# Patient Record
Sex: FEMALE | Race: WHITE | NOT HISPANIC OR LATINO | Employment: OTHER | URBAN - NONMETROPOLITAN AREA
[De-identification: names, ages, dates, MRNs, and addresses within clinical notes are randomized per-mention and may not be internally consistent; named-entity substitution may affect disease eponyms.]

---

## 2017-01-09 ENCOUNTER — COMMUNICATION - GICH (OUTPATIENT)
Dept: FAMILY MEDICINE | Facility: OTHER | Age: 67
End: 2017-01-09

## 2017-01-09 DIAGNOSIS — E78.5 HYPERLIPIDEMIA: ICD-10-CM

## 2017-02-13 ENCOUNTER — AMBULATORY - GICH (OUTPATIENT)
Dept: RADIOLOGY | Facility: OTHER | Age: 67
End: 2017-02-13

## 2017-02-13 DIAGNOSIS — R06.09 OTHER FORMS OF DYSPNEA: ICD-10-CM

## 2017-02-22 ENCOUNTER — MEDICAL CORRESPONDENCE (OUTPATIENT)
Facility: CLINIC | Age: 67
End: 2017-02-22
Payer: COMMERCIAL

## 2017-02-22 ENCOUNTER — TRANSFERRED RECORDS (OUTPATIENT)
Dept: HEALTH INFORMATION MANAGEMENT | Facility: CLINIC | Age: 67
End: 2017-02-22

## 2017-02-22 ENCOUNTER — HOSPITAL ENCOUNTER (OUTPATIENT)
Dept: RADIOLOGY | Facility: OTHER | Age: 67
End: 2017-02-22

## 2017-02-22 DIAGNOSIS — R06.09 OTHER FORMS OF DYSPNEA: ICD-10-CM

## 2017-02-22 PROCEDURE — 93306 TTE W/DOPPLER COMPLETE: CPT | Mod: 26 | Performed by: INTERNAL MEDICINE

## 2017-08-24 ENCOUNTER — COMMUNICATION - GICH (OUTPATIENT)
Dept: FAMILY MEDICINE | Facility: OTHER | Age: 67
End: 2017-08-24

## 2017-11-25 ENCOUNTER — HISTORY (OUTPATIENT)
Dept: MEDSURG UNIT | Facility: OTHER | Age: 67
End: 2017-11-25

## 2017-12-01 ENCOUNTER — COMMUNICATION - GICH (OUTPATIENT)
Dept: FAMILY MEDICINE | Facility: OTHER | Age: 67
End: 2017-12-01

## 2017-12-04 ENCOUNTER — AMBULATORY - GICH (OUTPATIENT)
Dept: SCHEDULING | Facility: OTHER | Age: 67
End: 2017-12-04

## 2017-12-04 ENCOUNTER — COMMUNICATION - GICH (OUTPATIENT)
Dept: FAMILY MEDICINE | Facility: OTHER | Age: 67
End: 2017-12-04

## 2017-12-11 ENCOUNTER — HISTORY (OUTPATIENT)
Dept: INTERNAL MEDICINE | Facility: OTHER | Age: 67
End: 2017-12-11

## 2017-12-11 ENCOUNTER — OFFICE VISIT - GICH (OUTPATIENT)
Dept: INTERNAL MEDICINE | Facility: OTHER | Age: 67
End: 2017-12-11

## 2017-12-11 DIAGNOSIS — J18.9 PNEUMONIA: ICD-10-CM

## 2017-12-11 DIAGNOSIS — C18.9 MALIGNANT NEOPLASM OF COLON (H): ICD-10-CM

## 2017-12-11 DIAGNOSIS — M05.9 RHEUMATOID ARTHRITIS WITH RHEUMATOID FACTOR (H): ICD-10-CM

## 2017-12-11 DIAGNOSIS — I10 ESSENTIAL (PRIMARY) HYPERTENSION: ICD-10-CM

## 2017-12-11 DIAGNOSIS — N18.30 CHRONIC KIDNEY DISEASE, STAGE III (MODERATE) (H): ICD-10-CM

## 2017-12-11 DIAGNOSIS — M71.22 SYNOVIAL CYST OF LEFT POPLITEAL SPACE: ICD-10-CM

## 2017-12-11 ASSESSMENT — PATIENT HEALTH QUESTIONNAIRE - PHQ9: SUM OF ALL RESPONSES TO PHQ QUESTIONS 1-9: 0

## 2018-01-02 NOTE — TELEPHONE ENCOUNTER
Patient Information     Patient Name MRN Sex Laura Leroy [id number] Female 1950      Telephone Encounter by Everett Cooper RN at 1/10/2017  9:07 AM     Author:  Everett Cooper RN Service:  (none) Author Type:  NURS- Registered Nurse     Filed:  1/10/2017  9:14 AM Encounter Date:  2017 Status:  Signed     :  Everett Cooper RN (NURS- Registered Nurse)            [facility]    Office visit in the past 12 months.    Last visit with OLIVER MUKHERJEE was on: 2015 in [facility]  Next visit with OLIVER MUKHERJEE is on: No future appointment listed with this provider  Next visit with Family Practice is on: No future appointment listed in this department    Last Lipids:  Chol: 213    12/3/2015  T    12/3/2015  HDL:   40    12/3/2015  LDL:  121    12/3/2015  LDL DIRECT:  No results found in past 5 years    .    Max refills 12 months from last office visit.      Patient is due for medication management appointment as last office visit to address Hyperlipidemia as well as pravachol was on 2015. Patient was also to follow up 2 months after 12/3/2015 office visit with PCP. No office visit with PCP noted in chart. Limited refill provided at this time and letter/mychart message sent for reminder to patient. Prescription refilled per RN Medication Refill Policy.................... Everett Cooper RN ....................  1/10/2017   9:10 AM

## 2018-01-26 ENCOUNTER — DOCUMENTATION ONLY (OUTPATIENT)
Dept: FAMILY MEDICINE | Facility: OTHER | Age: 68
End: 2018-01-26

## 2018-01-26 PROBLEM — M94.9 DISORDER OF BONE AND CARTILAGE: Status: ACTIVE | Noted: 2018-01-26

## 2018-01-26 PROBLEM — Z86.0100 HISTORY OF COLONIC POLYPS: Status: ACTIVE | Noted: 2018-01-26

## 2018-01-26 PROBLEM — L71.9 ACNE ROSACEA: Status: ACTIVE | Noted: 2018-01-26

## 2018-01-26 PROBLEM — N18.30 CHRONIC KIDNEY DISEASE (CKD), STAGE III (MODERATE) (H): Status: ACTIVE | Noted: 2017-12-11

## 2018-01-26 PROBLEM — M89.9 DISORDER OF BONE AND CARTILAGE: Status: ACTIVE | Noted: 2018-01-26

## 2018-01-26 PROBLEM — M81.0 OSTEOPOROSIS: Status: ACTIVE | Noted: 2017-11-27

## 2018-01-26 PROBLEM — E78.5 HYPERLIPIDEMIA: Status: ACTIVE | Noted: 2018-01-26

## 2018-01-26 PROBLEM — G43.909 MIGRAINE HEADACHE: Status: ACTIVE | Noted: 2018-01-26

## 2018-01-26 PROBLEM — E03.9 HYPOTHYROIDISM: Status: ACTIVE | Noted: 2018-01-26

## 2018-01-26 PROBLEM — C18.9 MALIGNANT TUMOR OF COLON (H): Status: ACTIVE | Noted: 2017-11-26

## 2018-01-26 PROBLEM — J18.9 HCAP (HEALTHCARE-ASSOCIATED PNEUMONIA): Status: ACTIVE | Noted: 2017-11-28

## 2018-01-26 PROBLEM — M71.22 POPLITEAL CYST, LEFT: Status: ACTIVE | Noted: 2018-01-26

## 2018-01-26 RX ORDER — FORMOTEROL FUMARATE DIHYDRATE 20 UG/2ML
2 SOLUTION RESPIRATORY (INHALATION) 2 TIMES DAILY
COMMUNITY
End: 2019-07-08

## 2018-01-26 RX ORDER — ASPIRIN 81 MG/1
81 TABLET, CHEWABLE ORAL
COMMUNITY

## 2018-01-26 RX ORDER — ALBUTEROL SULFATE 0.83 MG/ML
2.5 SOLUTION RESPIRATORY (INHALATION) EVERY 4 HOURS PRN
Status: ON HOLD | COMMUNITY
Start: 2017-12-11 | End: 2018-06-16

## 2018-01-26 RX ORDER — LANCETS
EACH MISCELLANEOUS
COMMUNITY
Start: 2014-12-29 | End: 2018-08-22

## 2018-01-26 RX ORDER — MONTELUKAST SODIUM 10 MG/1
10 TABLET ORAL AT BEDTIME
COMMUNITY
End: 2019-07-08

## 2018-01-26 RX ORDER — AMLODIPINE BESYLATE 5 MG/1
5 TABLET ORAL DAILY
Status: ON HOLD | COMMUNITY
End: 2018-06-16

## 2018-01-26 RX ORDER — ESTRADIOL 10 UG/1
1 INSERT VAGINAL
Status: ON HOLD | COMMUNITY
Start: 2015-05-12 | End: 2018-06-16

## 2018-01-26 RX ORDER — TRAZODONE HYDROCHLORIDE 100 MG/1
100 TABLET ORAL AT BEDTIME
Status: ON HOLD | COMMUNITY
End: 2018-06-28

## 2018-01-26 RX ORDER — PREDNISONE 5 MG/1
10 TABLET ORAL
COMMUNITY
End: 2020-11-02

## 2018-01-26 RX ORDER — FENOFIBRATE 48 MG/1
48 TABLET, COATED ORAL
COMMUNITY
End: 2019-07-08

## 2018-01-26 RX ORDER — LOSARTAN POTASSIUM 25 MG/1
25 TABLET ORAL DAILY
Status: ON HOLD | COMMUNITY
Start: 2017-11-30 | End: 2018-06-28

## 2018-01-26 RX ORDER — EPINEPHRINE 0.3 MG/.3ML
0.3 INJECTION SUBCUTANEOUS
COMMUNITY
Start: 2014-05-27 | End: 2018-07-05

## 2018-01-26 RX ORDER — LEVOTHYROXINE SODIUM 112 UG/1
112 TABLET ORAL
Status: ON HOLD | COMMUNITY
End: 2018-06-28

## 2018-01-26 RX ORDER — TRAMADOL HYDROCHLORIDE 50 MG/1
50 TABLET ORAL AT BEDTIME
Status: ON HOLD | COMMUNITY
End: 2018-06-28

## 2018-01-26 RX ORDER — PRAVASTATIN SODIUM 40 MG
40 TABLET ORAL AT BEDTIME
COMMUNITY
Start: 2017-01-10 | End: 2018-06-15

## 2018-01-26 RX ORDER — ALBUTEROL SULFATE 90 UG/1
2 AEROSOL, METERED RESPIRATORY (INHALATION) EVERY 4 HOURS PRN
Status: ON HOLD | COMMUNITY
Start: 2017-12-11 | End: 2018-06-28

## 2018-01-26 RX ORDER — BUDESONIDE 0.5 MG/2ML
0.5 INHALANT ORAL 2 TIMES DAILY
COMMUNITY
End: 2019-07-08

## 2018-02-09 VITALS
HEART RATE: 88 BPM | DIASTOLIC BLOOD PRESSURE: 72 MMHG | BODY MASS INDEX: 24.45 KG/M2 | WEIGHT: 143.2 LBS | SYSTOLIC BLOOD PRESSURE: 143 MMHG | HEIGHT: 64 IN

## 2018-02-11 ASSESSMENT — PATIENT HEALTH QUESTIONNAIRE - PHQ9: SUM OF ALL RESPONSES TO PHQ QUESTIONS 1-9: 0

## 2018-02-12 NOTE — TELEPHONE ENCOUNTER
Patient Information     Patient Name MRN Sex Laura Leroy [id number] Female 1950      Telephone Encounter by Micki Warren RN at 2017  4:27 PM     Author:  Micki Warren RN  Service:  (none) Author Type:  NURS- Registered Nurse     Filed:  2017  1:41 PM  Encounter Date:  2017 Status:  Addendum     :  Yamel Pacheco MD (Physician)        Related Notes: Original Note by Micki Warren RN (NURS- Registered Nurse) filed at 2017  4:34 PM            URGENT: Response needed within 24 hours    This timeframe is established by CMS as  best practice  for the delivery of home health care. The home health clinician may need to contact you again if this timeframe is not met.      S:    Medication reconciliation discrepancies and/or drug interactions/contraindications have been identified.  Home Care s drug regime review has revealed significant medication issues.    B:    You are being contacted for orders related to medication issues.     A:     Patient is taking the following medications which is not on her clinic list:   VItamin B12 100 mcg daily.           Albuterol Neb   2.5 mg /3ml, 3ml every 4 hours as needed for SOB or wheezing.            The following medication is listed with conflicting doses:             Tramadol.  (her prescription bottle does say 1 tab 2 times a day) 1-2 tabs q6 prn              During a routine med reconciliation, the following mod/major med interactions were noted:          Levaquin and prednisone          levaquin and tramadol          levaquin and budesonide          Levaquin and oscal          Levaquin and trazodone          Prednisone and aspirin          Prednisone and calcium          Prednisone and vagifem          Aspirin and budesonide          oscal and synthroid          oscal and prilosec          Fenofibrate and pravastatin          prilosec and Vitamin B12   All of the above is okay      R:    Please evaluate this information and indicate  below whether or not changes are required. A copy of the patient's drug interaction/contraindications report is available upon request.     Thank you for your time. Please call with questions or concerns.       Bobbi Mcfarland MD  1:41 PM 12/5/2017

## 2018-02-12 NOTE — TELEPHONE ENCOUNTER
Patient Information     Patient Name MRN Sex Laura Leroy [id number] Female 1950      Telephone Encounter by Evita Horner CNA at 2017 11:30 AM     Author:  Evita Horner CNA Service:  (none) Author Type:  NURS- Nurse Assist/Care Tech     Filed:  2017 11:31 AM Encounter Date:  2017 Status:  Signed     :  Evita Horner CNA (NURS- Nurse Assist/Care Tech)            Patient has a appointment scheduled 17 with Dr. Ortega for a hospital follow up. Would Dr. Ortega be willing to add the home care phrase to his note (.homecarecertification) for the face to face statement for medicare. Thank you.     Nilam Horner LPN  Clinical Coordinator

## 2018-02-12 NOTE — NURSING NOTE
Patient Information     Patient Name MRN Sex Laura Leroy [id number] Female 1950      Nursing Note by Haley Lugo LPN at 2017  2:00 PM     Author:  Haley Lugo LPN Service:  (none) Author Type:  NURS- Licensed Practical Nurse     Filed:  2017  2:20 PM Encounter Date:  2017 Status:  Signed     :  Haley Lugo LPN (NURS- Licensed Practical Nurse)            The patient is here today to have a hospital follow up.  Haley Lugo LPN......2017  1:51 PM

## 2018-02-12 NOTE — PROGRESS NOTES
Patient Information     Patient Name MRN Sex Laura Leroy [id number] Female 1950      Progress Notes by Raul Ortega MD at 2017  2:00 PM     Author:  Raul Ortega MD  Service:  (none) Author Type:  Physician     Filed:  2017  3:21 PM  Encounter Date:  2017 Status:  Addendum     :  Raul Ortega MD (Physician)        Related Notes: Original Note by Raul Ortega MD (Physician) filed at 2017  2:49 PM            SUBJECTIVE:    Laura Perez is a 67 y.o. female who presents for hospital f/u.    HPI Comments: She comes in today after having been hospitalized with a pneumonia. She had initially fallen and injured her knee and was seen from that. She had this aspirated in the emergency room and was then admitted to the hospital for pain control at which time the pneumonia and developed. She has a history of chronic rheumatoid arthritis and has had numerous treatments in the past including Biologics. She had to come off of all biologic because of the numerous infections that she had while on these. She is currently just on chronic prednisone.    She was treated appropriately in the hospital and was discharged home on Levaquin. She completed that course of therapy and now feels back to normal. Her follow-up chest x-ray showed improvement and near resolution of the infiltrate so I don't think that a follow-up chest x-ray needs to be done today. She had fairly extensive blood work while in the hospital and that looked fine as well. Her knee is doing much better.    Prior to all this, the patient had a colonoscopy as she was basically do and also because there is a family history of colon cancer. She had a subtotal colectomy done. All of that was done in [place]. She does go to [place] for most of her medical care, she establish there years ago when her  was attending law school in that area. She continues to see a rheumatologist and pulmonologist as well as  a nephrologist in that community.    I spent time today reconciling medications as well as updating her past medical history, past surgical history, family history and social history. Immunizations are up-to-date.      Allergies      Allergen   Reactions     Codeine  Nausea Only and Other - Describe In Comment Field     Stomach problems      Glyburide  Nausea Only and Dizziness     Septra [Sulfamethoxazole-Trimethoprim]  Other - Describe In Comment Field     Rash, nausea, dizziness      Sulfa (Sulfonamide Antibiotics)  Rash   ,   Current Outpatient Prescriptions     Medication  Sig     albuterol (PROVENTIL) 0.083 % neb solution Inhale 3 mL via a nebulizer every 4 hours if needed.     albuterol HFA 90 mcg/actuation inhaler Inhale 2 Puffs by mouth every 4 hours if needed.     amLODIPine (NORVASC) 5 mg tablet Take 5 mg by mouth once daily.     aspirin chewable 81 mg chewable tablet Take 81 mg by mouth once daily with a meal.     budesonide (PULMICORT RESPULES) 0.5 mg/2 mL neb suspension Inhale 0.5 mg via a nebulizer 2 times daily.     calcium with vitamin D3 (OS-GIANNI 500 + D) tablet Take 1 tablet by mouth 2 times daily with meals.     cyanocobalamin (VITAMIN B-12) 100 mcg tablet Take  by mouth once daily.     EPINEPHrine (EPIPEN 2-MADIHA) 0.3 mg/0.3 mL (1:1,000) injection Inject 0.3 mg intramuscular one time if needed for Allergic Reaction for 1 dose.     fenofibrate nanocrystallized (TRICOR) 48 mg tablet Take 48 mg by mouth once daily with a meal.     Formoterol Fumarate 20 mcg/2 mL nebu Inhale 2 mL by mouth 2 times daily.     insulin aspart protamine-insulin aspart (NOVOLOG MIX 70-30) 100 unit/mL (70-30) FLEXPEN 35 units am, 18 units pm  Indications: Take 35 units in the morning. Take 18 units in the evening.     levothyroxine (SYNTHROID) 112 mcg tablet Take 112 mcg by mouth before breakfast.     losartan (COZAAR) 25 mg tablet Take 1 tablet by mouth once daily.     montelukast (SINGULAIR) 10 mg tablet Take 10 mg by mouth  at bedtime.     omeprazole (PRILOSEC) 20 mg Delayed-Release capsule TAKE 1 CAPSULE BY MOUTH ONCE DAILY BEFORE A MEAL     ONE TOUCH ULTRA TEST strip TEST 2 TIMES PER DAY.     ONE TOUCH ULTRASOFT LANCETS TEST 2 TIMES PER DAY     pravastatin (PRAVACHOL) 40 mg tablet TAKE 1 TABLET BY MOUTH AT BEDTIME     predniSONE (DELTASONE) 5 mg tablet Take 15 mg by mouth once daily with a meal.     traMADol (ULTRAM) 50 mg tablet Take 50 mg by mouth 2 times daily if needed for Pain.     traZODone (DESYREL) 100 mg tablet Take 100 mg by mouth at bedtime.     VAGIFEM 10 mcg tab vaginal tablet INSERT 1 TABLET INTO VAGINA THREE TIMES PER WEEK     No current facility-administered medications for this visit.      Medications have been reviewed by me and are current to the best of my knowledge and ability. ,   Past Medical History:     Diagnosis  Date     Acne rosacea      Colon cancer (HC) 9/2107     COPD (chronic obstructive pulmonary disease) (HC)      Diabetes mellitus (HC)     Diabetes Mellitus, prednisone induced      Diverticulosis     Diffuse diverticulosis and history of diminutive hyperplastic colon      History of migraine headaches      Hyperlipidemia      Hypertension      Hypothyroidism      Rheumatoid arthritis(714.0)     Rheumatologist Dr. Gonzalez, [phone], fax [phone]      Urosepsis 8/2013    admitted to [facility], cx ecoli    ,   Patient Active Problem List       Diagnosis  Date Noted     Chronic kidney disease (CKD), stage III (moderate)  12/11/2017     HTN (hypertension)  11/29/2017     HCAP (healthcare-associated pneumonia)  11/28/2017     Osteoporosis  11/27/2017     Popliteal cyst, left       Colon cancer (HC)  11/26/2017     Anemia of chronic kidney failure, stage 3 (moderate)  07/28/2017     Diabetes mellitus type 2, insulin dependent (HC)  07/28/2017     Chronic diastolic CHF (congestive heart failure), NYHA class 2 (HC)  02/13/2016     Non-rheumatic mitral valve stenosis  02/13/2016     On prednisone  therapy  01/15/2016     Orthostatic hypotension  01/15/2016     Secondary adrenal insufficiency (HC)  01/15/2016     Seropositive rheumatoid arthritis (HC)  01/15/2016     Lichen sclerosus et atrophicus  2015     COPD (chronic obstructive pulmonary disease) (HC)  2014     MIGRAINE HEADACHE       Hypothyroidism       HYPERLIPIDEMIA       ACNE ROSACEA       COLONIC POLYPS, HYPERPLASTIC, HX OF       diminutive at 25 cm          OSTEOPENIA               ,   Past Surgical History:      Procedure  Laterality Date     ARTHROPLASTY  98    Left MCP joint arthroplasty       ARTHROPLASTY      Right wrist arthroplasty       ARTHROPLASTY      Right long finger MCP repair [place]       ARTHROPLASTY      Right MTP 1 and 5 and left MTP 5       ARTHROPLASTY  3/01/04    Right MCP 4 and 5 arthroplasty.         CARPAL TUNNEL RELEASE      Left carpal tunnel repair       COLONOSCOPY SCREENING  08    Colonoscopy next due        HX of SURGERY FOOT      MTP 2, 3, 4 right foot       SUBTOTAL COLECTOMY  2017     TUBAL LIGATION      and   Social History        Substance Use Topics          Smoking status:   Former Smoker      Types:  Cigarettes      Smokeless tobacco:   Never Used      Alcohol use   1.5 oz/week     3 Glasses of wine per week        Comment: 3 daily       Family Status     Relation  Status     Mother  at age 82     Father  at age 45    MI      Son Alive     Son Alive     Son Alive     Daughter Alive     Sister Alive     Sister Alive     Sister Alive     Brother      No Family History      Social History     Social History        Marital status:       Spouse name: N/A     Number of children:  N/A     Years of education:  N/A     Social History Main Topics          Smoking status:   Former Smoker      Types:  Cigarettes      Smokeless tobacco:   Never Used      Alcohol use   1.5 oz/week     3 Glasses of wine per week        Comment: 3 daily    "    Drug use:   No      Sexual activity:   Not Asked      Other Topics  Concern     None      Social History Narrative         , [place].  Does not work outside the home.    Four children are grown.    Lives in town.                   REVIEW OF SYSTEMS:  Review of Systems   Constitutional: Negative for chills, diaphoresis, fever, malaise/fatigue and weight loss.   HENT: Negative for congestion, ear pain, nosebleeds, sore throat and tinnitus.    Eyes: Negative for blurred vision, double vision, photophobia, pain, discharge and redness.   Respiratory: Negative for cough, hemoptysis, sputum production, shortness of breath and wheezing.    Cardiovascular: Negative for chest pain, palpitations, orthopnea, claudication, leg swelling and PND.   Gastrointestinal: Negative for abdominal pain, blood in stool, constipation, diarrhea, heartburn, nausea and vomiting.   Genitourinary: Negative for dysuria, flank pain and hematuria.   Musculoskeletal: Negative for back pain, joint pain, myalgias and neck pain.   Skin: Negative for itching and rash.   Neurological: Negative for dizziness, tingling, tremors, speech change, loss of consciousness, weakness and headaches.   Psychiatric/Behavioral: Negative for depression, hallucinations, memory loss, substance abuse and suicidal ideas. The patient is not nervous/anxious.        OBJECTIVE:  /72  Pulse 88  Ht 1.626 m (5' 4\")  Wt 65 kg (143 lb 3.2 oz)  Breastfeeding? No  BMI 24.58 kg/m2    EXAM:   Physical Exam   Constitutional: She is well-developed, well-nourished, and in no distress. No distress.   HENT:   Head: Normocephalic.   Neck: Normal range of motion.   Cardiovascular: Normal rate, regular rhythm and normal heart sounds.    Pulmonary/Chest: Effort normal. She has decreased breath sounds. She has no wheezes. She has no rhonchi. She has no rales.   Abdominal: Soft. Bowel sounds are normal. She exhibits no distension and no mass. There is no tenderness. There is no " rebound and no guarding.   Musculoskeletal: She exhibits no edema.   chronic RA changes especially in her hands   Neurological: She is alert.   Skin: Skin is warm and dry. She is not diaphoretic.   Psychiatric: Affect normal.   Nursing note and vitals reviewed.      ASSESSMENT/PLAN:    ICD-10-CM    1. Popliteal cyst, left M71.22    2. HTN (hypertension) I10    3. HCAP (healthcare-associated pneumonia) J18.9    4. Seropositive rheumatoid arthritis (HC) M05.9    5. Malignant neoplasm of colon, unspecified part of colon (HC) C18.9    6. Chronic kidney disease (CKD), stage III (moderate) N18.3         Plan:  She appears stable at this time and has certainly recovered from her acute infection. Medications are all reconciled and will remain the same, she did not need any refills. Immunizations are up-to-date. She will follow-up with me on an as-needed basis. She will follow-up with her physicians in [facility] as previously scheduled as well.    Home Health Certification/Face to Face Attestation:     I certify that this patient is under my care and that I, or a nurse practitioner (NP) with whom I collaborate or physician assistant (PA) whom I supervise, had a face-to-face encounter that meets the face-to-face encounter requirements with this patient during this visit.    Date of the Face to Face Encounter: 12/11/2017    I certify, based on my findings, review of the medical records, and/or collaboration with the NP or PA, the following services are medically necessary home health services:  Physicial Therapy  And OT  Clinical findings support the need for the above services for this patient because of:  Significant weakness and decreased strength/endurance    This patient is homebound based on my clinical findings, review of the medical records, and/or collaboration with the NP or the PA whom I supervise because:   There is a taxing effort to leave the home due to recent illness and  hospitalization.    Patients with Medicare are required to be homebound. Patients may receive therapy services under Medicaid after it has been determined the recipient is unable to access outpatient therapy.     Patient requires the assistance of another individual in order to leave the home.    The services identified in this certification are medically necessary home health services.     Provider following for home care services Yamel Pacheco MD           Electronically signed,   Raul Ortega MD    12/11/2017

## 2018-02-12 NOTE — TELEPHONE ENCOUNTER
Patient Information     Patient Name MRN Laura Calvin [id number] Female 1950      Telephone Encounter by Jaida Roberts at 2017  2:38 PM     Author:  Jaida Roberts Service:  (none) Author Type:  (none)     Filed:  2017  2:42 PM Encounter Date:  2017 Status:  Signed     :  Jaida Roberts MD documented Okay.  Jaida Roberts LPN .............2017  2:39 PM

## 2018-03-01 ENCOUNTER — MEDICAL CORRESPONDENCE (OUTPATIENT)
Dept: HEALTH INFORMATION MANAGEMENT | Facility: OTHER | Age: 68
End: 2018-03-01

## 2018-03-13 DIAGNOSIS — J44.9 COPD (CHRONIC OBSTRUCTIVE PULMONARY DISEASE) (H): Primary | ICD-10-CM

## 2018-03-14 DIAGNOSIS — M47.816 SPONDYLOSIS WITHOUT MYELOPATHY OR RADICULOPATHY, LUMBAR REGION: Primary | ICD-10-CM

## 2018-03-14 DIAGNOSIS — M48.061 STENOSIS, SPINAL, LUMBAR: ICD-10-CM

## 2018-03-21 ENCOUNTER — HOSPITAL ENCOUNTER (OUTPATIENT)
Dept: PHYSICAL THERAPY | Facility: OTHER | Age: 68
Setting detail: THERAPIES SERIES
End: 2018-03-21
Attending: FAMILY MEDICINE
Payer: MEDICARE

## 2018-03-21 PROCEDURE — 97162 PT EVAL MOD COMPLEX 30 MIN: CPT | Mod: GP | Performed by: PHYSICAL THERAPIST

## 2018-03-21 PROCEDURE — G8982 BODY POS GOAL STATUS: HCPCS | Mod: GP,CJ | Performed by: PHYSICAL THERAPIST

## 2018-03-21 PROCEDURE — 40000185 ZZHC STATISTIC PT OUTPT VISIT: Performed by: PHYSICAL THERAPIST

## 2018-03-21 PROCEDURE — G8981 BODY POS CURRENT STATUS: HCPCS | Mod: GP,CK | Performed by: PHYSICAL THERAPIST

## 2018-03-21 PROCEDURE — 97110 THERAPEUTIC EXERCISES: CPT | Mod: GP | Performed by: PHYSICAL THERAPIST

## 2018-03-21 NOTE — INITIAL ASSESSMENTS
03/21/18 0700   General Information   Type of Visit Initial OP Ortho PT Evaluation   Start of Care Date 03/21/18   Referring Physician Savage Zee MD   Patient/Family Goals Statement to have less pain with daily activities, and positions.   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   How/Where did it occur From insidious onset   Chronicity Chronic   Pain rating (0-10 point scale) Best (/10);Worst (/10)   Best (/10) 5   Worst (/10) 9   Pain quality A. Sharp;B. Dull;C. Aching   Frequency of pain/symptoms A. Constant   Pain/symptoms are: Other   Pain symptoms comment worse with bending or reaching.   Pain/symptoms exacerbated by B. Walking;H. Overhead reach;G. Certain positions;I. Bending;K. Home tasks;L. Work tasks   Pain/symptoms eased by A. Sitting;C. Rest;E. Changing positions   Progression of symptoms since onset: Unchanged;Worsened   Current / Previous Interventions   Diagnostic Tests: MRI   MRI results FINDINGS: Coronal and sagittal sequences demonstrate relative preservation of normal vertebral body heights as well as alignment. Minimal height loss of L5 with slight scalloping of the inferior endplate are without corresponding bone marrow edema  consistent with chronic change. No suspicious abnormal focal bone marrow signal is appreciated. The spinal cord demonstrates grossly normal caliber and intrinsic signal with the conus medullaris terminating at level of L1.  The nerve roots demonstrate normal distribution.  Moderate epidural lipomatosis most severe at the lumbar sacral junction typically is of limited clinical significance.  No significant paraspinal soft tissue abnormal is appreciated. Renal cysts are essentially noted.   Prior Level of Function   Prior Level of Function-Mobility has been limited with reach and bending for past year or so, hard to do laundry, don/doff clothing, shop, and walk for more than 30 min.   Prior Level of Function-ADLs laundry, dressing, reaching up and out.   Current Level of Function   Current Community Support Family/friend caregiver   Patient role/employment history A. 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   Current equipment-ADL Reacher   Fall Risk Screen   Fall screen completed by PT   Have you fallen 2 or more times in the past year? No   Have you fallen and had an injury in the past year? No   Is patient a fall risk? No   Functional Scales   Functional Scales Other   Other Scales  PSFS 57% impaired.   Lumbar Spine/SI Objective Findings   Observation very tight paraspinals, decreased rotaiton, RA deformities of hands/wrists.   Integumentary intact   Posture slight kyphotic, rounded shoulders,    Flexion ROM tight, sore, standing and seated flexion, able to reach to ankles in standing,   Extension ROM tight, not as painful. but limited due to tightness.   Lumbar ROM Comment shoulder pain limited passive assessment of ROM, but she is very tight,   Pelvic Screen supine assess of innominate showed good alignment, but pain with ASIS gapping and compression.   Hip Screen Neg.   Hip Flexion (L2) Strength good   Hip Abduction Strength fair   Hip Adduction Strength good   Hip Extension Strength fair    Knee Flexion Strength good   Knee Extension (L3) Strength good   Ankle Dorsiflexion (L4) Strength good   Great Toe Extension (L5) Strength good   Ankle Plantar Flexion (S1) Strength fair to good.   Hamstring Flexibility tight, supine ankle of stretch at 50 deg.   Hip Flexor Flexibility not tested   Quadricep Flexibility tight   Piriformis Flexibility mild tight   SLR Neg   Repeated Extension Prone not able to test, due to shoulders   Slump Test tension in spine   Segmental Mobility shoulder pain limited testing   Palpation very tight paraspianls and QL.   Shoulder Objective Findings   Side (if bilateral, select both right and left) Right;Left   Observation hypertonic UT/LS.   Integumentary  bruising along lower arm, patient states from meds.   Posture kyphotic, rounderd shoulders.   Thoracic Mobility Screen stiff and sore with rotaiton and side glide, more assess next session.   Shoulder ROM Comment pain with all ROM checks.   Neer's Test pain   Grider-Clifford Test pain   Crossover Test pain   Right Shoulder Flexion AROM 130   Right Shoulder Flexion PROM 150   Right Shoulder Abduction AROM 120   Right Shoulder Abduction PROM 150   Right Shoulder ER AROM 30, behind neck/head to C4.   Right Shoulder ER PROM 45   Right Shoulder IR AROM to behind back to L3   Right Shoulder Flexion Strength 4/5   Right Shoulder Abduction Strength 4+   Right Shoulder ER Strength 4   Right Shoulder IR Strength 4   Right Shoulder Extension Strength 4   Left Shoulder Flexion AROM 120   Left Shoulder Flexion PROM 130   Left Shoulder Abduction AROM 100   Left Shoulder Abduction PROM 120   Left Shoulder ER AROM 25, to rech behind head to C3   Left Shoulder IR AROM to behind back to L5   Left Shoulder Flexion Strength 4   Left Shoulder Abduction Strength 4   Left Shoulder ER Strength 4   Left Shoulder IR Strength 4   Left Shoulder Extension Strength 4   Planned Therapy Interventions   Planned Therapy Interventions joint mobilization;manual  therapy;neuromuscular re-education;ROM;strengthening;stretching   Planned Modality Interventions   Planned Modality Interventions Electrical stimulation;Hot packs;Traction;Ultrasound   Clinical Impression   Criteria for Skilled Therapeutic Interventions Met yes, treatment indicated   PT Diagnosis decerased mobility, ROM, strength, and pain of bilateral shoudlers and lumbar spine.   Functional limitations due to impairments dressing, don/doff clothing , shopping, walking, bed mobilty.   Clinical Presentation Evolving/Changing   Clinical Presentation Rationale RA, chronic OA, worsening s/s.   Clinical Decision Making (Complexity) Moderate complexity   Therapy Frequency 2 times/Week   Predicted Duration of Therapy Intervention (days/wks) 2x/wk for 8 weeks. (less or more if indicated)   Risk & Benefits of therapy have been explained Yes   Patient, Family & other staff in agreement with plan of care Yes   Education Assessment   Barriers to Learning No barriers   Ortho Goal 1   Goal Identifier walking   Goal Description tolerate walking for shopping greater than 30 min.   Target Date 04/18/18   Ortho Goal 2   Goal Identifier raisng arm overhead   Goal Description able to reach up into cupboards with 50% less pain   Target Date 04/18/18   Ortho Goal 3   Goal Identifier bending forward/down   Goal Description able to bend down to floor/or laundry to  items with good posture and 50% less pain   Target Date 04/18/18   Total Evaluation Time   Total Evaluation Time 40   1x Eval Only-Outpatient/Observation Medicare G-Code   G-code Body Position: Changing & Maintaining Body Position   Body Position: Changing & Maintaining Body Position   Body Position: Current Status , Goal , Discharge -Xkhq Only- Modifier the same for all G-codes CK: 40-59% impairment   Body Position Comments goal of CJ   Therapy Certification   Certification date from 03/21/18   Certification date to 05/20/18

## 2018-03-21 NOTE — PROGRESS NOTES
[facility]          OUTPATIENT PHYSICAL THERAPY ORTHOPEDIC EVALUATION  PLAN OF TREATMENT FOR OUTPATIENT REHABILITATION  (COMPLETE FOR INITIAL CLAIMS ONLY)  Patient's Last Name, First Name, M.I.  YOB: 1950  Laura Perez    Provider s Name:  [facility]   Medical Record No.  [id number]   Start of Care Date:  03/21/18   Onset Date:      Type:     _X__PT   ___OT   ___SLP Medical Diagnosis:        PT Diagnosis:  decerased mobility, ROM, strength, and pain of bilateral shoudlers and lumbar spine.   Visits from SOC:  1      _________________________________________________________________________________  Plan of Treatment/Functional Goals:  joint mobilization, manual therapy, neuromuscular re-education, ROM, strengthening, stretching     Electrical stimulation, Hot packs, Traction, Ultrasound     Goals  Goal Identifier: walking  Goal Description: tolerate walking for shopping greater than 30 min.  Target Date: 04/18/18    Goal Identifier: raisng arm overhead  Goal Description: able to reach up into cupboards with 50% less pain  Target Date: 04/18/18    Goal Identifier: bending forward/down  Goal Description: able to bend down to floor/or laundry to  items with good posture and 50% less pain  Target Date: 04/18/18           Therapy Frequency:  2 times/Week  Predicted Duration of Therapy Intervention:  2x/wk for 8 weeks. (less or more if indicated)    Jackson Naranjo, PT                 I CERTIFY THE NEED FOR THESE SERVICES FURNISHED UNDER        THIS PLAN OF TREATMENT AND WHILE UNDER MY CARE .             Physician Signature               Date    X_____________________________________________________                             Certification Date From:  03/21/18   Certification Date To:  05/20/18    Referring Provider:  Savage Zee MD    Initial Assessment        See Epic Evaluation Start of Care Date: 03/21/18

## 2018-03-26 ENCOUNTER — HOSPITAL ENCOUNTER (OUTPATIENT)
Dept: RESPIRATORY THERAPY | Facility: OTHER | Age: 68
Discharge: HOME | End: 2018-03-26
Attending: INTERNAL MEDICINE | Admitting: INTERNAL MEDICINE
Payer: MEDICARE

## 2018-03-26 ENCOUNTER — HOSPITAL ENCOUNTER (OUTPATIENT)
Dept: RESPIRATORY THERAPY | Facility: OTHER | Age: 68
End: 2018-03-26
Attending: INTERNAL MEDICINE
Payer: MEDICARE

## 2018-03-26 DIAGNOSIS — J44.9 COPD (CHRONIC OBSTRUCTIVE PULMONARY DISEASE) (H): Primary | ICD-10-CM

## 2018-03-26 PROCEDURE — 94729 DIFFUSING CAPACITY: CPT | Mod: 26 | Performed by: INTERNAL MEDICINE

## 2018-03-26 PROCEDURE — 25000125 ZZHC RX 250: Performed by: INTERNAL MEDICINE

## 2018-03-26 PROCEDURE — 94726 PLETHYSMOGRAPHY LUNG VOLUMES: CPT | Mod: 26 | Performed by: INTERNAL MEDICINE

## 2018-03-26 PROCEDURE — 94640 AIRWAY INHALATION TREATMENT: CPT

## 2018-03-26 PROCEDURE — 94060 EVALUATION OF WHEEZING: CPT | Mod: 26 | Performed by: INTERNAL MEDICINE

## 2018-03-26 PROCEDURE — 94200 LUNG FUNCTION TEST (MBC/MVV): CPT

## 2018-03-26 PROCEDURE — 94726 PLETHYSMOGRAPHY LUNG VOLUMES: CPT

## 2018-03-26 PROCEDURE — 94729 DIFFUSING CAPACITY: CPT

## 2018-03-26 PROCEDURE — 94060 EVALUATION OF WHEEZING: CPT

## 2018-03-26 PROCEDURE — 94618 PULMONARY STRESS TESTING: CPT

## 2018-03-26 PROCEDURE — 94200 LUNG FUNCTION TEST (MBC/MVV): CPT | Mod: 26 | Performed by: INTERNAL MEDICINE

## 2018-03-26 RX ORDER — ALBUTEROL SULFATE 0.83 MG/ML
2.5 SOLUTION RESPIRATORY (INHALATION) ONCE
Status: COMPLETED | OUTPATIENT
Start: 2018-03-26 | End: 2018-03-26

## 2018-03-26 RX ADMIN — ALBUTEROL SULFATE 2.5 MG: 2.5 SOLUTION RESPIRATORY (INHALATION) at 12:03

## 2018-03-27 ENCOUNTER — HOSPITAL ENCOUNTER (OUTPATIENT)
Dept: RESPIRATORY THERAPY | Facility: OTHER | Age: 68
End: 2018-03-27
Attending: INTERNAL MEDICINE
Payer: MEDICARE

## 2018-03-27 VITALS — WEIGHT: 145 LBS | BODY MASS INDEX: 25.69 KG/M2 | HEIGHT: 63 IN

## 2018-03-27 PROCEDURE — G0424 PULMONARY REHAB W EXER: HCPCS

## 2018-03-27 ASSESSMENT — 6 MINUTE WALK TEST (6MWT)
MALE CALC: 439.97
GENDER SELECTION: FEMALE
TOTAL DISTANCE WALKED: 459
FEMALE CALC: 454.89

## 2018-03-27 ASSESSMENT — DUKE ACTIVITY SCORE INDEX (DASI)
DASI METS SCORE: 4.95
VO2_PEAK: 17.32

## 2018-03-27 ASSESSMENT — PULMONARY FUNCTION TESTS
FEV1/FVC: 57
FVC_PERCENT_PREDICTED: 44
FEV1 (%PREDICTED): 32
FVC: 1.2
FEV1: .68

## 2018-03-27 NOTE — PROGRESS NOTES
03/27/18 0900   Session   Session Initial Evaluation and Exercise Prescription   Type Initial   General Information   Treatment Diagnosis COPD   Classification of COPD Stage 3 (Severe)   Onset Date 12/29/15  (Pt unsure of exact date)   Hospital Location Other (see comments)  ([facility] in [place])   Current Signs and Symptoms SOB;Anxiety;Fatigue;Lightheadedness   Outpatient Pulmonary Rehab Start Date 03/27/18   Primary Physician Felicitas Johnson   Pulmonolgist Elliot Ray   Other Specialty Provider Nia Gomez   General Information Comments (Pt suffers form Rhemotoid Arthritus)   Medical History/Comorbidities   Medical History/Comorbidities Anxiety;COPD;Chronic pain syndromes;Chronic renal disease;Depression;Diabetes;GERD;Hypertension;Malignancy;Neuromuscular disease;Obstructive sleep apnea;Osteoarthritis;Osteoporosis;Pneumonia;Previous exacerbation of chronic lung disease;Other (see comments)   Medical History Comments (Rheumatoid arthritus)   Untoward Events/Exacerbations/Hospitalizations   Untoward Events/Exacerbations/Hospitalizations None   Sputum   Sputum Production Amount (periodic increases in sputum)   Sputum Production Color Yellow   Sputum Production Consistency Thick   Tobacco History   Tobacco Former smoker   Tobacco Habit Cigarettes   Years Smoked 30   Average Packs Per Day 1   Quit Date or Planned Quit Date 08/15/05   Medications   Short-Acting Beta Agonist Prescribed, taking as prescribed  (Proformis BID and Albuterol MDI prn)   Inhaled Corticosteroid Prescribed, taking as prescribed  (Pulmicort Neb BID)   Preventative Vaccinations   Influenza Vaccination Yes   Pneumonia Vaccination Yes   Pain   Patient Currently in Pain Yes   Pain Location (Back and sholders)   Pain Rating 7   Pain Description Ache;Dull   Pain Treatment Recommendations (pt uses Tramado, Med Marijuana, Trazadone, Montilukas)   Fall Risk Screen   Fall screen completed by Pulmonary Rehab   Have you fallen 2 or more  times in the past year? No   Have you fallen and had an injury in the past year? Yes   Timed Up and Go score (seconds) 12.86   Fall screen comments pt slower due to arthritus   Living and Work Status   Living Arrangements house;spouse   Support System Live with an adult   Environmental Factors No concerns   ADL Limitations Dressing;Bathing;Toileting;Stair climbing;Cooking   Initial Duke Activity Status Index (DASI) score. A measure of functional capacity. The goal is to have a pre-program raw score of 9.95 (~4 METs) or above 17.95   Initial DASI VO2 Peak (ml*kg-1*min-1) 17.32   Initial DASI MET Level 4.95   Occupation clerical/housewife   Physical Assessments   Edema +1 Trace  (due to her arthritus)   Left Lung Sounds diminished   Right Lung Sounds diminished   Pulmonary Function Test (PFTs)   PFT Results Available   Date Completed 03/26/18   FVC Actual 1.2   % Predicted FVC 44   FEV1 Actual 0.68   % Predicted FEV1 32   FEV1/FEV Ratio 57   FRC Actual 0.72   % Predicted FRC  27   Uncorrected DLCO 14.35   % Predicted Uncorrected DLCO 72   Pre/Post Bronchodilator Pre   Airway Obstruction Severe   Individualized Treatment Plan   Sessions Scheduled 16   Sessions Attended (1)   Type Aerobic exercise;Resistance training;Flexibility training   Oxygen Use   Supplemental Oxygen Needed No   Interventions Recommended Titrate O2 with exercise;Continue to monitor SpO2 at rest and with exercise on current O2 settings   Exercise Prescription   Mode Treadmill;Nustep;Arm Ergometer/UBE;Weights;Ambulation   Frequency 3 daysweek   Duration/Time 15-30 min   THR (85% of age predicted max heart rate)  128.35   Comments (will do some basic exercises to day to determine range)   Exercise Assessment   6 Minute Walk Predicted - Gender Selection Female   6 Minute Walk Predicted (Female) 454.89   6 Minute Walk Predicted (Male) 439.97   6 Minute Walk Distance (Initial) 459 Meters   Resting HR 92   Exercise    Resting /60   SpO2 92  "  Exercise SpO2 89   Current MET level 1.6   Exercise Tolerance fair   Normal Limits Discussed Yes   Current Symptoms at Home Anxiety;Dizziness;Fatigue;Joint pain;Lightleadedness   Nutrition Management   Age 68   Height 1.6 m (5' 3\")   Weight 65.8 kg (145 lb)   BMI (Calculated) 25.74   Assessment Overweight   Goal weight 61.2 kg (135 lb)   Interventions Planned Educate on weight loss principles   Psychosocial   Initial Patient Health Questionnaire -9 (PHQ-9) for depression. To notify physician if pre-score >9. 10   Initial [facility] COOP Survey score. A quality of life measure. To re evaluate if total score is > 25. Also consider PHQ-9 and DASI to determine if patient should be referred back to physician. 23   Initial COPD Assessment Test (CAT). A quality of life measure. Scores range from 0-40. Higher scores indicate greater levels of limitations. The goal is to reduce scores pre to post program. 22   Initial Shortness of Breath Questionnaire (SOBQ) score. The goal is to reduce the score pre to post program. 38   Stages of Change   Aerobic Exercise Preparation;Action   Physical Activity Preparation;Action   Recommended diet Contemplation   Stress Preparation   Smoking Cessation Maintenance   Oxygen Usage NA   Current Home Exercise   Type of Exercise LE Strengthening Exercise   Recommended Home Exercise Prescription   Type of Exercise Walking;Treadmill   Frequency (Days per week) 3  (3)   Duration (minutes per session) 15-30 min   Effort Rating Recommended (0-10) Scale  4-6/10   30 Day Exercise Plan Pt will attend [place] 2d/w and will ex at home atleat 1d/w   Learning Assessment   Learner Patient   Primary Language English   Preferred Learning Style Pictures/Video   Barriers to Learning No barriers noted   Patient Education/Referrals   Education Recommended Activities of Daily Living;Advance Directives;Air Quality;Airway Clearance;Anatomy and Disease Process;Breathing Techniques;Community Resources/Exacerbation " Management;Emotional Aspects of CLD;Energy Conservation;Exercise Principles;Home Oxygen Equipment;Medication Overview;Nutrition   Education Attended Breathing Techniques;Exercise Principles;Medication Overview   Pulmonary Rehab Goals   Pulmonary Rehab Goals 1;2;3;4   Goal 1   Goal Be able to climb stairs with less SOB   Target Date (end of [place])   Goal 2   Goal Learn breathing techniques so I can better manage my SOB   Target Date (By week four of [place])   Goal 3   Goal Be able to walk longer distances with less SOB   Target Date (by the end of [place])   Goal 4   Goal Learn how to appropriately exercise with my lung condition   Target Date (By mid [place])   Assessment   Assessment Pt is ready to start Pulmonary Rehab.         Today's face-to-face visit has been completed. I have reviewed initial evaluation outcomes including patient's response to initial activity assessment, and agree with exercise prescription for pulmonary rehabilitation for this patient.      Kary Vitale RT on 3/27/2018 at 1:25 PM

## 2018-03-28 ENCOUNTER — HOSPITAL ENCOUNTER (OUTPATIENT)
Dept: PHYSICAL THERAPY | Facility: OTHER | Age: 68
Setting detail: THERAPIES SERIES
End: 2018-03-28
Attending: FAMILY MEDICINE
Payer: MEDICARE

## 2018-03-28 PROCEDURE — 97110 THERAPEUTIC EXERCISES: CPT | Mod: GP | Performed by: PHYSICAL THERAPIST

## 2018-03-28 PROCEDURE — 40000185 ZZHC STATISTIC PT OUTPT VISIT: Performed by: PHYSICAL THERAPIST

## 2018-03-28 PROCEDURE — 97140 MANUAL THERAPY 1/> REGIONS: CPT | Mod: GP | Performed by: PHYSICAL THERAPIST

## 2018-03-28 NOTE — ADDENDUM NOTE
Encounter addended by: Jackson Naranjo, PT on: 3/28/2018  7:15 AM<BR>     Actions taken: Flowsheet accepted

## 2018-03-30 LAB
DLCOCOR-%PRED-PRE: 74 %
DLCOCOR-PRE: 14.72 ML/MIN/MMHG
DLCOUNC-%PRED-PRE: 72 %
DLCOUNC-PRE: 14.35 ML/MIN/MMHG
DLCOUNC-PRED: 19.8 ML/MIN/MMHG
ERV-%PRED-PRE: 37 %
ERV-PRE: 0.22 L
ERV-PRED: 0.6 L
EXPTIME-PRE: 6.17 SEC
FEF2575-%PRED-POST: 32 %
FEF2575-%PRED-PRE: 17 %
FEF2575-POST: 0.59 L/SEC
FEF2575-PRE: 0.32 L/SEC
FEF2575-PRED: 1.82 L/SEC
FEFMAX-%PRED-PRE: 41 %
FEFMAX-PRE: 2.25 L/SEC
FEFMAX-PRED: 5.41 L/SEC
FEV1-%PRED-PRE: 32 %
FEV1-PRE: 0.68 L
FEV1FEV6-PRE: 55 %
FEV1FEV6-PRED: 79 %
FEV1FVC-PRE: 57 %
FEV1FVC-PRED: 76 %
FEV1SVC-PRE: 49 %
FEV1SVC-PRED: 74 %
FIFMAX-PRE: 1.86 L/SEC
FRCPLETH-%PRED-PRE: 27 %
FRCPLETH-PRE: 0.72 L
FRCPLETH-PRED: 2.6 L
FVC-%PRED-PRE: 44 %
FVC-PRE: 1.2 L
FVC-PRED: 2.68 L
GAW-%PRED-PRE: 5 %
GAW-PRE: 0.06 L/S/CMH2O
GAW-PRED: 1.03 L/S/CMH2O
IC-%PRED-PRE: 53 %
IC-PRE: 1.18 L
IC-PRED: 2.23 L
MVV-%PRED-PRE: 52 %
MVV-PRE: 43 L/MIN
MVV-PRED: 82 L/MIN
RVPLETH-%PRED-PRE: 25 %
RVPLETH-PRE: 0.5 L
RVPLETH-PRED: 1.96 L
SGAW-%PRED-PRE: 77 %
SGAW-PRE: 0.08 1/CMH2O*S
SGAW-PRED: 0.1 1/CMH2O*S
SRAW-%PRED-PRE: 330 %
SRAW-PRE: 15.72 CMH2O*S
SRAW-PRED: 4.76 CMH2O*S
TLCPLETH-%PRED-PRE: 41 %
TLCPLETH-PRE: 1.91 L
TLCPLETH-PRED: 4.6 L
VA-%PRED-PRE: 62 %
VA-PRE: 2.94 L
VC-%PRED-PRE: 49 %
VC-PRE: 1.41 L
VC-PRED: 2.83 L

## 2018-04-04 ENCOUNTER — HOSPITAL ENCOUNTER (OUTPATIENT)
Dept: PHYSICAL THERAPY | Facility: OTHER | Age: 68
Setting detail: THERAPIES SERIES
End: 2018-04-04
Attending: FAMILY MEDICINE
Payer: MEDICARE

## 2018-04-04 PROCEDURE — 97012 MECHANICAL TRACTION THERAPY: CPT | Mod: GP | Performed by: PHYSICAL THERAPIST

## 2018-04-04 PROCEDURE — 40000185 ZZHC STATISTIC PT OUTPT VISIT: Performed by: PHYSICAL THERAPIST

## 2018-04-04 PROCEDURE — 97110 THERAPEUTIC EXERCISES: CPT | Mod: GP | Performed by: PHYSICAL THERAPIST

## 2018-04-04 PROCEDURE — 97140 MANUAL THERAPY 1/> REGIONS: CPT | Mod: GP | Performed by: PHYSICAL THERAPIST

## 2018-04-10 ENCOUNTER — HOSPITAL ENCOUNTER (OUTPATIENT)
Dept: RESPIRATORY THERAPY | Facility: OTHER | Age: 68
End: 2018-04-10
Attending: INTERNAL MEDICINE
Payer: MEDICARE

## 2018-04-10 VITALS — BODY MASS INDEX: 27.63 KG/M2 | WEIGHT: 156 LBS

## 2018-04-10 PROCEDURE — G0424 PULMONARY REHAB W EXER: HCPCS

## 2018-04-10 PROCEDURE — 40000275 ZZH STATISTIC RCP TIME EA 10 MIN

## 2018-04-11 ENCOUNTER — HOSPITAL ENCOUNTER (OUTPATIENT)
Dept: PHYSICAL THERAPY | Facility: OTHER | Age: 68
Setting detail: THERAPIES SERIES
End: 2018-04-11
Attending: FAMILY MEDICINE
Payer: MEDICARE

## 2018-04-11 PROCEDURE — 97140 MANUAL THERAPY 1/> REGIONS: CPT | Mod: GP,XU | Performed by: PHYSICAL THERAPIST

## 2018-04-11 PROCEDURE — 40000185 ZZHC STATISTIC PT OUTPT VISIT: Performed by: PHYSICAL THERAPIST

## 2018-04-11 PROCEDURE — 97012 MECHANICAL TRACTION THERAPY: CPT | Mod: GP | Performed by: PHYSICAL THERAPIST

## 2018-04-11 PROCEDURE — 97110 THERAPEUTIC EXERCISES: CPT | Mod: GP | Performed by: PHYSICAL THERAPIST

## 2018-04-17 ENCOUNTER — HOSPITAL ENCOUNTER (OUTPATIENT)
Dept: RESPIRATORY THERAPY | Facility: OTHER | Age: 68
End: 2018-04-17
Attending: INTERNAL MEDICINE
Payer: MEDICARE

## 2018-04-17 VITALS — BODY MASS INDEX: 27.33 KG/M2 | WEIGHT: 154.3 LBS

## 2018-04-17 PROCEDURE — 40000275 ZZH STATISTIC RCP TIME EA 10 MIN

## 2018-04-17 PROCEDURE — G0424 PULMONARY REHAB W EXER: HCPCS

## 2018-04-18 ENCOUNTER — HOSPITAL ENCOUNTER (OUTPATIENT)
Dept: PHYSICAL THERAPY | Facility: OTHER | Age: 68
Setting detail: THERAPIES SERIES
End: 2018-04-18
Attending: FAMILY MEDICINE
Payer: MEDICARE

## 2018-04-18 PROCEDURE — 97140 MANUAL THERAPY 1/> REGIONS: CPT | Mod: GP | Performed by: PHYSICAL THERAPIST

## 2018-04-18 PROCEDURE — 40000185 ZZHC STATISTIC PT OUTPT VISIT: Performed by: PHYSICAL THERAPIST

## 2018-04-18 PROCEDURE — 97110 THERAPEUTIC EXERCISES: CPT | Mod: GP | Performed by: PHYSICAL THERAPIST

## 2018-04-18 PROCEDURE — 97012 MECHANICAL TRACTION THERAPY: CPT | Mod: GP | Performed by: PHYSICAL THERAPIST

## 2018-04-19 ENCOUNTER — HOSPITAL ENCOUNTER (OUTPATIENT)
Dept: RESPIRATORY THERAPY | Facility: OTHER | Age: 68
End: 2018-04-19
Attending: INTERNAL MEDICINE
Payer: MEDICARE

## 2018-04-19 VITALS — BODY MASS INDEX: 27.53 KG/M2 | WEIGHT: 155.4 LBS

## 2018-04-19 PROCEDURE — 40000275 ZZH STATISTIC RCP TIME EA 10 MIN

## 2018-04-19 PROCEDURE — G0424 PULMONARY REHAB W EXER: HCPCS

## 2018-04-20 ENCOUNTER — HOSPITAL ENCOUNTER (OUTPATIENT)
Dept: PHYSICAL THERAPY | Facility: OTHER | Age: 68
Setting detail: THERAPIES SERIES
End: 2018-04-20
Attending: FAMILY MEDICINE
Payer: MEDICARE

## 2018-04-20 PROCEDURE — 97140 MANUAL THERAPY 1/> REGIONS: CPT | Mod: GP | Performed by: PHYSICAL THERAPIST

## 2018-04-20 PROCEDURE — 97110 THERAPEUTIC EXERCISES: CPT | Mod: GP | Performed by: PHYSICAL THERAPIST

## 2018-04-20 PROCEDURE — 40000185 ZZHC STATISTIC PT OUTPT VISIT: Performed by: PHYSICAL THERAPIST

## 2018-04-24 ENCOUNTER — HOSPITAL ENCOUNTER (OUTPATIENT)
Dept: RESPIRATORY THERAPY | Facility: OTHER | Age: 68
End: 2018-04-24
Attending: INTERNAL MEDICINE
Payer: MEDICARE

## 2018-04-24 VITALS — HEIGHT: 63 IN | BODY MASS INDEX: 27.64 KG/M2 | WEIGHT: 156 LBS

## 2018-04-24 PROCEDURE — 40000275 ZZH STATISTIC RCP TIME EA 10 MIN

## 2018-04-24 PROCEDURE — G0424 PULMONARY REHAB W EXER: HCPCS

## 2018-04-24 ASSESSMENT — 6 MINUTE WALK TEST (6MWT)
FEMALE CALC: 443.4
MALE CALC: 431.02

## 2018-04-24 NOTE — PROGRESS NOTES
04/24/18 1400   Session   Session 30 Day Individualized Treatment Plan   Type Reassessment   General Information   Treatment Diagnosis COPD   Classification of COPD Stage 3 (Severe)   Onset Date 12/28/15   Hospital Location (no change)   Current Signs and Symptoms SOB;Fatigue;Anxiety  (improved)   Outpatient Pulmonary Rehab Start Date 03/27/18   Primary Physician Felicitas Johnson   Pulmonolgist Elliot Ray   Other Specialty Provider same   Medical History/Comorbidities   Medical History/Comorbidities (no change)   Medical History Comments (no change)   Untoward Events/Exacerbations/Hospitalizations   Untoward Events/Exacerbations/Hospitalizations None   Sputum   Sputum Production Amount none   Tobacco History   Tobacco Former smoker   Tobacco Habit (no changes)   Medications   Long-Acting Beta Agonist (no changes)   Preventative Vaccinations   Influenza Vaccination (no changes)   Pneumonia Vaccination (no changes)   Pain   Patient Currently in Pain (no change)   Pain Location (no change)   Living and Work Status   Living Arrangements (no change)   Physical Assessments   Edema None   Left Lung Sounds diminished   Right Lung Sounds diminished   Pulmonary Function Test (PFTs)   PFT Results (no changes)   Individualized Treatment Plan   Sessions Scheduled 17   Sessions Attended 6   Type Aerobic exercise;Resistance training;Flexibility training;Other (comments)  (education)   Oxygen Use   Supplemental Oxygen Needed No   Interventions Recommended Attend education on home oxygen; patient on home oxygen use;Titrate O2 with exercise;Continue to monitor SpO2 at rest and with exercise on current O2 settings   Interventions Completed Titrated O2 wtih exercise;Demonstrated need for oxygen using SpO2 readings;Demonstrated stable SpO2 readings at rest and with exercise on current O2 settings   Exercise Prescription   Mode Treadmill;Nustep;Arm Ergometer/UBE;Weights   Frequency 3 daysweek  (work up to 5d/week)  "  Duration/Time 30-45 min   THR (85% of age predicted max heart rate)  128.35   Effort Rating (0-10) 4-6   Progression of Exercise doing well increase to 5d/w   Oxygen Titration with Exercise > 88% with exercise   Exercise Assessment   6 Minute Walk Predicted (Female) 443.4   6 Minute Walk Predicted (Male) 431.02   Nutrition Management   Age 68   Height 1.6 m (5' 2.99\")   Weight 70.8 kg (156 lb)   BMI (Calculated) 27.7   Assessment Overweight   Goal weight 61.2 kg (134 lb 15.8 oz)   Interventions Planned Attend group nutrition class;Educate on weight loss principles;Initiate plan for weight loss/weight maintenance;Initiate plan for weight gain   Stages of Change   Aerobic Exercise Action   Physical Activity Action   Recommended diet Action   Stress Action   Smoking Cessation Maintenance   Oxygen Usage NA   Current Home Exercise   Type of Exercise Walking;LE Strengthening Exercise   Frequency (days per week) 3   (increase upto 5 days/week)   Recommended Home Exercise Prescription   Type of Exercise Walking;Treadmill;LE Strengthening Exercise;Mall walking   Frequency (Days per week) 3   Duration (minutes per session) 30-45 min   Effort Rating Recommended (0-10) Scale  4-6/10   Recommended Exercise Heart Rate Range    30 Day Exercise Plan work up to 5 day/week    Learning Assessment   Learner Patient   Primary Language English   Preferred Learning Style Pictures/Video   Barriers to Learning No barriers noted   Patient Education/Referrals   Education Recommended Activities of Daily Living;Advance Directives;Airway Clearance;Anatomy and Disease Process;Breathing Techniques;Community Resources/Exacerbation Management;Emotional Aspects of CLD;Energy Conservation;Exercise Principles;Home Oxygen Equipment;Medication Overview;Nutrition;Panic and Anxiety;Sleep Issues;Smoking Cessation   Education Attended Activities of Daily Living;Airway Clearance;Anatomy and Disease Process;Breathing Techniques;Energy " Conservation;Exercise Principles;Home Oxygen Equipment   Goal 1   Target Date (improving)   Goal 2   Target Date 04/24/18   Date Met 04/24/18   Goal 3   Target Date 04/24/18   Date Met 04/24/18   Goal 4   Target Date 04/24/18   Date Met 04/24/18   Assessment   Assessment cont. with [place] work upto 5d/w of ex   I have reviewed and agree with this patient s individual treatment plan and exercise prescription for pulmonary rehabilitation.  Please see  individual treatment plan for details of progress and plan.    Kary Vitale, RT on 4/24/2018 at 2:46 PM

## 2018-04-25 ENCOUNTER — HOSPITAL ENCOUNTER (OUTPATIENT)
Dept: PHYSICAL THERAPY | Facility: OTHER | Age: 68
Setting detail: THERAPIES SERIES
End: 2018-04-25
Attending: FAMILY MEDICINE
Payer: MEDICARE

## 2018-04-25 PROCEDURE — 97140 MANUAL THERAPY 1/> REGIONS: CPT | Mod: GP | Performed by: PHYSICAL THERAPIST

## 2018-04-25 PROCEDURE — 97110 THERAPEUTIC EXERCISES: CPT | Mod: GP | Performed by: PHYSICAL THERAPIST

## 2018-04-25 PROCEDURE — 40000185 ZZHC STATISTIC PT OUTPT VISIT: Performed by: PHYSICAL THERAPIST

## 2018-04-25 NOTE — ADDENDUM NOTE
Encounter addended by: Jackson Naranjo, PT on: 4/25/2018  9:08 AM<BR>     Actions taken: Episode edited, Flowsheet data copied forward, Flowsheet accepted, Charge Capture section accepted

## 2018-04-26 ENCOUNTER — HOSPITAL ENCOUNTER (OUTPATIENT)
Dept: RESPIRATORY THERAPY | Facility: OTHER | Age: 68
End: 2018-04-26
Attending: INTERNAL MEDICINE
Payer: MEDICARE

## 2018-04-26 VITALS — BODY MASS INDEX: 27.82 KG/M2 | WEIGHT: 157 LBS

## 2018-04-26 PROCEDURE — G0424 PULMONARY REHAB W EXER: HCPCS

## 2018-04-26 PROCEDURE — 40000275 ZZH STATISTIC RCP TIME EA 10 MIN

## 2018-05-03 ENCOUNTER — HOSPITAL ENCOUNTER (OUTPATIENT)
Dept: RESPIRATORY THERAPY | Facility: OTHER | Age: 68
End: 2018-05-03
Attending: INTERNAL MEDICINE
Payer: MEDICARE

## 2018-05-03 VITALS — WEIGHT: 157 LBS | BODY MASS INDEX: 27.82 KG/M2

## 2018-05-03 PROCEDURE — 40000275 ZZH STATISTIC RCP TIME EA 10 MIN

## 2018-05-03 PROCEDURE — G0424 PULMONARY REHAB W EXER: HCPCS

## 2018-05-04 NOTE — ADDENDUM NOTE
Encounter addended by: Jackson Naranjo, PT on: 5/4/2018  8:17 AM<BR>     Actions taken: Episode edited, Flowsheet data copied forward, Flowsheet accepted, Charge Capture section accepted

## 2018-05-10 ENCOUNTER — HOSPITAL ENCOUNTER (OUTPATIENT)
Dept: RESPIRATORY THERAPY | Facility: OTHER | Age: 68
End: 2018-05-10
Attending: INTERNAL MEDICINE
Payer: MEDICARE

## 2018-05-10 VITALS — WEIGHT: 157 LBS | BODY MASS INDEX: 27.82 KG/M2

## 2018-05-10 PROCEDURE — 40000275 ZZH STATISTIC RCP TIME EA 10 MIN

## 2018-05-10 PROCEDURE — G0424 PULMONARY REHAB W EXER: HCPCS

## 2018-05-15 ENCOUNTER — HOSPITAL ENCOUNTER (OUTPATIENT)
Dept: RESPIRATORY THERAPY | Facility: OTHER | Age: 68
End: 2018-05-15
Attending: INTERNAL MEDICINE
Payer: MEDICARE

## 2018-05-15 VITALS — WEIGHT: 155.3 LBS | BODY MASS INDEX: 27.52 KG/M2

## 2018-05-15 PROCEDURE — 40000275 ZZH STATISTIC RCP TIME EA 10 MIN

## 2018-05-15 PROCEDURE — G0424 PULMONARY REHAB W EXER: HCPCS

## 2018-05-17 ENCOUNTER — HOSPITAL ENCOUNTER (OUTPATIENT)
Dept: RESPIRATORY THERAPY | Facility: OTHER | Age: 68
End: 2018-05-17
Attending: INTERNAL MEDICINE
Payer: MEDICARE

## 2018-05-17 VITALS — WEIGHT: 155.8 LBS | BODY MASS INDEX: 27.61 KG/M2

## 2018-05-17 PROCEDURE — 40000275 ZZH STATISTIC RCP TIME EA 10 MIN

## 2018-05-17 PROCEDURE — G0424 PULMONARY REHAB W EXER: HCPCS

## 2018-05-22 ENCOUNTER — HOSPITAL ENCOUNTER (OUTPATIENT)
Dept: RESPIRATORY THERAPY | Facility: OTHER | Age: 68
End: 2018-05-22
Attending: INTERNAL MEDICINE
Payer: MEDICARE

## 2018-05-22 VITALS — BODY MASS INDEX: 27.91 KG/M2 | WEIGHT: 157.5 LBS

## 2018-05-22 PROCEDURE — G0424 PULMONARY REHAB W EXER: HCPCS

## 2018-05-22 PROCEDURE — 40000275 ZZH STATISTIC RCP TIME EA 10 MIN

## 2018-05-24 ENCOUNTER — HOSPITAL ENCOUNTER (OUTPATIENT)
Dept: RESPIRATORY THERAPY | Facility: OTHER | Age: 68
End: 2018-05-24
Attending: INTERNAL MEDICINE
Payer: MEDICARE

## 2018-05-24 VITALS — BODY MASS INDEX: 27.94 KG/M2 | WEIGHT: 157.7 LBS

## 2018-05-24 PROCEDURE — G0424 PULMONARY REHAB W EXER: HCPCS

## 2018-05-24 PROCEDURE — 40000275 ZZH STATISTIC RCP TIME EA 10 MIN

## 2018-05-29 ENCOUNTER — HOSPITAL ENCOUNTER (OUTPATIENT)
Dept: RESPIRATORY THERAPY | Facility: OTHER | Age: 68
End: 2018-05-29
Attending: INTERNAL MEDICINE
Payer: MEDICARE

## 2018-05-29 VITALS — HEIGHT: 63 IN | WEIGHT: 155.9 LBS | BODY MASS INDEX: 27.62 KG/M2

## 2018-05-29 PROCEDURE — 40000275 ZZH STATISTIC RCP TIME EA 10 MIN

## 2018-05-29 PROCEDURE — G0424 PULMONARY REHAB W EXER: HCPCS

## 2018-05-29 ASSESSMENT — PULMONARY FUNCTION TESTS
FVC: 144
FVC_PERCENT_PREDICTED: 53
FEV1/FVC: 57
FEV1 (%PREDICTED): 39
FEV1: .82

## 2018-05-29 ASSESSMENT — DUKE ACTIVITY SCORE INDEX (DASI)
VO2_PEAK: 16.14
TOTAL_SCORE: 15.2
DASI METS SCORE: 4.61

## 2018-05-29 ASSESSMENT — 6 MINUTE WALK TEST (6MWT)
MALE CALC: 431.1
TOTAL DISTANCE WALKED: 827.8
FEMALE CALC: 443.5
GENDER SELECTION: FEMALE

## 2018-05-29 NOTE — PROGRESS NOTES
05/29/18 1400   Session   Session 60 Day Individualized Treatment Plan   Type Discharge/Follow-up   General Information   Treatment Diagnosis COPD   Classification of COPD Stage 3 (Severe)   Current Signs and Symptoms SOB;Anxiety;Fatigue;Lightheadedness   Primary Physician Felicitas Johnson   Pulmonolgist Elliot Ray   Medical History/Comorbidities   Medical History/Comorbidities Anxiety;COPD;Chronic pain syndromes;Chronic renal disease;Depression;Diabetes;GERD;Hypertension;Malignancy;Neuromuscular disease;Obstructive sleep apnea;Osteoarthritis;Osteoporosis;Pneumonia;Previous exacerbation of chronic lung disease;Other (see comments)   Medical History Comments (Rheumatoid Arthritus)   Sputum   Sputum Production Amount None   Tobacco History   Tobacco Former smoker   Tobacco Habit Cigarettes   Years Smoked 30   Medications   Long-Acting Beta Agonist (No changes)   Short-Acting Beta Agonist (No changes)   Inhaled Corticosteroid (No changes)   Preventative Vaccinations   Influenza Vaccination (No change)   Pneumonia Vaccination (No change)   Pain   Patient Currently in Pain Yes   Pain Location Back and shoulders   Pain Rating 7   Pain Description Dull;Ache   Pain Comments (Takes pain meds for chronic back and shoulder pain)   Fall Risk Screen   Fall screen completed by Pulmonary Rehab   Have you fallen 2 or more times in the past year? No   Have you fallen and had an injury in the past year? Yes   Living and Work Status   Living Arrangements house;spouse   Support System Live with an adult   Environmental Factors No concerns   ADL Limitations Bathing;Stair climbing   Discharge DASI score 15.2   Discharge DASI VO2 Peak 16.14   Discharge DASI MET Level 4.61   Pulmonary Function Test (PFTs)   PFT Results Available   Date Completed 05/29/18   FVC Actual 144   % Predicted FVC 53   FEV1 Actual 0.82   % Predicted FEV1 39   FEV1/FEV Ratio 57   Pre/Post Bronchodilator Pre   Individualized Treatment Plan   Sessions Scheduled 16  "  Sessions Attended 13   Type Aerobic exercise;Resistance training;Flexibility training   Oxygen Use   Supplemental Oxygen Needed No   Exercise Prescription   Mode Nustep;Treadmill;Sci-Fit;Weights;Ambulation   Frequency 3 daysweek   Duration/Time 45-60 min   THR (85% of age predicted max heart rate)  128.35   Effort Rating (0-10) 4-6   Progression of Exercise Continue to exercise at home   Oxygen Titration with Exercise > 88% with exercise   Exercise Assessment   6 Minute Walk Predicted - Gender Selection Female   6 Minute Walk Predicted (Female) 443.5   6 Minute Walk Predicted (Male) 431.1   6-min Walk Distance (Discharge) 827.8 Meters   Resting    Exercise    Resting /58   SpO2 92   Exercise SpO2 89   Exercise Tolerance good   Current Symptoms at Home Anxiety;Dyspnea;Fatigue;Lightleadedness   Current Symptoms in Rehab Denies symptoms   Nutrition Management   Age 68   Height 1.6 m (5' 2.99\")   Weight 70.7 kg (155 lb 14.4 oz)   BMI (Calculated) 27.68   Assessment Overweight   Interventions Completed Attended group nutrition class;Educated on weight loss principles;Instructed on label reading   Psychosocial   Discharge PHQ-9 for depression 8   Discharge [facility] Survey Score 28   Discharge CAT Test Score 23   Discharge SOBQ Score 81   Interventions Completed Identified 2-3 coping mechanisms for stress;Verbalized understanding of negative impact of stress to personal health;Recognizes Signs and Symptoms of Depression;Introduced to Relaxation Techniques to assist with decreased anxiety;Demonstrated ability to apply breathing techniques to control dyspnea cycle   Stages of Change   Aerobic Exercise Maintenance   Physical Activity Maintenance   Recommended diet Action   Stress Action   Smoking Cessation Maintenance   Oxygen Usage NA   Current Home Exercise   Type of Exercise Walking;LE Strengthening Exercise   Frequency (days per week) 2   Duration (minutes per session) 20   Recommended Home " Exercise Prescription   Type of Exercise Walking;Mall walking;LE Strengthening Exercise   Frequency (Days per week) 3-5 d/w   Duration (minutes per session) 45-60 min aerobic exercise   Effort Rating Recommended (0-10) Scale  4-6/10   Recommended Exercise Heart Rate Range    Learning Assessment   Learner Patient   Primary Language English   Patient Education/Referrals   Education Attended Activities of Daily Living;Advance Directives;Air Quality;Airway Clearance;Anatomy and Disease Process;Breathing Techniques;Community Resources/Exacerbation Management;Emotional Aspects of CLD;Energy Conservation;Exercise Principles;Home Oxygen Equipment;Medication Overview;Nutrition;Panic and Anxiety;Sleep Issues;Smoking Cessation   Goal 1   Date Met 05/29/18   Progress Towards Goal (Takes more time, but easier)   Goal 2   Date Met 05/29/18   Goal 3   Date Met 05/29/18   Goal 4   Date Met 05/29/18         Pulmonary Rehab Discharge Summary    Reason for discharge:    All goals and outcomes met, no further needs identified.    Progress towards goals:  Goals met    Recommendation(s):    Continue home exercise program.     I have personally met face-to-face with pt and ...        RT Franklin on 5/29/2018 at 4:29 PM

## 2018-06-08 ENCOUNTER — TRANSFERRED RECORDS (OUTPATIENT)
Dept: HEALTH INFORMATION MANAGEMENT | Facility: OTHER | Age: 68
End: 2018-06-08

## 2018-06-15 ENCOUNTER — APPOINTMENT (OUTPATIENT)
Dept: GENERAL RADIOLOGY | Facility: OTHER | Age: 68
DRG: 871 | End: 2018-06-15
Attending: FAMILY MEDICINE
Payer: MEDICARE

## 2018-06-15 ENCOUNTER — HOSPITAL ENCOUNTER (INPATIENT)
Facility: OTHER | Age: 68
LOS: 3 days | Discharge: ANOTHER HOSPITAL | DRG: 871 | End: 2018-06-18
Attending: FAMILY MEDICINE | Admitting: FAMILY MEDICINE
Payer: MEDICARE

## 2018-06-15 DIAGNOSIS — Z79.899 POLYPHARMACY: ICD-10-CM

## 2018-06-15 DIAGNOSIS — J18.9 PNEUMONIA DUE TO INFECTIOUS ORGANISM, UNSPECIFIED LATERALITY, UNSPECIFIED PART OF LUNG: ICD-10-CM

## 2018-06-15 DIAGNOSIS — M05.9 SEROPOSITIVE RHEUMATOID ARTHRITIS (H): ICD-10-CM

## 2018-06-15 DIAGNOSIS — N18.6 TYPE 2 DIABETES MELLITUS WITH ESRD (END-STAGE RENAL DISEASE) (H): ICD-10-CM

## 2018-06-15 DIAGNOSIS — R79.89 ABNORMAL LIVER FUNCTION TESTS: ICD-10-CM

## 2018-06-15 DIAGNOSIS — E86.0 DEHYDRATION: ICD-10-CM

## 2018-06-15 DIAGNOSIS — Z79.4 ENCOUNTER FOR LONG-TERM (CURRENT) USE OF INSULIN (H): ICD-10-CM

## 2018-06-15 DIAGNOSIS — I13.0 MALIGNANT HYPERTENSIVE HEART AND RENAL DISEASE WITH RENAL FAILURE, STAGE 1 THROUGH STAGE 4 OR UNSPECIFIED CHRONIC KIDNEY DISEASE, WITH HEART FAILURE (H): ICD-10-CM

## 2018-06-15 DIAGNOSIS — J44.9 CHRONIC OBSTRUCTIVE PULMONARY DISEASE, UNSPECIFIED COPD TYPE (H): ICD-10-CM

## 2018-06-15 DIAGNOSIS — N18.30 CHRONIC KIDNEY DISEASE, STAGE III (MODERATE) (H): ICD-10-CM

## 2018-06-15 DIAGNOSIS — E11.22 TYPE 2 DIABETES MELLITUS WITH ESRD (END-STAGE RENAL DISEASE) (H): ICD-10-CM

## 2018-06-15 PROBLEM — A41.9 SEPSIS (H): Status: ACTIVE | Noted: 2018-06-15

## 2018-06-15 LAB
ALBUMIN SERPL-MCNC: 3.3 G/DL (ref 3.5–5.7)
ALBUMIN UR-MCNC: 30 MG/DL
ALP SERPL-CCNC: 70 U/L (ref 34–104)
ALT SERPL W P-5'-P-CCNC: 18 U/L (ref 7–52)
ANION GAP SERPL CALCULATED.3IONS-SCNC: 11 MMOL/L (ref 3–14)
APPEARANCE UR: CLEAR
AST SERPL W P-5'-P-CCNC: 22 U/L (ref 13–39)
BASOPHILS # BLD AUTO: 0 10E9/L (ref 0–0.2)
BASOPHILS NFR BLD AUTO: 0.2 %
BILIRUB DIRECT SERPL-MCNC: 0.7 MG/DL (ref 0–0.2)
BILIRUB SERPL-MCNC: 1.2 MG/DL (ref 0.3–1)
BILIRUB UR QL STRIP: ABNORMAL
BUN SERPL-MCNC: 18 MG/DL (ref 7–25)
CALCIUM SERPL-MCNC: 9.3 MG/DL (ref 8.6–10.3)
CHLORIDE SERPL-SCNC: 97 MMOL/L (ref 98–107)
CO2 SERPL-SCNC: 22 MMOL/L (ref 21–31)
COLOR UR AUTO: YELLOW
CREAT SERPL-MCNC: 1.63 MG/DL (ref 0.6–1.2)
CRP SERPL-MCNC: 21.3 MG/L
DIFFERENTIAL METHOD BLD: ABNORMAL
EOSINOPHIL # BLD AUTO: 0 10E9/L (ref 0–0.7)
EOSINOPHIL NFR BLD AUTO: 0.1 %
ERYTHROCYTE [DISTWIDTH] IN BLOOD BY AUTOMATED COUNT: 17.2 % (ref 10–15)
GFR SERPL CREATININE-BSD FRML MDRD: 31 ML/MIN/1.7M2
GLUCOSE SERPL-MCNC: 222 MG/DL (ref 70–105)
GLUCOSE UR STRIP-MCNC: NEGATIVE MG/DL
HCT VFR BLD AUTO: 30.5 % (ref 35–47)
HGB BLD-MCNC: 9.4 G/DL (ref 11.7–15.7)
HGB UR QL STRIP: ABNORMAL
HYALINE CASTS #/AREA URNS LPF: ABNORMAL /LPF
IMM GRANULOCYTES # BLD: 0.2 10E9/L (ref 0–0.4)
IMM GRANULOCYTES NFR BLD: 1 %
INR PPP: 1.22 (ref 0–1.3)
KETONES UR STRIP-MCNC: 15 MG/DL
LACTATE SERPL-SCNC: 0.9 MMOL/L (ref 0.5–2.2)
LEUKOCYTE ESTERASE UR QL STRIP: NEGATIVE
LYMPHOCYTES # BLD AUTO: 0.6 10E9/L (ref 0.8–5.3)
LYMPHOCYTES NFR BLD AUTO: 3.4 %
MAGNESIUM SERPL-MCNC: 1.8 MG/DL (ref 1.9–2.7)
MCH RBC QN AUTO: 24.7 PG (ref 26.5–33)
MCHC RBC AUTO-ENTMCNC: 30.8 G/DL (ref 31.5–36.5)
MCV RBC AUTO: 80 FL (ref 78–100)
MONOCYTES # BLD AUTO: 1.4 10E9/L (ref 0–1.3)
MONOCYTES NFR BLD AUTO: 7.4 %
MUCOUS THREADS #/AREA URNS LPF: PRESENT /LPF
NEUTROPHILS # BLD AUTO: 16.5 10E9/L (ref 1.6–8.3)
NEUTROPHILS NFR BLD AUTO: 87.9 %
NITRATE UR QL: NEGATIVE
NT-PROBNP SERPL-MCNC: 49 PG/ML (ref 0–100)
PH UR STRIP: 6 PH (ref 5–7)
PLATELET # BLD AUTO: 321 10E9/L (ref 150–450)
POTASSIUM SERPL-SCNC: 4.6 MMOL/L (ref 3.5–5.1)
PROT SERPL-MCNC: 7.1 G/DL (ref 6.4–8.9)
RBC # BLD AUTO: 3.8 10E12/L (ref 3.8–5.2)
RBC #/AREA URNS AUTO: ABNORMAL /HPF
SODIUM SERPL-SCNC: 130 MMOL/L (ref 134–144)
SOURCE: ABNORMAL
SP GR UR STRIP: 1.02 (ref 1–1.03)
TROPONIN I SERPL-MCNC: <0.03 UG/L (ref 0–0.03)
UROBILINOGEN UR STRIP-ACNC: 2 EU/DL (ref 0.2–1)
WBC # BLD AUTO: 18.8 10E9/L (ref 4–11)
WBC #/AREA URNS AUTO: ABNORMAL /HPF

## 2018-06-15 PROCEDURE — 99285 EMERGENCY DEPT VISIT HI MDM: CPT | Mod: 25 | Performed by: FAMILY MEDICINE

## 2018-06-15 PROCEDURE — 86140 C-REACTIVE PROTEIN: CPT | Performed by: FAMILY MEDICINE

## 2018-06-15 PROCEDURE — 83605 ASSAY OF LACTIC ACID: CPT | Performed by: FAMILY MEDICINE

## 2018-06-15 PROCEDURE — 87086 URINE CULTURE/COLONY COUNT: CPT | Performed by: FAMILY MEDICINE

## 2018-06-15 PROCEDURE — 82248 BILIRUBIN DIRECT: CPT | Performed by: FAMILY MEDICINE

## 2018-06-15 PROCEDURE — 25000128 H RX IP 250 OP 636: Performed by: FAMILY MEDICINE

## 2018-06-15 PROCEDURE — 93010 ELECTROCARDIOGRAM REPORT: CPT | Performed by: INTERNAL MEDICINE

## 2018-06-15 PROCEDURE — 36415 COLL VENOUS BLD VENIPUNCTURE: CPT | Performed by: FAMILY MEDICINE

## 2018-06-15 PROCEDURE — 83735 ASSAY OF MAGNESIUM: CPT | Performed by: FAMILY MEDICINE

## 2018-06-15 PROCEDURE — 40000275 ZZH STATISTIC RCP TIME EA 10 MIN

## 2018-06-15 PROCEDURE — 99285 EMERGENCY DEPT VISIT HI MDM: CPT | Mod: Z6 | Performed by: FAMILY MEDICINE

## 2018-06-15 PROCEDURE — 99222 1ST HOSP IP/OBS MODERATE 55: CPT | Mod: AI | Performed by: FAMILY MEDICINE

## 2018-06-15 PROCEDURE — 96375 TX/PRO/DX INJ NEW DRUG ADDON: CPT | Performed by: FAMILY MEDICINE

## 2018-06-15 PROCEDURE — 25000125 ZZHC RX 250: Performed by: FAMILY MEDICINE

## 2018-06-15 PROCEDURE — A9270 NON-COVERED ITEM OR SERVICE: HCPCS | Mod: GY | Performed by: FAMILY MEDICINE

## 2018-06-15 PROCEDURE — 83880 ASSAY OF NATRIURETIC PEPTIDE: CPT | Performed by: FAMILY MEDICINE

## 2018-06-15 PROCEDURE — 81001 URINALYSIS AUTO W/SCOPE: CPT | Performed by: FAMILY MEDICINE

## 2018-06-15 PROCEDURE — 84484 ASSAY OF TROPONIN QUANT: CPT | Performed by: FAMILY MEDICINE

## 2018-06-15 PROCEDURE — 87040 BLOOD CULTURE FOR BACTERIA: CPT | Mod: 91 | Performed by: FAMILY MEDICINE

## 2018-06-15 PROCEDURE — 80053 COMPREHEN METABOLIC PANEL: CPT | Performed by: FAMILY MEDICINE

## 2018-06-15 PROCEDURE — 71045 X-RAY EXAM CHEST 1 VIEW: CPT

## 2018-06-15 PROCEDURE — 25000131 ZZH RX MED GY IP 250 OP 636 PS 637: Mod: GY | Performed by: FAMILY MEDICINE

## 2018-06-15 PROCEDURE — 25000132 ZZH RX MED GY IP 250 OP 250 PS 637: Mod: GY | Performed by: FAMILY MEDICINE

## 2018-06-15 PROCEDURE — 96376 TX/PRO/DX INJ SAME DRUG ADON: CPT | Performed by: FAMILY MEDICINE

## 2018-06-15 PROCEDURE — 73600 X-RAY EXAM OF ANKLE: CPT | Mod: LT

## 2018-06-15 PROCEDURE — 96361 HYDRATE IV INFUSION ADD-ON: CPT | Performed by: FAMILY MEDICINE

## 2018-06-15 PROCEDURE — 87040 BLOOD CULTURE FOR BACTERIA: CPT | Performed by: FAMILY MEDICINE

## 2018-06-15 PROCEDURE — 85025 COMPLETE CBC W/AUTO DIFF WBC: CPT | Performed by: FAMILY MEDICINE

## 2018-06-15 PROCEDURE — 12000000 ZZH R&B MED SURG/OB

## 2018-06-15 PROCEDURE — 96365 THER/PROPH/DIAG IV INF INIT: CPT | Performed by: FAMILY MEDICINE

## 2018-06-15 PROCEDURE — 85610 PROTHROMBIN TIME: CPT | Performed by: FAMILY MEDICINE

## 2018-06-15 RX ORDER — SODIUM CHLORIDE, SODIUM LACTATE, POTASSIUM CHLORIDE, CALCIUM CHLORIDE 600; 310; 30; 20 MG/100ML; MG/100ML; MG/100ML; MG/100ML
1000 INJECTION, SOLUTION INTRAVENOUS CONTINUOUS
Status: DISCONTINUED | OUTPATIENT
Start: 2018-06-15 | End: 2018-06-16

## 2018-06-15 RX ORDER — FAMOTIDINE 20 MG/1
20 TABLET, FILM COATED ORAL 2 TIMES DAILY
Status: DISCONTINUED | OUTPATIENT
Start: 2018-06-15 | End: 2018-06-16

## 2018-06-15 RX ORDER — LEVOFLOXACIN 5 MG/ML
500 INJECTION, SOLUTION INTRAVENOUS ONCE
Status: COMPLETED | OUTPATIENT
Start: 2018-06-15 | End: 2018-06-15

## 2018-06-15 RX ORDER — HYDROMORPHONE HYDROCHLORIDE 1 MG/ML
0.5 INJECTION, SOLUTION INTRAMUSCULAR; INTRAVENOUS; SUBCUTANEOUS
Status: COMPLETED | OUTPATIENT
Start: 2018-06-15 | End: 2018-06-15

## 2018-06-15 RX ORDER — OXYCODONE HYDROCHLORIDE 5 MG/1
5-10 TABLET ORAL
Status: DISCONTINUED | OUTPATIENT
Start: 2018-06-15 | End: 2018-06-18 | Stop reason: HOSPADM

## 2018-06-15 RX ORDER — BUDESONIDE 0.5 MG/2ML
0.5 INHALANT ORAL 2 TIMES DAILY
Status: DISCONTINUED | OUTPATIENT
Start: 2018-06-15 | End: 2018-06-18 | Stop reason: HOSPADM

## 2018-06-15 RX ORDER — FORMOTEROL FUMARATE DIHYDRATE 20 UG/2ML
2 SOLUTION RESPIRATORY (INHALATION) 2 TIMES DAILY
Status: DISCONTINUED | OUTPATIENT
Start: 2018-06-15 | End: 2018-06-16 | Stop reason: CLARIF

## 2018-06-15 RX ORDER — NALOXONE HYDROCHLORIDE 0.4 MG/ML
.1-.4 INJECTION, SOLUTION INTRAMUSCULAR; INTRAVENOUS; SUBCUTANEOUS
Status: DISCONTINUED | OUTPATIENT
Start: 2018-06-15 | End: 2018-06-18 | Stop reason: HOSPADM

## 2018-06-15 RX ORDER — NICOTINE POLACRILEX 4 MG
15-30 LOZENGE BUCCAL
Status: DISCONTINUED | OUTPATIENT
Start: 2018-06-15 | End: 2018-06-18 | Stop reason: HOSPADM

## 2018-06-15 RX ORDER — IPRATROPIUM BROMIDE AND ALBUTEROL SULFATE 2.5; .5 MG/3ML; MG/3ML
3 SOLUTION RESPIRATORY (INHALATION) 4 TIMES DAILY
Status: DISCONTINUED | OUTPATIENT
Start: 2018-06-16 | End: 2018-06-18 | Stop reason: HOSPADM

## 2018-06-15 RX ORDER — LEVOTHYROXINE SODIUM 112 UG/1
112 TABLET ORAL
Status: DISCONTINUED | OUTPATIENT
Start: 2018-06-16 | End: 2018-06-18 | Stop reason: HOSPADM

## 2018-06-15 RX ORDER — ALBUTEROL SULFATE 0.83 MG/ML
3 SOLUTION RESPIRATORY (INHALATION)
Status: DISCONTINUED | OUTPATIENT
Start: 2018-06-15 | End: 2018-06-18 | Stop reason: HOSPADM

## 2018-06-15 RX ORDER — ONDANSETRON 2 MG/ML
4 INJECTION INTRAMUSCULAR; INTRAVENOUS EVERY 30 MIN PRN
Status: DISCONTINUED | OUTPATIENT
Start: 2018-06-15 | End: 2018-06-15

## 2018-06-15 RX ORDER — PROCHLORPERAZINE 25 MG
12.5 SUPPOSITORY, RECTAL RECTAL EVERY 12 HOURS PRN
Status: DISCONTINUED | OUTPATIENT
Start: 2018-06-15 | End: 2018-06-18 | Stop reason: HOSPADM

## 2018-06-15 RX ORDER — SODIUM CHLORIDE 9 MG/ML
INJECTION, SOLUTION INTRAVENOUS CONTINUOUS
Status: DISCONTINUED | OUTPATIENT
Start: 2018-06-15 | End: 2018-06-17

## 2018-06-15 RX ORDER — LISINOPRIL 2.5 MG/1
2.5 TABLET ORAL
COMMUNITY
Start: 2018-06-08 | End: 2018-07-24

## 2018-06-15 RX ORDER — PANTOPRAZOLE SODIUM 40 MG/1
40 TABLET, DELAYED RELEASE ORAL
Status: DISCONTINUED | OUTPATIENT
Start: 2018-06-16 | End: 2018-06-18 | Stop reason: HOSPADM

## 2018-06-15 RX ORDER — DEXTROSE MONOHYDRATE 25 G/50ML
25-50 INJECTION, SOLUTION INTRAVENOUS
Status: DISCONTINUED | OUTPATIENT
Start: 2018-06-15 | End: 2018-06-18 | Stop reason: HOSPADM

## 2018-06-15 RX ORDER — LEVOFLOXACIN 5 MG/ML
500 INJECTION, SOLUTION INTRAVENOUS EVERY 24 HOURS
Status: DISCONTINUED | OUTPATIENT
Start: 2018-06-16 | End: 2018-06-16

## 2018-06-15 RX ORDER — FUROSEMIDE 20 MG
20 TABLET ORAL DAILY PRN
Status: ON HOLD | COMMUNITY
End: 2018-06-28

## 2018-06-15 RX ORDER — PROCHLORPERAZINE MALEATE 5 MG
5 TABLET ORAL EVERY 6 HOURS PRN
Status: DISCONTINUED | OUTPATIENT
Start: 2018-06-15 | End: 2018-06-18 | Stop reason: HOSPADM

## 2018-06-15 RX ORDER — PREDNISONE 20 MG/1
60 TABLET ORAL DAILY
Status: DISCONTINUED | OUTPATIENT
Start: 2018-06-15 | End: 2018-06-17

## 2018-06-15 RX ORDER — MONTELUKAST SODIUM 5 MG/1
10 TABLET, CHEWABLE ORAL AT BEDTIME
Status: DISCONTINUED | OUTPATIENT
Start: 2018-06-15 | End: 2018-06-18 | Stop reason: HOSPADM

## 2018-06-15 RX ORDER — HYDROMORPHONE HYDROCHLORIDE 1 MG/ML
0.5 INJECTION, SOLUTION INTRAMUSCULAR; INTRAVENOUS; SUBCUTANEOUS ONCE
Status: COMPLETED | OUTPATIENT
Start: 2018-06-15 | End: 2018-06-15

## 2018-06-15 RX ORDER — LEVOFLOXACIN 5 MG/ML
500 INJECTION, SOLUTION INTRAVENOUS EVERY 24 HOURS
Status: DISCONTINUED | OUTPATIENT
Start: 2018-06-15 | End: 2018-06-15

## 2018-06-15 RX ORDER — ACETAMINOPHEN 325 MG/1
650 TABLET ORAL EVERY 4 HOURS PRN
Status: DISCONTINUED | OUTPATIENT
Start: 2018-06-15 | End: 2018-06-18 | Stop reason: HOSPADM

## 2018-06-15 RX ORDER — ATORVASTATIN CALCIUM 20 MG/1
20 TABLET, FILM COATED ORAL AT BEDTIME
Status: DISCONTINUED | OUTPATIENT
Start: 2018-06-15 | End: 2018-06-18 | Stop reason: HOSPADM

## 2018-06-15 RX ORDER — ASPIRIN 81 MG/1
81 TABLET, CHEWABLE ORAL DAILY
Status: DISCONTINUED | OUTPATIENT
Start: 2018-06-15 | End: 2018-06-18 | Stop reason: HOSPADM

## 2018-06-15 RX ORDER — GABAPENTIN 300 MG/1
300 CAPSULE ORAL 2 TIMES DAILY
Status: DISCONTINUED | OUTPATIENT
Start: 2018-06-15 | End: 2018-06-18 | Stop reason: HOSPADM

## 2018-06-15 RX ORDER — ONDANSETRON 2 MG/ML
4 INJECTION INTRAMUSCULAR; INTRAVENOUS EVERY 6 HOURS PRN
Status: DISCONTINUED | OUTPATIENT
Start: 2018-06-15 | End: 2018-06-18 | Stop reason: HOSPADM

## 2018-06-15 RX ORDER — ONDANSETRON 4 MG/1
4 TABLET, ORALLY DISINTEGRATING ORAL EVERY 6 HOURS PRN
Status: DISCONTINUED | OUTPATIENT
Start: 2018-06-15 | End: 2018-06-18 | Stop reason: HOSPADM

## 2018-06-15 RX ADMIN — ATORVASTATIN CALCIUM 20 MG: 20 TABLET, FILM COATED ORAL at 22:04

## 2018-06-15 RX ADMIN — LIDOCAINE HYDROCHLORIDE 20 ML: 20 JELLY TOPICAL at 18:03

## 2018-06-15 RX ADMIN — LEVOFLOXACIN 500 MG: 5 INJECTION, SOLUTION INTRAVENOUS at 18:58

## 2018-06-15 RX ADMIN — INSULIN ASPART 1 UNITS: 100 INJECTION, SOLUTION INTRAVENOUS; SUBCUTANEOUS at 22:08

## 2018-06-15 RX ADMIN — GABAPENTIN 300 MG: 300 CAPSULE ORAL at 22:05

## 2018-06-15 RX ADMIN — ENOXAPARIN SODIUM 40 MG: 100 INJECTION SUBCUTANEOUS at 22:07

## 2018-06-15 RX ADMIN — FAMOTIDINE 20 MG: 20 TABLET ORAL at 22:03

## 2018-06-15 RX ADMIN — ONDANSETRON 4 MG: 2 INJECTION INTRAMUSCULAR; INTRAVENOUS at 17:41

## 2018-06-15 RX ADMIN — SODIUM CHLORIDE, SODIUM LACTATE, POTASSIUM CHLORIDE, AND CALCIUM CHLORIDE 1000 ML: 600; 310; 30; 20 INJECTION, SOLUTION INTRAVENOUS at 17:40

## 2018-06-15 RX ADMIN — PREDNISONE 60 MG: 20 TABLET ORAL at 22:22

## 2018-06-15 RX ADMIN — MONTELUKAST SODIUM 10 MG: 5 TABLET, CHEWABLE ORAL at 22:03

## 2018-06-15 RX ADMIN — SODIUM CHLORIDE 1000 ML: 900 INJECTION, SOLUTION INTRAVENOUS at 18:58

## 2018-06-15 RX ADMIN — ASPIRIN 81 MG 81 MG: 81 TABLET ORAL at 22:05

## 2018-06-15 RX ADMIN — SODIUM CHLORIDE: 900 INJECTION, SOLUTION INTRAVENOUS at 21:59

## 2018-06-15 RX ADMIN — OXYCODONE HYDROCHLORIDE 10 MG: 5 TABLET ORAL at 22:04

## 2018-06-15 RX ADMIN — Medication 0.5 MG: at 18:45

## 2018-06-15 RX ADMIN — Medication 0.5 MG: at 17:41

## 2018-06-15 ASSESSMENT — ACTIVITIES OF DAILY LIVING (ADL)
EATING: 0 - INDEPENDENT
COGNITION: 0 - NO COGNITION ISSUES REPORTED
BATHING: 0 - INDEPENDENT
RETIRED_EATING: 0-->INDEPENDENT
AMBULATION: 2 - ASSISTIVE PERSON
SWALLOWING: 0 - SWALLOWS FOODS/LIQUIDS WITHOUT DIFFICULTY
TOILETING: 0-->INDEPENDENT
DRESS: 0 - INDEPENDENT
COMMUNICATION: 0 - UNDERSTANDS/COMMUNICATES WITHOUT DIFFICULTY
BATHING: 0-->INDEPENDENT
TRANSFERRING: 2 - ASSISTIVE PERSON
FALL_HISTORY_WITHIN_LAST_SIX_MONTHS: YES
AMBULATION: 0-->INDEPENDENT
DRESS: 0-->INDEPENDENT
WHICH_OF_THE_ABOVE_FUNCTIONAL_RISKS_HAD_A_RECENT_ONSET_OR_CHANGE?: AMBULATION;TRANSFERRING
TOILETING: 2 - ASSISTIVE PERSON
RETIRED_COMMUNICATION: 0-->UNDERSTANDS/COMMUNICATES WITHOUT DIFFICULTY
SWALLOWING: 0-->SWALLOWS FOODS/LIQUIDS WITHOUT DIFFICULTY
NUMBER_OF_TIMES_PATIENT_HAS_FALLEN_WITHIN_LAST_SIX_MONTHS: 8
TRANSFERRING: 0-->INDEPENDENT

## 2018-06-15 NOTE — IP AVS SNAPSHOT
Laura Perez #[id number] (CSN: [id number])  (68 year old F)  (Adm: 06/15/18)     PNQN-[id number]               [facility]: [phone]            Patient Demographics     Patient Name Sex          Age SSN Address Phone    Laura Perez Female 1950 (68 year old) [id number] [facility] [address] [phone] (Home)  [phone] (Mobile)      Emergency Contact(s)     Name Relation Home Work Mobile    Benigno Perez Spouse [phone]      Savage Perez Son [phone]        Admission Information     Attending Provider Admitting Provider Admission Type Admission Date/Time    Noelle Jacinto MD Cleary, Cathy, MD Emergency 06/15/18  1637    Discharge Date Hospital Service Auth/Cert Status [facility] Room/Bed Admission Status        MEDICAL SURGICAL  Admission (Confirmed)       Admission     Complaint    Pneumonia, Recurrent pneumonia      Hospital Account     Name Acct ID Class Status Primary Coverage    Laura Perez [id number] Inpatient Open MEDICARE - MEDICARE            Guarantor Account (for Hospital Account #[id number])     Name Relation to Pt Service Area Active? Acct Type    Laura Perez Self FCS Yes Personal/Family    Address Phone          [address] [place] [address] [phone](H)              Coverage Information (for Hospital Account #[id number])     1. MEDICARE/MEDICARE     F/O Payor/Plan Precert #    MEDICARE/MEDICARE     Subscriber Subscriber #    Laura Perez [id number]    Address Phone    [employer] CLAIM[employer]  [address]  [place] [address] [phone]          2. [place]     F/O Payor/Plan Precert #    [place]     Subscriber Subscriber #    Laura Perez [id number]    Address Phone    [address] [address]  [place], [place] [id number] [phone]                                                      INTERAGENCY TRANSFER FORM -  "PHYSICIAN ORDERS   6/15/2018                       [facility]: [phone]            Attending Provider: Noelle Jacinto MD     Allergies:  Glyburide, Sulfa Drugs, Sulfamethoxazole-trimethoprim, Codeine, Mosquitoes (Informational Only), Other [Seasonal Allergies]    Infection:  None   Service:  HOSPITALIST    Ht:  1.6 m (5' 3\")   Wt:  70.9 kg (156 lb 4.9 oz)   Admission Wt:  69.4 kg (153 lb)    BMI:  27.69 kg/m 2   BSA:  1.78 m 2            ED Clinical Impression     Diagnosis Description Comment Added By Time Added    Pneumonia due to infectious organism, unspecified laterality, unspecified part of lung [J18.9] Pneumonia due to infectious organism, unspecified laterality, unspecified part of lung [J18.9]  Toney Davis MD 6/15/2018  8:16 PM    Dehydration [E86.0] Dehydration [E86.0]  Toney Davis MD 6/15/2018  8:16 PM    Creatinine elevation [R79.89] Creatinine elevation [R79.89]  Toney Davis MD 6/15/2018  8:17 PM      Hospital Problems as of 6/18/2018              Priority Class Noted POA    COPD (chronic obstructive pulmonary disease) (H) Medium  9/12/2014 Yes    Seropositive rheumatoid arthritis (H) Medium  1/15/2016 Yes    Chronic diastolic CHF (congestive heart failure), NYHA class 2 (H) Medium  2/13/2016 Yes    Diabetes mellitus type 2, insulin dependent (H) Medium  7/28/2017 Yes    Chronic kidney disease (CKD), stage III (moderate) Medium  12/11/2017 Yes    * (Principal)Sepsis (H) Medium  6/15/2018 Yes    Recurrent pneumonia Medium  6/15/2018 Yes    Avascular necrosis of left talus (H) Medium  6/16/2018 Yes    ACS (acute coronary syndrome) (H) Medium  6/18/2018 No      Non-Hospital Problems as of 6/18/2018              Priority Class Noted    Lichen sclerosus et atrophicus Medium  3/5/2015    On prednisone therapy Medium  1/15/2016    Orthostatic hypotension Medium  1/15/2016    Secondary adrenal insufficiency (H) Medium  1/15/2016    Non-rheumatic mitral valve " stenosis Medium  2/13/2016    Anemia of chronic kidney failure, stage 3 (moderate) Medium  7/28/2017    Malignant tumor of colon (H) Medium  11/26/2017    Osteoporosis Medium  11/27/2017    HCAP (healthcare-associated pneumonia) Medium  11/28/2017    HTN (hypertension) Medium  11/29/2017    Acne rosacea Medium  1/26/2018    History of colonic polyps Medium  1/26/2018    Hyperlipidemia Medium  1/26/2018    Hypothyroidism Medium  1/26/2018    Migraine headache Medium  1/26/2018    Disorder of bone and cartilage Medium  1/26/2018    Popliteal cyst, left Medium  1/26/2018    Pneumonia Medium  6/15/2018      Code Status History     Date Active Date Inactive Code Status Order ID Comments User Context    6/18/2018 12:07 AM  Full Code [id number]  Noelle Jacinto MD Outpatient    6/15/2018  9:19 PM 6/18/2018 12:07 AM Full Code [id number]  Noelle Jacinto MD Inpatient      Current Code Status     Date Active Code Status Order ID Comments User Context       Prior      Summary of Visit     Reason for your hospital stay       Pneumonia; sepsis; chest pain with elevated cardiac enzymes leading to transfer to [facility]                Medication Review      CONTINUE these medications which have NOT CHANGED        Dose / Directions Comments    albuterol 108 (90 Base) MCG/ACT Inhaler   Commonly known as:  PROAIR HFA/PROVENTIL HFA/VENTOLIN HFA        Dose:  2 puff   Inhale 2 puffs into the lungs every 4 hours as needed   Refills:  0        amLODIPine 2.5 MG tablet   Commonly known as:  NORVASC        Dose:  2.5 mg   Take 2.5 mg by mouth daily   Refills:  0        aspirin 81 MG chewable tablet        Dose:  81 mg   Take 81 mg by mouth daily with food   Refills:  0        blood glucose monitoring lancets        TEST 2 TIMES PER DAY   Refills:  0        budesonide 0.5 MG/2ML neb solution   Commonly known as:  PULMICORT        Dose:  0.5 mg   Inhale 0.5 mg into the lungs 2 times daily   Refills:  0        Calcium carb-Vitamin D 500 mg  [place]-200 units 500-200 MG-UNIT per tablet   Commonly known as:  OSCAL with D;Oyster Shell Calcium        Dose:  1 tablet   Take 1 tablet by mouth daily with food   Refills:  0        EPINEPHrine 0.3 MG/0.3ML injection 2-pack   Commonly known as:  EPIPEN/ADRENACLICK/or ANY BX GENERIC EQUIV        Dose:  0.3 mg   Inject 0.3 mg into the muscle once as needed   Refills:  0        fenofibrate 48 MG tablet        Dose:  48 mg   Take 48 mg by mouth daily with food   Refills:  0        formoterol 20 MCG/2ML neb solution   Commonly known as:  PERFOROMIST        Dose:  2 mL   Inhale 2 mLs into the lungs 2 times daily   Refills:  0        furosemide 20 MG tablet   Commonly known as:  LASIX        Dose:  20 mg   Take 20 mg by mouth daily as needed (leg swelling)   Refills:  0        gabapentin 300 MG capsule   Commonly known as:  NEURONTIN        Dose:  300 mg   Take 300 mg by mouth 3 times daily   Refills:  0        glucagon 1 MG kit        Dose:  1 mg   Inject 1 mg into the muscle as needed for low blood sugar   Refills:  0        glucose 4 g Chew chewable tablet        Dose:  1 tablet   Take 1 tablet by mouth as needed for low blood sugar   Refills:  0        insulin aspart prot & aspart injection   Commonly known as:  NovoLOG MIX 70/30 PEN        Inject 50 units under th skin in the morning, and  30 units under the skin at bedtime   Refills:  0        levothyroxine 112 MCG tablet   Commonly known as:  SYNTHROID/LEVOTHROID        Dose:  112 mcg   Take 112 mcg by mouth every morning (before breakfast)   Refills:  0        lisinopril 2.5 MG tablet   Commonly known as:  PRINIVIL/Zestril        Dose:  2.5 mg   Take 2.5 mg by mouth   Refills:  0        losartan 25 MG tablet   Commonly known as:  COZAAR        Dose:  25 mg   Take 25 mg by mouth daily   Refills:  0        * medical cannabis liquid        1-2 sprays under the tongue as needed for pain   Refills:  0        * medical cannabis (Patient's own supply.  Not a  prescription)        Dose:  1 Dose   Apply 1 Dose topically as needed (pain) (This is NOT a prescription, and does not certify that the patient has a qualifying medical condition for medical cannabis.  The purpose of this order is  to document that the patient reports taking medical cannabis.)   Refills:  0        * medical cannabis liquid        Dose:  1 drop   Place 1 drop under the tongue as needed (pain) (This is NOT a prescription, and does not certify that the patient has a qualifying medical condition for medical cannabis.  The purpose of this order is  to document that the patient reports taking medical cannabis.)   Refills:  0        montelukast 10 MG tablet   Commonly known as:  SINGULAIR        Dose:  10 mg   Take 10 mg by mouth At Bedtime   Refills:  0        omeprazole 20 MG CR capsule   Commonly known as:  priLOSEC        Dose:  20 mg   Take 20 mg by mouth every morning (before breakfast)   Refills:  0        ONETOUCH ULTRA test strip   Generic drug:  blood glucose monitoring        TEST 2 TIMES PER DAY.   Refills:  0        predniSONE 5 MG tablet   Commonly known as:  DELTASONE        Dose:  12.5 mg   Take 12.5 mg by mouth daily with food   Refills:  0        simvastatin 40 MG tablet   Commonly known as:  ZOCOR        Dose:  40 mg   Take 40 mg by mouth At Bedtime   Refills:  0        traMADol 50 MG tablet   Commonly known as:  ULTRAM        Dose:   mg   Take  mg by mouth At Bedtime   Refills:  0        traZODone 100 MG tablet   Commonly known as:  DESYREL        Dose:  100 mg   Take 100 mg by mouth At Bedtime   Refills:  0        YUVAFEM 10 MCG Tabs vaginal tablet   Generic drug:  estradiol        Dose:  10 mcg   Place 10 mcg vaginally twice a week   Refills:  0        * Notice:  This list has 3 medication(s) that are the same as other medications prescribed for you. Read the directions carefully, and ask your doctor or other care provider to review them with you.            Follow-Up  "Appointment Instructions     Follow-up and recommended labs and tests        As recommended at discharge from [facility]                                                 [facility]   6/15/2018                       [facility]: [phone]            Attending Provider: Noelle Jacinto MD     Allergies:  Glyburide, Sulfa Drugs, Sulfamethoxazole-trimethoprim, Codeine, Mosquitoes (Informational Only), Other [Seasonal Allergies]    Infection:  None   Service:  HOSPITALIST    Ht:  1.6 m (5' 3\")   Wt:  70.9 kg (156 lb 4.9 oz)   Admission Wt:  69.4 kg (153 lb)    BMI:  27.69 kg/m 2   BSA:  1.78 m 2            Advance Directives        Scanned docmt in ACP Activity?           No scanned doc        Immunizations     None      ASSESSMENT     Discharge Profile Flowsheet     DISCHARGE NEEDS ASSESSMENT     Passing flatus  yes 06/17/18 1539    Transportation Available  family or friend will provide 06/17/18 1155   COMMUNICATION ASSESSMENT      FUNCTIONAL LEVEL CURRENT     Patient's communication style  spoken language (English or Bilingual) 06/15/18 1638    Ambulation  2 - assistive person 06/17/18 0942   SKIN      Transferring  2 - assistive person 06/17/18 0942   Inspection of bony prominences  Full except (identify areas not inspected) 06/17/18 1541    Toileting  2 - assistive person 06/17/18 0942   Full except areas not inspected   Coccyx 06/17/18 1541    Bathing  2 - assistive person 06/17/18 0942   Inspection under devices  Full 06/17/18 1541    Dressing  2 - assistive person 06/17/18 0942   Skin WDL  ex;color;characteristics 06/17/18 1541    Eating  0 - independent 06/17/18 0942   Skin Color/Characteristics  bruised (ecchymotic);redness blanchable 06/17/18 1541    Communication  0 - understands/communicates without difficulty 06/17/18 0942   Skin Temperature  warm 06/17/18 1541    Swallowing  0 - swallows foods/liquids without difficulty 06/17/18 0942   Skin Moisture  dry " "06/17/18 1541    GASTROINTESTINAL (ADULT,PEDIATRIC,OB)     Skin Elasticity  slow return to original state 06/17/18 1541    GI WDL  ex;appearance/characteristics 06/17/18 1539   Skin Integrity  abrasion(s);bruise(s);scar(s) 06/17/18 1541    Abdominal Appearance  rounded 06/17/18 1539   SAFETY      All Quadrants Bowel Sounds  audible and normoactive 06/17/18 1539   Safety WDL  WDL 06/17/18 1543    All Quadrants Palpation  tender 06/15/18 1738   Safety Factors  bed in low position;wheels locked;call light in reach;upper side rails raised x 2;ID band on 06/17/18 1543    GI Signs/Symptoms  no gastrointestinal signs/symptoms 06/17/18 1539   All Alarms  alarm(s) activated and audible 06/17/18 1543                 Assessment WDL (Within Defined Limits) Definitions           Safety WDL     Effective: 09/28/15    Row Information: <b>WDL Definition:</b> Bed in low position, wheels locked; call light in reach; upper side rails up x 2; ID band on<br> <font color=\"gray\"><i>Item=AS safety wdl>>List=AS safety wdl>>Version=F14</i></font>      Skin WDL     Effective: 09/28/15    Row Information: <b>WDL Definition:</b> Warm; dry; intact; elastic; without discoloration; pressure points without redness<br> <font color=\"gray\"><i>Item=AS skin wdl>>List=AS skin wdl>>Version=F14</i></font>      Vitals     Vital Signs Flowsheet     VITAL SIGNS     Height Method  Stated 06/15/18 1640    Temp  98.9  F (37.2  C) 06/17/18 2345   Weight  70.9 kg (156 lb 4.9 oz) 06/17/18 0519    Temp src  Tympanic 06/17/18 2345   Weight Method  Bed scale 06/17/18 0519    Resp  20 06/17/18 2345   BSA (Calculated - sq m)  1.77 06/15/18 2127    Pulse  91 06/17/18 1448   BMI (Calculated)  27.43 06/15/18 2127    Heart Rate  95 06/17/18 2345   LATISHA COMA SCALE      Pulse/Heart Rate Source  Monitor 06/17/18 2345   Best Eye Response  4-->(E4) spontaneous 06/17/18 1535    BP  135/62 06/17/18 2345   Best Motor Response  6-->(M6) obeys commands 06/17/18 1535    OXYGEN " "THERAPY     Best Verbal Response  5-->(V5) oriented 06/17/18 1535    SpO2  93 % 06/17/18 2345   Laurita Coma Scale Score  15 06/17/18 1535    O2 Device  None (Room air) 06/17/18 2345   ECG      Oxygen Delivery  1 LPM (decreased to RA) 06/16/18 1818   ECG Rhythm  Sinus rhythm 06/17/18 2233    PAIN/COMFORT     MO Interval  0.14 06/17/18 2233    Patient Currently in Pain  yes 06/17/18 2055   QRS Interval  0.10 06/17/18 2233    Preferred Pain Scale  number (Numeric Rating Pain Scale) 06/17/18 2055   QT Interval  0.37 (QTc 0.45) 06/17/18 2233    Patient's Stated Pain Goal  No pain 06/17/18 2055   Lead Monitored  Lead II 06/17/18 2233    0-10 Pain Scale  7 06/17/18 2055   Ectopy  PVC 06/17/18 2233    Pain Location  Leg 06/17/18 2055   Ectopy Frequency  Occasional 06/17/18 2233    Pain Orientation  Right;Left 06/17/18 2055   POINT OF CARE TESTING      Pain Descriptors  Aching;Throbbing 06/17/18 2055   Puncture Site  fingertip 06/17/18 2101    Pain Intervention(s)  Medication (See eMAR) 06/17/18 1532   Bedside Glucose (mg/dl )   301 mg/dl 06/17/18 2101    Response to Interventions  Decrease in pain 06/17/18 2314   POSITIONING      Additional Documentation  Frances Postanesthesia Score (with SaO2) (Group) 06/16/18 2206   Body Position  independently positioning 06/17/18 2055    ANALGESIA SIDE EFFECTS MONITORING     Head of Bed (HOB)  HOB at 20-30 degrees 06/17/18 0937    Side Effects Monitoring: Respiratory Quality  R 06/16/18 2206   Positioning/Transfer Devices  pillows;in use 06/16/18 2054    Side Effects Monitoring: Respiratory Depth  N 06/16/18 2206   DAILY CARE      Side Effects Monitoring: Sedation Level  S 06/16/18 2206   Activity Management  activity adjusted per tolerance;activity encouraged 06/17/18 1544    HEIGHT AND WEIGHT     Activity Assistance Provided  assistance, 2 people 06/17/18 0937    Height  1.6 m (5' 3\") 06/15/18 2127                 Patient Lines/Drains/Airways Status    Active " LINES/DRAINS/AIRWAYS     Name: Placement date: Placement time: Site: Days: Last dressing change:    Urethral Catheter Non-latex 16 fr 06/15/18   1804   Non-latex   2     Peripheral IV 06/16/18 Left Upper forearm 06/16/18   0906   Upper forearm   1             Patient Lines/Drains/Airways Status    Active PICC/CVC     None            Intake/Output Detail Report     Date Intake     Output Net    Shift P.O. I.V. IV Piggyback Total Urine Total       Yojana 06/16/18 1500 - 06/16/18 2259 200 704 -- 904 1050 1050 -146    Noc 06/16/18 2300 - 06/17/18 0659 -- 565 -- 565 750 750 -185    Day 06/17/18 0700 - 06/17/18 1459 300 -- -- 300 600 600 -300    Yojana 06/17/18 1500 - 06/17/18 2259 100 512 -- 612 1200 1200 -588    Noc 06/17/18 2300 - 06/18/18 0659 -- -- -- -- -- -- 0      Case Management/Discharge Planning     Case Management/Discharge Planning Flowsheet     LIVING ENVIRONMENT     QUESTION TO PATIENT:  Has a member of your family or a partner(now or in the past) intimidated, hurt, manipulated, or controlled you in any way?  no 06/15/18 1645    Lives With  spouse 06/17/18 1155   QUESTION TO PATIENT: Do you feel safe going back to the place where you are living?  yes 06/15/18 1645    COPING/STRESS     OBSERVATION: Is there reason to believe there has been maltreatment of a vulnerable adult (ie. Physical/Sexual/Emotional abuse, self neglect, lack of adequate food, shelter, medical care, or financial exploitation)?  no 06/15/18 1645    Major Change/Loss/Stressor  illness 06/15/18 2322   OTHER      DISCHARGE PLANNING     Are you depressed or being treated for depression?  No 06/15/18 2336    Transportation Available  family or friend will provide 06/17/18 1155   HOMICIDE RISK      ABUSE RISK SCREEN     Feels Like Hurting Others  no 06/15/18 1645                  [facility]: [phone]            Medication Administration Report for Laura Perez as of 06/18/18 0028   Legend:    Given Hold Not Given Due  Canceled Entry Other Actions    Time Time (Time) Time  Time-Action       Inactive    Active    Linked        Medications 06/12/18 06/13/18 06/14/18 06/15/18 06/16/18 06/17/18 06/18/18    acetaminophen (TYLENOL) tablet 650 mg  Dose: 650 mg  Freq: EVERY 4 HOURS PRN Route: PO  PRN Reason: mild pain  Start: 06/15/18 2119   Admin Instructions: Alternate ibuprofen (if ordered) with acetaminophen.  Maximum acetaminophen dose from all sources = 75 mg/kg/day not to exceed 4 grams/day.    Admin. Amount: 2 tablet (2 × 325 mg tablet)  Last Admin: 06/17/18 1107  Dispense Loc: MED/SURG/PEDS          1107 (650 mg)-Given [C]            albuterol neb solution 2.5 mg  Dose: 3 mL  Freq: EVERY 2 HOURS PRN Route: NEBULIZATION  PRN Reasons: wheezing,shortness of breath / dyspnea  Start: 06/15/18 2119   Admin. Amount: 2.5 mg = 3 mL Conc: 2.5 mg/3 mL  Dispense Loc: MED/SURG/PEDS  Volume: 3 mL               aspirin chewable tablet 81 mg  Dose: 81 mg  Freq: DAILY Route: PO  Start: 06/15/18 2130   Admin. Amount: 1 tablet (1 × 81 mg tablet)  Last Admin: 06/17/18 0817  Dispense Loc: MED/SURG/PEDS        2205 (81 mg)-Given        0823 (81 mg)-Given        0817 (81 mg)-Given        [ ] 0800           atorvastatin (LIPITOR) tablet 20 mg  Dose: 20 mg  Freq: AT BEDTIME Route: PO  Start: 06/15/18 2200   Admin. Amount: 1 tablet (1 × 20 mg tablet)  Last Admin: 06/17/18 2141  Dispense Loc: MED/SURG/PEDS        2204 (20 mg)-Given        2148 (20 mg)-Given        2141 (20 mg)-Given        [ ] 2200           budesonide (PULMICORT) neb solution 0.5 mg  Dose: 0.5 mg  Freq: 2 TIMES DAILY Route: NEBULIZATION  Start: 06/15/18 2130   Admin. Amount: 0.5 mg = 2 mL Conc: 0.5 mg/2 mL  Last Admin: 06/17/18 1940  Dispense Loc: MED/SURG/PEDS        (2133)-Not Given [C]        0753 (0.5 mg)-Given       1813 (0.5 mg)-Given        0616 (0.5 mg)-Given       1940 (0.5 mg)-Given        [ ] 0700       [ ] 1900           enoxaparin (LOVENOX) injection 40 mg  Dose: 40 mg  Freq:  EVERY 24 HOURS Route: SC  Start: 06/15/18 2130   Admin Instructions: HOLD if platelet count falls below 50% of baseline or less than 100,000/ L and notify provider.    Admin. Amount: 40 mg = 0.4 mL Conc: 40 mg/0.4 mL  Last Admin: 06/17/18 2143  Dispense Loc: MED/SURG/PEDS  Volume: 0.4 mL        2207 (40 mg)-Given        2150 (40 mg)-Given        2143 (40 mg)-Given        [ ] 2200           famotidine (PEPCID) tablet 20 mg  Dose: 20 mg  Freq: AT BEDTIME Route: PO  Start: 06/16/18 2200   Admin. Amount: 1 tablet (1 × 20 mg tablet)  Last Admin: 06/17/18 2141  Dispense Loc: MED/SURG/PEDS         2148 (20 mg)-Given        2141 (20 mg)-Given        [ ] 2200           gabapentin (NEURONTIN) capsule 300 mg  Dose: 300 mg  Freq: 2 TIMES DAILY Route: PO  Start: 06/15/18 2200   Admin. Amount: 1 capsule (1 × 300 mg capsule)  Last Admin: 06/17/18 2141  Dispense Loc: MED/SURG/PEDS        2205 (300 mg)-Given        1005 (300 mg)-Given       2148 (300 mg)-Given        1009 (300 mg)-Given       2141 (300 mg)-Given        [ ] 1000       [ ] 2200           glucose gel 15-30 g  Dose: 15-30 g  Freq: EVERY 15 MIN PRN Route: PO  PRN Reason: low blood sugar  Start: 06/15/18 2119   Admin Instructions: Give 15 g for BG 51 to 69 mg/dL IF patient is conscious and able to swallow. Give 30 g for BG less than or equal to 50 mg/dL IF patient is conscious and able to swallow. Do NOT give glucose gel via enteral tube.  IF patient has enteral tube: give apple juice 120 mL (4 oz or 15 g of CHO) via enteral tube for BG 51 to 69 mg/dL.  Give apple juice 240 mL (8 oz or 30 g of CHO) via enteral tube for BG less than or equal to 50 mg/dL.    ~Oral gel is preferable for conscious and able to swallow patient.   ~IF gel unavailable or patient refuses may provide apple juice 120 mL (4 oz or 15 g of CHO). Document juice on I and O flowsheet.    Admin. Amount: 15-30 g  Dispense Loc: MED/SURG/PEDS  Volume: 93.75 mL              Or  dextrose 50 % injection 25-50  mL  Dose: 25-50 mL  Freq: EVERY 15 MIN PRN Route: IV  PRN Reason: low blood sugar  Start: 06/15/18 2119   Admin Instructions: Use if have IV access, BG less than 70 mg/dL and meet dose criteria below:  Dose if conscious and alert (or disorientated) and NPO = 25 mL  Dose if unconscious / not alert = 50 mL  Vesicant. For ordered doses up to 25 g, give IV Push undiluted. Give each 5g over 1 minute.    Admin. Amount: 25-50 mL  Dispense Loc: MED/SURG/PEDS  Infused Over: 1-5 Minutes  Volume: 50 mL              Or  glucagon injection 1 mg  Dose: 1 mg  Freq: EVERY 15 MIN PRN Route: SC  PRN Reason: low blood sugar  PRN Comment: May repeat x 1 only  Start: 06/15/18 2119   Admin Instructions: May give SQ or IM. ONLY use glucagon IF patient has NO IV access AND is UNABLE to swallow AND blood glucose is LESS than or EQUAL to 50 mg/dL.  If ordered IV, give IV Push over 1 minute. Reconstitute with 1mL sterile water.    Admin. Amount: 1 mg  Dispense Loc: MED/SURG/PEDS               insulin aspart (NovoLOG) inj (RAPID ACTING)  Dose: 4 Units  Freq: DAILY WITH SUPPER Route: SC  Start: 06/17/18 1700   Admin Instructions: Do not give if pre-prandial glucose is less than 60 mg/dL.  If given at mealtime, must be administered 5 min before meal or immediately after.    Admin. Amount: 4 Units  Last Admin: 06/17/18 1729  Dispense Loc: [facility] DISPENSE ON DEMAND ONLY  Volume: 3 mL          1726 (4 Units)-Handoff       1729 (4 Units)-Given        [ ] 1700           insulin aspart (NovoLOG) inj (RAPID ACTING)  Dose: 4 Units  Freq: DAILY WITH LUNCH Route: SC  Start: 06/17/18 1200   Admin Instructions: Do not give if pre-prandial glucose is less than 60 mg/dL.  If given at mealtime, must be administered 5 min before meal or immediately after.    Admin. Amount: 4 Units  Dispense Loc: [facility] DISPENSE ON DEMAND ONLY  Volume: 3 mL          1315-Hold [C]        [ ] 1200           insulin glargine (LANTUS) injection 10 Units  Dose: 10 Units  Freq: AT BEDTIME  Route: SC  Start: 06/16/18 2200   Admin. Amount: 10 Units  Last Admin: 06/17/18 2143  Dispense Loc:  MAIN PHARMACY  Volume: 0.1 mL         2151 (10 Units)-Given        2143 (10 Units)-Given        [ ] 2200           ipratropium - albuterol 0.5 mg/2.5 mg/3 mL (DUONEB) neb solution 3 mL  Dose: 3 mL  Freq: 4 TIMES DAILY Route: NEBULIZATION  Start: 06/16/18 0700   Admin. Amount: 3 mL  Last Admin: 06/17/18 1939  Dispense Loc: MED/SURG/PEDS  Volume: 3 mL         0753 (3 mL)-Given       1202 (3 mL)-Given       1557-Hold       1810 (3 mL)-Given        0616 (3 mL)-Given       1044 (3 mL)-Given       1527 (3 mL)-Given       1939 (3 mL)-Given        [ ] 0700       [ ] 1100       [ ] 1500       [ ] 1900           levofloxacin (LEVAQUIN) tablet 750 mg  Dose: 750 mg  Freq: EVERY 48 HOURS Route: PO  Indications of Use: COMMUNITY ACQUIRED PNEUMONIA  Start: 06/17/18 1230   Admin Instructions: Administer at least 2 hrs before or 4 hrs after aluminum, calcium, iron, zinc or magnesium containing products.    Admin. Amount: 1 tablet (1 × 750 mg tablet)  Dispense Loc: MED/SURG/PEDS          1318-Hold [C]            levothyroxine (SYNTHROID/LEVOTHROID) tablet 112 mcg  Dose: 112 mcg  Freq: EVERY MORNING BEFORE BREAKFAST Route: PO  Start: 06/16/18 0730   Admin Instructions: Separate oral administration of iron- or calcium-containing products and levothyroxine by at least 4 hours.    Admin. Amount: 1 tablet (1 × 112 mcg tablet)  Last Admin: 06/17/18 0801  Dispense Loc: MED/SURG/PEDS         0823 (112 mcg)-Given        0801 (112 mcg)-Given        [ ] 0730           montelukast (SINGULAIR) chewable tablet 10 mg  Dose: 10 mg  Freq: AT BEDTIME Route: PO  Start: 06/15/18 2200   Admin. Amount: 2 tablet (2 × 5 mg tablet)  Last Admin: 06/17/18 2142  Dispense Loc: MED/SURG/PEDS        2203 (10 mg)-Given        2148 (10 mg)-Given        2142 (10 mg)-Given        [ ] 2200           naloxone (NARCAN) injection 0.1-0.4 mg  Dose: 0.1-0.4 mg  Freq: EVERY  2 MIN PRN Route: IV  PRN Reason: opioid reversal  Start: 06/15/18 2119   Admin Instructions: For respiratory rate LESS than or EQUAL to 8.  Partial reversal dose:  0.1 mg titrated q 2 minutes for Analgesia Side Effects Monitoring Sedation Level of 3 (frequently drowsy, arousable, drifts to sleep during conversation).Full reversal dose:  0.4 mg bolus for Analgesia Side Effects Monitoring Sedation Level of 4 (somnolent, minimal or no response to stimulation).  For ordered doses up to 2mg give IVP. Give each 0.4mg over 15 seconds in emergency situations. For non-emergent situations further dilute in 9mL of NS to facilitate titration of response.    Admin. Amount: 0.1-0.4 mg = 0.25-1 mL Conc: 0.4 mg/mL  Dispense Loc: MED/SURG/PEDS  Volume: 1 mL               nitroGLYcerin (NITROSTAT) sublingual tablet 0.4 mg  Dose: 0.4 mg  Freq: EVERY 5 MIN PRN Route: SL  PRN Reason: chest pain  Start: 06/17/18 2227   Admin. Amount: 1 tablet (1 × 0.4 mg tablet)  Last Admin: 06/17/18 2335  Dispense Loc: MED/SURG/PEDS          2234 (0.4 mg)-Given       2248 (0.4 mg)-Given       2300 (0.4 mg)-Given       2335 (0.4 mg)-Given            ondansetron (ZOFRAN-ODT) ODT tab 4 mg  Dose: 4 mg  Freq: EVERY 6 HOURS PRN Route: PO  PRN Reasons: nausea,vomiting  Start: 06/15/18 2119   Admin Instructions: This is Step 1 of nausea and vomiting management.  If nausea not resolved in 15 minutes, go to Step 2 prochlorperazine (COMPAZINE). Do not push through foil backing. Peel back foil and gently remove. Place on tongue immediately. Administration with liquid unnecessary  With dry hands, peel back foil backing and gently remove tablet; do not push oral disintegrating tablet through foil backing; administer immediately on tongue and oral disintegrating tablet dissolves in seconds; then swallow with saliva; liquid not required.    Admin. Amount: 1 tablet (1 × 4 mg tablet)  Last Admin: 06/16/18 2019  Dispense Loc: MED/SURG/PEDS         2019 (4 mg)-Given             Or  ondansetron (ZOFRAN) injection 4 mg  Dose: 4 mg  Freq: EVERY 6 HOURS PRN Route: IV  PRN Reasons: nausea,vomiting  Start: 06/15/18 2119   Admin Instructions: This is Step 1 of nausea and vomiting management.  If nausea not resolved in 15 minutes, go to Step 2 prochlorperazine (COMPAZINE).  Irritant. For ordered doses up to 4 mg, give IV Push undiluted over 2-5 minutes.    Admin. Amount: 4 mg = 2 mL Conc: 4 mg/2 mL  Dispense Loc: MED/SURG/PEDS  Infused Over: 2-5 Minutes  Volume: 2 mL                      oxyCODONE IR (ROXICODONE) tablet 5-10 mg  Dose: 5-10 mg  Freq: EVERY 3 HOURS PRN Route: PO  PRN Reason: other  PRN Comment: pain control or improvement in physical function. Hold dose for analgesic side effects.  Start: 06/15/18 2119   Admin Instructions: Start with the lowest dose.  May adjust dose by 5 mg every 3 hours as needed. Notify provider to assess for uncontrolled pain or analgesic side effects. Hold while on PCA or with regular IV opioid dosing.    Admin. Amount: 1-2 tablet (1-2 × 5 mg tablet)  Last Admin: 06/17/18 2141  Dispense Loc: MED/SURG/PEDS        2204 (10 mg)-Given        0622 (10 mg)-Given       1318 (10 mg)-Given       1708 (10 mg)-Given       2020 (10 mg)-Given        0817 (5 mg)-Given       1019 (5 mg)-Given       1528 (5 mg)-Given       2141 (10 mg)-Given            pantoprazole (PROTONIX) EC tablet 40 mg  Dose: 40 mg  Freq: EVERY MORNING BEFORE BREAKFAST Route: PO  Start: 06/16/18 0730   Admin. Amount: 1 tablet (1 × 40 mg tablet)  Last Admin: 06/17/18 0801  Dispense Loc: MED/SURG/PEDS         0823 (40 mg)-Given        0801 (40 mg)-Given        [ ] 0730           prochlorperazine (COMPAZINE) injection 5 mg  Dose: 5 mg  Freq: EVERY 6 HOURS PRN Route: IV  PRN Reasons: nausea,vomiting  Start: 06/15/18 2119   Admin Instructions: This is Step 2 of nausea and vomiting management. Give if nausea not resolved 15 minutes after giving ondansetron (ZOFRAN). If nausea not resolved in 15 minutes,  go to Step 3 metoclopramide (REGLAN), if ordered.  For ordered doses up to 10 mg, give IV Push undiluted. Each 5mg over 1 minute.    Admin. Amount: 5 mg = 1 mL Conc: 5 mg/mL  Dispense Loc: MED/SURG/PEDS  Infused Over: 1-2 Minutes  Volume: 1 mL              Or  prochlorperazine (COMPAZINE) tablet 5 mg  Dose: 5 mg  Freq: EVERY 6 HOURS PRN Route: PO  PRN Reason: vomiting  Start: 06/15/18 2119   Admin Instructions: This is Step 2 of nausea and vomiting management. Give if nausea not resolved 15 minutes after giving ondansetron (ZOFRAN). If nausea not resolved in 15 minutes, go to Step 3 metoclopramide (REGLAN), if ordered.    Admin. Amount: 1 tablet (1 × 5 mg tablet)  Dispense Loc: MED/SURG/PEDS              Or  prochlorperazine (COMPAZINE) Suppository 12.5 mg  Dose: 12.5 mg  Freq: EVERY 12 HOURS PRN Route: RE  PRN Reasons: nausea,vomiting  Start: 06/15/18 2119   Admin Instructions: This is Step 2 of nausea and vomiting management. Give if nausea not resolved 15 minutes after giving ondansetron (ZOFRAN). If nausea not resolved in 15 minutes, go to Step 3 metoclopramide (REGLAN), if ordered.    Admin. Amount: 0.5 suppository (0.5 × 25 mg suppository)  Dispense Loc: MED/SURG/PEDS               salmeterol (SEREVENT) diskus inhaler 1 puff  Dose: 1 puff  Freq: 2 TIMES DAILY Route: IN  Start: 06/16/18 0800   Admin Instructions: Substitution for formoterol inhaler    Admin. Amount: 1 puff  Last Admin: 06/17/18 1940  Dispense Loc:  MAIN PHARM[facility]         0932 (1 puff)-Given       1813 (1 puff)-Given        0617 (1 puff)-Given       1940 (1 puff)-Given        [ ] 0700       [ ] 1900          Future Medications  Medications 06/12/18 06/13/18 06/14/18 06/15/18 06/16/18 06/17/18 06/18/18       insulin aspart (NovoLOG) inj (RAPID ACTING)  Dose: 3 Units  Freq: EVERY MORNING BEFORE BREAKFAST Route: SC  Start: 06/18/18 0730   Admin Instructions: Do not give if pre-prandial glucose is less than 60 mg/dL.  If given at mealtime, must be  administered 5 min before meal or immediately after.    Admin. Amount: 3 Units  Dispense Loc: [facility] DISPENSE ON DEMAND ONLY  Volume: 3 mL           [ ] 0730           predniSONE (DELTASONE) tablet 40 mg  Dose: 40 mg  Freq: DAILY Route: PO  Start: 18 0800   Admin. Amount: 2 tablet (2 × 20 mg tablet)  Dispense Loc: MED/SURG/PEDS           [ ] 0800          Completed Medications  Medications 06/12/18 06/13/18 06/14/18 06/15/18 06/16/18 06/17/18 06/18/18         Dose: 1,000 mL  Freq: ONCE Route: IV  Last Dose: Stopped (06/15/18 2108)  Start: 06/15/18 1844   End: 06/15/18 2108   Admin. Amount: 1,000 mL  Last Admin: 06/15/18 1858  Dispense Loc: EMERGENCY DEPARTMENT  Infused Over: 1 Hours  Administrations Remainin  Volume: 1,000 mL         (1,000 mL)-New Bag       -ED Infusing on Admission/transfer                Dose: 81 mg  Freq: ONCE Route: PO  Start: 18   End: 18   Admin. Amount: 1 tablet (1 × 81 mg tablet)  Last Admin: 18  Dispense Loc: MED/SURG/PEDS  Administrations Remainin           (81 mg)-Given              Dose: 0.5 mg  Freq: ONCE Route: IV  Start: 06/15/18 1843   End: 06/15/18 1845   Admin Instructions: Notify the provider to assess for uncontrolled pain or analgesic side effects.  Hold while on IV PCA or with regular IV opioid dosing.  For ordered doses up to 4 mg give IV Push undiluted. Administer each 2mg over 2-5 minutes.    Admin. Amount: 0.5 mg  Last Admin: 06/15/18 1845  Dispense Loc: EMERGENCY DEPARTMENT  Administrations Remainin        5 (0.5 mg)-Given                Dose: 0.5 mg  Freq: ONCE PRN Route: IV  PRN Reason: other  PRN Comment: pain control or improvement in physical function. Hold dose for analgesic side effects.  Start: 06/15/18 1729   End: 06/15/18 1741   Admin Instructions: Notify the provider to assess for uncontrolled pain or analgesic side effects.  Hold while on IV PCA or with regular IV opioid dosing.  For ordered doses  up to 4 mg give IV Push undiluted. Administer each 2mg over 2-5 minutes.    Admin. Amount: 0.5 mg  Last Admin: 06/15/18 1741  Dispense Loc: EMERGENCY DEPARTMENT  Administrations Remainin        1741 (0.5 mg)-Given                Dose: 2 Units  Freq: ONCE Route: SC  Start: 18   End: 18   Admin Instructions: If given at mealtime, must be administered 5 min before meal or immediately after.    Admin. Amount: 2 Units  Last Admin: 18  Dispense Loc: [facility] DISPENSE ON DEMAND ONLY  Administrations Remainin  Volume: 3 mL          3 (2 Units)-Given              Dose: 1,000 mL  Freq: ONCE Route: IV  Last Dose: Stopped (06/15/18 1855)  Start: 06/15/18 1728   End: 06/15/18 1855   Admin. Amount: 1,000 mL  Last Admin: 06/15/18 1740  Dispense Loc: EMERGENCY DEPARTMENT  Infused Over: 1 Hours  Administrations Remainin  Volume: 1,000 mL         (1,000 mL)-New Bag       -Stopped                Dose: 500 mg  Freq: ONCE Route: IV  Indications of Use: SKIN AND SOFT TISSUE INFECTION  Last Dose: Stopped (06/15/18 1915)  Start: 06/15/18 1844   End: 06/15/18 1915   Admin Instructions: Irritant. Administer at a rate of no greater than 100 mL/hr    Admin. Amount: 500 mg = 100 mL Conc: 500 mg/100 mL  Last Admin: 06/15/18 1858  Dispense Loc: EMERGENCY DEPARTMENT  Infused Over: 60 Minutes  Administrations Remainin         (500 mg)-New Bag       -Stopped                Dose: 20 mL  Freq: ONCE PRN Route: UR  PRN Comment: For saleem insertion  Start: 06/15/18 1725   End: 06/15/18 1803   Admin Instructions: For saleem insertion    Admin. Amount: 20 mL  Last Admin: 06/15/18 1803  Dispense Loc: EMERGENCY DEPARTMENT  Administrations Remainin  Volume: 20 mL        1803 (20 mL)-Given             Discontinued Medications  Medications 06/12/18 06/13/18 06/14/18 06/15/18 06/16/18 06/17/18 06/18/18         Dose: 20 mg  Freq: 2 TIMES DAILY Route: PO  Start: 06/15/18 2200   End: 18 0757    Admin. Amount: 1 tablet (1 × 20 mg tablet)  Last Admin: 06/15/18 2203  Dispense Loc: MED/SURG/PEDS        2203 (20 mg)-Given        0735-Med Discontinued           Dose: 2 mL  Freq: 2 TIMES DAILY Route: NEBULIZATION  Start: 06/15/18 2130   End: 06/16/18 0758   Admin. Amount: 20 mcg = 2 mL Conc: 20 mcg/2 mL  Volume: 2 mL        (2128)-Not Given        0756-Hold       0758-Med Discontinued           Dose: 1-3 Units  Freq: AT BEDTIME Route: SC  Start: 06/15/18 2200   End: 06/17/18 1200   Admin Instructions: LOW INSULIN RESISTANCE DOSING    Do Not give Bedtime Correction Insulin if BG less than 200.   For  - 299 give 1 unit.   For  - 399 give 2 units   For BG greater than or equal 400 give 3 units.   Notify provider if glucose greater than or equal to 350 mg/dL after administration of correction dose.  If given at mealtime, must be administered 5 min before meal or immediately after.    Admin. Amount: 1-3 Units  Last Admin: 06/16/18 2151  Dispense Loc: [facility] DISPENSE ON DEMAND ONLY  Volume: 3 mL        2208 (1 Units)-Given        2145 (2 Units)-Handoff       2151 (2 Units)-Given        1200-Med Discontinued          Dose: 1-3 Units  Freq: 3 TIMES DAILY BEFORE MEALS Route: SC  Start: 06/16/18 0730   End: 06/17/18 1200   Admin Instructions: Correction Scale - LOW INSULIN RESISTANCE DOSING     Do Not give Correction Insulin if Pre-Meal BG less than 140.   For Pre-Meal  - 239 give 1 unit.   For Pre-Meal  - 339 give 2 units.   For Pre-Meal BG greater than or equal to 340 give 3 units.   To be given with prandial insulin, and based on pre-meal blood glucose.   Notify provider if glucose greater than or equal to 350 mg/dL after administration of correction dose.  If given at mealtime, must be administered 5 min before meal or immediately after.    Admin. Amount: 1-3 Units  Last Admin: 06/17/18 1153  Dispense Loc: [facility] DISPENSE ON DEMAND ONLY  Volume: 3 mL         0849 (1 Units)-Given       1315 (2  Units)-Handoff       1319 (2 Units)-Given       1705 (3 Units)-Handoff [C]       1710 (3 Units)-Given        0818 (1 Units)-Given       1148 (2 Units)-Handoff [C]       1153 (2 Units)-Given       1200-Med Discontinued          Rate: 125 mL/hr Dose: 1000 mL  Freq: CONTINUOUS Route: IV  Start: 06/15/18 1730   End: 06/16/18 0741   Admin Instructions: Administer after the bolus.    Admin. Amount: 1,000 mL  Dispense Loc:  MAIN PHARMACY  Volume: 1,000 mL                0741-Med Discontinued           Dose: 500 mg  Freq: EVERY 24 HOURS Route: IV  Indications of Use: COMMUNITY ACQUIRED PNEUMONIA  Start: 06/16/18 1900   End: 06/16/18 0739   Admin Instructions: Irritant. Administer at a rate of no greater than 100 mL/hr    Admin. Amount: 500 mg = 100 mL Conc: 500 mg/100 mL  Dispense Loc:  [facility]  Infused Over: 60 Minutes         0739-Med Discontinued           Dose: 500 mg  Freq: EVERY 24 HOURS Route: IV  Indications of Use: COMMUNITY ACQUIRED PNEUMONIA  Start: 06/15/18 2004   End: 06/15/18 2004   Admin Instructions: Irritant. Administer at a rate of no greater than 100 mL/hr    Admin. Amount: 500 mg = 100 mL Conc: 500 mg/100 mL  Infused Over: 60 Minutes        2004-Med Discontinued                  Dose: 750 mg  Freq: EVERY 48 HOURS Route: IV  Indications of Use: COMMUNITY ACQUIRED PNEUMONIA  Last Dose: Stopped (06/17/18 1318)  Start: 06/17/18 1200   End: 06/17/18 1215   Admin Instructions: Irritant. Administer at a rate of no greater than 100 mL/hr    Admin. Amount: 750 mg = 150 mL Conc: 500 mg/100 mL  Dispense Loc:  MAIN PHARMACY  Infused Over: 60 Minutes          1215-Med Discontinued  1318-Hold [C]              Dose: 750 mg  Freq: EVERY 48 HOURS Route: IV  Indications of Use: COMMUNITY ACQUIRED PNEUMONIA  Last Dose: 750 mg (06/17/18 1010)  Start: 06/17/18 1000   End: 06/17/18 1150   Admin Instructions: Irritant. Administer at a rate of no greater than 100 mL/hr    Admin. Amount: 750 mg = 150 mL Conc: 500  mg/100 mL  Last Admin: 18 1010  Dispense Loc:  MAIN [facility]  Infused Over: 60 Minutes   Current Line: Peripheral IV 18 Left Upper forearm         1010 (750 mg)-New Bag       1150-Med Discontinued          Dose: 4 mg  Freq: EVERY 30 MIN PRN Route: IV  PRN Reasons: nausea,vomiting  Start: 06/15/18 1729   End: 06/15/18 2136   Admin Instructions: May repeat in 30 minutes as needed, up to 3 doses.  Irritant. For ordered doses up to 4 mg, give IV Push undiluted over 2-5 minutes.    Admin. Amount: 4 mg = 2 mL Conc: 4 mg/2 mL  Last Admin: 06/15/18 1741  Dispense Loc: MED/SURG/PEDS  Infused Over: 2-5 Minutes  Administrations Remainin  Volume: 2 mL        174 (4 mg)-Given       -Med Discontinued            Dose: 60 mg  Freq: DAILY Route: PO  Start: 06/15/18 2028   End: 18   Admin. Amount: 3 tablet (3 × 20 mg tablet)  Last Admin: 18 08  Dispense Loc: MED/SURG/PEDS        2222 (60 mg)-Given        0823 (60 mg)-Given        0817 (60 mg)-Given       1150-Med Discontinued          Dose: 40 mg  Freq: AT BEDTIME Route: PO  Start: 06/15/18 2200   End: 06/15/18 2143        2143-Med Discontinued            Rate: 50 mL/hr   Freq: CONTINUOUS Route: IV  Start: 06/15/18 2145   End: 18   Admin Instructions: For 8 hours then decrease to 100mL/hr    Last Admin: 18  Dispense Loc:  MAIN PHARM[facility]Y  Volume: 1,000 mL   Current Line: Peripheral IV 18 Left Upper forearm       2159 ( )-New Bag        0618 ( )-Rate/Dose Change       0619 ( )-New Bag       1409 ( )-Rate/Dose Change       1708 ( )-New Bag        1154-Med Discontinued     Medications 06/12/18 06/13/18 06/14/18 06/15/18 06/16/18 06/17/18 06/18/18               INTERAGENCY TRANSFER FORM - NOTES (H&P, Discharge Summary, Consults, Procedures, Therapies)   6/15/2018                       [facility]: [phone]               History & Physicals      H&P by Noelle Jacinto MD at 6/15/2018  8:32 PM      Author:  Noelle Jacinto MD Service:  Family Medicine Author Type:  Physician    Filed:  6/15/2018  8:53 PM Date of Service:  6/15/2018  8:32 PM Creation Time:  6/15/2018  8:28 PM    Status:  Signed :  Noelle Jacinto MD (Physician)         [facility]    History and Physical  Hospitalist       Date of Admission:  6/15/2018[CC1.1]    Assessment & Plan[CC1.2]   Laura Perez is a 68 year old female who presents with a several-day history of illness with vomiting, diarrhea, and weakness.     Principal Problem:    Sepsis (H)    Assessment: by SOFA criteria. Likely source pulmonary. Has had antecedent GI illness with vomiting and diarrhea.     Plan: Fluid bolus and first dose Levaquin given in ED. BP low after Dilaudid dose. Continue IVF, monitor closely given Hx CHF.   Active Problems:  Recurrent pneumonia    Assessment: prior RUL pneumonia with current CXR showing fibrosis vs infiltrate. Question aspiration given recent vomiting.     Plan: Levaquin as above, scheduled nebs. Consider repeat CXR once rehydrated.  Chronic diastolic CHF (congestive heart failure), NYHA class 2 (H)    Assessment: currently appears compensated    Plan: watch fluid status closely  Chronic kidney disease (CKD), stage III (moderate)    Assessment: at baseline    Plan: monitor  COPD (chronic obstructive pulmonary disease) (H)    Assessment: on steroid and LABA nebs at home    Plan: oral Prednisone, continue nebs  Diabetes mellitus type 2, insulin dependent (H)    Assessment: normally controlled with 70/30 insulin    Plan: hold 70/30 until taking PO well; coverage insulin  Seropositive rheumatoid arthritis (H)    Assessment: steroid-dependent    Plan: increase Prednisone for acute illness, taper as improves.  Will obtain x-ray of left ankle because of erythema and pain      # Pain Assessment:[CC1.1]  Current Pain Score 6/15/2018   Pain score (0-10) 2[CC1.2]   - Laura is experiencing pain due to RA. Pain management was  discussed with Laura and her spouse and the plan was created in a collaborative fashion.  Laura's response to the current recommendations: engaged  - will use gabapentin BID, PO or IV opiates short-term      DVT Prophylaxis: Enoxaparin (Lovenox) SQ  Code Status: Full Code[CC1.1]    Noelle Jacinto    Primary Care Physician   Felicitas Johnson MD    Chief Complaint[CC1.2]   Weakness, falls, fever    History is obtained from the patient, , ED physician, and chart review.[CC1.1]    History of Present Illness[CC1.2]   Laura Perez is a 68 year old female who presents with an approximate 1 week history of illness.  She relates that she and her  were at their farm in the [place] area about 8 days ago when she noticed unusual fatigue and vomited once.  She saw her rheumatologist and her primary physician in [place] the following day and had several medication changes.  3 days prior to admission, she developed a diarrheal illness with 4-5 episodes of diarrheal stool per day.  She has had only a few episodes of vomiting.  She has noticed marked fatigue, generalized achiness, and increased falls, but no shortness of breath and only mild cough.  Her past medical history is significant for rheumatoid arthritis since her teens.  She has had, as above, several medication changes recently, including a marked increase in her gabapentin dose after an unsuccessful spinal injection for pain.  The gabapentin dose was then fairly quickly tapered because she had several falls with bruising and skin tears.  Today, she felt warm, continued to have diarrhea, and was diffusely weak and was brought to the ED for evaluation.  In the ED, low-grade fever was noted.  White count was markedly elevated and chest x-ray showed a possible right upper lobe infiltrate.  Blood pressure was in the low end of normal range, with mild hypotension noted after a dose of IV Dilaudid.  Heart rate was in the 90s.  She was treated with an IV  fluid bolus and given a dose of IV Levaquin.  She is feeling somewhat better.[CC1.1]    Past Medical History[CC1.2]    I have reviewed this patient's medical history and updated it with pertinent information if needed.[CC1.1]   Past Medical History:   Diagnosis Date     Chronic obstructive pulmonary disease (H)     No Comments Provided     Diverticulosis of intestine without perforation or abscess without bleeding     Diffuse diverticulosis and history of diminutive hyperplastic colon     Essential (primary) hypertension     No Comments Provided     Hyperlipidemia     No Comments Provided     Hypothyroidism     No Comments Provided     Malignant neoplasm of colon (H)     9/2017     Personal history of other diseases of the nervous system and sense organs     No Comments Provided     Rheumatoid arthritis (H)     Rheumatologist Dr. Gonzalez, [phone], fax [phone]     Rosacea     No Comments Provided     Type 2 diabetes mellitus without complications (H)     Diabetes Mellitus, prednisone induced     Urinary tract infection     8/2013,admitted to [facility], cx ecoli       Past Surgical History[CC1.2]   I have reviewed this patient's surgical history and updated it with pertinent information if needed.[CC1.1]  Past Surgical History:   Procedure Laterality Date     COLONOSCOPY  09/29/2008     HEMICOLECTOMY, RT/LT Right 09/2017    For colon cancer     LAPAROSCOPIC TUBAL LIGATION      No Comments Provided     OTHER SURGICAL HISTORY      2/5/98,[id number],ARTHROPLASTY,Left MCP joint arthroplasty     OTHER SURGICAL HISTORY      1990,[id number],ARTHROPLASTY,Right wrist arthroplasty     OTHER SURGICAL HISTORY      1996,[id number],ARTHROPLASTY,Right long finger MCP repair [place]     OTHER SURGICAL HISTORY      5/00,[id number],OTHER,MTP 2, 3, 4 right foot     OTHER SURGICAL HISTORY      1/01,[id number],ARTHROPLASTY,Right MTP 1 and 5 and left MTP 5     RELEASE CARPAL TUNNEL      1991,Left carpal tunnel repair     Right MCP 4  and 5 arthroplasty  03/01/2004       Prior to Admission Medications   Prior to Admission Medications   Prescriptions Last Dose Informant Patient Reported? Taking?   Calcium carb-Vitamin D 500 mg Jackson-200 units (OSCAL WITH D;OYSTER SHELL CALCIUM) 500-200 MG-UNIT per tablet Past Week at Unknown time  Yes Yes   Sig: Take 1 tablet by mouth daily with food   DULoxetine HCl (CYMBALTA PO) 6/15/2018 at Unknown time  Yes Yes   Sig: Take 20 mg by mouth daily   EPINEPHrine (EPIPEN/ADRENACLICK/OR ANY BX GENERIC EQUIV) 0.3 MG/0.3ML injection 2-pack More than a month at Unknown time  Yes No   Sig: Inject 0.3 mg into the muscle once as needed   GABAPENTIN PO 6/15/2018 at Unknown time  Yes Yes   Sig: Take 300 mg by mouth 2 times daily   SIMVASTATIN PO Past Week at Unknown time  Yes Yes   Sig: Take 40 mg by mouth At Bedtime   albuterol (2.5 MG/3ML) 0.083% neb solution 6/14/2018 at Unknown time  Yes Yes   Sig: Inhale 2.5 mg into the lungs every 4 hours as needed   albuterol (PROAIR HFA/PROVENTIL HFA/VENTOLIN HFA) 108 (90 BASE) MCG/ACT Inhaler More than a month at Unknown time  Yes No   Sig: Inhale 2 puffs into the lungs every 4 hours as needed   amLODIPine (NORVASC) 5 MG tablet 6/14/2018 at Unknown time  Yes Yes   Sig: Take 5 mg by mouth daily   aspirin 81 MG chewable tablet 6/14/2018 at Unknown time  Yes Yes   Sig: Take 81 mg by mouth daily with food   blood glucose monitoring (ONE TOUCH ULTRASOFT) lancets 6/15/2018 at Unknown time  Yes Yes   Sig: TEST 2 TIMES PER DAY   blood glucose monitoring (ONETOUCH ULTRA) test strip 6/15/2018 at Unknown time  Yes Yes   Sig: TEST 2 TIMES PER DAY.   budesonide (PULMICORT) 0.5 MG/2ML neb solution Past Week at Unknown time  Yes Yes   Sig: Inhale 0.5 mg into the lungs 2 times daily   cyanocobalamin (TH VITAMIN B12) 100 MCG TABS tablet Past Week at Unknown time  Yes Yes   Sig: Take by mouth daily   estradiol (VAGIFEM) 10 MCG TABS vaginal tablet Past Week at Unknown time  Yes Yes   Sig: Place 1  tablet vaginally three times a week   fenofibrate 48 MG tablet Past Week at Unknown time  Yes Yes   Sig: Take 48 mg by mouth daily with food   formoterol (PERFOROMIST) 20 MCG/2ML neb solution Past Week at Unknown time  Yes Yes   Sig: Inhale 2 mLs into the lungs 2 times daily   furosemide (LASIX) 20 MG tablet   Yes No   Sig: Take 20 mg by mouth   insulin aspart prot & aspart (NOVOLOG MIX 70/30 PEN) injection 2018 at Unknown time  Yes Yes   Si units am, 30 units pm  Indications:   levothyroxine (SYNTHROID/LEVOTHROID) 112 MCG tablet Past Week at Unknown time  Yes Yes   Sig: Take 112 mcg by mouth every morning (before breakfast)   lisinopril (PRINIVIL/ZESTRIL) 2.5 MG tablet   Yes Yes   Sig: Take 2.5 mg by mouth   losartan (COZAAR) 25 MG tablet Past Week at Unknown time  Yes Yes   Sig: Take 25 mg by mouth daily   medical cannabis liquid   Yes No   montelukast (SINGULAIR) 10 MG tablet Past Week at Unknown time  Yes Yes   Sig: Take 10 mg by mouth At Bedtime   omeprazole (PRILOSEC) 20 MG CR capsule Past Week at Unknown time  Yes Yes   Sig: Take 20 mg by mouth every morning (before breakfast)   predniSONE (DELTASONE) 5 MG tablet 6/15/2018 at Unknown time  Yes Yes   Sig: Take 15 mg by mouth daily with food   traMADol (ULTRAM) 50 MG tablet 6/15/2018 at Unknown time  Yes Yes   Sig: Take 50 mg by mouth 2 times daily as needed   traZODone (DESYREL) 100 MG tablet 6/15/2018 at Unknown time  Yes Yes   Sig: Take 100 mg by mouth At Bedtime      Facility-Administered Medications: None     Allergies   Allergies   Allergen Reactions     Codeine Nausea and Other (See Comments)     Stomach problems     Glyburide Nausea     Other reaction(s): Dizziness     Sulfamethoxazole-Trimethoprim      Other reaction(s): Other - Describe In Comment Field  Rash, nausea, dizziness     Sulfa Drugs Rash       Social History[CC1.2]   I have reviewed this patient's social history and updated it with pertinent information if needed. Laura PICKETT  Chris[CC1.1]  reports that she has quit smoking. Her smoking use included Cigarettes. She has a 30.00 pack-year smoking history. She has never used smokeless tobacco. She reports that she drinks about 1.5 oz of alcohol per week  She reports that she uses illicit drugs.[CC1.2]  (Has been using medical marijuana for pain)[CC1.1]    Family History[CC1.2]   I have reviewed this patient's family history and updated it with pertinent information if needed.[CC1.1]   Family History   Problem Relation Age of Onset     CANCER Mother 50     Cancer,uterus     HEART DISEASE Mother      Heart Disease,MI     Colon Cancer Mother      Cancer-colon     HEART DISEASE Father      Heart Disease,MI     Chronic Obstructive Pulmonary Disease Sister      COPD     Other - See Comments Sister      rubina dow     Colon Cancer Brother      Cancer-colon     Breast Cancer No family hx of      Cancer-breast       Review of Systems[CC1.2]   The 10 point Review of Systems is negative other than noted in the HPI or here.   She has had 1 or 2 episodes of falling that were associated with crying out and shaking of her upper extremities.  She was started on duloxetine at the time of her medical follow-up appointments last week, and this was subsequently stopped.[CC1.1]    Physical Exam   Temp: 99.6  F (37.6  C) Temp src: Tympanic BP: (!) 87/52   Heart Rate: 96 Resp: 27 SpO2: 96 % O2 Device: Nasal cannula Oxygen Delivery: 2 LPM[CC1.2]  Vital Signs with Ranges[CC1.1]  Temp:  [99.6  F (37.6  C)-100.1  F (37.8  C)] 99.6  F (37.6  C)  Heart Rate:  [] 96  Resp:  [12-27] 27  BP: ()/(50-69) 87/52  SpO2:  [88 %-97 %] 96 %  153 lbs 0 oz[CC1.2]    Constitutional: Alert, pale, in no respiratory distress  Eyes: Pupils are 2 mm and reactive, EOMI  HEENT: Normocephalic.  Oral mucosa mildly dry  Respiratory: Good air exchange with few right-sided crackles, no wheezes  Cardiovascular: Regular rate and rhythm, no murmur heard  GI: Soft with no palpable  mass or HSM, no apparent tenderness  Lymph/Hematologic: No cervical or supraclavicular adenopathy  Genitourinary: Deferred  Skin: Multiple superficial abrasions and ecchymoses over the upper extremities, especially over the left proximal forearm just distal to the elbow, where there is mild induration, but no fluctuance  Musculoskeletal: Both ankles and the right knee are warm to the touch and mildly erythematous.  Chronic hand deformities consistent with her diagnosis of RA and multiple surgeries  Neurologic: She is alert and oriented ×3, able to give an accurate history.  Face symmetric.  DTRs symmetric upper and lower extremities and toes downgoing  Psychiatric: Affect appropriate[CC1.1]    Data[CC1.2]   Data reviewed today:  I personally reviewed the chest x-ray image(s) showing possible recurrent RUL pneumonia.[CC1.1]    Recent Labs  Lab 06/15/18  1745   WBC 18.8*   HGB 9.4*   MCV 80      INR 1.22   *   POTASSIUM 4.6   CHLORIDE 97*   CO2 22   BUN 18   CR 1.63*   ANIONGAP 11   GIANNI 9.3   *   ALBUMIN 3.3*   PROTTOTAL 7.1   BILITOTAL 1.2*   ALKPHOS 70   ALT 18   AST 22   TROPI <0.030       Recent Results (from the past 24 hour(s))   XR Chest Port 1 View    Narrative    EXAM:XR CHEST PORT 1 VW     CLINICAL HISTORY: Patient Age  68 years Additional clinical info:  fever and hx pneumonia;      COMPARISON: 11/30/2017      TECHNIQUE: Single view      Impression    IMPRESSION: Slight interstitial prominence in the right upper lung  could be due to fibrosis or infiltrate. Aortic calcifications. Chest  otherwise normal.    ANAHI SANTACRUZ MD[CC1.2]          Revision History        User Key Date/Time User Provider Type Action    > CC1.2 6/15/2018  8:53 PM Noelle Jacinto MD Physician Sign     CC1.1 6/15/2018  8:28 PM Noelle Jacinto MD Physician                      Discharge Summaries      Discharge Summaries by Noelle Jacinto MD at 6/18/2018 12:07 AM     Author:  Noelle Jacinto MD Service:  Family  "Medicine Author Type:  Physician    Filed:  6/18/2018 12:19 AM Date of Service:  6/18/2018 12:07 AM Creation Time:  6/18/2018 12:07 AM    Status:  Signed :  Noelle Jacinto MD (Physician)         [facility]    Discharge/Transfer Summary  Hospitalist    Date of Admission:  6/15/2018  Date of Discharge:[CC1.1]  6/18/2018[CC1.2]  Discharging Provider: Noelle Jacinto  Date of Service (when I saw the patient):[CC1.1] 06/18/18[CC1.2]    Discharge Diagnoses   Principal Problem:    Sepsis (H) (6/15/2018)  Active Problems:    Chronic diastolic CHF (congestive heart failure), NYHA class 2 (H) (2/13/2016)    Chronic kidney disease (CKD), stage III (moderate) (12/11/2017)    COPD (chronic obstructive pulmonary disease) (H) (9/12/2014)    Diabetes mellitus type 2, insulin dependent (H) (7/28/2017)    Seropositive rheumatoid arthritis (H) (1/15/2016)    Recurrent pneumonia (6/15/2018)    Avascular necrosis of left talus (H) (6/16/2018)    ACS (acute coronary syndrome) (H) (6/18/2018)      History of Present Illness   Laura Perez is an 68 year old female who presented with weakness. From the admission H&P: \"Laura Perez is a 68 year old female who presents with an approximate 1 week history of illness.  She relates that she and her  were at their farm in the [place] area about 8 days ago when she noticed unusual fatigue and vomited once.  She saw her rheumatologist and her primary physician in [place] the following day and had several medication changes.  3 days prior to admission, she developed a diarrheal illness with 4-5 episodes of diarrheal stool per day.  She has had only a few episodes of vomiting.  She has noticed marked fatigue, generalized achiness, and increased falls, but no shortness of breath and only mild cough.  Her past medical history is significant for rheumatoid arthritis since her teens.  She has had, as above, several medication changes recently, including a marked " "increase in her gabapentin dose after an unsuccessful spinal injection for pain.  The gabapentin dose was then fairly quickly tapered because she had several falls with bruising and skin tears.  Today, she felt warm, continued to have diarrhea, and was diffusely weak and was brought to the ED for evaluation.  In the ED, low-grade fever was noted.  White count was markedly elevated and chest x-ray showed a possible right upper lobe infiltrate.  Blood pressure was in the low end of normal range, with mild hypotension noted after a dose of IV Dilaudid.  Heart rate was in the 90s.  She was treated with an IV fluid bolus and given a dose of IV Levaquin.  She is feeling somewhat better.\"    Hospital Course   Laura Perez was admitted on 6/15/2018.  The following problems were addressed during her hospitalization:  Principal Problem:    Sepsis (H)    Assessment: met SOFA criteria on admission. Now afebrile >24h, VS normal, improved subjectively    Plan: continue Levaquin, convert to PO   Active Problems:  Acute coronary syndrome    Patient developed anterior chest pain during the day[CC1.1] 6/18/2018[CC1.3] and notified her nurse after several hours of discomfort. Intermittent diaphoresis not temporally related to the pain; no dyspnea, palpitations, nausea, or lightheadedness. Substantially relieved after NTG X 3, with another dose 30 minutes later (total of 4). ASA given.  EKG showed +/- 0.5 mm ST depression in V3 and V4, repeat EKG normalized. Troponin 0.08, repeat after 90 minutes 0.112. Discussed with Dr. Markham, intake MD at [facility], who will accept patient in transfer.   Recurrent pneumonia  Post-hydration XRay shows lingular infiltrate. Afebrile, no further O2 requirement, WBC remains high (?steroids) but trending down. Converted to PO ABx.  Seropositive rheumatoid arthritis (H)  Steroid-dependent, s/p multiple reconstructive surgeries - mostly in hands. 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 R " ankle XRay shows only arthritic changes.  Ambulating with PT/OT, will try a walking boot for comfort  Chronic diastolic CHF (congestive heart failure), NYHA class 2 (H)  Clinically euvolemic. BNP 49 on admission. Weight stable.  Chronic kidney disease (CKD), stage III (moderate)  Cr improved. Hyperkalemia resolved. Still holding both lisinopril and losartan  COPD (chronic obstructive pulmonary disease) (H)  Weaned to room air. Using home CPAP at night. Prednisone taper started.  Diabetes mellitus type 2, insulin dependent (H)  FSBS elevated, recent A1c high  Treating with Lantus and mealtime coverage in hospital. Plan to resume usual 70/30 at discharge    Significant Results and Procedures   No procedures.    Pending Results   These results will be followed up by PCP  Unresulted Labs Ordered in the Past 30 Days of this Admission     Date and Time Order Name Status Description    6/15/2018 1843 Blood culture Preliminary     6/15/2018 1843 Blood culture Preliminary     6/15/2018 1730 Urine Culture Aerobic Bacterial In process           Code Status   Full Code       Primary Care Physician   Felicitas Johnson MD    Physical Exam   Temp: 98.9  F (37.2  C) Temp src: Tympanic BP: 135/62 Pulse: 91 Heart Rate: 95 Resp: 20 SpO2: 93 % O2 Device: None (Room air)    Vitals:    06/15/18 1639 06/15/18 2124 06/17/18 0519   Weight: 69.4 kg (153 lb) 70.1 kg (154 lb 8.7 oz) 70.9 kg (156 lb 4.9 oz)     Vital Signs with Ranges  Temp:  [96.2  F (35.7  C)-98.9  F (37.2  C)] 98.9  F (37.2  C)  Pulse:  [91] 91  Heart Rate:  [] 95  Resp:  [16-20] 20  BP: (106-155)/(42-65) 135/62  SpO2:  [91 %-95 %] 93 %  I/O last 3 completed shifts:  In: 1477 [P.O.:400; I.V.:1077]  Out: 2550 [Urine:2550]    Constitutional: alert, no resp distress, conversant  Eyes: conjunctival erythema on L, PERRL  ENT: OP moist  Respiratory: lungs clear  Cardiovascular: RRR, no murmur heard  GI: soft, NT    Discharge Disposition   Transferred to [facility] with   [facility] accepting  Condition at discharge: Stable    Consultations This Hospital Stay   PHYSICAL THERAPY ADULT IP CONSULT  OCCUPATIONAL THERAPY ADULT IP CONSULT    Time Spent on this Encounter   I, Noelle Jacinto, personally saw the patient today and spent greater than 30 minutes discharging this patient.    Discharge Orders     Reason for your hospital stay   Pneumonia; sepsis; chest pain with elevated cardiac enzymes leading to transfer to [facility]     Follow-up and recommended labs and tests    As recommended at discharge from [facility]     Full Code       Discharge Medications   Current Discharge Medication List      CONTINUE these medications which have NOT CHANGED    Details   amLODIPine (NORVASC) 2.5 MG tablet Take 2.5 mg by mouth daily      aspirin 81 MG chewable tablet Take 81 mg by mouth daily with food      blood glucose monitoring (ONE TOUCH ULTRASOFT) lancets TEST 2 TIMES PER DAY      blood glucose monitoring (ONETOUCH ULTRA) test strip TEST 2 TIMES PER DAY.      budesonide (PULMICORT) 0.5 MG/2ML neb solution Inhale 0.5 mg into the lungs 2 times daily      Calcium carb-Vitamin D 500 mg [place]-200 units (OSCAL WITH D;OYSTER SHELL CALCIUM) 500-200 MG-UNIT per tablet Take 1 tablet by mouth daily with food      estradiol (YUVAFEM) 10 MCG TABS vaginal tablet Place 10 mcg vaginally twice a week      fenofibrate 48 MG tablet Take 48 mg by mouth daily with food      formoterol (PERFOROMIST) 20 MCG/2ML neb solution Inhale 2 mLs into the lungs 2 times daily      furosemide (LASIX) 20 MG tablet Take 20 mg by mouth daily as needed (leg swelling)       gabapentin (NEURONTIN) 300 MG capsule Take 300 mg by mouth 3 times daily      insulin aspart prot & aspart (NOVOLOG MIX 70/30 PEN) injection Inject 50 units under th skin in the morning, and  30 units under the skin at bedtime      levothyroxine (SYNTHROID/LEVOTHROID) 112 MCG tablet Take 112 mcg by mouth every morning (before breakfast)      lisinopril  (PRINIVIL/ZESTRIL) 2.5 MG tablet Take 2.5 mg by mouth      losartan (COZAAR) 25 MG tablet Take 25 mg by mouth daily      !! medical cannabis (Patient's own supply.  Not a prescription) Apply 1 Dose topically as needed (pain) (This is NOT a prescription, and does not certify that the patient has a qualifying medical condition for medical cannabis.  The purpose of this order is  to document that the patient reports taking medical cannabis.)      !! medical cannabis liquid Place 1 drop under the tongue as needed (pain) (This is NOT a prescription, and does not certify that the patient has a qualifying medical condition for medical cannabis.  The purpose of this order is  to document that the patient reports taking medical cannabis.)      !! medical cannabis liquid 1-2 sprays under the tongue as needed for pain      montelukast (SINGULAIR) 10 MG tablet Take 10 mg by mouth At Bedtime      omeprazole (PRILOSEC) 20 MG CR capsule Take 20 mg by mouth every morning (before breakfast)      predniSONE (DELTASONE) 5 MG tablet Take 12.5 mg by mouth daily with food       simvastatin (ZOCOR) 40 MG tablet Take 40 mg by mouth At Bedtime      traMADol (ULTRAM) 50 MG tablet Take  mg by mouth At Bedtime       traZODone (DESYREL) 100 MG tablet Take 100 mg by mouth At Bedtime      albuterol (PROAIR HFA/PROVENTIL HFA/VENTOLIN HFA) 108 (90 BASE) MCG/ACT Inhaler Inhale 2 puffs into the lungs every 4 hours as needed      EPINEPHrine (EPIPEN/ADRENACLICK/OR ANY BX GENERIC EQUIV) 0.3 MG/0.3ML injection 2-pack Inject 0.3 mg into the muscle once as needed      glucagon 1 MG kit Inject 1 mg into the muscle as needed for low blood sugar       glucose 4 g CHEW chewable tablet Take 1 tablet by mouth as needed for low blood sugar       !! - Potential duplicate medications found. Please discuss with provider.        Allergies   Allergies   Allergen Reactions     Glyburide Anaphylaxis     Other reaction(s): Dizziness     Sulfa Drugs Anaphylaxis      Sulfamethoxazole-Trimethoprim Anaphylaxis     Other reaction(s): Other - Describe In Comment Field  Rash, nausea, dizziness     Codeine Nausea and Nausea and Vomiting     Mosquitoes (Informational Only) Hives     Other [Seasonal Allergies] Hives     Deer Flies     Data   Most Recent 3 CBC's:  Recent Labs   Lab Test  06/17/18   0635  06/16/18   0700  06/15/18   1745   WBC  16.6*  16.3*  18.8*   HGB  8.4*  8.5*  9.4*   MCV  81  83  80   PLT  348  300  321      Most Recent 3 BMP's:  Recent Labs   Lab Test  06/17/18   0635  06/16/18   0700  06/15/18   1745   NA  132*  131*  130*   POTASSIUM  4.7  5.6*  4.6   CHLORIDE  103  102  97*   CO2  21  21  22   BUN  21  20  18   CR  1.31*  1.50*  1.63*   ANIONGAP  8  8  11   GIANNI  8.9  8.4*  9.3   GLC  223*  194*  222*     Most Recent 2 LFT's:  Recent Labs   Lab Test  06/15/18   1745  06/29/16   1555   AST  22   --    ALT  18   --    ALKPHOS  70  51   BILITOTAL  1.2*  0.5     Most Recent INR's and Anticoagulation Dosing History:  Anticoagulation Dose History     Recent Dosing and Labs Latest Ref Rng & Units 6/15/2018    INR 0 - 1.3 1.22        Most Recent 3 Troponin's:  Recent Labs   Lab Test  06/17/18   2300  06/17/18   2116  06/15/18   1745   TROPI  0.112*  0.080*  <0.030     Most Recent Cholesterol Panel:  Recent Labs   Lab Test  12/03/15   1802   LDL  121*   HDL  40   TRIG  260*     Most Recent 6 Bacteria Isolates From Any Culture (See EPIC Reports for Culture Details):  Recent Labs   Lab Test  06/15/18   1745   CULT  No growth after 2 days  No growth after 2 days     Most Recent TSH, T4 and A1c Labs:No lab results found.  Results for orders placed or performed during the hospital encounter of 06/15/18   XR Chest Port 1 View    Narrative    EXAM:XR CHEST PORT 1 VW     CLINICAL HISTORY: Patient Age  68 years Additional clinical info:  fever and hx pneumonia;      COMPARISON: 11/30/2017      TECHNIQUE: Single view      Impression    IMPRESSION: Slight interstitial prominence in  the right upper lung  could be due to fibrosis or infiltrate. Aortic calcifications. Chest  otherwise normal.    ANAHI SANTACRUZ MD   XR Ankle Port Left 2 Views    Narrative    EXAM:XR ANKLE PORT LT 2 VW     CLINICAL HISTORY: Patient Age  68 years Additional clinical info:  pain, erythema, hx RA;      COMPARISON: None      TECHNIQUE: 2 views      Impression    IMPRESSION: Irregular sclerosis of the subcortical bone of the talar  dome. This has an appearance suggestive of avascular necrosis. MRI of  the left ankle be helpful in further evaluation. Soft tissue swelling  about the left ankle. Mild arthritic changes in the ankle mortise and  visualized midfoot.    ANAHI SANTACRUZ MD   XR Ankle Right 2 Views    Narrative    EXAM:XR ANKLE RT 2 VW     CLINICAL HISTORY: Patient Age  68 years Additional clinical info:  pain, Hx RA;      COMPARISON: Left ankle 6/15/2018      TECHNIQUE: 4 views      Impression    IMPRESSION: Subcortical lucency and irregular sclerosis in the left  talar dome could be due to avascular necrosis, degenerative cystic  changes, or erosive changes. MRI of the left ankle be helpful in  further evaluation. No fracture is identified. Mild degenerative  arthritis in the medial left ankle mortise. Soft tissue swelling about  the left ankle. Vascular calcifications. Mild degenerative arthritis  in the visualized left midfoot.    There is mild soft tissue swelling about the right ankle but the right  ankle otherwise appears normal.    ANAHI SANTACRUZ MD   XR Chest Port 1 View    Narrative    Procedure:XR CHEST PORT 1 VW    Clinical history:Female, 68 years, Recheck RUL fibrosis vs infiltrate;      Technique: Single view was obtained.    Comparison: 6/15/2018    Findings: The cardiac silhouette is normal. The pulmonary vasculature  is normal.    The lungs demonstrate increasing density at the left lung base,  partially silhouetting the left heart border. Slight interstitial  prominence in the right upper lobe  is not significantly changed  secondary to technical differences.. Bony structures are unremarkable.      Impression    Impression:  1.   Slight increase in density suggesting a lingular pneumonia.     2.  Interstitial prominence of the right upper lobe is similar in  appearance.    KAUSHIK KING MD[CC1.1]          Revision History        User Key Date/Time User Provider Type Action    > CC1.3 6/18/2018 12:19 AM Noelle Jacinto MD Physician Sign     CC1.2 6/18/2018 12:08 AM Noelle Jacinto MD Physician      CC1.1 6/18/2018 12:07 AM Noelle Jacinto MD Physician                   Consult Notes     No notes of this type exist for this encounter.         Progress Notes - Physician (Notes for yesterday and today)      Progress Notes by Noelle Jacinto MD at 6/17/2018 12:01 PM     Author:  Noelle Jacinto MD Service:  Family Medicine Author Type:  Physician    Filed:  6/17/2018 12:13 PM Date of Service:  6/17/2018 12:01 PM Creation Time:  6/17/2018 12:01 PM    Status:  Signed :  Noelle Jacinto MD (Physician)         [facility]    Hospitalist Progress Note[CC1.1]      Assessment & Plan[CC1.2]   Laura Perez is a 68 year old female who was admitted on 6/15/2018 with fever and weakness after a diarrheal illness. Being treated for recurrent pneumonia. Hospital day 3, Levaquin day 3.     Principal Problem:    Sepsis (H)    Assessment: met SOFA criteria on admission. Now afebrile >24h, VS normal, improved subjectively    Plan: continue Levaquin, convert to PO    Active Problems:  Recurrent pneumonia    Assessment: post-hydration XRay shows lingular infiltrate. Afebrile, no further O2 requirement, WBC remains high (?steroids) but trending down    Plan: convert Levaquin to PO. Encourage ambulation  Seropositive rheumatoid arthritis (H)    Assessment: steroid-dependent, s/p multiple reconstructive surgeries - mostly in hands. 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 R ankle XRay shows only  arthritic changes.    Plan: ambulate with PT/OT, will try a walking boot for comfort  Chronic diastolic CHF (congestive heart failure), NYHA class 2 (H)    Assessment: clinically euvolemic. BNP 49 on admission. Weight stable.    Plan: stop maintenance IV.   Chronic kidney disease (CKD), stage III (moderate)    Assessment: Cr improved. Hyperkalemia resolved. Still holding both lisinopril and losartan    Plan: continue to monitor  COPD (chronic obstructive pulmonary disease) (H)    Assessment: weaned to room air. Using home CPAP at night    Plan: convert Levaquin to PO; start Prednisone taper; continue scheduled nebs   Diabetes mellitus type 2, insulin dependent (H)    Assessment: FSBS elevated, recent A1c high    Plan: add scheduled mealtime insulin, continue coverage insulin and low-dose Lantus. Plan to resume usual 70/30 at discharge         # Pain Assessment:[CC1.1]  Current Pain Score 6/17/2018   Patient currently in pain? -   Pain score (0-10) 6   Pain location -   Pain descriptors -[CC1.2]   - Laura is experiencing pain due to RA. Pain management was discussed with Laura and the plan was created in a collaborative fashion.  Laura's response to the current recommendations: engaged  - Please see the plan for pain management as documented above      DVT Prophylaxis: Enoxaparin (Lovenox) SQ  Code Status:[CC1.1] Full Code    Noelle Jacinto    Interval History[CC1.2]   Feels better today, was up in chair, has not tried to walk any distance. Denies nausea; appetite improved. Pain in joints substantially better, comfortable at rest. No cough or pleuritic pain. No oxygen requirement.    -Data reviewed today: I reviewed all new labs and imaging results over the last 24 hours. I personally reviewed the CXR showing a lingular infiltrate.[CC1.1]    Physical Exam   Temp: 97.3  F (36.3  C) Temp src: Oral BP: 141/57   Heart Rate: 86 Resp: 20 SpO2: 95 % O2 Device: None (Room air) Oxygen Delivery: 1 LPM (decreased to RA)  Vitals:     06/15/18 1639 06/15/18 2124 06/17/18 0519   Weight: 69.4 kg (153 lb) 70.1 kg (154 lb 8.7 oz) 70.9 kg (156 lb 4.9 oz)[CC1.2]     Vital Signs with Ranges[CC1.1]  Temp:  [96.2  F (35.7  C)-98.7  F (37.1  C)] 97.3  F (36.3  C)  Heart Rate:  [70-98] 86  Resp:  [18-20] 20  BP: (106-156)/(42-59) 141/57  SpO2:  [92 %-96 %] 95 %  I/O last 3 completed shifts:  In: 1869 [P.O.:600; I.V.:1269]  Out: 1800 [Urine:1800][CC1.2]    Constitutional: Alert, NAD at rest  Respiratory: Good air exchange, few crackles R mid-lung which clear with cough  Cardiovascular: RRR without murmur  GI: soft, NT, no palpable mass or HSM  Skin/Integument: Decreased erythema and swelling around RUE skin tears  Extremities: Decreased erythema and tenderness over knee and ankle joints[CC1.1]    Medications       aspirin  81 mg Oral Daily     atorvastatin  20 mg Oral At Bedtime     budesonide  0.5 mg Nebulization BID     enoxaparin  40 mg Subcutaneous Q24H     famotidine  20 mg Oral At Bedtime     gabapentin (NEURONTIN) capsule 300 mg  300 mg Oral BID     [START ON 6/18/2018] insulin aspart  3 Units Subcutaneous QAM AC     insulin aspart  4 Units Subcutaneous Daily with lunch     insulin aspart  4 Units Subcutaneous Daily with supper     insulin glargine  10 Units Subcutaneous At Bedtime     ipratropium - albuterol 0.5 mg/2.5 mg/3 mL  3 mL Nebulization 4x Daily     levofloxacin  750 mg Intravenous Q48H     levothyroxine  112 mcg Oral QAM AC     montelukast  10 mg Oral At Bedtime     pantoprazole  40 mg Oral QAM AC     [START ON 6/18/2018] predniSONE  40 mg Oral Daily     salmeterol  1 puff Inhalation 2 times daily       Data     Recent Labs  Lab 06/17/18  0635 06/16/18  0700 06/15/18  1745   WBC 16.6* 16.3* 18.8*   HGB 8.4* 8.5* 9.4*   MCV 81 83 80    300 321   INR  --   --  1.22   * 131* 130*   POTASSIUM 4.7 5.6* 4.6   CHLORIDE 103 102 97*   CO2 21 21 22   BUN 21 20 18   CR 1.31* 1.50* 1.63*   ANIONGAP 8 8 11   GIANNI 8.9 8.4* 9.3   *  194* 222*   ALBUMIN  --   --  3.3*   PROTTOTAL  --   --  7.1   BILITOTAL  --   --  1.2*   ALKPHOS  --   --  70   ALT  --   --  18   AST  --   --  22   TROPI  --   --  <0.030     Glucose Values Latest Ref Rng & Units 6/16/2018 6/16/2018 6/16/2018 6/17/2018 6/17/2018 6/17/2018 6/17/2018   Bedside Glucose (mg/dl )  - 302 349 346 292 -- 232 281   GLUCOSE 70 - 105 mg/dL -- -- -- -- 223(H) -- --   Some recent data might be hidden[CC1.2]   8 U coverage insulin 6/16/18[CC1.1]      Recent Results (from the past 24 hour(s))   XR Ankle Right 2 Views    Narrative    EXAM:XR ANKLE RT 2 VW     CLINICAL HISTORY: Patient Age  68 years Additional clinical info:  pain, Hx RA;      COMPARISON: Left ankle 6/15/2018      TECHNIQUE: 4 views      Impression    IMPRESSION: Subcortical lucency and irregular sclerosis in the left  talar dome could be due to avascular necrosis, degenerative cystic  changes, or erosive changes. MRI of the left ankle be helpful in  further evaluation. No fracture is identified. Mild degenerative  arthritis in the medial left ankle mortise. Soft tissue swelling about  the left ankle. Vascular calcifications. Mild degenerative arthritis  in the visualized left midfoot.    There is mild soft tissue swelling about the right ankle but the right  ankle otherwise appears normal.    ANAHI SANTACRUZ MD   XR Chest Port 1 View    Narrative    Procedure:XR CHEST PORT 1 VW    Clinical history:Female, 68 years, Recheck RUL fibrosis vs infiltrate;      Technique: Single view was obtained.    Comparison: 6/15/2018    Findings: The cardiac silhouette is normal. The pulmonary vasculature  is normal.    The lungs demonstrate increasing density at the left lung base,  partially silhouetting the left heart border. Slight interstitial  prominence in the right upper lobe is not significantly changed  secondary to technical differences.. Bony structures are unremarkable.      Impression    Impression:  1.   Slight increase in  density suggesting a lingular pneumonia.     2.  Interstitial prominence of the right upper lobe is similar in  appearance.    KAUSHIK KING MD[CC1.2]        Revision History        User Key Date/Time User Provider Type Action    > CC1.2 6/17/2018 12:13 PM Noelle Jacinto MD Physician Sign     CC1.1 6/17/2018 12:01 PM Noelle Jacinto MD Physician                   Procedure Notes     No notes of this type exist for this encounter.         Progress Notes - Therapies (Notes from 06/15/18 through 06/18/18)      Progress Notes by Travon March, PT at 6/17/2018 11:55 AM     Author:  Travon March PT Service:  (none) Author Type:  Physical Therapist    Filed:  6/17/2018 11:55 AM Date of Service:  6/17/2018 11:55 AM Creation Time:  6/17/2018 11:55 AM    Status:  Signed :  Travon March PT (Physical Therapist)            06/17/18 1142   Quick Adds   Type of Visit Initial PT Evaluation   Living Environment   Lives With spouse   Number of Stairs to Enter Home 2   Number of Stairs Within Home 1  (pt does not use)   Stair Railings at Home outside, present on right side   Transportation Available family or friend will provide   Living Environment Comment Lives at home with spouse Benigno. 2 steps into house and 1 level living once inside. 2nd level but pt does not use.   Functional Level Prior   Ambulation 0-->independent   Transferring 0-->independent   Communication 0-->understands/communicates without difficulty   Cognition 0 - no cognition issues reported   Fall history within last six months yes   Which of the above functional risks had a recent onset or change? ambulation;transferring;fall history   Prior Functional Level Comment Ind with cares. Spouse does majority of laundry, meal prep, etc   General Information   Referring Physician [place]   Patient/Family Goals Statement Return home   Pertinent History of Current Problem (include personal factors and/or comorbidities that impact the POC) Pt has increased  fatigue. Hx of RA and diabetes.    Precautions/Limitations (fatigue, RA in hands)   Weight-Bearing Status - LLE full weight-bearing   Weight-Bearing Status - RLE full weight-bearing   General Observations assist of 1 with transfers for safety but pt did very well   General Info Comments She has a walker and cane at home if needed   Cognitive Status Examination   Orientation orientation to person, place and time   Level of Consciousness alert   Follows Commands and Answers Questions 100% of the time   Personal Safety and Judgment intact   Memory intact   Pain Assessment   Patient Currently in Pain Yes, see Vital Sign flowsheet  (3/10 L foot area)   Range of Motion (ROM)   ROM Comment functional LE ROM to accommodate bed mobility and transfers   Strength   Strength Comments functional LE strength to accommodate bed mobility and transfers   Bed Mobility   Bed Mobility Comments stand by assist for IV, catheter   Transfer Skills   Transfer Comments assist of 1 for safety and equipmemt handling   Balance   Balance Comments intact   General Therapy Interventions   Planned Therapy Interventions bed mobility training;gait training;strengthening   Clinical Impression   Criteria for Skilled Therapeutic Intervention yes, treatment indicated   PT Diagnosis fatigue   Clinical Presentation Stable/Uncomplicated   Clinical Decision Making (Complexity) Low complexity   Therapy Frequency` daily   Predicted Duration of Therapy Intervention (days/wks) 2-3 days   Anticipated Discharge Disposition Home with Home Therapy   Risk & Benefits of therapy have been explained Yes   Patient, Family & other staff in agreement with plan of care Yes   Clinical Impression Comments pleasant lady   Total Evaluation Time   Total Evaluation Time (Minutes) 15[BG1.1]        Revision History        User Key Date/Time User Provider Type Action    > BG1.1 6/17/2018 11:55 AM Travon March, PT Physical Therapist Sign            Progress Notes by Danilo  Ingrid PICKETT OT at 6/17/2018 11:21 AM     Author:  Ingrid Carrasco OT Service:  Physical Medicine and Rehabilitation Author Type:  Occupational Therapist    Filed:  6/17/2018 11:21 AM Date of Service:  6/17/2018 11:21 AM Creation Time:  6/17/2018 11:21 AM    Status:  Signed :  Ingrid Carrasco OT (Occupational Therapist)            06/17/18 1100   Quick Adds   Type of Visit Initial Occupational Therapy Evaluation   Living Environment   Lives With spouse   Functional Level Prior   Ambulation 0-->independent   Transferring 0-->independent   Toileting 0-->independent   Bathing 0-->independent   Dressing 0-->independent   Eating 0-->independent   Communication 0-->understands/communicates without difficulty   Swallowing 0-->swallows foods/liquids without difficulty   Cognition 0 - no cognition issues reported   Fall history within last six months yes   Number of times patient has fallen within last six months 8   Which of the above functional risks had a recent onset or change? ambulation;transferring   General Information   Onset of Illness/Injury or Date of Surgery - Date 06/16/18   Referring Physician Dr. Jacinto   Patient/Family Goals Statement to return home with     Precautions/Limitations fall precautions   General Info Comments Laura is a 68 year old female admitted form home due to increase pain from RA. She reports rina in the left ankle with difficulty weight bearing on it. she has had several falls.    Cognitive Status Examination   Orientation orientation to person, place and time   Level of Consciousness alert   Able to Follow Commands WNL/WFL   Personal Safety (Cognitive) WNL/WFL   Memory intact   Pain Assessment   Patient Currently in Pain Yes, see Vital Sign flowsheet   Coordination   Upper Extremity Coordination Left UE impaired;Right UE impaired   Gross Motor Coordination impaired due to arthritic deformities   Transfer Skill: Sit to Stand   Level of [place]: Sit/Stand  minimum assist (75% patients effort)   Physical Assist/Nonphysical Assist: Sit/Stand 2 persons   Transfer Skill: Sit to Stand full weight-bearing   Assistive Device for Transfer: Sit/Stand rolling walker   Clinical Impression   Criteria for Skilled Therapeutic Interventions Met yes, treatment indicated   OT Diagnosis Weakness, decline in self cares   Identified Performance Deficits self cares   Clinical Decision Making (Complexity) Low complexity   Therapy Frequency daily   Predicted Duration of Therapy Intervention (days/wks) 1-4 days   Anticipated Equipment Needs at Discharge other (see comments)  (patient has all equipment at home)   Anticipated Discharge Disposition Home  (with )   Risks and Benefits of Treatment have been explained. Yes   Patient, Family & other staff in agreement with plan of care Yes   Clinical Impression Comments Laura is demonstrating a dlicn ein her funcitonal ability due to pain and weakness. She is not able to perform functional transferrs at PLOF or self cares   Total Evaluation Time   Total Evaluation Time (Minutes) 15[MF1.1]        Revision History        User Key Date/Time User Provider Type Action    > MF1.1 6/17/2018 11:21 AM Ingrid Carrasco OT Occupational Therapist Sign            Progress Notes by Rosalie Kay RT at 6/15/2018 11:29 PM     Author:  Rosalie Kay RT Service:  (none) Author Type:  Respiratory Therapist    Filed:  6/15/2018 11:30 PM Date of Service:  6/15/2018 11:29 PM Creation Time:  6/15/2018 11:29 PM    Status:  Signed :  Rosalie Kay RT (Respiratory Therapist)         Patient set up on home CPAP machine of 6.  Sats 99% on room air.[CT1.1]     Revision History        User Key Date/Time User Provider Type Action    > CT1.1 6/15/2018 11:30 PM Rosalie Kay RT Respiratory Therapist Sign                                                      INTERAGENCY TRANSFER FORM - LAB / IMAGING / EKG / EMG RESULTS   6/15/2018                        [facility]: [phone]            Unresulted Labs (24h ago through future)    Start       Ordered    06/18/18 0600  Platelet count  (Pharmacological Prophylaxis - enoxaparin (LOVENOX) *Use only if creatinine clearance is greater than 30 mL/min)  EVERY THREE DAYS,   Routine     Comments:  Repeat every 3 days while on VTE prophylaxis.  Notify provider and hold enoxaparin if platelet count falls by 50% of baseline. If no result is listed, this lab has not been done the past 365 days. LATEST LAB RESULT: Platelet Count (10e9/L)       Date                     Value                 06/15/2018               321              ----------    06/15/18 2119    06/18/18 0000  Creatinine  (Pharmacological Prophylaxis - enoxaparin (LOVENOX) *Use only if creatinine clearance is greater than 30 mL/min)  EVERY THREE DAYS,   Routine     Comments:  Repeat every 3 days while on VTE prophylaxis.    06/15/18 2119         Lab Results - 3 Days      Troponin I [[id number]] (Abnormal)  Resulted: 06/17/18 2327, Result status: Final result    Ordering provider: Noelle Jacinto MD  06/17/18 2200 Resulting lab: [facility]    Specimen Information    Type Source Collected On   Blood  06/17/18 2300          Components       Value Reference Range Flag Lab   Troponin I ES 0.112 0.000 - 0.034 ug/L H [facility]            Troponin I [[id number]] (Abnormal)  Resulted: 06/17/18 2149, Result status: Final result    Ordering provider: Noelle Jacinto MD  06/17/18 2106 Resulting lab: [facility]    Specimen Information    Type Source Collected On   Blood  06/17/18 2116          Components       Value Reference Range Flag Lab   Troponin I ES 0.080 0.000 - 0.034 ug/L [facility]            Blood culture [[id number]]  Resulted: 06/17/18 0809, Result status: Preliminary result    Ordering provider: Toney Davis MD  06/15/18 1843 Resulting lab: [facility]    Specimen  Information    Type Source Collected On   Blood  06/15/18 1745          Components       Value Reference Range Flag Lab   Specimen Description Blood      Culture Micro No growth after 2 days   [facility]            Blood culture [[id number]]  Resulted: 06/17/18 0809, Result status: Preliminary result    Ordering provider: Toney Davis MD  06/15/18 1843 Resulting lab: [facility]    Specimen Information    Type Source Collected On   Blood  06/15/18 1745          Components       Value Reference Range Flag Lab   Specimen Description Blood      Culture Micro No growth after 2 days   [facility]            Basic metabolic panel [[id number]] (Abnormal)  Resulted: 06/17/18 0743, Result status: Final result    Ordering provider: Noelle Jacinto MD  06/16/18 2300 Resulting lab: [facility]    Specimen Information    Type Source Collected On   Blood  06/17/18 0635          Components       Value Reference Range Flag Lab   Sodium 132 134 - 144 mmol/L L [facility]   Potassium 4.7 3.5 - 5.1 mmol/L  [facility]   Chloride 103 98 - 107 mmol/L  [facility]   Carbon Dioxide 21 21 - 31 mmol/L  [facility]   Anion Gap 8 3 - 14 mmol/L  [facility]   Glucose 223 70 - 105 mg/dL H [facility]   Urea Nitrogen 21 7 - 25 mg/dL  [facility]   Creatinine 1.31 0.60 - 1.20 mg/dL H [facility]   GFR Estimate 40 >60 mL/min/1.7m2 L [facility]   GFR Estimate If Black 49 >60 mL/min/1.7m2 L [facility]   Calcium 8.9 8.6 - 10.3 mg/dL  [facility]            CBC with platelets [[id number]] (Abnormal)  Resulted: 06/17/18 0701, Result status: Final result    Ordering provider: Noelle Jacinto MD  06/16/18 2300 Resulting lab: [facility]    Specimen Information    Type Source Collected On   Blood  06/17/18 0635          Components       Value Reference Range Flag Lab   WBC 16.6 4.0 - 11.0 10e9/L H [facility]   RBC Count 3.33 3.8 - 5.2 10e12/L L [facility]   Hemoglobin 8.4 11.7 - 15.7 g/dL L [facility]    Hematocrit 27.1 35.0 - 47.0 % L [facility]   MCV 81 78 - 100 fl  [facility]   MCH 25.2 26.5 - 33.0 pg L [facility]   MCHC 31.0 31.5 - 36.5 g/dL L [facility]   RDW 16.5 10.0 - 15.0 % H [facility]   Platelet Count 348 150 - 450 10e9/L  [facility]            Basic metabolic panel [[id number]] (Abnormal)  Resulted: 06/16/18 0757, Result status: Final result    Ordering provider: Noelle Jacnito MD  06/15/18 2300 Resulting lab: [facility]    Specimen Information    Type Source Collected On   Blood  06/16/18 0700          Components       Value Reference Range Flag Lab   Sodium 131 134 - 144 mmol/L L [facility]   Potassium 5.6 3.5 - 5.1 mmol/L H [facility]   Chloride 102 98 - 107 mmol/L  [facility]   Carbon Dioxide 21 21 - 31 mmol/L  [facility]   Anion Gap 8 3 - 14 mmol/L  [facility]   Glucose 194 70 - 105 mg/dL H [facility]   Urea Nitrogen 20 7 - 25 mg/dL  [facility]   Creatinine 1.50 0.60 - 1.20 mg/dL H [facility]   GFR Estimate 34 >60 mL/min/1.7m2 L [facility]   GFR Estimate If Black 42 >60 mL/min/1.7m2 L [facility]   Calcium 8.4 8.6 - 10.3 mg/dL L [facility]            CBC with platelets [[id number]] (Abnormal)  Resulted: 06/16/18 0727, Result status: Final result    Ordering provider: Noelle Jacinto MD  06/15/18 4930 Resulting lab: [facility]    Specimen Information    Type Source Collected On   Blood  06/16/18 0700          Components       Value Reference Range Flag Lab   WBC 16.3 4.0 - 11.0 10e9/L H [facility]   RBC Count 3.39 3.8 - 5.2 10e12/L L [facility]   Hemoglobin 8.5 11.7 - 15.7 g/dL L [facility]   Hematocrit 28.0 35.0 - 47.0 % L [facility]   MCV 83 78 - 100 fl  [facility]   MCH 25.1 26.5 - 33.0 pg L [facility]   MCHC 30.4 31.5 - 36.5 g/dL L [facility]   RDW 16.9 10.0 - 15.0 % H [facility]   Platelet Count 300 150 - 450 10e9/L  [facility]            Urine Culture Aerobic Bacterial [[id number]]  Resulted: 06/15/18 1938, Result status: In process    Ordering provider:  Toney Davis MD  06/15/18 1730 Resulting lab: [facility]    Specimen Information    Type Source Collected On   Urine Urine catheter 06/15/18 1830            *UA reflex to Microscopic [[id number]] (Abnormal)  Resulted: 06/15/18 1844, Result status: Final result    Ordering provider: Toney Davis MD  06/15/18 1730 Resulting lab: [facility]    Specimen Information    Type Source Collected On   Midstream Urine Urine catheter 06/15/18 1829          Components       Value Reference Range Flag Lab   Color Urine Yellow   [facility]   Appearance Urine Clear   [facility]   Glucose Urine Negative NEG^Negative mg/dL  [facility]   Bilirubin Urine Moderate NEG^Negative A [facility]   Comment:         This is an unconfirmed screening test result. A positive result may be false.   Ketones Urine 15 NEG^Negative mg/dL A [facility]   Specific [place] Urine 1.025 1.003 - 1.035  [facility]   Blood Urine Trace NEG^Negative A [facility]   pH Urine 6.0 5.0 - 7.0 pH  [facility]   Protein Albumin Urine 30 NEG^Negative mg/dL A [facility]   Urobilinogen Urine 2.0 0.2 - 1.0 EU/dL H [facility]   Nitrite Urine Negative NEG^Negative  [facility]   Leukocyte Esterase Urine Negative NEG^Negative  [facility]   Source Midstream Urine   [facility]            Urine Microscopic [[id number]] (Abnormal)  Resulted: 06/15/18 1844, Result status: Final result    Ordering provider: Toney Davis MD  06/15/18 1829 Resulting lab: [facility]    Specimen Information    Type Source Collected On     06/15/18 1829          Components       Value Reference Range Flag Lab   WBC Urine 0 - 5 OTO5^0 - 5 /HPF  [facility]   RBC Urine O - 2 OTO2^O - 2 /HPF  [facility]   Hyaline Casts 5-10 OTO2^O - 2 /LPF A [facility]   Mucous Urine Present NEG^Negative /LPF A [facility]            NT pro BNP [[id number]]  Resulted: 06/15/18 1818, Result status: Final result    Ordering provider: Toney Davis MD  06/15/18 1730  Resulting lab: [facility]""    Specimen Information    Type Source Collected On   Blood  06/15/18 1745          Components       Value Reference Range Flag Lab   N-Terminal Pro BNP Inpatient 49 0 - 100 pg/mL  [facility]   Comment:            Reference range shown and results flagged as abnormal are suggested inpatient   cut points for confirming diagnosis if CHF in an acute setting. Establishing a   baseline value for each individual patient is useful for follow-up. An   inpatient or emergency department NT-proPBNP <300 pg/mL effectively rules out   acute CHF, with 99% negative predictive value.  The outpatient non-acute reference range for ruling out CHF is:   0-125 pg/mL (age 18 to less than 75)   0-450 pg/mL (age 75 yrs and older)              CRP inflammation [[id number]] (Abnormal)  Resulted: 06/15/18 1817, Result status: Final result    Ordering provider: Toney Davis MD  06/15/18 1730 Resulting lab: [facility] AND \[facility]""    Specimen Information    Type Source Collected On   Blood  06/15/18 1745          Components       Value Reference Range Flag Lab   CRP Inflammation 21.3 <0.5 mg/L H [facility]            Comprehensive metabolic panel [[id number]] (Abnormal)  Resulted: 06/15/18 1817, Result status: Final result    Ordering provider: Toney Davis MD  06/15/18 1730 Resulting lab: [facility] AND \[facility]\""    Specimen Information    Type Source Collected On   Blood  06/15/18 1745          Components       Value Reference Range Flag Lab   Sodium 130 134 - 144 mmol/L L [facility]   Potassium 4.6 3.5 - 5.1 mmol/L  [facility]   Chloride 97 98 - 107 mmol/L L [facility]   Carbon Dioxide 22 21 - 31 mmol/L  [facility]   Anion Gap 11 3 - 14 mmol/L  [facility]   Glucose 222 70 - 105 mg/dL H [facility]   Urea Nitrogen 18 7 - 25 mg/dL  [facility]   Creatinine 1.63 0.60 - 1.20 mg/dL H [facility]   GFR Estimate 31 >60 mL/min/1.7m2 L [facility]   GFR Estimate If Black 38 >60  mL/min/1.7m2 L [facility]   Calcium 9.3 8.6 - 10.3 mg/dL  [facility]   Bilirubin Total 1.2 0.3 - 1.0 mg/dL H [facility]   Albumin 3.3 3.5 - 5.7 g/dL L [facility]   Protein Total 7.1 6.4 - 8.9 g/dL  [facility]   Alkaline Phosphatase 70 34 - 104 U/L  [facility]   ALT 18 7 - 52 U/L  [facility]   AST 22 13 - 39 U/L  [facility]            Magnesium [[id number]] (Abnormal)  Resulted: 06/15/18 1817, Result status: Final result    Ordering provider: Toney Davis MD  06/15/18 1730 Resulting lab: [facility]    Specimen Information    Type Source Collected On   Blood  06/15/18 1745          Components       Value Reference Range Flag Lab   Magnesium 1.8 1.9 - 2.7 mg/dL L [facility]            Bilirubin direct [[id number]] (Abnormal)  Resulted: 06/15/18 1817, Result status: Final result    Ordering provider: Toney Davis MD  06/15/18 1726 Resulting lab: [facility]    Specimen Information    Type Source Collected On     06/15/18 1745          Components       Value Reference Range Flag Lab   Bilirubin Direct 0.7 0.0 - 0.2 mg/dL H [facility]            Troponin I [[id number]]  Resulted: 06/15/18 1814, Result status: Final result    Ordering provider: Toney Davis MD  06/15/18 1730 Resulting lab: [facility]    Specimen Information    Type Source Collected On   Blood  06/15/18 1745          Components       Value Reference Range Flag Lab   Troponin I ES <0.030 0.000 - 0.034 ug/L  [facility]            Lactic acid [[id number]]  Resulted: 06/15/18 1812, Result status: Final result    Ordering provider: Toney Davis MD  06/15/18 1730 Resulting lab: [facility]    Specimen Information    Type Source Collected On   Blood  06/15/18 1745          Components       Value Reference Range Flag Lab   Lactic Acid 0.9 0.5 - 2.2 mmol/L  [facility]            INR [[id number]]  Resulted: 06/15/18 1802, Result status: Final result    Ordering  provider: Toney Davis MD  06/15/18 1730 Resulting lab: [facility]    Specimen Information    Type Source Collected On   Blood  06/15/18 1745          Components       Value Reference Range Flag Lab   INR 1.22 0 - 1.3  [facility]            CBC with platelets differential [[id number]] (Abnormal)  Resulted: 06/15/18 1801, Result status: Final result    Ordering provider: Toney Davis MD  06/15/18 1730 Resulting lab: [facility]    Specimen Information    Type Source Collected On   Blood  06/15/18 1745          Components       Value Reference Range Flag Lab   WBC 18.8 4.0 - 11.0 10e9/L H [facility]   RBC Count 3.80 3.8 - 5.2 10e12/L  [facility]   Hemoglobin 9.4 11.7 - 15.7 g/dL L [facility]   Hematocrit 30.5 35.0 - 47.0 % L [facility]   MCV 80 78 - 100 fl  [facility]   MCH 24.7 26.5 - 33.0 pg L [facility]   MCHC 30.8 31.5 - 36.5 g/dL L [facility]   RDW 17.2 10.0 - 15.0 % H [facility]   Platelet Count 321 150 - 450 10e9/L  [facility]   Diff Method Automated Method   [facility]   % Neutrophils 87.9 %  [facility]   % Lymphocytes 3.4 %  [facility]   % Monocytes 7.4 %  [facility]   % Eosinophils 0.1 %  [facility]   % Basophils 0.2 %  [facility]   % Immature Granulocytes 1.0 %  [facility]   Absolute Neutrophil 16.5 1.6 - 8.3 10e9/L H [facility]   Absolute Lymphocytes 0.6 0.8 - 5.3 10e9/L L [facility]   Absolute Monocytes 1.4 0.0 - 1.3 10e9/L H [facility]   Absolute Eosinophils 0.0 0.0 - 0.7 10e9/L  [facility]   Absolute Basophils 0.0 0.0 - 0.2 10e9/L  [facility]   Abs Immature Granulocytes 0.2 0 - 0.4 10e9/L  [facility]            Testing Performed By     Lab - Abbreviation Name Director Address Valid Date Range    45 - EJR242 MISYS Unknown Unknown 01/28/02 0000 - Present    1743 - [facility] Unknown [address]  [facility] [address] 08/07/15 0948 - Present               Imaging Results - 3 Days      XR Chest Port 1 View  [[id number]]  Resulted: 06/17/18 0844, Result status: Final result    Ordering provider: Noelle Jacinto MD  06/17/18 0005 Resulted by: Kaushik Candelario MD    Performed: 06/17/18 0618 - 06/17/18 0641 Resulting lab: RADIOLOGY RESULTS    Narrative:       Procedure:XR CHEST PORT 1 VW    Clinical history:Female, 68 years, Recheck RUL fibrosis vs infiltrate;      Technique: Single view was obtained.    Comparison: 6/15/2018    Findings: The cardiac silhouette is normal. The pulmonary vasculature  is normal.    The lungs demonstrate increasing density at the left lung base,  partially silhouetting the left heart border. Slight interstitial  prominence in the right upper lobe is not significantly changed  secondary to technical differences.. Bony structures are unremarkable.      Impression:       Impression:  1.   Slight increase in density suggesting a lingular pneumonia.     2.  Interstitial prominence of the right upper lobe is similar in  appearance.    KAUSHIK CANDELARIO MD      XR Ankle Right 2 Views [[id number]]  Resulted: 06/16/18 1400, Result status: Final result    Ordering provider: Noelle Jacinto MD  06/16/18 1235 Resulted by: Ava Reyes MD    Performed: 06/16/18 1246 - 06/16/18 1351 Resulting lab: RADIOLOGY RESULTS    Narrative:       EXAM:XR ANKLE RT 2 VW     CLINICAL HISTORY: Patient Age  68 years Additional clinical info:  pain, Hx RA;      COMPARISON: Left ankle 6/15/2018      TECHNIQUE: 4 views      Impression:       IMPRESSION: Subcortical lucency and irregular sclerosis in the left  talar dome could be due to avascular necrosis, degenerative cystic  changes, or erosive changes. MRI of the left ankle be helpful in  further evaluation. No fracture is identified. Mild degenerative  arthritis in the medial left ankle mortise. Soft tissue swelling about  the left ankle. Vascular calcifications. Mild degenerative arthritis  in the visualized left midfoot.    There is mild soft tissue swelling about  the right ankle but the right  ankle otherwise appears normal.    AVA SANTACRUZ MD      XR Ankle Port Left 2 Views [[id number]]  Resulted: 06/16/18 0551, Result status: Final result    Ordering provider: Noelle Jacinto MD  06/15/18 2119 Resulted by: Ava Santacruz MD    Performed: 06/15/18 2130 - 06/15/18 2144 Resulting lab: RADIOLOGY RESULTS    Narrative:       EXAM:XR ANKLE PORT LT 2 VW     CLINICAL HISTORY: Patient Age  68 years Additional clinical info:  pain, erythema, hx RA;      COMPARISON: None      TECHNIQUE: 2 views      Impression:       IMPRESSION: Irregular sclerosis of the subcortical bone of the talar  dome. This has an appearance suggestive of avascular necrosis. MRI of  the left ankle be helpful in further evaluation. Soft tissue swelling  about the left ankle. Mild arthritic changes in the ankle mortise and  visualized midfoot.    AVA SANTACRUZ MD      XR Chest Port 1 View [[id number]]  Resulted: 06/15/18 1839, Result status: Final result    Ordering provider: Toney Davis MD  06/15/18 1730 Resulted by: Ava Santacruz MD    Performed: 06/15/18 1825 - 06/15/18 1825 Resulting lab: RADIOLOGY RESULTS    Narrative:       EXAM:XR CHEST PORT 1 VW     CLINICAL HISTORY: Patient Age  68 years Additional clinical info:  fever and hx pneumonia;      COMPARISON: 11/30/2017      TECHNIQUE: Single view      Impression:       IMPRESSION: Slight interstitial prominence in the right upper lung  could be due to fibrosis or infiltrate. Aortic calcifications. Chest  otherwise normal.    AVA SANTACRUZ MD      Testing Performed By     Lab - Abbreviation Name Director Address Valid Date Range    104 - [facility] RADIOLOGY RESULTS Unknown Unknown 02/16/05 1553 - Present            Encounter-Level Documents:     There are no encounter-level documents.      Order-Level Documents:     There are no order-level documents.

## 2018-06-15 NOTE — ED PROVIDER NOTES
History     Chief Complaint   Patient presents with     Generalized Weakness     HPI  Laura Perez is a 68 year old female who presents by ambulance from her home where she has had progressive weakness and several falls that occurred over the last week. Patient has had rheumatoid arthritis which is somewhat in remission but she is on multiple medications for management for hypertension and her other multiple medical problems as noted. She has been followed and [place] by her provider who is made some adjustments in her medications according to her  Benigno who helps provide the background information for the involved history of this lady's difficulties. The patient was diagnosed to have colon cancer and had a segment of her bowel removed. She had recent diagnosis of COPD which is a consequence from medications are used to treat her arthritis. Patient also was noted to have fever today and last night was sweaty with cough and is also been nauseated with vomiting of bilious material and has had diarrhea. She's not been voiding very often. She has a history of a pneumonia that developed during a hospitalization here in the past after she had an orthopedic procedure. Patient does have chronic shortness of breath and did have some episodes of chest discomfort earlier today which may have been from some congestion is currently not experiencing chest pain. Her pain level is 9/10 in his multiple locations and seems to alternate from various regions including a right elbow down to right foot and over to her left foot where she does have some erythema that is new. She has abrasions of her right elbow from falls that recently occurred. She has pain with flexion and extension of her knees and she has back pain for which she's had cortisone injection recently also. She is scheduled for epidural injections in the future.    Problem List:    Patient Active Problem List    Diagnosis Date Noted     Acne rosacea 01/26/2018      Priority: Medium     History of colonic polyps 01/26/2018     Priority: Medium     Overview:   diminutive at 25 cm       Hyperlipidemia 01/26/2018     Priority: Medium     Hypothyroidism 01/26/2018     Priority: Medium     Migraine headache 01/26/2018     Priority: Medium     Disorder of bone and cartilage 01/26/2018     Priority: Medium     Overview:   2007       Popliteal cyst, left 01/26/2018     Priority: Medium     Chronic kidney disease (CKD), stage III (moderate) 12/11/2017     Priority: Medium     HTN (hypertension) 11/29/2017     Priority: Medium     HCAP (healthcare-associated pneumonia) 11/28/2017     Priority: Medium     Osteoporosis 11/27/2017     Priority: Medium     Malignant tumor of colon (H) 11/26/2017     Priority: Medium     Anemia of chronic kidney failure, stage 3 (moderate) 07/28/2017     Priority: Medium     Diabetes mellitus type 2, insulin dependent (H) 07/28/2017     Priority: Medium     Chronic diastolic CHF (congestive heart failure), NYHA class 2 (H) 02/13/2016     Priority: Medium     Non-rheumatic mitral valve stenosis 02/13/2016     Priority: Medium     On prednisone therapy 01/15/2016     Priority: Medium     Orthostatic hypotension 01/15/2016     Priority: Medium     Secondary adrenal insufficiency (H) 01/15/2016     Priority: Medium     Seropositive rheumatoid arthritis (H) 01/15/2016     Priority: Medium     Lichen sclerosus et atrophicus 03/05/2015     Priority: Medium     COPD (chronic obstructive pulmonary disease) (H) 09/12/2014     Priority: Medium        Past Medical History:    Past Medical History:   Diagnosis Date     Chronic obstructive pulmonary disease (H)      Diverticulosis of intestine without perforation or abscess without bleeding      Essential (primary) hypertension      Hyperlipidemia      Hypothyroidism      Malignant neoplasm of colon (H)      Personal history of other diseases of the nervous system and sense organs      Rheumatoid arthritis (H)       Rosacea      Type 2 diabetes mellitus without complications (H)      Urinary tract infection        Past Surgical History:    Past Surgical History:   Procedure Laterality Date     COLONOSCOPY  09/29/2008     HEMICOLECTOMY, RT/LT Right 09/2017    For colon cancer     LAPAROSCOPIC TUBAL LIGATION      No Comments Provided     OTHER SURGICAL HISTORY      2/5/98,[id number],ARTHROPLASTY,Left MCP joint arthroplasty     OTHER SURGICAL HISTORY      1990,[id number],ARTHROPLASTY,Right wrist arthroplasty     OTHER SURGICAL HISTORY      1996,[id number],ARTHROPLASTY,Right long finger MCP repair [place]     OTHER SURGICAL HISTORY      5/00,[id number],OTHER,MTP 2, 3, 4 right foot     OTHER SURGICAL HISTORY      1/01,[id number],ARTHROPLASTY,Right MTP 1 and 5 and left MTP 5     RELEASE CARPAL TUNNEL      1991,Left carpal tunnel repair     Right MCP 4 and 5 arthroplasty  03/01/2004       Family History:    Family History   Problem Relation Age of Onset     CANCER Mother 50     Cancer,uterus     HEART DISEASE Mother      Heart Disease,MI     Colon Cancer Mother      Cancer-colon     HEART DISEASE Father      Heart Disease,MI     Chronic Obstructive Pulmonary Disease Sister      COPD     Other - See Comments Sister      rubina dow     Colon Cancer Brother      Cancer-colon     Breast Cancer No family hx of      Cancer-breast       Social History:  Marital Status:   [2]  Social History   Substance Use Topics     Smoking status: Former Smoker     Packs/day: 1.00     Years: 30.00     Types: Cigarettes     Smokeless tobacco: Never Used     Alcohol use 1.5 oz/week      Comment: Wine daily        Medications:      albuterol (2.5 MG/3ML) 0.083% neb solution   amLODIPine (NORVASC) 5 MG tablet   aspirin 81 MG chewable tablet   blood glucose monitoring (ONE TOUCH ULTRASOFT) lancets   blood glucose monitoring (ONETOUCH ULTRA) test strip   budesonide (PULMICORT) 0.5 MG/2ML neb solution   Calcium carb-Vitamin D 500 mg [place]-200 units (OSCAL WITH  "D;OYSTER SHELL CALCIUM) 500-200 MG-UNIT per tablet   cyanocobalamin (TH VITAMIN B12) 100 MCG TABS tablet   DULoxetine HCl (CYMBALTA PO)   estradiol (VAGIFEM) 10 MCG TABS vaginal tablet   fenofibrate 48 MG tablet   formoterol (PERFOROMIST) 20 MCG/2ML neb solution   GABAPENTIN PO   insulin aspart prot & aspart (NOVOLOG MIX 70/30 PEN) injection   levothyroxine (SYNTHROID/LEVOTHROID) 112 MCG tablet   lisinopril (PRINIVIL/ZESTRIL) 2.5 MG tablet   losartan (COZAAR) 25 MG tablet   montelukast (SINGULAIR) 10 MG tablet   omeprazole (PRILOSEC) 20 MG CR capsule   predniSONE (DELTASONE) 5 MG tablet   SIMVASTATIN PO   traMADol (ULTRAM) 50 MG tablet   traZODone (DESYREL) 100 MG tablet   albuterol (PROAIR HFA/PROVENTIL HFA/VENTOLIN HFA) 108 (90 BASE) MCG/ACT Inhaler   EPINEPHrine (EPIPEN/ADRENACLICK/OR ANY BX GENERIC EQUIV) 0.3 MG/0.3ML injection 2-pack   furosemide (LASIX) 20 MG tablet   medical cannabis liquid         Review of Systems as noted in the history of present illness is quite significant with a numerous areas of positive response on the full review, see history of present illness    Physical Exam   BP: 143/69  Heart Rate: 102  Temp: 100.1  F (37.8  C)  Resp: 20  Height: 160 cm (5' 3\")  Weight: 69.4 kg (153 lb)  SpO2: 94 %      Physical Exam this patient is running a low-grade temperature with mild tachycardia but otherwise stable vital signs maintaining O2 sats in 94% with a blood pressure 140/70  HEENT eyes exam is unremarkable, TMs are normal, throat clear,  neck is supple no tenderness  chest good breath sounds throughout and was able to get up into a sitting position with no wheezes or rales heard  cardiovascular regular rate in the 100-110 range could not appreciate a murmur  abdomen soft nontender somewhat protuberant but nontender with good bowel sounds noted surgical midline scar is well-healed  hip range of motion appears to be nontender  extremities right elbow has several-day-old abrasion and scabs but does " not appear to be acutely infected 1 small area of his bleeding from a scab has been picked off but the patient, patient also has an erythema on the instep of the left foot, both feet are tender to any range of motion or exam of both the feet and ankles  neurologically this patient does not appear to be demonstrating any lateralizing findings  dermatologic exam of than the abrasions and erythema on the foot appear to be otherwise negative    ED Course     ED Course     Procedures               Critical Care time:  none               Results for orders placed or performed during the hospital encounter of 06/15/18 (from the past 24 hour(s))   CBC with platelets differential   Result Value Ref Range    WBC 18.8 (H) 4.0 - 11.0 10e9/L    RBC Count 3.80 3.8 - 5.2 10e12/L    Hemoglobin 9.4 (L) 11.7 - 15.7 g/dL    Hematocrit 30.5 (L) 35.0 - 47.0 %    MCV 80 78 - 100 fl    MCH 24.7 (L) 26.5 - 33.0 pg    MCHC 30.8 (L) 31.5 - 36.5 g/dL    RDW 17.2 (H) 10.0 - 15.0 %    Platelet Count 321 150 - 450 10e9/L    Diff Method Automated Method     % Neutrophils 87.9 %    % Lymphocytes 3.4 %    % Monocytes 7.4 %    % Eosinophils 0.1 %    % Basophils 0.2 %    % Immature Granulocytes 1.0 %    Absolute Neutrophil 16.5 (H) 1.6 - 8.3 10e9/L    Absolute Lymphocytes 0.6 (L) 0.8 - 5.3 10e9/L    Absolute Monocytes 1.4 (H) 0.0 - 1.3 10e9/L    Absolute Eosinophils 0.0 0.0 - 0.7 10e9/L    Absolute Basophils 0.0 0.0 - 0.2 10e9/L    Abs Immature Granulocytes 0.2 0 - 0.4 10e9/L   CRP inflammation   Result Value Ref Range    CRP Inflammation 21.3 (H) <0.5 mg/L   INR   Result Value Ref Range    INR 1.22 0 - 1.3   Comprehensive metabolic panel   Result Value Ref Range    Sodium 130 (L) 134 - 144 mmol/L    Potassium 4.6 3.5 - 5.1 mmol/L    Chloride 97 (L) 98 - 107 mmol/L    Carbon Dioxide 22 21 - 31 mmol/L    Anion Gap 11 3 - 14 mmol/L    Glucose 222 (H) 70 - 105 mg/dL    Urea Nitrogen 18 7 - 25 mg/dL    Creatinine 1.63 (H) 0.60 - 1.20 mg/dL    GFR  Estimate 31 (L) >60 mL/min/1.7m2    GFR Estimate If Black 38 (L) >60 mL/min/1.7m2    Calcium 9.3 8.6 - 10.3 mg/dL    Bilirubin Total 1.2 (H) 0.3 - 1.0 mg/dL    Albumin 3.3 (L) 3.5 - 5.7 g/dL    Protein Total 7.1 6.4 - 8.9 g/dL    Alkaline Phosphatase 70 34 - 104 U/L    ALT 18 7 - 52 U/L    AST 22 13 - 39 U/L   Magnesium   Result Value Ref Range    Magnesium 1.8 (L) 1.9 - 2.7 mg/dL   Troponin I   Result Value Ref Range    Troponin I ES <0.030 0.000 - 0.034 ug/L   Lactic acid   Result Value Ref Range    Lactic Acid 0.9 0.5 - 2.2 mmol/L   NT pro BNP   Result Value Ref Range    N-Terminal Pro BNP Inpatient 49 0 - 100 pg/mL   Bilirubin direct   Result Value Ref Range    Bilirubin Direct 0.7 (H) 0.0 - 0.2 mg/dL   XR Chest Port 1 View    Narrative    EXAM:XR CHEST PORT 1 VW     CLINICAL HISTORY: Patient Age  68 years Additional clinical info:  fever and hx pneumonia;      COMPARISON: 11/30/2017      TECHNIQUE: Single view      Impression    IMPRESSION: Slight interstitial prominence in the right upper lung  could be due to fibrosis or infiltrate. Aortic calcifications. Chest  otherwise normal.    ANAHI SANTACRUZ MD   *UA reflex to Microscopic   Result Value Ref Range    Color Urine Yellow     Appearance Urine Clear     Glucose Urine Negative NEG^Negative mg/dL    Bilirubin Urine Moderate (A) NEG^Negative    Ketones Urine 15 (A) NEG^Negative mg/dL    Specific Gravity Urine 1.025 1.003 - 1.035    Blood Urine Trace (A) NEG^Negative    pH Urine 6.0 5.0 - 7.0 pH    Protein Albumin Urine 30 (A) NEG^Negative mg/dL    Urobilinogen Urine 2.0 (H) 0.2 - 1.0 EU/dL    Nitrite Urine Negative NEG^Negative    Leukocyte Esterase Urine Negative NEG^Negative    Source Midstream Urine    Urine Microscopic   Result Value Ref Range    WBC Urine 0 - 5 OTO5^0 - 5 /HPF    RBC Urine O - 2 OTO2^O - 2 /HPF    Hyaline Casts 5-10 (A) OTO2^O - 2 /LPF    Mucous Urine Present (A) NEG^Negative /LPF       Medications   lactated ringers BOLUS 1,000 mL (0 mLs  Intravenous Stopped 6/15/18 1855)     Followed by   lactated ringers infusion ( Intravenous Canceled Entry 6/15/18 1848)   ondansetron (ZOFRAN) injection 4 mg (4 mg Intravenous Given 6/15/18 1741)   levofloxacin (LEVAQUIN) infusion 500 mg (not administered)   lidocaine 2 % (URO-JET) jelly 20 mL (20 mLs Urethral Given 6/15/18 1803)   HYDROmorphone (PF) (DILAUDID) injection 0.5 mg (0.5 mg Intravenous Given 6/15/18 1741)   HYDROmorphone (PF) (DILAUDID) injection 0.5 mg (0.5 mg Intravenous Given 6/15/18 1845)   levofloxacin (LEVAQUIN) infusion 500 mg (500 mg Intravenous New Bag 6/15/18 1858)   0.9% sodium chloride BOLUS (1,000 mLs Intravenous New Bag 6/15/18 1858)       Assessments & Plan (with Medical Decision Making)     I have reviewed the nursing notes.    I have reviewed the findings, diagnosis, plan and need for follow up with the patient.    this patient appears to have a subtle abnormality of the chest x-ray and with an elevated white blood count source of this infection is not clear that may be skin and soft tissue of the left foot with the right elbow where there is old wound. Reviewed with the hospitalist and blood cultures have been submitted and therefore we'll start on antibiotics in the form of levofloxacin 500 mg IV at this time because of the impaired renal function at this patient is demonstrating with her creatinine level elevated. Discussed this with the patient at length and he agrees to this management and now for her to be under the care of the hospitalist Dr. Ailyn Jacinto.    New Prescriptions    No medications on file       Final diagnoses:   Pneumonia due to infectious organism, unspecified laterality, unspecified part of lung   Dehydration   Creatinine elevation       6/15/2018   [facility]     Toney Davis MD  06/15/18 2018

## 2018-06-15 NOTE — ED TRIAGE NOTES
Patient presents from home with c/o increased weakness over past couple of days. She fell on Thursday, was not seen after fall. She states she hit her head and fell on the left side. She recently has a couple meds changes in [place] with her PCP. She is unsure of the names of those meds. She was diaphoretic last night but did not check a temperature.

## 2018-06-15 NOTE — IP AVS SNAPSHOT
MRN:[id number]                      After Visit Summary   6/15/2018    Laura Perez    MRN: [id number]           Thank you!     Thank you for choosing [facility] for your care. Our goal is always to provide you with excellent care. Hearing back from our patients is one way we can continue to improve our services. Please take a few minutes to complete the written survey that you may receive in the mail after you visit with us. Thank you!        Patient Information     Date Of Birth          1950        Designated Caregiver       Most Recent Value    Caregiver    Will someone help with your care after discharge? yes    Name of designated caregiver Benigno-     Phone number of caregiver see facesheet    Caregiver address see facesheet      About your hospital stay     You were admitted on:  Sonia 15, 2018 You last received care in the:  [facility]    You were discharged on:  June 18, 2018        Reason for your hospital stay       Pneumonia; sepsis; chest pain with elevated cardiac enzymes leading to transfer to [facility] to Call     For medical emergencies, please call 911.  For non-urgent questions about your medical care, please call your primary care provider or clinic, [phone]          Attending Provider     Provider Specialty    Toney Davis MD Specialist    Noelle Jacinto MD Family Practice       Primary Care Provider Office Phone # Fax #    Felicitas Johnson MD, [phone]      Follow-up Appointments     Follow-up and recommended labs and tests        As recommended at discharge from [facility]                  Pending Results     Date and Time Order Name Status Description    6/15/2018 1843 Blood culture Preliminary     6/15/2018 1843 Blood culture Preliminary     6/15/2018 1730 Urine Culture Aerobic Bacterial In process             Admission Information     Date & Time Provider Department Dept. Phone     "6/15/2018 Noelle Jacinto MD [facility] [phone]      Your Vitals Were     Blood Pressure Pulse Temperature Respirations Height Weight    135/62 91 98.9  F (37.2  C) (Tympanic) 20 1.6 m (5' 3\") 70.9 kg (156 lb 4.9 oz)    Pulse Oximetry BMI (Body Mass Index)                93% 27.69 kg/m2          [employer] Information     [employer] lets you send messages to your doctor, view your test results, renew your prescriptions, schedule appointments and more. To sign up, go to [email or web address] . Click on \"Log in\" on the left side of the screen, which will take you to the Welcome page. Then click on \"Sign up Now\" on the right side of the page.     You will be asked to enter the access code listed below, as well as some personal information. Please follow the directions to create your username and password.     Your access code is: [email or web address]  Expires: 2018  2:45 PM     Your access code will  in 90 days. If you need help or a new code, please call your [facility] clinic or [phone].        Care EveryWhere ID     This is your Care EveryWhere ID. This could be used by other organizations to access your [facility] medical records  [id number]        Equal Access to Services     SKINNY HYMAN AH: Hadlonny Augustine, waaxda luqadaha, qaybta kaalban josue, tara boyd. So [facility] [phone].    ATENCIÓN: Si habla español, tiene a dawkins disposición servicios gratuitos de asistencia lingüística. Lilly al [phone].    We comply with applicable federal civil rights laws and [place] laws. We do not discriminate on the basis of race, color, national origin, age, disability, sex, sexual orientation, or gender identity.               Review of your medicines      CONTINUE these medicines which have NOT CHANGED        Dose / Directions    albuterol 108 (90 Base) MCG/ACT Inhaler   Commonly known as:  PROAIR HFA/PROVENTIL HFA/VENTOLIN HFA        Dose:  2 puff "   Inhale 2 puffs into the lungs every 4 hours as needed   Refills:  0       amLODIPine 2.5 MG tablet   Commonly known as:  NORVASC        Dose:  2.5 mg   Take 2.5 mg by mouth daily   Refills:  0       aspirin 81 MG chewable tablet        Dose:  81 mg   Take 81 mg by mouth daily with food   Refills:  0       blood glucose monitoring lancets        TEST 2 TIMES PER DAY   Refills:  0       budesonide 0.5 MG/2ML neb solution   Commonly known as:  PULMICORT        Dose:  0.5 mg   Inhale 0.5 mg into the lungs 2 times daily   Refills:  0       Calcium carb-Vitamin D 500 mg [place]-200 units 500-200 MG-UNIT per tablet   Commonly known as:  OSCAL with D;Oyster Shell Calcium        Dose:  1 tablet   Take 1 tablet by mouth daily with food   Refills:  0       EPINEPHrine 0.3 MG/0.3ML injection 2-pack   Commonly known as:  EPIPEN/ADRENACLICK/or ANY BX GENERIC EQUIV        Dose:  0.3 mg   Inject 0.3 mg into the muscle once as needed   Refills:  0       fenofibrate 48 MG tablet        Dose:  48 mg   Take 48 mg by mouth daily with food   Refills:  0       formoterol 20 MCG/2ML neb solution   Commonly known as:  PERFOROMIST        Dose:  2 mL   Inhale 2 mLs into the lungs 2 times daily   Refills:  0       furosemide 20 MG tablet   Commonly known as:  LASIX        Dose:  20 mg   Take 20 mg by mouth daily as needed (leg swelling)   Refills:  0       gabapentin 300 MG capsule   Commonly known as:  NEURONTIN        Dose:  300 mg   Take 300 mg by mouth 3 times daily   Refills:  0       glucagon 1 MG kit        Dose:  1 mg   Inject 1 mg into the muscle as needed for low blood sugar   Refills:  0       glucose 4 g Chew chewable tablet        Dose:  1 tablet   Take 1 tablet by mouth as needed for low blood sugar   Refills:  0       insulin aspart prot & aspart injection   Commonly known as:  NovoLOG MIX 70/30 PEN        Inject 50 units under th skin in the morning, and  30 units under the skin at bedtime   Refills:  0       levothyroxine 112  MCG tablet   Commonly known as:  SYNTHROID/LEVOTHROID        Dose:  112 mcg   Take 112 mcg by mouth every morning (before breakfast)   Refills:  0       lisinopril 2.5 MG tablet   Commonly known as:  PRINIVIL/Zestril        Dose:  2.5 mg   Take 2.5 mg by mouth   Refills:  0       losartan 25 MG tablet   Commonly known as:  COZAAR        Dose:  25 mg   Take 25 mg by mouth daily   Refills:  0       * medical cannabis liquid        1-2 sprays under the tongue as needed for pain   Refills:  0       * medical cannabis (Patient's own supply.  Not a prescription)        Dose:  1 Dose   Apply 1 Dose topically as needed (pain) (This is NOT a prescription, and does not certify that the patient has a qualifying medical condition for medical cannabis.  The purpose of this order is  to document that the patient reports taking medical cannabis.)   Refills:  0       * medical cannabis liquid        Dose:  1 drop   Place 1 drop under the tongue as needed (pain) (This is NOT a prescription, and does not certify that the patient has a qualifying medical condition for medical cannabis.  The purpose of this order is  to document that the patient reports taking medical cannabis.)   Refills:  0       montelukast 10 MG tablet   Commonly known as:  SINGULAIR        Dose:  10 mg   Take 10 mg by mouth At Bedtime   Refills:  0       omeprazole 20 MG CR capsule   Commonly known as:  priLOSEC        Dose:  20 mg   Take 20 mg by mouth every morning (before breakfast)   Refills:  0       ONETOUCH ULTRA test strip   Generic drug:  blood glucose monitoring        TEST 2 TIMES PER DAY.   Refills:  0       predniSONE 5 MG tablet   Commonly known as:  DELTASONE        Dose:  12.5 mg   Take 12.5 mg by mouth daily with food   Refills:  0       simvastatin 40 MG tablet   Commonly known as:  ZOCOR        Dose:  40 mg   Take 40 mg by mouth At Bedtime   Refills:  0       traMADol 50 MG tablet   Commonly known as:  ULTRAM        Dose:   mg   Take   mg by mouth At Bedtime   Refills:  0       traZODone 100 MG tablet   Commonly known as:  DESYREL        Dose:  100 mg   Take 100 mg by mouth At Bedtime   Refills:  0       YUVAFEM 10 MCG Tabs vaginal tablet   Generic drug:  estradiol        Dose:  10 mcg   Place 10 mcg vaginally twice a week   Refills:  0       * Notice:  This list has 3 medication(s) that are the same as other medications prescribed for you. Read the directions carefully, and ask your doctor or other care provider to review them with you.             Protect others around you: Learn how to safely use, store and throw away your medicines at [email or web address].             Medication List: This is a list of all your medications and when to take them. Check marks below indicate your daily home schedule. Keep this list as a reference.      Medications           Morning Afternoon Evening Bedtime As Needed    albuterol 108 (90 Base) MCG/ACT Inhaler   Commonly known as:  PROAIR HFA/PROVENTIL HFA/VENTOLIN HFA   Inhale 2 puffs into the lungs every 4 hours as needed                                amLODIPine 2.5 MG tablet   Commonly known as:  NORVASC   Take 2.5 mg by mouth daily                                aspirin 81 MG chewable tablet   Take 81 mg by mouth daily with food   Last time this was given:  81 mg on 6/17/2018 10:48 PM                                blood glucose monitoring lancets   TEST 2 TIMES PER DAY                                budesonide 0.5 MG/2ML neb solution   Commonly known as:  PULMICORT   Inhale 0.5 mg into the lungs 2 times daily   Last time this was given:  0.5 mg on 6/17/2018  7:40 PM                                Calcium carb-Vitamin D 500 mg [place]-200 units 500-200 MG-UNIT per tablet   Commonly known as:  OSCAL with D;Oyster Shell Calcium   Take 1 tablet by mouth daily with food                                EPINEPHrine 0.3 MG/0.3ML injection 2-pack   Commonly known as:  EPIPEN/ADRENACLICK/or ANY BX GENERIC EQUIV    Inject 0.3 mg into the muscle once as needed                                fenofibrate 48 MG tablet   Take 48 mg by mouth daily with food                                formoterol 20 MCG/2ML neb solution   Commonly known as:  PERFOROMIST   Inhale 2 mLs into the lungs 2 times daily                                furosemide 20 MG tablet   Commonly known as:  LASIX   Take 20 mg by mouth daily as needed (leg swelling)                                gabapentin 300 MG capsule   Commonly known as:  NEURONTIN   Take 300 mg by mouth 3 times daily   Last time this was given:  300 mg on 6/17/2018  9:41 PM                                glucagon 1 MG kit   Inject 1 mg into the muscle as needed for low blood sugar                                glucose 4 g Chew chewable tablet   Take 1 tablet by mouth as needed for low blood sugar                                insulin aspart prot & aspart injection   Commonly known as:  NovoLOG MIX 70/30 PEN   Inject 50 units under th skin in the morning, and  30 units under the skin at bedtime                                levothyroxine 112 MCG tablet   Commonly known as:  SYNTHROID/LEVOTHROID   Take 112 mcg by mouth every morning (before breakfast)   Last time this was given:  112 mcg on 6/17/2018  8:01 AM                                lisinopril 2.5 MG tablet   Commonly known as:  PRINIVIL/Zestril   Take 2.5 mg by mouth                                losartan 25 MG tablet   Commonly known as:  COZAAR   Take 25 mg by mouth daily                                * medical cannabis liquid   1-2 sprays under the tongue as needed for pain                                * medical cannabis (Patient's own supply.  Not a prescription)   Apply 1 Dose topically as needed (pain) (This is NOT a prescription, and does not certify that the patient has a qualifying medical condition for medical cannabis.  The purpose of this order is  to document that the patient reports taking medical cannabis.)                                 * medical cannabis liquid   Place 1 drop under the tongue as needed (pain) (This is NOT a prescription, and does not certify that the patient has a qualifying medical condition for medical cannabis.  The purpose of this order is  to document that the patient reports taking medical cannabis.)                                montelukast 10 MG tablet   Commonly known as:  SINGULAIR   Take 10 mg by mouth At Bedtime                                omeprazole 20 MG CR capsule   Commonly known as:  priLOSEC   Take 20 mg by mouth every morning (before breakfast)                                ONETOUCH ULTRA test strip   TEST 2 TIMES PER DAY.   Generic drug:  blood glucose monitoring                                predniSONE 5 MG tablet   Commonly known as:  DELTASONE   Take 12.5 mg by mouth daily with food   Last time this was given:  60 mg on 6/17/2018  8:17 AM                                simvastatin 40 MG tablet   Commonly known as:  ZOCOR   Take 40 mg by mouth At Bedtime                                traMADol 50 MG tablet   Commonly known as:  ULTRAM   Take  mg by mouth At Bedtime                                traZODone 100 MG tablet   Commonly known as:  DESYREL   Take 100 mg by mouth At Bedtime                                YUVAFEM 10 MCG Tabs vaginal tablet   Place 10 mcg vaginally twice a week   Generic drug:  estradiol                                * Notice:  This list has 3 medication(s) that are the same as other medications prescribed for you. Read the directions carefully, and ask your doctor or other care provider to review them with you.

## 2018-06-15 NOTE — IP AVS SNAPSHOT
[facility]    [address]    [place] [address]    Phone:  [phone]    Fax:  [phone]                                       After Visit Summary   6/15/2018    Laura Perez    MRN: [id number]           After Visit Summary Signature Page     I have received my discharge instructions, and my questions have been answered. I have discussed any challenges I see with this plan with the nurse or doctor.    ..........................................................................................................................................  Patient/Patient Representative Signature      ..........................................................................................................................................  Patient Representative Print Name and Relationship to Patient    ..................................................               ................................................  Date                                            Time    ..........................................................................................................................................  Reviewed by Signature/Title    ...................................................              ..............................................  Date                                                            Time

## 2018-06-16 ENCOUNTER — APPOINTMENT (OUTPATIENT)
Dept: GENERAL RADIOLOGY | Facility: OTHER | Age: 68
DRG: 871 | End: 2018-06-16
Attending: FAMILY MEDICINE
Payer: MEDICARE

## 2018-06-16 PROBLEM — M87.072: Status: ACTIVE | Noted: 2018-06-16

## 2018-06-16 LAB
ANION GAP SERPL CALCULATED.3IONS-SCNC: 8 MMOL/L (ref 3–14)
BUN SERPL-MCNC: 20 MG/DL (ref 7–25)
CALCIUM SERPL-MCNC: 8.4 MG/DL (ref 8.6–10.3)
CHLORIDE SERPL-SCNC: 102 MMOL/L (ref 98–107)
CO2 SERPL-SCNC: 21 MMOL/L (ref 21–31)
CREAT SERPL-MCNC: 1.5 MG/DL (ref 0.6–1.2)
ERYTHROCYTE [DISTWIDTH] IN BLOOD BY AUTOMATED COUNT: 16.9 % (ref 10–15)
GFR SERPL CREATININE-BSD FRML MDRD: 34 ML/MIN/1.7M2
GLUCOSE SERPL-MCNC: 194 MG/DL (ref 70–105)
HCT VFR BLD AUTO: 28 % (ref 35–47)
HGB BLD-MCNC: 8.5 G/DL (ref 11.7–15.7)
MCH RBC QN AUTO: 25.1 PG (ref 26.5–33)
MCHC RBC AUTO-ENTMCNC: 30.4 G/DL (ref 31.5–36.5)
MCV RBC AUTO: 83 FL (ref 78–100)
PLATELET # BLD AUTO: 300 10E9/L (ref 150–450)
POTASSIUM SERPL-SCNC: 5.6 MMOL/L (ref 3.5–5.1)
RBC # BLD AUTO: 3.39 10E12/L (ref 3.8–5.2)
SODIUM SERPL-SCNC: 131 MMOL/L (ref 134–144)
WBC # BLD AUTO: 16.3 10E9/L (ref 4–11)

## 2018-06-16 PROCEDURE — 94640 AIRWAY INHALATION TREATMENT: CPT

## 2018-06-16 PROCEDURE — A9270 NON-COVERED ITEM OR SERVICE: HCPCS | Mod: GY | Performed by: FAMILY MEDICINE

## 2018-06-16 PROCEDURE — 99232 SBSQ HOSP IP/OBS MODERATE 35: CPT | Performed by: FAMILY MEDICINE

## 2018-06-16 PROCEDURE — 80048 BASIC METABOLIC PNL TOTAL CA: CPT | Performed by: FAMILY MEDICINE

## 2018-06-16 PROCEDURE — 12000000 ZZH R&B MED SURG/OB

## 2018-06-16 PROCEDURE — 73600 X-RAY EXAM OF ANKLE: CPT | Mod: RT

## 2018-06-16 PROCEDURE — 25000125 ZZHC RX 250: Performed by: FAMILY MEDICINE

## 2018-06-16 PROCEDURE — 25000131 ZZH RX MED GY IP 250 OP 636 PS 637: Mod: GY | Performed by: FAMILY MEDICINE

## 2018-06-16 PROCEDURE — 94640 AIRWAY INHALATION TREATMENT: CPT | Mod: 76

## 2018-06-16 PROCEDURE — 40000275 ZZH STATISTIC RCP TIME EA 10 MIN

## 2018-06-16 PROCEDURE — 36415 COLL VENOUS BLD VENIPUNCTURE: CPT | Performed by: FAMILY MEDICINE

## 2018-06-16 PROCEDURE — 25000132 ZZH RX MED GY IP 250 OP 250 PS 637: Mod: GY | Performed by: FAMILY MEDICINE

## 2018-06-16 PROCEDURE — 25000128 H RX IP 250 OP 636: Performed by: FAMILY MEDICINE

## 2018-06-16 PROCEDURE — 85027 COMPLETE CBC AUTOMATED: CPT | Performed by: FAMILY MEDICINE

## 2018-06-16 RX ORDER — SIMVASTATIN 40 MG
40 TABLET ORAL AT BEDTIME
COMMUNITY
End: 2018-08-22

## 2018-06-16 RX ORDER — AMLODIPINE BESYLATE 2.5 MG/1
2.5 TABLET ORAL DAILY
Status: ON HOLD | COMMUNITY
End: 2018-06-28

## 2018-06-16 RX ORDER — GABAPENTIN 300 MG/1
300 CAPSULE ORAL AT BEDTIME
COMMUNITY
End: 2019-07-08

## 2018-06-16 RX ORDER — FAMOTIDINE 20 MG/1
20 TABLET, FILM COATED ORAL AT BEDTIME
Status: DISCONTINUED | OUTPATIENT
Start: 2018-06-16 | End: 2018-06-18 | Stop reason: HOSPADM

## 2018-06-16 RX ORDER — LEVOFLOXACIN 5 MG/ML
750 INJECTION, SOLUTION INTRAVENOUS
Status: DISCONTINUED | OUTPATIENT
Start: 2018-06-17 | End: 2018-06-17

## 2018-06-16 RX ORDER — ESTRADIOL 10 UG/1
10 INSERT VAGINAL
COMMUNITY
End: 2018-08-22

## 2018-06-16 RX ADMIN — SODIUM CHLORIDE: 900 INJECTION, SOLUTION INTRAVENOUS at 17:08

## 2018-06-16 RX ADMIN — ATORVASTATIN CALCIUM 20 MG: 20 TABLET, FILM COATED ORAL at 21:48

## 2018-06-16 RX ADMIN — SALMETEROL XINAFOATE 1 PUFF: 50 POWDER, METERED ORAL; RESPIRATORY (INHALATION) at 18:13

## 2018-06-16 RX ADMIN — GABAPENTIN 300 MG: 300 CAPSULE ORAL at 21:48

## 2018-06-16 RX ADMIN — FAMOTIDINE 20 MG: 20 TABLET ORAL at 21:48

## 2018-06-16 RX ADMIN — INSULIN ASPART 1 UNITS: 100 INJECTION, SOLUTION INTRAVENOUS; SUBCUTANEOUS at 08:49

## 2018-06-16 RX ADMIN — OXYCODONE HYDROCHLORIDE 10 MG: 5 TABLET ORAL at 20:20

## 2018-06-16 RX ADMIN — INSULIN ASPART 2 UNITS: 100 INJECTION, SOLUTION INTRAVENOUS; SUBCUTANEOUS at 21:51

## 2018-06-16 RX ADMIN — SALMETEROL XINAFOATE 1 PUFF: 50 POWDER, METERED ORAL; RESPIRATORY (INHALATION) at 09:32

## 2018-06-16 RX ADMIN — MONTELUKAST SODIUM 10 MG: 5 TABLET, CHEWABLE ORAL at 21:48

## 2018-06-16 RX ADMIN — INSULIN GLARGINE 10 UNITS: 100 INJECTION, SOLUTION SUBCUTANEOUS at 21:51

## 2018-06-16 RX ADMIN — OXYCODONE HYDROCHLORIDE 10 MG: 5 TABLET ORAL at 06:22

## 2018-06-16 RX ADMIN — SODIUM CHLORIDE: 900 INJECTION, SOLUTION INTRAVENOUS at 06:19

## 2018-06-16 RX ADMIN — PANTOPRAZOLE SODIUM 40 MG: 40 TABLET, DELAYED RELEASE ORAL at 08:23

## 2018-06-16 RX ADMIN — BUDESONIDE 0.5 MG: 0.5 INHALANT RESPIRATORY (INHALATION) at 18:13

## 2018-06-16 RX ADMIN — BUDESONIDE 0.5 MG: 0.5 INHALANT RESPIRATORY (INHALATION) at 07:53

## 2018-06-16 RX ADMIN — ASPIRIN 81 MG 81 MG: 81 TABLET ORAL at 08:23

## 2018-06-16 RX ADMIN — IPRATROPIUM BROMIDE AND ALBUTEROL SULFATE 3 ML: .5; 3 SOLUTION RESPIRATORY (INHALATION) at 18:10

## 2018-06-16 RX ADMIN — INSULIN ASPART 2 UNITS: 100 INJECTION, SOLUTION INTRAVENOUS; SUBCUTANEOUS at 13:19

## 2018-06-16 RX ADMIN — GABAPENTIN 300 MG: 300 CAPSULE ORAL at 10:05

## 2018-06-16 RX ADMIN — ENOXAPARIN SODIUM 40 MG: 100 INJECTION SUBCUTANEOUS at 21:50

## 2018-06-16 RX ADMIN — IPRATROPIUM BROMIDE AND ALBUTEROL SULFATE 3 ML: .5; 3 SOLUTION RESPIRATORY (INHALATION) at 12:02

## 2018-06-16 RX ADMIN — PREDNISONE 60 MG: 20 TABLET ORAL at 08:23

## 2018-06-16 RX ADMIN — INSULIN ASPART 3 UNITS: 100 INJECTION, SOLUTION INTRAVENOUS; SUBCUTANEOUS at 17:10

## 2018-06-16 RX ADMIN — ONDANSETRON 4 MG: 4 TABLET, ORALLY DISINTEGRATING ORAL at 20:19

## 2018-06-16 RX ADMIN — LEVOTHYROXINE SODIUM 112 MCG: 112 TABLET ORAL at 08:23

## 2018-06-16 RX ADMIN — OXYCODONE HYDROCHLORIDE 10 MG: 5 TABLET ORAL at 13:18

## 2018-06-16 RX ADMIN — IPRATROPIUM BROMIDE AND ALBUTEROL SULFATE 3 ML: .5; 3 SOLUTION RESPIRATORY (INHALATION) at 07:53

## 2018-06-16 RX ADMIN — OXYCODONE HYDROCHLORIDE 10 MG: 5 TABLET ORAL at 17:08

## 2018-06-16 NOTE — PROGRESS NOTES
" [facility] [place] [place]    Patient admitted to room 334 at approximately 2115 via cart from emergency room. Patient was accompanied by spouse and transport tech.     Verbal SBAR report received from Zenobia BULL prior to patient arrival.     Patient trasferred to bed via slip Patient alert and oriented X 3. Pain is controlled with current analgesics.  Medication(s) being used: narcotic analgesics including oxycodone. 0-10 Pain Scale: 9. Admission vital signs: Blood pressure 111/48, temperature 98.9  F (37.2  C), temperature source Tympanic, resp. rate 18, height 1.6 m (5' 3\"), weight 70.1 kg (154 lb 8.7 oz), SpO2 95 %. Patient and spouse was oriented to plan of care, call light, bed controls, tv, telephone, bathroom and visiting hours.     Sarita Moore RN on 6/15/2018 at 9:15 PM    "

## 2018-06-16 NOTE — H&P
[facility]    History and Physical  Hospitalist       Date of Admission:  6/15/2018    Assessment & Plan   Laura Perez is a 68 year old female who presents with a several-day history of illness with vomiting, diarrhea, and weakness.     Principal Problem:    Sepsis (H)    Assessment: by SOFA criteria. Likely source pulmonary. Has had antecedent GI illness with vomiting and diarrhea.     Plan: Fluid bolus and first dose Levaquin given in ED. BP low after Dilaudid dose. Continue IVF, monitor closely given Hx CHF.   Active Problems:  Recurrent pneumonia    Assessment: prior RUL pneumonia with current CXR showing fibrosis vs infiltrate. Question aspiration given recent vomiting.     Plan: Levaquin as above, scheduled nebs. Consider repeat CXR once rehydrated.  Chronic diastolic CHF (congestive heart failure), NYHA class 2 (H)    Assessment: currently appears compensated    Plan: watch fluid status closely  Chronic kidney disease (CKD), stage III (moderate)    Assessment: at baseline    Plan: monitor  COPD (chronic obstructive pulmonary disease) (H)    Assessment: on steroid and LABA nebs at home    Plan: oral Prednisone, continue nebs  Diabetes mellitus type 2, insulin dependent (H)    Assessment: normally controlled with 70/30 insulin    Plan: hold 70/30 until taking PO well; coverage insulin  Seropositive rheumatoid arthritis (H)    Assessment: steroid-dependent    Plan: increase Prednisone for acute illness, taper as improves.  Will obtain x-ray of left ankle because of erythema and pain      # Pain Assessment:  Current Pain Score 6/15/2018   Pain score (0-10) 2   - Laura is experiencing pain due to RA. Pain management was discussed with Lauar and her spouse and the plan was created in a collaborative fashion.  Laura's response to the current recommendations: engaged  - will use gabapentin BID, PO or IV opiates short-term      DVT Prophylaxis: Enoxaparin (Lovenox) SQ  Code Status: Full  Adela Jacinto    Primary Care Physician   Felicitas Johnson MD    Chief Complaint   Weakness, falls, fever    History is obtained from the patient, , ED physician, and chart review.    History of Present Illness   Laura Perez is a 68 year old female who presents with an approximate 1 week history of illness.  She relates that she and her  were at their farm in the [place] area about 8 days ago when she noticed unusual fatigue and vomited once.  She saw her rheumatologist and her primary physician in [place] the following day and had several medication changes.  3 days prior to admission, she developed a diarrheal illness with 4-5 episodes of diarrheal stool per day.  She has had only a few episodes of vomiting.  She has noticed marked fatigue, generalized achiness, and increased falls, but no shortness of breath and only mild cough.  Her past medical history is significant for rheumatoid arthritis since her teens.  She has had, as above, several medication changes recently, including a marked increase in her gabapentin dose after an unsuccessful spinal injection for pain.  The gabapentin dose was then fairly quickly tapered because she had several falls with bruising and skin tears.  Today, she felt warm, continued to have diarrhea, and was diffusely weak and was brought to the ED for evaluation.  In the ED, low-grade fever was noted.  White count was markedly elevated and chest x-ray showed a possible right upper lobe infiltrate.  Blood pressure was in the low end of normal range, with mild hypotension noted after a dose of IV Dilaudid.  Heart rate was in the 90s.  She was treated with an IV fluid bolus and given a dose of IV Levaquin.  She is feeling somewhat better.    Past Medical History    I have reviewed this patient's medical history and updated it with pertinent information if needed.   Past Medical History:   Diagnosis Date     Chronic obstructive pulmonary disease (H)      No Comments Provided     Diverticulosis of intestine without perforation or abscess without bleeding     Diffuse diverticulosis and history of diminutive hyperplastic colon     Essential (primary) hypertension     No Comments Provided     Hyperlipidemia     No Comments Provided     Hypothyroidism     No Comments Provided     Malignant neoplasm of colon (H)     9/2017     Personal history of other diseases of the nervous system and sense organs     No Comments Provided     Rheumatoid arthritis (H)     Rheumatologist Dr. Gonzalez, [phone], fax [phone]     Rosacea     No Comments Provided     Type 2 diabetes mellitus without complications (H)     Diabetes Mellitus, prednisone induced     Urinary tract infection     8/2013,admitted to [facility], [facility]ak [facility]       Past Surgical History   I have reviewed this patient's surgical history and updated it with pertinent information if needed.  Past Surgical History:   Procedure Laterality Date     COLONOSCOPY  09/29/2008     HEMICOLECTOMY, RT/LT Right 09/2017    For colon cancer     LAPAROSCOPIC TUBAL LIGATION      No Comments Provided     OTHER SURGICAL HISTORY      2/5/98,[id number],ARTHROPLASTY,Left MCP joint arthroplasty     OTHER SURGICAL HISTORY      1990,[id number],ARTHROPLASTY,Right wrist arthroplasty     OTHER SURGICAL HISTORY      1996,[id number],ARTHROPLASTY,Right long finger MCP repair Plain     OTHER SURGICAL HISTORY      5/00,[id number],OTHER,MTP 2, 3, 4 right foot     OTHER SURGICAL HISTORY      1/01,[id number],ARTHROPLASTY,Right MTP 1 and 5 and left MTP 5     RELEASE CARPAL TUNNEL      1991,Left carpal tunnel repair     Right MCP 4 and 5 arthroplasty  03/01/2004       Prior to Admission Medications   Prior to Admission Medications   Prescriptions Last Dose Informant Patient Reported? Taking?   Calcium carb-Vitamin D 500 mg [place]-200 units (OSCAL WITH D;OYSTER SHELL CALCIUM) 500-200 MG-UNIT per tablet Past Week at Unknown time  Yes Yes   Sig: Take 1 tablet by  mouth daily with food   DULoxetine HCl (CYMBALTA PO) 6/15/2018 at Unknown time  Yes Yes   Sig: Take 20 mg by mouth daily   EPINEPHrine (EPIPEN/ADRENACLICK/OR ANY BX GENERIC EQUIV) 0.3 MG/0.3ML injection 2-pack More than a month at Unknown time  Yes No   Sig: Inject 0.3 mg into the muscle once as needed   GABAPENTIN PO 6/15/2018 at Unknown time  Yes Yes   Sig: Take 300 mg by mouth 2 times daily   SIMVASTATIN PO Past Week at Unknown time  Yes Yes   Sig: Take 40 mg by mouth At Bedtime   albuterol (2.5 MG/3ML) 0.083% neb solution 6/14/2018 at Unknown time  Yes Yes   Sig: Inhale 2.5 mg into the lungs every 4 hours as needed   albuterol (PROAIR HFA/PROVENTIL HFA/VENTOLIN HFA) 108 (90 BASE) MCG/ACT Inhaler More than a month at Unknown time  Yes No   Sig: Inhale 2 puffs into the lungs every 4 hours as needed   amLODIPine (NORVASC) 5 MG tablet 6/14/2018 at Unknown time  Yes Yes   Sig: Take 5 mg by mouth daily   aspirin 81 MG chewable tablet 6/14/2018 at Unknown time  Yes Yes   Sig: Take 81 mg by mouth daily with food   blood glucose monitoring (ONE TOUCH ULTRASOFT) lancets 6/15/2018 at Unknown time  Yes Yes   Sig: TEST 2 TIMES PER DAY   blood glucose monitoring (ONETOUCH ULTRA) test strip 6/15/2018 at Unknown time  Yes Yes   Sig: TEST 2 TIMES PER DAY.   budesonide (PULMICORT) 0.5 MG/2ML neb solution Past Week at Unknown time  Yes Yes   Sig: Inhale 0.5 mg into the lungs 2 times daily   cyanocobalamin (TH VITAMIN B12) 100 MCG TABS tablet Past Week at Unknown time  Yes Yes   Sig: Take by mouth daily   estradiol (VAGIFEM) 10 MCG TABS vaginal tablet Past Week at Unknown time  Yes Yes   Sig: Place 1 tablet vaginally three times a week   fenofibrate 48 MG tablet Past Week at Unknown time  Yes Yes   Sig: Take 48 mg by mouth daily with food   formoterol (PERFOROMIST) 20 MCG/2ML neb solution Past Week at Unknown time  Yes Yes   Sig: Inhale 2 mLs into the lungs 2 times daily   furosemide (LASIX) 20 MG tablet   Yes No   Sig: Take 20 mg  by mouth   insulin aspart prot & aspart (NOVOLOG MIX 70/30 PEN) injection 2018 at Unknown time  Yes Yes   Si units am, 30 units pm  Indications:   levothyroxine (SYNTHROID/LEVOTHROID) 112 MCG tablet Past Week at Unknown time  Yes Yes   Sig: Take 112 mcg by mouth every morning (before breakfast)   lisinopril (PRINIVIL/ZESTRIL) 2.5 MG tablet   Yes Yes   Sig: Take 2.5 mg by mouth   losartan (COZAAR) 25 MG tablet Past Week at Unknown time  Yes Yes   Sig: Take 25 mg by mouth daily   medical cannabis liquid   Yes No   montelukast (SINGULAIR) 10 MG tablet Past Week at Unknown time  Yes Yes   Sig: Take 10 mg by mouth At Bedtime   omeprazole (PRILOSEC) 20 MG CR capsule Past Week at Unknown time  Yes Yes   Sig: Take 20 mg by mouth every morning (before breakfast)   predniSONE (DELTASONE) 5 MG tablet 6/15/2018 at Unknown time  Yes Yes   Sig: Take 15 mg by mouth daily with food   traMADol (ULTRAM) 50 MG tablet 6/15/2018 at Unknown time  Yes Yes   Sig: Take 50 mg by mouth 2 times daily as needed   traZODone (DESYREL) 100 MG tablet 6/15/2018 at Unknown time  Yes Yes   Sig: Take 100 mg by mouth At Bedtime      Facility-Administered Medications: None     Allergies   Allergies   Allergen Reactions     Codeine Nausea and Other (See Comments)     Stomach problems     Glyburide Nausea     Other reaction(s): Dizziness     Sulfamethoxazole-Trimethoprim      Other reaction(s): Other - Describe In Comment Field  Rash, nausea, dizziness     Sulfa Drugs Rash       Social History   I have reviewed this patient's social history and updated it with pertinent information if needed. Laura NIEVES Perez  reports that she has quit smoking. Her smoking use included Cigarettes. She has a 30.00 pack-year smoking history. She has never used smokeless tobacco. She reports that she drinks about 1.5 oz of alcohol per week  She reports that she uses illicit drugs.  (Has been using medical marijuana for pain)    Family History   I have reviewed this  patient's family history and updated it with pertinent information if needed.   Family History   Problem Relation Age of Onset     CANCER Mother 50     Cancer,uterus     HEART DISEASE Mother      Heart Disease,MI     Colon Cancer Mother      Cancer-colon     HEART DISEASE Father      Heart Disease,MI     Chronic Obstructive Pulmonary Disease Sister      COPD     Other - See Comments Sister      rubina dow     Colon Cancer Brother      Cancer-colon     Breast Cancer No family hx of      Cancer-breast       Review of Systems   The 10 point Review of Systems is negative other than noted in the HPI or here.   She has had 1 or 2 episodes of falling that were associated with crying out and shaking of her upper extremities.  She was started on duloxetine at the time of her medical follow-up appointments last week, and this was subsequently stopped.    Physical Exam   Temp: 99.6  F (37.6  C) Temp src: Tympanic BP: (!) 87/52   Heart Rate: 96 Resp: 27 SpO2: 96 % O2 Device: Nasal cannula Oxygen Delivery: 2 LPM  Vital Signs with Ranges  Temp:  [99.6  F (37.6  C)-100.1  F (37.8  C)] 99.6  F (37.6  C)  Heart Rate:  [] 96  Resp:  [12-27] 27  BP: ()/(50-69) 87/52  SpO2:  [88 %-97 %] 96 %  153 lbs 0 oz    Constitutional: Alert, pale, in no respiratory distress  Eyes: Pupils are 2 mm and reactive, EOMI  HEENT: Normocephalic.  Oral mucosa mildly dry  Respiratory: Good air exchange with few right-sided crackles, no wheezes  Cardiovascular: Regular rate and rhythm, no murmur heard  GI: Soft with no palpable mass or HSM, no apparent tenderness  Lymph/Hematologic: No cervical or supraclavicular adenopathy  Genitourinary: Deferred  Skin: Multiple superficial abrasions and ecchymoses over the upper extremities, especially over the left proximal forearm just distal to the elbow, where there is mild induration, but no fluctuance  Musculoskeletal: Both ankles and the right knee are warm to the touch and mildly erythematous.   Chronic hand deformities consistent with her diagnosis of RA and multiple surgeries  Neurologic: She is alert and oriented ×3, able to give an accurate history.  Face symmetric.  DTRs symmetric upper and lower extremities and toes downgoing  Psychiatric: Affect appropriate    Data   Data reviewed today:  I personally reviewed the chest x-ray image(s) showing possible recurrent RUL pneumonia.    Recent Labs  Lab 06/15/18  1745   WBC 18.8*   HGB 9.4*   MCV 80      INR 1.22   *   POTASSIUM 4.6   CHLORIDE 97*   CO2 22   BUN 18   CR 1.63*   ANIONGAP 11   GIANNI 9.3   *   ALBUMIN 3.3*   PROTTOTAL 7.1   BILITOTAL 1.2*   ALKPHOS 70   ALT 18   AST 22   TROPI <0.030       Recent Results (from the past 24 hour(s))   XR Chest Port 1 View    Narrative    EXAM:XR CHEST PORT 1 VW     CLINICAL HISTORY: Patient Age  68 years Additional clinical info:  fever and hx pneumonia;      COMPARISON: 11/30/2017      TECHNIQUE: Single view      Impression    IMPRESSION: Slight interstitial prominence in the right upper lung  could be due to fibrosis or infiltrate. Aortic calcifications. Chest  otherwise normal.    ANAHI SANTACRUZ MD

## 2018-06-16 NOTE — PLAN OF CARE
Problem: Patient Care Overview  Goal: Plan of Care/Patient Progress Review  Outcome: No Change  Pt's BP remains low but WDL, pt feet continue to hurt bilaterally, pt rated pain 8/10 at the highest, PRN oxycodone given x 2, pt legs repositioned for additional pain management, pt satisfied with pain control. Pt had remained on bedrest throughout the shift, declined being repositioned by staff. Pt placed on RA by RT, pt tolerating it well. Farr output adequate, IV infusing, bed alarm on, call light within reach. Will continue to monitor.   Tasha Mustafa, RN on 6/16/2018 at 6:23 PM

## 2018-06-16 NOTE — PLAN OF CARE
"Problem: Patient Care Overview  Goal: Plan of Care/Patient Progress Review  Outcome: No Change  No change since previous assessment. Pt c/o pain 7/10 PRN oxycodone 10mg given with relief. Pt state,\"they feel a little better, but I couldn't stand up if I wanted to\". Lt foot is red and warm to touch. Lung sounds clear. BP (!) 101/27  Pulse 82  Temp 97.4  F (36.3  C) (Tympanic)  Resp 18  Ht 1.6 m (5' 3\")  Wt 70.1 kg (154 lb 8.7 oz)  SpO2 92%  BMI 27.38 kg/m2 1L O2. Sarita Moore RN on 6/16/2018 at 7:11 AM          "

## 2018-06-16 NOTE — PROGRESS NOTES
[facility]    Hospitalist Progress Note      Assessment & Plan   Laura Perez is a 68 year old female who was admitted on 6/15/2018 with fever and weakness after a diarrheal illness. Being treated presumptively for recurrent pneumonia, ? Aspiration.     Principal Problem:    Sepsis (H)    Assessment: met SOFA criteria on admission. WBC decreasing, Tmax 99.6, BP improved. Presumed respiratory source. UC pending.    Plan: continue Levaquin    Active Problems:  Recurrent pneumonia    Assessment: probable; CXR shows infiltrate vs fibrosis RUL    Plan: PA and lateral CXR in AM to reflect post-hydration changes.   Seropositive rheumatoid arthritis (H)    Assessment: steroid-dependent, s/p multiple reconstructive surgeries - mostly in hands. 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 Also having significant pain R talus.    Plan: XRay R ankle. Plan MRI Monday. Taper steroids as able.  Chronic diastolic CHF (congestive heart failure), NYHA class 2 (H)    Assessment: clinically euvolemic. BNP 49 on admission    Plan: decrease IVF rate, monitor I/O, weight  Chronic kidney disease (CKD), stage III (moderate)    Assessment: Cr stable, in baseline range K+5.6 this morning, no PO or IV potassium, lisinopril and losartan being held    Plan: continue to monitor  COPD (chronic obstructive pulmonary disease) (H)    Assessment: with O2 requirement - not usual for her. Using home CPAP at night    Plan: continue Prednisone, Levaquin, nebs.   Diabetes mellitus type 2, insulin dependent (H)    Assessment: FSBS elevated, recent A1c high    Plan: add low-dose Lantus and continue coverage. Plan to resume usual 70/30 at discharge         # Pain Assessment:  Current Pain Score 6/16/2018   Patient currently in pain? yes   Pain score (0-10) 3   Pain location -   Pain descriptors -   - Laura is experiencing pain due to RA. Pain management was discussed with Laura and her spouse and the plan was created in a  collaborative fashion.  Laura's response to the current recommendations: engaged  - Please see the plan for pain management as documented above      DVT Prophylaxis: Enoxaparin (Lovenox) SQ  Code Status: Full Code    Noelle [place]    Interval History   Feels better today with improved appetite and energy.  Still having significant pain in both ankles/feet.  Denies cough or shortness of breath.  Slept well.  No vomiting or diarrhea since admission.    -Data reviewed today: I reviewed all new labs and imaging results over the last 24 hours. I personally reviewed no images or EKG's today.    Physical Exam   Temp: 97.9  F (36.6  C) Temp src: Oral BP: 112/50 Pulse: 82 Heart Rate: 69 Resp: 18 SpO2: 97 % O2 Device: Nasal cannula Oxygen Delivery: 1 LPM  Vitals:    06/15/18 1639 06/15/18 2124   Weight: 69.4 kg (153 lb) 70.1 kg (154 lb 8.7 oz)     Vital Signs with Ranges  Temp:  [97.4  F (36.3  C)-100.1  F (37.8  C)] 97.9  F (36.6  C)  Pulse:  [82] 82  Heart Rate:  [] 69  Resp:  [9-27] 18  BP: ()/(27-69) 112/50  SpO2:  [88 %-99 %] 97 %  I/O last 3 completed shifts:  In: -   Out: 350 [Urine:350]    Constitutional: Alert, NAD at rest  Respiratory: Good air exchange, no crackles or wheezes  Cardiovascular: RRR without murmur  GI: soft, NT, no palpable mass or HSM  Skin/Integument: Decreased erythema and swelling around RUE skin tears  Other:      Medications     sodium chloride 100 mL/hr at 06/16/18 0619       aspirin  81 mg Oral Daily     atorvastatin  20 mg Oral At Bedtime     budesonide  0.5 mg Nebulization BID     enoxaparin  40 mg Subcutaneous Q24H     famotidine  20 mg Oral At Bedtime     gabapentin (NEURONTIN) capsule 300 mg  300 mg Oral BID     insulin aspart  1-3 Units Subcutaneous TID AC     insulin aspart  1-3 Units Subcutaneous At Bedtime     ipratropium - albuterol 0.5 mg/2.5 mg/3 mL  3 mL Nebulization 4x Daily     [START ON 6/17/2018] levofloxacin  750 mg Intravenous Q48H     levothyroxine  112 mcg Oral  QAM AC     montelukast  10 mg Oral At Bedtime     pantoprazole  40 mg Oral QAM AC     predniSONE  60 mg Oral Daily     salmeterol  1 puff Inhalation 2 times daily       Data     Recent Labs  Lab 06/16/18  0700 06/15/18  1745   WBC 16.3* 18.8*   HGB 8.5* 9.4*   MCV 83 80    321   INR  --  1.22   * 130*   POTASSIUM 5.6* 4.6   CHLORIDE 102 97*   CO2 21 22   BUN 20 18   CR 1.50* 1.63*   ANIONGAP 8 11   GIANNI 8.4* 9.3   * 222*   ALBUMIN  --  3.3*   PROTTOTAL  --  7.1   BILITOTAL  --  1.2*   ALKPHOS  --  70   ALT  --  18   AST  --  22   TROPI  --  <0.030     Glucose Values Latest Ref Rng & Units 6/15/2018 6/16/2018 6/16/2018 6/16/2018 6/16/2018   Bedside Glucose (mg/dl )  - 204 165 -- 185 302   GLUCOSE 70 - 105 mg/dL 222(H) -- 194(H) -- --   Some recent data might be hidden   2U coverage insulin since admission      Recent Results (from the past 24 hour(s))   XR Chest Port 1 View    Narrative    EXAM:XR CHEST PORT 1 VW     CLINICAL HISTORY: Patient Age  68 years Additional clinical info:  fever and hx pneumonia;      COMPARISON: 11/30/2017      TECHNIQUE: Single view      Impression    IMPRESSION: Slight interstitial prominence in the right upper lung  could be due to fibrosis or infiltrate. Aortic calcifications. Chest  otherwise normal.    ANAHI SANTACRUZ MD   XR Ankle Port Left 2 Views    Narrative    EXAM:XR ANKLE PORT LT 2 VW     CLINICAL HISTORY: Patient Age  68 years Additional clinical info:  pain, erythema, hx RA;      COMPARISON: None      TECHNIQUE: 2 views      Impression    IMPRESSION: Irregular sclerosis of the subcortical bone of the talar  dome. This has an appearance suggestive of avascular necrosis. MRI of  the left ankle be helpful in further evaluation. Soft tissue swelling  about the left ankle. Mild arthritic changes in the ankle mortise and  visualized midfoot.    ANAHI SANTACRUZ MD

## 2018-06-16 NOTE — PHARMACY-ADMISSION MEDICATION HISTORY
Pharmacy -- Admission Medication Reconciliation    Prior to admission (PTA) medications were reviewed and the patient's PTA medication list was updated.    Sources Consulted: ANIL Puente, Patient interview    The reliability of this Medication Reconciliation is: Reliability: Reliable    The following significant changes were made:  -Albuterol nebs removed  -Norvasc updated to 2.5 mg daily  -B12 removed  -Cymbalta removed; patient started this last week and took 1 dose. Was then told by rheumatologist to stop taking  -Estradiol updated to Yuvafem twice weekly  -Lasix updated to 20 mg daily for ankle swelling. Patient takes this approximately twice per month  -Gabapentin updated to 300 mg TID  -Lisinopril directions added - patient started taking this week  -Cannabis updated: has topical, oral sublingual, oral spray, and oral suspension available at home for pain  -Prednisone updated to 12.5 mg daily  -Tramadol updated to  mg QHS  -Glucagon added  -Glucose tablets added    FYI:  -Atovaquone suspension started by her rheumatologist from [place] on 6/12/18. Per patient, she was started on this due to her immunosuppression and the fact that certain areas of [place] that she travels carry risk of certain infections. Patient took one dose and was then notified she could stop taking as she started feeling ill.    -Patient confirms taking both lisinopril and losartan (losartan last filled 12/2017)    In addition, the patient's allergies were reviewed with the patient and updated as follows:   Allergies: Glyburide; Sulfa drugs; Sulfamethoxazole-trimethoprim; Codeine; Mosquitoes (informational only); and Other [seasonal allergies]    The pharmacist has reviewed with the patient that all personal medications should be removed from the building or locked in the belongings safe.  Patient shall only take medications ordered by the physician and administered by the nursing staff.        Medication barriers  identified: none    Medication adherence concerns: unable to discern compliance; losartan has not been filled since 2017    Understanding of emergency medications: Has albuterol inhaler, glucose tablets, glucagon kit, and Epipen at home. Patient believes Epipens are  and would like refills upon discharge.      Steve Mendez, [facility], 2018,  6:47 PM

## 2018-06-17 ENCOUNTER — APPOINTMENT (OUTPATIENT)
Dept: GENERAL RADIOLOGY | Facility: OTHER | Age: 68
DRG: 871 | End: 2018-06-17
Attending: FAMILY MEDICINE
Payer: MEDICARE

## 2018-06-17 ENCOUNTER — APPOINTMENT (OUTPATIENT)
Dept: OCCUPATIONAL THERAPY | Facility: OTHER | Age: 68
DRG: 871 | End: 2018-06-17
Attending: FAMILY MEDICINE
Payer: MEDICARE

## 2018-06-17 ENCOUNTER — APPOINTMENT (OUTPATIENT)
Dept: PHYSICAL THERAPY | Facility: OTHER | Age: 68
DRG: 871 | End: 2018-06-17
Attending: FAMILY MEDICINE
Payer: MEDICARE

## 2018-06-17 VITALS
DIASTOLIC BLOOD PRESSURE: 62 MMHG | TEMPERATURE: 98.9 F | BODY MASS INDEX: 27.7 KG/M2 | HEART RATE: 91 BPM | SYSTOLIC BLOOD PRESSURE: 135 MMHG | OXYGEN SATURATION: 93 % | HEIGHT: 63 IN | RESPIRATION RATE: 20 BRPM | WEIGHT: 156.31 LBS

## 2018-06-17 LAB
ANION GAP SERPL CALCULATED.3IONS-SCNC: 8 MMOL/L (ref 3–14)
BUN SERPL-MCNC: 21 MG/DL (ref 7–25)
CALCIUM SERPL-MCNC: 8.9 MG/DL (ref 8.6–10.3)
CHLORIDE SERPL-SCNC: 103 MMOL/L (ref 98–107)
CO2 SERPL-SCNC: 21 MMOL/L (ref 21–31)
CREAT SERPL-MCNC: 1.31 MG/DL (ref 0.6–1.2)
ERYTHROCYTE [DISTWIDTH] IN BLOOD BY AUTOMATED COUNT: 16.5 % (ref 10–15)
GFR SERPL CREATININE-BSD FRML MDRD: 40 ML/MIN/1.7M2
GLUCOSE SERPL-MCNC: 223 MG/DL (ref 70–105)
HCT VFR BLD AUTO: 27.1 % (ref 35–47)
HGB BLD-MCNC: 8.4 G/DL (ref 11.7–15.7)
MCH RBC QN AUTO: 25.2 PG (ref 26.5–33)
MCHC RBC AUTO-ENTMCNC: 31 G/DL (ref 31.5–36.5)
MCV RBC AUTO: 81 FL (ref 78–100)
PLATELET # BLD AUTO: 348 10E9/L (ref 150–450)
POTASSIUM SERPL-SCNC: 4.7 MMOL/L (ref 3.5–5.1)
RBC # BLD AUTO: 3.33 10E12/L (ref 3.8–5.2)
SODIUM SERPL-SCNC: 132 MMOL/L (ref 134–144)
TROPONIN I SERPL-MCNC: 0.08 UG/L (ref 0–0.03)
TROPONIN I SERPL-MCNC: 0.11 UG/L (ref 0–0.03)
WBC # BLD AUTO: 16.6 10E9/L (ref 4–11)

## 2018-06-17 PROCEDURE — 99232 SBSQ HOSP IP/OBS MODERATE 35: CPT | Performed by: FAMILY MEDICINE

## 2018-06-17 PROCEDURE — A9270 NON-COVERED ITEM OR SERVICE: HCPCS | Mod: GY | Performed by: FAMILY MEDICINE

## 2018-06-17 PROCEDURE — 84484 ASSAY OF TROPONIN QUANT: CPT | Performed by: FAMILY MEDICINE

## 2018-06-17 PROCEDURE — 25000132 ZZH RX MED GY IP 250 OP 250 PS 637: Mod: GY | Performed by: FAMILY MEDICINE

## 2018-06-17 PROCEDURE — 93005 ELECTROCARDIOGRAM TRACING: CPT

## 2018-06-17 PROCEDURE — 97161 PT EVAL LOW COMPLEX 20 MIN: CPT | Mod: GP | Performed by: PHYSICAL THERAPIST

## 2018-06-17 PROCEDURE — 97530 THERAPEUTIC ACTIVITIES: CPT | Mod: GP | Performed by: PHYSICAL THERAPIST

## 2018-06-17 PROCEDURE — 97166 OT EVAL MOD COMPLEX 45 MIN: CPT | Mod: GO | Performed by: OCCUPATIONAL THERAPIST

## 2018-06-17 PROCEDURE — 25000125 ZZHC RX 250: Performed by: FAMILY MEDICINE

## 2018-06-17 PROCEDURE — 40000193 ZZH STATISTIC PT WARD VISIT: Performed by: PHYSICAL THERAPIST

## 2018-06-17 PROCEDURE — 25000131 ZZH RX MED GY IP 250 OP 636 PS 637: Mod: GY | Performed by: FAMILY MEDICINE

## 2018-06-17 PROCEDURE — 94640 AIRWAY INHALATION TREATMENT: CPT

## 2018-06-17 PROCEDURE — 25000128 H RX IP 250 OP 636: Performed by: FAMILY MEDICINE

## 2018-06-17 PROCEDURE — 94640 AIRWAY INHALATION TREATMENT: CPT | Mod: 76

## 2018-06-17 PROCEDURE — 80048 BASIC METABOLIC PNL TOTAL CA: CPT | Performed by: FAMILY MEDICINE

## 2018-06-17 PROCEDURE — 36415 COLL VENOUS BLD VENIPUNCTURE: CPT | Performed by: FAMILY MEDICINE

## 2018-06-17 PROCEDURE — 97530 THERAPEUTIC ACTIVITIES: CPT | Mod: GO | Performed by: OCCUPATIONAL THERAPIST

## 2018-06-17 PROCEDURE — 71045 X-RAY EXAM CHEST 1 VIEW: CPT

## 2018-06-17 PROCEDURE — 85027 COMPLETE CBC AUTOMATED: CPT | Performed by: FAMILY MEDICINE

## 2018-06-17 PROCEDURE — 40000275 ZZH STATISTIC RCP TIME EA 10 MIN

## 2018-06-17 PROCEDURE — 12000000 ZZH R&B MED SURG/OB

## 2018-06-17 RX ORDER — ASPIRIN 81 MG/1
81 TABLET, CHEWABLE ORAL ONCE
Status: COMPLETED | OUTPATIENT
Start: 2018-06-17 | End: 2018-06-17

## 2018-06-17 RX ORDER — PREDNISONE 20 MG/1
40 TABLET ORAL DAILY
Status: DISCONTINUED | OUTPATIENT
Start: 2018-06-18 | End: 2018-06-18 | Stop reason: HOSPADM

## 2018-06-17 RX ORDER — NITROGLYCERIN 0.4 MG/1
0.4 TABLET SUBLINGUAL EVERY 5 MIN PRN
Status: DISCONTINUED | OUTPATIENT
Start: 2018-06-17 | End: 2018-06-18 | Stop reason: HOSPADM

## 2018-06-17 RX ORDER — LEVOFLOXACIN 750 MG/1
750 TABLET, FILM COATED ORAL
Status: DISCONTINUED | OUTPATIENT
Start: 2018-06-17 | End: 2018-06-18 | Stop reason: HOSPADM

## 2018-06-17 RX ORDER — LEVOFLOXACIN 5 MG/ML
750 INJECTION, SOLUTION INTRAVENOUS
Status: DISCONTINUED | OUTPATIENT
Start: 2018-06-17 | End: 2018-06-17

## 2018-06-17 RX ADMIN — INSULIN GLARGINE 10 UNITS: 100 INJECTION, SOLUTION SUBCUTANEOUS at 21:43

## 2018-06-17 RX ADMIN — IPRATROPIUM BROMIDE AND ALBUTEROL SULFATE 3 ML: .5; 3 SOLUTION RESPIRATORY (INHALATION) at 06:16

## 2018-06-17 RX ADMIN — IPRATROPIUM BROMIDE AND ALBUTEROL SULFATE 3 ML: .5; 3 SOLUTION RESPIRATORY (INHALATION) at 19:39

## 2018-06-17 RX ADMIN — INSULIN ASPART 2 UNITS: 100 INJECTION, SOLUTION INTRAVENOUS; SUBCUTANEOUS at 21:43

## 2018-06-17 RX ADMIN — INSULIN ASPART 2 UNITS: 100 INJECTION, SOLUTION INTRAVENOUS; SUBCUTANEOUS at 11:53

## 2018-06-17 RX ADMIN — MONTELUKAST SODIUM 10 MG: 5 TABLET, CHEWABLE ORAL at 21:42

## 2018-06-17 RX ADMIN — LEVOFLOXACIN 750 MG: 5 INJECTION, SOLUTION INTRAVENOUS at 10:10

## 2018-06-17 RX ADMIN — BUDESONIDE 0.5 MG: 0.5 INHALANT RESPIRATORY (INHALATION) at 06:16

## 2018-06-17 RX ADMIN — ENOXAPARIN SODIUM 40 MG: 100 INJECTION SUBCUTANEOUS at 21:43

## 2018-06-17 RX ADMIN — IPRATROPIUM BROMIDE AND ALBUTEROL SULFATE 3 ML: .5; 3 SOLUTION RESPIRATORY (INHALATION) at 15:27

## 2018-06-17 RX ADMIN — IPRATROPIUM BROMIDE AND ALBUTEROL SULFATE 3 ML: .5; 3 SOLUTION RESPIRATORY (INHALATION) at 10:44

## 2018-06-17 RX ADMIN — GABAPENTIN 300 MG: 300 CAPSULE ORAL at 21:41

## 2018-06-17 RX ADMIN — BUDESONIDE 0.5 MG: 0.5 INHALANT RESPIRATORY (INHALATION) at 19:40

## 2018-06-17 RX ADMIN — PREDNISONE 60 MG: 20 TABLET ORAL at 08:17

## 2018-06-17 RX ADMIN — GABAPENTIN 300 MG: 300 CAPSULE ORAL at 10:09

## 2018-06-17 RX ADMIN — FAMOTIDINE 20 MG: 20 TABLET ORAL at 21:41

## 2018-06-17 RX ADMIN — NITROGLYCERIN 0.4 MG: 0.4 TABLET SUBLINGUAL at 22:34

## 2018-06-17 RX ADMIN — OXYCODONE HYDROCHLORIDE 5 MG: 5 TABLET ORAL at 10:19

## 2018-06-17 RX ADMIN — INSULIN ASPART 4 UNITS: 100 INJECTION, SOLUTION INTRAVENOUS; SUBCUTANEOUS at 17:29

## 2018-06-17 RX ADMIN — PANTOPRAZOLE SODIUM 40 MG: 40 TABLET, DELAYED RELEASE ORAL at 08:01

## 2018-06-17 RX ADMIN — OXYCODONE HYDROCHLORIDE 5 MG: 5 TABLET ORAL at 15:28

## 2018-06-17 RX ADMIN — INSULIN ASPART 1 UNITS: 100 INJECTION, SOLUTION INTRAVENOUS; SUBCUTANEOUS at 08:18

## 2018-06-17 RX ADMIN — SALMETEROL XINAFOATE 1 PUFF: 50 POWDER, METERED ORAL; RESPIRATORY (INHALATION) at 06:17

## 2018-06-17 RX ADMIN — ATORVASTATIN CALCIUM 20 MG: 20 TABLET, FILM COATED ORAL at 21:41

## 2018-06-17 RX ADMIN — NITROGLYCERIN 0.4 MG: 0.4 TABLET SUBLINGUAL at 22:48

## 2018-06-17 RX ADMIN — NITROGLYCERIN 0.4 MG: 0.4 TABLET SUBLINGUAL at 23:00

## 2018-06-17 RX ADMIN — ASPIRIN 81 MG 81 MG: 81 TABLET ORAL at 22:48

## 2018-06-17 RX ADMIN — LEVOTHYROXINE SODIUM 112 MCG: 112 TABLET ORAL at 08:01

## 2018-06-17 RX ADMIN — OXYCODONE HYDROCHLORIDE 5 MG: 5 TABLET ORAL at 08:17

## 2018-06-17 RX ADMIN — NITROGLYCERIN 0.4 MG: 0.4 TABLET SUBLINGUAL at 23:35

## 2018-06-17 RX ADMIN — ACETAMINOPHEN 650 MG: 325 TABLET, FILM COATED ORAL at 11:07

## 2018-06-17 RX ADMIN — OXYCODONE HYDROCHLORIDE 10 MG: 5 TABLET ORAL at 21:41

## 2018-06-17 RX ADMIN — ASPIRIN 81 MG 81 MG: 81 TABLET ORAL at 08:17

## 2018-06-17 RX ADMIN — SALMETEROL XINAFOATE 1 PUFF: 50 POWDER, METERED ORAL; RESPIRATORY (INHALATION) at 19:40

## 2018-06-17 NOTE — PROGRESS NOTES
06/17/18 1100   Quick Adds   Type of Visit Initial Occupational Therapy Evaluation   Living Environment   Lives With spouse   Functional Level Prior   Ambulation 0-->independent   Transferring 0-->independent   Toileting 0-->independent   Bathing 0-->independent   Dressing 0-->independent   Eating 0-->independent   Communication 0-->understands/communicates without difficulty   Swallowing 0-->swallows foods/liquids without difficulty   Cognition 0 - no cognition issues reported   Fall history within last six months yes   Number of times patient has fallen within last six months 8   Which of the above functional risks had a recent onset or change? ambulation;transferring   General Information   Onset of Illness/Injury or Date of Surgery - Date 06/16/18   Referring Physician Dr. Jacinto   Patient/Family Goals Statement to return home with     Precautions/Limitations fall precautions   General Info Comments Laura is a 68 year old female admitted form home due to increase pain from RA. She reports rina in the left ankle with difficulty weight bearing on it. she has had several falls.    Cognitive Status Examination   Orientation orientation to person, place and time   Level of Consciousness alert   Able to Follow Commands WNL/WFL   Personal Safety (Cognitive) WNL/WFL   Memory intact   Pain Assessment   Patient Currently in Pain Yes, see Vital Sign flowsheet   Coordination   Upper Extremity Coordination Left UE impaired;Right UE impaired   Gross Motor Coordination impaired due to arthritic deformities   Transfer Skill: Sit to Stand   Level of [place]: Sit/Stand minimum assist (75% patients effort)   Physical Assist/Nonphysical Assist: Sit/Stand 2 persons   Transfer Skill: Sit to Stand full weight-bearing   Assistive Device for Transfer: Sit/Stand rolling walker   Clinical Impression   Criteria for Skilled Therapeutic Interventions Met yes, treatment indicated   OT Diagnosis Weakness, decline in self cares    Identified Performance Deficits self cares   Clinical Decision Making (Complexity) Low complexity   Therapy Frequency daily   Predicted Duration of Therapy Intervention (days/wks) 1-4 days   Anticipated Equipment Needs at Discharge other (see comments)  (patient has all equipment at home)   Anticipated Discharge Disposition Home  (with )   Risks and Benefits of Treatment have been explained. Yes   Patient, Family & other staff in agreement with plan of care Yes   Clinical Impression Comments Laura is demonstrating a dlicn ein her funcitonal ability due to pain and weakness. She is not able to perform functional transferrs at OF or self cares   Total Evaluation Time   Total Evaluation Time (Minutes) 15

## 2018-06-17 NOTE — PROGRESS NOTES
Assisted back to bed with walker and one person. Patient gets very short of breath with activity. Needs many breaks to rest.

## 2018-06-17 NOTE — PLAN OF CARE
"Problem: Patient Care Overview  Goal: Plan of Care/Patient Progress Review  Outcome: No Change  No changes since previous assessment. Pt slept most of shift. Lung sounds coarse. Pt continues to c/o leg pain. /42  Pulse 82  Temp 98.7  F (37.1  C) (Tympanic)  Resp 18  Ht 1.6 m (5' 3\")  Wt 70.9 kg (156 lb 4.9 oz)  SpO2 92%  BMI 27.69 kg/m2 room air. Sarita Moore RN on 6/17/2018 at 5:40 AM          "

## 2018-06-17 NOTE — PLAN OF CARE
Problem: OT General Care Plan  Goal: OT target date for goal attainment  Discharge Planner OT   Patient plan for discharge: To home with   Current status: Laura was able to perform bed mobility with min a. Stood and transferred to a chair with FWW and A 2 for balance.   Barriers to return to prior living situation: pain and weakness  Recommendations for discharge: home with . She has her home set up for her.   Rationale for recommendations: see above.        Entered by: Ingrid Carrasco 06/17/2018 11:26 AM

## 2018-06-17 NOTE — PROGRESS NOTES
Patient is up in chair. PT/OT assisted patient to chair. Patient states it feels good to be out of bed but it hurt and tired her out to move around.

## 2018-06-17 NOTE — PROGRESS NOTES
[facility]    Hospitalist Progress Note      Assessment & Plan   Laura Perez is a 68 year old female who was admitted on 6/15/2018 with fever and weakness after a diarrheal illness. Being treated for recurrent pneumonia. Hospital day 3, Levaquin day 3.     Principal Problem:    Sepsis (H)    Assessment: met SOFA criteria on admission. Now afebrile >24h, VS normal, improved subjectively    Plan: continue Levaquin, convert to PO    Active Problems:  Recurrent pneumonia    Assessment: post-hydration XRay shows lingular infiltrate. Afebrile, no further O2 requirement, WBC remains high (?steroids) but trending down    Plan: convert Levaquin to PO. Encourage ambulation  Seropositive rheumatoid arthritis (H)    Assessment: steroid-dependent, s/p multiple reconstructive surgeries - mostly in hands. 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 R ankle XRay shows only arthritic changes.    Plan: ambulate with PT/OT, will try a walking boot for comfort  Chronic diastolic CHF (congestive heart failure), NYHA class 2 (H)    Assessment: clinically euvolemic. BNP 49 on admission. Weight stable.    Plan: stop maintenance IV.   Chronic kidney disease (CKD), stage III (moderate)    Assessment: Cr improved. Hyperkalemia resolved. Still holding both lisinopril and losartan    Plan: continue to monitor  COPD (chronic obstructive pulmonary disease) (H)    Assessment: weaned to room air. Using home CPAP at night    Plan: convert Levaquin to PO; start Prednisone taper; continue scheduled nebs   Diabetes mellitus type 2, insulin dependent (H)    Assessment: FSBS elevated, recent A1c high    Plan: add scheduled mealtime insulin, continue coverage insulin and low-dose Lantus. Plan to resume usual 70/30 at discharge         # Pain Assessment:  Current Pain Score 6/17/2018   Patient currently in pain? -   Pain score (0-10) 6   Pain location -   Pain descriptors -   - Laura is experiencing pain due to RA. Pain  management was discussed with Laura and the plan was created in a collaborative fashion.  Laura's response to the current recommendations: engaged  - Please see the plan for pain management as documented above      DVT Prophylaxis: Enoxaparin (Lovenox) SQ  Code Status: Full Code    [place]    Interval History   Feels better today, was up in chair, has not tried to walk any distance. Denies nausea; appetite improved. Pain in joints substantially better, comfortable at rest. No cough or pleuritic pain. No oxygen requirement.    -Data reviewed today: I reviewed all new labs and imaging results over the last 24 hours. I personally reviewed the CXR showing a lingular infiltrate.    Physical Exam   Temp: 97.3  F (36.3  C) Temp src: Oral BP: 141/57   Heart Rate: 86 Resp: 20 SpO2: 95 % O2 Device: None (Room air) Oxygen Delivery: 1 LPM (decreased to RA)  Vitals:    06/15/18 1639 06/15/18 2124 06/17/18 0519   Weight: 69.4 kg (153 lb) 70.1 kg (154 lb 8.7 oz) 70.9 kg (156 lb 4.9 oz)     Vital Signs with Ranges  Temp:  [96.2  F (35.7  C)-98.7  F (37.1  C)] 97.3  F (36.3  C)  Heart Rate:  [70-98] 86  Resp:  [18-20] 20  BP: (106-156)/(42-59) 141/57  SpO2:  [92 %-96 %] 95 %  I/O last 3 completed shifts:  In: 1869 [P.O.:600; I.V.:1269]  Out: 1800 [Urine:1800]    Constitutional: Alert, NAD at rest  Respiratory: Good air exchange, few crackles R mid-lung which clear with cough  Cardiovascular: RRR without murmur  GI: soft, NT, no palpable mass or HSM  Skin/Integument: Decreased erythema and swelling around RUE skin tears  Extremities: Decreased erythema and tenderness over knee and ankle joints    Medications       aspirin  81 mg Oral Daily     atorvastatin  20 mg Oral At Bedtime     budesonide  0.5 mg Nebulization BID     enoxaparin  40 mg Subcutaneous Q24H     famotidine  20 mg Oral At Bedtime     gabapentin (NEURONTIN) capsule 300 mg  300 mg Oral BID     [START ON 6/18/2018] insulin aspart  3 Units Subcutaneous QAM AC      insulin aspart  4 Units Subcutaneous Daily with lunch     insulin aspart  4 Units Subcutaneous Daily with supper     insulin glargine  10 Units Subcutaneous At Bedtime     ipratropium - albuterol 0.5 mg/2.5 mg/3 mL  3 mL Nebulization 4x Daily     levofloxacin  750 mg Intravenous Q48H     levothyroxine  112 mcg Oral QAM AC     montelukast  10 mg Oral At Bedtime     pantoprazole  40 mg Oral QAM AC     [START ON 6/18/2018] predniSONE  40 mg Oral Daily     salmeterol  1 puff Inhalation 2 times daily       Data     Recent Labs  Lab 06/17/18  0635 06/16/18  0700 06/15/18  1745   WBC 16.6* 16.3* 18.8*   HGB 8.4* 8.5* 9.4*   MCV 81 83 80    300 321   INR  --   --  1.22   * 131* 130*   POTASSIUM 4.7 5.6* 4.6   CHLORIDE 103 102 97*   CO2 21 21 22   BUN 21 20 18   CR 1.31* 1.50* 1.63*   ANIONGAP 8 8 11   GIANNI 8.9 8.4* 9.3   * 194* 222*   ALBUMIN  --   --  3.3*   PROTTOTAL  --   --  7.1   BILITOTAL  --   --  1.2*   ALKPHOS  --   --  70   ALT  --   --  18   AST  --   --  22   TROPI  --   --  <0.030     Glucose Values Latest Ref Rng & Units 6/16/2018 6/16/2018 6/16/2018 6/17/2018 6/17/2018 6/17/2018 6/17/2018   Bedside Glucose (mg/dl )  - 302 349 346 292 -- 232 281   GLUCOSE 70 - 105 mg/dL -- -- -- -- 223(H) -- --   Some recent data might be hidden   8 U coverage insulin 6/16/18      Recent Results (from the past 24 hour(s))   XR Ankle Right 2 Views    Narrative    EXAM:XR ANKLE RT 2 VW     CLINICAL HISTORY: Patient Age  68 years Additional clinical info:  pain, Hx RA;      COMPARISON: Left ankle 6/15/2018      TECHNIQUE: 4 views      Impression    IMPRESSION: Subcortical lucency and irregular sclerosis in the left  talar dome could be due to avascular necrosis, degenerative cystic  changes, or erosive changes. MRI of the left ankle be helpful in  further evaluation. No fracture is identified. Mild degenerative  arthritis in the medial left ankle mortise. Soft tissue swelling about  the left ankle.  Vascular calcifications. Mild degenerative arthritis  in the visualized left midfoot.    There is mild soft tissue swelling about the right ankle but the right  ankle otherwise appears normal.    ANAHI SANTACRUZ MD   XR Chest Port 1 View    Narrative    Procedure:XR CHEST PORT 1 VW    Clinical history:Female, 68 years, Recheck RUL fibrosis vs infiltrate;      Technique: Single view was obtained.    Comparison: 6/15/2018    Findings: The cardiac silhouette is normal. The pulmonary vasculature  is normal.    The lungs demonstrate increasing density at the left lung base,  partially silhouetting the left heart border. Slight interstitial  prominence in the right upper lobe is not significantly changed  secondary to technical differences.. Bony structures are unremarkable.      Impression    Impression:  1.   Slight increase in density suggesting a lingular pneumonia.     2.  Interstitial prominence of the right upper lobe is similar in  appearance.    KAUSHIK KING MD

## 2018-06-17 NOTE — PLAN OF CARE
Problem: PT General Care Plan  Goal: PT target date for goal attainment  Discharge Planner PT   Patient plan for discharge: home with spouse  Current status: LE pain  Barriers to return to prior living situation: pain, fatigue  Recommendations for discharge: home with spouse  Rationale for recommendations: pt was Ind prior to hosp admission       Entered by: Travon March 06/17/2018 11:58 AM

## 2018-06-17 NOTE — PROGRESS NOTES
06/17/18 1142   Quick Adds   Type of Visit Initial PT Evaluation   Living Environment   Lives With spouse   Number of Stairs to Enter Home 2   Number of Stairs Within Home 1  (pt does not use)   Stair Railings at Home outside, present on right side   Transportation Available family or friend will provide   Living Environment Comment Lives at home with spouse Benigno. 2 steps into house and 1 level living once inside. 2nd level but pt does not use.   Functional Level Prior   Ambulation 0-->independent   Transferring 0-->independent   Communication 0-->understands/communicates without difficulty   Cognition 0 - no cognition issues reported   Fall history within last six months yes   Which of the above functional risks had a recent onset or change? ambulation;transferring;fall history   Prior Functional Level Comment Ind with cares. Spouse does majority of laundry, meal prep, etc   General Information   Referring Physician [place]   Patient/Family Goals Statement Return home   Pertinent History of Current Problem (include personal factors and/or comorbidities that impact the POC) Pt has increased fatigue. Hx of RA and diabetes.    Precautions/Limitations (fatigue, RA in hands)   Weight-Bearing Status - LLE full weight-bearing   Weight-Bearing Status - RLE full weight-bearing   General Observations assist of 1 with transfers for safety but pt did very well   General Info Comments She has a walker and cane at home if needed   Cognitive Status Examination   Orientation orientation to person, place and time   Level of Consciousness alert   Follows Commands and Answers Questions 100% of the time   Personal Safety and Judgment intact   Memory intact   Pain Assessment   Patient Currently in Pain Yes, see Vital Sign flowsheet  (3/10 L foot area)   Range of Motion (ROM)   ROM Comment functional LE ROM to accommodate bed mobility and transfers   Strength   Strength Comments functional LE strength to accommodate bed mobility and  transfers   Bed Mobility   Bed Mobility Comments stand by assist for IV, catheter   Transfer Skills   Transfer Comments assist of 1 for safety and equipmemt handling   Balance   Balance Comments intact   General Therapy Interventions   Planned Therapy Interventions bed mobility training;gait training;strengthening   Clinical Impression   Criteria for Skilled Therapeutic Intervention yes, treatment indicated   PT Diagnosis fatigue   Clinical Presentation Stable/Uncomplicated   Clinical Decision Making (Complexity) Low complexity   Therapy Frequency` daily   Predicted Duration of Therapy Intervention (days/wks) 2-3 days   Anticipated Discharge Disposition Home with Home Therapy   Risk & Benefits of therapy have been explained Yes   Patient, Family & other staff in agreement with plan of care Yes   Clinical Impression Comments pleasant lady   Total Evaluation Time   Total Evaluation Time (Minutes) 15

## 2018-06-18 ENCOUNTER — TRANSFERRED RECORDS (OUTPATIENT)
Dept: HEALTH INFORMATION MANAGEMENT | Facility: OTHER | Age: 68
End: 2018-06-18

## 2018-06-18 PROBLEM — I24.9 ACS (ACUTE CORONARY SYNDROME) (H): Status: ACTIVE | Noted: 2018-06-18

## 2018-06-18 LAB
BACTERIA SPEC CULT: NORMAL
SPECIMEN SOURCE: NORMAL

## 2018-06-18 PROCEDURE — 99239 HOSP IP/OBS DSCHRG MGMT >30: CPT | Performed by: FAMILY MEDICINE

## 2018-06-18 RX ADMIN — NITROGLYCERIN 0.4 MG: 0.4 TABLET SUBLINGUAL at 00:35

## 2018-06-18 NOTE — DISCHARGE SUMMARY
"[facility]    Discharge/Transfer Summary  Hospitalist    Date of Admission:  6/15/2018  Date of Discharge:  6/18/2018  Discharging Provider: Noelle Jacinto  Date of Service (when I saw the patient): 06/18/18    Discharge Diagnoses   Principal Problem:    Sepsis (H) (6/15/2018)  Active Problems:    Chronic diastolic CHF (congestive heart failure), NYHA class 2 (H) (2/13/2016)    Chronic kidney disease (CKD), stage III (moderate) (12/11/2017)    COPD (chronic obstructive pulmonary disease) (H) (9/12/2014)    Diabetes mellitus type 2, insulin dependent (H) (7/28/2017)    Seropositive rheumatoid arthritis (H) (1/15/2016)    Recurrent pneumonia (6/15/2018)    Avascular necrosis of left talus (H) (6/16/2018)    ACS (acute coronary syndrome) (H) (6/18/2018)      History of Present Illness   Laura Perez is an 68 year old female who presented with weakness. From the admission H&P: \"Laura Perez is a 68 year old female who presents with an approximate 1 week history of illness.  She relates that she and her  were at their farm in the [place] area about 8 days ago when she noticed unusual fatigue and vomited once.  She saw her rheumatologist and her primary physician in [place] the following day and had several medication changes.  3 days prior to admission, she developed a diarrheal illness with 4-5 episodes of diarrheal stool per day.  She has had only a few episodes of vomiting.  She has noticed marked fatigue, generalized achiness, and increased falls, but no shortness of breath and only mild cough.  Her past medical history is significant for rheumatoid arthritis since her teens.  She has had, as above, several medication changes recently, including a marked increase in her gabapentin dose after an unsuccessful spinal injection for pain.  The gabapentin dose was then fairly quickly tapered because she had several falls with bruising and skin tears.  Today, she felt warm, continued to " "have diarrhea, and was diffusely weak and was brought to the ED for evaluation.  In the ED, low-grade fever was noted.  White count was markedly elevated and chest x-ray showed a possible right upper lobe infiltrate.  Blood pressure was in the low end of normal range, with mild hypotension noted after a dose of IV Dilaudid.  Heart rate was in the 90s.  She was treated with an IV fluid bolus and given a dose of IV Levaquin.  She is feeling somewhat better.\"    Hospital Course   Laura Perez was admitted on 6/15/2018.  The following problems were addressed during her hospitalization:  Principal Problem:    Sepsis (H)    Assessment: met SOFA criteria on admission. Now afebrile >24h, VS normal, improved subjectively    Plan: continue Levaquin, convert to PO   Active Problems:  Acute coronary syndrome    Patient developed anterior chest pain during the day 6/18/2018 and notified her nurse after several hours of discomfort. Intermittent diaphoresis not temporally related to the pain; no dyspnea, palpitations, nausea, or lightheadedness. Substantially relieved after NTG X 3, with another dose 30 minutes later (total of 4). ASA given.  EKG showed +/- 0.5 mm ST depression in V3 and V4, repeat EKG normalized. Troponin 0.08, repeat after 90 minutes 0.112. Discussed with Dr. Markham, intake MD at [facility], who will accept patient in transfer.   Recurrent pneumonia  Post-hydration XRay shows lingular infiltrate. Afebrile, no further O2 requirement, WBC remains high (?steroids) but trending down. Converted to PO ABx.  Seropositive rheumatoid arthritis (H)  Steroid-dependent, s/p multiple reconstructive surgeries - mostly in hands. 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 R ankle XRay shows only arthritic changes.  Ambulating with PT/OT, will try a walking boot for comfort  Chronic diastolic CHF (congestive heart failure), NYHA class 2 (H)  Clinically euvolemic. BNP 49 on admission. Weight stable.  Chronic kidney " disease (CKD), stage III (moderate)  Cr improved. Hyperkalemia resolved. Still holding both lisinopril and losartan  COPD (chronic obstructive pulmonary disease) (H)  Weaned to room air. Using home CPAP at night. Prednisone taper started.  Diabetes mellitus type 2, insulin dependent (H)  FSBS elevated, recent A1c high  Treating with Lantus and mealtime coverage in hospital. Plan to resume usual 70/30 at discharge    Significant Results and Procedures   No procedures.    Pending Results   These results will be followed up by PCP  Unresulted Labs Ordered in the Past 30 Days of this Admission     Date and Time Order Name Status Description    6/15/2018 1843 Blood culture Preliminary     6/15/2018 1843 Blood culture Preliminary     6/15/2018 1730 Urine Culture Aerobic Bacterial In process           Code Status   Full Code       Primary Care Physician   Felicitas Johnson MD    Physical Exam   Temp: 98.9  F (37.2  C) Temp src: Tympanic BP: 135/62 Pulse: 91 Heart Rate: 95 Resp: 20 SpO2: 93 % O2 Device: None (Room air)    Vitals:    06/15/18 1639 06/15/18 2124 06/17/18 0519   Weight: 69.4 kg (153 lb) 70.1 kg (154 lb 8.7 oz) 70.9 kg (156 lb 4.9 oz)     Vital Signs with Ranges  Temp:  [96.2  F (35.7  C)-98.9  F (37.2  C)] 98.9  F (37.2  C)  Pulse:  [91] 91  Heart Rate:  [] 95  Resp:  [16-20] 20  BP: (106-155)/(42-65) 135/62  SpO2:  [91 %-95 %] 93 %  I/O last 3 completed shifts:  In: 1477 [P.O.:400; I.V.:1077]  Out: 2550 [Urine:2550]    Constitutional: alert, no resp distress, conversant  Eyes: conjunctival erythema on L, PERRL  ENT: OP moist  Respiratory: lungs clear  Cardiovascular: RRR, no murmur heard  GI: soft, NT    Discharge Disposition   Transferred to [facility] with Dr. Markham accepting  Condition at discharge: Stable    Consultations This Hospital Stay   PHYSICAL THERAPY ADULT IP CONSULT  OCCUPATIONAL THERAPY ADULT IP CONSULT    Time Spent on this Encounter   INoelle, personally saw the patient  today and spent greater than 30 minutes discharging this patient.    Discharge Orders     Reason for your hospital stay   Pneumonia; sepsis; chest pain with elevated cardiac enzymes leading to transfer to [facility]     Follow-up and recommended labs and tests    As recommended at discharge from [facility]     Full Code       Discharge Medications   Current Discharge Medication List      CONTINUE these medications which have NOT CHANGED    Details   amLODIPine (NORVASC) 2.5 MG tablet Take 2.5 mg by mouth daily      aspirin 81 MG chewable tablet Take 81 mg by mouth daily with food      blood glucose monitoring (ONE TOUCH ULTRASOFT) lancets TEST 2 TIMES PER DAY      blood glucose monitoring (ONETOUCH ULTRA) test strip TEST 2 TIMES PER DAY.      budesonide (PULMICORT) 0.5 MG/2ML neb solution Inhale 0.5 mg into the lungs 2 times daily      Calcium carb-Vitamin D 500 mg [place]-200 units (OSCAL WITH D;OYSTER SHELL CALCIUM) 500-200 MG-UNIT per tablet Take 1 tablet by mouth daily with food      estradiol (YUVAFEM) 10 MCG TABS vaginal tablet Place 10 mcg vaginally twice a week      fenofibrate 48 MG tablet Take 48 mg by mouth daily with food      formoterol (PERFOROMIST) 20 MCG/2ML neb solution Inhale 2 mLs into the lungs 2 times daily      furosemide (LASIX) 20 MG tablet Take 20 mg by mouth daily as needed (leg swelling)       gabapentin (NEURONTIN) 300 MG capsule Take 300 mg by mouth 3 times daily      insulin aspart prot & aspart (NOVOLOG MIX 70/30 PEN) injection Inject 50 units under th skin in the morning, and  30 units under the skin at bedtime      levothyroxine (SYNTHROID/LEVOTHROID) 112 MCG tablet Take 112 mcg by mouth every morning (before breakfast)      lisinopril (PRINIVIL/ZESTRIL) 2.5 MG tablet Take 2.5 mg by mouth      losartan (COZAAR) 25 MG tablet Take 25 mg by mouth daily      !! medical cannabis (Patient's own supply.  Not a prescription) Apply 1 Dose topically as needed (pain) (This is NOT a prescription,  and does not certify that the patient has a qualifying medical condition for medical cannabis.  The purpose of this order is  to document that the patient reports taking medical cannabis.)      !! medical cannabis liquid Place 1 drop under the tongue as needed (pain) (This is NOT a prescription, and does not certify that the patient has a qualifying medical condition for medical cannabis.  The purpose of this order is  to document that the patient reports taking medical cannabis.)      !! medical cannabis liquid 1-2 sprays under the tongue as needed for pain      montelukast (SINGULAIR) 10 MG tablet Take 10 mg by mouth At Bedtime      omeprazole (PRILOSEC) 20 MG CR capsule Take 20 mg by mouth every morning (before breakfast)      predniSONE (DELTASONE) 5 MG tablet Take 12.5 mg by mouth daily with food       simvastatin (ZOCOR) 40 MG tablet Take 40 mg by mouth At Bedtime      traMADol (ULTRAM) 50 MG tablet Take  mg by mouth At Bedtime       traZODone (DESYREL) 100 MG tablet Take 100 mg by mouth At Bedtime      albuterol (PROAIR HFA/PROVENTIL HFA/VENTOLIN HFA) 108 (90 BASE) MCG/ACT Inhaler Inhale 2 puffs into the lungs every 4 hours as needed      EPINEPHrine (EPIPEN/ADRENACLICK/OR ANY BX GENERIC EQUIV) 0.3 MG/0.3ML injection 2-pack Inject 0.3 mg into the muscle once as needed      glucagon 1 MG kit Inject 1 mg into the muscle as needed for low blood sugar       glucose 4 g CHEW chewable tablet Take 1 tablet by mouth as needed for low blood sugar       !! - Potential duplicate medications found. Please discuss with provider.        Allergies   Allergies   Allergen Reactions     Glyburide Anaphylaxis     Other reaction(s): Dizziness     Sulfa Drugs Anaphylaxis     Sulfamethoxazole-Trimethoprim Anaphylaxis     Other reaction(s): Other - Describe In Comment Field  Rash, nausea, dizziness     Codeine Nausea and Nausea and Vomiting     Mosquitoes (Informational Only) Hives     Other [Seasonal Allergies] Hives      [facility]     Data   Most Recent 3 CBC's:  Recent Labs   Lab Test  06/17/18   0635  06/16/18   0700  06/15/18   1745   WBC  16.6*  16.3*  18.8*   HGB  8.4*  8.5*  9.4*   MCV  81  83  80   PLT  348  300  321      Most Recent 3 BMP's:  Recent Labs   Lab Test  06/17/18   0635  06/16/18   0700  06/15/18   1745   NA  132*  131*  130*   POTASSIUM  4.7  5.6*  4.6   CHLORIDE  103  102  97*   CO2  21  21  22   BUN  21  20  18   CR  1.31*  1.50*  1.63*   ANIONGAP  8  8  11   GIANNI  8.9  8.4*  9.3   GLC  223*  194*  222*     Most Recent 2 LFT's:  Recent Labs   Lab Test  06/15/18   1745  06/29/16   1555   AST  22   --    ALT  18   --    ALKPHOS  70  51   BILITOTAL  1.2*  0.5     Most Recent INR's and Anticoagulation Dosing History:  Anticoagulation Dose History     Recent Dosing and Labs Latest Ref Rng & Units 6/15/2018    INR 0 - 1.3 1.22        Most Recent 3 Troponin's:  Recent Labs   Lab Test  06/17/18   2300  06/17/18   2116  06/15/18   1745   TROPI  0.112*  0.080*  <0.030     Most Recent Cholesterol Panel:  Recent Labs   Lab Test  12/03/15   1802   LDL  121*   HDL  40   TRIG  260*     Most Recent 6 Bacteria Isolates From Any Culture (See EPIC Reports for Culture Details):  Recent Labs   Lab Test  06/15/18   1745   CULT  No growth after 2 days  No growth after 2 days     Most Recent TSH, T4 and A1c Labs:No lab results found.  Results for orders placed or performed during the hospital encounter of 06/15/18   XR Chest Port 1 View    Narrative    EXAM:XR CHEST PORT 1 VW     CLINICAL HISTORY: Patient Age  68 years Additional clinical info:  fever and hx pneumonia;      COMPARISON: 11/30/2017      TECHNIQUE: Single view      Impression    IMPRESSION: Slight interstitial prominence in the right upper lung  could be due to fibrosis or infiltrate. Aortic calcifications. Chest  otherwise normal.    ANAHI SANTACRUZ MD   XR Ankle Port Left 2 Views    Narrative    EXAM:XR ANKLE PORT LT 2 VW     CLINICAL HISTORY: Patient Age  68 years  Additional clinical info:  pain, erythema, hx RA;      COMPARISON: None      TECHNIQUE: 2 views      Impression    IMPRESSION: Irregular sclerosis of the subcortical bone of the talar  dome. This has an appearance suggestive of avascular necrosis. MRI of  the left ankle be helpful in further evaluation. Soft tissue swelling  about the left ankle. Mild arthritic changes in the ankle mortise and  visualized midfoot.    ANAHI SANTACRUZ MD   XR Ankle Right 2 Views    Narrative    EXAM:XR ANKLE RT 2 VW     CLINICAL HISTORY: Patient Age  68 years Additional clinical info:  pain, Hx RA;      COMPARISON: Left ankle 6/15/2018      TECHNIQUE: 4 views      Impression    IMPRESSION: Subcortical lucency and irregular sclerosis in the left  talar dome could be due to avascular necrosis, degenerative cystic  changes, or erosive changes. MRI of the left ankle be helpful in  further evaluation. No fracture is identified. Mild degenerative  arthritis in the medial left ankle mortise. Soft tissue swelling about  the left ankle. Vascular calcifications. Mild degenerative arthritis  in the visualized left midfoot.    There is mild soft tissue swelling about the right ankle but the right  ankle otherwise appears normal.    ANAHI SANTACRUZ MD   XR Chest Port 1 View    Narrative    Procedure:XR CHEST PORT 1 VW    Clinical history:Female, 68 years, Recheck RUL fibrosis vs infiltrate;      Technique: Single view was obtained.    Comparison: 6/15/2018    Findings: The cardiac silhouette is normal. The pulmonary vasculature  is normal.    The lungs demonstrate increasing density at the left lung base,  partially silhouetting the left heart border. Slight interstitial  prominence in the right upper lobe is not significantly changed  secondary to technical differences.. Bony structures are unremarkable.      Impression    Impression:  1.   Slight increase in density suggesting a lingular pneumonia.     2.  Interstitial prominence of the right  upper lobe is similar in  appearance.    KAUSHIK KING MD

## 2018-06-18 NOTE — PROGRESS NOTES
"Pt was transferred to [facility] via [facility]. At 0100. Report given to [facility] RN. /62  Pulse 91  Temp 98.9  F (37.2  C) (Tympanic)  Resp 20  Ht 1.6 m (5' 3\")  Wt 70.9 kg (156 lb 4.9 oz)  SpO2 93%  BMI 27.69 kg/m2 Room air. No medications running.   Sarita Moore RN on 6/18/2018 at 1:00 AM    "

## 2018-06-21 LAB
BACTERIA SPEC CULT: NORMAL
BACTERIA SPEC CULT: NORMAL
SPECIMEN SOURCE: NORMAL
SPECIMEN SOURCE: NORMAL

## 2018-06-27 ENCOUNTER — APPOINTMENT (OUTPATIENT)
Dept: GENERAL RADIOLOGY | Facility: OTHER | Age: 68
End: 2018-06-27
Attending: EMERGENCY MEDICINE
Payer: MEDICARE

## 2018-06-27 ENCOUNTER — HOSPITAL ENCOUNTER (OUTPATIENT)
Facility: OTHER | Age: 68
Setting detail: OBSERVATION
Discharge: NURSING, REHAB OR LONG-TERM CARE | End: 2018-06-28
Attending: EMERGENCY MEDICINE | Admitting: INTERNAL MEDICINE
Payer: MEDICARE

## 2018-06-27 DIAGNOSIS — J44.9 CHRONIC OBSTRUCTIVE PULMONARY DISEASE, UNSPECIFIED COPD TYPE (H): Primary | ICD-10-CM

## 2018-06-27 DIAGNOSIS — F41.9 ANXIETY: ICD-10-CM

## 2018-06-27 DIAGNOSIS — M62.81 GENERALIZED MUSCLE WEAKNESS: ICD-10-CM

## 2018-06-27 DIAGNOSIS — R11.0 NAUSEA: ICD-10-CM

## 2018-06-27 DIAGNOSIS — R06.02 SOB (SHORTNESS OF BREATH): ICD-10-CM

## 2018-06-27 DIAGNOSIS — M54.50 LOW BACK PAIN WITHOUT SCIATICA, UNSPECIFIED BACK PAIN LATERALITY, UNSPECIFIED CHRONICITY: ICD-10-CM

## 2018-06-27 DIAGNOSIS — B00.1 HERPES LABIALIS: ICD-10-CM

## 2018-06-27 PROBLEM — J44.1 COPD EXACERBATION (H): Status: ACTIVE | Noted: 2018-06-27

## 2018-06-27 LAB
ALBUMIN SERPL-MCNC: 3.5 G/DL (ref 3.5–5.7)
ALBUMIN UR-MCNC: 30 MG/DL
ALP SERPL-CCNC: 70 U/L (ref 34–104)
ALT SERPL W P-5'-P-CCNC: 12 U/L (ref 7–52)
ANION GAP SERPL CALCULATED.3IONS-SCNC: 15 MMOL/L (ref 3–14)
APPEARANCE UR: CLEAR
AST SERPL W P-5'-P-CCNC: 17 U/L (ref 13–39)
BACTERIA #/AREA URNS HPF: ABNORMAL /HPF
BASOPHILS # BLD AUTO: 0 10E9/L (ref 0–0.2)
BASOPHILS NFR BLD AUTO: 0.3 %
BILIRUB SERPL-MCNC: 0.6 MG/DL (ref 0.3–1)
BILIRUB UR QL STRIP: ABNORMAL
BUN SERPL-MCNC: 12 MG/DL (ref 7–25)
CALCIUM SERPL-MCNC: 9.3 MG/DL (ref 8.6–10.3)
CHLORIDE SERPL-SCNC: 99 MMOL/L (ref 98–107)
CO2 SERPL-SCNC: 18 MMOL/L (ref 21–31)
COLOR UR AUTO: YELLOW
CREAT SERPL-MCNC: 1.59 MG/DL (ref 0.6–1.2)
D DIMER PPP DDU-MCNC: <200 NG/ML D-DU (ref 0–230)
DIFFERENTIAL METHOD BLD: ABNORMAL
EOSINOPHIL # BLD AUTO: 0.1 10E9/L (ref 0–0.7)
EOSINOPHIL NFR BLD AUTO: 0.6 %
ERYTHROCYTE [DISTWIDTH] IN BLOOD BY AUTOMATED COUNT: 17.1 % (ref 10–15)
GFR SERPL CREATININE-BSD FRML MDRD: 32 ML/MIN/1.7M2
GLUCOSE SERPL-MCNC: 191 MG/DL (ref 70–105)
GLUCOSE UR STRIP-MCNC: 250 MG/DL
HCT VFR BLD AUTO: 36.1 % (ref 35–47)
HGB BLD-MCNC: 11 G/DL (ref 11.7–15.7)
HGB UR QL STRIP: NEGATIVE
IMM GRANULOCYTES # BLD: 0.1 10E9/L (ref 0–0.4)
IMM GRANULOCYTES NFR BLD: 0.7 %
KETONES UR STRIP-MCNC: NEGATIVE MG/DL
LACTATE SERPL-SCNC: <0.2 MMOL/L (ref 0.5–2.2)
LEUKOCYTE ESTERASE UR QL STRIP: ABNORMAL
LYMPHOCYTES # BLD AUTO: 1.4 10E9/L (ref 0.8–5.3)
LYMPHOCYTES NFR BLD AUTO: 9.8 %
MCH RBC QN AUTO: 24.2 PG (ref 26.5–33)
MCHC RBC AUTO-ENTMCNC: 30.5 G/DL (ref 31.5–36.5)
MCV RBC AUTO: 79 FL (ref 78–100)
MONOCYTES # BLD AUTO: 0.9 10E9/L (ref 0–1.3)
MONOCYTES NFR BLD AUTO: 5.9 %
NEUTROPHILS # BLD AUTO: 12 10E9/L (ref 1.6–8.3)
NEUTROPHILS NFR BLD AUTO: 82.7 %
NITRATE UR QL: NEGATIVE
NON-SQ EPI CELLS #/AREA URNS LPF: ABNORMAL /LPF
PH UR STRIP: 6.5 PH (ref 5–7)
PLATELET # BLD AUTO: 357 10E9/L (ref 150–450)
POTASSIUM SERPL-SCNC: 4.1 MMOL/L (ref 3.5–5.1)
PROT SERPL-MCNC: 6.6 G/DL (ref 6.4–8.9)
RBC # BLD AUTO: 4.55 10E12/L (ref 3.8–5.2)
RBC #/AREA URNS AUTO: ABNORMAL /HPF
SODIUM SERPL-SCNC: 132 MMOL/L (ref 134–144)
SOURCE: ABNORMAL
SP GR UR STRIP: 1.02 (ref 1–1.03)
TROPONIN I SERPL-MCNC: 0.04 UG/L (ref 0–0.03)
UROBILINOGEN UR STRIP-ACNC: 0.2 EU/DL (ref 0.2–1)
WBC # BLD AUTO: 14.5 10E9/L (ref 4–11)
WBC #/AREA URNS AUTO: ABNORMAL /HPF

## 2018-06-27 PROCEDURE — 93005 ELECTROCARDIOGRAM TRACING: CPT | Performed by: EMERGENCY MEDICINE

## 2018-06-27 PROCEDURE — 96361 HYDRATE IV INFUSION ADD-ON: CPT | Performed by: EMERGENCY MEDICINE

## 2018-06-27 PROCEDURE — 96374 THER/PROPH/DIAG INJ IV PUSH: CPT | Performed by: EMERGENCY MEDICINE

## 2018-06-27 PROCEDURE — 99220 ZZC INITIAL OBSERVATION CARE,LEVL III: CPT | Performed by: INTERNAL MEDICINE

## 2018-06-27 PROCEDURE — 40000275 ZZH STATISTIC RCP TIME EA 10 MIN

## 2018-06-27 PROCEDURE — 25000132 ZZH RX MED GY IP 250 OP 250 PS 637: Mod: GY | Performed by: INTERNAL MEDICINE

## 2018-06-27 PROCEDURE — 99285 EMERGENCY DEPT VISIT HI MDM: CPT | Mod: 25 | Performed by: EMERGENCY MEDICINE

## 2018-06-27 PROCEDURE — 85025 COMPLETE CBC W/AUTO DIFF WBC: CPT | Performed by: EMERGENCY MEDICINE

## 2018-06-27 PROCEDURE — G0378 HOSPITAL OBSERVATION PER HR: HCPCS

## 2018-06-27 PROCEDURE — 96376 TX/PRO/DX INJ SAME DRUG ADON: CPT | Performed by: EMERGENCY MEDICINE

## 2018-06-27 PROCEDURE — 94640 AIRWAY INHALATION TREATMENT: CPT

## 2018-06-27 PROCEDURE — 83605 ASSAY OF LACTIC ACID: CPT | Performed by: EMERGENCY MEDICINE

## 2018-06-27 PROCEDURE — 81001 URINALYSIS AUTO W/SCOPE: CPT | Performed by: EMERGENCY MEDICINE

## 2018-06-27 PROCEDURE — 93010 ELECTROCARDIOGRAM REPORT: CPT | Performed by: INTERNAL MEDICINE

## 2018-06-27 PROCEDURE — 84484 ASSAY OF TROPONIN QUANT: CPT | Performed by: EMERGENCY MEDICINE

## 2018-06-27 PROCEDURE — 36415 COLL VENOUS BLD VENIPUNCTURE: CPT | Performed by: EMERGENCY MEDICINE

## 2018-06-27 PROCEDURE — 96375 TX/PRO/DX INJ NEW DRUG ADDON: CPT | Performed by: EMERGENCY MEDICINE

## 2018-06-27 PROCEDURE — 71045 X-RAY EXAM CHEST 1 VIEW: CPT

## 2018-06-27 PROCEDURE — 25000128 H RX IP 250 OP 636: Performed by: INTERNAL MEDICINE

## 2018-06-27 PROCEDURE — 80053 COMPREHEN METABOLIC PANEL: CPT | Performed by: EMERGENCY MEDICINE

## 2018-06-27 PROCEDURE — 99285 EMERGENCY DEPT VISIT HI MDM: CPT | Mod: Z6 | Performed by: EMERGENCY MEDICINE

## 2018-06-27 PROCEDURE — 94640 AIRWAY INHALATION TREATMENT: CPT | Mod: 76

## 2018-06-27 PROCEDURE — 25000125 ZZHC RX 250: Performed by: INTERNAL MEDICINE

## 2018-06-27 PROCEDURE — 85379 FIBRIN DEGRADATION QUANT: CPT | Performed by: EMERGENCY MEDICINE

## 2018-06-27 PROCEDURE — A9270 NON-COVERED ITEM OR SERVICE: HCPCS | Mod: GY | Performed by: INTERNAL MEDICINE

## 2018-06-27 PROCEDURE — 25000128 H RX IP 250 OP 636: Performed by: EMERGENCY MEDICINE

## 2018-06-27 RX ORDER — GABAPENTIN 300 MG/1
300 CAPSULE ORAL 3 TIMES DAILY
Status: DISCONTINUED | OUTPATIENT
Start: 2018-06-27 | End: 2018-06-28 | Stop reason: HOSPADM

## 2018-06-27 RX ORDER — PROCHLORPERAZINE 25 MG
12.5 SUPPOSITORY, RECTAL RECTAL EVERY 12 HOURS PRN
Status: DISCONTINUED | OUTPATIENT
Start: 2018-06-27 | End: 2018-06-28 | Stop reason: HOSPADM

## 2018-06-27 RX ORDER — ATOVAQUONE AND PROGUANIL HYDROCHLORIDE 250; 100 MG/1; MG/1
1 TABLET, FILM COATED ORAL DAILY
Status: ON HOLD | COMMUNITY
End: 2018-06-28

## 2018-06-27 RX ORDER — LIDOCAINE 40 MG/G
CREAM TOPICAL
Status: DISCONTINUED | OUTPATIENT
Start: 2018-06-27 | End: 2018-06-28 | Stop reason: HOSPADM

## 2018-06-27 RX ORDER — AMOXICILLIN 250 MG
2 CAPSULE ORAL 2 TIMES DAILY PRN
Status: DISCONTINUED | OUTPATIENT
Start: 2018-06-27 | End: 2018-06-28 | Stop reason: HOSPADM

## 2018-06-27 RX ORDER — ACETAMINOPHEN 325 MG/1
650 TABLET ORAL EVERY 4 HOURS PRN
Status: DISCONTINUED | OUTPATIENT
Start: 2018-06-27 | End: 2018-06-28 | Stop reason: HOSPADM

## 2018-06-27 RX ORDER — FENOFIBRATE 48 MG/1
48 TABLET, COATED ORAL DAILY
Status: DISCONTINUED | OUTPATIENT
Start: 2018-06-28 | End: 2018-06-28 | Stop reason: HOSPADM

## 2018-06-27 RX ORDER — LORAZEPAM 2 MG/ML
0.5 INJECTION INTRAMUSCULAR ONCE
Status: COMPLETED | OUTPATIENT
Start: 2018-06-27 | End: 2018-06-27

## 2018-06-27 RX ORDER — ONDANSETRON 4 MG/1
4 TABLET, ORALLY DISINTEGRATING ORAL EVERY 6 HOURS PRN
Status: DISCONTINUED | OUTPATIENT
Start: 2018-06-27 | End: 2018-06-28 | Stop reason: HOSPADM

## 2018-06-27 RX ORDER — PANTOPRAZOLE SODIUM 40 MG/1
40 TABLET, DELAYED RELEASE ORAL
Status: DISCONTINUED | OUTPATIENT
Start: 2018-06-28 | End: 2018-06-28 | Stop reason: HOSPADM

## 2018-06-27 RX ORDER — AMOXICILLIN 250 MG
1 CAPSULE ORAL 2 TIMES DAILY PRN
Status: DISCONTINUED | OUTPATIENT
Start: 2018-06-27 | End: 2018-06-28 | Stop reason: HOSPADM

## 2018-06-27 RX ORDER — IPRATROPIUM BROMIDE AND ALBUTEROL SULFATE 2.5; .5 MG/3ML; MG/3ML
3 SOLUTION RESPIRATORY (INHALATION) EVERY 4 HOURS
Status: DISCONTINUED | OUTPATIENT
Start: 2018-06-27 | End: 2018-06-28 | Stop reason: HOSPADM

## 2018-06-27 RX ORDER — HYDROMORPHONE HYDROCHLORIDE 1 MG/ML
.3-.5 INJECTION, SOLUTION INTRAMUSCULAR; INTRAVENOUS; SUBCUTANEOUS
Status: DISCONTINUED | OUTPATIENT
Start: 2018-06-27 | End: 2018-06-28 | Stop reason: HOSPADM

## 2018-06-27 RX ORDER — NICOTINE POLACRILEX 4 MG
15-30 LOZENGE BUCCAL
Status: DISCONTINUED | OUTPATIENT
Start: 2018-06-27 | End: 2018-06-28 | Stop reason: HOSPADM

## 2018-06-27 RX ORDER — BISACODYL 10 MG
10 SUPPOSITORY, RECTAL RECTAL DAILY PRN
Status: DISCONTINUED | OUTPATIENT
Start: 2018-06-27 | End: 2018-06-28 | Stop reason: HOSPADM

## 2018-06-27 RX ORDER — ALBUTEROL SULFATE 0.83 MG/ML
2.5 SOLUTION RESPIRATORY (INHALATION)
Status: DISCONTINUED | OUTPATIENT
Start: 2018-06-27 | End: 2018-06-28 | Stop reason: HOSPADM

## 2018-06-27 RX ORDER — TRAMADOL HYDROCHLORIDE 50 MG/1
50-100 TABLET ORAL AT BEDTIME
Status: DISCONTINUED | OUTPATIENT
Start: 2018-06-27 | End: 2018-06-28 | Stop reason: HOSPADM

## 2018-06-27 RX ORDER — MONTELUKAST SODIUM 10 MG/1
10 TABLET ORAL AT BEDTIME
Status: DISCONTINUED | OUTPATIENT
Start: 2018-06-27 | End: 2018-06-28

## 2018-06-27 RX ORDER — NALOXONE HYDROCHLORIDE 0.4 MG/ML
.1-.4 INJECTION, SOLUTION INTRAMUSCULAR; INTRAVENOUS; SUBCUTANEOUS
Status: DISCONTINUED | OUTPATIENT
Start: 2018-06-27 | End: 2018-06-28 | Stop reason: HOSPADM

## 2018-06-27 RX ORDER — MORPHINE SULFATE 2 MG/ML
2 INJECTION, SOLUTION INTRAMUSCULAR; INTRAVENOUS ONCE
Status: COMPLETED | OUTPATIENT
Start: 2018-06-27 | End: 2018-06-27

## 2018-06-27 RX ORDER — CALCIUM CARBONATE 500(1250)
1 TABLET ORAL 2 TIMES DAILY
Status: ON HOLD | COMMUNITY
End: 2018-06-28

## 2018-06-27 RX ORDER — SODIUM CHLORIDE 9 MG/ML
INJECTION, SOLUTION INTRAVENOUS CONTINUOUS
Status: DISCONTINUED | OUTPATIENT
Start: 2018-06-27 | End: 2018-06-28 | Stop reason: HOSPADM

## 2018-06-27 RX ORDER — PREDNISONE 20 MG/1
40 TABLET ORAL DAILY
Status: DISCONTINUED | OUTPATIENT
Start: 2018-06-27 | End: 2018-06-28 | Stop reason: HOSPADM

## 2018-06-27 RX ORDER — ASPIRIN 81 MG/1
81 TABLET, CHEWABLE ORAL DAILY
Status: DISCONTINUED | OUTPATIENT
Start: 2018-06-28 | End: 2018-06-28 | Stop reason: HOSPADM

## 2018-06-27 RX ORDER — HYDROMORPHONE HYDROCHLORIDE 1 MG/ML
0.5 INJECTION, SOLUTION INTRAMUSCULAR; INTRAVENOUS; SUBCUTANEOUS
Status: COMPLETED | OUTPATIENT
Start: 2018-06-27 | End: 2018-06-27

## 2018-06-27 RX ORDER — DEXTROSE MONOHYDRATE 25 G/50ML
25-50 INJECTION, SOLUTION INTRAVENOUS
Status: DISCONTINUED | OUTPATIENT
Start: 2018-06-27 | End: 2018-06-28 | Stop reason: HOSPADM

## 2018-06-27 RX ORDER — DULOXETIN HYDROCHLORIDE 30 MG/1
30 CAPSULE, DELAYED RELEASE ORAL DAILY
Status: DISCONTINUED | OUTPATIENT
Start: 2018-06-28 | End: 2018-06-28 | Stop reason: HOSPADM

## 2018-06-27 RX ORDER — LEVOTHYROXINE SODIUM 112 UG/1
112 TABLET ORAL
Status: DISCONTINUED | OUTPATIENT
Start: 2018-06-28 | End: 2018-06-28 | Stop reason: HOSPADM

## 2018-06-27 RX ORDER — ACETAMINOPHEN 650 MG/1
650 SUPPOSITORY RECTAL EVERY 4 HOURS PRN
Status: DISCONTINUED | OUTPATIENT
Start: 2018-06-27 | End: 2018-06-28 | Stop reason: HOSPADM

## 2018-06-27 RX ORDER — FORMOTEROL FUMARATE DIHYDRATE 20 UG/2ML
2 SOLUTION RESPIRATORY (INHALATION) 2 TIMES DAILY
Status: DISCONTINUED | OUTPATIENT
Start: 2018-06-27 | End: 2018-06-27 | Stop reason: CLARIF

## 2018-06-27 RX ORDER — PROCHLORPERAZINE MALEATE 5 MG
5 TABLET ORAL EVERY 6 HOURS PRN
Status: DISCONTINUED | OUTPATIENT
Start: 2018-06-27 | End: 2018-06-28 | Stop reason: HOSPADM

## 2018-06-27 RX ORDER — VALACYCLOVIR HYDROCHLORIDE 500 MG/1
1000 TABLET, FILM COATED ORAL EVERY 12 HOURS SCHEDULED
Status: DISCONTINUED | OUTPATIENT
Start: 2018-06-27 | End: 2018-06-28 | Stop reason: HOSPADM

## 2018-06-27 RX ORDER — LORAZEPAM 2 MG/ML
.5-1 INJECTION INTRAMUSCULAR EVERY 4 HOURS PRN
Status: DISCONTINUED | OUTPATIENT
Start: 2018-06-27 | End: 2018-06-28 | Stop reason: HOSPADM

## 2018-06-27 RX ORDER — ONDANSETRON 2 MG/ML
4 INJECTION INTRAMUSCULAR; INTRAVENOUS EVERY 6 HOURS PRN
Status: DISCONTINUED | OUTPATIENT
Start: 2018-06-27 | End: 2018-06-28 | Stop reason: HOSPADM

## 2018-06-27 RX ORDER — BUDESONIDE 0.5 MG/2ML
0.5 INHALANT ORAL 2 TIMES DAILY
Status: DISCONTINUED | OUTPATIENT
Start: 2018-06-27 | End: 2018-06-28 | Stop reason: HOSPADM

## 2018-06-27 RX ORDER — ATORVASTATIN CALCIUM 10 MG/1
10 TABLET, FILM COATED ORAL DAILY
Status: DISCONTINUED | OUTPATIENT
Start: 2018-06-28 | End: 2018-06-28 | Stop reason: HOSPADM

## 2018-06-27 RX ORDER — OXYCODONE HYDROCHLORIDE 5 MG/1
5-10 TABLET ORAL
Status: DISCONTINUED | OUTPATIENT
Start: 2018-06-27 | End: 2018-06-28 | Stop reason: HOSPADM

## 2018-06-27 RX ADMIN — IPRATROPIUM BROMIDE AND ALBUTEROL SULFATE 3 ML: .5; 3 SOLUTION RESPIRATORY (INHALATION) at 23:23

## 2018-06-27 RX ADMIN — OXYCODONE HYDROCHLORIDE 10 MG: 5 TABLET ORAL at 23:33

## 2018-06-27 RX ADMIN — GABAPENTIN 300 MG: 300 CAPSULE ORAL at 22:37

## 2018-06-27 RX ADMIN — SODIUM CHLORIDE: 900 INJECTION, SOLUTION INTRAVENOUS at 18:08

## 2018-06-27 RX ADMIN — MORPHINE SULFATE 2 MG: 2 INJECTION, SOLUTION INTRAMUSCULAR; INTRAVENOUS at 11:31

## 2018-06-27 RX ADMIN — BUDESONIDE 0.5 MG: 0.5 INHALANT RESPIRATORY (INHALATION) at 18:28

## 2018-06-27 RX ADMIN — TRAMADOL HYDROCHLORIDE 50 MG: 50 TABLET, FILM COATED ORAL at 22:37

## 2018-06-27 RX ADMIN — PREDNISONE 40 MG: 20 TABLET ORAL at 18:18

## 2018-06-27 RX ADMIN — OXYCODONE HYDROCHLORIDE 5 MG: 5 TABLET ORAL at 20:24

## 2018-06-27 RX ADMIN — SALMETEROL XINAFOATE 1 PUFF: 50 POWDER, METERED ORAL; RESPIRATORY (INHALATION) at 19:43

## 2018-06-27 RX ADMIN — VALACYCLOVIR HYDROCHLORIDE 1000 MG: 500 TABLET, FILM COATED ORAL at 19:35

## 2018-06-27 RX ADMIN — LORAZEPAM 0.5 MG: 2 INJECTION INTRAMUSCULAR; INTRAVENOUS at 09:49

## 2018-06-27 RX ADMIN — SODIUM CHLORIDE: 900 INJECTION, SOLUTION INTRAVENOUS at 11:30

## 2018-06-27 RX ADMIN — SODIUM CHLORIDE 500 ML/HR: 900 INJECTION, SOLUTION INTRAVENOUS at 16:49

## 2018-06-27 RX ADMIN — LORAZEPAM 0.5 MG: 2 INJECTION INTRAMUSCULAR; INTRAVENOUS at 11:31

## 2018-06-27 RX ADMIN — Medication 0.5 MG: at 16:50

## 2018-06-27 RX ADMIN — IPRATROPIUM BROMIDE AND ALBUTEROL SULFATE 3 ML: .5; 3 SOLUTION RESPIRATORY (INHALATION) at 18:28

## 2018-06-27 ASSESSMENT — ENCOUNTER SYMPTOMS
ARTHRALGIAS: 0
LIGHT-HEADEDNESS: 0
COUGH: 1
CHEST TIGHTNESS: 0
SHORTNESS OF BREATH: 1
CHILLS: 0
NAUSEA: 0
FEVER: 0
AGITATION: 0
VOMITING: 0
DYSURIA: 0

## 2018-06-27 NOTE — H&P
[facility]    History and Physical  Hospitalist       Date of Admission:  6/27/2018  Date of Service (when I saw the patient): 06/27/18    Assessment & Plan   1. COPD Exacerbation- steroids, Nebs, O2.   2. HTN- BP soft now, so hold all BP meds.   3. RA- Chronic pain. Oxy and low dose Dilaudid.   4. Mild hypotension- Given CHF history, careful with IV opioids and fluids. Gentle IVFs.   5. OC dessicans- Outpt Ortho f/u.   6. Herpes labialis- Continue Valcyclovir.   7. DM- Per  BS have been low. Will place on Sl scale and hypoglycemia protocol.   8. CKD Stage 3- Cr at baseline.     Active Problems:    * No active hospital problems. *    DVT Prophylaxis: Heparin SQ  Code Status: Full Code    Disposition: Expected discharge in 1 day once NH bed available and breathing more stable.     Vanita Coley    Primary Care Physician   Elin Mohan    Chief Complaint   SOB and chronic pain.    History is obtained from the patient, electronic health record, emergency department physician, paper chart and patient's spouse    History of Present Illness   Laura Perez is a 68 year old female who presents with SOB and chronic pain. She was DCed from [facility] on 6/21/2018. I reviewed the DC summary and briefly she was thought to get hypotensive from large doses of IV opioids, then got into pulmonary edema and was diuresed. She had a mild Trop elevation which was thought to be due to fluid overload. She had an Echo which revealed an LVFEF of 55% (diastolic CHF). She also was found to have COPD and given Nebs. She was also Rxed initially for a pneumonia but after a chest CT was ruled out for pneumonia and PE. She was DCed on Doxy for possible acute bronchitis. She's had a total of 10 days of Abxs both in-house and as an outpt.     She also has chronic pain and is on chronic steroids for her RA. Her rheumatologist has been struggling with various biologics to get her RA controlled but complete control hasn't  been possible. She's been at home and not doing well, not using her Nebs and she says both her pain and breathing are bothering her. She describes pain all over and her skin is all bruised which also causes pain.  reports anxiety as well. SHe's currently on O2 and having a hard time breathing but able to speak in full sentences. 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.     Past Medical History    I have reviewed this patient's medical history and updated it with pertinent information if needed.   Past Medical History:   Diagnosis Date     Chronic obstructive pulmonary disease (H)     No Comments Provided     Diverticulosis of intestine without perforation or abscess without bleeding     Diffuse diverticulosis and history of diminutive hyperplastic colon     Essential (primary) hypertension     No Comments Provided     Hyperlipidemia     No Comments Provided     Hypothyroidism     No Comments Provided     Malignant neoplasm of colon (H)     9/2017     Personal history of other diseases of the nervous system and sense organs     No Comments Provided     Rheumatoid arthritis (H)     Rheumatologist Dr. Gonzalez, [phone], fax [phone]     Rosacea     No Comments Provided     Type 2 diabetes mellitus without complications (H)     Diabetes Mellitus, prednisone induced     Urinary tract infection     8/2013,admitted to [facility]       Past Surgical History   I have reviewed this patient's surgical history and updated it with pertinent information if needed.  Past Surgical History:   Procedure Laterality Date     ARTHROPLASTY,Right MTP 1 and 5 and left MTP 5  01/2001     COLON SURGERY Right 09/2017    For colon cancer     COLONOSCOPY  09/29/2008     LAPAROSCOPIC TUBAL LIGATION      No Comments Provided     Left MCP joint arthroplasty  02/05/1998      MTP 2, 3, 4 right foot  05/2000     RELEASE CARPAL TUNNEL Left 1991     Right long finger MCP repair  1996    [place]     Right MCP 4 and 5 arthroplasty  03/01/2004     Right wrist arthroplasty  1990       Prior to Admission Medications   Prior to Admission Medications   Prescriptions Last Dose Informant Patient Reported? Taking?   Calcium carb-Vitamin D 500 mg [place]-200 units (OSCAL WITH D;OYSTER SHELL CALCIUM) 500-200 MG-UNIT per tablet Unknown at Unknown time Self Yes No   Sig: Take 1 tablet by mouth daily with food   DOXYCYCLINE HYCLATE PO 6/26/2018 at Unknown time  Yes Yes   DULOXETINE HCL PO 6/26/2018 at Unknown time  Yes Yes   Sig: Take 30 mg by mouth   EPINEPHrine (EPIPEN/ADRENACLICK/OR ANY BX GENERIC EQUIV) 0.3 MG/0.3ML injection 2-pack Unknown at Unknown time Pharmacy Yes No   Sig: Inject 0.3 mg into the muscle once as needed   PRAVASTATIN SODIUM PO 6/26/2018 at Unknown time  Yes Yes   Sig: Take 40 mg by mouth   VALACYCLOVIR HCL PO 6/26/2018 at Unknown time  Yes Yes   albuterol (PROAIR HFA/PROVENTIL HFA/VENTOLIN HFA) 108 (90 BASE) MCG/ACT Inhaler 6/26/2018 at Unknown time Pharmacy Yes Yes   Sig: Inhale 2 puffs into the lungs every 4 hours as needed   amLODIPine (NORVASC) 2.5 MG tablet 6/26/2018 at Unknown time Pharmacy Yes Yes   Sig: Take 2.5 mg by mouth daily   aspirin 81 MG chewable tablet 6/26/2018 at Unknown time Self Yes Yes   Sig: Take 81 mg by mouth daily with food   atovaquone-proguanil (MALARONE) 250-100 MG per tablet 6/26/2018 at Unknown time  Yes Yes   Sig: Take 1 tablet by mouth daily   blood glucose monitoring (ONE TOUCH ULTRASOFT) lancets Unknown at Unknown time Pharmacy Yes No   Sig: TEST 2 TIMES PER DAY   blood glucose monitoring (ONETOUCH ULTRA) test strip Unknown at Unknown time Pharmacy Yes No   Sig: TEST 2 TIMES PER DAY.   budesonide (PULMICORT) 0.5 MG/2ML neb solution 6/26/2018 at Unknown time Pharmacy Yes Yes   Sig: Inhale 0.5 mg into the lungs 2 times daily   calcium carbonate  (OS-GIANNI 500 MG [place]. [place]) 500 MG tablet 6/26/2018 at Unknown time  Yes Yes   Sig: Take 1 tablet by mouth 2 times daily   estradiol (YUVAFEM) 10 MCG TABS vaginal tablet 6/26/2018 at Unknown time Pharmacy Yes Yes   Sig: Place 10 mcg vaginally twice a week   fenofibrate 48 MG tablet 6/26/2018 at Unknown time Pharmacy Yes Yes   Sig: Take 48 mg by mouth daily with food   formoterol (PERFOROMIST) 20 MCG/2ML neb solution 6/26/2018 at Unknown time Pharmacy Yes Yes   Sig: Inhale 2 mLs into the lungs 2 times daily   furosemide (LASIX) 20 MG tablet  Self Yes No   Sig: Take 20 mg by mouth daily as needed (leg swelling)    gabapentin (NEURONTIN) 300 MG capsule 6/26/2018 at Unknown time Self Yes Yes   Sig: Take 300 mg by mouth 3 times daily   glucagon 1 MG kit  Pharmacy Yes No   Sig: Inject 1 mg into the muscle as needed for low blood sugar    glucose 4 g CHEW chewable tablet Unknown at Unknown time Self Yes No   Sig: Take 1 tablet by mouth as needed for low blood sugar   insulin aspart prot & aspart (NOVOLOG MIX 70/30 PEN) injection 6/26/2018 at Unknown time Self Yes Yes   Sig: Inject 50 units under th skin in the morning, and  30 units under the skin at bedtime   levothyroxine (SYNTHROID/LEVOTHROID) 112 MCG tablet 6/26/2018 at Unknown time Pharmacy Yes Yes   Sig: Take 112 mcg by mouth every morning (before breakfast)   lisinopril (PRINIVIL/ZESTRIL) 2.5 MG tablet Unknown at Unknown time Pharmacy Yes No   Sig: Take 2.5 mg by mouth   losartan (COZAAR) 25 MG tablet 6/26/2018 at Unknown time Pharmacy Yes Yes   Sig: Take 25 mg by mouth daily   medical cannabis (Patient's own supply.  Not a prescription) Unknown at Unknown time Self Yes No   Sig: Apply 1 Dose topically as needed (pain) (This is NOT a prescription, and does not certify that the patient has a qualifying medical condition for medical cannabis.  The purpose of this order is  to document that the patient reports taking medical cannabis.)   medical cannabis liquid Unknown at  Unknown time Self Yes No   Si-2 sprays under the tongue as needed for pain   medical cannabis liquid Unknown at Unknown time Self Yes No   Sig: Place 1 drop under the tongue as needed (pain) (This is NOT a prescription, and does not certify that the patient has a qualifying medical condition for medical cannabis.  The purpose of this order is  to document that the patient reports taking medical cannabis.)   montelukast (SINGULAIR) 10 MG tablet 2018 at Unknown time Pharmacy Yes Yes   Sig: Take 10 mg by mouth At Bedtime   omeprazole (PRILOSEC) 20 MG CR capsule 2018 at Unknown time Pharmacy Yes Yes   Sig: Take 20 mg by mouth every morning (before breakfast)   predniSONE (DELTASONE) 5 MG tablet 2018 at Unknown time Self Yes Yes   Sig: Take 12.5 mg by mouth daily with food    simvastatin (ZOCOR) 40 MG tablet Unknown at Unknown time Pharmacy Yes No   Sig: Take 40 mg by mouth At Bedtime   traMADol (ULTRAM) 50 MG tablet 2018 at Unknown time Self Yes Yes   Sig: Take  mg by mouth At Bedtime    traZODone (DESYREL) 100 MG tablet 2018 at Unknown time Pharmacy Yes Yes   Sig: Take 100 mg by mouth At Bedtime      Facility-Administered Medications: None     Allergies   Allergies   Allergen Reactions     Glyburide Anaphylaxis     Other reaction(s): Dizziness     Sulfa Drugs Anaphylaxis     Sulfamethoxazole-Trimethoprim Anaphylaxis     Other reaction(s): Other - Describe In Comment Field  Rash, nausea, dizziness     Codeine Nausea and Nausea and Vomiting     Mosquitoes (Informational Only) Hives     Other [Seasonal Allergies] Hives     Deer Flies       Social History   I have reviewed this patient's social history and updated it with pertinent information if needed. Laura PICKETT Chris  reports that she has quit smoking. Her smoking use included Cigarettes. She has a 30.00 pack-year smoking history. She has never used smokeless tobacco. She reports that she drinks about 1.5 oz of alcohol per week  She  reports that she uses illicit drugs.    Family History   I have reviewed this patient's family history and updated it with pertinent information if needed.   Family History   Problem Relation Age of Onset     Cancer Mother 50     Cancer,uterus     HEART DISEASE Mother      Heart Disease,MI     Colon Cancer Mother      Cancer-colon     HEART DISEASE Father      Heart Disease,MI     Chronic Obstructive Pulmonary Disease Sister      COPD     Other - See Comments Sister      rubina dow     Colon Cancer Brother      Cancer-colon     Breast Cancer No family hx of      Cancer-breast       Review of Systems   The 10 point Review of Systems is negative other than noted in the HPI or here.     Physical Exam   Temp: 100.4  F (38  C) Temp src: Tympanic BP: (!) 88/56 Pulse: 112 Heart Rate: 104 Resp: 15 SpO2: 94 % O2 Device: None (Room air)    Vital Signs with Ranges  Temp:  [99.1  F (37.3  C)-100.4  F (38  C)] 100.4  F (38  C)  Pulse:  [112] 112  Heart Rate:  [101-115] 104  Resp:  [8-37] 15  BP: ()/(41-75) 88/56  SpO2:  [92 %-100 %] 94 %  0 lbs 0 oz    Constitutional: Moaning in pain.  Eyes: Unremakable.  HEENT: No JVD.   Respiratory: Poor A/E bilaterally in all lung fields.   Cardiovascular: S1 + S2.   GI: Soft, NT.  Skin: Old bruising. Says it hurts at the IV site but no infiltration.  Musculoskeletal: NO pedal edema.  Neurologic: Grossly non-focal.  Psychiatric: Affect is anxious.     Data   Data reviewed today:  I personally reviewed all data below:    Recent Labs  Lab 06/27/18  0945   WBC 14.5*   HGB 11.0*   MCV 79      *   POTASSIUM 4.1   CHLORIDE 99   CO2 18*   BUN 12   CR 1.59*   ANIONGAP 15*   GIANNI 9.3   *   ALBUMIN 3.5   PROTTOTAL 6.6   BILITOTAL 0.6   ALKPHOS 70   ALT 12   AST 17   TROPI 0.041*       Lactic Acid   Date Value Ref Range Status   06/27/2018 <0.2 (L) 0.5 - 2.2 mmol/L Final   06/15/2018 0.9 0.5 - 2.2 mmol/L Final       Recent Results (from the past 24 hour(s))   XR Chest Port 1  View    Narrative    PROCEDURE:  XR CHEST PORT 1 VW    HISTORY:  sob; .     COMPARISON:  6/17/2018    FINDINGS:   The cardiac silhouette is normal in size. The pulmonary vasculature is  normal.  The lungs are clear. No pleural effusion or pneumothorax.      Impression    IMPRESSION:  No acute cardiopulmonary disease.      MIREILLE ROMERO MD

## 2018-06-27 NOTE — PROGRESS NOTES
" NSG ADMISSION NOTE    Patient admitted to room 312 at approximately 1745 via wheel chair from emergency room. Patient was accompanied by spouse.     Verbal SBAR report received from Rosanna prior to patient arrival.     Patient trasferred to bed via slip sheet Patient alert and oriented X 3. Pain is controlled with current analgesics.  Medication(s) being used: patient given pain meds in ER. 0-10 Pain Scale: 8. Admission vital signs: Blood pressure 147/45, pulse 112, temperature 97.8  F (36.6  C), temperature source Tympanic, resp. rate 24, height 1.6 m (5' 3\"), weight 101 kg (222 lb 10.6 oz), SpO2 98 %, not currently breastfeeding. Patient was oriented to plan of care, call light, bed controls, tv, telephone, bathroom and visiting hours.     Risk Assessment    The following safety risks were identified during admission: fall. Yellow risk band applied: YES.     Skin Initial Assessment    This writer admitted this patient and completed a full skin assessment and Oh score in the Adult PCS flowsheet. Appropriate interventions initiated as needed.     Secondary skin check completed by Margaux.    Skin  Inspection of bony prominences: Full  Skin WDL: color  Skin Color/Characteristics: pale    Oh Risk Assessment  Sensory Perception: 3-->slightly limited  Moisture: 4-->rarely moist  Activity: 3-->walks occasionally  Mobility: 3-->slightly limited  Nutrition: 2-->probably inadequate  Friction and Shear: 2-->potential problem  Oh Score: 17    Beverly Rodriguez    "

## 2018-06-27 NOTE — PROGRESS NOTES
:    Received referral to see patient regarding possible placement.  I called Lulu at [facility] to see if they were still full and she stated they were.  I met with patient and her  in room and their first choice was [facility].  I explained they were full so they chose [facility]. I offered [place] and or [facility] SNF and they declined. They are aware patient will have a semi private room, but will request a private if they have one available.  I made referral and left message with Allison from [facility] regarding possible admission.  Patient would like [facility] to transport.  MD was updated.    ELIDA Jin on 6/27/2018 at 1:34 PM      :    Allison from [facility] called back and they will accept patient. The only opening they had was on the long term care side semi private.  I spoke with patient and her  and they were both in agreement patient going to facility and aware of roommate and in long term care.  It appears patient had a three day medicare qualifying stay within the month so It should be covered by insurance.  Patient  discussed that he would like her to transmission to [facility] when room became.  I called Lulu at [facility] and left her message as well as Allison from [facility]. [facility] will be here at 1515.  RN was updated in ED.    ELIDA Jin on 6/27/2018 at 2:37 PM

## 2018-06-27 NOTE — IP AVS SNAPSHOT
MRN:[id number]                      After Visit Summary   6/27/2018    Larua Perez    MRN: [id number]           Thank you!     Thank you for choosing [facility] for your care. Our goal is always to provide you with excellent care. Hearing back from our patients is one way we can continue to improve our services. Please take a few minutes to complete the written survey that you may receive in the mail after you visit with us. Thank you!        Patient Information     Date Of Birth          1950        Designated Caregiver       Most Recent Value    Caregiver    Will someone help with your care after discharge? yes    Name of designated caregiver Benigno    Phone number of caregiver [phone]    Caregiver address [address] [place], [place]      About your hospital stay     You were admitted on:  June 27, 2018 You last received care in the:  [facility]    You were discharged on:  June 28, 2018        Reason for your hospital stay       COPD exacerbation- resolved.                  Who to Call     For medical emergencies, please call 911.  For non-urgent questions about your medical care, please call your primary care provider or clinic, [phone]          Attending Provider     Provider Specialty    Toney Almonte MD Emergency Medicine    BrijeshVanita garcia MD Internal Medicine       Primary Care Provider Office Phone # Fax #    Elin ACE DO [place] [phone]      After Care Instructions     Activity - Up with assistive device           Activity - Up with nursing assistance           Additional Discharge Instructions       Follow up with PMD within one week.            Advance Diet as Tolerated       Follow this diet upon discharge: Orders Placed This Encounter      Regular Diet Adult            Daily weights       Call Provider for weight gain of more than 2 pounds per day or 5 pounds per week.            Fall precautions           General info for SNF        "Length of Stay Estimate: Short Term Care: Estimated # of Days <30  Condition at Discharge: Stable  Level of care:skilled   Rehabilitation Potential: Fair  Admission H&P remains valid and up-to-date: Yes  Recent Chemotherapy: N/A  Use Nursing Home Standing Orders: Yes            Glucose monitor nursing POCT       Before meals and at bedtime            Mantoux instructions       Give two-step Mantoux (PPD) Per Facility Policy Yes            Oxygen - Nasal cannula       1-4 Lpm by nasal cannula to keep O2 sats 92% or greater. Wean as able.                  Additional Services     Occupational Therapy Adult Consult       Evaluate and treat as clinically indicated.    Reason:  Weakness            Physical Therapy Adult Consult       Evaluate and treat as clinically indicated.    Reason:  Weakness                  Pending Results     Date and Time Order Name Status Description    6/27/2018 1119 EKG 12-lead, tracing only In process             Statement of Approval     Ordered          06/28/18 1312  I have reviewed and agree with all the recommendations and orders detailed in this document.  EFFECTIVE NOW     Approved and electronically signed by:  Vanita Coley MD             Admission Information     Date & Time Provider Department Dept. Phone    6/27/2018 Vanita Coley MD [facility] [phone]      Your Vitals Were     Blood Pressure Pulse Temperature Respirations Height Weight    108/48 89 97.4  F (36.3  C) (Tympanic) 22 1.6 m (5' 3\") 66 kg (145 lb 8.1 oz)    Pulse Oximetry BMI (Body Mass Index)                97% 25.77 kg/m2          MyChart Information     [facility] lets you send messages to your doctor, view your test results, renew your prescriptions, schedule appointments and more. To sign up, go to [email or web address] . Click on \"Log in\" on the left side of the screen, which will take you to the Welcome page. Then click on \"Sign up Now\" on the right side of the page.     You will " be asked to enter the access code listed below, as well as some personal information. Please follow the directions to create your username and password.     Your access code is: [email or web address]  Expires: 2018  2:45 PM     Your access code will  in 90 days. If you need help or a new code, please call your [facility] clinic or [phone].        Care EveryWhere ID     This is your Care EveryWhere ID. This could be used by other organizations to access your [facility] medical records  [id number]        Equal Access to Services     [facility]: Hadlonny gamez Sofrank, waaxda luqadaha, qayoni kaalban josue, tara lovett . So [facility] [phone].    ATENCIÓN: Si habla español, tiene a dawkins disposición servicios gratuitos de asistencia lingüística. Lilly al [phone].    We comply with applicable federal civil rights laws and [place] laws. We do not discriminate on the basis of race, color, national origin, age, disability, sex, sexual orientation, or gender identity.               Review of your medicines      START taking        Dose / Directions    acetaminophen 325 MG tablet   Commonly known as:  TYLENOL   Used for:  Low back pain without sciatica, unspecified back pain laterality, unspecified chronicity        Dose:  650 mg   Take 2 tablets (650 mg) by mouth every 4 hours as needed for mild pain   Quantity:  100 tablet   Refills:  0       ipratropium - albuterol 0.5 mg/2.5 mg/3 mL 0.5-2.5 (3) MG/3ML neb solution   Commonly known as:  DUONEB   Used for:  Chronic obstructive pulmonary disease, unspecified COPD type (H)        Dose:  3 mL   Take 1 vial (3 mLs) by nebulization 4 times daily   Quantity:  360 mL   Refills:  0       ondansetron 4 MG ODT tab   Commonly known as:  ZOFRAN ODT   Used for:  Nausea        Dose:  4-8 mg   Take 1-2 tablets (4-8 mg) by mouth every 8 hours as needed for nausea   Quantity:  20 tablet   Refills:  1       oxyCODONE IR 5 MG tablet    Commonly known as:  ROXICODONE   Used for:  Low back pain without sciatica, unspecified back pain laterality, unspecified chronicity        Dose:  5-10 mg   Take 1-2 tablets (5-10 mg) by mouth every 3 hours as needed for other (pain control or improvement in physical function.)   Quantity:  6 tablet   Refills:  0         CONTINUE these medicines which may have CHANGED, or have new prescriptions. If we are uncertain of the size of tablets/capsules you have at home, strength may be listed as something that might have changed.        Dose / Directions    traMADol 50 MG tablet   Commonly known as:  ULTRAM   This may have changed:    - when to take this  - reasons to take this   Used for:  Low back pain without sciatica, unspecified back pain laterality, unspecified chronicity        Dose:  50 mg   Take 1 tablet (50 mg) by mouth every 6 hours as needed for severe pain   Quantity:  10 tablet   Refills:  0         CONTINUE these medicines which have NOT CHANGED        Dose / Directions    albuterol (2.5 MG/3ML) 0.083% neb solution        Dose:  2.5 mg   Take 2.5 mg by nebulization every 6 hours as needed for shortness of breath / dyspnea or wheezing   Refills:  0       aspirin 81 MG chewable tablet        Dose:  81 mg   Take 81 mg by mouth daily with food   Refills:  0       blood glucose monitoring lancets        TEST 2 TIMES PER DAY   Refills:  0       budesonide 0.5 MG/2ML neb solution   Commonly known as:  PULMICORT        Dose:  0.5 mg   Inhale 0.5 mg into the lungs 2 times daily   Refills:  0       DULoxetine 30 MG EC capsule   Commonly known as:  CYMBALTA        Dose:  30 mg   Take 30 mg by mouth every morning   Refills:  0       EPINEPHrine 0.3 MG/0.3ML injection 2-pack   Commonly known as:  EPIPEN/ADRENACLICK/or ANY BX GENERIC EQUIV        Dose:  0.3 mg   Inject 0.3 mg into the muscle once as needed   Refills:  0       fenofibrate 48 MG tablet        Dose:  48 mg   Take 48 mg by mouth daily with food   Refills:   0       formoterol 20 MCG/2ML neb solution   Commonly known as:  PERFOROMIST        Dose:  2 mL   Inhale 2 mLs into the lungs 2 times daily   Refills:  0       gabapentin 300 MG capsule   Commonly known as:  NEURONTIN        Dose:  300 mg   Take 300 mg by mouth At Bedtime   Refills:  0       glucagon 1 MG kit        Dose:  1 mg   Inject 1 mg into the muscle as needed for low blood sugar   Refills:  0       glucose 4 g Chew chewable tablet        Dose:  1 tablet   Take 1 tablet by mouth as needed for low blood sugar   Refills:  0       insulin aspart prot & aspart injection   Commonly known as:  NovoLOG MIX 70/30 PEN        Inject 50 units under th skin in the morning, and  30 units under the skin at bedtime   Refills:  0       lisinopril 2.5 MG tablet   Commonly known as:  PRINIVIL/Zestril        Dose:  2.5 mg   Take 2.5 mg by mouth   Refills:  0       * medical cannabis liquid        1-2 sprays under the tongue as needed for pain   Refills:  0       * medical cannabis (Patient's own supply.  Not a prescription)        Dose:  1 Dose   Apply 1 Dose topically as needed (pain) (This is NOT a prescription, and does not certify that the patient has a qualifying medical condition for medical cannabis.  The purpose of this order is  to document that the patient reports taking medical cannabis.)   Refills:  0       * medical cannabis liquid        Dose:  1 drop   Place 1 drop under the tongue as needed (pain) (This is NOT a prescription, and does not certify that the patient has a qualifying medical condition for medical cannabis.  The purpose of this order is  to document that the patient reports taking medical cannabis.)   Refills:  0       montelukast 10 MG tablet   Commonly known as:  SINGULAIR        Dose:  10 mg   Take 10 mg by mouth At Bedtime   Refills:  0       omeprazole 20 MG CR capsule   Commonly known as:  priLOSEC        Dose:  20 mg   Take 20 mg by mouth every morning (before breakfast)   Refills:  0        ONETOUCH ULTRA test strip   Generic drug:  blood glucose monitoring        TEST 2 TIMES PER DAY.   Refills:  0       * predniSONE 5 MG tablet   Commonly known as:  DELTASONE        Dose:  10 mg   Take 10 mg by mouth daily with food TOTAL DAILY DOSE = 12 MG   Refills:  0       * predniSONE 1 MG tablet   Commonly known as:  DELTASONE        Dose:  2 mg   Take 2 mg by mouth daily TOTAL DAILY DOSE = 12 MG   Refills:  0       simvastatin 40 MG tablet   Commonly known as:  ZOCOR        Dose:  40 mg   Take 40 mg by mouth At Bedtime   Refills:  0       SYNTHROID 125 MCG tablet   Generic drug:  levothyroxine        Dose:  125 mcg   Take 125 mcg by mouth daily   Refills:  0       traZODone 100 MG tablet   Commonly known as:  DESYREL   Used for:  Anxiety        Dose:  100 mg   Take 1 tablet (100 mg) by mouth At Bedtime   Quantity:  30 tablet   Refills:  0       valACYclovir 1000 mg tablet   Commonly known as:  VALTREX   Used for:  Herpes labialis        Dose:  1000 mg   Take 1 tablet (1,000 mg) by mouth daily   Quantity:  7 tablet   Refills:  0       YUVAFEM 10 MCG Tabs vaginal tablet   Generic drug:  estradiol        Dose:  10 mcg   Place 10 mcg vaginally twice a week Takes on Sundays and Wednesdays   Refills:  0       * Notice:  This list has 5 medication(s) that are the same as other medications prescribed for you. Read the directions carefully, and ask your doctor or other care provider to review them with you.      STOP taking     amLODIPine 2.5 MG tablet   Commonly known as:  NORVASC           doxycycline Monohydrate 100 MG Tabs           furosemide 20 MG tablet   Commonly known as:  LASIX           losartan 25 MG tablet   Commonly known as:  COZAAR                Where to get your medicines      Some of these will need a paper prescription and others can be bought over the counter. Ask your nurse if you have questions.     Bring a paper prescription for each of these medications     oxyCODONE IR 5 MG tablet    traMADol  50 MG tablet    traZODone 100 MG tablet       You don't need a prescription for these medications     acetaminophen 325 MG tablet    ipratropium - albuterol 0.5 mg/2.5 mg/3 mL 0.5-2.5 (3) MG/3ML neb solution    ondansetron 4 MG ODT tab                Protect others around you: Learn how to safely use, store and throw away your medicines at www.disposemymeds.org.        ANTIBIOTIC INSTRUCTION     You've Been Prescribed an Antibiotic - Now What?  Your healthcare team thinks that you or your loved one might have an infection. Some infections can be treated with antibiotics, which are powerful, life-saving drugs. Like all medications, antibiotics have side effects and should only be used when necessary. There are some important things you should know about your antibiotic treatment.      Your healthcare team may run tests before you start taking an antibiotic.    Your team may take samples (e.g., from your blood, urine or other areas) to run tests to look for bacteria. These test can be important to determine if you need an antibiotic at all and, if you do, which antibiotic will work best.      Within a few days, your healthcare team might change or even stop your antibiotic.    Your team may start you on an antibiotic while they are working to find out what is making you sick.    Your team might change your antibiotic because test results show that a different antibiotic would be better to treat your infection.    In some cases, once your team has more information, they learn that you do not need an antibiotic at all. They may find out that you don't have an infection, or that the antibiotic you're taking won't work against your infection. For example, an infection caused by a virus can't be treated with antibiotics. Staying on an antibiotic when you don't need it is more likely to be harmful than helpful.      You may experience side effects from your antibiotic.    Like all medications, antibiotics have side effects.  Some of these can be serious.    Let you healthcare team know if you have any known allergies when you are admitted to the hospital.    One significant side effect of nearly all antibiotics is the risk of severe and sometimes deadly diarrhea caused by Clostridium difficile (C. Difficile). This occurs when a person takes antibiotics because some good germs are destroyed. Antibiotic use allows C. diificile to take over, putting patients at high risk for this serious infection.    As a patient or caregiver, it is important to understand your or your loved one's antibiotic treatment. It is especially important for caregivers to speak up when patients can't speak for themselves. Here are some important questions to ask your healthcare team.    What infection is this antibiotic treating and how do you know I have that infection?    What side effects might occur from this antibiotic?    How long will I need to take this antibiotic?    Is it safe to take this antibiotic with other medications or supplements (e.g., vitamins) that I am taking?     Are there any special directions I need to know about taking this antibiotic? For example, should I take it with food?    How will I be monitored to know whether my infection is responding to the antibiotic?    What tests may help to make sure the right antibiotic is prescribed for me?      Information provided by:  www.cdc.gov/getsmart  U.S. Department of Health and Human Services  Centers for disease Control and Prevention  National Center for Emerging and Zoonotic Infectious Diseases  Division of Healthcare Quality Promotion        Information about OPIOIDS     PRESCRIPTION OPIOIDS: WHAT YOU NEED TO KNOW   We gave you an opioid (narcotic) pain medicine. It is important to manage your pain, but opioids are not always the best choice. You should first try all the other options your care team gave you. Take this medicine for as short a time (and as few doses) as possible.     These  medicines have risks:    DO NOT drive when on new or higher doses of pain medicine. These medicines can affect your alertness and reaction times, and you could be arrested for driving under the influence (DUI). If you need to use opioids long-term, talk to your care team about driving.    DO NOT operate heave machinery    DO NOT do any other dangerous activities while taking these medicines.     DO NOT drink any alcohol while taking these medicines.      If the opioid prescribed includes acetaminophen, DO NOT take with any other medicines that contain acetaminophen. Read all labels carefully. Look for the word  acetaminophen  or  Tylenol.  Ask your pharmacist if you have questions or are unsure.    You can get addicted to pain medicines, especially if you have a history of addiction (chemical, alcohol or substance dependence). Talk to your care team about ways to reduce this risk.    Store your pills in a secure place, locked if possible. We will not replace any lost or stolen medicine. If you don t finish your medicine, please throw away (dispose) as directed by your pharmacist. The [place] Pollution Control Agency has more information about safe disposal: https://www.pca.[facility].[place].us/living-green/managing-unwanted-medications.     All opioids tend to cause constipation. Drink plenty of water and eat foods that have a lot of fiber, such as fruits, vegetables, prune juice, apple juice and high-fiber cereal. Take a laxative (Miralax, milk of magnesia, Colace, Senna) if you don t move your bowels at least every other day.              Medication List: This is a list of all your medications and when to take them. Check marks below indicate your daily home schedule. Keep this list as a reference.      Medications           Morning Afternoon Evening Bedtime As Needed    acetaminophen 325 MG tablet   Commonly known as:  TYLENOL   Take 2 tablets (650 mg) by mouth every 4 hours as needed for mild pain                                 albuterol (2.5 MG/3ML) 0.083% neb solution   Take 2.5 mg by nebulization every 6 hours as needed for shortness of breath / dyspnea or wheezing                                aspirin 81 MG chewable tablet   Take 81 mg by mouth daily with food   Last time this was given:  81 mg on 6/28/2018 10:55 AM                                blood glucose monitoring lancets   TEST 2 TIMES PER DAY                                budesonide 0.5 MG/2ML neb solution   Commonly known as:  PULMICORT   Inhale 0.5 mg into the lungs 2 times daily   Last time this was given:  0.5 mg on 6/28/2018  7:32 AM                                DULoxetine 30 MG EC capsule   Commonly known as:  CYMBALTA   Take 30 mg by mouth every morning   Last time this was given:  30 mg on 6/28/2018 10:56 AM                                EPINEPHrine 0.3 MG/0.3ML injection 2-pack   Commonly known as:  EPIPEN/ADRENACLICK/or ANY BX GENERIC EQUIV   Inject 0.3 mg into the muscle once as needed                                fenofibrate 48 MG tablet   Take 48 mg by mouth daily with food   Last time this was given:  48 mg on 6/28/2018 10:55 AM                                formoterol 20 MCG/2ML neb solution   Commonly known as:  PERFOROMIST   Inhale 2 mLs into the lungs 2 times daily                                gabapentin 300 MG capsule   Commonly known as:  NEURONTIN   Take 300 mg by mouth At Bedtime   Last time this was given:  300 mg on 6/28/2018  2:39 PM                                glucagon 1 MG kit   Inject 1 mg into the muscle as needed for low blood sugar                                glucose 4 g Chew chewable tablet   Take 1 tablet by mouth as needed for low blood sugar                                insulin aspart prot & aspart injection   Commonly known as:  NovoLOG MIX 70/30 PEN   Inject 50 units under th skin in the morning, and  30 units under the skin at bedtime                                ipratropium - albuterol 0.5 mg/2.5 mg/3 mL  0.5-2.5 (3) MG/3ML neb solution   Commonly known as:  DUONEB   Take 1 vial (3 mLs) by nebulization 4 times daily   Last time this was given:  3 mLs on 6/28/2018  7:30 AM                                lisinopril 2.5 MG tablet   Commonly known as:  PRINIVIL/Zestril   Take 2.5 mg by mouth                                * medical cannabis liquid   1-2 sprays under the tongue as needed for pain                                * medical cannabis (Patient's own supply.  Not a prescription)   Apply 1 Dose topically as needed (pain) (This is NOT a prescription, and does not certify that the patient has a qualifying medical condition for medical cannabis.  The purpose of this order is  to document that the patient reports taking medical cannabis.)                                * medical cannabis liquid   Place 1 drop under the tongue as needed (pain) (This is NOT a prescription, and does not certify that the patient has a qualifying medical condition for medical cannabis.  The purpose of this order is  to document that the patient reports taking medical cannabis.)                                montelukast 10 MG tablet   Commonly known as:  SINGULAIR   Take 10 mg by mouth At Bedtime                                omeprazole 20 MG CR capsule   Commonly known as:  priLOSEC   Take 20 mg by mouth every morning (before breakfast)                                ondansetron 4 MG ODT tab   Commonly known as:  ZOFRAN ODT   Take 1-2 tablets (4-8 mg) by mouth every 8 hours as needed for nausea                                ONETOUCH ULTRA test strip   TEST 2 TIMES PER DAY.   Generic drug:  blood glucose monitoring                                oxyCODONE IR 5 MG tablet   Commonly known as:  ROXICODONE   Take 1-2 tablets (5-10 mg) by mouth every 3 hours as needed for other (pain control or improvement in physical function.)   Last time this was given:  10 mg on 6/28/2018  2:39 PM                                * predniSONE 5 MG tablet    Commonly known as:  DELTASONE   Take 10 mg by mouth daily with food TOTAL DAILY DOSE = 12 MG   Last time this was given:  40 mg on 6/28/2018 10:55 AM                                * predniSONE 1 MG tablet   Commonly known as:  DELTASONE   Take 2 mg by mouth daily TOTAL DAILY DOSE = 12 MG   Last time this was given:  40 mg on 6/28/2018 10:55 AM                                simvastatin 40 MG tablet   Commonly known as:  ZOCOR   Take 40 mg by mouth At Bedtime                                SYNTHROID 125 MCG tablet   Take 125 mcg by mouth daily   Last time this was given:  112 mcg on 6/28/2018  8:52 AM   Generic drug:  levothyroxine                                traMADol 50 MG tablet   Commonly known as:  ULTRAM   Take 1 tablet (50 mg) by mouth every 6 hours as needed for severe pain   Last time this was given:  50 mg on 6/27/2018 10:37 PM                                traZODone 100 MG tablet   Commonly known as:  DESYREL   Take 1 tablet (100 mg) by mouth At Bedtime                                valACYclovir 1000 mg tablet   Commonly known as:  VALTREX   Take 1 tablet (1,000 mg) by mouth daily   Last time this was given:  1,000 mg on 6/28/2018  9:27 AM                                YUVAFEM 10 MCG Tabs vaginal tablet   Place 10 mcg vaginally twice a week Takes on Sundays and Wednesdays   Generic drug:  estradiol                                * Notice:  This list has 5 medication(s) that are the same as other medications prescribed for you. Read the directions carefully, and ask your doctor or other care provider to review them with you.

## 2018-06-27 NOTE — PROGRESS NOTES
:    Notified by Rosanna BULL that patient will be admitting to the hospital.  Spouse would like patient's bed held at [facility] if possible.    Telephone call to [facility] at [facility].  Notified that patient is not being discharged.  She stated they could hold the bed until the am and  will discuss patient's discharge planning with [facility] in the am.    ELIDA Smith on 6/27/2018 at 4:11 PM

## 2018-06-27 NOTE — IP AVS SNAPSHOT
Laura Perez #[id number] (CSN: [id number])  (68 year old F)  (Adm: 18)     [id number]               [facility]: [phone]            Patient Demographics     Patient Name Sex          Age SSN Address Phone    Laura Perez Female 1950 (68 year old) [id number] [place] [id number] [phone] (Home)  [phone] (Mobile)      Emergency Contact(s)     Name Relation Home Work Mobile    Benigno Perez Spouse [phone]      Savage Perez Son [phone]        Admission Information     Attending Provider Admitting Provider Admission Type Admission Date/Time    Vanita Coley MD Khokhar, Anwar, MD Emergency 18  0906    Discharge Date Hospital Service Auth/Cert Status Service Area     Observation Incomplete [facility] SERVICES    Unit Room/Bed Admission Status        MEDICAL SURGICAL  Admission (Confirmed)       Admission     Complaint    COPD exacerbation (H)      Hospital Account     Name Acct ID Class Status Primary Coverage    Laura Perez [id number] Observation Open MEDICARE - MEDICARE            Guarantor Account (for Hospital Account #[id number])     Name Relation to Pt Service Area Active? Acct Type    Laura Perez Self FCS Yes Personal/Family    Address Phone          [address] [place] [id number] [phone](H)              Coverage Information (for Hospital Account #[id number])     1. MEDICARE/MEDICARE     F/O Payor/Plan Precert #    MEDICARE/MEDICARE     Subscriber Subscriber #    Laura Perez [id number]    Address Phone    ATT[employer] CLAIMS  [address]  [place] [address] [phone]          2. [place]     F/[place] Payor/Plan Precert #    [place]     Subscriber Subscriber #    Laura Perez [id number]    Address Phone    [address] [address]  [place], [place] [id number] [phone]                                                      INTERAGENCY TRANSFER FORM - PHYSICIAN  "ORDERS   6/27/2018                       [facility]: [phone]            Attending Provider: Vanita Coley MD     Allergies:  Glyburide, Mosquitoes (Informational Only), Other [Seasonal Allergies], Sulfamethoxazole-trimethoprim, Codeine    Infection:  None   Service:  Observation    Ht:  1.6 m (5' 3\")   Wt:  66 kg (145 lb 8.1 oz)   Admission Wt:  101 kg (222 lb 10.6 oz)    BMI:  25.77 kg/m 2   BSA:  1.71 m 2            ED Clinical Impression     Diagnosis Description Comment Added By Time Added    Generalized muscle weakness [M62.81] Generalized muscle weakness [M62.81]  Toney Almonte MD 6/27/2018  3:00 PM    Low back pain without sciatica, unspecified back pain laterality, unspecified chronicity [M54.5] Low back pain without sciatica, unspecified back pain laterality, unspecified chronicity [M54.5]  Toney Almonte MD 6/27/2018  3:01 PM    SOB (shortness of breath) [R06.02] SOB (shortness of breath) [R06.02]  Toney Almonte MD 6/27/2018  3:02 PM    Anxiety [F41.9] Anxiety [F41.9]  Toney Almonte MD 6/27/2018  3:02 PM      Hospital Problems as of 6/28/2018              Priority Class Noted POA    COPD exacerbation (H) Medium  6/27/2018 Yes      Non-Hospital Problems as of 6/28/2018              Priority Class Noted    COPD (chronic obstructive pulmonary disease) (H) Medium  9/12/2014    Lichen sclerosus et atrophicus Medium  3/5/2015    On prednisone therapy Medium  1/15/2016    Orthostatic hypotension Medium  1/15/2016    Secondary adrenal insufficiency (H) Medium  1/15/2016    Seropositive rheumatoid arthritis (H) Medium  1/15/2016    Chronic diastolic CHF (congestive heart failure), NYHA class 2 (H) Medium  2/13/2016    Non-rheumatic mitral valve stenosis Medium  2/13/2016    Anemia of chronic kidney failure, stage 3 (moderate) Medium  7/28/2017    Diabetes mellitus type 2, insulin dependent (H) Medium  7/28/2017    Malignant tumor of colon (H) Medium  11/26/2017    Osteoporosis Medium "  11/27/2017    HCAP (healthcare-associated pneumonia) Medium  11/28/2017    HTN (hypertension) Medium  11/29/2017    Chronic kidney disease (CKD), stage III (moderate) Medium  12/11/2017    Acne rosacea Medium  1/26/2018    History of colonic polyps Medium  1/26/2018    Hyperlipidemia Medium  1/26/2018    Hypothyroidism Medium  1/26/2018    Migraine headache Medium  1/26/2018    Disorder of bone and cartilage Medium  1/26/2018    Popliteal cyst, left Medium  1/26/2018    Sepsis (H) Medium  6/15/2018    Recurrent pneumonia Medium  6/15/2018    Pneumonia Medium  6/15/2018    Avascular necrosis of left talus (H) Medium  6/16/2018    ACS (acute coronary syndrome) (H) Medium  6/18/2018      Code Status History     Date Active Date Inactive Code Status Order ID Comments User Context    6/18/2018 12:07 AM 6/27/2018  5:42 PM Full Code [id number]  Noelle Jacinto MD Outpatient    6/15/2018  9:19 PM 6/18/2018 12:07 AM Full Code [id number]  Noelle Jacinto MD Inpatient      Current Code Status     Date Active Code Status Order ID Comments User Context       6/27/2018  5:42 PM Full Code [id number]  Vanita Coley MD Inpatient       Summary of Visit     Reason for your hospital stay       COPD exacerbation- resolved.                Medication Review      START taking        Dose / Directions Comments    acetaminophen 325 MG tablet   Commonly known as:  TYLENOL   Used for:  Low back pain without sciatica, unspecified back pain laterality, unspecified chronicity        Dose:  650 mg   Take 2 tablets (650 mg) by mouth every 4 hours as needed for mild pain   Quantity:  100 tablet   Refills:  0        ipratropium - albuterol 0.5 mg/2.5 mg/3 mL 0.5-2.5 (3) MG/3ML neb solution   Commonly known as:  DUONEB   Used for:  Chronic obstructive pulmonary disease, unspecified COPD type (H)        Dose:  3 mL   Take 1 vial (3 mLs) by nebulization 4 times daily   Quantity:  360 mL   Refills:  0        ondansetron 4 MG ODT tab   Commonly known as:   ZOFRAN ODT   Used for:  Nausea        Dose:  4-8 mg   Take 1-2 tablets (4-8 mg) by mouth every 8 hours as needed for nausea   Quantity:  20 tablet   Refills:  1        oxyCODONE IR 5 MG tablet   Commonly known as:  ROXICODONE   Used for:  Low back pain without sciatica, unspecified back pain laterality, unspecified chronicity        Dose:  5-10 mg   Take 1-2 tablets (5-10 mg) by mouth every 3 hours as needed for other (pain control or improvement in physical function.)   Quantity:  6 tablet   Refills:  0          CONTINUE these medications which may have CHANGED, or have new prescriptions. If we are uncertain of the size of tablets/capsules you have at home, strength may be listed as something that might have changed.        Dose / Directions Comments    traMADol 50 MG tablet   Commonly known as:  ULTRAM   This may have changed:    - when to take this  - reasons to take this   Used for:  Low back pain without sciatica, unspecified back pain laterality, unspecified chronicity        Dose:  50 mg   Take 1 tablet (50 mg) by mouth every 6 hours as needed for severe pain   Quantity:  10 tablet   Refills:  0          CONTINUE these medications which have NOT CHANGED        Dose / Directions Comments    albuterol (2.5 MG/3ML) 0.083% neb solution        Dose:  2.5 mg   Take 2.5 mg by nebulization every 6 hours as needed for shortness of breath / dyspnea or wheezing   Refills:  0        aspirin 81 MG chewable tablet        Dose:  81 mg   Take 81 mg by mouth daily with food   Refills:  0        blood glucose monitoring lancets        TEST 2 TIMES PER DAY   Refills:  0        budesonide 0.5 MG/2ML neb solution   Commonly known as:  PULMICORT        Dose:  0.5 mg   Inhale 0.5 mg into the lungs 2 times daily   Refills:  0        DULoxetine 30 MG EC capsule   Commonly known as:  CYMBALTA        Dose:  30 mg   Take 30 mg by mouth every morning   Refills:  0        EPINEPHrine 0.3 MG/0.3ML injection 2-pack   Commonly known as:   EPIPEN/ADRENACLICK/or ANY BX GENERIC EQUIV        Dose:  0.3 mg   Inject 0.3 mg into the muscle once as needed   Refills:  0        fenofibrate 48 MG tablet        Dose:  48 mg   Take 48 mg by mouth daily with food   Refills:  0        formoterol 20 MCG/2ML neb solution   Commonly known as:  PERFOROMIST        Dose:  2 mL   Inhale 2 mLs into the lungs 2 times daily   Refills:  0        gabapentin 300 MG capsule   Commonly known as:  NEURONTIN        Dose:  300 mg   Take 300 mg by mouth At Bedtime   Refills:  0        glucagon 1 MG kit        Dose:  1 mg   Inject 1 mg into the muscle as needed for low blood sugar   Refills:  0        glucose 4 g Chew chewable tablet        Dose:  1 tablet   Take 1 tablet by mouth as needed for low blood sugar   Refills:  0        insulin aspart prot & aspart injection   Commonly known as:  NovoLOG MIX 70/30 PEN        Inject 50 units under th skin in the morning, and  30 units under the skin at bedtime   Refills:  0        lisinopril 2.5 MG tablet   Commonly known as:  PRINIVIL/Zestril        Dose:  2.5 mg   Take 2.5 mg by mouth   Refills:  0        * medical cannabis liquid        1-2 sprays under the tongue as needed for pain   Refills:  0        * medical cannabis (Patient's own supply.  Not a prescription)        Dose:  1 Dose   Apply 1 Dose topically as needed (pain) (This is NOT a prescription, and does not certify that the patient has a qualifying medical condition for medical cannabis.  The purpose of this order is  to document that the patient reports taking medical cannabis.)   Refills:  0        * medical cannabis liquid        Dose:  1 drop   Place 1 drop under the tongue as needed (pain) (This is NOT a prescription, and does not certify that the patient has a qualifying medical condition for medical cannabis.  The purpose of this order is  to document that the patient reports taking medical cannabis.)   Refills:  0        montelukast 10 MG tablet   Commonly known as:   SINGULAIR        Dose:  10 mg   Take 10 mg by mouth At Bedtime   Refills:  0        omeprazole 20 MG CR capsule   Commonly known as:  priLOSEC        Dose:  20 mg   Take 20 mg by mouth every morning (before breakfast)   Refills:  0        ONETOUCH ULTRA test strip   Generic drug:  blood glucose monitoring        TEST 2 TIMES PER DAY.   Refills:  0        * predniSONE 5 MG tablet   Commonly known as:  DELTASONE        Dose:  10 mg   Take 10 mg by mouth daily with food TOTAL DAILY DOSE = 12 MG   Refills:  0        * predniSONE 1 MG tablet   Commonly known as:  DELTASONE        Dose:  2 mg   Take 2 mg by mouth daily TOTAL DAILY DOSE = 12 MG   Refills:  0        simvastatin 40 MG tablet   Commonly known as:  ZOCOR        Dose:  40 mg   Take 40 mg by mouth At Bedtime   Refills:  0        SYNTHROID 125 MCG tablet   Generic drug:  levothyroxine        Dose:  125 mcg   Take 125 mcg by mouth daily   Refills:  0        traZODone 100 MG tablet   Commonly known as:  DESYREL   Used for:  Anxiety        Dose:  100 mg   Take 1 tablet (100 mg) by mouth At Bedtime   Quantity:  30 tablet   Refills:  0        valACYclovir 1000 mg tablet   Commonly known as:  VALTREX   Used for:  Herpes labialis        Dose:  1000 mg   Take 1 tablet (1,000 mg) by mouth daily   Quantity:  7 tablet   Refills:  0        YUVAFEM 10 MCG Tabs vaginal tablet   Generic drug:  estradiol        Dose:  10 mcg   Place 10 mcg vaginally twice a week Takes on Sundays and Wednesdays   Refills:  0        * Notice:  This list has 5 medication(s) that are the same as other medications prescribed for you. Read the directions carefully, and ask your doctor or other care provider to review them with you.      STOP taking     amLODIPine 2.5 MG tablet   Commonly known as:  NORVASC           doxycycline Monohydrate 100 MG Tabs           furosemide 20 MG tablet   Commonly known as:  LASIX           losartan 25 MG tablet   Commonly known as:  COZAAR                   After Care   "   Activity - Up with assistive device           Activity - Up with nursing assistance           Additional Discharge Instructions       Follow up with PMD within one week.       Advance Diet as Tolerated       Follow this diet upon discharge: Orders Placed This Encounter      Regular Diet Adult       Daily weights       Call Provider for weight gain of more than 2 pounds per day or 5 pounds per week.       Fall precautions           General info for SNF       Length of Stay Estimate: Short Term Care: Estimated # of Days <30  Condition at Discharge: Stable  Level of care:skilled   Rehabilitation Potential: Fair  Admission H&P remains valid and up-to-date: Yes  Recent Chemotherapy: N/A  Use Nursing Home Standing Orders: Yes       Glucose monitor nursing POCT       Before meals and at bedtime       Mantoux instructions       Give two-step Mantoux (PPD) Per Facility Policy Yes             Procedures     Oxygen - Nasal cannula       1-4 Lpm by nasal cannula to keep O2 sats 92% or greater. Wean as able.             Referrals     Occupational Therapy Adult Consult       Evaluate and treat as clinically indicated.    Reason:  Weakness       Physical Therapy Adult Consult       Evaluate and treat as clinically indicated.    Reason:  Weakness             Statement of Approval     Ordered          06/28/18 1312  I have reviewed and agree with all the recommendations and orders detailed in this document.  EFFECTIVE NOW     Approved and electronically signed by:  Vanita Coley MD                                                 INTERAGENCY TRANSFER FORM - NURSING   6/27/2018                       [facility]: [phone]            Attending Provider: Vanita Coley MD     Allergies:  Glyburide, Mosquitoes (Informational Only), Other [Seasonal Allergies], Sulfamethoxazole-trimethoprim, Codeine    Infection:  None   Service:  Observation    Ht:  1.6 m (5' 3\")   Wt:  66 kg (145 lb 8.1 oz)   Admission Wt:  " "101 kg (222 lb 10.6 oz)    BMI:  25.77 kg/m 2   BSA:  1.71 m 2            Advance Directives        Scanned docmt in ACP Activity?           No scanned doc        Immunizations     None      ASSESSMENT     Discharge Profile Flowsheet     GASTROINTESTINAL (ADULT,PEDIATRIC,OB)     Inspection of bony prominences  Full 06/28/18 0301    GI WDL  ex;appearance/characteristics 06/28/18 0937   Skin WDL  ex;all 06/28/18 0301    Abdominal Appearance  distended;rounded 06/28/18 0937   Skin Color/Characteristics  pale 06/28/18 0301    All Quadrants Bowel Sounds  audible and active in all quadrants 06/28/18 0937   Skin Temperature  warm 06/28/18 0301    All Quadrants Palpation  guarding;tender 06/27/18 0933   Skin Moisture  dry 06/28/18 0301    Last Bowel Movement  06/27/18 06/27/18 0933   Skin Elasticity  quick return to original state 06/28/18 0301    GI Signs/Symptoms  diarrhea (per patient report) 06/27/18 1825   Skin Integrity  bruise(s);scab(s) 06/28/18 0301    Passing flatus  yes 06/28/18 0301   SAFETY      COMMUNICATION ASSESSMENT     Safety WDL  WDL 06/28/18 1430    Patient's communication style  spoken language (English or Bilingual) 06/27/18 1756   Safety Factors  bed in low position;wheels locked;call light in reach;upper side rails raised x 2;ID band on 06/28/18 1430    SKIN     All Alarms  alarm(s) activated and audible 06/28/18 1430                 Assessment WDL (Within Defined Limits) Definitions           Safety WDL     Effective: 09/28/15    Row Information: <b>WDL Definition:</b> Bed in low position, wheels locked; call light in reach; upper side rails up x 2; ID band on<br> <font color=\"gray\"><i>Item=AS safety wdl>>List=AS safety wdl>>Version=F14</i></font>      Skin WDL     Effective: 09/28/15    Row Information: <b>WDL Definition:</b> Warm; dry; intact; elastic; without discoloration; pressure points without redness<br> <font color=\"gray\"><i>Item=AS skin wdl>>List=AS skin wdl>>Version=F14</i></font>    "   Vitals     Vital Signs Flowsheet     VITAL SIGNS     Height Method  Stated 06/27/18 0909    Temp  97.4  F (36.3  C) 06/28/18 0931   Weight  66 kg (145 lb 8.1 oz) 06/28/18 0433    Temp src  Tympanic 06/28/18 0931   Weight Method  Bed scale 06/28/18 0433    Resp  22 06/28/18 0931   Estimated Weight (From ED)  70.3 kg (155 lb) 06/27/18 0909    Pulse  89 06/28/18 0303   BSA (Calculated - sq m)  2.12 06/27/18 1748    Heart Rate  89 06/28/18 0931   BMI (Calculated)  39.53 06/27/18 1748    Pulse/Heart Rate Source  Monitor 06/28/18 0931   EKG MONITORING      BP  108/48 06/28/18 0931   Cardiac Regularity  Regular 06/27/18 0931    OXYGEN THERAPY     Cardiac Rhythm  ST 06/27/18 0931    SpO2  97 % 06/28/18 0931   LATISHA COMA SCALE      O2 Device  None (Room air) 06/28/18 0931   Best Eye Response  4-->(E4) spontaneous 06/28/18 0937    Oxygen Delivery  1 LPM (decreased to RA) 06/27/18 1944   Best Motor Response  6-->(M6) obeys commands 06/28/18 0937    PAIN/COMFORT     Best Verbal Response  5-->(V5) oriented 06/28/18 0937    Patient Currently in Pain  yes 06/28/18 1148   Latisha Coma Scale Score  15 06/28/18 0937    Preferred Pain Scale  number (Numeric Rating Pain Scale) 06/28/18 1148   POINT OF CARE TESTING      Patient's Stated Pain Goal  5 06/28/18 0557   Puncture Site  fingertip 06/28/18 1201    0-10 Pain Scale  5 06/28/18 1440   Bedside Glucose (mg/dl )   326 mg/dl 06/28/18 1201    Pain Location  Foot 06/28/18 0557   POSITIONING      Pain Orientation  Right 06/28/18 0557   Body Position  independently positioning;supine 06/28/18 0543    Pain Descriptors  Aching;Constant 06/28/18 0557   Head of Bed (HOB)  HOB at 15 degrees 06/28/18 0543    Pain Intervention(s)  Medication (See eMAR) 06/28/18 0557   Positioning/Transfer Devices  pillows 06/28/18 0543    Response to Interventions  Decrease in pain 06/28/18 1148   DAILY CARE      HEIGHT AND WEIGHT     Activity Management  activity adjusted per tolerance 06/27/18 9143  "   Height  1.6 m (5' 3\") 06/27/18 1748   Activity Assistance Provided  assistance, 1 person 06/27/18 1828            Patient Lines/Drains/Airways Status    Active LINES/DRAINS/AIRWAYS     Name: Placement date: Placement time: Site: Days: Last dressing change:    Peripheral IV 06/27/18 Right Lower forearm 06/27/18   1652   Lower forearm   less than 1             Patient Lines/Drains/Airways Status    Active PICC/CVC     None            Intake/Output Detail Report     Date Intake     Output Net    Shift P.O. I.V. IV Piggyback Total Urine Total       Noc 06/26/18 2300 - 06/27/18 0659 -- -- -- -- -- -- 0    Day 06/27/18 0700 - 06/27/18 1459 -- -- -- -- -- -- 0    Yojana 06/27/18 1500 - 06/27/18 2259 -- -- -- -- 100 100 -100    Noc 06/27/18 2300 - 06/28/18 0659 125 665 -- 790 300 300 490    Day 06/28/18 0700 - 06/28/18 1459 440 303 -- 743 100 100 643      Last Void/BM       Most Recent Value    Urine Occurrence 1 at 06/28/2018 1241    Stool Occurrence       Case Management/Discharge Planning     Case Management/Discharge Planning Flowsheet     LIVING ENVIRONMENT     QUESTION TO PATIENT: Do you feel safe going back to the place where you are living?  yes 06/27/18 1756    Lives With  spouse 06/27/18 1756   OBSERVATION: Is there reason to believe there has been maltreatment of a vulnerable adult (ie. Physical/Sexual/Emotional abuse, self neglect, lack of adequate food, shelter, medical care, or financial exploitation)?  no 06/27/18 1756    Provides Primary Care For  no one 06/27/18 1756   HOMICIDE RISK      COPING/STRESS     Feels Like Hurting Others  no 06/27/18 1756    Major Change/Loss/Stressor  illness 06/27/18 1756   OTHER      ABUSE RISK SCREEN     Are you depressed or being treated for depression?  No 06/27/18 1756    QUESTION TO PATIENT:  Has a member of your family or a partner(now or in the past) intimidated, hurt, manipulated, or controlled you in any way?  no 06/27/18 1756                       [facility] " [facility]: [phone]            Medication Administration Report for Laura Perez as of 06/28/18 5339   Legend:    Given Hold Not Given Due Canceled Entry Other Actions    Time Time (Time) Time  Time-Action       Inactive    Active    Linked        Medications 06/22/18 06/23/18 06/24/18 06/25/18 06/26/18 06/27/18 06/28/18    0.9% sodium chloride BOLUS  Dose: 250 mL  Freq: EVERY 6 HOURS PRN Route: IV  PRN Comment: Over 2 hours for SBP<85mmHg  Start: 06/27/18 1742   Admin. Amount: 250 mL  Dispense Loc:  MAIN PHARMACY  Volume: 250 mL               acetaminophen (TYLENOL) Suppository 650 mg  Dose: 650 mg  Freq: EVERY 4 HOURS PRN Route: RE  PRN Reason: mild pain  Start: 06/27/18 1742   Admin Instructions: Alternate ibuprofen (if ordered) with acetaminophen.  Maximum acetaminophen dose from all sources = 75 mg/kg/day not to exceed 4 grams/day.    Admin. Amount: 1 suppository (1 × 650 mg suppository)  Dispense Loc: MED/SURG/PEDS               acetaminophen (TYLENOL) tablet 650 mg  Dose: 650 mg  Freq: EVERY 4 HOURS PRN Route: PO  PRN Reason: mild pain  Start: 06/27/18 1742   Admin Instructions: Alternate ibuprofen (if ordered) with acetaminophen.  Maximum acetaminophen dose from all sources = 75 mg/kg/day not to exceed 4 grams/day.    Admin. Amount: 2 tablet (2 × 325 mg tablet)  Dispense Loc: MED/SURG/PEDS               albuterol neb solution 2.5 mg  Dose: 2.5 mg  Freq: EVERY 1 HOUR PRN Route: NEBULIZATION  PRN Reason: wheezing  Start: 06/27/18 1742   Admin. Amount: 2.5 mg = 3 mL Conc: 2.5 mg/3 mL  Dispense Loc: MED/SURG/PEDS  Volume: 3 mL               aspirin chewable tablet 81 mg  Dose: 81 mg  Freq: DAILY Route: PO  Start: 06/28/18 1000   Admin. Amount: 1 tablet (1 × 81 mg tablet)  Last Admin: 06/28/18 1055  Dispense Loc: MED/SURG/PEDS           1055 (81 mg)-Given           atorvastatin (LIPITOR) tablet 10 mg  Dose: 10 mg  Freq: DAILY Route: PO  Start: 06/28/18 1000   Admin. Amount: 1 tablet (1 × 10 mg  tablet)  Last Admin: 06/28/18 1055  Dispense Loc: MED/SURG/PEDS           1055 (10 mg)-Given           bisacodyl (DULCOLAX) Suppository 10 mg  Dose: 10 mg  Freq: DAILY PRN Route: RE  PRN Reason: constipation  Start: 06/27/18 1742   Admin Instructions: Hold for loose stools.  This is the third step of a three step constipation treatment.    Admin. Amount: 1 suppository (1 × 10 mg suppository)  Dispense Loc: MED/SURG/PEDS               budesonide (PULMICORT) neb solution 0.5 mg  Dose: 0.5 mg  Freq: 2 TIMES DAILY Route: NEBULIZATION  Start: 06/27/18 1900   Admin. Amount: 0.5 mg = 2 mL Conc: 0.5 mg/2 mL  Last Admin: 06/28/18 0732  Dispense Loc: MED/SURG/PEDS          1828 (0.5 mg)-Given        0732 (0.5 mg)-Given       [ ] 1900           DULoxetine (CYMBALTA) EC capsule 30 mg  Dose: 30 mg  Freq: DAILY Route: PO  Start: 06/28/18 1000   Admin. Amount: 1 capsule (1 × 30 mg capsule)  Last Admin: 06/28/18 1056  Dispense Loc: MED/SURG/PEDS           1056 (30 mg)-Given           fenofibrate tablet 48 mg  Dose: 48 mg  Freq: DAILY Route: PO  Start: 06/28/18 1000   Admin. Amount: 1 tablet (1 × 48 mg tablet)  Last Admin: 06/28/18 1055  Dispense Loc: [facility]           1055 (48 mg)-Given           gabapentin (NEURONTIN) capsule 300 mg  Dose: 300 mg  Freq: 3 TIMES DAILY Route: PO  Start: 06/27/18 2200   Admin. Amount: 1 capsule (1 × 300 mg capsule)  Last Admin: 06/28/18 1439  Dispense Loc: MED/SURG/PEDS          2237 (300 mg)-Given        0558 (300 mg)-Given       1439 (300 mg)-Given       [ ] 2200           glucose gel 15-30 g  Dose: 15-30 g  Freq: EVERY 15 MIN PRN Route: PO  PRN Reason: low blood sugar  Start: 06/27/18 1742   Admin Instructions: Give 15 g for BG 51 to 69 mg/dL IF patient is conscious and able to swallow. Give 30 g for BG less than or equal to 50 mg/dL IF patient is conscious and able to swallow. Do NOT give glucose gel via enteral tube.  IF patient has enteral tube: give apple juice 120 mL (4 oz or 15 g of  CHO) via enteral tube for BG 51 to 69 mg/dL.  Give apple juice 240 mL (8 oz or 30 g of CHO) via enteral tube for BG less than or equal to 50 mg/dL.    ~Oral gel is preferable for conscious and able to swallow patient.   ~IF gel unavailable or patient refuses may provide apple juice 120 mL (4 oz or 15 g of CHO). Document juice on I and O flowsheet.    Admin. Amount: 15-30 g  Dispense Loc: MED/SURG/PEDS  Volume: 93.75 mL              Or  dextrose 50 % injection 25-50 mL  Dose: 25-50 mL  Freq: EVERY 15 MIN PRN Route: IV  PRN Reason: low blood sugar  Start: 06/27/18 1742   Admin Instructions: Use if have IV access, BG less than 70 mg/dL and meet dose criteria below:  Dose if conscious and alert (or disorientated) and NPO = 25 mL  Dose if unconscious / not alert = 50 mL  Vesicant. For ordered doses up to 25 g, give IV Push undiluted. Give each 5g over 1 minute.    Admin. Amount: 25-50 mL  Dispense Loc: MED/SURG/PEDS  Infused Over: 1-5 Minutes  Volume: 50 mL              Or  glucagon injection 1 mg  Dose: 1 mg  Freq: EVERY 15 MIN PRN Route: SC  PRN Reason: low blood sugar  PRN Comment: May repeat x 1 only  Start: 06/27/18 1742   Admin Instructions: May give SQ or IM. ONLY use glucagon IF patient has NO IV access AND is UNABLE to swallow AND blood glucose is LESS than or EQUAL to 50 mg/dL.  If ordered IV, give IV Push over 1 minute. Reconstitute with 1mL sterile water.    Admin. Amount: 1 mg  Dispense Loc: MED/SURG/PEDS               HYDROmorphone (PF) (DILAUDID) injection 0.3-0.5 mg  Dose: 0.3-0.5 mg  Freq: EVERY 2 HOURS PRN Route: IV  PRN Reason: other  PRN Comment: IF unable to take PO. Pain control or improvement in physical function.  Start: 06/27/18 1742   Admin Instructions: Hold dose for analgesic side effects.  If needing IV pain meds for 2 or more doses call provider to have oral medications adjusted to get pain controlled on oral regimen prior to discharge.  For ordered doses up to 4 mg give IV Push undiluted.  Administer each 2mg over 2-5 minutes.    Admin. Amount: 0.3-0.5 mg  Dispense Loc: MED/SURG/PEDS               insulin aspart (NovoLOG) inj (RAPID ACTING)  Dose: 1-5 Units  Freq: AT BEDTIME Route: SC  Start: 06/27/18 2200   Admin Instructions: MEDIUM INSULIN RESISTANCE DOSING    Do Not give Bedtime Correction Insulin if BG less than  200.   For  - 249 give 1 units.   For  - 299 give 2 units.   For  - 349 give 3 units.   For  -399 give 4 units.   For BG greater than or equal to 400 give 5 units.  Notify provider if glucose greater than or equal to 350 mg/dL after administration of correction dose.  If given at mealtime, must be administered 5 min before meal or immediately after.    Admin. Amount: 1-5 Units  Dispense Loc: [facility] DISPENSE ON DEMAND ONLY  Volume: 3 mL          (2221)-Not Given [C]        [ ] 2200           insulin aspart (NovoLOG) inj (RAPID ACTING)  Dose: 1-7 Units  Freq: 3 TIMES DAILY BEFORE MEALS Route: SC  Start: 06/27/18 1745   Admin Instructions: Correction Scale - MEDIUM INSULIN RESISTANCE DOSING     Do Not give Correction Insulin if Pre-Meal BG less than 140.   For Pre-Meal  - 189 give 1 unit.   For Pre-Meal  - 239 give 2 units.   For Pre-Meal  - 289 give 3 units.   For Pre-Meal  - 339 give 4 units.   For Pre-Meal - 399 give 5 units.   For Pre-Meal -449 give 6 units  For Pre-Meal BG greater than or equal to 450 give 7 units.   To be given with prandial insulin, and based on pre-meal blood glucose.    Notify provider if glucose greater than or equal to 350 mg/dL after administration of correction dose.  If given at mealtime, must be administered 5 min before meal or immediately after.    Admin. Amount: 1-7 Units  Last Admin: 06/28/18 1238  Dispense Loc: [facility] DISPENSE ON DEMAND ONLY  Volume: 3 mL                  0847 (4 Units)-Given       1238 (4 Units)-Given       [ ] 1730           ipratropium - albuterol 0.5 mg/2.5 mg/3 mL (DUONEB) neb  "solution 3 mL  Dose: 3 mL  Freq: EVERY 4 HOURS Route: NEBULIZATION  Start: 06/27/18 1900   Admin. Amount: 3 mL  Last Admin: 06/28/18 0730  Dispense Loc: MED/SURG/PEDS  Volume: 3 mL          1828 (3 mL)-Given       2323 (3 mL)-Given        0302 (3 mL)-Given       0730 (3 mL)-Given       (1126)-Not Given [C]       [ ] 1500       [ ] 1900       [ ] 2300           levothyroxine (SYNTHROID/LEVOTHROID) tablet 112 mcg  Dose: 112 mcg  Freq: EVERY MORNING BEFORE BREAKFAST Route: PO  Start: 06/28/18 0730   Admin Instructions: Separate oral administration of iron- or calcium-containing products and levothyroxine by at least 4 hours.    Admin. Amount: 1 tablet (1 × 112 mcg tablet)  Last Admin: 06/28/18 0852  Dispense Loc: MED/SURG/PEDS           0852 (112 mcg)-Given           lidocaine (LMX4) cream  Freq: EVERY 1 HOUR PRN Route: Top  PRN Reason: pain  PRN Comment: with VAD insertion or accessing implanted port.  Start: 06/27/18 1742   Admin Instructions: Do NOT give if patient has a history of allergy to any local anesthetic or any \"katherine\" product.  Apply 30 minutes prior to VAD insertion or port access. MAX Dose: 2.5 g (  of 5 g tube).    Dispense Loc: MED/SURG/PEDS               lidocaine 1 % 1 mL  Dose: 1 mL  Freq: EVERY 1 HOUR PRN Route: OTHER  PRN Comment: mild pain with VAD insertion or accessing implanted port  Start: 06/27/18 1742   Admin Instructions: Do NOT give if patient has a history of allergy to any local anesthetic or any \"katherine\" product. MAX dose 1 mL subcutaneous OR intradermal in divided doses.    Admin. Amount: 1 mL  Dispense Loc: MED/SURG/PEDS  Volume: 2 mL               LORazepam (ATIVAN) injection 0.5-1 mg  Dose: 0.5-1 mg  Freq: EVERY 4 HOURS PRN Route: IV  PRN Reasons: anxiety,agitation  Start: 06/27/18 1742   Admin Instructions: For IV PUSH: Dilute with equal volume of NS. For ordered doses up to 4 mg give IV Push. Administer each 2mg over 1-5 minutes.    Admin. Amount: 0.5-1 mg = 0.25-0.5 mL Conc: 2 " mg/mL  Dispense Loc: MED/SURG/PEDS  Volume: 1 mL               magnesium hydroxide (MILK OF MAGNESIA) suspension 30 mL  Dose: 30 mL  Freq: DAILY PRN Route: PO  PRN Reason: constipation  Start: 06/27/18 1742   Admin Instructions: Hold for loose stools.  This is the second step of a three step constipation treatment.    Admin. Amount: 30 mL  Dispense Loc: MED/SURG/PEDS  Volume: 30 mL               naloxone (NARCAN) injection 0.1-0.4 mg  Dose: 0.1-0.4 mg  Freq: EVERY 2 MIN PRN Route: IV  PRN Reason: opioid reversal  Start: 06/27/18 1742   Admin Instructions: For respiratory rate LESS than or EQUAL to 8.  Partial reversal dose:  0.1 mg titrated q 2 minutes for Analgesia Side Effects Monitoring Sedation Level of 3 (frequently drowsy, arousable, drifts to sleep during conversation).Full reversal dose:  0.4 mg bolus for Analgesia Side Effects Monitoring Sedation Level of 4 (somnolent, minimal or no response to stimulation).  For ordered doses up to 2mg give IVP. Give each 0.4mg over 15 seconds in emergency situations. For non-emergent situations further dilute in 9mL of NS to facilitate titration of response.    Admin. Amount: 0.1-0.4 mg = 0.25-1 mL Conc: 0.4 mg/mL  Dispense Loc: MED/SURG/PEDS  Volume: 1 mL               ondansetron (ZOFRAN-ODT) ODT tab 4 mg  Dose: 4 mg  Freq: EVERY 6 HOURS PRN Route: PO  PRN Reasons: nausea,vomiting  Start: 06/27/18 1742   Admin Instructions: This is Step 1 of nausea and vomiting management.  If nausea not resolved in 15 minutes, go to Step 2 prochlorperazine (COMPAZINE). Do not push through foil backing. Peel back foil and gently remove. Place on tongue immediately. Administration with liquid unnecessary  With dry hands, peel back foil backing and gently remove tablet; do not push oral disintegrating tablet through foil backing; administer immediately on tongue and oral disintegrating tablet dissolves in seconds; then swallow with saliva; liquid not required.    Admin. Amount: 1 tablet  (1 × 4 mg tablet)  Dispense Loc: MED/SURG/PEDS                     Or  ondansetron (ZOFRAN) injection 4 mg  Dose: 4 mg  Freq: EVERY 6 HOURS PRN Route: IV  PRN Reasons: nausea,vomiting  Start: 06/27/18 1742   Admin Instructions: This is Step 1 of nausea and vomiting management.  If nausea not resolved in 15 minutes, go to Step 2 prochlorperazine (COMPAZINE).  Irritant. For ordered doses up to 4 mg, give IV Push undiluted over 2-5 minutes.    Admin. Amount: 4 mg = 2 mL Conc: 4 mg/2 mL  Last Admin: 06/28/18 1102  Dispense Loc: MED/SURG/PEDS  Infused Over: 2-5 Minutes  Volume: 2 mL           1102 (4 mg)-Given           oxyCODONE IR (ROXICODONE) tablet 5-10 mg  Dose: 5-10 mg  Freq: EVERY 3 HOURS PRN Route: PO  PRN Reason: other  PRN Comment: pain control or improvement in physical function.  Start: 06/27/18 1742   Admin Instructions: Start with the lowest dose.  May adjust dose by 5 mg every 3 hours as needed for pain control or improvement in physical function.  Hold dose for analgesic side effects.  Notify provider to assess for uncontrolled pain or analgesic side effects.    Admin. Amount: 1-2 tablet (1-2 × 5 mg tablet)  Last Admin: 06/28/18 1439  Dispense Loc: MED/SURG/PEDS          2024 (5 mg)-Given       2333 (10 mg)-Given        0246 (10 mg)-Given       0558 (10 mg)-Given       0927 (10 mg)-Given       1439 (10 mg)-Given           pantoprazole (PROTONIX) EC tablet 40 mg  Dose: 40 mg  Freq: EVERY MORNING BEFORE BREAKFAST Route: PO  Start: 06/28/18 0730   Admin Instructions: DO NOT CRUSH.    Admin. Amount: 1 tablet (1 × 40 mg tablet)  Last Admin: 06/28/18 0852  Dispense Loc: MED/SURG/PEDS           0852 (40 mg)-Given           predniSONE (DELTASONE) tablet 40 mg  Dose: 40 mg  Freq: DAILY Route: PO  Start: 06/27/18 1745   Admin. Amount: 2 tablet (2 × 20 mg tablet)  Last Admin: 06/28/18 1055  Dispense Loc: MED/SURG/PEDS          1818 (40 mg)-Given        1055 (40 mg)-Given           prochlorperazine (COMPAZINE)  injection 5 mg  Dose: 5 mg  Freq: EVERY 6 HOURS PRN Route: IV  PRN Reasons: nausea,vomiting  Start: 06/27/18 1742   Admin Instructions: This is Step 2 of nausea and vomiting management. Give if nausea not resolved 15 minutes after giving ondansetron (ZOFRAN). If nausea not resolved in 15 minutes, go to Step 3 metoclopramide (REGLAN), if ordered.  For ordered doses up to 10 mg, give IV Push undiluted. Each 5mg over 1 minute.    Admin. Amount: 5 mg = 1 mL Conc: 5 mg/mL  Dispense Loc: MED/SURG/PEDS  Infused Over: 1-2 Minutes  Volume: 1 mL              Or  prochlorperazine (COMPAZINE) tablet 5 mg  Dose: 5 mg  Freq: EVERY 6 HOURS PRN Route: PO  PRN Reason: vomiting  Start: 06/27/18 1742   Admin Instructions: This is Step 2 of nausea and vomiting management. Give if nausea not resolved 15 minutes after giving ondansetron (ZOFRAN). If nausea not resolved in 15 minutes, go to Step 3 metoclopramide (REGLAN), if ordered.    Admin. Amount: 1 tablet (1 × 5 mg tablet)  Dispense Loc: MED/SURG/PEDS              Or  prochlorperazine (COMPAZINE) Suppository 12.5 mg  Dose: 12.5 mg  Freq: EVERY 12 HOURS PRN Route: RE  PRN Reasons: nausea,vomiting  Start: 06/27/18 1742   Admin Instructions: This is Step 2 of nausea and vomiting management. Give if nausea not resolved 15 minutes after giving ondansetron (ZOFRAN). If nausea not resolved in 15 minutes, go to Step 3 metoclopramide (REGLAN), if ordered.    Admin. Amount: 0.5 suppository (0.5 × 25 mg suppository)  Dispense Loc: MED/SURG/PEDS               salmeterol (SEREVENT) diskus inhaler 1 puff  Dose: 1 puff  Freq: 2 TIMES DAILY Route: IN  Start: 06/27/18 1900   Admin. Amount: 1 puff  Last Admin: 06/28/18 0733  Dispense Loc:  MAIN PHARMACY          1943 (1 puff)-Given        0733 (1 puff)-Given       [ ] 1900           senna-docusate (SENOKOT-S;PERICOLACE) 8.6-50 MG per tablet 1 tablet  Dose: 1 tablet  Freq: 2 TIMES DAILY PRN Route: PO  PRN Reason: constipation  Start: 06/27/18 1746    Admin Instructions: If no bowel movement in 24 hours, increase to 2 tablets PO.  Hold for loose stools.  This is the first step of a three step constipation treatment.    Admin. Amount: 1 tablet  Dispense Loc: MED/SURG/PEDS              Or  senna-docusate (SENOKOT-S;PERICOLACE) 8.6-50 MG per tablet 2 tablet  Dose: 2 tablet  Freq: 2 TIMES DAILY PRN Route: PO  PRN Reason: constipation  Start: 06/27/18 1742   Admin Instructions: Hold for loose stools.  This is the first step of a three step constipation treatment.    Admin. Amount: 2 tablet  Dispense Loc: MED/SURG/PEDS               sodium chloride (PF) 0.9% PF flush 3 mL  Dose: 3 mL  Freq: EVERY 8 HOURS Route: IK  Start: 06/27/18 1745   Admin Instructions: And Q1H PRN, to lock peripheral IV dormant line.    Admin. Amount: 3 mL  Dispense Loc: MATERIALS  Volume: 10 mL   Current Line: Peripheral IV 06/27/18 Right Lower forearm                 (0110)-Not Given [C]       (1057)-Not Given       [ ] 1745           sodium chloride (PF) 0.9% PF flush 3 mL  Dose: 3 mL  Freq: EVERY 1 HOUR PRN Route: IK  PRN Reason: line flush  Start: 06/27/18 1742   Admin Instructions: for peripheral IV flush post IV meds    Admin. Amount: 3 mL  Dispense Loc: MATERIALS  Volume: 10 mL               sodium chloride 0.9% infusion  Rate: 50 mL/hr   Freq: CONTINUOUS Route: IV  Start: 06/27/18 1800   Last Admin: 06/28/18 0641  Dispense Loc:  MAIN PHARMACY  Volume: 1,000 mL   Current Line: Peripheral IV 06/27/18 Right Lower forearm         1808 ( )-New Bag       2345 ( )-Rate/Dose Verify        0641 ( )-Rate/Dose Verify           sodium chloride 0.9% infusion  Rate: 500 mL/hr   Freq: CONTINUOUS Route: IV  Last Dose: 500 mL/hr (06/27/18 1649)  Start: 06/27/18 1119   Last Admin: 06/27/18 1649  Dispense Loc:  MAIN PHARMACY  Volume: 1,000 mL          1130 ( )-New Bag       1630-Stopped       1649 (500 mL/hr)-New Bag            traMADol (ULTRAM) tablet  mg  Dose:  mg  Freq: AT BEDTIME  Route: PO  Start: 18   Admin. Amount: 1-2 tablet (1-2 × 50 mg tablet)  Last Admin: 18  Dispense Loc: MED/SURG/PEDS           (50 mg)-Given        [ ]            valACYclovir (VALTREX) tablet 1,000 mg  Dose: 1,000 mg  Freq: EVERY 12 HOURS SCHEDULED Route: PO  Indications of Use: HERPES LABIALIS  Start: 18   Admin. Amount: 2 tablet (2 × 500 mg tablet)  Last Admin: 18  Dispense Loc:  MAIN PHARMACY           (1,000 mg)-Given        927 (1,000 mg)-Given       [ ]           Future Medications  Medications 18       montelukast (SINGULAIR) chewable tablet 10 mg  Dose: 10 mg  Freq: AT BEDTIME Route: PO  Start: 18   Admin. Amount: 2 tablet (2 × 5 mg tablet)  Dispense Loc: MED/SURG/PEDS           [ ]           Completed Medications  Medications 18         Dose: 0.5 mg  Freq: ONCE PRN Route: IV  PRN Reason: moderate to severe pain  Start: 18 1636   End: 18 165   Admin Instructions: For ordered doses up to 4 mg give IV Push undiluted. Administer each 2mg over 2-5 minutes.    Admin. Amount: 0.5 mg  Last Admin: 18  Dispense Loc: EMERGENCY DEPARTMENT  Administrations Remainin          1650 (0.5 mg)-Given              Dose: 0.5 mg  Freq: ONCE Route: IV  Start: 18 1119   End: 18 113   Admin Instructions: For IV PUSH: Dilute with equal volume of NS. For ordered doses up to 4 mg give IV Push. Administer each 2mg over 1-5 minutes.    Admin. Amount: 0.5 mg = 0.25 mL Conc: 2 mg/mL  Last Admin: 18 1131  Dispense Loc: EMERGENCY DEPARTMENT  Administrations Remainin  Volume: 1 mL          1131 (0.5 mg)-Given              Dose: 0.5 mg  Freq: ONCE Route: IV  Start: 18   End: 18   Admin Instructions: For IV PUSH: Dilute with equal volume of NS. For ordered doses up to 4 mg give  IV Push. Administer each 2mg over 1-5 minutes.    Admin. Amount: 0.5 mg = 0.25 mL Conc: 2 mg/mL  Last Admin: 18 0949  Dispense Loc: EMERGENCY DEPARTMENT  Administrations Remainin  Volume: 1 mL          0949 (0.5 mg)-Given              Dose: 2 mg  Freq: ONCE Route: IV  Start: 18 1119   End: 18 1135   Admin Instructions: For ordered doses up to 15 mg give IV Push undiluted over 4-5 minutes.    Admin. Amount: 2 mg  Last Admin: 18 113  Dispense Loc: EMERGENCY DEPARTMENT  Infused Over: 4-5 Minutes  Administrations Remainin          1131 (2 mg)-Given           Discontinued Medications  Medications 18         Dose: 2 mL  Freq: 2 TIMES DAILY Route: NEBULIZATION  Start: 18   End: 18   Admin. Amount: 20 mcg = 2 mL Conc: 20 mcg/2 mL  Volume: 2 mL          -Med Discontinued          Dose: 10 mg  Freq: AT BEDTIME Route: PO  Start: 18   End: 18   Admin. Amount: 1 tablet (1 × 10 mg tablet)  Dispense Loc: MED/SURG/PEDS          (3267)-Not Given [C]        -Med Discontinued         Dose: 20 mg  Freq: EVERY MORNING BEFORE BREAKFAST Route: PO  Start: 18 07   End: 18   Admin. Amount: 1 capsule (1 × 20 mg capsule)          -Med Discontinued          Dose: 40 mg  Freq: DAILY Route: PO  Start: 18 1745   End: 18                 181-Med Discontinued                 INTERAGENCY TRANSFER FORM - NOTES (H&P, Discharge Summary, Consults, Procedures, Therapies)   2018                       [facility]: [phone]               History & Physicals      H&P by Vanita Coley MD at 2018  4:37 PM     Author:  Vanita Coley MD Service:  Hospitalist Author Type:  Physician    Filed:  2018  4:59 PM Date of Service:  2018  4:37 PM Creation Time:  2018  4:37 PM    Status:  Addendum :  Vanita Coley MD (Physician)          [facility]    History and Physical  Hospitalist       Date of Admission:  6/27/2018  Date of Service (when I saw the patient):[AK1.1] 06/27/18[AK1.2]    Assessment & Plan   1. COPD Exacerbation- steroids, Nebs, O2.   2. HTN- BP soft now, so hold all BP meds.   3. RA- Chronic pain. Oxy and low dose Dilaudid.   4. Mild hypotension- Given CHF history, careful with IV opioids and fluids. Gentle IVFs.   5. OC dessicans- Outpt Ortho f/u.   6. Herpes labialis- Continue Valcyclovir.   7. DM- Per  BS have been low. Will place on Sl scale and hypoglycemia protocol.[AK1.1]   8. CKD Stage 3- Cr at baseline.[AK1.3]     Active Problems:    * No active hospital problems. *    DVT Prophylaxis: Heparin SQ  Code Status: Full Code    Disposition: Expected discharge in 1 day once NH bed available and breathing more stable.     Vanita Coley    Primary Care Physician   Elin Mohan    Chief Complaint   SOB and chronic pain.    History is obtained from the patient, electronic health record, emergency department physician, paper chart and patient's spouse    History of Present Illness   Laura Perez is a 68 year old female who presents with SOB and chronic pain. She was DCed from [facility] on 6/21/2018. I reviewed the DC summary and briefly she was thought to get hypotensive from large doses of IV opioids, then got into pulmonary edema and was diuresed. She had a mild Trop elevation which was thought to be due to fluid overload. She had an Echo which revealed an LVFEF of 55% (diastolic CHF). She also was found to have COPD and given Nebs. She was also Rxed initially for a pneumonia but after a chest CT was ruled out for pneumonia and PE. She was DCed on Doxy for possible acute bronchitis. She's had a total of 10 days of Abxs both in-house and as an outpt.     She also has chronic pain and is on chronic steroids for her RA. Her rheumatologist has been struggling with various biologics to get her RA  controlled but complete control hasn't been possible. She's been at home and not doing well, not using her Nebs and she says both her pain and breathing are bothering her. She describes pain all over and her skin is all bruised which also causes pain.  reports anxiety as well. SHe's currently on O2 and having a hard time breathing but able to speak in full sentences. 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.     Past Medical History    I have reviewed this patient's medical history and updated it with pertinent information if needed.[AK1.1]   Past Medical History:   Diagnosis Date     Chronic obstructive pulmonary disease (H)     No Comments Provided     Diverticulosis of intestine without perforation or abscess without bleeding     Diffuse diverticulosis and history of diminutive hyperplastic colon     Essential (primary) hypertension     No Comments Provided     Hyperlipidemia     No Comments Provided     Hypothyroidism     No Comments Provided     Malignant neoplasm of colon (H)     9/2017     Personal history of other diseases of the nervous system and sense organs     No Comments Provided     Rheumatoid arthritis (H)     Rheumatologist Dr. Gonzalez, [phone], fax [phone]     Rosacea     No Comments Provided     Type 2 diabetes mellitus without complications (H)     Diabetes Mellitus, prednisone induced     Urinary tract infection     8/2013,admitted to [facility][AK1.4]       Past Surgical History   I have reviewed this patient's surgical history and updated it with pertinent information if needed.[AK1.1]  Past Surgical History:   Procedure Laterality Date     ARTHROPLASTY,Right MTP 1 and 5 and left MTP 5  01/2001     COLON SURGERY Right 09/2017    For colon cancer     COLONOSCOPY  09/29/2008     LAPAROSCOPIC TUBAL LIGATION      No  Comments Provided     Left MCP joint arthroplasty  02/05/1998     MTP 2, 3, 4 right foot  05/2000     RELEASE CARPAL TUNNEL Left 1991     Right long finger MCP repair  1996    [place]     Right MCP 4 and 5 arthroplasty  03/01/2004     Right wrist arthroplasty  1990[AK1.4]       Prior to Admission Medications   Prior to Admission Medications   Prescriptions Last Dose Informant Patient Reported? Taking?   Calcium carb-Vitamin D 500 mg [place]-200 units (OSCAL WITH D;OYSTER SHELL CALCIUM) 500-200 MG-UNIT per tablet Unknown at Unknown time Self Yes No   Sig: Take 1 tablet by mouth daily with food   DOXYCYCLINE HYCLATE PO 6/26/2018 at Unknown time  Yes Yes   DULOXETINE HCL PO 6/26/2018 at Unknown time  Yes Yes   Sig: Take 30 mg by mouth   EPINEPHrine (EPIPEN/ADRENACLICK/OR ANY BX GENERIC EQUIV) 0.3 MG/0.3ML injection 2-pack Unknown at Unknown time Pharmacy Yes No   Sig: Inject 0.3 mg into the muscle once as needed   PRAVASTATIN SODIUM PO 6/26/2018 at Unknown time  Yes Yes   Sig: Take 40 mg by mouth   VALACYCLOVIR HCL PO 6/26/2018 at Unknown time  Yes Yes   albuterol (PROAIR HFA/PROVENTIL HFA/VENTOLIN HFA) 108 (90 BASE) MCG/ACT Inhaler 6/26/2018 at Unknown time Pharmacy Yes Yes   Sig: Inhale 2 puffs into the lungs every 4 hours as needed   amLODIPine (NORVASC) 2.5 MG tablet 6/26/2018 at Unknown time Pharmacy Yes Yes   Sig: Take 2.5 mg by mouth daily   aspirin 81 MG chewable tablet 6/26/2018 at Unknown time Self Yes Yes   Sig: Take 81 mg by mouth daily with food   atovaquone-proguanil (MALARONE) 250-100 MG per tablet 6/26/2018 at Unknown time  Yes Yes   Sig: Take 1 tablet by mouth daily   blood glucose monitoring (ONE TOUCH ULTRASOFT) lancets Unknown at Unknown time Pharmacy Yes No   Sig: TEST 2 TIMES PER DAY   blood glucose monitoring (ONETOUCH ULTRA) test strip Unknown at Unknown time Pharmacy Yes No   Sig: TEST 2 TIMES PER DAY.   budesonide (PULMICORT) 0.5 MG/2ML neb solution 6/26/2018 at Unknown time Pharmacy Yes Yes    Sig: Inhale 0.5 mg into the lungs 2 times daily   calcium carbonate (OS-GIANNI 500 MG [place]) 500 MG tablet 6/26/2018 at Unknown time  Yes Yes   Sig: Take 1 tablet by mouth 2 times daily   estradiol (YUVAFEM) 10 MCG TABS vaginal tablet 6/26/2018 at Unknown time Pharmacy Yes Yes   Sig: Place 10 mcg vaginally twice a week   fenofibrate 48 MG tablet 6/26/2018 at Unknown time Pharmacy Yes Yes   Sig: Take 48 mg by mouth daily with food   formoterol (PERFOROMIST) 20 MCG/2ML neb solution 6/26/2018 at Unknown time Pharmacy Yes Yes   Sig: Inhale 2 mLs into the lungs 2 times daily   furosemide (LASIX) 20 MG tablet  Self Yes No   Sig: Take 20 mg by mouth daily as needed (leg swelling)    gabapentin (NEURONTIN) 300 MG capsule 6/26/2018 at Unknown time Self Yes Yes   Sig: Take 300 mg by mouth 3 times daily   glucagon 1 MG kit  Pharmacy Yes No   Sig: Inject 1 mg into the muscle as needed for low blood sugar    glucose 4 g CHEW chewable tablet Unknown at Unknown time Self Yes No   Sig: Take 1 tablet by mouth as needed for low blood sugar   insulin aspart prot & aspart (NOVOLOG MIX 70/30 PEN) injection 6/26/2018 at Unknown time Self Yes Yes   Sig: Inject 50 units under th skin in the morning, and  30 units under the skin at bedtime   levothyroxine (SYNTHROID/LEVOTHROID) 112 MCG tablet 6/26/2018 at Unknown time Pharmacy Yes Yes   Sig: Take 112 mcg by mouth every morning (before breakfast)   lisinopril (PRINIVIL/ZESTRIL) 2.5 MG tablet Unknown at Unknown time Pharmacy Yes No   Sig: Take 2.5 mg by mouth   losartan (COZAAR) 25 MG tablet 6/26/2018 at Unknown time Pharmacy Yes Yes   Sig: Take 25 mg by mouth daily   medical cannabis (Patient's own supply.  Not a prescription) Unknown at Unknown time Self Yes No   Sig: Apply 1 Dose topically as needed (pain) (This is NOT a prescription, and does not certify that the patient has a qualifying medical condition for medical cannabis.  The purpose of this order is  to document that the patient  reports taking medical cannabis.)   medical cannabis liquid Unknown at Unknown time Self Yes No   Si-2 sprays under the tongue as needed for pain   medical cannabis liquid Unknown at Unknown time Self Yes No   Sig: Place 1 drop under the tongue as needed (pain) (This is NOT a prescription, and does not certify that the patient has a qualifying medical condition for medical cannabis.  The purpose of this order is  to document that the patient reports taking medical cannabis.)   montelukast (SINGULAIR) 10 MG tablet 2018 at Unknown time Pharmacy Yes Yes   Sig: Take 10 mg by mouth At Bedtime   omeprazole (PRILOSEC) 20 MG CR capsule 2018 at Unknown time Pharmacy Yes Yes   Sig: Take 20 mg by mouth every morning (before breakfast)   predniSONE (DELTASONE) 5 MG tablet 2018 at Unknown time Self Yes Yes   Sig: Take 12.5 mg by mouth daily with food    simvastatin (ZOCOR) 40 MG tablet Unknown at Unknown time Pharmacy Yes No   Sig: Take 40 mg by mouth At Bedtime   traMADol (ULTRAM) 50 MG tablet 2018 at Unknown time Self Yes Yes   Sig: Take  mg by mouth At Bedtime    traZODone (DESYREL) 100 MG tablet 2018 at Unknown time Pharmacy Yes Yes   Sig: Take 100 mg by mouth At Bedtime      Facility-Administered Medications: None     Allergies   Allergies   Allergen Reactions     Glyburide Anaphylaxis     Other reaction(s): Dizziness     Sulfa Drugs Anaphylaxis     Sulfamethoxazole-Trimethoprim Anaphylaxis     Other reaction(s): Other - Describe In Comment Field  Rash, nausea, dizziness     Codeine Nausea and Nausea and Vomiting     Mosquitoes (Informational Only) Hives     Other [Seasonal Allergies] Hives     Deer Flies       Social History   I have reviewed this patient's social history and updated it with pertinent information if needed. Laura PICKETT Chris[AK1.1]  reports that she has quit smoking. Her smoking use included Cigarettes. She has a 30.00 pack-year smoking history. She has never used smokeless  tobacco. She reports that she drinks about 1.5 oz of alcohol per week  She reports that she uses illicit drugs.[AK1.4]    Family History   I have reviewed this patient's family history and updated it with pertinent information if needed.[AK1.1]   Family History   Problem Relation Age of Onset     Cancer Mother 50     Cancer,uterus     HEART DISEASE Mother      Heart Disease,MI     Colon Cancer Mother      Cancer-colon     HEART DISEASE Father      Heart Disease,MI     Chronic Obstructive Pulmonary Disease Sister      COPD     Other - See Comments Sister      rubina dow     Colon Cancer Brother      Cancer-colon     Breast Cancer No family hx of      Cancer-breast[AK1.4]       Review of Systems   The 10 point Review of Systems is negative other than noted in the HPI or here.     Physical Exam   Temp: 100.4  F (38  C) Temp src: Tympanic BP: (!) 88/56 Pulse: 112 Heart Rate: 104 Resp: 15 SpO2: 94 % O2 Device: None (Room air)    Vital Signs with Ranges  Temp:  [99.1  F (37.3  C)-100.4  F (38  C)] 100.4  F (38  C)  Pulse:  [112] 112  Heart Rate:  [101-115] 104  Resp:  [8-37] 15  BP: ()/(41-75) 88/56  SpO2:  [92 %-100 %] 94 %  0 lbs 0 oz    Constitutional: Moaning in pain.  Eyes: Unremakable.  HEENT: No JVD.   Respiratory: Poor A/E bilaterally in all lung fields.   Cardiovascular: S1 + S2.   GI: Soft, NT.  Skin: Old bruising. Says it hurts at the IV site but no infiltration.  Musculoskeletal: NO pedal edema.  Neurologic: Grossly non-focal.  Psychiatric: Affect is anxious.     Data   Data reviewed today:  I personally reviewed all data below:    Recent Labs  Lab 06/27/18  0945   WBC 14.5*   HGB 11.0*   MCV 79      *   POTASSIUM 4.1   CHLORIDE 99   CO2 18*   BUN 12   CR 1.59*   ANIONGAP 15*   GIANNI 9.3   *   ALBUMIN 3.5   PROTTOTAL 6.6   BILITOTAL 0.6   ALKPHOS 70   ALT 12   AST 17   TROPI 0.041*       Lactic Acid   Date Value Ref Range Status   06/27/2018 <0.2 (L) 0.5 - 2.2 mmol/L Final  "  06/15/2018 0.9 0.5 - 2.2 mmol/L Final       Recent Results (from the past 24 hour(s))   XR Chest Port 1 View    Narrative    PROCEDURE:  XR CHEST PORT 1 VW    HISTORY:  sob; .     COMPARISON:  6/17/2018    FINDINGS:   The cardiac silhouette is normal in size. The pulmonary vasculature is  normal.  The lungs are clear. No pleural effusion or pneumothorax.      Impression    IMPRESSION:  No acute cardiopulmonary disease.      MIREILLE ROMERO MD[AK1.1]          Revision History        User Key Date/Time User Provider Type Action    > [N/A] 6/27/2018  4:59 PM Vanita Coley MD Physician Addend     AK1.3 6/27/2018  4:58 PM Vanita Coley MD Physician Sign     AK1.2 6/27/2018  4:57 PM Vanita Coley MD Physician      AK1.4 6/27/2018  4:53 PM Vanita Coley MD Physician      AK1.1 6/27/2018  4:37 PM Vanita Coley MD Physician                      Discharge Summaries      Discharge Summaries by Vanita Coley MD at 6/28/2018 12:39 PM     Author:  Vanita Coley MD Service:  Hospitalist Author Type:  Physician    Filed:  6/28/2018 12:44 PM Date of Service:  6/28/2018 12:39 PM Creation Time:  6/28/2018 12:38 PM    Status:  Signed :  Vanita Coley MD (Physician)         [facility]    Discharge Summary  Hospitalist    Date of Admission:  6/27/2018  Date of Discharge:[AK1.1]  6/28/2018[AK1.2]  Discharging Provider:[AK1.1] Vanita Coley[AK1.3]  Date of Service (when I saw the patient):[AK1.1] 06/28/18[AK1.2]    Discharge Diagnoses[AK1.3]   COPD exacerbation  Chronic pain from RA[AK1.1]    History of Present Illness[AK1.3]   \"Laura Perez is a 68 year old female who presents with SOB and chronic pain. She was DCed from [facility] on 6/21/2018. I reviewed the DC summary and briefly she was thought to get hypotensive from large doses of IV opioids, then got into pulmonary edema and was diuresed. She had a mild Trop elevation which was thought to be due to fluid overload. She had an Echo which " "revealed an LVFEF of 55% (diastolic CHF). She also was found to have COPD and given Nebs. She was also Rxed initially for a pneumonia but after a chest CT was ruled out for pneumonia and PE. She was DCed on Doxy for possible acute bronchitis. She's had a total of 10 days of Abxs both in-house and as an outpt.      She also has chronic pain and is on chronic steroids for her RA. Her rheumatologist has been struggling with various biologics to get her RA controlled but complete control hasn't been possible. She's been at home and not doing well, not using her Nebs and she says both her pain and breathing are bothering her. She describes pain all over and her skin is all bruised which also causes pain.  reports anxiety as well. SHe's currently on O2 and having a hard time breathing but able to speak in full sentences. 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 \"[AK1.1]    Hospital Course[AK1.3]   Laura Perez was admitted on 6/27/2018.  The following problems were addressed during her hospitalization:    1. COPD Exacerbation- Off O2. Pt wasn't doing Nebs or INH at home so turned around real quick. Now DC to TCU.   2. HTN- BP soft so DCed Amlodipine. Doesn't take Lasix- DCed. On ACE-I and ARB. DCed ARB.   3. RA- Chronic pain. Oxy and low dose Dilaudid.   4. Mild hypotension- Given CHF history, careful with IV opioids and fluids. Gentle IVFs. Resolved. BP meds cleaned up as above.   5. OC dessicans- Outpt Ortho f/u. No new fractures or lesions.   6. Herpes labialis- Continue Valcyclovir x7 more days.    7. DM- Continue outpt regimen with accuchecks at NH.  8. CKD Stage 3- Cr at baseline.[AK1.1]     Active Problems:    COPD exacerbation (H)[AK1.3]      Vanita Coley[AK1.1]    Code Status[AK1.3]   Full Code[AK1.1]       Primary Care Physician   Elin ACE " Marques      Discharge Disposition[AK1.3]   Discharged to short-term care facility  Condition at discharge: Stable[AK1.1]    Consultations This Hospital Stay   SOCIAL WORK IP CONSULT  SOCIAL WORK IP CONSULT  PHYSICAL THERAPY ADULT IP CONSULT  OCCUPATIONAL THERAPY ADULT IP CONSULT  RESPIRATORY CARE IP CONSULT  PHYSICAL THERAPY ADULT IP CONSULT  OCCUPATIONAL THERAPY ADULT IP CONSULT    Time Spent on this Encounter[AK1.3]   IVanita, personally saw the patient today and spent greater than 30 minutes discharging this patient.[AK1.1]    Discharge Orders     General info for SNF   Length of Stay Estimate: Short Term Care: Estimated # of Days <30  Condition at Discharge: Stable  Level of care:skilled   Rehabilitation Potential: Fair  Admission H&P remains valid and up-to-date: Yes  Recent Chemotherapy: N/A  Use Nursing Home Standing Orders: Yes     Mantoux instructions   Give two-step Mantoux (PPD) Per Facility Policy Yes     Reason for your hospital stay   COPD exacerbation- resolved.     Glucose monitor nursing POCT   Before meals and at bedtime     Daily weights   Call Provider for weight gain of more than 2 pounds per day or 5 pounds per week.     Additional Discharge Instructions   Follow up with PMD within one week.     Activity - Up with assistive device     Activity - Up with nursing assistance     Physical Therapy Adult Consult   Evaluate and treat as clinically indicated.    Reason:  Weakness     Occupational Therapy Adult Consult   Evaluate and treat as clinically indicated.    Reason:  Weakness     Fall precautions     Advance Diet as Tolerated   Follow this diet upon discharge: Orders Placed This Encounter     Regular Diet Adult       Discharge Medications   Current Discharge Medication List      START taking these medications    Details   acetaminophen (TYLENOL) 325 MG tablet Take 2 tablets (650 mg) by mouth every 4 hours as needed for mild pain  Qty: 100 tablet    Associated Diagnoses: Low back pain  without sciatica, unspecified back pain laterality, unspecified chronicity      ipratropium - albuterol 0.5 mg/2.5 mg/3 mL (DUONEB) 0.5-2.5 (3) MG/3ML neb solution Take 1 vial (3 mLs) by nebulization 4 times daily  Qty: 360 mL    Associated Diagnoses: Chronic obstructive pulmonary disease, unspecified COPD type (H)      oxyCODONE IR (ROXICODONE) 5 MG tablet Take 1-2 tablets (5-10 mg) by mouth every 3 hours as needed for other (pain control or improvement in physical function.)  Qty: 6 tablet, Refills: 0    Associated Diagnoses: Low back pain without sciatica, unspecified back pain laterality, unspecified chronicity         CONTINUE these medications which have CHANGED    Details   traMADol (ULTRAM) 50 MG tablet Take 1 tablet (50 mg) by mouth every 6 hours as needed for severe pain  Qty: 10 tablet, Refills: 0    Associated Diagnoses: Low back pain without sciatica, unspecified back pain laterality, unspecified chronicity      traZODone (DESYREL) 100 MG tablet Take 1 tablet (100 mg) by mouth At Bedtime  Qty: 30 tablet, Refills: 0    Associated Diagnoses: Anxiety      valACYclovir (VALTREX) 1000 mg tablet Take 1 tablet (1,000 mg) by mouth daily for 7 days  Qty: 7 tablet, Refills: 0    Associated Diagnoses: Herpes labialis         CONTINUE these medications which have NOT CHANGED    Details   aspirin 81 MG chewable tablet Take 81 mg by mouth daily with food      budesonide (PULMICORT) 0.5 MG/2ML neb solution Inhale 0.5 mg into the lungs 2 times daily      DULoxetine (CYMBALTA) 30 MG EC capsule Take 30 mg by mouth every morning       estradiol (YUVAFEM) 10 MCG TABS vaginal tablet Place 10 mcg vaginally twice a week Takes on Sundays and Wednesdays      fenofibrate 48 MG tablet Take 48 mg by mouth daily with food      formoterol (PERFOROMIST) 20 MCG/2ML neb solution Inhale 2 mLs into the lungs 2 times daily      gabapentin (NEURONTIN) 300 MG capsule Take 300 mg by mouth At Bedtime       insulin aspart prot & aspart  (NOVOLOG MIX 70/30 PEN) injection Inject 50 units under th skin in the morning, and  30 units under the skin at bedtime      levothyroxine (SYNTHROID) 125 MCG tablet Take 125 mcg by mouth daily      !! medical cannabis (Patient's own supply.  Not a prescription) Apply 1 Dose topically as needed (pain) (This is NOT a prescription, and does not certify that the patient has a qualifying medical condition for medical cannabis.  The purpose of this order is  to document that the patient reports taking medical cannabis.)      !! medical cannabis liquid Place 1 drop under the tongue as needed (pain) (This is NOT a prescription, and does not certify that the patient has a qualifying medical condition for medical cannabis.  The purpose of this order is  to document that the patient reports taking medical cannabis.)      !! medical cannabis liquid 1-2 sprays under the tongue as needed for pain      montelukast (SINGULAIR) 10 MG tablet Take 10 mg by mouth At Bedtime      omeprazole (PRILOSEC) 20 MG CR capsule Take 20 mg by mouth every morning (before breakfast)      !! predniSONE (DELTASONE) 1 MG tablet Take 2 mg by mouth daily TOTAL DAILY DOSE = 12 MG      !! predniSONE (DELTASONE) 5 MG tablet Take 10 mg by mouth daily with food TOTAL DAILY DOSE = 12 MG      albuterol (2.5 MG/3ML) 0.083% neb solution Take 2.5 mg by nebulization every 6 hours as needed for shortness of breath / dyspnea or wheezing      blood glucose monitoring (ONE TOUCH ULTRASOFT) lancets TEST 2 TIMES PER DAY      blood glucose monitoring (ONETOUCH ULTRA) test strip TEST 2 TIMES PER DAY.      EPINEPHrine (EPIPEN/ADRENACLICK/OR ANY BX GENERIC EQUIV) 0.3 MG/0.3ML injection 2-pack Inject 0.3 mg into the muscle once as needed      glucagon 1 MG kit Inject 1 mg into the muscle as needed for low blood sugar       glucose 4 g CHEW chewable tablet Take 1 tablet by mouth as needed for low blood sugar      lisinopril (PRINIVIL/ZESTRIL) 2.5 MG tablet Take 2.5 mg by  mouth      simvastatin (ZOCOR) 40 MG tablet Take 40 mg by mouth At Bedtime       !! - Potential duplicate medications found. Please discuss with provider.      STOP taking these medications       amLODIPine (NORVASC) 2.5 MG tablet Comments:   Reason for Stopping:         doxycycline Monohydrate 100 MG TABS Comments:   Reason for Stopping:         furosemide (LASIX) 20 MG tablet Comments:   Reason for Stopping:         losartan (COZAAR) 25 MG tablet Comments:   Reason for Stopping:             Allergies   Allergies   Allergen Reactions     Glyburide Anaphylaxis     Other reaction(s): Dizziness     Mosquitoes (Informational Only) Hives     Other [Seasonal Allergies] Hives     Deer Flies     Sulfamethoxazole-Trimethoprim Anaphylaxis     Other reaction(s): Other - Describe In Comment Field  Rash, nausea, dizziness     Codeine Nausea and Nausea and Vomiting     Data[AK1.3]   Most Recent 3 CBC's:[AK1.1]  Recent Labs   Lab Test  06/27/18   0945  06/17/18   0635  06/16/18   0700   WBC  14.5*  16.6*  16.3*   HGB  11.0*  8.4*  8.5*   MCV  79  81  83   PLT  357  348  300[AK1.2]      Most Recent 3 BMP's:[AK1.1]  Recent Labs   Lab Test  06/27/18   0945  06/17/18   0635  06/16/18   0700   NA  132*  132*  131*   POTASSIUM  4.1  4.7  5.6*   CHLORIDE  99  103  102   CO2  18*  21  21   BUN  12  21  20   CR  1.59*  1.31*  1.50*   ANIONGAP  15*  8  8   GIANNI  9.3  8.9  8.4*   GLC  191*  223*  194*[AK1.2]     Most Recent 2 LFT's:[AK1.1]  Recent Labs   Lab Test  06/27/18   0945  06/15/18   1745   AST  17  22   ALT  12  18   ALKPHOS  70  70   BILITOTAL  0.6  1.2*[AK1.2]     Most Recent INR's and Anticoagulation Dosing History:  Anticoagulation Dose History     Recent Dosing and Labs Latest Ref Rng & Units 6/15/2018    INR 0 - 1.3 1.22        Most Recent 3 Troponin's:[AK1.1]  Recent Labs   Lab Test  06/27/18   0945  06/17/18   2300  06/17/18   2116   TROPI  0.041*  0.112*  0.080*[AK1.2]     Most Recent Cholesterol Panel:[AK1.1]  Recent Labs    Lab Test  12/03/15   1802   LDL  121*   HDL  40   TRIG  260*[AK1.2]     Most Recent 6 Bacteria Isolates From Any Culture (See EPIC Reports for Culture Details):[AK1.1]  Recent Labs   Lab Test  06/15/18   1830  06/15/18   1745   CULT  Urine culture negative (no growth of uropathogens).  No growth after 6 days  No growth after 6 days[AK1.2]     Most Recent TSH, T4 and A1c Labs:[AK1.1]No lab results found.  Results for orders placed or performed during the hospital encounter of 06/27/18   XR Chest Port 1 View    Narrative    PROCEDURE:  XR CHEST PORT 1 VW    HISTORY:  sob; .     COMPARISON:  6/17/2018    FINDINGS:   The cardiac silhouette is normal in size. The pulmonary vasculature is  normal.  The lungs are clear. No pleural effusion or pneumothorax.      Impression    IMPRESSION:  No acute cardiopulmonary disease.      MIREILLE ROMERO MD   XR Foot Port Right 3 Views    Narrative    PROCEDURE:  XR FOOT PORT RT 3 VW    HISTORY: Pain;     COMPARISON:  None.    TECHNIQUE:  3 views of the right foot were obtained.    FINDINGS:  There are scattered degenerative changes of the  interphalangeal and metatarsophalangeal joints. There are also  degenerative changes in the midfoot. No acute fracture or dislocation.        Impression    IMPRESSION: No acute fracture.      MIREILLE ROMERO MD   XR Wrist Port Left G/E 3 Views    Narrative    PROCEDURE:  XR WRIST PORT LT  G/E 3 VW    HISTORY: Pain r/o fracture; ulnar-sided wrist pain after a fall    COMPARISON:  None.    TECHNIQUE:  3 views of the left wrist were obtained.    FINDINGS:  There are advanced degenerative changes of the wrist. There  are postoperative changes in the MCP joints. No acute fracture or  dislocation.       Impression    IMPRESSION: No acute fracture.      MIREILLE ROMERO MD[AK1.2]          Revision History        User Key Date/Time User Provider Type Action    > AK1.2 6/28/2018 12:44 PM Vanita Coley MD Physician Sign     AK1.3 6/28/2018 12:39 PM Brijesh  MD Vanita Physician      AK1.1 6/28/2018 12:38 PM Vanita Coley MD Physician                   Consult Notes     No notes of this type exist for this encounter.         Progress Notes - Physician (Notes for yesterday and today)      ED Notes signed by Aparna Falcon at 6/28/2018  9:44 AM      Author:  Scan, Non-Provider Service:  (none) Author Type:  (none)    Filed:  6/28/2018  9:44 AM Date of Service:  6/28/2018  8:10 AM Creation Time:  6/28/2018  9:44 AM    Status:  Signed :  Scan, Non-Provider     Scan on 6/28/2018  9:44 AM by Devendra FalconProvider : MEDS 1 1          Revision History        User Key Date/Time User Provider Type Action    > [N/A] 6/28/2018  9:44 AM Scan, Non-Provider (none) Sign            ED Provider Notes by Toney Almonte MD at 6/27/2018  9:06 AM     Author:  Toney Almonte MD Service:  Emergency Medicine Author Type:  Physician    Filed:  6/27/2018  4:56 PM Date of Service:  6/27/2018  9:06 AM Creation Time:  6/27/2018  9:16 AM    Status:  Signed :  Toney Almonte MD (Physician)           History[TV1.1]     Chief Complaint   Patient presents with     Generalized Weakness[TV1.2]     Patient is a 68 year old female presenting with shortness of breath. The history is provided by the patient and the EMS personnel.   Shortness of Breath   Associated symptoms: cough    Associated symptoms: no chest pain, no fever, no rash and no vomiting      Laura Perez is a 68 year old female who comes in from home via ambulance. Her  called 911 as he feels she has been declining. She was hospitalized for pneumonia and has been home for only about 5 days. Since that time she is getting progressively worse. Last night she got up and went to the bathroom and fell in the bathroom. She is complaining of shortness of breath. She says that it is hard to breathe. When I ask what is hard she says it just is hard to get the air in and out, however she is actually hyperventilating a little bit.  Paramedics did report an oxygen saturation of 88% on room air when they arrived. She has a low-grade fever, she is not sure about fevers at home. She has been drinking liquids okay. Has been having diarrhea perhaps 4-5 stools per day. No emesis.    Problem List:[TV1.1]    Patient Active Problem List    Diagnosis Date Noted     ACS (acute coronary syndrome) (H) 06/18/2018     Priority: Medium     Avascular necrosis of left talus (H) 06/16/2018     Priority: Medium     Sepsis (H) 06/15/2018     Priority: Medium     Recurrent pneumonia 06/15/2018     Priority: Medium     Pneumonia 06/15/2018     Priority: Medium     Acne rosacea 01/26/2018     Priority: Medium     History of colonic polyps 01/26/2018     Priority: Medium     Overview:   diminutive at 25 cm       Hyperlipidemia 01/26/2018     Priority: Medium     Hypothyroidism 01/26/2018     Priority: Medium     Migraine headache 01/26/2018     Priority: Medium     Disorder of bone and cartilage 01/26/2018     Priority: Medium     Overview:   2007       Popliteal cyst, left 01/26/2018     Priority: Medium     Chronic kidney disease (CKD), stage III (moderate) 12/11/2017     Priority: Medium     HTN (hypertension) 11/29/2017     Priority: Medium     HCAP (healthcare-associated pneumonia) 11/28/2017     Priority: Medium     Osteoporosis 11/27/2017     Priority: Medium     Malignant tumor of colon (H) 11/26/2017     Priority: Medium     Anemia of chronic kidney failure, stage 3 (moderate) 07/28/2017     Priority: Medium     Diabetes mellitus type 2, insulin dependent (H) 07/28/2017     Priority: Medium     Chronic diastolic CHF (congestive heart failure), NYHA class 2 (H) 02/13/2016     Priority: Medium     Non-rheumatic mitral valve stenosis 02/13/2016     Priority: Medium     On prednisone therapy 01/15/2016     Priority: Medium     Orthostatic hypotension 01/15/2016     Priority: Medium     Secondary adrenal insufficiency (H) 01/15/2016     Priority: Medium      Seropositive rheumatoid arthritis (H) 01/15/2016     Priority: Medium     Lichen sclerosus et atrophicus 03/05/2015     Priority: Medium     COPD (chronic obstructive pulmonary disease) (H) 09/12/2014     Priority: Medium[TV1.2]        Past Medical History:[TV1.1]    Past Medical History:   Diagnosis Date     Chronic obstructive pulmonary disease (H)      Diverticulosis of intestine without perforation or abscess without bleeding      Essential (primary) hypertension      Hyperlipidemia      Hypothyroidism      Malignant neoplasm of colon (H)      Personal history of other diseases of the nervous system and sense organs      Rheumatoid arthritis (H)      Rosacea      Type 2 diabetes mellitus without complications (H)      Urinary tract infection[TV1.2]        Past Surgical History:[TV1.1]    Past Surgical History:   Procedure Laterality Date     ARTHROPLASTY,Right MTP 1 and 5 and left MTP 5  01/2001     COLON SURGERY Right 09/2017    For colon cancer     COLONOSCOPY  09/29/2008     LAPAROSCOPIC TUBAL LIGATION      No Comments Provided     Left MCP joint arthroplasty  02/05/1998     MTP 2, 3, 4 right foot  05/2000     RELEASE CARPAL TUNNEL Left 1991     Right long finger MCP repair  1996    [place]     Right MCP 4 and 5 arthroplasty  03/01/2004     Right wrist arthroplasty  1990[TV1.2]       Family History:[TV1.1]    Family History   Problem Relation Age of Onset     Cancer Mother 50     Cancer,uterus     HEART DISEASE Mother      Heart Disease,MI     Colon Cancer Mother      Cancer-colon     HEART DISEASE Father      Heart Disease,MI     Chronic Obstructive Pulmonary Disease Sister      COPD     Other - See Comments Sister      rubina dow     Colon Cancer Brother      Cancer-colon     Breast Cancer No family hx of      Cancer-breast[TV1.2]       Social History:  Marital Status:   [2][TV1.1]  Social History   Substance Use Topics     Smoking status: Former Smoker     Packs/day: 1.00     Years: 30.00      Types: Cigarettes     Smokeless tobacco: Never Used     Alcohol use 1.5 oz/week      Comment: Wine daily[TV1.2]        Medications:[TV1.1]      albuterol (PROAIR HFA/PROVENTIL HFA/VENTOLIN HFA) 108 (90 BASE) MCG/ACT Inhaler   amLODIPine (NORVASC) 2.5 MG tablet   aspirin 81 MG chewable tablet   atovaquone-proguanil (MALARONE) 250-100 MG per tablet   budesonide (PULMICORT) 0.5 MG/2ML neb solution   calcium carbonate (OS-GIANNI 500 MG [place]. [place]) 500 MG tablet   DOXYCYCLINE HYCLATE PO   DULOXETINE HCL PO   estradiol (YUVAFEM) 10 MCG TABS vaginal tablet   fenofibrate 48 MG tablet   formoterol (PERFOROMIST) 20 MCG/2ML neb solution   gabapentin (NEURONTIN) 300 MG capsule   insulin aspart prot & aspart (NOVOLOG MIX 70/30 PEN) injection   levothyroxine (SYNTHROID/LEVOTHROID) 112 MCG tablet   losartan (COZAAR) 25 MG tablet   montelukast (SINGULAIR) 10 MG tablet   omeprazole (PRILOSEC) 20 MG CR capsule   PRAVASTATIN SODIUM PO   predniSONE (DELTASONE) 5 MG tablet   traMADol (ULTRAM) 50 MG tablet   traZODone (DESYREL) 100 MG tablet   VALACYCLOVIR HCL PO   blood glucose monitoring (ONE TOUCH ULTRASOFT) lancets   blood glucose monitoring (ONETOUCH ULTRA) test strip   Calcium carb-Vitamin D 500 mg [place]-200 units (OSCAL WITH D;OYSTER SHELL CALCIUM) 500-200 MG-UNIT per tablet   EPINEPHrine (EPIPEN/ADRENACLICK/OR ANY BX GENERIC EQUIV) 0.3 MG/0.3ML injection 2-pack   furosemide (LASIX) 20 MG tablet   glucagon 1 MG kit   glucose 4 g CHEW chewable tablet   lisinopril (PRINIVIL/ZESTRIL) 2.5 MG tablet   medical cannabis (Patient's own supply.  Not a prescription)   medical cannabis liquid   medical cannabis liquid   simvastatin (ZOCOR) 40 MG tablet[TV1.2]         Review of Systems   Constitutional: Negative for chills and fever.   HENT: Negative for congestion.    Eyes: Negative for visual disturbance.   Respiratory: Positive for cough and shortness of breath. Negative for chest tightness.    Cardiovascular: Negative for chest pain and leg  "swelling.   Gastrointestinal: Negative for nausea and vomiting.   Genitourinary: Negative for dysuria.   Musculoskeletal: Negative for arthralgias.   Skin: Negative for rash.   Neurological: Negative for light-headedness.   Psychiatric/Behavioral: Negative for agitation.       Physical Exam[TV1.1]   BP: 118/73  Pulse: 112  Heart Rate: 115  Temp: 99.1  F (37.3  C)  Resp: 24  Height: 160 cm (5' 3\")  SpO2: 100 %[TV1.2]      Physical Exam   Constitutional: She is oriented to person, place, and time. She appears well-developed and well-nourished. No distress.   HENT:   Head: Normocephalic and atraumatic.   Eyes: Conjunctivae are normal.   Neck: Neck supple.   Cardiovascular: Normal rate, regular rhythm and normal heart sounds.    Pulmonary/Chest: Effort normal and breath sounds normal. No accessory muscle usage. Tachypnea noted. No respiratory distress.   Abdominal: Soft. Bowel sounds are normal.   Neurological: She is alert and oriented to person, place, and time.   Skin: Skin is warm and dry. She is not diaphoretic.   Psychiatric: She has a normal mood and affect. Her behavior is normal.   Nursing note and vitals reviewed.      ED Course[TV1.1]     ED Course[TV1.2]     Procedures[TV1.1]             EKG Interpretation:      Interpreted by Toney Almonte  Time reviewed: 1155  Symptoms at time of EKG: chest pain     Rhythm: normal sinus   Rate: Tachycardia 108  Axis: Normal  Ectopy: none  Conduction: normal  ST Segments/ T Waves: No ST-T wave changes  Q Waves: none    Clinical Impression: sinus tachycardia[TV1.3]          Results for orders placed or performed during the hospital encounter of 06/27/18 (from the past 24 hour(s))   CBC with platelets differential   Result Value Ref Range    WBC 14.5 (H) 4.0 - 11.0 10e9/L    RBC Count 4.55 3.8 - 5.2 10e12/L    Hemoglobin 11.0 (L) 11.7 - 15.7 g/dL    Hematocrit 36.1 35.0 - 47.0 %    MCV 79 78 - 100 fl    MCH 24.2 (L) 26.5 - 33.0 pg    MCHC 30.5 (L) 31.5 - 36.5 g/dL    RDW 17.1 " (H) 10.0 - 15.0 %    Platelet Count 357 150 - 450 10e9/L    Diff Method Automated Method     % Neutrophils 82.7 %    % Lymphocytes 9.8 %    % Monocytes 5.9 %    % Eosinophils 0.6 %    % Basophils 0.3 %    % Immature Granulocytes 0.7 %    Absolute Neutrophil 12.0 (H) 1.6 - 8.3 10e9/L    Absolute Lymphocytes 1.4 0.8 - 5.3 10e9/L    Absolute Monocytes 0.9 0.0 - 1.3 10e9/L    Absolute Eosinophils 0.1 0.0 - 0.7 10e9/L    Absolute Basophils 0.0 0.0 - 0.2 10e9/L    Abs Immature Granulocytes 0.1 0 - 0.4 10e9/L   Comprehensive metabolic panel   Result Value Ref Range    Sodium 132 (L) 134 - 144 mmol/L    Potassium 4.1 3.5 - 5.1 mmol/L    Chloride 99 98 - 107 mmol/L    Carbon Dioxide 18 (L) 21 - 31 mmol/L    Anion Gap 15 (H) 3 - 14 mmol/L    Glucose 191 (H) 70 - 105 mg/dL    Urea Nitrogen 12 7 - 25 mg/dL    Creatinine 1.59 (H) 0.60 - 1.20 mg/dL    GFR Estimate 32 (L) >60 mL/min/1.7m2    GFR Estimate If Black 39 (L) >60 mL/min/1.7m2    Calcium 9.3 8.6 - 10.3 mg/dL    Bilirubin Total 0.6 0.3 - 1.0 mg/dL    Albumin 3.5 3.5 - 5.7 g/dL    Protein Total 6.6 6.4 - 8.9 g/dL    Alkaline Phosphatase 70 34 - 104 U/L    ALT 12 7 - 52 U/L    AST 17 13 - 39 U/L   D-Dimer (HI,GH)   Result Value Ref Range    D-Dimer ng/mL <200 0 - 230 ng/ml D-DU   Troponin I   Result Value Ref Range    Troponin I ES 0.041 (H) 0.000 - 0.034 ug/L   Lactic acid   Result Value Ref Range    Lactic Acid <0.2 (L) 0.5 - 2.2 mmol/L   *UA reflex to Microscopic   Result Value Ref Range    Color Urine Yellow     Appearance Urine Clear     Glucose Urine 250 (A) NEG^Negative mg/dL    Bilirubin Urine Small (A) NEG^Negative    Ketones Urine Negative NEG^Negative mg/dL    Specific Gravity Urine 1.020 1.003 - 1.035    Blood Urine Negative NEG^Negative    pH Urine 6.5 5.0 - 7.0 pH    Protein Albumin Urine 30 (A) NEG^Negative mg/dL    Urobilinogen Urine 0.2 0.2 - 1.0 EU/dL    Nitrite Urine Negative NEG^Negative    Leukocyte Esterase Urine Trace (A) NEG^Negative    Source  Unspecified Urine    Urine Microscopic   Result Value Ref Range    WBC Urine 0 - 5 OTO5^0 - 5 /HPF    RBC Urine O - 2 OTO2^O - 2 /HPF    Squamous Epithelial /LPF Urine Many (A) FEW^Few /LPF    Bacteria Urine Few (A) NEG^Negative /HPF   XR Chest Port 1 View    Narrative    PROCEDURE:  XR CHEST PORT 1 VW    HISTORY:  sob; .     COMPARISON:  6/17/2018    FINDINGS:   The cardiac silhouette is normal in size. The pulmonary vasculature is  normal.  The lungs are clear. No pleural effusion or pneumothorax.      Impression    IMPRESSION:  No acute cardiopulmonary disease.      MIREILLE ROMERO MD       Medications   sodium chloride 0.9% infusion (500 mL/hr Intravenous New Bag 6/27/18 1649)   LORazepam (ATIVAN) injection 0.5 mg (0.5 mg Intravenous Given 6/27/18 0949)   LORazepam (ATIVAN) injection 0.5 mg (0.5 mg Intravenous Given 6/27/18 1131)   morphine (PF) injection 2 mg (2 mg Intravenous Given 6/27/18 1131)   HYDROmorphone (PF) (DILAUDID) injection 0.5 mg (0.5 mg Intravenous Given 6/27/18 1650)[TV1.2]       Assessments & Plan (with Medical Decision Making)     I have reviewed the nursing notes.    I have reviewed the findings, diagnosis, plan and need for follow up with the patient.[TV1.1]  Patient continuing to complain of shortness of breath. She is also had some significant episodes of hypotension. This responded transiently to a 500 mL bolus. Labs otherwise significant for elevated white blood count, she has a low CO2 and a little bit of an anion gap. This may well be from hyperventilation when she first came in. Patient continues to be very uncomfortable. We discussed options including admission to local nursing home. We did have  come and see her and we are able to find her a bed, however she seemed to deteriorate a little bit and the  and myself are not comfortable sending her there tonight. I spoke with Dr. Caceres who has agreed to admit her for observation.[TV1.3]    Current Discharge  Medication List          Final diagnoses:   Generalized muscle weakness   Low back pain without sciatica, unspecified back pain laterality, unspecified chronicity   SOB (shortness of breath)   Anxiety[TV1.2]       6/27/2018   [facility][TV1.1]     Toney Almonte MD  06/27/18 1656  [TV1.4]     Revision History        User Key Date/Time User Provider Type Action    > TV1.4 6/27/2018  4:56 PM Toney Almonte MD Physician Sign     TV1.2 6/27/2018  4:53 PM Toney Almonte MD Physician      TV1.3 6/27/2018  4:52 PM Toney Almonte MD Physician      TV1.1 6/27/2018  9:16 AM Toney Almonte MD Physician                   Procedure Notes     No notes of this type exist for this encounter.         Progress Notes - Therapies (Notes from 06/25/18 through 06/28/18)      Progress Notes by Lelo Murphy RT at 6/27/2018  6:34 PM     Author:  Lelo Murphy RT Service:  (none) Author Type:  Respiratory Therapist    Filed:  6/27/2018  6:35 PM Date of Service:  6/27/2018  6:34 PM Creation Time:  6/27/2018  6:34 PM    Status:  Signed :  Lelo Murphy RT (Respiratory Therapist)         RCAT completed and pt score scored 8.  RT recommends no changes at this time. Pt has home meds ordered, has a productive spontaneous cough, and was 98% on 2L so oxygen decreased to 1L.  RT will continue to follow and re-assess as needed.      Lelo Lopez RRT[ST1.1]     Revision History        User Key Date/Time User Provider Type Action    > ST1.1 6/27/2018  6:35 PM Lelo Murphy RT Respiratory Therapist Sign                                                      INTERAGENCY TRANSFER FORM - LAB / IMAGING / EKG / EMG RESULTS   6/27/2018                       [facility]: [phone]            Unresulted Labs     None         Lab Results - 3 Days      Troponin I [[id number]] (Abnormal)  Resulted: 06/27/18 1320, Result status: Final result    Ordering provider: Toney Almonte MD  06/27/18 1119 Resulting lab:  [facility]    Specimen Information    Type Source Collected On   Blood  06/27/18 0945          Components       Value Reference Range Flag Lab   Troponin I ES 0.041 0.000 - 0.034 ug/L H [facility]            Lactic acid [[id number]] (Abnormal)  Resulted: 06/27/18 1027, Result status: Final result    Ordering provider: Toney Almonte MD  06/27/18 0915 Resulting lab: [facility]    Specimen Information    Type Source Collected On   Blood  06/27/18 0955          Components       Value Reference Range Flag Lab   Lactic Acid <0.2 0.5 - 2.2 mmol/L L [facility]            Comprehensive metabolic panel [[id number]] (Abnormal)  Resulted: 06/27/18 1027, Result status: Final result    Ordering provider: Toney Almonte MD  06/27/18 0915 Resulting lab: [facility]    Specimen Information    Type Source Collected On   Blood  06/27/18 0945          Components       Value Reference Range Flag Lab   Sodium 132 134 - 144 mmol/L L [facility]   Potassium 4.1 3.5 - 5.1 mmol/L  [facility]   Chloride 99 98 - 107 mmol/L  [facility]   Carbon Dioxide 18 21 - 31 mmol/L L [facility]   Anion Gap 15 3 - 14 mmol/L H [facility]   Glucose 191 70 - 105 mg/dL H [facility]   Urea Nitrogen 12 7 - 25 mg/dL  [facility]   Creatinine 1.59 0.60 - 1.20 mg/dL H [facility]   GFR Estimate 32 >60 mL/min/1.7m2 L [facility]   GFR Estimate If Black 39 >60 mL/min/1.7m2 L [facility]   Calcium 9.3 8.6 - 10.3 mg/dL  [facility]   Bilirubin Total 0.6 0.3 - 1.0 mg/dL  [facility]   Albumin 3.5 3.5 - 5.7 g/dL  [facility]   Protein Total 6.6 6.4 - 8.9 g/dL  [facility]   Alkaline Phosphatase 70 34 - 104 U/L  [facility]   ALT 12 7 - 52 U/L  [facility]   AST 17 13 - 39 U/L  [facility]            *UA reflex to Microscopic [[id number]] (Abnormal)  Resulted: 06/27/18 1027, Result status: Final result    Ordering provider: Toney Almonte MD  06/27/18 0956 Resulting lab: [facility]    Specimen  Information    Type Source Collected On   Unspecified Urine Urine catheter 06/27/18 1013          Components       Value Reference Range Flag Lab   Color Urine Yellow   [facility]   Appearance Urine Clear   [facility]   Glucose Urine 250 NEG^Negative mg/dL A [facility]   Bilirubin Urine Small NEG^Negative A [facility]   Comment:         This is an unconfirmed screening test result. A positive result may be false.   Ketones Urine Negative NEG^Negative mg/dL  [facility]   Specific [place] Urine 1.020 1.003 - 1.035  [facility]   Blood Urine Negative NEG^Negative  [facility]   pH Urine 6.5 5.0 - 7.0 pH  [facility]   Protein Albumin Urine 30 NEG^Negative mg/dL A [facility]   Urobilinogen Urine 0.2 0.2 - 1.0 EU/dL  [facility]   Nitrite Urine Negative NEG^Negative  [facility]   Leukocyte Esterase Urine Trace NEG^Negative A [facility]   Source Unspecified Urine   [facility]            Urine Microscopic [[id number]] (Abnormal)  Resulted: 06/27/18 1027, Result status: Final result    Ordering provider: Toney Almonte MD  06/27/18 1013 Resulting lab: [facility]    Specimen Information    Type Source Collected On     06/27/18 1013          Components       Value Reference Range Flag Lab   WBC Urine 0 - 5 OTO5^0 - 5 /HPF  [facility]   RBC Urine O - 2 OTO2^O - 2 /HPF  [facility]   Squamous Epithelial /LPF Urine Many FEW^Few /LPF A [facility]   Bacteria Urine Few NEG^Negative /HPF A [facility]            D-Dimer (HI,GH) [[id number]]  Resulted: 06/27/18 1006, Result status: Final result    Ordering provider: Toney Almonte MD  06/27/18 0915 Resulting lab: [facility]    Specimen Information    Type Source Collected On   Blood  06/27/18 0945          Components       Value Reference Range Flag Lab   D-Dimer ng/mL <200 0 - 230 ng/ml D-DU  [facility]            CBC with platelets differential [[id number]] (Abnormal)  Resulted: 06/27/18 1003, Result status: Final result    Ordering provider:  Toney Almonte MD  06/27/18 0915 Resulting lab: [facility]    Specimen Information    Type Source Collected On   Blood  06/27/18 0945          Components       Value Reference Range Flag Lab   WBC 14.5 4.0 - 11.0 10e9/L H [facility]   RBC Count 4.55 3.8 - 5.2 10e12/L  [facility]   Hemoglobin 11.0 11.7 - 15.7 g/dL L [facility]   Hematocrit 36.1 35.0 - 47.0 %  [facility]   MCV 79 78 - 100 fl  [facility]   MCH 24.2 26.5 - 33.0 pg L [facility]   MCHC 30.5 31.5 - 36.5 g/dL L [facility]   RDW 17.1 10.0 - 15.0 % H [facility]   Platelet Count 357 150 - 450 10e9/L  [facility]   Diff Method Automated Method   [facility]   % Neutrophils 82.7 %  [facility]   % Lymphocytes 9.8 %  [facility]   % Monocytes 5.9 %  [facility]   % Eosinophils 0.6 %  [facility]   % Basophils 0.3 %  [facility]   % Immature Granulocytes 0.7 %  [facility]   Absolute Neutrophil 12.0 1.6 - 8.3 10e9/L H [facility]   Absolute Lymphocytes 1.4 0.8 - 5.3 10e9/L  [facility]   Absolute Monocytes 0.9 0.0 - 1.3 10e9/L  [facility]   Absolute Eosinophils 0.1 0.0 - 0.7 10e9/L  [facility]   Absolute Basophils 0.0 0.0 - 0.2 10e9/L  [facility]   Abs Immature Granulocytes 0.1 0 - 0.4 10e9/L  [facility]            Testing Performed By     Lab - Abbreviation Name Director Address Valid Date Range    1743 - [facility] Unknown [address]  [facility] [address] 08/07/15 0948 - Present               Imaging Results - 3 Days      XR Wrist Port Left G/E 3 Views [[id number]]  Resulted: 06/28/18 0939, Result status: Final result    Ordering provider: Vanita Coley MD  06/28/18 0854 Resulted by: Kelsey Retana MD    Performed: 06/28/18 0901 - 06/28/18 0929 Resulting lab: RADIOLOGY RESULTS    Narrative:       PROCEDURE:  XR WRIST PORT LT  G/E 3 VW    HISTORY: Pain r/o fracture; ulnar-sided wrist pain after a fall    COMPARISON:  None.    TECHNIQUE:  3 views of the left wrist were obtained.    FINDINGS:  There are  advanced degenerative changes of the wrist. There  are postoperative changes in the MCP joints. No acute fracture or  dislocation.       Impression:       IMPRESSION: No acute fracture.      MIREILLE ROMERO MD      XR Foot Port Right 3 Views [[id number]]  Resulted: 06/28/18 0938, Result status: Final result    Ordering provider: Vanita Coley MD  06/28/18 0854 Resulted by: Mireille Romero MD    Performed: 06/28/18 0929 - 06/28/18 0929 Resulting lab: RADIOLOGY RESULTS    Narrative:       PROCEDURE:  XR FOOT PORT RT 3 VW    HISTORY: Pain;     COMPARISON:  None.    TECHNIQUE:  3 views of the right foot were obtained.    FINDINGS:  There are scattered degenerative changes of the  interphalangeal and metatarsophalangeal joints. There are also  degenerative changes in the midfoot. No acute fracture or dislocation.        Impression:       IMPRESSION: No acute fracture.      MIREILLE ROMERO MD      XR Chest Port 1 View [[id number]]  Resulted: 06/27/18 1054, Result status: Final result    Ordering provider: Toney Almonte MD  06/27/18 0915 Resulted by: Mireille Romero MD    Performed: 06/27/18 1041 - 06/27/18 1052 Resulting lab: RADIOLOGY RESULTS    Narrative:       PROCEDURE:  XR CHEST PORT 1 VW    HISTORY:  sob; .     COMPARISON:  6/17/2018    FINDINGS:   The cardiac silhouette is normal in size. The pulmonary vasculature is  normal.  The lungs are clear. No pleural effusion or pneumothorax.      Impression:       IMPRESSION:  No acute cardiopulmonary disease.      MIREILLE ROMERO MD      Testing Performed By     Lab - Abbreviation Name Director Address Valid Date Range    104 - [facility] RADIOLOGY RESULTS Unknown Unknown 02/16/05 1553 - Present            Encounter-Level Documents:     There are no encounter-level documents.      Order-Level Documents:     There are no order-level documents.

## 2018-06-27 NOTE — PROGRESS NOTES
:    Submitted Pre-Admission Screening for Laura Chris. [facility] confirmation number is [id number].    Jarrett Carcamo on 6/27/2018 at 2:14 PM  ELIDA Smith on 6/27/2018 at 2:34 PM

## 2018-06-27 NOTE — ED TRIAGE NOTES
Pt to ED from home.  Was recently at [facility] and sent to [place] due to possible cardiac issues, this was ruled out in [place] and returned home last Thursday.  Pt has not felt well since leaving [place] and cont to be weak and has general discomfort.  Complaining of left mid axillary rib pain.  Moaning cont.    [place]BIA-SUICIDE SEVERITY RATING SCALE   Screen with Triage Points for Emergency Department      Ask questions that are bolded and underlined.   Past  month   Ask Questions 1 and 2 YES NO   1)  Have you wished you were dead or wished you could go to sleep and not wake up?   x   2)  Have you actually had any thoughts of killing yourself?   x   If YES to 2, ask questions 3, 4, 5, and 6.  If NO to 2, go directly to question 6.   3)  Have you been thinking about how you might do this?   E.g.  I thought about taking an overdose but I never made a specific plan as to when where or how I would actually do it .and I would never go through with it.       4)  Have you had these thoughts and had some intention of acting on them?   As opposed to  I have the thoughts but I definitely will not do anything about them.       5)  Have you started to work out or worked out the details of how to kill yourself? Do you intend to carry out this plan?      6)  Have you ever done anything, started to do anything, or prepared to do anything to end your life?  Examples: Collected pills, obtained a gun, gave away valuables, wrote a will or suicide note, took out pills but didn t swallow any, held a gun but changed your mind or it was grabbed from your hand, went to the roof but didn t jump; or actually took pills, tried to shoot yourself, cut yourself, tried to hang yourself, etc.    If YES, ask: Was this within the past three months?  Lifetime     x    Past 3 Months        Item 1:  Behavioral Health Referral at Discharge  Item 2:  Behavioral Health Referral at Discharge   Item 3:  Behavioral Health Consult (Psychiatric Nurse/)  and consider Patient Safety Precautions  Item 4:  Immediate Notification of Physician and/or Behavioral Health and Patient Safety Precautions   Item 5:  Immediate Notification of Physician and/or Behavioral Health and Patient Safety Precautions  Item 6:  Over 3 months ago: Behavioral Health Consult (Psychiatric Nurse/) and consider Patient Safety Precautions  OR  Item 6:  3 months ago or less: Immediate Notification of Physician and/or Behavioral Health and Patient Safety Precautions

## 2018-06-27 NOTE — ED NOTES
Social service notified and informed that pt will NOT be sent to [facility].  They will contact ET.

## 2018-06-27 NOTE — ED PROVIDER NOTES
History     Chief Complaint   Patient presents with     Generalized Weakness     Patient is a 68 year old female presenting with shortness of breath. The history is provided by the patient and the EMS personnel.   Shortness of Breath   Associated symptoms: cough    Associated symptoms: no chest pain, no fever, no rash and no vomiting      Laura Perez is a 68 year old female who comes in from home via ambulance. Her  called 911 as he feels she has been declining. She was hospitalized for pneumonia and has been home for only about 5 days. Since that time she is getting progressively worse. Last night she got up and went to the bathroom and fell in the bathroom. She is complaining of shortness of breath. She says that it is hard to breathe. When I ask what is hard she says it just is hard to get the air in and out, however she is actually hyperventilating a little bit. Paramedics did report an oxygen saturation of 88% on room air when they arrived. She has a low-grade fever, she is not sure about fevers at home. She has been drinking liquids okay. Has been having diarrhea perhaps 4-5 stools per day. No emesis.    Problem List:    Patient Active Problem List    Diagnosis Date Noted     ACS (acute coronary syndrome) (H) 06/18/2018     Priority: Medium     Avascular necrosis of left talus (H) 06/16/2018     Priority: Medium     Sepsis (H) 06/15/2018     Priority: Medium     Recurrent pneumonia 06/15/2018     Priority: Medium     Pneumonia 06/15/2018     Priority: Medium     Acne rosacea 01/26/2018     Priority: Medium     History of colonic polyps 01/26/2018     Priority: Medium     Overview:   diminutive at 25 cm       Hyperlipidemia 01/26/2018     Priority: Medium     Hypothyroidism 01/26/2018     Priority: Medium     Migraine headache 01/26/2018     Priority: Medium     Disorder of bone and cartilage 01/26/2018     Priority: Medium     Overview:   2007       Popliteal cyst, left 01/26/2018     Priority:  Medium     Chronic kidney disease (CKD), stage III (moderate) 12/11/2017     Priority: Medium     HTN (hypertension) 11/29/2017     Priority: Medium     HCAP (healthcare-associated pneumonia) 11/28/2017     Priority: Medium     Osteoporosis 11/27/2017     Priority: Medium     Malignant tumor of colon (H) 11/26/2017     Priority: Medium     Anemia of chronic kidney failure, stage 3 (moderate) 07/28/2017     Priority: Medium     Diabetes mellitus type 2, insulin dependent (H) 07/28/2017     Priority: Medium     Chronic diastolic CHF (congestive heart failure), NYHA class 2 (H) 02/13/2016     Priority: Medium     Non-rheumatic mitral valve stenosis 02/13/2016     Priority: Medium     On prednisone therapy 01/15/2016     Priority: Medium     Orthostatic hypotension 01/15/2016     Priority: Medium     Secondary adrenal insufficiency (H) 01/15/2016     Priority: Medium     Seropositive rheumatoid arthritis (H) 01/15/2016     Priority: Medium     Lichen sclerosus et atrophicus 03/05/2015     Priority: Medium     COPD (chronic obstructive pulmonary disease) (H) 09/12/2014     Priority: Medium        Past Medical History:    Past Medical History:   Diagnosis Date     Chronic obstructive pulmonary disease (H)      Diverticulosis of intestine without perforation or abscess without bleeding      Essential (primary) hypertension      Hyperlipidemia      Hypothyroidism      Malignant neoplasm of colon (H)      Personal history of other diseases of the nervous system and sense organs      Rheumatoid arthritis (H)      Rosacea      Type 2 diabetes mellitus without complications (H)      Urinary tract infection        Past Surgical History:    Past Surgical History:   Procedure Laterality Date     ARTHROPLASTY,Right MTP 1 and 5 and left MTP 5  01/2001     COLON SURGERY Right 09/2017    For colon cancer     COLONOSCOPY  09/29/2008     LAPAROSCOPIC TUBAL LIGATION      No Comments Provided     Left MCP joint arthroplasty  02/05/1998      MTP 2, 3, 4 right foot  05/2000     RELEASE CARPAL TUNNEL Left 1991     Right long finger MCP repair  1996    [facility]     Right MCP 4 and 5 arthroplasty  03/01/2004     Right wrist arthroplasty  1990       Family History:    Family History   Problem Relation Age of Onset     Cancer Mother 50     Cancer,uterus     HEART DISEASE Mother      Heart Disease,MI     Colon Cancer Mother      Cancer-colon     HEART DISEASE Father      Heart Disease,MI     Chronic Obstructive Pulmonary Disease Sister      COPD     Other - See Comments Sister      rubina dow     Colon Cancer Brother      Cancer-colon     Breast Cancer No family hx of      Cancer-breast       Social History:  Marital Status:   [2]  Social History   Substance Use Topics     Smoking status: Former Smoker     Packs/day: 1.00     Years: 30.00     Types: Cigarettes     Smokeless tobacco: Never Used     Alcohol use 1.5 oz/week      Comment: Wine daily        Medications:      albuterol (PROAIR HFA/PROVENTIL HFA/VENTOLIN HFA) 108 (90 BASE) MCG/ACT Inhaler   amLODIPine (NORVASC) 2.5 MG tablet   aspirin 81 MG chewable tablet   atovaquone-proguanil (MALARONE) 250-100 MG per tablet   budesonide (PULMICORT) 0.5 MG/2ML neb solution   calcium carbonate (OS-GIANNI 500 MG [place]. [place]) 500 MG tablet   DOXYCYCLINE HYCLATE PO   DULOXETINE HCL PO   estradiol (YUVAFEM) 10 MCG TABS vaginal tablet   fenofibrate 48 MG tablet   formoterol (PERFOROMIST) 20 MCG/2ML neb solution   gabapentin (NEURONTIN) 300 MG capsule   insulin aspart prot & aspart (NOVOLOG MIX 70/30 PEN) injection   levothyroxine (SYNTHROID/LEVOTHROID) 112 MCG tablet   losartan (COZAAR) 25 MG tablet   montelukast (SINGULAIR) 10 MG tablet   omeprazole (PRILOSEC) 20 MG CR capsule   PRAVASTATIN SODIUM PO   predniSONE (DELTASONE) 5 MG tablet   traMADol (ULTRAM) 50 MG tablet   traZODone (DESYREL) 100 MG tablet   VALACYCLOVIR HCL PO   blood glucose monitoring (ONE TOUCH ULTRASOFT) lancets   blood glucose  "monitoring (ONETOUCH ULTRA) test strip   Calcium carb-Vitamin D 500 mg [place]-200 units (OSCAL WITH D;OYSTER SHELL CALCIUM) 500-200 MG-UNIT per tablet   EPINEPHrine (EPIPEN/ADRENACLICK/OR ANY BX GENERIC EQUIV) 0.3 MG/0.3ML injection 2-pack   furosemide (LASIX) 20 MG tablet   glucagon 1 MG kit   glucose 4 g CHEW chewable tablet   lisinopril (PRINIVIL/ZESTRIL) 2.5 MG tablet   medical cannabis (Patient's own supply.  Not a prescription)   medical cannabis liquid   medical cannabis liquid   simvastatin (ZOCOR) 40 MG tablet         Review of Systems   Constitutional: Negative for chills and fever.   HENT: Negative for congestion.    Eyes: Negative for visual disturbance.   Respiratory: Positive for cough and shortness of breath. Negative for chest tightness.    Cardiovascular: Negative for chest pain and leg swelling.   Gastrointestinal: Negative for nausea and vomiting.   Genitourinary: Negative for dysuria.   Musculoskeletal: Negative for arthralgias.   Skin: Negative for rash.   Neurological: Negative for light-headedness.   Psychiatric/Behavioral: Negative for agitation.       Physical Exam   BP: 118/73  Pulse: 112  Heart Rate: 115  Temp: 99.1  F (37.3  C)  Resp: 24  Height: 160 cm (5' 3\")  SpO2: 100 %      Physical Exam   Constitutional: She is oriented to person, place, and time. She appears well-developed and well-nourished. No distress.   HENT:   Head: Normocephalic and atraumatic.   Eyes: Conjunctivae are normal.   Neck: Neck supple.   Cardiovascular: Normal rate, regular rhythm and normal heart sounds.    Pulmonary/Chest: Effort normal and breath sounds normal. No accessory muscle usage. Tachypnea noted. No respiratory distress.   Abdominal: Soft. Bowel sounds are normal.   Neurological: She is alert and oriented to person, place, and time.   Skin: Skin is warm and dry. She is not diaphoretic.   Psychiatric: She has a normal mood and affect. Her behavior is normal.   Nursing note and vitals reviewed.      ED " Course     ED Course     Procedures             EKG Interpretation:      Interpreted by Toney Almonte  Time reviewed: 1155  Symptoms at time of EKG: chest pain     Rhythm: normal sinus   Rate: Tachycardia 108  Axis: Normal  Ectopy: none  Conduction: normal  ST Segments/ T Waves: No ST-T wave changes  Q Waves: none    Clinical Impression: sinus tachycardia          Results for orders placed or performed during the hospital encounter of 06/27/18 (from the past 24 hour(s))   CBC with platelets differential   Result Value Ref Range    WBC 14.5 (H) 4.0 - 11.0 10e9/L    RBC Count 4.55 3.8 - 5.2 10e12/L    Hemoglobin 11.0 (L) 11.7 - 15.7 g/dL    Hematocrit 36.1 35.0 - 47.0 %    MCV 79 78 - 100 fl    MCH 24.2 (L) 26.5 - 33.0 pg    MCHC 30.5 (L) 31.5 - 36.5 g/dL    RDW 17.1 (H) 10.0 - 15.0 %    Platelet Count 357 150 - 450 10e9/L    Diff Method Automated Method     % Neutrophils 82.7 %    % Lymphocytes 9.8 %    % Monocytes 5.9 %    % Eosinophils 0.6 %    % Basophils 0.3 %    % Immature Granulocytes 0.7 %    Absolute Neutrophil 12.0 (H) 1.6 - 8.3 10e9/L    Absolute Lymphocytes 1.4 0.8 - 5.3 10e9/L    Absolute Monocytes 0.9 0.0 - 1.3 10e9/L    Absolute Eosinophils 0.1 0.0 - 0.7 10e9/L    Absolute Basophils 0.0 0.0 - 0.2 10e9/L    Abs Immature Granulocytes 0.1 0 - 0.4 10e9/L   Comprehensive metabolic panel   Result Value Ref Range    Sodium 132 (L) 134 - 144 mmol/L    Potassium 4.1 3.5 - 5.1 mmol/L    Chloride 99 98 - 107 mmol/L    Carbon Dioxide 18 (L) 21 - 31 mmol/L    Anion Gap 15 (H) 3 - 14 mmol/L    Glucose 191 (H) 70 - 105 mg/dL    Urea Nitrogen 12 7 - 25 mg/dL    Creatinine 1.59 (H) 0.60 - 1.20 mg/dL    GFR Estimate 32 (L) >60 mL/min/1.7m2    GFR Estimate If Black 39 (L) >60 mL/min/1.7m2    Calcium 9.3 8.6 - 10.3 mg/dL    Bilirubin Total 0.6 0.3 - 1.0 mg/dL    Albumin 3.5 3.5 - 5.7 g/dL    Protein Total 6.6 6.4 - 8.9 g/dL    Alkaline Phosphatase 70 34 - 104 U/L    ALT 12 7 - 52 U/L    AST 17 13 - 39 U/L   D-Dimer (HI,GH)    Result Value Ref Range    D-Dimer ng/mL <200 0 - 230 ng/ml D-DU   Troponin I   Result Value Ref Range    Troponin I ES 0.041 (H) 0.000 - 0.034 ug/L   Lactic acid   Result Value Ref Range    Lactic Acid <0.2 (L) 0.5 - 2.2 mmol/L   *UA reflex to Microscopic   Result Value Ref Range    Color Urine Yellow     Appearance Urine Clear     Glucose Urine 250 (A) NEG^Negative mg/dL    Bilirubin Urine Small (A) NEG^Negative    Ketones Urine Negative NEG^Negative mg/dL    Specific Gravity Urine 1.020 1.003 - 1.035    Blood Urine Negative NEG^Negative    pH Urine 6.5 5.0 - 7.0 pH    Protein Albumin Urine 30 (A) NEG^Negative mg/dL    Urobilinogen Urine 0.2 0.2 - 1.0 EU/dL    Nitrite Urine Negative NEG^Negative    Leukocyte Esterase Urine Trace (A) NEG^Negative    Source Unspecified Urine    Urine Microscopic   Result Value Ref Range    WBC Urine 0 - 5 OTO5^0 - 5 /HPF    RBC Urine O - 2 OTO2^O - 2 /HPF    Squamous Epithelial /LPF Urine Many (A) FEW^Few /LPF    Bacteria Urine Few (A) NEG^Negative /HPF   XR Chest Port 1 View    Narrative    PROCEDURE:  XR CHEST PORT 1 VW    HISTORY:  sob; .     COMPARISON:  6/17/2018    FINDINGS:   The cardiac silhouette is normal in size. The pulmonary vasculature is  normal.  The lungs are clear. No pleural effusion or pneumothorax.      Impression    IMPRESSION:  No acute cardiopulmonary disease.      MIREILLE ROMERO MD       Medications   sodium chloride 0.9% infusion (500 mL/hr Intravenous New Bag 6/27/18 1649)   LORazepam (ATIVAN) injection 0.5 mg (0.5 mg Intravenous Given 6/27/18 0949)   LORazepam (ATIVAN) injection 0.5 mg (0.5 mg Intravenous Given 6/27/18 1131)   morphine (PF) injection 2 mg (2 mg Intravenous Given 6/27/18 1131)   HYDROmorphone (PF) (DILAUDID) injection 0.5 mg (0.5 mg Intravenous Given 6/27/18 1650)       Assessments & Plan (with Medical Decision Making)     I have reviewed the nursing notes.    I have reviewed the findings, diagnosis, plan and need for follow up with the  patient.  Patient continuing to complain of shortness of breath. She is also had some significant episodes of hypotension. This responded transiently to a 500 mL bolus. Labs otherwise significant for elevated white blood count, she has a low CO2 and a little bit of an anion gap. This may well be from hyperventilation when she first came in. Patient continues to be very uncomfortable. We discussed options including admission to local nursing home. We did have  come and see her and we are able to find her a bed, however she seemed to deteriorate a little bit and the  and myself are not comfortable sending her there tonight. I spoke with Dr. Caceres who has agreed to admit her for observation.    Current Discharge Medication List          Final diagnoses:   Generalized muscle weakness   Low back pain without sciatica, unspecified back pain laterality, unspecified chronicity   SOB (shortness of breath)   Anxiety       6/27/2018   [facility]     Toney Almonte MD  06/27/18 1182

## 2018-06-27 NOTE — IP AVS SNAPSHOT
[facility]    [address]    [place] [address]    Phone:  [phone]    Fax:  [phone]                                       After Visit Summary   6/27/2018    Laura Perez    MRN: [id number]           After Visit Summary Signature Page     I have received my discharge instructions, and my questions have been answered. I have discussed any challenges I see with this plan with the nurse or doctor.    ..........................................................................................................................................  Patient/Patient Representative Signature      ..........................................................................................................................................  Patient Representative Print Name and Relationship to Patient    ..................................................               ................................................  Date                                            Time    ..........................................................................................................................................  Reviewed by Signature/Title    ...................................................              ..............................................  Date                                                            Time

## 2018-06-27 NOTE — ED NOTES
Pt readied to go to [facility] home at 1500 as planned.  MD aware she is leaving and  concerned she is not healthy enough to go to [facility] home.  In room moaning and temp has been increasing throughout stay.  MD spoke with  and will decide if able to be discharge.

## 2018-06-27 NOTE — PROGRESS NOTES
RCAT completed and pt score scored 8.  RT recommends no changes at this time. Pt has home meds ordered, has a productive spontaneous cough, and was 98% on 2L so oxygen decreased to 1L.  RT will continue to follow and re-assess as needed.      Lelo Lopez, RRT

## 2018-06-28 ENCOUNTER — APPOINTMENT (OUTPATIENT)
Dept: GENERAL RADIOLOGY | Facility: OTHER | Age: 68
End: 2018-06-28
Attending: INTERNAL MEDICINE
Payer: MEDICARE

## 2018-06-28 ENCOUNTER — APPOINTMENT (OUTPATIENT)
Dept: PHYSICAL THERAPY | Facility: OTHER | Age: 68
End: 2018-06-28
Attending: INTERNAL MEDICINE
Payer: MEDICARE

## 2018-06-28 ENCOUNTER — APPOINTMENT (OUTPATIENT)
Dept: OCCUPATIONAL THERAPY | Facility: OTHER | Age: 68
End: 2018-06-28
Attending: INTERNAL MEDICINE
Payer: MEDICARE

## 2018-06-28 VITALS
SYSTOLIC BLOOD PRESSURE: 108 MMHG | HEIGHT: 63 IN | HEART RATE: 89 BPM | DIASTOLIC BLOOD PRESSURE: 48 MMHG | BODY MASS INDEX: 25.78 KG/M2 | WEIGHT: 145.5 LBS | RESPIRATION RATE: 22 BRPM | TEMPERATURE: 97.4 F | OXYGEN SATURATION: 97 %

## 2018-06-28 PROCEDURE — 73630 X-RAY EXAM OF FOOT: CPT | Mod: RT

## 2018-06-28 PROCEDURE — 73110 X-RAY EXAM OF WRIST: CPT | Mod: LT

## 2018-06-28 PROCEDURE — 96372 THER/PROPH/DIAG INJ SC/IM: CPT

## 2018-06-28 PROCEDURE — 96376 TX/PRO/DX INJ SAME DRUG ADON: CPT

## 2018-06-28 PROCEDURE — 94640 AIRWAY INHALATION TREATMENT: CPT | Mod: 76

## 2018-06-28 PROCEDURE — 99217 ZZC OBSERVATION CARE DISCHARGE: CPT | Performed by: INTERNAL MEDICINE

## 2018-06-28 PROCEDURE — 25000128 H RX IP 250 OP 636: Performed by: INTERNAL MEDICINE

## 2018-06-28 PROCEDURE — 25000131 ZZH RX MED GY IP 250 OP 636 PS 637: Mod: GY | Performed by: INTERNAL MEDICINE

## 2018-06-28 PROCEDURE — 97161 PT EVAL LOW COMPLEX 20 MIN: CPT | Mod: GP

## 2018-06-28 PROCEDURE — G8978 MOBILITY CURRENT STATUS: HCPCS | Mod: GP,CJ

## 2018-06-28 PROCEDURE — G0378 HOSPITAL OBSERVATION PER HR: HCPCS

## 2018-06-28 PROCEDURE — 40000275 ZZH STATISTIC RCP TIME EA 10 MIN

## 2018-06-28 PROCEDURE — G8980 MOBILITY D/C STATUS: HCPCS | Mod: GP,CJ

## 2018-06-28 PROCEDURE — A9270 NON-COVERED ITEM OR SERVICE: HCPCS | Mod: GY | Performed by: INTERNAL MEDICINE

## 2018-06-28 PROCEDURE — 97530 THERAPEUTIC ACTIVITIES: CPT | Mod: GP

## 2018-06-28 PROCEDURE — 40000193 ZZH STATISTIC PT WARD VISIT

## 2018-06-28 PROCEDURE — G8979 MOBILITY GOAL STATUS: HCPCS | Mod: GP,CJ

## 2018-06-28 PROCEDURE — 25000125 ZZHC RX 250: Performed by: INTERNAL MEDICINE

## 2018-06-28 PROCEDURE — 25000132 ZZH RX MED GY IP 250 OP 250 PS 637: Mod: GY | Performed by: INTERNAL MEDICINE

## 2018-06-28 RX ORDER — ONDANSETRON 4 MG/1
4-8 TABLET, ORALLY DISINTEGRATING ORAL EVERY 8 HOURS PRN
Qty: 20 TABLET | Refills: 1
Start: 2018-06-28 | End: 2018-07-24

## 2018-06-28 RX ORDER — PREDNISONE 1 MG/1
2 TABLET ORAL DAILY
Status: ON HOLD | COMMUNITY
End: 2020-01-25

## 2018-06-28 RX ORDER — DOXYCYCLINE 100 MG/1
100 TABLET ORAL 2 TIMES DAILY
Status: ON HOLD | COMMUNITY
Start: 2018-06-21 | End: 2018-06-28

## 2018-06-28 RX ORDER — DULOXETIN HYDROCHLORIDE 30 MG/1
30 CAPSULE, DELAYED RELEASE ORAL EVERY MORNING
COMMUNITY
End: 2019-07-08

## 2018-06-28 RX ORDER — TRAZODONE HYDROCHLORIDE 100 MG/1
100 TABLET ORAL AT BEDTIME
Qty: 30 TABLET | Refills: 0 | Status: SHIPPED | OUTPATIENT
Start: 2018-06-28 | End: 2018-07-24

## 2018-06-28 RX ORDER — LEVOTHYROXINE SODIUM 125 UG/1
125 TABLET ORAL DAILY
COMMUNITY
End: 2018-07-24

## 2018-06-28 RX ORDER — MONTELUKAST SODIUM 5 MG/1
10 TABLET, CHEWABLE ORAL AT BEDTIME
Status: DISCONTINUED | OUTPATIENT
Start: 2018-06-28 | End: 2018-06-28 | Stop reason: HOSPADM

## 2018-06-28 RX ORDER — ACETAMINOPHEN 325 MG/1
650 TABLET ORAL EVERY 4 HOURS PRN
Qty: 100 TABLET
Start: 2018-06-28 | End: 2018-07-24

## 2018-06-28 RX ORDER — VALACYCLOVIR HYDROCHLORIDE 1 G/1
1000 TABLET, FILM COATED ORAL DAILY
Qty: 7 TABLET | Refills: 0 | Status: SHIPPED | OUTPATIENT
Start: 2018-06-28 | End: 2018-06-28

## 2018-06-28 RX ORDER — VALACYCLOVIR HYDROCHLORIDE 1 G/1
1000 TABLET, FILM COATED ORAL DAILY
Qty: 7 TABLET | Refills: 0 | COMMUNITY
Start: 2018-06-28 | End: 2018-07-24

## 2018-06-28 RX ORDER — VALACYCLOVIR HYDROCHLORIDE 1 G/1
1000 TABLET, FILM COATED ORAL DAILY
Status: ON HOLD | COMMUNITY
Start: 2018-06-20 | End: 2018-06-28

## 2018-06-28 RX ORDER — OXYCODONE HYDROCHLORIDE 5 MG/1
5-10 TABLET ORAL
Qty: 6 TABLET | Refills: 0 | Status: SHIPPED | OUTPATIENT
Start: 2018-06-28 | End: 2018-06-29

## 2018-06-28 RX ORDER — IPRATROPIUM BROMIDE AND ALBUTEROL SULFATE 2.5; .5 MG/3ML; MG/3ML
3 SOLUTION RESPIRATORY (INHALATION) 4 TIMES DAILY
Qty: 360 ML
Start: 2018-06-28 | End: 2018-07-24

## 2018-06-28 RX ORDER — ATOVAQUONE 750 MG/5ML
1500 SUSPENSION ORAL AT BEDTIME
Status: ON HOLD | COMMUNITY
End: 2018-06-28

## 2018-06-28 RX ORDER — ALBUTEROL SULFATE 0.83 MG/ML
2.5 SOLUTION RESPIRATORY (INHALATION) EVERY 6 HOURS PRN
Status: ON HOLD | COMMUNITY
End: 2022-10-14

## 2018-06-28 RX ORDER — TRAMADOL HYDROCHLORIDE 50 MG/1
50 TABLET ORAL EVERY 6 HOURS PRN
Qty: 10 TABLET | Refills: 0 | Status: SHIPPED | OUTPATIENT
Start: 2018-06-28 | End: 2018-09-10

## 2018-06-28 RX ADMIN — PREDNISONE 40 MG: 20 TABLET ORAL at 10:55

## 2018-06-28 RX ADMIN — BUDESONIDE 0.5 MG: 0.5 INHALANT RESPIRATORY (INHALATION) at 07:32

## 2018-06-28 RX ADMIN — ASPIRIN 81 MG 81 MG: 81 TABLET ORAL at 10:55

## 2018-06-28 RX ADMIN — GABAPENTIN 300 MG: 300 CAPSULE ORAL at 05:58

## 2018-06-28 RX ADMIN — LEVOTHYROXINE SODIUM 112 MCG: 112 TABLET ORAL at 08:52

## 2018-06-28 RX ADMIN — FENOFIBRATE 48 MG: 48 TABLET ORAL at 10:55

## 2018-06-28 RX ADMIN — DULOXETINE 30 MG: 30 CAPSULE, DELAYED RELEASE ORAL at 10:56

## 2018-06-28 RX ADMIN — OXYCODONE HYDROCHLORIDE 10 MG: 5 TABLET ORAL at 02:46

## 2018-06-28 RX ADMIN — OXYCODONE HYDROCHLORIDE 10 MG: 5 TABLET ORAL at 05:58

## 2018-06-28 RX ADMIN — SALMETEROL XINAFOATE 1 PUFF: 50 POWDER, METERED ORAL; RESPIRATORY (INHALATION) at 07:33

## 2018-06-28 RX ADMIN — GABAPENTIN 300 MG: 300 CAPSULE ORAL at 14:39

## 2018-06-28 RX ADMIN — INSULIN ASPART 4 UNITS: 100 INJECTION, SOLUTION INTRAVENOUS; SUBCUTANEOUS at 12:38

## 2018-06-28 RX ADMIN — INSULIN ASPART 4 UNITS: 100 INJECTION, SOLUTION INTRAVENOUS; SUBCUTANEOUS at 08:47

## 2018-06-28 RX ADMIN — ONDANSETRON 4 MG: 2 INJECTION INTRAMUSCULAR; INTRAVENOUS at 11:02

## 2018-06-28 RX ADMIN — IPRATROPIUM BROMIDE AND ALBUTEROL SULFATE 3 ML: .5; 3 SOLUTION RESPIRATORY (INHALATION) at 03:02

## 2018-06-28 RX ADMIN — VALACYCLOVIR HYDROCHLORIDE 1000 MG: 500 TABLET, FILM COATED ORAL at 09:27

## 2018-06-28 RX ADMIN — PANTOPRAZOLE SODIUM 40 MG: 40 TABLET, DELAYED RELEASE ORAL at 08:52

## 2018-06-28 RX ADMIN — IPRATROPIUM BROMIDE AND ALBUTEROL SULFATE 3 ML: .5; 3 SOLUTION RESPIRATORY (INHALATION) at 07:30

## 2018-06-28 RX ADMIN — OXYCODONE HYDROCHLORIDE 10 MG: 5 TABLET ORAL at 14:39

## 2018-06-28 RX ADMIN — OXYCODONE HYDROCHLORIDE 10 MG: 5 TABLET ORAL at 09:27

## 2018-06-28 RX ADMIN — ATORVASTATIN CALCIUM 10 MG: 10 TABLET, FILM COATED ORAL at 10:55

## 2018-06-28 NOTE — PLAN OF CARE
"Problem: Patient Care Overview  Goal: Plan of Care/Patient Progress Review  -diagnostic tests and consults completed and resulted   -vital signs normal or at patient baseline   Outcome: No Change  /50  Pulse 95  Temp 98.8  F (37.1  C) (Tympanic)  Resp 16  Ht 1.6 m (5' 3\")  Wt 101 kg (222 lb 10.6 oz)  SpO2 93%  Breastfeeding? No  BMI 39.44 kg/m2  1 LPM via nasal cannula. PRN roxicodone administered for right foot pain with some relief, see MAR. Patient declines additional pain interventions.          "

## 2018-06-28 NOTE — PLAN OF CARE
"Problem: Patient Care Overview  Goal: Plan of Care/Patient Progress Review  -diagnostic tests and consults completed and resulted   -vital signs normal or at patient baseline    Outcome: No Change  /49  Pulse 89  Temp 97.7  F (36.5  C) (Tympanic)  Resp 16  Ht 1.6 m (5' 3\")  Wt 101 kg (222 lb 10.6 oz)  SpO2 95%  Breastfeeding? No  BMI 39.44 kg/m2 room air. Lung sounds clear upon auscultation. Patient reports infrequent productive cough. Patient continues to report right foot pain. PRN roxicodone administered, see MAR. RLE elevated with pillows. Will continue to monitor.         "

## 2018-06-28 NOTE — PHARMACY - DISCHARGE MEDICATION RECONCILIATION AND EDUCATION
Pharmacy:  Discharge Counseling and Medication Reconciliation    Laura Perez  [address] [facility]  [place] [address]  [phone] (home)   68 year old female  PCP: Elin Mohan    Allergies: Glyburide; Mosquitoes (informational only); Other [seasonal allergies]; Sulfamethoxazole-trimethoprim; and Codeine    Discharge Counseling:    Pharmacist met with patient (and/or family) today to review the medication portion of the After Visit Summary (with an emphasis on NEW medications) and to address patient's questions/concerns.    Summary of Education: Met with patient to discuss new medications and medication changes at discharge.  We discussed stopping the amlodipine, losartan and furosemide and also discussed stopping the doxycycline.  We discussed the use of acetaminophen, tramadol, oxycodone.  Patient states she has used all of these medications in the past.  We also discussed adding Duonebs to her current respiratory regimen.      Materials Provided:  MedCounselor sheets printed from Clinical Pharmacology on: ipratropium/albuterol    Discharge Medication Reconciliation:    Aleida Hyatt RPH has reviewed the patient's discharge medication orders and has compared them to the inpatient medication administration record and to what the patient was taking prior to admission - any discrepancies have been resolved.    It has been determined that the patient has an adequate supply of medications available or which can be obtained from the patient's preferred pharmacy, which HE/SHE has confirmed as: [facility] [An updated medication list will be faxed to the patient's pharmacy.]    Thank you for the consult.    Aleida Hyatt RPH........June 28, 2018 3:23 PM

## 2018-06-28 NOTE — PROGRESS NOTES
:    Coordination with Allison from [facility].  Coordinated discharge for 1300.  [facility] will transport.      ELIDA Smith on 6/28/2018 at 10:56 AM

## 2018-06-28 NOTE — PROGRESS NOTES
:    Received call from Gricel at [facility] that patient will now be discharging to . She received a call from patient's spouse, who arranged this.  to transport patient at 1500. Notified patient and nurse of discharge time. Called Allison at [facility] and left voicemail with update. Called Senior linkage line and had PAS screen switch facilities to [facility]. No further needs at this time.     CARMEN Dexter on 6/28/2018 at 1:27 PM

## 2018-06-28 NOTE — PROGRESS NOTES
06/28/18 1500   Quick Adds   Type of Visit Initial PT Evaluation   Living Environment   Lives With spouse   Living Arrangements house   Home Accessibility no concerns   Number of Stairs to Enter Home 2   Number of Stairs Within Home 1   Stair Railings at Home outside, present on right side   Transportation Available car   Functional Level Prior   Ambulation 1-->assistive equipment   Transferring 1-->assistive equipment   Toileting 1-->assistive equipment   Communication 0-->understands/communicates without difficulty   Swallowing 0-->swallows foods/liquids without difficulty   Cognition 0 - no cognition issues reported   Fall history within last six months yes   Number of times patient has fallen within last six months 8   Which of the above functional risks had a recent onset or change? ambulation;transferring   General Information   Referring Physician Brijesh   Patient/Family Goals Statement return home when able   Pertinent History of Current Problem (include personal factors and/or comorbidities that impact the POC) Pt has increased fatigue. Hx of RA and diabetes.    Precautions/Limitations fall precautions   Weight-Bearing Status - LLE full weight-bearing   Weight-Bearing Status - RLE full weight-bearing   Cognitive Status Examination   Orientation orientation to person, place and time   Level of Consciousness alert   Follows Commands and Answers Questions 100% of the time   Personal Safety and Judgment intact   Memory intact   Pain Assessment   Patient Currently in Pain Yes, see Vital Sign flowsheet   Integumentary/Edema   Integumentary/Edema no deficits were identifed   Range of Motion (ROM)   ROM Comment WFL LEs   Strength   Strength Comments WFL LEs   Bed Mobility   Bed Mobility Comments moderate assist for bed mobilities   Transfer Skills   Transfer Comments moderate assist to minimal assist   Gait   Gait Comments patient unable to functionally ambulate at this time   Balance   Balance Comments  decreased gait/dynamic stability   Sensory Examination   Sensory Perception Comments appears iintact to light touch in LEs   Coordination   Coordination no deficits were identified   General Therapy Interventions   Planned Therapy Interventions bed mobility training;gait training;transfer training   Clinical Impression   Criteria for Skilled Therapeutic Intervention yes, treatment indicated   PT Diagnosis impaired mobility   Influenced by the following impairments pain and weakness   Functional limitations due to impairments mobility   Clinical Presentation Stable/Uncomplicated   Clinical Decision Making (Complexity) Low complexity   Predicted Duration of Therapy Intervention (days/wks) 1-2 days   Anticipated Equipment Needs at Discharge front wheeled walker   Anticipated Discharge Disposition Transitional Care Facility   Risk & Benefits of therapy have been explained Yes   Patient, Family & other staff in agreement with plan of care Yes   Total Evaluation Time   Total Evaluation Time (Minutes) 15

## 2018-06-28 NOTE — PLAN OF CARE
Problem: Pain, Acute (Adult)  Goal: Acceptable Pain Control/Comfort Level  Patient will demonstrate the desired outcomes by discharge/transition of care.   Outcome: Improving  Pain control adequate and improving with medication at this time.

## 2018-06-28 NOTE — PLAN OF CARE
Problem: Patient Care Overview  Goal: Plan of Care/Patient Progress Review  -diagnostic tests and consults completed and resulted   -vital signs normal or at patient baseline    Physical Therapy Discharge Summary    Reason for therapy discharge:    Discharged to transitional care facility.    Progress towards therapy goal(s). See goals on Care Plan in [facility] electronic health record for goal details.  Goals not met.  Barriers to achieving goals:   discharge from facility.    Therapy recommendation(s):    Continued therapy is recommended.  Rationale/Recommendations:  to promote return to her prior level of functional mobility.

## 2018-06-28 NOTE — PHARMACY-ADMISSION MEDICATION HISTORY
"Pharmacy -- Admission Medication Reconciliation    Prior to admission (PTA) medications were reviewed and the patient's PTA medication list was updated.    Sources Consulted: patient, SARAH Puente, chart review    The reliability of this Medication Reconciliation is: Reliability: Reliable    The following significant changes were made:  Atovaquone REMOVED - patient took 1 dose and got very sick. Was ordered for PCP prophylaxis due to chronic immunosuppression per rheumatology  Calcium REMOVED  Duloxetine UPDATED to once daily - patient states she takes this in the AM and Gabapentin in the PM  Gabapentin frequency UPDATED to once daily at HS  Levothyroxine dose CHANGED - patient had a recent dose increase  Tramadol dose CHANGED to 50 mg at HS  Prednisone dose CHANGED to 12 mg daily    Note: patient states she recently had her Novolog MIX 70/30 dose increased to 50 units in AM and 30 units in PM; patient states she has difficulty injecting the larger doses due to her severe arthritis.    Note: patient states significant improvement in pain, anxiety, depression when taking medical cannabis.  Patient has not taken any in the past week      In addition, the patient's allergies were reviewed with the patient and updated as follows:   Allergies: Glyburide; Mosquitoes (informational only); Other [seasonal allergies]; Sulfamethoxazole-trimethoprim; and Codeine    The pharmacist has reviewed with the patient that all personal medications should be removed from the building or locked in the belongings safe.  Patient shall only take medications ordered by the physician and administered by the nursing staff.       Medication barriers identified: patient with significant anxiety due to high pill burden, multiple nebulizer treatments and insulin requirements.  Patient would benefit from reduction in medications if possible.  Patient pays $1000/month for medical cannabis and is currently in the \"donut hole\" for her " prescription coverage.  Patient recently paid $600 for atovaquone prescription and states they would not qualify for any kind of financial assistance.   Medication adherence concerns: yes   Understanding of emergency medications: has EpiPen for severe reactions to deer flies, mosquitoes, etc but has never had to use    Aleida SUYAPA Haytt [facility], 6/28/2018,  12:16 PM

## 2018-06-28 NOTE — PLAN OF CARE
Problem: Patient Care Overview  Goal: Plan of Care/Patient Progress Review  -diagnostic tests and consults completed and resulted   -vital signs normal or at patient baseline    Outcome: No Change  Patient continues to require PRN analgesics for right foot pain, see MAR. RLE remains elevated with pillows. No additional changes since last assessment.

## 2018-06-28 NOTE — DISCHARGE SUMMARY
"[facility]    Discharge Summary  Hospitalist    Date of Admission:  6/27/2018  Date of Discharge:  6/28/2018  Discharging Provider: Vanita Coley  Date of Service (when I saw the patient): 06/28/18    Discharge Diagnoses   COPD exacerbation  Chronic pain from RA    History of Present Illness   \"Laura Preez is a 68 year old female who presents with SOB and chronic pain. She was DCed from [facility] on 6/21/2018. I reviewed the DC summary and briefly she was thought to get hypotensive from large doses of IV opioids, then got into pulmonary edema and was diuresed. She had a mild Trop elevation which was thought to be due to fluid overload. She had an Echo which revealed an LVFEF of 55% (diastolic CHF). She also was found to have COPD and given Nebs. She was also Rxed initially for a pneumonia but after a chest CT was ruled out for pneumonia and PE. She was DCed on Doxy for possible acute bronchitis. She's had a total of 10 days of Abxs both in-house and as an outpt.      She also has chronic pain and is on chronic steroids for her RA. Her rheumatologist has been struggling with various biologics to get her RA controlled but complete control hasn't been possible. She's been at home and not doing well, not using her Nebs and she says both her pain and breathing are bothering her. She describes pain all over and her skin is all bruised which also causes pain.  reports anxiety as well. SHe's currently on O2 and having a hard time breathing but able to speak in full sentences. 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 \"    Hospital Course   Laura Perez was admitted on 6/27/2018.  The following problems were addressed during her hospitalization:    1. COPD Exacerbation- Off O2. Pt wasn't doing Nebs or INH at home so turned around real quick. Now DC " to TCU.   2. HTN- BP soft so DCed Amlodipine. Doesn't take Lasix- DCed. On ACE-I and ARB. DCed ARB.   3. RA- Chronic pain. Oxy and low dose Dilaudid.   4. Mild hypotension- Given CHF history, careful with IV opioids and fluids. Gentle IVFs. Resolved. BP meds cleaned up as above.   5. OC dessicans- Outpt Ortho f/u. No new fractures or lesions.   6. Herpes labialis- Continue Valcyclovir x7 more days.    7. DM- Continue outpt regimen with accuchecks at NH.  8. CKD Stage 3- Cr at baseline.     Active Problems:    COPD exacerbation (H)      Vanita Coley    Code Status   Full Code       Primary Care Physician   Elin Mohan      Discharge Disposition   Discharged to short-term care facility  Condition at discharge: Stable    Consultations This Hospital Stay   SOCIAL WORK IP CONSULT  SOCIAL WORK IP CONSULT  PHYSICAL THERAPY ADULT IP CONSULT  OCCUPATIONAL THERAPY ADULT IP CONSULT  RESPIRATORY CARE IP CONSULT  PHYSICAL THERAPY ADULT IP CONSULT  OCCUPATIONAL THERAPY ADULT IP CONSULT    Time Spent on this Encounter   I, Vanita Coley, personally saw the patient today and spent greater than 30 minutes discharging this patient.    Discharge Orders     General info for SNF   Length of Stay Estimate: Short Term Care: Estimated # of Days <30  Condition at Discharge: Stable  Level of care:skilled   Rehabilitation Potential: Fair  Admission H&P remains valid and up-to-date: Yes  Recent Chemotherapy: N/A  Use Nursing Home Standing Orders: Yes     Mantoux instructions   Give two-step Mantoux (PPD) Per Facility Policy Yes     Reason for your hospital stay   COPD exacerbation- resolved.     Glucose monitor nursing POCT   Before meals and at bedtime     Daily weights   Call Provider for weight gain of more than 2 pounds per day or 5 pounds per week.     Additional Discharge Instructions   Follow up with PMD within one week.     Activity - Up with assistive device     Activity - Up with nursing assistance     Physical Therapy Adult  Consult   Evaluate and treat as clinically indicated.    Reason:  Weakness     Occupational Therapy Adult Consult   Evaluate and treat as clinically indicated.    Reason:  Weakness     Fall precautions     Advance Diet as Tolerated   Follow this diet upon discharge: Orders Placed This Encounter     Regular Diet Adult       Discharge Medications   Current Discharge Medication List      START taking these medications    Details   acetaminophen (TYLENOL) 325 MG tablet Take 2 tablets (650 mg) by mouth every 4 hours as needed for mild pain  Qty: 100 tablet    Associated Diagnoses: Low back pain without sciatica, unspecified back pain laterality, unspecified chronicity      ipratropium - albuterol 0.5 mg/2.5 mg/3 mL (DUONEB) 0.5-2.5 (3) MG/3ML neb solution Take 1 vial (3 mLs) by nebulization 4 times daily  Qty: 360 mL    Associated Diagnoses: Chronic obstructive pulmonary disease, unspecified COPD type (H)      oxyCODONE IR (ROXICODONE) 5 MG tablet Take 1-2 tablets (5-10 mg) by mouth every 3 hours as needed for other (pain control or improvement in physical function.)  Qty: 6 tablet, Refills: 0    Associated Diagnoses: Low back pain without sciatica, unspecified back pain laterality, unspecified chronicity         CONTINUE these medications which have CHANGED    Details   traMADol (ULTRAM) 50 MG tablet Take 1 tablet (50 mg) by mouth every 6 hours as needed for severe pain  Qty: 10 tablet, Refills: 0    Associated Diagnoses: Low back pain without sciatica, unspecified back pain laterality, unspecified chronicity      traZODone (DESYREL) 100 MG tablet Take 1 tablet (100 mg) by mouth At Bedtime  Qty: 30 tablet, Refills: 0    Associated Diagnoses: Anxiety      valACYclovir (VALTREX) 1000 mg tablet Take 1 tablet (1,000 mg) by mouth daily for 7 days  Qty: 7 tablet, Refills: 0    Associated Diagnoses: Herpes labialis         CONTINUE these medications which have NOT CHANGED    Details   aspirin 81 MG chewable tablet Take 81 mg  by mouth daily with food      budesonide (PULMICORT) 0.5 MG/2ML neb solution Inhale 0.5 mg into the lungs 2 times daily      DULoxetine (CYMBALTA) 30 MG EC capsule Take 30 mg by mouth every morning       estradiol (YUVAFEM) 10 MCG TABS vaginal tablet Place 10 mcg vaginally twice a week Takes on Sundays and Wednesdays      fenofibrate 48 MG tablet Take 48 mg by mouth daily with food      formoterol (PERFOROMIST) 20 MCG/2ML neb solution Inhale 2 mLs into the lungs 2 times daily      gabapentin (NEURONTIN) 300 MG capsule Take 300 mg by mouth At Bedtime       insulin aspart prot & aspart (NOVOLOG MIX 70/30 PEN) injection Inject 50 units under th skin in the morning, and  30 units under the skin at bedtime      levothyroxine (SYNTHROID) 125 MCG tablet Take 125 mcg by mouth daily      !! medical cannabis (Patient's own supply.  Not a prescription) Apply 1 Dose topically as needed (pain) (This is NOT a prescription, and does not certify that the patient has a qualifying medical condition for medical cannabis.  The purpose of this order is  to document that the patient reports taking medical cannabis.)      !! medical cannabis liquid Place 1 drop under the tongue as needed (pain) (This is NOT a prescription, and does not certify that the patient has a qualifying medical condition for medical cannabis.  The purpose of this order is  to document that the patient reports taking medical cannabis.)      !! medical cannabis liquid 1-2 sprays under the tongue as needed for pain      montelukast (SINGULAIR) 10 MG tablet Take 10 mg by mouth At Bedtime      omeprazole (PRILOSEC) 20 MG CR capsule Take 20 mg by mouth every morning (before breakfast)      !! predniSONE (DELTASONE) 1 MG tablet Take 2 mg by mouth daily TOTAL DAILY DOSE = 12 MG      !! predniSONE (DELTASONE) 5 MG tablet Take 10 mg by mouth daily with food TOTAL DAILY DOSE = 12 MG      albuterol (2.5 MG/3ML) 0.083% neb solution Take 2.5 mg by nebulization every 6 hours as  needed for shortness of breath / dyspnea or wheezing      blood glucose monitoring (ONE TOUCH ULTRASOFT) lancets TEST 2 TIMES PER DAY      blood glucose monitoring (ONETOUCH ULTRA) test strip TEST 2 TIMES PER DAY.      EPINEPHrine (EPIPEN/ADRENACLICK/OR ANY BX GENERIC EQUIV) 0.3 MG/0.3ML injection 2-pack Inject 0.3 mg into the muscle once as needed      glucagon 1 MG kit Inject 1 mg into the muscle as needed for low blood sugar       glucose 4 g CHEW chewable tablet Take 1 tablet by mouth as needed for low blood sugar      lisinopril (PRINIVIL/ZESTRIL) 2.5 MG tablet Take 2.5 mg by mouth      simvastatin (ZOCOR) 40 MG tablet Take 40 mg by mouth At Bedtime       !! - Potential duplicate medications found. Please discuss with provider.      STOP taking these medications       amLODIPine (NORVASC) 2.5 MG tablet Comments:   Reason for Stopping:         doxycycline Monohydrate 100 MG TABS Comments:   Reason for Stopping:         furosemide (LASIX) 20 MG tablet Comments:   Reason for Stopping:         losartan (COZAAR) 25 MG tablet Comments:   Reason for Stopping:             Allergies   Allergies   Allergen Reactions     Glyburide Anaphylaxis     Other reaction(s): Dizziness     Mosquitoes (Informational Only) Hives     Other [Seasonal Allergies] Hives     Deer Flies     Sulfamethoxazole-Trimethoprim Anaphylaxis     Other reaction(s): Other - Describe In Comment Field  Rash, nausea, dizziness     Codeine Nausea and Nausea and Vomiting     Data   Most Recent 3 CBC's:  Recent Labs   Lab Test  06/27/18   0945  06/17/18   0635  06/16/18   0700   WBC  14.5*  16.6*  16.3*   HGB  11.0*  8.4*  8.5*   MCV  79  81  83   PLT  357  348  300      Most Recent 3 BMP's:  Recent Labs   Lab Test  06/27/18   0945  06/17/18   0635  06/16/18   0700   NA  132*  132*  131*   POTASSIUM  4.1  4.7  5.6*   CHLORIDE  99  103  102   CO2  18*  21  21   BUN  12  21  20   CR  1.59*  1.31*  1.50*   ANIONGAP  15*  8  8   GIANNI  9.3  8.9  8.4*   GLC  191*   223*  194*     Most Recent 2 LFT's:  Recent Labs   Lab Test  06/27/18   0945  06/15/18   1745   AST  17  22   ALT  12  18   ALKPHOS  70  70   BILITOTAL  0.6  1.2*     Most Recent INR's and Anticoagulation Dosing History:  Anticoagulation Dose History     Recent Dosing and Labs Latest Ref Rng & Units 6/15/2018    INR 0 - 1.3 1.22        Most Recent 3 Troponin's:  Recent Labs   Lab Test  06/27/18   0945  06/17/18   2300  06/17/18   2116   TROPI  0.041*  0.112*  0.080*     Most Recent Cholesterol Panel:  Recent Labs   Lab Test  12/03/15   1802   LDL  121*   HDL  40   TRIG  260*     Most Recent 6 Bacteria Isolates From Any Culture (See EPIC Reports for Culture Details):  Recent Labs   Lab Test  06/15/18   1830  06/15/18   1745   CULT  Urine culture negative (no growth of uropathogens).  No growth after 6 days  No growth after 6 days     Most Recent TSH, T4 and A1c Labs:No lab results found.  Results for orders placed or performed during the hospital encounter of 06/27/18   XR Chest Port 1 View    Narrative    PROCEDURE:  XR CHEST PORT 1 VW    HISTORY:  sob; .     COMPARISON:  6/17/2018    FINDINGS:   The cardiac silhouette is normal in size. The pulmonary vasculature is  normal.  The lungs are clear. No pleural effusion or pneumothorax.      Impression    IMPRESSION:  No acute cardiopulmonary disease.      MIREILLE ROMERO MD   XR Foot Port Right 3 Views    Narrative    PROCEDURE:  XR FOOT PORT RT 3 VW    HISTORY: Pain;     COMPARISON:  None.    TECHNIQUE:  3 views of the right foot were obtained.    FINDINGS:  There are scattered degenerative changes of the  interphalangeal and metatarsophalangeal joints. There are also  degenerative changes in the midfoot. No acute fracture or dislocation.        Impression    IMPRESSION: No acute fracture.      MIREILLE ROMERO MD   XR Wrist Port Left G/E 3 Views    Narrative    PROCEDURE:  XR WRIST PORT LT  G/E 3 VW    HISTORY: Pain r/o fracture; ulnar-sided wrist pain after a  fall    COMPARISON:  None.    TECHNIQUE:  3 views of the left wrist were obtained.    FINDINGS:  There are advanced degenerative changes of the wrist. There  are postoperative changes in the MCP joints. No acute fracture or  dislocation.       Impression    IMPRESSION: No acute fracture.      MIREILLE ROMERO MD

## 2018-06-28 NOTE — PROGRESS NOTES
Patient discharged to [facility] in wheelchair at 1510.  Accompanied by [facility] staff.  Stable when leaving.  Belongings sent with patient.  Livier Roberts RN on 6/28/2018 at 3:13 PM

## 2018-06-28 NOTE — PHARMACY - DISCHARGE MEDICATION RECONCILIATION
[facility]  [facility] of [facility]  [address]   [address]    June 28, 2018    Dear Pharmacist,    Your customer, Laura Perez, born on 1950, was recently discharged from [facility].  We have updated her medication list and want to alert you to the following:       Review of your medicines      START taking       Dose / Directions    acetaminophen 325 MG tablet   Commonly known as:  TYLENOL   Used for:  Low back pain without sciatica, unspecified back pain laterality, unspecified chronicity        Dose:  650 mg   Take 2 tablets (650 mg) by mouth every 4 hours as needed for mild pain   Quantity:  100 tablet   Refills:  0       ipratropium - albuterol 0.5 mg/2.5 mg/3 mL 0.5-2.5 (3) MG/3ML neb solution   Commonly known as:  DUONEB   Used for:  Chronic obstructive pulmonary disease, unspecified COPD type (H)        Dose:  3 mL   Take 1 vial (3 mLs) by nebulization 4 times daily   Quantity:  360 mL   Refills:  0       ondansetron 4 MG ODT tab   Commonly known as:  ZOFRAN ODT   Used for:  Nausea        Dose:  4-8 mg   Take 1-2 tablets (4-8 mg) by mouth every 8 hours as needed for nausea   Quantity:  20 tablet   Refills:  1       oxyCODONE IR 5 MG tablet   Commonly known as:  ROXICODONE   Used for:  Low back pain without sciatica, unspecified back pain laterality, unspecified chronicity        Dose:  5-10 mg   Take 1-2 tablets (5-10 mg) by mouth every 3 hours as needed for other (pain control or improvement in physical function.)   Quantity:  6 tablet   Refills:  0         CONTINUE these medicines which may have CHANGED, or have new prescriptions. If we are uncertain of the size of tablets/capsules you have at home, strength may be listed as something that might have changed.       Dose / Directions    traMADol 50 MG tablet   Commonly known as:  ULTRAM   This may have changed:    - when to take this  - reasons to take this   Used for:  Low back pain  without sciatica, unspecified back pain laterality, unspecified chronicity        Dose:  50 mg   Take 1 tablet (50 mg) by mouth every 6 hours as needed for severe pain   Quantity:  10 tablet   Refills:  0         CONTINUE these medicines which have NOT CHANGED       Dose / Directions    albuterol (2.5 MG/3ML) 0.083% neb solution        Dose:  2.5 mg   Take 2.5 mg by nebulization every 6 hours as needed for shortness of breath / dyspnea or wheezing   Refills:  0       aspirin 81 MG chewable tablet        Dose:  81 mg   Take 81 mg by mouth daily with food   Refills:  0       blood glucose monitoring lancets        TEST 2 TIMES PER DAY   Refills:  0       budesonide 0.5 MG/2ML neb solution   Commonly known as:  PULMICORT        Dose:  0.5 mg   Inhale 0.5 mg into the lungs 2 times daily   Refills:  0       DULoxetine 30 MG EC capsule   Commonly known as:  CYMBALTA        Dose:  30 mg   Take 30 mg by mouth every morning   Refills:  0       EPINEPHrine 0.3 MG/0.3ML injection 2-pack   Commonly known as:  EPIPEN/ADRENACLICK/or ANY BX GENERIC EQUIV        Dose:  0.3 mg   Inject 0.3 mg into the muscle once as needed   Refills:  0       fenofibrate 48 MG tablet        Dose:  48 mg   Take 48 mg by mouth daily with food   Refills:  0       formoterol 20 MCG/2ML neb solution   Commonly known as:  PERFOROMIST        Dose:  2 mL   Inhale 2 mLs into the lungs 2 times daily   Refills:  0       gabapentin 300 MG capsule   Commonly known as:  NEURONTIN        Dose:  300 mg   Take 300 mg by mouth At Bedtime   Refills:  0       glucagon 1 MG kit        Dose:  1 mg   Inject 1 mg into the muscle as needed for low blood sugar   Refills:  0       glucose 4 g Chew chewable tablet        Dose:  1 tablet   Take 1 tablet by mouth as needed for low blood sugar   Refills:  0       insulin aspart prot & aspart injection   Commonly known as:  NovoLOG MIX 70/30 PEN        Inject 50 units under th skin in the morning, and  30 units under the skin at  bedtime   Refills:  0       lisinopril 2.5 MG tablet   Commonly known as:  PRINIVIL/Zestril        Dose:  2.5 mg   Take 2.5 mg by mouth   Refills:  0       * medical cannabis liquid        1-2 sprays under the tongue as needed for pain   Refills:  0       * medical cannabis (Patient's own supply.  Not a prescription)        Dose:  1 Dose   Apply 1 Dose topically as needed (pain) (This is NOT a prescription, and does not certify that the patient has a qualifying medical condition for medical cannabis.  The purpose of this order is  to document that the patient reports taking medical cannabis.)   Refills:  0       * medical cannabis liquid        Dose:  1 drop   Place 1 drop under the tongue as needed (pain) (This is NOT a prescription, and does not certify that the patient has a qualifying medical condition for medical cannabis.  The purpose of this order is  to document that the patient reports taking medical cannabis.)   Refills:  0       montelukast 10 MG tablet   Commonly known as:  SINGULAIR        Dose:  10 mg   Take 10 mg by mouth At Bedtime   Refills:  0       omeprazole 20 MG CR capsule   Commonly known as:  priLOSEC        Dose:  20 mg   Take 20 mg by mouth every morning (before breakfast)   Refills:  0       ONETOUCH ULTRA test strip   Generic drug:  blood glucose monitoring        TEST 2 TIMES PER DAY.   Refills:  0       * predniSONE 5 MG tablet   Commonly known as:  DELTASONE        Dose:  10 mg   Take 10 mg by mouth daily with food TOTAL DAILY DOSE = 12 MG   Refills:  0       * predniSONE 1 MG tablet   Commonly known as:  DELTASONE        Dose:  2 mg   Take 2 mg by mouth daily TOTAL DAILY DOSE = 12 MG   Refills:  0       simvastatin 40 MG tablet   Commonly known as:  ZOCOR        Dose:  40 mg   Take 40 mg by mouth At Bedtime   Refills:  0       SYNTHROID 125 MCG tablet   Generic drug:  levothyroxine        Dose:  125 mcg   Take 125 mcg by mouth daily   Refills:  0       traZODone 100 MG tablet    Commonly known as:  DESYREL   Used for:  Anxiety        Dose:  100 mg   Take 1 tablet (100 mg) by mouth At Bedtime   Quantity:  30 tablet   Refills:  0       valACYclovir 1000 mg tablet   Commonly known as:  VALTREX   Used for:  Herpes labialis        Dose:  1000 mg   Take 1 tablet (1,000 mg) by mouth daily   Quantity:  7 tablet   Refills:  0       YUVAFEM 10 MCG Tabs vaginal tablet   Generic drug:  estradiol        Dose:  10 mcg   Place 10 mcg vaginally twice a week Takes on Sundays and Wednesdays   Refills:  0       * Notice:  This list has 5 medication(s) that are the same as other medications prescribed for you. Read the directions carefully, and ask your doctor or other care provider to review them with you.      STOP taking          amLODIPine 2.5 MG tablet   Commonly known as:  NORVASC           doxycycline Monohydrate 100 MG Tabs           furosemide 20 MG tablet   Commonly known as:  LASIX           losartan 25 MG tablet   Commonly known as:  COZAAR                Where to get your medicines      Some of these will need a paper prescription and others can be bought over the counter. Ask your nurse if you have questions.     Bring a paper prescription for each of these medications      oxyCODONE IR 5 MG tablet     traMADol 50 MG tablet     traZODone 100 MG tablet       You don't need a prescription for these medications      acetaminophen 325 MG tablet     ipratropium - albuterol 0.5 mg/2.5 mg/3 mL 0.5-2.5 (3) MG/3ML neb solution     ondansetron 4 MG ODT tab             We also reviewed Laura Perez's allergy list and updated it as needed:  Allergies: Glyburide; Mosquitoes (informational only); Other [seasonal allergies]; Sulfamethoxazole-trimethoprim; and Codeine    Thank you for continuing to care for Laura Perez.  We look forward to working together with you in the future.    Sincerely,  Aleida Hyatt, [facility]

## 2018-06-28 NOTE — PROGRESS NOTES
:    Telephone contact with Allison from [facility].  Provided discharge update.  They are holding a bed for patient at this time.  Discharge pending.      ELIDA Smith on 6/28/2018 at 9:33 AM

## 2018-06-29 ENCOUNTER — TELEPHONE (OUTPATIENT)
Dept: INTERNAL MEDICINE | Facility: OTHER | Age: 68
End: 2018-06-29

## 2018-06-29 DIAGNOSIS — M54.50 LOW BACK PAIN WITHOUT SCIATICA, UNSPECIFIED BACK PAIN LATERALITY, UNSPECIFIED CHRONICITY: ICD-10-CM

## 2018-06-29 RX ORDER — OXYCODONE HYDROCHLORIDE 5 MG/1
5 TABLET ORAL EVERY 6 HOURS PRN
Qty: 40 TABLET | Refills: 0 | Status: SHIPPED | OUTPATIENT
Start: 2018-06-29 | End: 2019-07-08

## 2018-06-29 NOTE — TELEPHONE ENCOUNTER
Patient released from Hospital earlier today and was given prescription for Oxycodone 5mg by Hospitalist provider.  [facility] staff not available at this time.  Will try again.    Kelsey Yung LPN............6/29/2018 11:35 PM

## 2018-06-29 NOTE — TELEPHONE ENCOUNTER
Patient sent to [facility] last night with 6 tablets of Oxycodone 5mg.  Per staff she has used 3 tablets so far and they are giving Tylenol for breakthru pain inbetween doses.  Requesting prescription for Oxycodone 5mg for patient.  Fax hard copy to [phone].    Kelsey Yung LPN............6/29/2018 3:18 PM

## 2018-06-29 NOTE — TELEPHONE ENCOUNTER
Left message for nurse at [facility] to return call to clinic.  Kelsey Yung LPN............6/29/2018 2:50 PM

## 2018-07-05 ENCOUNTER — TELEPHONE (OUTPATIENT)
Dept: FAMILY MEDICINE | Facility: OTHER | Age: 68
End: 2018-07-05

## 2018-07-05 ENCOUNTER — OFFICE VISIT (OUTPATIENT)
Dept: FAMILY MEDICINE | Facility: OTHER | Age: 68
End: 2018-07-05
Attending: NURSE PRACTITIONER
Payer: MEDICARE

## 2018-07-05 VITALS
BODY MASS INDEX: 24.98 KG/M2 | SYSTOLIC BLOOD PRESSURE: 136 MMHG | WEIGHT: 141 LBS | DIASTOLIC BLOOD PRESSURE: 60 MMHG | HEART RATE: 108 BPM | TEMPERATURE: 97.5 F

## 2018-07-05 DIAGNOSIS — Z09 HOSPITAL DISCHARGE FOLLOW-UP: ICD-10-CM

## 2018-07-05 DIAGNOSIS — M05.9 SEROPOSITIVE RHEUMATOID ARTHRITIS (H): ICD-10-CM

## 2018-07-05 DIAGNOSIS — J43.8 OTHER EMPHYSEMA (H): ICD-10-CM

## 2018-07-05 DIAGNOSIS — E11.9 DM TYPE 2, NOT AT GOAL (H): Primary | ICD-10-CM

## 2018-07-05 DIAGNOSIS — N18.30 CHRONIC KIDNEY DISEASE (CKD), STAGE III (MODERATE) (H): ICD-10-CM

## 2018-07-05 DIAGNOSIS — I50.32 CHRONIC DIASTOLIC CHF (CONGESTIVE HEART FAILURE), NYHA CLASS 2 (H): ICD-10-CM

## 2018-07-05 LAB
ANION GAP SERPL CALCULATED.3IONS-SCNC: 11 MMOL/L (ref 3–14)
BASOPHILS # BLD AUTO: 0 10E9/L (ref 0–0.2)
BASOPHILS NFR BLD AUTO: 0.1 %
BUN SERPL-MCNC: 20 MG/DL (ref 7–25)
CALCIUM SERPL-MCNC: 9.9 MG/DL (ref 8.6–10.3)
CHLORIDE SERPL-SCNC: 102 MMOL/L (ref 98–107)
CO2 SERPL-SCNC: 24 MMOL/L (ref 21–31)
CREAT SERPL-MCNC: 1.36 MG/DL (ref 0.6–1.2)
DIFFERENTIAL METHOD BLD: ABNORMAL
EOSINOPHIL # BLD AUTO: 0.1 10E9/L (ref 0–0.7)
EOSINOPHIL NFR BLD AUTO: 0.3 %
ERYTHROCYTE [DISTWIDTH] IN BLOOD BY AUTOMATED COUNT: 17.4 % (ref 10–15)
GFR SERPL CREATININE-BSD FRML MDRD: 39 ML/MIN/1.7M2
GLUCOSE SERPL-MCNC: 57 MG/DL (ref 70–105)
HCT VFR BLD AUTO: 33.9 % (ref 35–47)
HGB BLD-MCNC: 10.3 G/DL (ref 11.7–15.7)
IMM GRANULOCYTES # BLD: 0.2 10E9/L (ref 0–0.4)
IMM GRANULOCYTES NFR BLD: 1 %
LYMPHOCYTES # BLD AUTO: 1.8 10E9/L (ref 0.8–5.3)
LYMPHOCYTES NFR BLD AUTO: 8.5 %
MCH RBC QN AUTO: 24.5 PG (ref 26.5–33)
MCHC RBC AUTO-ENTMCNC: 30.4 G/DL (ref 31.5–36.5)
MCV RBC AUTO: 81 FL (ref 78–100)
MONOCYTES # BLD AUTO: 1.3 10E9/L (ref 0–1.3)
MONOCYTES NFR BLD AUTO: 5.9 %
NEUTROPHILS # BLD AUTO: 17.7 10E9/L (ref 1.6–8.3)
NEUTROPHILS NFR BLD AUTO: 84.2 %
PLATELET # BLD AUTO: 450 10E9/L (ref 150–450)
POTASSIUM SERPL-SCNC: 3.9 MMOL/L (ref 3.5–5.1)
RBC # BLD AUTO: 4.2 10E12/L (ref 3.8–5.2)
SODIUM SERPL-SCNC: 137 MMOL/L (ref 134–144)
WBC # BLD AUTO: 21.1 10E9/L (ref 4–11)

## 2018-07-05 PROCEDURE — 80048 BASIC METABOLIC PNL TOTAL CA: CPT | Performed by: NURSE PRACTITIONER

## 2018-07-05 PROCEDURE — 99214 OFFICE O/P EST MOD 30 MIN: CPT | Performed by: NURSE PRACTITIONER

## 2018-07-05 PROCEDURE — 85025 COMPLETE CBC W/AUTO DIFF WBC: CPT | Performed by: NURSE PRACTITIONER

## 2018-07-05 PROCEDURE — 36415 COLL VENOUS BLD VENIPUNCTURE: CPT | Performed by: NURSE PRACTITIONER

## 2018-07-05 PROCEDURE — G0463 HOSPITAL OUTPT CLINIC VISIT: HCPCS

## 2018-07-05 RX ORDER — EPINEPHRINE 0.3 MG/.3ML
0.3 INJECTION SUBCUTANEOUS
Qty: 0.9 ML | Refills: 1 | Status: SHIPPED | OUTPATIENT
Start: 2018-07-05 | End: 2022-10-17

## 2018-07-05 ASSESSMENT — PAIN SCALES - GENERAL: PAINLEVEL: NO PAIN (0)

## 2018-07-05 NOTE — PROGRESS NOTES
SUBJECTIVE:   Laura Perez is a 68 year old female who presents to clinic today for the following health issues:    Presents with her  for a follow-up from her recent hospitalization, was hospitalized [facility] June 18-21, recently hospitalized to [facility] overnight for observation June 27.  Has multiple chronic medical conditions including rheumatoid arthritis, COPD, anemia of chronic disease with chronic kidney disease, hypertension, chronic diastolic heart failure, hypothyroidism, hyperlipidemia  See [facility] hospitalization discharge notes in scans  It appears she was diuresed at [facility] with a 3 L return and felt she improved significantly, IV Zosyn was discontinued.  Was also thought she had a pneumonia, CT scan showed mild pleural effusions however was not felt significant enough for thoracentesis  She reports falling several times in the past couple of months, she has seen Micki Clark who has prescribed medical marijuana for pain control, she is also been prescribed Dilaudid and tramadol.    Her  reports some orthostatic hypotension incidences--- Laura feels her current medications are effective and she has been working with occupational therapy on positioning and blood pressure checks which has been normal  Reports overall she is feeling well, she is eating regular meals and her blood sugars have improved overall however her 70/30 twice daily insulin was increased by a [place] internal medicine provider to 50 units in the morning and 30 units at at bedtime and her  feels she needs her insulin dose evaluated.  She has not met with Maggie BULL at [facility]----Laura and her  would like to establish care with a internal medicine physician in [place], they do have a cabin near [place] and she will continue to see specialists in [place] such as pulmonology, rheumatology, gastroenterology.  Laura is walking independently with a walker, she  is working with physical therapy and feels she is getting stronger.  She denies any shortness of breath or cough.  She has been taking showers independently.  Her  would like to make sure she is stronger and able to navigate at home with minimal assistance before discharge from rehab        HPI    Patient Active Problem List   Diagnosis     Acne rosacea     Anemia of chronic kidney failure, stage 3 (moderate)     Chronic diastolic CHF (congestive heart failure), NYHA class 2 (H)     Chronic kidney disease (CKD), stage III (moderate)     Malignant tumor of colon (H)     History of colonic polyps     COPD (chronic obstructive pulmonary disease) (H)     Diabetes mellitus type 2, insulin dependent (H)     HCAP (healthcare-associated pneumonia)     HTN (hypertension)     Hyperlipidemia     Hypothyroidism     Lichen sclerosus et atrophicus     Migraine headache     Non-rheumatic mitral valve stenosis     On prednisone therapy     Orthostatic hypotension     Disorder of bone and cartilage     Osteoporosis     Popliteal cyst, left     Secondary adrenal insufficiency (H)     Seropositive rheumatoid arthritis (H)     Sepsis (H)     Recurrent pneumonia     Pneumonia     Avascular necrosis of left talus (H)     ACS (acute coronary syndrome) (H)     COPD exacerbation (H)     Past Surgical History:   Procedure Laterality Date     ARTHROPLASTY,Right MTP 1 and 5 and left MTP 5  01/2001     COLON SURGERY Right 09/2017    For colon cancer     COLONOSCOPY  09/29/2008     LAPAROSCOPIC TUBAL LIGATION      No Comments Provided     Left MCP joint arthroplasty  02/05/1998     MTP 2, 3, 4 right foot  05/2000     RELEASE CARPAL TUNNEL Left 1991     Right long finger MCP repair  1996    [place]     Right MCP 4 and 5 arthroplasty  03/01/2004     Right wrist arthroplasty  1990       Social History   Substance Use Topics     Smoking status: Former Smoker     Packs/day: 1.00     Years: 30.00     Types: Cigarettes     Quit date:  8/31/2005     Smokeless tobacco: Never Used     Alcohol use 1.5 oz/week      Comment: Wine daily     Family History   Problem Relation Age of Onset     Cancer Mother 50     Cancer,uterus     HEART DISEASE Mother      Heart Disease,MI     Colon Cancer Mother      Cancer-colon     HEART DISEASE Father      Heart Disease,MI     Chronic Obstructive Pulmonary Disease Sister      COPD     Other - See Comments Sister      rubina dow     Colon Cancer Brother      Cancer-colon     Breast Cancer No family hx of      Cancer-breast         Current Outpatient Prescriptions   Medication Sig Dispense Refill     acetaminophen (TYLENOL) 325 MG tablet Take 2 tablets (650 mg) by mouth every 4 hours as needed for mild pain 100 tablet      albuterol (2.5 MG/3ML) 0.083% neb solution Take 2.5 mg by nebulization every 6 hours as needed for shortness of breath / dyspnea or wheezing       aspirin 81 MG chewable tablet Take 81 mg by mouth daily with food       blood glucose monitoring (ONE TOUCH ULTRASOFT) lancets TEST 2 TIMES PER DAY       blood glucose monitoring (ONETOUCH ULTRA) test strip TEST 2 TIMES PER DAY.       budesonide (PULMICORT) 0.5 MG/2ML neb solution Inhale 0.5 mg into the lungs 2 times daily       DULoxetine (CYMBALTA) 30 MG EC capsule Take 30 mg by mouth every morning        EPINEPHrine (EPIPEN/ADRENACLICK/OR ANY BX GENERIC EQUIV) 0.3 MG/0.3ML injection 2-pack Inject 0.3 mLs (0.3 mg) into the muscle once as needed 0.9 mL 1     estradiol (YUVAFEM) 10 MCG TABS vaginal tablet Place 10 mcg vaginally twice a week Takes on Sundays and Wednesdays       fenofibrate 48 MG tablet Take 48 mg by mouth daily with food       formoterol (PERFOROMIST) 20 MCG/2ML neb solution Inhale 2 mLs into the lungs 2 times daily       gabapentin (NEURONTIN) 300 MG capsule Take 300 mg by mouth At Bedtime        glucagon 1 MG kit Inject 1 mg into the muscle as needed for low blood sugar        glucose 4 g CHEW chewable tablet Take 1 tablet by mouth as  needed for low blood sugar       insulin aspart prot & aspart (NOVOLOG MIX 70/30 PEN) injection 45 units am and and 28 at evening 30 mL 3     ipratropium - albuterol 0.5 mg/2.5 mg/3 mL (DUONEB) 0.5-2.5 (3) MG/3ML neb solution Take 1 vial (3 mLs) by nebulization 4 times daily 360 mL      levothyroxine (SYNTHROID) 125 MCG tablet Take 125 mcg by mouth daily       lisinopril (PRINIVIL/ZESTRIL) 2.5 MG tablet Take 2.5 mg by mouth       medical cannabis (Patient's own supply.  Not a prescription) Apply 1 Dose topically as needed (pain) (This is NOT a prescription, and does not certify that the patient has a qualifying medical condition for medical cannabis.  The purpose of this order is  to document that the patient reports taking medical cannabis.)       medical cannabis liquid Place 1 drop under the tongue as needed (pain) (This is NOT a prescription, and does not certify that the patient has a qualifying medical condition for medical cannabis.  The purpose of this order is  to document that the patient reports taking medical cannabis.)       medical cannabis liquid 1-2 sprays under the tongue as needed for pain       montelukast (SINGULAIR) 10 MG tablet Take 10 mg by mouth At Bedtime       omeprazole (PRILOSEC) 20 MG CR capsule Take 20 mg by mouth every morning (before breakfast)       ondansetron (ZOFRAN ODT) 4 MG ODT tab Take 1-2 tablets (4-8 mg) by mouth every 8 hours as needed for nausea 20 tablet 1     oxyCODONE IR (ROXICODONE) 5 MG tablet Take 1 tablet (5 mg) by mouth every 6 hours as needed for moderate to severe pain 40 tablet 0     predniSONE (DELTASONE) 1 MG tablet Take 2 mg by mouth daily TOTAL DAILY DOSE = 12 MG       predniSONE (DELTASONE) 5 MG tablet Take 10 mg by mouth daily with food TOTAL DAILY DOSE = 12 MG       simvastatin (ZOCOR) 40 MG tablet Take 40 mg by mouth At Bedtime       traMADol (ULTRAM) 50 MG tablet Take 1 tablet (50 mg) by mouth every 6 hours as needed for severe pain 10 tablet 0      traZODone (DESYREL) 100 MG tablet Take 1 tablet (100 mg) by mouth At Bedtime 30 tablet 0     valACYclovir (VALTREX) 1000 mg tablet Take 1 tablet (1,000 mg) by mouth daily 7 tablet 0     [DISCONTINUED] insulin aspart prot & aspart (NOVOLOG MIX 70/30 PEN) injection Inject 50 units under th skin in the morning, and  30 units under the skin at bedtime       Allergies   Allergen Reactions     Glyburide Anaphylaxis     Other reaction(s): Dizziness     Mosquitoes (Informational Only) Hives     Other [Seasonal Allergies] Hives     Deer Flies     Sulfamethoxazole-Trimethoprim Anaphylaxis     Other reaction(s): Other - Describe In Comment Field  Rash, nausea, dizziness     Codeine Nausea and Nausea and Vomiting     Recent Labs   Lab Test  07/05/18   1205  06/27/18   0945   06/15/18   1745   12/03/15   1802   08/11/15   1326   03/06/14   0939   LDL   --    --    --    --    --   121*   --   127*   --   100   HDL   --    --    --    --    --   40   --   36   --   51   TRIG   --    --    --    --    --   260*   --   332*   --   254*   ALT   --   12 --   18   --    --    --    --    --    --    CR  1.36*  1.59*   < >  1.63*   < >  1.60*   < >   --    < >   --    GFRESTIMATED  39*  32*   < >  31*   --    --    --    --    --    --    GFRESTBLACK  47*  39*   < >  38*   < >  39*   < >   --    < >   --    POTASSIUM  3.9  4.1   < >  4.6   < >  4.8   < >   --    < >   --     < > = values in this interval not displayed.      BP Readings from Last 3 Encounters:   07/05/18 136/60   06/28/18 108/48   06/17/18 135/62    Wt Readings from Last 3 Encounters:   07/05/18 141 lb (64 kg)   06/28/18 145 lb 8.1 oz (66 kg)   06/17/18 156 lb 4.9 oz (70.9 kg)                  Labs reviewed in EPIC    Review of Systems   Musculoskeletal: Positive for gait problem.   All other systems reviewed and are negative.       OBJECTIVE:     /60 (BP Location: Right arm, Patient Position: Sitting, Cuff Size: Adult Regular)  Pulse 108  Temp 97.5  F (36.4   C) (Tympanic)  Wt 141 lb (64 kg)  Breastfeeding? No  BMI 24.98 kg/m2  Body mass index is 24.98 kg/(m^2).  Physical Exam   Constitutional: She is oriented to person, place, and time. She appears well-developed and well-nourished. No distress.   HENT:   Head: Normocephalic and atraumatic.   Cardiovascular: Normal rate and regular rhythm.    Pulmonary/Chest: Effort normal and breath sounds normal.   Musculoskeletal: Normal range of motion.   She is walking independently with a walker   Neurological: She is alert and oriented to person, place, and time.   Skin: Skin is warm and dry. She is not diaphoretic.   Psychiatric: She has a normal mood and affect. Her behavior is normal. Judgment and thought content normal.   Nursing note and vitals reviewed.          ASSESSMENT/PLAN:   Clinically overall Laura reports she is feeling well, she is getting stronger, becoming more independent with her ADLs  Is not having significant pain and does not want any pain medication adjustments    Has been having lows anywhere from 60-80 in the morning and sometimes noted at bedtime about 10:00 with glucose checks--has had to have ice cream or milk  We will adjust NovoLog mix 70/30 twice daily insulin down to 45 units in the morning and 28 units in the evening    I would recommend that she follow-up with Maggie BULL in diabetic education on glucose management--her  feels part of her improved glucose control is due to living in a controlled environment with routine meals and insulin administration  She has lost 10 pounds in the last month and this may be partly responsible for her lows    They would like to establish care with Dr. Ortega internal medicine--- patient would also like to continue seeing her specialists in [place] for rheumatology, pulmonology, gastroenterology    She is scheduled to establish care with internal medicine in about 2 weeks--will recheck kidney function labs and CBC  Patient and her  would both  like to be called with lab results when available            1. Other emphysema (H)    - EPINEPHrine (EPIPEN/ADRENACLICK/OR ANY BX GENERIC EQUIV) 0.3 MG/0.3ML injection 2-pack; Inject 0.3 mLs (0.3 mg) into the muscle once as needed  Dispense: 0.9 mL; Refill: 1    2. DM type 2, not at goal (H)    - insulin aspart prot & aspart (NOVOLOG MIX 70/30 PEN) injection; 45 units am and and 28 at evening  Dispense: 30 mL; Refill: 3  - CBC and Differential  - Basic metabolic panel    3. Chronic kidney disease (CKD), stage III (moderate)      4. Chronic diastolic CHF (congestive heart failure), NYHA class 2 (H)      5. Seropositive rheumatoid arthritis (H)      6. Hospital discharge follow-up          VIANEY Nevarez [facility]

## 2018-07-05 NOTE — MR AVS SNAPSHOT
After Visit Summary   7/5/2018    Laura Perez    MRN: [id number]           Patient Information     Date Of Birth          1950        Visit Information        Provider Department      7/5/2018 11:00 AM Shanita Olson APRN CNP [facility]        Today's Diagnoses     DM type 2, not at goal (H)    -  1    Bug bite, subsequent encounter           Follow-ups after your visit        Your next 10 appointments already scheduled     Jul 16, 2018  2:20 PM CDT   [facility] with Raul [facility]   [facility]    [address]  [place] [address] [phone]           Bring a current list of meds and any records pertaining to this visit. For Physicals, please bring immunization records and any forms needing to be filled out. Please arrive 10 minutes early to complete paperwork.            Jul 24, 2018  1:00 PM CDT   Diabetic Education with  DIABETES EDUCATION   [facility]    [address]  [place] [address] [phone]              Who to contact     If you have questions or need follow up information about today's clinic visit or your schedule please contact [facility] directly at [phone].  Normal or non-critical lab and imaging results will be communicated to you by MyChart, letter or phone within 4 business days after the clinic has received the results. If you do not hear from us within 7 days, please contact the clinic through MyChart or phone. If you have a critical or abnormal lab result, we will notify you by phone as soon as possible.  Submit refill requests through [employer] or call your pharmacy and they will forward the refill request to us. Please allow 3 business days for your refill to be completed.          Additional Information About Your Visit        MyChart Information     [employer] lets you  "send messages to your doctor, view your test results, renew your prescriptions, schedule appointments and more. To sign up, go to [email or web address] . Click on \"Log in\" on the left side of the screen, which will take you to the Welcome page. Then click on \"Sign up Now\" on the right side of the page.     You will be asked to enter the access code listed below, as well as some personal information. Please follow the directions to create your username and password.     Your access code is: [email or web address]  Expires: 2018  2:45 PM     Your access code will  in 90 days. If you need help or a new code, please call your [facility] clinic or [phone].        Care EveryWhere ID     This is your [facility] EveryWhere ID. This could be used by other organizations to access your [facility] medical records  [id number]        Your Vitals Were     Pulse Temperature Breastfeeding? BMI (Body Mass Index)          108 97.5  F (36.4  C) (Tympanic) No 24.98 kg/m2         Blood Pressure from Last 3 Encounters:   18 136/60   18 108/48   18 135/62    Weight from Last 3 Encounters:   18 64 kg (141 lb)   18 66 kg (145 lb 8.1 oz)   18 70.9 kg (156 lb 4.9 oz)              We Performed the Following     Basic metabolic panel     CBC and Differential          Today's Medication Changes          These changes are accurate as of 18 12:06 PM.  If you have any questions, ask your nurse or doctor.               These medicines have changed or have updated prescriptions.        Dose/Directions    insulin aspart prot & aspart injection   Commonly known as:  NovoLOG MIX 70/30 PEN   This may have changed:  See the new instructions.   Used for:  DM type 2, not at goal (H)   Changed by:  Shanita Olson APRN CNP        45 units am and and 28 at evening   Quantity:  30 mL   Refills:  3            Where to get your medicines      These medications were sent to [id number] - [place], [place] - " [address]  [address], [place] [address]     Phone:  [phone]     EPINEPHrine 0.3 MG/0.3ML injection 2-pack    insulin aspart prot & aspart injection                Primary Care Provider Office Phone # Fax #    Elin Mohan [phone]       [address]  [facility] [address]        Equal Access to Services     SKINNY HYMAN : Hadii aad ku hadasho Soomaali, waaxda luqadaha, qaybta kaalmada adeegyada, waxay idiin hayaan adeeg kharash laitalo . So [facility] [phone].    ATENCIÓN: Si gabriele rodriguez, tiene a dawkins disposición servicios gratuitos de asistencia lingüística. Llame al [phone].    We comply with applicable federal civil rights laws and [place] laws. We do not discriminate on the basis of race, color, national origin, age, disability, sex, sexual orientation, or gender identity.            Thank you!     Thank you for choosing [facility]  for your care. Our goal is always to provide you with excellent care. Hearing back from our patients is one way we can continue to improve our services. Please take a few minutes to complete the written survey that you may receive in the mail after your visit with us. Thank you!             Your Updated Medication List - Protect others around you: Learn how to safely use, store and throw away your medicines at www.disposemymeds.org.          This list is accurate as of 7/5/18 12:06 PM.  Always use your most recent med list.                   Brand Name Dispense Instructions for use Diagnosis    acetaminophen 325 MG tablet    TYLENOL    100 tablet    Take 2 tablets (650 mg) by mouth every 4 hours as needed for mild pain    Low back pain without sciatica, unspecified back pain laterality, unspecified chronicity       albuterol (2.5 MG/3ML) 0.083% neb solution      Take 2.5 mg by nebulization every 6 hours as needed for shortness of breath / dyspnea or wheezing        aspirin 81 MG chewable  tablet      Take 81 mg by mouth daily with food        blood glucose monitoring lancets      TEST 2 TIMES PER DAY        budesonide 0.5 MG/2ML neb solution    PULMICORT     Inhale 0.5 mg into the lungs 2 times daily        DULoxetine 30 MG EC capsule    CYMBALTA     Take 30 mg by mouth every morning        EPINEPHrine 0.3 MG/0.3ML injection 2-pack    EPIPEN/ADRENACLICK/or ANY BX GENERIC EQUIV    0.9 mL    Inject 0.3 mLs (0.3 mg) into the muscle once as needed    Bug bite, subsequent encounter       fenofibrate 48 MG tablet      Take 48 mg by mouth daily with food        formoterol 20 MCG/2ML neb solution    PERFOROMIST     Inhale 2 mLs into the lungs 2 times daily        gabapentin 300 MG capsule    NEURONTIN     Take 300 mg by mouth At Bedtime        glucagon 1 MG kit      Inject 1 mg into the muscle as needed for low blood sugar        glucose 4 g Chew chewable tablet      Take 1 tablet by mouth as needed for low blood sugar        insulin aspart prot & aspart injection    NovoLOG MIX 70/30 PEN    30 mL    45 units am and and 28 at evening    DM type 2, not at goal (H)       ipratropium - albuterol 0.5 mg/2.5 mg/3 mL 0.5-2.5 (3) MG/3ML neb solution    DUONEB    360 mL    Take 1 vial (3 mLs) by nebulization 4 times daily    Chronic obstructive pulmonary disease, unspecified COPD type (H)       lisinopril 2.5 MG tablet    PRINIVIL/Zestril     Take 2.5 mg by mouth        * medical cannabis liquid      1-2 sprays under the tongue as needed for pain        * medical cannabis (Patient's own supply.  Not a prescription)      Apply 1 Dose topically as needed (pain) (This is NOT a prescription, and does not certify that the patient has a qualifying medical condition for medical cannabis.  The purpose of this order is  to document that the patient reports taking medical cannabis.)        * medical cannabis liquid      Place 1 drop under the tongue as needed (pain) (This is NOT a prescription, and does not certify that the  patient has a qualifying medical condition for medical cannabis.  The purpose of this order is  to document that the patient reports taking medical cannabis.)        montelukast 10 MG tablet    SINGULAIR     Take 10 mg by mouth At Bedtime        omeprazole 20 MG CR capsule    priLOSEC     Take 20 mg by mouth every morning (before breakfast)        ondansetron 4 MG ODT tab    ZOFRAN ODT    20 tablet    Take 1-2 tablets (4-8 mg) by mouth every 8 hours as needed for nausea    Nausea       ONETOUCH ULTRA test strip   Generic drug:  blood glucose monitoring      TEST 2 TIMES PER DAY.        oxyCODONE IR 5 MG tablet    ROXICODONE    40 tablet    Take 1 tablet (5 mg) by mouth every 6 hours as needed for moderate to severe pain    Low back pain without sciatica, unspecified back pain laterality, unspecified chronicity       * predniSONE 5 MG tablet    DELTASONE     Take 10 mg by mouth daily with food TOTAL DAILY DOSE = 12 MG        * predniSONE 1 MG tablet    DELTASONE     Take 2 mg by mouth daily TOTAL DAILY DOSE = 12 MG        simvastatin 40 MG tablet    ZOCOR     Take 40 mg by mouth At Bedtime        SYNTHROID 125 MCG tablet   Generic drug:  levothyroxine      Take 125 mcg by mouth daily        traMADol 50 MG tablet    ULTRAM    10 tablet    Take 1 tablet (50 mg) by mouth every 6 hours as needed for severe pain    Low back pain without sciatica, unspecified back pain laterality, unspecified chronicity       traZODone 100 MG tablet    DESYREL    30 tablet    Take 1 tablet (100 mg) by mouth At Bedtime    Anxiety       valACYclovir 1000 mg tablet    VALTREX    7 tablet    Take 1 tablet (1,000 mg) by mouth daily    Herpes labialis       YUVAFEM 10 MCG Tabs vaginal tablet   Generic drug:  estradiol      Place 10 mcg vaginally twice a week Takes on Sundays and Wednesdays        * Notice:  This list has 5 medication(s) that are the same as other medications prescribed for you. Read the directions carefully, and ask your doctor  or other care provider to review them with you.

## 2018-07-05 NOTE — TELEPHONE ENCOUNTER
Per Shanita Olson CNP verbal order, please notify [facility] Lab results.   WBC count was 14-15 when patient left [facility] and when checked today at Clinic her WBC was 20.  Shanita Olson CNP does not feel this is too concerning since patient is on prednisone.  Patient is also feeling well and seems to be doing better and improving Shanita Olson CNP would like her to have a repeat CBC drawn Monday or Tuesday of next week.      Notified Staff at [facility] and was asked to fax phone note.   Lela Webb LPN........................7/5/2018  4:09 PM

## 2018-07-05 NOTE — TELEPHONE ENCOUNTER
Results and chart reviewed with Dr Noel Doc of the day for consult on followup  She feels WBC non specific and clinically feeling well, VSS and working with PT and OT  Will recheck CBC    Call [facility]--spoke with Mindy BULL  Faxed lab results for chart  Discussed above-- CBC with differential to be drawn Monday morning--call abnormal results    Shanita Olson, VIANEY CNP   July 5, 2018

## 2018-07-05 NOTE — NURSING NOTE
Patient presents for follow up hospital COPD/ heart Failure.   Lela Webb LPN........................7/5/2018  11:10 AM

## 2018-07-06 ENCOUNTER — TELEPHONE (OUTPATIENT)
Dept: FAMILY MEDICINE | Facility: OTHER | Age: 68
End: 2018-07-06

## 2018-07-06 ENCOUNTER — RESULTS ONLY (OUTPATIENT)
Dept: LAB | Age: 68
End: 2018-07-06

## 2018-07-06 ENCOUNTER — MEDICAL CORRESPONDENCE (OUTPATIENT)
Dept: HEALTH INFORMATION MANAGEMENT | Facility: OTHER | Age: 68
End: 2018-07-06

## 2018-07-06 ENCOUNTER — APPOINTMENT (OUTPATIENT)
Dept: LAB | Facility: OTHER | Age: 68
End: 2018-07-06
Attending: INTERNAL MEDICINE
Payer: MEDICARE

## 2018-07-06 LAB
BASOPHILS # BLD AUTO: 0 10E9/L (ref 0–0.2)
BASOPHILS NFR BLD AUTO: 0.2 %
DIFFERENTIAL METHOD BLD: ABNORMAL
EOSINOPHIL # BLD AUTO: 0.1 10E9/L (ref 0–0.7)
EOSINOPHIL NFR BLD AUTO: 1 %
ERYTHROCYTE [DISTWIDTH] IN BLOOD BY AUTOMATED COUNT: 17.6 % (ref 10–15)
HCT VFR BLD AUTO: 29.3 % (ref 35–47)
HGB BLD-MCNC: 8.8 G/DL (ref 11.7–15.7)
IMM GRANULOCYTES # BLD: 0.1 10E9/L (ref 0–0.4)
IMM GRANULOCYTES NFR BLD: 0.8 %
LYMPHOCYTES # BLD AUTO: 1.7 10E9/L (ref 0.8–5.3)
LYMPHOCYTES NFR BLD AUTO: 16.6 %
MCH RBC QN AUTO: 24.5 PG (ref 26.5–33)
MCHC RBC AUTO-ENTMCNC: 30 G/DL (ref 31.5–36.5)
MCV RBC AUTO: 82 FL (ref 78–100)
MONOCYTES # BLD AUTO: 0.8 10E9/L (ref 0–1.3)
MONOCYTES NFR BLD AUTO: 7.4 %
NEUTROPHILS # BLD AUTO: 7.7 10E9/L (ref 1.6–8.3)
NEUTROPHILS NFR BLD AUTO: 74 %
PLATELET # BLD AUTO: 328 10E9/L (ref 150–450)
RBC # BLD AUTO: 3.59 10E12/L (ref 3.8–5.2)
WBC # BLD AUTO: 10.4 10E9/L (ref 4–11)

## 2018-07-06 PROCEDURE — G0472 HEP C SCREEN HIGH RISK/OTHER: HCPCS | Performed by: INTERNAL MEDICINE

## 2018-07-06 PROCEDURE — 86706 HEP B SURFACE ANTIBODY: CPT | Mod: GZ | Performed by: INTERNAL MEDICINE

## 2018-07-06 PROCEDURE — 87389 HIV-1 AG W/HIV-1&-2 AB AG IA: CPT | Performed by: INTERNAL MEDICINE

## 2018-07-06 NOTE — TELEPHONE ENCOUNTER
Called her  as requested to review lab results and update on condition  I will call him back with lab results  And follow-up plan---he is in agreement with transferring to [facility] as they have infectious disease staff and specialists available  VIANEY Nevarez CNP   July 6, 2018

## 2018-07-06 NOTE — TELEPHONE ENCOUNTER
Consulted with Dr Sharma in ED and Dr iverson hospitalist  Recommend recheck CBC with diff this morning--if stable and trending down consider close monitoring--if elevated at all will send into ED or back to [facility] with infectious disease staff available    [facility] staff report Laura had a good night is feeling well there is no evidence of weakness, nausea infection that is evident  They feel that she may have some new crackles in her upper lobes this morning when they listened to her    /58  HR 88, respiratons 18   T 97.8  O2 96 %  Glucose 106    Lung sounds crackles upper      CBC with diff

## 2018-07-06 NOTE — TELEPHONE ENCOUNTER
Orders placed verbal--they will fill out their lab slip and drop off Monday to our lab    Shanita Olson, VIANEY CNP   July 6, 2018

## 2018-07-06 NOTE — TELEPHONE ENCOUNTER
I called Laura's  and she requested with lab results to report that his CBC has returned to normal range for her health status  I had lab re run CBC and same results    Mr Perez, feels comfortable with the plan of   staff continue to monitor and  recheck lab CBC on Monday morning 7/9  At this time Laura is feeling well working with physical therapy and becoming more physically active with physical therapy  The staff at  was directed to have a low threshold for sending her in if any acute condition change    Mr Perez feels if there are any lab changes or acute worsneing changes he would prefer transferring to [facility] or he will take her to [place]    Shanita Olson, APRN CNP   July 6, 2018

## 2018-07-06 NOTE — TELEPHONE ENCOUNTER
I talked to the nurse Annetta at [facility] and I gave verbal orders for lab to be drawn Monday. Lab results were faxed this morning. Orders need to be placed.    Yolanda Hawley LPN on 7/6/2018 at 3:26 PM

## 2018-07-09 ENCOUNTER — MEDICAL CORRESPONDENCE (OUTPATIENT)
Dept: HEALTH INFORMATION MANAGEMENT | Facility: OTHER | Age: 68
End: 2018-07-09

## 2018-07-09 ENCOUNTER — APPOINTMENT (OUTPATIENT)
Dept: LAB | Facility: OTHER | Age: 68
End: 2018-07-09
Attending: INTERNAL MEDICINE
Payer: MEDICARE

## 2018-07-09 LAB
BASOPHILS # BLD AUTO: 0 10E9/L (ref 0–0.2)
BASOPHILS NFR BLD AUTO: 0.1 %
DIFFERENTIAL METHOD BLD: ABNORMAL
EOSINOPHIL # BLD AUTO: 0.1 10E9/L (ref 0–0.7)
EOSINOPHIL NFR BLD AUTO: 0.4 %
ERYTHROCYTE [DISTWIDTH] IN BLOOD BY AUTOMATED COUNT: 17.5 % (ref 10–15)
HCT VFR BLD AUTO: 29.2 % (ref 35–47)
HGB BLD-MCNC: 8.9 G/DL (ref 11.7–15.7)
IMM GRANULOCYTES # BLD: 0.1 10E9/L (ref 0–0.4)
IMM GRANULOCYTES NFR BLD: 0.7 %
LYMPHOCYTES # BLD AUTO: 1.1 10E9/L (ref 0.8–5.3)
LYMPHOCYTES NFR BLD AUTO: 8.2 %
MCH RBC QN AUTO: 24.9 PG (ref 26.5–33)
MCHC RBC AUTO-ENTMCNC: 30.5 G/DL (ref 31.5–36.5)
MCV RBC AUTO: 82 FL (ref 78–100)
MONOCYTES # BLD AUTO: 1 10E9/L (ref 0–1.3)
MONOCYTES NFR BLD AUTO: 7.3 %
NEUTROPHILS # BLD AUTO: 11.1 10E9/L (ref 1.6–8.3)
NEUTROPHILS NFR BLD AUTO: 83.3 %
PLATELET # BLD AUTO: 291 10E9/L (ref 150–450)
RBC # BLD AUTO: 3.57 10E12/L (ref 3.8–5.2)
WBC # BLD AUTO: 13.4 10E9/L (ref 4–11)

## 2018-07-09 PROCEDURE — 85025 COMPLETE CBC W/AUTO DIFF WBC: CPT | Performed by: INTERNAL MEDICINE

## 2018-07-10 ENCOUNTER — TELEPHONE (OUTPATIENT)
Dept: INTERNAL MEDICINE | Facility: OTHER | Age: 68
End: 2018-07-10

## 2018-07-10 LAB
HBV SURFACE AB SERPL IA-ACNC: 0.12 M[IU]/ML
HCV AB SERPL QL IA: NONREACTIVE
HIV 1+2 AB+HIV1 P24 AG SERPL QL IA: NONREACTIVE

## 2018-07-10 NOTE — TELEPHONE ENCOUNTER
Message left for  to call back.    Notify that patients labs / WBC from yesterday are stable.   Lela Webb LPN........................7/10/2018  8:49 AM

## 2018-07-23 NOTE — PROGRESS NOTES
Patient Information     Patient Name  Laura Perez MRN  [id number] Sex  Female   1950      Letter by Yamel Pacheco MD at      Author:  Yamel Pacheco MD Service:  (none) Author Type:  (none)    Filed:   Encounter Date:  2017 Status:  (Other)           Laura Perez  [address] [facility] [address]          2017    Dear Ms. Perez:    I hope this letter finds you well. I realized I have not seen you in over a year and wanted to reach out to make sure you are receiving care for your diabetes. Please make an appointment in the near future if able.    Sincerely,  aYmel Mcfarland MD  2:23 PM 2017

## 2018-07-23 NOTE — PROGRESS NOTES
Patient Information     Patient Name  Laura Perez MRN  [id number] Sex  Female   1950      Letter by Yamel Pacheco MD at      Author:  Yamel Pacheco MD Service:  (none) Author Type:  (none)    Filed:   Encounter Date:  2017 Status:  (Other)           Laura Perez  [address] [facility] [address]          January 10, 2017    Dear Ms. Perez:    This is to remind you that you are due for your 1 year follow-up appointment with Yamel Pacheco MD in relation to continued use of pravachol, as well as your two month follow up in relation to diabetes.  Your last visit in relation to use of pravachol was on 2015. Additional refills of your medication require you to complete this visit.    Please call [phone] to schedule your appointment.    Thank you for choosing [facility] for your health care needs.    Sincerely,      Refill RN  [facility]

## 2018-07-24 ENCOUNTER — OFFICE VISIT (OUTPATIENT)
Dept: INTERNAL MEDICINE | Facility: OTHER | Age: 68
End: 2018-07-24
Attending: INTERNAL MEDICINE
Payer: MEDICARE

## 2018-07-24 ENCOUNTER — ALLIED HEALTH/NURSE VISIT (OUTPATIENT)
Dept: EDUCATION SERVICES | Facility: OTHER | Age: 68
End: 2018-07-24
Attending: INTERNAL MEDICINE
Payer: MEDICARE

## 2018-07-24 VITALS
SYSTOLIC BLOOD PRESSURE: 118 MMHG | DIASTOLIC BLOOD PRESSURE: 62 MMHG | WEIGHT: 140.4 LBS | HEIGHT: 64 IN | BODY MASS INDEX: 23.97 KG/M2 | HEART RATE: 100 BPM

## 2018-07-24 DIAGNOSIS — I10 ESSENTIAL HYPERTENSION: ICD-10-CM

## 2018-07-24 DIAGNOSIS — M81.0 OSTEOPOROSIS, UNSPECIFIED OSTEOPOROSIS TYPE, UNSPECIFIED PATHOLOGICAL FRACTURE PRESENCE: ICD-10-CM

## 2018-07-24 DIAGNOSIS — Z79.4 DIABETES MELLITUS TYPE 2, INSULIN DEPENDENT (H): Primary | ICD-10-CM

## 2018-07-24 DIAGNOSIS — N18.30 CHRONIC KIDNEY DISEASE (CKD), STAGE III (MODERATE) (H): ICD-10-CM

## 2018-07-24 DIAGNOSIS — E11.9 DIABETES MELLITUS TYPE 2, INSULIN DEPENDENT (H): Primary | ICD-10-CM

## 2018-07-24 DIAGNOSIS — E03.9 HYPOTHYROIDISM, UNSPECIFIED TYPE: ICD-10-CM

## 2018-07-24 DIAGNOSIS — Z79.4 DIABETES MELLITUS TYPE 2, INSULIN DEPENDENT (H): ICD-10-CM

## 2018-07-24 DIAGNOSIS — E11.9 DIABETES MELLITUS TYPE 2, INSULIN DEPENDENT (H): ICD-10-CM

## 2018-07-24 DIAGNOSIS — R09.89 BILATERAL CAROTID BRUITS: ICD-10-CM

## 2018-07-24 DIAGNOSIS — Z12.31 ENCOUNTER FOR SCREENING MAMMOGRAM FOR BREAST CANCER: ICD-10-CM

## 2018-07-24 LAB
ANION GAP SERPL CALCULATED.3IONS-SCNC: 13 MMOL/L (ref 3–14)
BUN SERPL-MCNC: 22 MG/DL (ref 7–25)
CALCIUM SERPL-MCNC: 9.5 MG/DL (ref 8.6–10.3)
CHLORIDE SERPL-SCNC: 97 MMOL/L (ref 98–107)
CO2 SERPL-SCNC: 21 MMOL/L (ref 21–31)
CREAT SERPL-MCNC: 1.3 MG/DL (ref 0.6–1.2)
GFR SERPL CREATININE-BSD FRML MDRD: 41 ML/MIN/1.7M2
GLUCOSE SERPL-MCNC: 252 MG/DL (ref 70–105)
HBA1C MFR BLD: 7.8 % (ref 4–6)
POTASSIUM SERPL-SCNC: 5.1 MMOL/L (ref 3.5–5.1)
SODIUM SERPL-SCNC: 131 MMOL/L (ref 134–144)
T4 FREE SERPL-MCNC: 1.33 NG/DL (ref 0.6–1.6)
TSH SERPL DL<=0.05 MIU/L-ACNC: 0.55 IU/ML (ref 0.34–5.6)

## 2018-07-24 PROCEDURE — G0108 DIAB MANAGE TRN  PER INDIV: HCPCS

## 2018-07-24 PROCEDURE — G0463 HOSPITAL OUTPT CLINIC VISIT: HCPCS

## 2018-07-24 PROCEDURE — 36415 COLL VENOUS BLD VENIPUNCTURE: CPT | Performed by: INTERNAL MEDICINE

## 2018-07-24 PROCEDURE — 84439 ASSAY OF FREE THYROXINE: CPT | Performed by: INTERNAL MEDICINE

## 2018-07-24 PROCEDURE — 80048 BASIC METABOLIC PNL TOTAL CA: CPT | Performed by: INTERNAL MEDICINE

## 2018-07-24 PROCEDURE — 84443 ASSAY THYROID STIM HORMONE: CPT | Performed by: INTERNAL MEDICINE

## 2018-07-24 PROCEDURE — 99215 OFFICE O/P EST HI 40 MIN: CPT | Performed by: INTERNAL MEDICINE

## 2018-07-24 PROCEDURE — 83036 HEMOGLOBIN GLYCOSYLATED A1C: CPT | Performed by: INTERNAL MEDICINE

## 2018-07-24 RX ORDER — LISINOPRIL 2.5 MG/1
2.5 TABLET ORAL DAILY
Qty: 30 TABLET | COMMUNITY
Start: 2018-07-24 | End: 2019-07-08

## 2018-07-24 RX ORDER — LEVOTHYROXINE SODIUM 125 UG/1
125 TABLET ORAL DAILY
Qty: 90 TABLET | Refills: 3 | Status: SHIPPED | OUTPATIENT
Start: 2018-07-24 | End: 2019-07-08

## 2018-07-24 ASSESSMENT — ENCOUNTER SYMPTOMS
TREMORS: 0
NECK STIFFNESS: 0
EYE REDNESS: 0
CONFUSION: 0
JOINT SWELLING: 0
SORE THROAT: 0
WEAKNESS: 0
FATIGUE: 0
SLEEP DISTURBANCE: 0
FLANK PAIN: 0
FEVER: 0
TROUBLE SWALLOWING: 0
NUMBNESS: 0
DIAPHORESIS: 0
HALLUCINATIONS: 0
ABDOMINAL DISTENTION: 0
EYE PAIN: 0
DYSURIA: 0
NECK PAIN: 0
HEADACHES: 0
VOMITING: 0
CONSTIPATION: 0
DIFFICULTY URINATING: 0
AGITATION: 0
FREQUENCY: 0
LIGHT-HEADEDNESS: 1
WHEEZING: 0
CHILLS: 0
DIZZINESS: 0
FACIAL SWELLING: 0
SINUS PAIN: 0
ABDOMINAL PAIN: 0
PALPITATIONS: 0
ARTHRALGIAS: 1
NAUSEA: 0
DIARRHEA: 0
COUGH: 0
ADENOPATHY: 0
BLOOD IN STOOL: 0
RHINORRHEA: 0
SEIZURES: 0
HEMATURIA: 0
SINUS PRESSURE: 0
BRUISES/BLEEDS EASILY: 1
BACK PAIN: 0
CHEST TIGHTNESS: 0
SHORTNESS OF BREATH: 1

## 2018-07-24 NOTE — MR AVS SNAPSHOT
After Visit Summary   7/24/2018    Laura Perez    MRN: [id number]           Patient Information     Date Of Birth          1950        Visit Information        Provider Department      7/24/2018 4:00 PM Raul Ortega MD [facility]        Today's Diagnoses     Diabetes mellitus type 2, insulin dependent (H)    -  1    Encounter for screening mammogram for breast cancer        Hypothyroidism, unspecified type        Essential hypertension        Chronic kidney disease (CKD), stage III (moderate)        Osteoporosis, unspecified osteoporosis type, unspecified pathological fracture presence        Bilateral carotid bruits           Follow-ups after your visit        Future tests that were ordered for you today     Open Future Orders        Priority Expected Expires Ordered    US Carotid Bilateral Routine  7/24/2019 7/24/2018    Basic Metabolic Panel Routine  7/24/2019 7/24/2018    Hemoglobin A1c Routine  7/24/2019 7/24/2018    TSH Routine  7/24/2019 7/24/2018    T4, Free Routine  7/24/2019 7/24/2018    MA Screen Bilateral w/Vish Routine  7/24/2019 7/24/2018            Who to contact     If you have questions or need follow up information about today's clinic visit or your schedule please contact [facility] directly at [phone].  Normal or non-critical lab and imaging results will be communicated to you by MyChart, letter or phone within 4 business days after the clinic has received the results. If you do not hear from us within 7 days, please contact the clinic through MyChart or phone. If you have a critical or abnormal lab result, we will notify you by phone as soon as possible.  Submit refill requests through [employer] or call your pharmacy and they will forward the refill request to us. Please allow 3 business days for your refill to be completed.          Additional Information About Your Visit        Care EveryWhere ID     This is your Care  "EveryWhere ID. This could be used by other organizations to access your [facility] medical records  [id number]        Your Vitals Were     Pulse Height Breastfeeding? BMI (Body Mass Index)          100 5' 3.5\" (1.613 m) No 24.48 kg/m2         Blood Pressure from Last 3 Encounters:   07/24/18 118/62   07/05/18 136/60   06/28/18 108/48    Weight from Last 3 Encounters:   07/24/18 140 lb 6.4 oz (63.7 kg)   07/05/18 141 lb (64 kg)   06/28/18 145 lb 8.1 oz (66 kg)                 Today's Medication Changes          These changes are accurate as of 7/24/18  4:36 PM.  If you have any questions, ask your nurse or doctor.               These medicines have changed or have updated prescriptions.        Dose/Directions    insulin aspart prot & aspart injection   Commonly known as:  NovoLOG MIX 70/30 PEN   This may have changed:  additional instructions   Changed by:  Raul Ortega MD        45 units am and and 26 at evening   Quantity:  30 mL   Refills:  3       lisinopril 2.5 MG tablet   Commonly known as:  PRINIVIL/Zestril   This may have changed:  when to take this   Changed by:  Raul Ortega MD        Dose:  2.5 mg   Take 1 tablet (2.5 mg) by mouth daily   Quantity:  30 tablet   Refills:  0       * medical cannabis liquid   This may have changed:  Another medication with the same name was removed. Continue taking this medication, and follow the directions you see here.   Changed by:  Raul Ortega MD        1-2 sprays under the tongue as needed for pain   Refills:  0       * medical cannabis liquid   This may have changed:  Another medication with the same name was removed. Continue taking this medication, and follow the directions you see here.   Changed by:  Raul Ortega MD        Dose:  1 drop   Place 1 drop under the tongue as needed (pain) (This is NOT a prescription, and does not certify that the patient has a qualifying medical condition for medical cannabis.  The purpose of this order is  to document that " the patient reports taking medical cannabis.)   Refills:  0       * Notice:  This list has 2 medication(s) that are the same as other medications prescribed for you. Read the directions carefully, and ask your doctor or other care provider to review them with you.      Stop taking these medicines if you haven't already. Please contact your care team if you have questions.     acetaminophen 325 MG tablet   Commonly known as:  TYLENOL   Stopped by:  Raul Ortega MD                Where to get your medicines      These medications were sent to [id number] - [place], [place] - [address]  [address], [facility] [address]     Phone:  [phone]     levothyroxine 125 MCG tablet                Primary Care Provider Office Phone # Fax #    Elin Mohan  [phone]       [address]  [facility] [address]        Equal Access to Services     [facility]: Hadii aad ku hadasho Soomaali, waaxda luqadaha, qaybta kaalmada adeegyada, waxay margiein hayaan avinash lovett . So [facility] [phone].    ATENCIÓN: Si habla español, tiene a dawkins disposición servicios gratuitos de asistencia lingüística. Lilly al [phone].    We comply with applicable federal civil rights laws and [place] laws. We do not discriminate on the basis of race, color, national origin, age, disability, sex, sexual orientation, or gender identity.            Thank you!     Thank you for choosing [facility]  for your care. Our goal is always to provide you with excellent care. Hearing back from our patients is one way we can continue to improve our services. Please take a few minutes to complete the written survey that you may receive in the mail after your visit with us. Thank you!             Your Updated Medication List - Protect others around you: Learn how to safely use, store and throw away your medicines at www.disposemymeds.org.          This  list is accurate as of 7/24/18  4:36 PM.  Always use your most recent med list.                   Brand Name Dispense Instructions for use Diagnosis    albuterol (2.5 MG/3ML) 0.083% neb solution      Take 2.5 mg by nebulization every 6 hours as needed for shortness of breath / dyspnea or wheezing        aspirin 81 MG chewable tablet      Take 81 mg by mouth daily with food        blood glucose monitoring lancets      TEST 2 TIMES PER DAY        budesonide 0.5 MG/2ML neb solution    PULMICORT     Inhale 0.5 mg into the lungs 2 times daily        DULoxetine 30 MG EC capsule    CYMBALTA     Take 30 mg by mouth every morning        EPINEPHrine 0.3 MG/0.3ML injection 2-pack    EPIPEN/ADRENACLICK/or ANY BX GENERIC EQUIV    0.9 mL    Inject 0.3 mLs (0.3 mg) into the muscle once as needed    Other emphysema (H)       fenofibrate 48 MG tablet      Take 48 mg by mouth daily with food        formoterol 20 MCG/2ML neb solution    PERFOROMIST     Inhale 2 mLs into the lungs 2 times daily        gabapentin 300 MG capsule    NEURONTIN     Take 300 mg by mouth At Bedtime        glucagon 1 MG kit      Inject 1 mg into the muscle as needed for low blood sugar        glucose 4 g Chew chewable tablet      Take 1 tablet by mouth as needed for low blood sugar        insulin aspart prot & aspart injection    NovoLOG MIX 70/30 PEN    30 mL    45 units am and and 26 at evening        levothyroxine 125 MCG tablet    SYNTHROID    90 tablet    Take 1 tablet (125 mcg) by mouth daily    Hypothyroidism, unspecified type       lisinopril 2.5 MG tablet    PRINIVIL/Zestril    30 tablet    Take 1 tablet (2.5 mg) by mouth daily        * medical cannabis liquid      1-2 sprays under the tongue as needed for pain        * medical cannabis liquid      Place 1 drop under the tongue as needed (pain) (This is NOT a prescription, and does not certify that the patient has a qualifying medical condition for medical cannabis.  The purpose of this order is  to  document that the patient reports taking medical cannabis.)        montelukast 10 MG tablet    SINGULAIR     Take 10 mg by mouth At Bedtime        omeprazole 20 MG CR capsule    priLOSEC     Take 20 mg by mouth every morning (before breakfast)        ONETOUCH ULTRA test strip   Generic drug:  blood glucose monitoring      TEST 2 TIMES PER DAY.        oxyCODONE IR 5 MG tablet    ROXICODONE    40 tablet    Take 1 tablet (5 mg) by mouth every 6 hours as needed for moderate to severe pain    Low back pain without sciatica, unspecified back pain laterality, unspecified chronicity       * predniSONE 5 MG tablet    DELTASONE     Take 10 mg by mouth daily with food TOTAL DAILY DOSE = 12 MG        * predniSONE 1 MG tablet    DELTASONE     Take 2 mg by mouth daily TOTAL DAILY DOSE = 12 MG        simvastatin 40 MG tablet    ZOCOR     Take 40 mg by mouth At Bedtime        traMADol 50 MG tablet    ULTRAM    10 tablet    Take 1 tablet (50 mg) by mouth every 6 hours as needed for severe pain    Low back pain without sciatica, unspecified back pain laterality, unspecified chronicity       YUVAFEM 10 MCG Tabs vaginal tablet   Generic drug:  estradiol      Place 10 mcg vaginally twice a week Takes on Sundays and Wednesdays        * Notice:  This list has 4 medication(s) that are the same as other medications prescribed for you. Read the directions carefully, and ask your doctor or other care provider to review them with you.

## 2018-07-24 NOTE — PATIENT INSTRUCTIONS
Diabetes Goals and Reminders    Your A1c test should be done every 3 months.  Your goal is less than 8%.   Your last result is:  No results found for: A1C    Your LDL cholesterol test should be done at least once a year.  Take a statin, if prescribed by your doctor, based on age and risk factors.  Your last result is:  LDL Cholesterol Calculated   Date Value Ref Range Status   12/03/2015 121 (H) <100 mg/dL        Have blood pressure and weight checked every three months.  Your blood pressure goal is 140/90 or less.  Your last blood pressure reading was:   BP Readings from Last 1 Encounters:   07/05/18 136/60       Test your blood sugar 2 times per day.  Your home blood glucose target ranges are:  Fasting or Before meals:   2 hours after a meal: Less than 200      Avoid all tobacco.  Follow your healthy diet and exercise plan.  See the eye doctor every year.  See the dentist every six months.  Have kidney function tested yearly.    Take all medicine as prescribed.  Please let me know if you are having symptoms you don t expect or if you wish to stop any medicine.    Follow Up Plan  Please call or visit Diabetes Education if blood sugars are consistently out of target.  Your future lab plan is:  HgA1c anytime.    If you need your cholesterol checked at your next appointment, you should fast 8 to 10 hours before your appointment.  Do not eat or drink anything besides water.  Drink plenty of water and take all your usual medicine.    SUMMARY FOR TODAY'S VISIT  Recommend Novolog 70/30 premix adjustment at supper from 28 to 26 units.  Keep breakfast dose at 45 units at this time.    1.  Continue testing blood sugar 2 time(s) daily, as directed.    2.  Continue carbohydrate counting, 2 - 3 choices per meal, 0 - 1 choice per snack (limiting snacks to help with weight loss and tighter blood sugar control).     3.  Recommend low to moderate physical activity, such as walking, working up to a minimum of 30 minutes most  days, as tolerated.     4.  Follow-up for continued diabetes education, as needed.     Maggie Ceballos RN, BSN, CDE  7/24/2018 2:07 PM

## 2018-07-24 NOTE — PROGRESS NOTES
Diabetes Education with BG Assessment    SUBJECTIVE:  Laura Perez is an 68 year old female who presentsfor evaluation and treatment   of Type 2 diabetes mellitus. Age at diagnosis 62.    Current treatment includes diet, SMBG and insulin injections.   Current monitoring regimen: home blood tests - twice daily.  Home blood sugar records: FBG 90, 77, 85 and preSupper 129 - 226 mg/dL.      Discussed D5 Health Goals and patient has met 4 of 5 goals at this time.  (BP less than 140/90, ASA therapy as recommended, Statin therapy as recommended, A1c less than 8%, tobacco free).    No results found for: GLUCOSE  HDL Cholesterol   Date/Time Value Ref Range Status   12/03/2015 06:02 PM 40 23 - 92 mg/dL      LDL Cholesterol Calculated   Date/Time Value Ref Range Status   12/03/2015 06:02  (H) <100 mg/dL      No components found for: MICROALBCRU, [id number], MICROALBRAND    Social History   Substance Use Topics     Smoking status: Former Smoker     Packs/day: 1.00     Years: 30.00     Types: Cigarettes     Quit date: 8/31/2005     Smokeless tobacco: Never Used     Alcohol use 1.5 oz/week      Comment: Rare       Current Outpatient Rx   Medication Sig Dispense Refill     albuterol (2.5 MG/3ML) 0.083% neb solution Take 2.5 mg by nebulization every 6 hours as needed for shortness of breath / dyspnea or wheezing       aspirin 81 MG chewable tablet Take 81 mg by mouth daily with food       blood glucose monitoring (ONE TOUCH ULTRASOFT) lancets TEST 2 TIMES PER DAY       blood glucose monitoring (ONETOUCH ULTRA) test strip TEST 2 TIMES PER DAY.       budesonide (PULMICORT) 0.5 MG/2ML neb solution Inhale 0.5 mg into the lungs 2 times daily       DULoxetine (CYMBALTA) 30 MG EC capsule Take 30 mg by mouth every morning        EPINEPHrine (EPIPEN/ADRENACLICK/OR ANY BX GENERIC EQUIV) 0.3 MG/0.3ML injection 2-pack Inject 0.3 mLs (0.3 mg) into the muscle once as needed 0.9 mL 1     estradiol (YUVAFEM) 10 MCG TABS vaginal tablet  Place 10 mcg vaginally twice a week Takes on Sundays and Wednesdays       fenofibrate 48 MG tablet Take 48 mg by mouth daily with food       formoterol (PERFOROMIST) 20 MCG/2ML neb solution Inhale 2 mLs into the lungs 2 times daily       gabapentin (NEURONTIN) 300 MG capsule Take 300 mg by mouth At Bedtime        glucagon 1 MG kit Inject 1 mg into the muscle as needed for low blood sugar        glucose 4 g CHEW chewable tablet Take 1 tablet by mouth as needed for low blood sugar       medical cannabis liquid Place 1 drop under the tongue as needed (pain) (This is NOT a prescription, and does not certify that the patient has a qualifying medical condition for medical cannabis.  The purpose of this order is  to document that the patient reports taking medical cannabis.)       medical cannabis liquid 1-2 sprays under the tongue as needed for pain       montelukast (SINGULAIR) 10 MG tablet Take 10 mg by mouth At Bedtime       omeprazole (PRILOSEC) 20 MG CR capsule Take 20 mg by mouth every morning (before breakfast)       oxyCODONE IR (ROXICODONE) 5 MG tablet Take 1 tablet (5 mg) by mouth every 6 hours as needed for moderate to severe pain 40 tablet 0     predniSONE (DELTASONE) 1 MG tablet Take 2 mg by mouth daily TOTAL DAILY DOSE = 12 MG       predniSONE (DELTASONE) 5 MG tablet Take 10 mg by mouth daily with food TOTAL DAILY DOSE = 12 MG       simvastatin (ZOCOR) 40 MG tablet Take 40 mg by mouth At Bedtime       traMADol (ULTRAM) 50 MG tablet Take 1 tablet (50 mg) by mouth every 6 hours as needed for severe pain 10 tablet 0     [DISCONTINUED] insulin aspart prot & aspart (NOVOLOG MIX 70/30 PEN) injection 45 units am and and 28 at evening 30 mL 3     [DISCONTINUED] levothyroxine (SYNTHROID) 125 MCG tablet Take 125 mcg by mouth daily       [DISCONTINUED] lisinopril (PRINIVIL/ZESTRIL) 2.5 MG tablet Take 2.5 mg by mouth       insulin aspart prot & aspart (NOVOLOG MIX 70/30 PEN) injection 45 units am and and 26 at evening  "30 mL 3     levothyroxine (SYNTHROID) 125 MCG tablet Take 1 tablet (125 mcg) by mouth daily 90 tablet 3     lisinopril (PRINIVIL/ZESTRIL) 2.5 MG tablet Take 1 tablet (2.5 mg) by mouth daily 30 tablet         Allergies: Glyburide; Mosquitoes (informational only); Other [seasonal allergies]; Sulfamethoxazole-trimethoprim; and Codeine     OBJECTIVE:  There were no vitals taken for this visit.      ASSESSMENT:  Patient shares her daily routine:  5:00am wakes up, 8 - 8:30am breakfast (\"about 3 or more choices if I am low\") with Novolog 70/30 premix 45 units.  11:30 - 12:00pm is Lunch (2 - 3 choices CHO), Supper 5 - 7pm (about 3 choices or more CHO) \"this is my main meal\" with Novolog 70/30 premix 28 units.  Bedtime is 9 - 10:00pm.  \"I don't usually have any snacks in the evening.\"    BG results:  Patient and her  share Fasting average 90 with lowest 77 mg/dL and pre-Supper BG range 129 - 226 mg/dl.     Discussed how decreasing risk for hypoglycemia and watching CHO intake at meals can help decrease risk for hyperglycemia.  Recommend preSupper Novolog 70/30 premix decrease from 28 to 26 units to help decrease risk of fasting low BG.  Recommend carbohydrate counting, 3 choices per meal, 0 - 1 choice per snack (limiting snacks to help with weight loss and tighter BG control).       Educational Handouts Given:  Diabetes Success Plan, DSMS sheet, A1c flier, My Food Plan, Eating for Better Health.    Patient did verbalize understanding of education as evidenced by stating need to continue testing BG twice daily to see if current treatment plan is adequate for DM2 control.     Patient did demonstrate understanding of education today as evidenced by goal setting.         PLAN:        Medication recommendations: dosage change: Recommend Novolog 70/30 premix decrease every PM from 28 to 26 units and continue current preBreakfast dose at 45 units.  Patient will test blood glucose as above or as previously directed  Patient " will follow meal plan, practice carbohydrate counting and label reading.  Patient encouraged to develop physical activity planor continue with current plan  Patient will follow up with provider as directed by PCP    Self-Directed Behavior Goal/s set by patient:  (1) Count carbohydrates at most of my meals and snacks.       Diabetes recommendations: diabetic diet discussed in detail, written exchange diet given, low cholesterol diet, weight control and daily exercise discussed, use and side effects of insulin is reviewed, glycohemoglobin monitoring discussed and long term diabetic complications discussed    Time spent with patient was 60 minutes.    Maggie Ceballos RN, BSN, CDE  7/24/2018 6:03 PM

## 2018-07-24 NOTE — PROGRESS NOTES
Chief Complaint:  This patient is here for a get established visit.      HPI: She comes in today to establish care.  She has an extensive past medical history, dominated mostly by her rheumatoid arthritis.  In addition to the rheumatoid arthritis, the patient has diabetes and is on insulin therapy.  She has hypothyroidism and hypertension with associated chronic kidney disease.  She does get a lot of her medical cares in the [facility] area as they have a farm but is not too far from there.  She does see a rheumatologist as well as GI specialist in that area.  Her colon surgeon is in that area as well.    In addition she has a history of COPD.  That is currently stable.  She does have a history of recurring pneumonias.  She is up-to-date with pneumonia shots and flu shots.    She has been having some trouble with orthostasis and falls.  She was on 3 blood pressure pills but now is just on a low-dose of lisinopril and seems to be doing much better.  She just saw diabetic education and had her insulin adjusted downward slightly.  She has chronic pain but rarely uses the oxycodone.  She does have medical marijuana which she uses to help her sleep at night.    Medications are reconciled.  Past medical history, past surgical history, family history and social history are all reviewed and updated.    Past Medical History:   Diagnosis Date     Chronic kidney disease (CKD), stage III (moderate)      Chronic obstructive pulmonary disease (H)     No Comments Provided     Colon cancer (H)      Diverticulosis of intestine without perforation or abscess without bleeding     Diffuse diverticulosis and history of diminutive hyperplastic colon     Essential (primary) hypertension     No Comments Provided     Hyperlipidemia     No Comments Provided     Hypothyroidism     No Comments Provided     Malignant neoplasm of colon (H)     9/2017     Osteoporosis      Rheumatoid arthritis (H)     Rheumatologist Dr. Gonzalez,  [phone], fax [phone]     Rosacea     No Comments Provided     Type 2 diabetes mellitus without complications (H)     Diabetes Mellitus, prednisone induced       Past Surgical History:   Procedure Laterality Date     ARTHROPLASTY,Right MTP 1 and 5 and left MTP 5  01/2001     COLON SURGERY Right 09/2017    For colon cancer     COLONOSCOPY  09/29/2008     LAPAROSCOPIC TUBAL LIGATION      No Comments Provided     Left MCP joint arthroplasty  02/05/1998     MTP 2, 3, 4 right foot  05/2000     RELEASE CARPAL TUNNEL Left 1991     Right long finger MCP repair  1996    [place]     Right MCP 4 and 5 arthroplasty  03/01/2004     Right wrist arthroplasty  1990       Current Outpatient Prescriptions   Medication Sig Dispense Refill     albuterol (2.5 MG/3ML) 0.083% neb solution Take 2.5 mg by nebulization every 6 hours as needed for shortness of breath / dyspnea or wheezing       aspirin 81 MG chewable tablet Take 81 mg by mouth daily with food       blood glucose monitoring (ONE TOUCH ULTRASOFT) lancets TEST 2 TIMES PER DAY       blood glucose monitoring (ONETOUCH ULTRA) test strip TEST 2 TIMES PER DAY.       budesonide (PULMICORT) 0.5 MG/2ML neb solution Inhale 0.5 mg into the lungs 2 times daily       DULoxetine (CYMBALTA) 30 MG EC capsule Take 30 mg by mouth every morning        EPINEPHrine (EPIPEN/ADRENACLICK/OR ANY BX GENERIC EQUIV) 0.3 MG/0.3ML injection 2-pack Inject 0.3 mLs (0.3 mg) into the muscle once as needed 0.9 mL 1     estradiol (YUVAFEM) 10 MCG TABS vaginal tablet Place 10 mcg vaginally twice a week Takes on Sundays and Wednesdays       fenofibrate 48 MG tablet Take 48 mg by mouth daily with food       formoterol (PERFOROMIST) 20 MCG/2ML neb solution Inhale 2 mLs into the lungs 2 times daily       gabapentin (NEURONTIN) 300 MG capsule Take 300 mg by mouth At Bedtime        glucagon 1 MG kit Inject 1 mg into the muscle as needed for low blood sugar        glucose 4 g CHEW chewable tablet Take 1  tablet by mouth as needed for low blood sugar       insulin aspart prot & aspart (NOVOLOG MIX 70/30 PEN) injection 45 units am and and 26 at evening 30 mL 3     levothyroxine (SYNTHROID) 125 MCG tablet Take 1 tablet (125 mcg) by mouth daily 90 tablet 3     lisinopril (PRINIVIL/ZESTRIL) 2.5 MG tablet Take 1 tablet (2.5 mg) by mouth daily 30 tablet      medical cannabis liquid Place 1 drop under the tongue as needed (pain) (This is NOT a prescription, and does not certify that the patient has a qualifying medical condition for medical cannabis.  The purpose of this order is  to document that the patient reports taking medical cannabis.)       medical cannabis liquid 1-2 sprays under the tongue as needed for pain       montelukast (SINGULAIR) 10 MG tablet Take 10 mg by mouth At Bedtime       omeprazole (PRILOSEC) 20 MG CR capsule Take 20 mg by mouth every morning (before breakfast)       oxyCODONE IR (ROXICODONE) 5 MG tablet Take 1 tablet (5 mg) by mouth every 6 hours as needed for moderate to severe pain 40 tablet 0     predniSONE (DELTASONE) 1 MG tablet Take 2 mg by mouth daily TOTAL DAILY DOSE = 12 MG       predniSONE (DELTASONE) 5 MG tablet Take 10 mg by mouth daily with food TOTAL DAILY DOSE = 12 MG       simvastatin (ZOCOR) 40 MG tablet Take 40 mg by mouth At Bedtime       traMADol (ULTRAM) 50 MG tablet Take 1 tablet (50 mg) by mouth every 6 hours as needed for severe pain 10 tablet 0     [DISCONTINUED] insulin aspart prot & aspart (NOVOLOG MIX 70/30 PEN) injection 45 units am and and 28 at evening 30 mL 3     [DISCONTINUED] levothyroxine (SYNTHROID) 125 MCG tablet Take 125 mcg by mouth daily       [DISCONTINUED] lisinopril (PRINIVIL/ZESTRIL) 2.5 MG tablet Take 2.5 mg by mouth         Allergies   Allergen Reactions     Glyburide Anaphylaxis     Other reaction(s): Dizziness     Mosquitoes (Informational Only) Hives     Other [Seasonal Allergies] Hives     Deer Flies     Sulfamethoxazole-Trimethoprim Anaphylaxis      Other reaction(s): Other - Describe In Comment Field  Rash, nausea, dizziness     Codeine Nausea and Nausea and Vomiting       Family History   Problem Relation Age of Onset     Cancer Mother 50     uterus     HEART DISEASE Mother      MI     Colon Cancer Mother      HEART DISEASE Father      MI     Chronic Obstructive Pulmonary Disease Sister      Other - See Comments Sister      rubina dow     Colon Cancer Brother      Breast Cancer No family hx of      Cancer-breast       Social History     Social History     Marital status:      Spouse name: N/A     Number of children: N/A     Years of education: N/A     Occupational History     Not on file.     Social History Main Topics     Smoking status: Former Smoker     Packs/day: 1.00     Years: 30.00     Types: Cigarettes     Quit date: 8/31/2005     Smokeless tobacco: Never Used     Alcohol use 1.5 oz/week      Comment: Rare     Drug use: Yes      Comment: Medical marijuana     Sexual activity: Not on file     Other Topics Concern     Not on file     Social History Narrative      , [place].  Does not work outside the home.  Four children are grown.  Lives in town.       Review of Systems   Constitutional: Negative for chills, diaphoresis, fatigue and fever.   HENT: Negative for congestion, ear pain, facial swelling, mouth sores, nosebleeds, rhinorrhea, sinus pain, sinus pressure, sore throat and trouble swallowing.    Eyes: Negative for pain, redness and visual disturbance.   Respiratory: Positive for shortness of breath. Negative for cough, chest tightness and wheezing.    Cardiovascular: Negative for chest pain, palpitations and leg swelling.   Gastrointestinal: Negative for abdominal distention, abdominal pain, blood in stool, constipation, diarrhea, nausea and vomiting.   Endocrine: Negative for cold intolerance and heat intolerance.   Genitourinary: Negative for difficulty urinating, dysuria, flank pain, frequency, hematuria, pelvic pain, vaginal  bleeding, vaginal discharge and vaginal pain.   Musculoskeletal: Positive for arthralgias. Negative for back pain, gait problem, joint swelling, neck pain and neck stiffness.   Skin: Negative for rash.   Neurological: Positive for light-headedness. Negative for dizziness, tremors, seizures, syncope, weakness, numbness and headaches.   Hematological: Negative for adenopathy. Bruises/bleeds easily.   Psychiatric/Behavioral: Negative for agitation, confusion, hallucinations and sleep disturbance.       Physical Exam   Constitutional: She appears well-developed and well-nourished. No distress.   HENT:   Head: Normocephalic.   Right Ear: External ear normal.   Left Ear: External ear normal.   Mouth/Throat: Oropharynx is clear and moist.   Neck: Normal range of motion. Neck supple. Decreased carotid pulses present. No JVD present. Carotid bruit is present. No tracheal deviation present. No thyromegaly present.   Cardiovascular: Normal rate and regular rhythm.    Murmur heard.   Systolic murmur is present with a grade of 2/6   Right upper sternal border   Pulmonary/Chest: She has decreased breath sounds. She has no wheezes. She has no rhonchi. She has no rales.   Abdominal: Soft. Bowel sounds are normal. She exhibits no distension and no mass. There is no tenderness. No hernia.   Musculoskeletal: Normal range of motion. She exhibits no edema.   Lymphadenopathy:     She has no cervical adenopathy.   Neurological: She is alert.   Skin: Skin is warm and dry. She is not diaphoretic.   She has thinning skin from the prednisone with numerous ecchymoses   Nursing note and vitals reviewed.      Assessment:      ICD-10-CM    1. Diabetes mellitus type 2, insulin dependent (H) E11.9 Hemoglobin A1c    Z79.4    2. Encounter for screening mammogram for breast cancer Z12.31 MA Screen Bilateral w/Vish   3. Hypothyroidism, unspecified type E03.9 TSH     T4, Free     levothyroxine (SYNTHROID) 125 MCG tablet   4. Essential hypertension I10     5. Chronic kidney disease (CKD), stage III (moderate) N18.3 Basic Metabolic Panel   6. Osteoporosis, unspecified osteoporosis type, unspecified pathological fracture presence M81.0    7. Bilateral carotid bruits R09.89 US Carotid Bilateral        Plan: Medications will continue without change for the time being.  She may have some autonomic dysfunction with orthostasis and she is certainly better on less blood pressure pills.  Lab is drawn and pending, I will send a letter with the results.  Thyroid was refilled for her at the same dose.  Mammogram scheduled.  Carotid ultrasound scheduled.  I will let her know the results.  She will have a colonoscopy this fall, one year follow-up after her colon cancer.  She needs to check with her rheumatologist regarding alternative therapy for her osteoporosis.  She should consider getting this ShingRx vaccine.  Follow-up will be dependent on how she does have the results of her pending lab work.

## 2018-07-24 NOTE — NURSING NOTE
The patient is here today to establish care with Raul Ortega MD.  Haley Lugo LPN on 7/24/2018 at 3:57 PM

## 2018-07-24 NOTE — MR AVS SNAPSHOT
After Visit Summary   7/24/2018    Laura Perez    MRN: [id number]           Patient Information     Date Of Birth          1950        Visit Information        Provider Department      7/24/2018 1:00 PM  DIABETES EDUCATION [facility]        Today's Diagnoses     Chronic obstructive pulmonary disease, unspecified COPD type (H)          Care Instructions    Diabetes Goals and Reminders    Your A1c test should be done every 3 months.  Your goal is less than 8%.   Your last result is:  No results found for: A1C    Your LDL cholesterol test should be done at least once a year.  Take a statin, if prescribed by your doctor, based on age and risk factors.  Your last result is:  LDL Cholesterol Calculated   Date Value Ref Range Status   12/03/2015 121 (H) <100 mg/dL        Have blood pressure and weight checked every three months.  Your blood pressure goal is 140/90 or less.  Your last blood pressure reading was:   BP Readings from Last 1 Encounters:   07/05/18 136/60       Test your blood sugar 2 times per day.  Your home blood glucose target ranges are:  Fasting or Before meals:   2 hours after a meal: Less than 200      Avoid all tobacco.  Follow your healthy diet and exercise plan.  See the eye doctor every year.  See the dentist every six months.  Have kidney function tested yearly.    Take all medicine as prescribed.  Please let me know if you are having symptoms you don t expect or if you wish to stop any medicine.    Follow Up Plan  Please call or visit Diabetes Education if blood sugars are consistently out of target.  Your future lab plan is:  HgA1c anytime.    If you need your cholesterol checked at your next appointment, you should fast 8 to 10 hours before your appointment.  Do not eat or drink anything besides water.  Drink plenty of water and take all your usual medicine.    SUMMARY FOR TODAY'S VISIT  Recommend Novolog 70/30 premix adjustment at supper from 28  to 26 units.  Keep breakfast dose at 45 units at this time.    1.  Continue testing blood sugar 2 time(s) daily, as directed.    2.  Continue carbohydrate counting, 2 - 3 choices per meal, 0 - 1 choice per snack (limiting snacks to help with weight loss and tighter blood sugar control).     3.  Recommend low to moderate physical activity, such as walking, working up to a minimum of 30 minutes most days, as tolerated.     4.  Follow-up for continued diabetes education, as needed.     Maggie Ceballos RN, BSN, CDE  7/24/2018 2:07 PM                Follow-ups after your visit        Your next 10 appointments already scheduled     Jul 24, 2018  4:00 PM CDT   Office Visit with Raul Ortega MD   [facility]    [address]  [facility] [id number] [phone]           Bring a current list of meds and any records pertaining to this visit. For Physicals, please bring immunization records and any forms needing to be filled out. Please arrive 10 minutes early to complete paperwork.              Who to contact     If you have questions or need follow up information about today's clinic visit or your schedule please contact [facility] directly at [phone].  Normal or non-critical lab and imaging results will be communicated to you by MyChart, letter or phone within 4 business days after the clinic has received the results. If you do not hear from us within 7 days, please contact the clinic through [employer] or phone. If you have a critical or abnormal lab result, we will notify you by phone as soon as possible.  Submit refill requests through [employer] or call your pharmacy and they will forward the refill request to us. Please allow 3 business days for your refill to be completed.          Additional Information About Your Visit        Care EveryWhere ID     This is your Care EveryWhere ID. This could be used by other organizations to  access your [place] medical records  [id number]         Blood Pressure from Last 3 Encounters:   07/05/18 136/60   06/28/18 108/48   06/17/18 135/62    Weight from Last 3 Encounters:   07/05/18 64 kg (141 lb)   06/28/18 66 kg (145 lb 8.1 oz)   06/17/18 70.9 kg (156 lb 4.9 oz)              Today, you had the following     No orders found for display       Primary Care Provider Office Phone # Fax #    Elin Mohan [phone]       [address]  [facility] [address]        Equal Access to Services     [facility] Hospital[facility]ANDRA : Hadii aad win hadasho Sosulaimanali, waaxda luqadaha, qaybta kaalmada liat, tara lovett . So [facility] [phone].    ATENCIÓN: Si habla español, tiene a dawkins disposición servicios gratuitos de asistencia lingüística. [facility] [phone].    We comply with applicable federal civil rights laws and [place] laws. We do not discriminate on the basis of race, color, national origin, age, disability, sex, sexual orientation, or gender identity.            Thank you!     Thank you for choosing [facility]  for your care. Our goal is always to provide you with excellent care. Hearing back from our patients is one way we can continue to improve our services. Please take a few minutes to complete the written survey that you may receive in the mail after your visit with us. Thank you!             Your Updated Medication List - Protect others around you: Learn how to safely use, store and throw away your medicines at www.disposemymeds.org.          This list is accurate as of 7/24/18  2:11 PM.  Always use your most recent med list.                   Brand Name Dispense Instructions for use Diagnosis    acetaminophen 325 MG tablet    TYLENOL    100 tablet    Take 2 tablets (650 mg) by mouth every 4 hours as needed for mild pain    Low back pain without sciatica, unspecified back pain laterality, unspecified chronicity       albuterol (2.5  MG/3ML) 0.083% neb solution      Take 2.5 mg by nebulization every 6 hours as needed for shortness of breath / dyspnea or wheezing        aspirin 81 MG chewable tablet      Take 81 mg by mouth daily with food        blood glucose monitoring lancets      TEST 2 TIMES PER DAY        budesonide 0.5 MG/2ML neb solution    PULMICORT     Inhale 0.5 mg into the lungs 2 times daily        DULoxetine 30 MG EC capsule    CYMBALTA     Take 30 mg by mouth every morning        EPINEPHrine 0.3 MG/0.3ML injection 2-pack    EPIPEN/ADRENACLICK/or ANY BX GENERIC EQUIV    0.9 mL    Inject 0.3 mLs (0.3 mg) into the muscle once as needed    Other emphysema (H)       fenofibrate 48 MG tablet      Take 48 mg by mouth daily with food        formoterol 20 MCG/2ML neb solution    PERFOROMIST     Inhale 2 mLs into the lungs 2 times daily        gabapentin 300 MG capsule    NEURONTIN     Take 300 mg by mouth At Bedtime        glucagon 1 MG kit      Inject 1 mg into the muscle as needed for low blood sugar        glucose 4 g Chew chewable tablet      Take 1 tablet by mouth as needed for low blood sugar        insulin aspart prot & aspart injection    NovoLOG MIX 70/30 PEN    30 mL    45 units am and and 28 at evening    DM type 2, not at goal (H)       lisinopril 2.5 MG tablet    PRINIVIL/Zestril     Take 2.5 mg by mouth        * medical cannabis liquid      1-2 sprays under the tongue as needed for pain        * medical cannabis (Patient's own supply.  Not a prescription)      Apply 1 Dose topically as needed (pain) (This is NOT a prescription, and does not certify that the patient has a qualifying medical condition for medical cannabis.  The purpose of this order is  to document that the patient reports taking medical cannabis.)        * medical cannabis liquid      Place 1 drop under the tongue as needed (pain) (This is NOT a prescription, and does not certify that the patient has a qualifying medical condition for medical cannabis.  The  purpose of this order is  to document that the patient reports taking medical cannabis.)        montelukast 10 MG tablet    SINGULAIR     Take 10 mg by mouth At Bedtime        omeprazole 20 MG CR capsule    priLOSEC     Take 20 mg by mouth every morning (before breakfast)        ONETOUCH ULTRA test strip   Generic drug:  blood glucose monitoring      TEST 2 TIMES PER DAY.        oxyCODONE IR 5 MG tablet    ROXICODONE    40 tablet    Take 1 tablet (5 mg) by mouth every 6 hours as needed for moderate to severe pain    Low back pain without sciatica, unspecified back pain laterality, unspecified chronicity       * predniSONE 5 MG tablet    DELTASONE     Take 10 mg by mouth daily with food TOTAL DAILY DOSE = 12 MG        * predniSONE 1 MG tablet    DELTASONE     Take 2 mg by mouth daily TOTAL DAILY DOSE = 12 MG        simvastatin 40 MG tablet    ZOCOR     Take 40 mg by mouth At Bedtime        SYNTHROID 125 MCG tablet   Generic drug:  levothyroxine      Take 125 mcg by mouth daily        traMADol 50 MG tablet    ULTRAM    10 tablet    Take 1 tablet (50 mg) by mouth every 6 hours as needed for severe pain    Low back pain without sciatica, unspecified back pain laterality, unspecified chronicity       YUVAFEM 10 MCG Tabs vaginal tablet   Generic drug:  estradiol      Place 10 mcg vaginally twice a week Takes on Sundays and Wednesdays        * Notice:  This list has 5 medication(s) that are the same as other medications prescribed for you. Read the directions carefully, and ask your doctor or other care provider to review them with you.

## 2018-07-25 ASSESSMENT — PATIENT HEALTH QUESTIONNAIRE - PHQ9: SUM OF ALL RESPONSES TO PHQ QUESTIONS 1-9: 0

## 2018-08-02 ENCOUNTER — HOSPITAL ENCOUNTER (OUTPATIENT)
Dept: MAMMOGRAPHY | Facility: OTHER | Age: 68
Discharge: HOME | End: 2018-08-02
Attending: INTERNAL MEDICINE | Admitting: INTERNAL MEDICINE
Payer: MEDICARE

## 2018-08-02 ENCOUNTER — HOSPITAL ENCOUNTER (OUTPATIENT)
Dept: ULTRASOUND IMAGING | Facility: OTHER | Age: 68
End: 2018-08-02
Attending: INTERNAL MEDICINE
Payer: MEDICARE

## 2018-08-02 DIAGNOSIS — Z12.31 ENCOUNTER FOR SCREENING MAMMOGRAM FOR BREAST CANCER: ICD-10-CM

## 2018-08-02 DIAGNOSIS — R09.89 BILATERAL CAROTID BRUITS: ICD-10-CM

## 2018-08-02 PROCEDURE — 93880 EXTRACRANIAL BILAT STUDY: CPT

## 2018-08-02 PROCEDURE — 77067 SCR MAMMO BI INCL CAD: CPT

## 2018-08-03 ENCOUNTER — TRANSFERRED RECORDS (OUTPATIENT)
Dept: HEALTH INFORMATION MANAGEMENT | Facility: OTHER | Age: 68
End: 2018-08-03

## 2018-08-15 ENCOUNTER — TRANSFERRED RECORDS (OUTPATIENT)
Dept: HEALTH INFORMATION MANAGEMENT | Facility: OTHER | Age: 68
End: 2018-08-15

## 2018-08-22 DIAGNOSIS — E78.5 HYPERLIPIDEMIA, UNSPECIFIED HYPERLIPIDEMIA TYPE: ICD-10-CM

## 2018-08-22 DIAGNOSIS — K21.9 GASTROESOPHAGEAL REFLUX DISEASE, ESOPHAGITIS PRESENCE NOT SPECIFIED: ICD-10-CM

## 2018-08-22 DIAGNOSIS — J44.9 CHRONIC OBSTRUCTIVE PULMONARY DISEASE, UNSPECIFIED COPD TYPE (H): Primary | ICD-10-CM

## 2018-08-22 DIAGNOSIS — E11.9 DIABETES MELLITUS TYPE 2, INSULIN DEPENDENT (H): ICD-10-CM

## 2018-08-22 DIAGNOSIS — N95.2 ATROPHIC VAGINITIS: ICD-10-CM

## 2018-08-22 DIAGNOSIS — Z79.4 DIABETES MELLITUS TYPE 2, INSULIN DEPENDENT (H): ICD-10-CM

## 2018-08-24 DIAGNOSIS — Z79.4 DIABETES MELLITUS TYPE 2, INSULIN DEPENDENT (H): Primary | ICD-10-CM

## 2018-08-24 DIAGNOSIS — E11.9 DIABETES MELLITUS TYPE 2, INSULIN DEPENDENT (H): Primary | ICD-10-CM

## 2018-08-24 RX ORDER — ALBUTEROL SULFATE 90 UG/1
AEROSOL, METERED RESPIRATORY (INHALATION)
Qty: 3 INHALER | Refills: 3 | Status: SHIPPED | OUTPATIENT
Start: 2018-08-24 | End: 2020-11-02

## 2018-08-24 RX ORDER — BLOOD SUGAR DIAGNOSTIC
STRIP MISCELLANEOUS
Qty: 200 STRIP | Refills: 1 | Status: SHIPPED | OUTPATIENT
Start: 2018-08-24 | End: 2019-07-08

## 2018-08-24 RX ORDER — LANCETS 33 GAUGE
EACH MISCELLANEOUS
Qty: 200 EACH | Refills: 1 | Status: SHIPPED | OUTPATIENT
Start: 2018-08-24 | End: 2019-06-24

## 2018-08-24 RX ORDER — SIMVASTATIN 40 MG
TABLET ORAL
Qty: 90 TABLET | Refills: 3 | Status: SHIPPED | OUTPATIENT
Start: 2018-08-24 | End: 2019-07-08

## 2018-08-24 RX ORDER — PEN NEEDLE, DIABETIC 32GX 5/32"
NEEDLE, DISPOSABLE MISCELLANEOUS
Qty: 200 EACH | Refills: 3 | Status: SHIPPED | OUTPATIENT
Start: 2018-08-24 | End: 2019-07-08

## 2018-08-24 RX ORDER — ESTRADIOL 10 UG/1
TABLET VAGINAL
Qty: 24 TABLET | Refills: 3 | Status: SHIPPED | OUTPATIENT
Start: 2018-08-24 | End: 2018-10-16

## 2018-08-24 NOTE — TELEPHONE ENCOUNTER
Routing refill request to provider for review/approval because:  Medication is reported/historical  Albuterol Inhaler not on current medication list  LOV: 7/24/18    Sharda Garcia RN on 8/24/2018 at 3:07 PM

## 2018-08-24 NOTE — TELEPHONE ENCOUNTER
"Received refill request from [facility] for Novolog 70/30    Note from pharmacy: \" patient is now seeing you for primary care. Needs a renewal on this medication. Say they are now doing 50 units in am and 30 in pm.\"    Novolog set up as this   Please review and sign if appropriate.    Sharda Garcia RN on 8/24/2018 at 4:23 PM    "

## 2018-09-10 ENCOUNTER — OFFICE VISIT (OUTPATIENT)
Dept: INTERNAL MEDICINE | Facility: OTHER | Age: 68
End: 2018-09-10
Attending: INTERNAL MEDICINE
Payer: MEDICARE

## 2018-09-10 VITALS
HEART RATE: 82 BPM | TEMPERATURE: 96.1 F | BODY MASS INDEX: 23.31 KG/M2 | HEIGHT: 64 IN | SYSTOLIC BLOOD PRESSURE: 116 MMHG | DIASTOLIC BLOOD PRESSURE: 54 MMHG | WEIGHT: 136.5 LBS | RESPIRATION RATE: 16 BRPM

## 2018-09-10 DIAGNOSIS — I95.1 ORTHOSTATIC HYPOTENSION: ICD-10-CM

## 2018-09-10 DIAGNOSIS — E11.9 DIABETES MELLITUS TYPE 2, INSULIN DEPENDENT (H): ICD-10-CM

## 2018-09-10 DIAGNOSIS — J44.9 CHRONIC OBSTRUCTIVE PULMONARY DISEASE, UNSPECIFIED COPD TYPE (H): ICD-10-CM

## 2018-09-10 DIAGNOSIS — H25.9 AGE-RELATED CATARACT OF BOTH EYES, UNSPECIFIED AGE-RELATED CATARACT TYPE: ICD-10-CM

## 2018-09-10 DIAGNOSIS — Z79.4 DIABETES MELLITUS TYPE 2, INSULIN DEPENDENT (H): ICD-10-CM

## 2018-09-10 DIAGNOSIS — M05.9 SEROPOSITIVE RHEUMATOID ARTHRITIS (H): ICD-10-CM

## 2018-09-10 DIAGNOSIS — E78.5 HYPERLIPIDEMIA, UNSPECIFIED HYPERLIPIDEMIA TYPE: Primary | ICD-10-CM

## 2018-09-10 DIAGNOSIS — Z79.52 ON PREDNISONE THERAPY: ICD-10-CM

## 2018-09-10 DIAGNOSIS — C18.9 MALIGNANT NEOPLASM OF COLON, UNSPECIFIED PART OF COLON (H): ICD-10-CM

## 2018-09-10 DIAGNOSIS — M54.50 LOW BACK PAIN WITHOUT SCIATICA, UNSPECIFIED BACK PAIN LATERALITY, UNSPECIFIED CHRONICITY: ICD-10-CM

## 2018-09-10 DIAGNOSIS — N18.30 CHRONIC KIDNEY DISEASE (CKD), STAGE III (MODERATE) (H): ICD-10-CM

## 2018-09-10 DIAGNOSIS — Z01.818 PREOPERATIVE EXAMINATION: ICD-10-CM

## 2018-09-10 DIAGNOSIS — I10 ESSENTIAL HYPERTENSION: ICD-10-CM

## 2018-09-10 DIAGNOSIS — E03.9 HYPOTHYROIDISM, UNSPECIFIED TYPE: ICD-10-CM

## 2018-09-10 PROCEDURE — G0463 HOSPITAL OUTPT CLINIC VISIT: HCPCS

## 2018-09-10 PROCEDURE — 99215 OFFICE O/P EST HI 40 MIN: CPT | Performed by: INTERNAL MEDICINE

## 2018-09-10 RX ORDER — MIDODRINE HYDROCHLORIDE 2.5 MG/1
2.5 TABLET ORAL 2 TIMES DAILY
Refills: 11 | COMMUNITY
Start: 2018-09-10 | End: 2019-07-08

## 2018-09-10 RX ORDER — TRAMADOL HYDROCHLORIDE 50 MG/1
TABLET ORAL
Qty: 10 TABLET | Refills: 0 | COMMUNITY
Start: 2018-09-10 | End: 2019-07-08

## 2018-09-10 RX ORDER — MIDODRINE HYDROCHLORIDE 2.5 MG/1
TABLET ORAL
Refills: 11 | COMMUNITY
Start: 2018-08-20 | End: 2018-09-10

## 2018-09-10 ASSESSMENT — ENCOUNTER SYMPTOMS
DIARRHEA: 0
FACIAL SWELLING: 0
DYSURIA: 0
FREQUENCY: 0
EYE REDNESS: 0
ADENOPATHY: 0
DIZZINESS: 0
RHINORRHEA: 0
DIFFICULTY URINATING: 0
CONFUSION: 0
SINUS PAIN: 0
WHEEZING: 0
DIAPHORESIS: 0
NECK PAIN: 0
JOINT SWELLING: 0
FEVER: 0
SINUS PRESSURE: 0
CHILLS: 0
NECK STIFFNESS: 0
NUMBNESS: 0
SHORTNESS OF BREATH: 0
SORE THROAT: 0
CONSTIPATION: 0
ABDOMINAL DISTENTION: 0
BLOOD IN STOOL: 0
FLANK PAIN: 0
ABDOMINAL PAIN: 0
HALLUCINATIONS: 0
PALPITATIONS: 0
HEMATURIA: 0
COUGH: 0
AGITATION: 0
TROUBLE SWALLOWING: 0
FATIGUE: 0
EYE PAIN: 0
WEAKNESS: 0
HEADACHES: 0
CHEST TIGHTNESS: 0
TREMORS: 0
BACK PAIN: 0
BRUISES/BLEEDS EASILY: 0
VOMITING: 0
SLEEP DISTURBANCE: 0
NAUSEA: 0
SEIZURES: 0

## 2018-09-10 ASSESSMENT — PAIN SCALES - GENERAL: PAINLEVEL: MILD PAIN (3)

## 2018-09-10 NOTE — MR AVS SNAPSHOT
After Visit Summary   9/10/2018    Laura Perez    MRN: [id number]           Patient Information     Date Of Birth          1950        Visit Information        Provider Department      9/10/2018 9:20 AM Raul Ortega MD [facility]        Today's Diagnoses     Hyperlipidemia, unspecified hyperlipidemia type    -  1    Diabetes mellitus type 2, insulin dependent (H)        Low back pain without sciatica, unspecified back pain laterality, unspecified chronicity        Hypothyroidism, unspecified type        Chronic kidney disease (CKD), stage III (moderate)        Essential hypertension        On prednisone therapy        Orthostatic hypotension        Seropositive rheumatoid arthritis (H)        Chronic obstructive pulmonary disease, unspecified COPD type (H)        Age-related cataract of both eyes, unspecified age-related cataract type        Preoperative examination        Malignant neoplasm of colon, unspecified part of colon (H)           Follow-ups after your visit        Who to contact     If you have questions or need follow up information about today's clinic visit or your schedule please contact [facility] AND \[facility]\"" directly at [phone].  Normal or non-critical lab and imaging results will be communicated to you by SMCproshart, letter or phone within 4 business days after the clinic has received the results. If you do not hear from us within 7 days, please contact the clinic through [employer] or phone. If you have a critical or abnormal lab result, we will notify you by phone as soon as possible.  Submit refill requests through [employer] or call your pharmacy and they will forward the refill request to us. Please allow 3 business days for your refill to be completed.          Additional Information About Your Visit        SMCprosharPowerReviews Information     [employer] gives you secure access to your electronic health record. If you see a primary care provider, you can  "also send messages to your care team and make appointments. If you have questions, please call your primary care clinic.  If you do not have a primary care provider, please call [phone] and they will assist you.        Care EveryWhere ID     This is your Care EveryWhere ID. This could be used by other organizations to access your [place] medical records  [id number]        Your Vitals Were     Pulse Temperature Respirations Height Breastfeeding? BMI (Body Mass Index)    82 96.1  F (35.6  C) (Tympanic) 16 5' 3.5\" (1.613 m) No 23.8 kg/m2       Blood Pressure from Last 3 Encounters:   09/10/18 116/54   07/24/18 118/62   07/05/18 136/60    Weight from Last 3 Encounters:   09/10/18 136 lb 8 oz (61.9 kg)   07/24/18 140 lb 6.4 oz (63.7 kg)   07/05/18 141 lb (64 kg)              Today, you had the following     No orders found for display         Today's Medication Changes          These changes are accurate as of 9/10/18 10:00 AM.  If you have any questions, ask your nurse or doctor.               These medicines have changed or have updated prescriptions.        Dose/Directions    insulin aspart prot & aspart injection   Commonly known as:  NovoLOG MIX 70/30 PEN   This may have changed:  additional instructions   Used for:  Diabetes mellitus type 2, insulin dependent (H)   Changed by:  Raul Ortega MD        45 units am and and 26 in evening   Quantity:  15 mL   Refills:  11       midodrine 2.5 MG tablet   Commonly known as:  PROAMATINE   This may have changed:  See the new instructions.   Changed by:  Raul Ortega MD        Dose:  2.5 mg   Take 1 tablet (2.5 mg) by mouth 2 times daily   Refills:  11       traMADol 50 MG tablet   Commonly known as:  ULTRAM   This may have changed:    - how much to take  - how to take this  - when to take this  - reasons to take this  - additional instructions   Used for:  Low back pain without sciatica, unspecified back pain laterality, unspecified chronicity   Changed by:  " Raul Ortega MD        One daily   Quantity:  10 tablet   Refills:  0               Information about OPIOIDS     PRESCRIPTION OPIOIDS: WHAT YOU NEED TO KNOW   We gave you an opioid (narcotic) pain medicine. It is important to manage your pain, but opioids are not always the best choice. You should first try all the other options your care team gave you. Take this medicine for as short a time (and as few doses) as possible.    Some activities can increase your pain, such as bandage changes or therapy sessions. It may help to take your pain medicine 30 to 60 minutes before these activities. Reduce your stress by getting enough sleep, working on hobbies you enjoy and practicing relaxation or meditation. Talk to your care team about ways to manage your pain beyond prescription opioids.    These medicines have risks:    DO NOT drive when on new or higher doses of pain medicine. These medicines can affect your alertness and reaction times, and you could be arrested for driving under the influence (DUI). If you need to use opioids long-term, talk to your care team about driving.    DO NOT operate heavy machinery    DO NOT do any other dangerous activities while taking these medicines.    DO NOT drink any alcohol while taking these medicines.     If the opioid prescribed includes acetaminophen, DO NOT take with any other medicines that contain acetaminophen. Read all labels carefully. Look for the word  acetaminophen  or  Tylenol.  Ask your pharmacist if you have questions or are unsure.    You can get addicted to pain medicines, especially if you have a history of addiction (chemical, alcohol or substance dependence). Talk to your care team about ways to reduce this risk.    All opioids tend to cause constipation. Drink plenty of water and eat foods that have a lot of fiber, such as fruits, vegetables, prune juice, apple juice and high-fiber cereal. Take a laxative (Miralax, milk of magnesia, Colace, Senna) if you  don t move your bowels at least every other day. Other side effects include upset stomach, sleepiness, dizziness, throwing up, tolerance (needing more of the medicine to have the same effect), physical dependence and slowed breathing.    Store your pills in a secure place, locked if possible. We will not replace any lost or stolen medicine. If you don t finish your medicine, please throw away (dispose) as directed by your pharmacist. The [place] [employer]ution [employer] has more information about safe disposal: [email or web address]         Primary Care Provider Office Phone # Fax #    Raul Ortega [phone] [address] [facility] [address]        Equal Access to Services     SKINNY HYMAN : Beny gamez Sofrank, waaxda luqadaha, qaybta kaalmada liat, tara lovett . So [facility] [phone].    ATENCIÓN: Si habla español, tiene a dawkins disposición servicios gratuitos de asistencia lingüística. [facility] [phone].    We comply with applicable federal civil rights laws and [place] laws. We do not discriminate on the basis of race, color, national origin, age, disability, sex, sexual orientation, or gender identity.            Thank you!     Thank you for choosing [facility] AND [facility]  for your care. Our goal is always to provide you with excellent care. Hearing back from our patients is one way we can continue to improve our services. Please take a few minutes to complete the written survey that you may receive in the mail after your visit with us. Thank you!             Your Updated Medication List - Protect others around you: Learn how to safely use, store and throw away your medicines at www.disposemymeds.org.          This list is accurate as of 9/10/18 10:01 AM.  Always use your most recent med list.                   Brand Name Dispense Instructions for use Diagnosis    *  albuterol (2.5 MG/3ML) 0.083% neb solution      Take 2.5 mg by nebulization every 6 hours as needed for shortness of breath / dyspnea or wheezing        * PROAIR  (90 Base) MCG/ACT inhaler   Generic drug:  albuterol     3 Inhaler    INHALE 2 PUFFS INTO THE LUNGS EVERY 6 HOURS AS NEEDED FOR WHEEZIGN FOR UP  DAYS    Chronic obstructive pulmonary disease, unspecified COPD type (H)       aspirin 81 MG chewable tablet      Take 81 mg by mouth daily with food        BD LUCILLE U/F 32G X 4 MM   Generic drug:  insulin pen needle     200 each    TESING TWICE DAILY    Diabetes mellitus type 2, insulin dependent (H)       budesonide 0.5 MG/2ML neb solution    PULMICORT     Inhale 0.5 mg into the lungs 2 times daily        DULoxetine 30 MG EC capsule    CYMBALTA     Take 30 mg by mouth every morning        EPINEPHrine 0.3 MG/0.3ML injection 2-pack    EPIPEN/ADRENACLICK/or ANY BX GENERIC EQUIV    0.9 mL    Inject 0.3 mLs (0.3 mg) into the muscle once as needed    Other emphysema (H)       fenofibrate 48 MG tablet      Take 48 mg by mouth daily with food        formoterol 20 MCG/2ML neb solution    PERFOROMIST     Inhale 2 mLs into the lungs 2 times daily        gabapentin 300 MG capsule    NEURONTIN     Take 300 mg by mouth At Bedtime        glucagon 1 MG kit      Inject 1 mg into the muscle as needed for low blood sugar        glucose 4 g Chew chewable tablet      Take 1 tablet by mouth as needed for low blood sugar        insulin aspart prot & aspart injection    NovoLOG MIX 70/30 PEN    15 mL    45 units am and and 26 in evening    Diabetes mellitus type 2, insulin dependent (H)       levothyroxine 125 MCG tablet    SYNTHROID    90 tablet    Take 1 tablet (125 mcg) by mouth daily    Hypothyroidism, unspecified type       lisinopril 2.5 MG tablet    PRINIVIL/Zestril    30 tablet    Take 1 tablet (2.5 mg) by mouth daily        * medical cannabis liquid      1-2 sprays under the tongue as needed for pain        *  medical cannabis liquid      Place 1 drop under the tongue as needed (pain) (This is NOT a prescription, and does not certify that the patient has a qualifying medical condition for medical cannabis.  The purpose of this order is  to document that the patient reports taking medical cannabis.)        midodrine 2.5 MG tablet    PROAMATINE     Take 1 tablet (2.5 mg) by mouth 2 times daily        montelukast 10 MG tablet    SINGULAIR     Take 10 mg by mouth At Bedtime        omeprazole 20 MG CR capsule    priLOSEC    90 capsule    TAKE 1 CAPSULE BY MOUTH DAILY    Gastroesophageal reflux disease, esophagitis presence not specified       ONETOUCH DELICA LANCETS 33G Misc     200 each    TEST TWICE DAILY    Diabetes mellitus type 2, insulin dependent (H)       ONETOUCH VERIO IQ test strip   Generic drug:  blood glucose monitoring     200 strip    TESTING TWICE DAILY    Diabetes mellitus type 2, insulin dependent (H)       oxyCODONE IR 5 MG tablet    ROXICODONE    40 tablet    Take 1 tablet (5 mg) by mouth every 6 hours as needed for moderate to severe pain    Low back pain without sciatica, unspecified back pain laterality, unspecified chronicity       * predniSONE 5 MG tablet    DELTASONE     Take 10 mg by mouth daily with food TOTAL DAILY DOSE = 12 MG        * predniSONE 1 MG tablet    DELTASONE     Take 2 mg by mouth daily TOTAL DAILY DOSE = 12 MG        simvastatin 40 MG tablet    ZOCOR    90 tablet    TAKE 1 TABLET BY MOUTH EVERY EVENING    Hyperlipidemia, unspecified hyperlipidemia type       traMADol 50 MG tablet    ULTRAM    10 tablet    One daily    Low back pain without sciatica, unspecified back pain laterality, unspecified chronicity       YUVAFEM 10 MCG Tabs vaginal tablet   Generic drug:  estradiol     24 tablet    PLACE 10 MCG VAGINALLY TWICE A WEEK.    Atrophic vaginitis       * Notice:  This list has 6 medication(s) that are the same as other medications prescribed for you. Read the directions carefully,  and ask your doctor or other care provider to review them with you.

## 2018-09-10 NOTE — Clinical Note
Copy of the EKG from June 27, 2018 along with my preoperative consultation needs to go to [facility] for Dr. Spivey but then a copy to [facility] in [place] as well, attention Dr. Reji Perez

## 2018-09-10 NOTE — NURSING NOTE
Patient presents to clinic with her spouse Benigno for pre op exam.  Surgery with Dr. Spivey at [facility] for Cataract Right Eye 09/17 and Left Eye on 10/03/18.    Kelsey Yung LPN............9/10/2018 9:24 AM

## 2018-09-10 NOTE — PROGRESS NOTES
Chief Complaint:  This patient is here for a preop consultation and clearance.    HPI: She is in today for preoperative consultation and clearance for a number of upcoming surgeries.  First of all, she has cataract surgery scheduled.  She will have her right eye done on September 17, the left eye will be done on October 3.  This will be done at [facility] by Dr. Spivey.  She is then going to have colonoscopy with Dr. Perez in [place] on September 27.  This will be routine follow-up on her colon cancer.    She is in today generally feeling well.  She does have diabetes and this has been doing very well.  She was able to decrease her insulin.  Part of the improvement is related to her weight loss.  She has been doing much better with a healthy diet and this has been reflected in her daily blood sugars.  Her last A1c was 7.8%.  She is not having any hypoglycemic problems.    The patient does have a history of chronic kidney disease as well as secondary adrenal insufficiency.  She is currently on Midrin and her orthostatic problems have improved significantly.  This has been prescribed by her nephrologist in [place].  She still has some orthostasis so they are hoping that when she goes back to see her nephrologist for a follow-up, that the dosage will be increased.    She is not having any cardiac symptoms or chest pain.  She does not have any heart history of relevance.  She is not having any significant respiratory problems, she does have a history of recurrent pneumonias as well as COPD but that is currently stable.  No fevers or chills or acute illnesses.  No GI or  problems of significance.    Medications are reconciled.  Past medical history, past surgical history, family history and social histories are reviewed and updated.  She has a remote history of her blood transfusion.  She has never had anesthesia complications or bleeding diathesis.    Past Medical History:   Diagnosis Date      Chronic kidney disease (CKD), stage III (moderate)      Chronic obstructive pulmonary disease (H)     No Comments Provided     Colon cancer (H)      Diverticulosis of intestine without perforation or abscess without bleeding     Diffuse diverticulosis and history of diminutive hyperplastic colon     Essential (primary) hypertension     No Comments Provided     Hyperlipidemia     No Comments Provided     Hypothyroidism     No Comments Provided     Malignant neoplasm of colon (H)     9/2017     Osteoporosis      Rheumatoid arthritis (H)     Rheumatologist Dr. Gonzalez, [phone], fax [phone]     Rosacea     No Comments Provided     Type 2 diabetes mellitus without complications (H)     Diabetes Mellitus, prednisone induced       Past Surgical History:   Procedure Laterality Date     ARTHROPLASTY,Right MTP 1 and 5 and left MTP 5  01/2001     COLON SURGERY Right 09/2017    For colon cancer     COLONOSCOPY  09/29/2008     LAPAROSCOPIC TUBAL LIGATION      No Comments Provided     Left MCP joint arthroplasty  02/05/1998     MTP 2, 3, 4 right foot  05/2000     RELEASE CARPAL TUNNEL Left 1991     Right long finger MCP repair  1996    [place]     Right MCP 4 and 5 arthroplasty  03/01/2004     Right wrist arthroplasty  1990       Current Outpatient Prescriptions   Medication Sig Dispense Refill     albuterol (2.5 MG/3ML) 0.083% neb solution Take 2.5 mg by nebulization every 6 hours as needed for shortness of breath / dyspnea or wheezing       aspirin 81 MG chewable tablet Take 81 mg by mouth daily with food       BD LUCILLE U/F 32G X 4 MM insulin pen needle TESING TWICE DAILY 200 each 3     budesonide (PULMICORT) 0.5 MG/2ML neb solution Inhale 0.5 mg into the lungs 2 times daily       DULoxetine (CYMBALTA) 30 MG EC capsule Take 30 mg by mouth every morning        EPINEPHrine (EPIPEN/ADRENACLICK/OR ANY BX GENERIC EQUIV) 0.3 MG/0.3ML injection 2-pack Inject 0.3 mLs (0.3 mg) into the muscle once as needed 0.9 mL 1      fenofibrate 48 MG tablet Take 48 mg by mouth daily with food       formoterol (PERFOROMIST) 20 MCG/2ML neb solution Inhale 2 mLs into the lungs 2 times daily       gabapentin (NEURONTIN) 300 MG capsule Take 300 mg by mouth At Bedtime        glucagon 1 MG kit Inject 1 mg into the muscle as needed for low blood sugar        glucose 4 g CHEW chewable tablet Take 1 tablet by mouth as needed for low blood sugar       insulin aspart prot & aspart (NOVOLOG MIX 70/30 PEN) injection 45 units am and and 26 in evening 15 mL 11     levothyroxine (SYNTHROID) 125 MCG tablet Take 1 tablet (125 mcg) by mouth daily 90 tablet 3     lisinopril (PRINIVIL/ZESTRIL) 2.5 MG tablet Take 1 tablet (2.5 mg) by mouth daily 30 tablet      medical cannabis liquid Place 1 drop under the tongue as needed (pain) (This is NOT a prescription, and does not certify that the patient has a qualifying medical condition for medical cannabis.  The purpose of this order is  to document that the patient reports taking medical cannabis.)       medical cannabis liquid 1-2 sprays under the tongue as needed for pain       midodrine (PROAMATINE) 2.5 MG tablet Take 1 tablet (2.5 mg) by mouth 2 times daily  11     montelukast (SINGULAIR) 10 MG tablet Take 10 mg by mouth At Bedtime       omeprazole (PRILOSEC) 20 MG CR capsule TAKE 1 CAPSULE BY MOUTH DAILY 90 capsule 3     ONETOUCH DELICA LANCETS 33G MISC TEST TWICE DAILY 200 each 1     ONETOUCH VERIO IQ test strip TESTING TWICE DAILY 200 strip 1     oxyCODONE IR (ROXICODONE) 5 MG tablet Take 1 tablet (5 mg) by mouth every 6 hours as needed for moderate to severe pain 40 tablet 0     predniSONE (DELTASONE) 1 MG tablet Take 2 mg by mouth daily TOTAL DAILY DOSE = 12 MG       predniSONE (DELTASONE) 5 MG tablet Take 10 mg by mouth daily with food TOTAL DAILY DOSE = 12 MG       PROAIR  (90 Base) MCG/ACT inhaler INHALE 2 PUFFS INTO THE LUNGS EVERY 6 HOURS AS NEEDED FOR WHEEZIGN FOR UP  DAYS 3 Inhaler 3      simvastatin (ZOCOR) 40 MG tablet TAKE 1 TABLET BY MOUTH EVERY EVENING 90 tablet 3     traMADol (ULTRAM) 50 MG tablet One daily 10 tablet 0     YUVAFEM 10 MCG TABS vaginal tablet PLACE 10 MCG VAGINALLY TWICE A WEEK. 24 tablet 3     [DISCONTINUED] insulin aspart prot & aspart (NOVOLOG MIX 70/30 PEN) injection 50 units am and and 30 at evening 15 mL 11       Allergies   Allergen Reactions     Glyburide Anaphylaxis     Other reaction(s): Dizziness     Mosquitoes (Informational Only) Hives     Other [Seasonal Allergies] Hives     Deer Flies     Sulfa Drugs Anaphylaxis and Hives     Sulfamethoxazole W/Trimethoprim Hives and Itching     Sulfamethoxazole-Trimethoprim Anaphylaxis     Other reaction(s): Other - Describe In Comment Field  Rash, nausea, dizziness     Codeine Nausea and Nausea and Vomiting       Family History   Problem Relation Age of Onset     Cancer Mother 50     uterus     HEART DISEASE Mother      MI     Colon Cancer Mother      HEART DISEASE Father      MI     Chronic Obstructive Pulmonary Disease Sister      Other - See Comments Sister      rubina dow     Colon Cancer Brother      Breast Cancer No family hx of      Cancer-breast       Social History     Social History     Marital status:      Spouse name: N/A     Number of children: N/A     Years of education: N/A     Occupational History     Not on file.     Social History Main Topics     Smoking status: Former Smoker     Packs/day: 1.00     Years: 30.00     Types: Cigarettes     Quit date: 8/31/2005     Smokeless tobacco: Never Used     Alcohol use 1.5 oz/week      Comment: Rare     Drug use: Yes      Comment: Medical marijuana     Sexual activity: Not on file     Other Topics Concern     Not on file     Social History Narrative      , [place].  Does not work outside the home.  Four children are grown.  Lives in town.       Review of Systems   Constitutional: Negative for chills, diaphoresis, fatigue and fever.   HENT: Negative for  congestion, ear pain, facial swelling, mouth sores, nosebleeds, rhinorrhea, sinus pain, sinus pressure, sore throat and trouble swallowing.    Eyes: Negative for pain, redness and visual disturbance.   Respiratory: Negative for cough, chest tightness, shortness of breath and wheezing.    Cardiovascular: Negative for chest pain, palpitations and leg swelling.   Gastrointestinal: Negative for abdominal distention, abdominal pain, blood in stool, constipation, diarrhea, nausea and vomiting.   Endocrine: Negative for cold intolerance and heat intolerance.   Genitourinary: Negative for difficulty urinating, dysuria, flank pain, frequency, hematuria, pelvic pain, vaginal bleeding, vaginal discharge and vaginal pain.   Musculoskeletal: Negative for back pain, gait problem, joint swelling, neck pain and neck stiffness.   Skin: Negative for rash.   Neurological: Negative for dizziness, tremors, seizures, syncope, weakness, numbness and headaches.   Hematological: Negative for adenopathy. Does not bruise/bleed easily.   Psychiatric/Behavioral: Negative for agitation, confusion, hallucinations and sleep disturbance.       Physical Exam   Constitutional: She is oriented to person, place, and time. She appears well-developed and well-nourished. No distress.   HENT:   Head: Normocephalic.   Right Ear: External ear normal.   Left Ear: External ear normal.   Mouth/Throat: Oropharynx is clear and moist. No oropharyngeal exudate.   Eyes: Conjunctivae are normal. Pupils are equal, round, and reactive to light.   Neck: Normal range of motion. Neck supple. Normal carotid pulses and no JVD present. Carotid bruit is not present. No tracheal deviation present. No thyromegaly present.   Cardiovascular: Normal rate, regular rhythm and normal heart sounds.  Exam reveals no gallop and no friction rub.    No murmur heard.  Pulmonary/Chest: Effort normal and breath sounds normal. No respiratory distress. She has no wheezes. She has no rales.    Abdominal: Soft. Bowel sounds are normal. She exhibits no distension and no mass. There is no tenderness. There is no rebound.   Musculoskeletal: She exhibits no edema.   Numerous deformities from RA   Lymphadenopathy:     She has no cervical adenopathy.   Neurological: She is alert and oriented to person, place, and time. She has normal reflexes. No cranial nerve deficit. Coordination normal.   Skin: Skin is warm and dry. No rash noted. She is not diaphoretic.   Psychiatric: She has a normal mood and affect. Her behavior is normal.   Nursing note and vitals reviewed.    Component      Latest Ref Rng & Units 7/9/2018 7/24/2018   WBC      4.0 - 11.0 10e9/L 13.4 (H)    RBC Count      3.8 - 5.2 10e12/L 3.57 (L)    Hemoglobin      11.7 - 15.7 g/dL 8.9 (L)    Hematocrit      35.0 - 47.0 % 29.2 (L)    MCV      78 - 100 fl 82    MCH      26.5 - 33.0 pg 24.9 (L)    MCHC      31.5 - 36.5 g/dL 30.5 (L)    RDW      10.0 - 15.0 % 17.5 (H)    Platelet Count      150 - 450 10e9/L 291    Diff Method       Automated Method    % Neutrophils      % 83.3    % Lymphocytes      % 8.2    % Monocytes      % 7.3    % Eosinophils      % 0.4    % Basophils      % 0.1    % Immature Granulocytes      % 0.7    Absolute Neutrophil      1.6 - 8.3 10e9/L 11.1 (H)    Absolute Lymphocytes      0.8 - 5.3 10e9/L 1.1    Absolute Monocytes      0.0 - 1.3 10e9/L 1.0    Absolute Eosinophils      0.0 - 0.7 10e9/L 0.1    Absolute Basophils      0.0 - 0.2 10e9/L 0.0    Abs Immature Granulocytes      0 - 0.4 10e9/L 0.1    Sodium      134 - 144 mmol/L  131 (L)   Potassium      3.5 - 5.1 mmol/L  5.1   Chloride      98 - 107 mmol/L  97 (L)   Carbon Dioxide      21 - 31 mmol/L  21   Anion Gap      3 - 14 mmol/L  13   Glucose      70 - 105 mg/dL  252 (H)   Urea Nitrogen      7 - 25 mg/dL  22   Creatinine      0.60 - 1.20 mg/dL  1.30 (H)   GFR Estimate      >60 mL/min/1.7m2  41 (L)   GFR Estimate If Black      >60 mL/min/1.7m2  49 (L)   Calcium      8.6 - 10.3  mg/dL  9.5       Assessment:      ICD-10-CM    1. Hyperlipidemia, unspecified hyperlipidemia type E78.5    2. Diabetes mellitus type 2, insulin dependent (H) E11.9 insulin aspart prot & aspart (NOVOLOG MIX 70/30 PEN) injection    Z79.4    3. Low back pain without sciatica, unspecified back pain laterality, unspecified chronicity M54.5 traMADol (ULTRAM) 50 MG tablet   4. Hypothyroidism, unspecified type E03.9    5. Chronic kidney disease (CKD), stage III (moderate) N18.3    6. Essential hypertension I10    7. On prednisone therapy Z79.52    8. Orthostatic hypotension I95.1    9. Seropositive rheumatoid arthritis (H) M05.9    10. Chronic obstructive pulmonary disease, unspecified COPD type (H) J44.9    11. Age-related cataract of both eyes, unspecified age-related cataract type H25.9    12. Preoperative examination Z01.818    13. Malignant neoplasm of colon, unspecified part of colon (H) C18.9         Plan: This patient is scheduled for cataract surgery as well as colonoscopy.  Her exam today is unremarkable and acceptable.  Recent lab is acceptable and not repeated today.  She has had an EKG recently and was unremarkable and not repeated.  She is okay for her planned surgical procedures without contraindication.  I asked her to take a half dose of her insulin the evening prior to each procedure, none the morning of the procedure.  I asked her to stop her aspirin 1 week prior to each procedure.  The morning of the procedure, she will take her Midrin and prednisone and use her nebulizers.  All other medications will be held.  Her usual regimen of medications can be resumed postoperatively.

## 2018-10-11 ENCOUNTER — TRANSFERRED RECORDS (OUTPATIENT)
Dept: HEALTH INFORMATION MANAGEMENT | Facility: OTHER | Age: 68
End: 2018-10-11

## 2018-10-16 ENCOUNTER — TRANSFERRED RECORDS (OUTPATIENT)
Dept: HEALTH INFORMATION MANAGEMENT | Facility: OTHER | Age: 68
End: 2018-10-16

## 2018-10-16 DIAGNOSIS — N95.2 ATROPHIC VAGINITIS: ICD-10-CM

## 2018-10-16 RX ORDER — ESTRADIOL 10 UG/1
INSERT VAGINAL
Qty: 24 TABLET | Refills: 3 | Status: SHIPPED | OUTPATIENT
Start: 2018-10-16 | End: 2019-07-08

## 2018-10-19 RX ORDER — ESTRADIOL 10 UG/1
INSERT VAGINAL
Qty: 25 TABLET | Refills: 3 | OUTPATIENT
Start: 2018-10-19

## 2018-10-19 NOTE — TELEPHONE ENCOUNTER
Estradiol refilled on 10/16/18 #24 x 3 refills to [facility].    Sharda Garcia RN on 10/19/2018 at 8:34 AM

## 2018-10-29 ENCOUNTER — TRANSFERRED RECORDS (OUTPATIENT)
Dept: HEALTH INFORMATION MANAGEMENT | Facility: OTHER | Age: 68
End: 2018-10-29

## 2018-10-31 ENCOUNTER — TRANSFERRED RECORDS (OUTPATIENT)
Dept: HEALTH INFORMATION MANAGEMENT | Facility: OTHER | Age: 68
End: 2018-10-31

## 2018-11-01 ENCOUNTER — TRANSFERRED RECORDS (OUTPATIENT)
Dept: HEALTH INFORMATION MANAGEMENT | Facility: OTHER | Age: 68
End: 2018-11-01

## 2018-11-05 ENCOUNTER — TELEPHONE (OUTPATIENT)
Dept: INTERNAL MEDICINE | Facility: OTHER | Age: 68
End: 2018-11-05

## 2018-11-05 NOTE — TELEPHONE ENCOUNTER
MAF - Patient discharging from [facility] today and requesting an hospital follow up appointment for pneumonia.

## 2018-11-05 NOTE — TELEPHONE ENCOUNTER
The patient was contacted and given the appointment time of 2pm on 11-7-2018.  Haley Lugo LPN on 11/5/2018 at 9:38 AM

## 2018-11-07 ENCOUNTER — OFFICE VISIT (OUTPATIENT)
Dept: INTERNAL MEDICINE | Facility: OTHER | Age: 68
End: 2018-11-07
Attending: INTERNAL MEDICINE
Payer: MEDICARE

## 2018-11-07 VITALS
HEART RATE: 96 BPM | BODY MASS INDEX: 24.41 KG/M2 | TEMPERATURE: 96 F | DIASTOLIC BLOOD PRESSURE: 78 MMHG | SYSTOLIC BLOOD PRESSURE: 142 MMHG | WEIGHT: 140 LBS

## 2018-11-07 DIAGNOSIS — E85.89 OTHER AMYLOIDOSIS (H): ICD-10-CM

## 2018-11-07 DIAGNOSIS — J18.9 RECURRENT PNEUMONIA: Primary | ICD-10-CM

## 2018-11-07 PROCEDURE — G0463 HOSPITAL OUTPT CLINIC VISIT: HCPCS

## 2018-11-07 PROCEDURE — 99213 OFFICE O/P EST LOW 20 MIN: CPT | Performed by: INTERNAL MEDICINE

## 2018-11-07 ASSESSMENT — ENCOUNTER SYMPTOMS
HEMATOLOGIC/LYMPHATIC NEGATIVE: 1
CONSTITUTIONAL NEGATIVE: 1
ALLERGIC/IMMUNOLOGIC NEGATIVE: 1
ENDOCRINE NEGATIVE: 1

## 2018-11-07 ASSESSMENT — PATIENT HEALTH QUESTIONNAIRE - PHQ9: SUM OF ALL RESPONSES TO PHQ QUESTIONS 1-9: 0

## 2018-11-07 NOTE — PROGRESS NOTES
Chief Complaint:  Hospital f/u.    HPI: She is here today for a follow-up.  She was hospitalized in [place] with pneumonia.  A week prior to that she had a kidney biopsy and was found to have presumed amyloidosis.  She is going to be going to the [facility] ER tomorrow morning for this.  She is back down to her usual doses of medications and still has just a couple of days of antibiotic left for the pneumonia.  She feels as though she is at baseline.    Past Medical History:   Diagnosis Date     Chronic kidney disease (CKD), stage III (moderate) (H)      Chronic obstructive pulmonary disease (H)     No Comments Provided     Colon cancer (H)      Diverticulosis of intestine without perforation or abscess without bleeding     Diffuse diverticulosis and history of diminutive hyperplastic colon     Essential (primary) hypertension     No Comments Provided     Hyperlipidemia     No Comments Provided     Hypothyroidism     No Comments Provided     Malignant neoplasm of colon (H)     9/2017     Osteoporosis      Rheumatoid arthritis (H)     Rheumatologist Dr. Gonzalez, [phone], fax [phone]     Rosacea     No Comments Provided     Type 2 diabetes mellitus without complications (H)     Diabetes Mellitus, prednisone induced       Past Surgical History:   Procedure Laterality Date     ARTHROPLASTY,Right MTP 1 and 5 and left MTP 5  01/2001     CATARACT IOL, RT/LT Bilateral      COLON SURGERY Right 09/2017    For colon cancer     COLONOSCOPY  09/29/2008     COLONOSCOPY  2018    F/U in 5 years recommended secondary to polyps     LAPAROSCOPIC TUBAL LIGATION      No Comments Provided     Left MCP joint arthroplasty  02/05/1998     MTP 2, 3, 4 right foot  05/2000     RELEASE CARPAL TUNNEL Left 1991     Right long finger MCP repair  1996    [place]     Right MCP 4 and 5 arthroplasty  03/01/2004     Right wrist arthroplasty  1990       Allergies   Allergen Reactions     Glyburide Anaphylaxis     Other reaction(s):  Dizziness     Mosquitoes (Informational Only) Hives     Other [Seasonal Allergies] Hives     Deer Flies     Sulfa Drugs Anaphylaxis and Hives     Sulfamethoxazole W/Trimethoprim Hives and Itching     Sulfamethoxazole-Trimethoprim Anaphylaxis     Other reaction(s): Other - Describe In Comment Field  Rash, nausea, dizziness     Codeine Nausea and Nausea and Vomiting       Current Outpatient Prescriptions   Medication Sig Dispense Refill     albuterol (2.5 MG/3ML) 0.083% neb solution Take 2.5 mg by nebulization every 6 hours as needed for shortness of breath / dyspnea or wheezing       aspirin 81 MG chewable tablet Take 81 mg by mouth daily with food       BD LUCILLE U/F 32G X 4 MM insulin pen needle TESING TWICE DAILY 200 each 3     budesonide (PULMICORT) 0.5 MG/2ML neb solution Inhale 0.5 mg into the lungs 2 times daily       DULoxetine (CYMBALTA) 30 MG EC capsule Take 30 mg by mouth every morning        EPINEPHrine (EPIPEN/ADRENACLICK/OR ANY BX GENERIC EQUIV) 0.3 MG/0.3ML injection 2-pack Inject 0.3 mLs (0.3 mg) into the muscle once as needed 0.9 mL 1     estradiol (YUVAFEM) 10 MCG TABS vaginal tablet PLACE 10 MCG VAGINALLY TWICE A WEEK. 24 tablet 3     fenofibrate 48 MG tablet Take 48 mg by mouth daily with food       formoterol (PERFOROMIST) 20 MCG/2ML neb solution Inhale 2 mLs into the lungs 2 times daily       gabapentin (NEURONTIN) 300 MG capsule Take 300 mg by mouth At Bedtime        glucagon 1 MG kit Inject 1 mg into the muscle as needed for low blood sugar        glucose 4 g CHEW chewable tablet Take 1 tablet by mouth as needed for low blood sugar       insulin aspart prot & aspart (NOVOLOG MIX 70/30 PEN) injection 45 units am and and 26 in evening 15 mL 11     levothyroxine (SYNTHROID) 125 MCG tablet Take 1 tablet (125 mcg) by mouth daily 90 tablet 3     lisinopril (PRINIVIL/ZESTRIL) 2.5 MG tablet Take 1 tablet (2.5 mg) by mouth daily 30 tablet      medical cannabis liquid Place 1 drop under the tongue as  needed (pain) (This is NOT a prescription, and does not certify that the patient has a qualifying medical condition for medical cannabis.  The purpose of this order is  to document that the patient reports taking medical cannabis.)       medical cannabis liquid 1-2 sprays under the tongue as needed for pain       midodrine (PROAMATINE) 2.5 MG tablet Take 1 tablet (2.5 mg) by mouth 2 times daily  11     montelukast (SINGULAIR) 10 MG tablet Take 10 mg by mouth At Bedtime       omeprazole (PRILOSEC) 20 MG CR capsule TAKE 1 CAPSULE BY MOUTH DAILY 90 capsule 3     ONETOUCH DELICA LANCETS 33G MISC TEST TWICE DAILY 200 each 1     ONETOUCH VERIO IQ test strip TESTING TWICE DAILY 200 strip 1     oxyCODONE IR (ROXICODONE) 5 MG tablet Take 1 tablet (5 mg) by mouth every 6 hours as needed for moderate to severe pain 40 tablet 0     predniSONE (DELTASONE) 1 MG tablet Take 2 mg by mouth daily TOTAL DAILY DOSE = 12 MG       predniSONE (DELTASONE) 5 MG tablet Take 10 mg by mouth daily with food TOTAL DAILY DOSE = 12 MG       PROAIR  (90 Base) MCG/ACT inhaler INHALE 2 PUFFS INTO THE LUNGS EVERY 6 HOURS AS NEEDED FOR WHEEZIGN FOR UP  DAYS 3 Inhaler 3     simvastatin (ZOCOR) 40 MG tablet TAKE 1 TABLET BY MOUTH EVERY EVENING 90 tablet 3     traMADol (ULTRAM) 50 MG tablet One daily 10 tablet 0       Social History     Social History     Marital status:      Spouse name: N/A     Number of children: N/A     Years of education: N/A     Occupational History     Not on file.     Social History Main Topics     Smoking status: Former Smoker     Packs/day: 1.00     Years: 30.00     Types: Cigarettes     Quit date: 8/31/2005     Smokeless tobacco: Never Used     Alcohol use 1.5 oz/week      Comment: Rare     Drug use: Yes      Comment: Medical marijuana     Sexual activity: Not on file     Other Topics Concern     Not on file     Social History Narrative      , [place].  Does not work outside the home.  Four children are  grown.  Lives in town.       Review of Systems   Constitutional: Negative.    Endocrine: Negative.    Skin: Negative.    Allergic/Immunologic: Negative.    Hematological: Negative.        Physical Exam   Constitutional: She appears well-developed and well-nourished. No distress.   Cardiovascular: Normal rate and regular rhythm.  Exam reveals no S4.    Pulmonary/Chest: No respiratory distress. She has decreased breath sounds. She has no wheezes. She has no rhonchi. She has no rales.   Abdominal: There is no hepatosplenomegaly.   Skin: She is not diaphoretic.   Nursing note and vitals reviewed.      Assessment:      ICD-10-CM    1. Recurrent pneumonia J18.9    2. Other amyloidosis (H) E85.89        Plan: Counseling provided.  She will follow through with her [facility] evaluation starting tomorrow morning.  I did not set a follow-up appointment with me, this will be on an as-needed basis.

## 2018-11-07 NOTE — NURSING NOTE
"The patient is here today to be seen for a hospital follow up.  Haley Lugo LPN on 11/7/2018 at 1:59 PM  Chief Complaint   Patient presents with     RECHECK       Initial /78 (BP Location: Right arm, Patient Position: Sitting, Cuff Size: Adult Small)  Pulse 96  Temp 96  F (35.6  C)  Wt 140 lb (63.5 kg)  Breastfeeding? No  BMI 24.41 kg/m2 Estimated body mass index is 24.41 kg/(m^2) as calculated from the following:    Height as of 9/10/18: 5' 3.5\" (1.613 m).    Weight as of this encounter: 140 lb (63.5 kg).  Medication Reconciliation: complete    Haley Lugo LPN    "

## 2018-11-07 NOTE — MR AVS SNAPSHOT
After Visit Summary   11/7/2018    Laura Perez    MRN: [id number]           Patient Information     Date Of Birth          1950        Visit Information        Provider Department      11/7/2018 2:00 PM Raul Ortega MD [facility]        Today's Diagnoses     Recurrent pneumonia    -  1    Other amyloidosis (H)           Follow-ups after your visit        Who to contact     If you have questions or need follow up information about today's clinic visit or your schedule please contact [facility] directly at [phone].  Normal or non-critical lab and imaging results will be communicated to you by MeeDochart, letter or phone within 4 business days after the clinic has received the results. If you do not hear from us within 7 days, please contact the clinic through Apps [employer] Zerts or phone. If you have a critical or abnormal lab result, we will notify you by phone as soon as possible.  Submit refill requests through Apps [employer] Zerts or call your pharmacy and they will forward the refill request to us. Please allow 3 business days for your refill to be completed.          Additional Information About Your Visit        MyChart Information     [employer]s [employer] gives you secure access to your electronic health record. If you see a primary care provider, you can also send messages to your care team and make appointments. If you have questions, please call your primary care clinic.  If you do not have a primary care provider, please call [phone] and they will assist you.        Care EveryWhere ID     This is your Care EveryWhere ID. This could be used by other organizations to access your [facility] medical records  [id number]        Your Vitals Were     Pulse Temperature Breastfeeding? BMI (Body Mass Index)          96 96  F (35.6  C) No 24.41 kg/m2         Blood Pressure from Last 3 Encounters:   11/07/18 142/78   09/10/18 116/54   07/24/18 118/62    Weight from Last 3  Encounters:   11/07/18 140 lb (63.5 kg)   09/10/18 136 lb 8 oz (61.9 kg)   07/24/18 140 lb 6.4 oz (63.7 kg)              Today, you had the following     No orders found for display       Primary Care Provider Office Phone # Fax #    Raul Ortega [phone]       [address] [facility] [address]        Equal Access to Services     [facility]: Hadii aad ku hadasho Soomaali, waaxda luqadaha, qaybta kaalmada adeegyada, waxay idiin hayaan adefreda lovett . So [facility] [phone].    ATENCIÓN: Si gabriele rodriguez, tiene a dawkins disposición servicios gratuitos de asistencia lingüística. Llame al [phone].    We comply with applicable federal civil rights laws and [place] laws. We do not discriminate on the basis of race, color, national origin, age, disability, sex, sexual orientation, or gender identity.            Thank you!     Thank you for choosing [facility] AND [facility]  for your care. Our goal is always to provide you with excellent care. Hearing back from our patients is one way we can continue to improve our services. Please take a few minutes to complete the written survey that you may receive in the mail after your visit with us. Thank you!             Your Updated Medication List - Protect others around you: Learn how to safely use, store and throw away your medicines at www.disposemymeds.org.          This list is accurate as of 11/7/18  2:17 PM.  Always use your most recent med list.                   Brand Name Dispense Instructions for use Diagnosis    * albuterol (2.5 MG/3ML) 0.083% neb solution      Take 2.5 mg by nebulization every 6 hours as needed for shortness of breath / dyspnea or wheezing        * PROAIR  (90 Base) MCG/ACT inhaler   Generic drug:  albuterol     3 Inhaler    INHALE 2 PUFFS INTO THE LUNGS EVERY 6 HOURS AS NEEDED FOR WHEEZIGN FOR UP  DAYS    Chronic obstructive pulmonary disease, unspecified COPD type (H)       aspirin 81  MG chewable tablet      Take 81 mg by mouth daily with food        BD LUCILLE U/F 32G X 4 MM   Generic drug:  insulin pen needle     200 each    TESING TWICE DAILY    Diabetes mellitus type 2, insulin dependent (H)       budesonide 0.5 MG/2ML neb solution    PULMICORT     Inhale 0.5 mg into the lungs 2 times daily        DULoxetine 30 MG EC capsule    CYMBALTA     Take 30 mg by mouth every morning        EPINEPHrine 0.3 MG/0.3ML injection 2-pack    EPIPEN/ADRENACLICK/or ANY BX GENERIC EQUIV    0.9 mL    Inject 0.3 mLs (0.3 mg) into the muscle once as needed    Other emphysema (H)       estradiol 10 MCG Tabs vaginal tablet    YUVAFEM    24 tablet    PLACE 10 MCG VAGINALLY TWICE A WEEK.    Atrophic vaginitis       fenofibrate 48 MG tablet      Take 48 mg by mouth daily with food        formoterol 20 MCG/2ML neb solution    PERFOROMIST     Inhale 2 mLs into the lungs 2 times daily        gabapentin 300 MG capsule    NEURONTIN     Take 300 mg by mouth At Bedtime        glucagon 1 MG kit      Inject 1 mg into the muscle as needed for low blood sugar        glucose 4 g Chew chewable tablet      Take 1 tablet by mouth as needed for low blood sugar        insulin aspart prot & aspart injection    NovoLOG MIX 70/30 PEN    15 mL    45 units am and and 26 in evening    Diabetes mellitus type 2, insulin dependent (H)       levothyroxine 125 MCG tablet    SYNTHROID    90 tablet    Take 1 tablet (125 mcg) by mouth daily    Hypothyroidism, unspecified type       lisinopril 2.5 MG tablet    PRINIVIL/Zestril    30 tablet    Take 1 tablet (2.5 mg) by mouth daily        * medical cannabis liquid      1-2 sprays under the tongue as needed for pain        * medical cannabis liquid      Place 1 drop under the tongue as needed (pain) (This is NOT a prescription, and does not certify that the patient has a qualifying medical condition for medical cannabis.  The purpose of this order is  to document that the patient reports taking medical  cannabis.)        midodrine 2.5 MG tablet    PROAMATINE     Take 1 tablet (2.5 mg) by mouth 2 times daily        montelukast 10 MG tablet    SINGULAIR     Take 10 mg by mouth At Bedtime        omeprazole 20 MG CR capsule    priLOSEC    90 capsule    TAKE 1 CAPSULE BY MOUTH DAILY    Gastroesophageal reflux disease, esophagitis presence not specified       ONETOUCH DELICA LANCETS 33G Misc     200 each    TEST TWICE DAILY    Diabetes mellitus type 2, insulin dependent (H)       ONETOUCH VERIO IQ test strip   Generic drug:  blood glucose monitoring     200 strip    TESTING TWICE DAILY    Diabetes mellitus type 2, insulin dependent (H)       oxyCODONE IR 5 MG tablet    ROXICODONE    40 tablet    Take 1 tablet (5 mg) by mouth every 6 hours as needed for moderate to severe pain    Low back pain without sciatica, unspecified back pain laterality, unspecified chronicity       * predniSONE 5 MG tablet    DELTASONE     Take 10 mg by mouth daily with food TOTAL DAILY DOSE = 12 MG        * predniSONE 1 MG tablet    DELTASONE     Take 2 mg by mouth daily TOTAL DAILY DOSE = 12 MG        simvastatin 40 MG tablet    ZOCOR    90 tablet    TAKE 1 TABLET BY MOUTH EVERY EVENING    Hyperlipidemia, unspecified hyperlipidemia type       traMADol 50 MG tablet    ULTRAM    10 tablet    One daily    Low back pain without sciatica, unspecified back pain laterality, unspecified chronicity       * Notice:  This list has 6 medication(s) that are the same as other medications prescribed for you. Read the directions carefully, and ask your doctor or other care provider to review them with you.

## 2018-12-12 ENCOUNTER — TRANSFERRED RECORDS (OUTPATIENT)
Dept: HEALTH INFORMATION MANAGEMENT | Facility: OTHER | Age: 68
End: 2018-12-12

## 2019-01-22 ENCOUNTER — TRANSFERRED RECORDS (OUTPATIENT)
Dept: HEALTH INFORMATION MANAGEMENT | Facility: OTHER | Age: 69
End: 2019-01-22

## 2019-02-07 ENCOUNTER — TELEPHONE (OUTPATIENT)
Dept: INTERNAL MEDICINE | Facility: OTHER | Age: 69
End: 2019-02-07

## 2019-02-07 NOTE — TELEPHONE ENCOUNTER
MAF - Patient requesting lab orders. Patient stated that provider had said to come back in 3 months.

## 2019-02-08 DIAGNOSIS — E11.9 DIABETES MELLITUS TYPE 2, INSULIN DEPENDENT (H): Primary | ICD-10-CM

## 2019-02-08 DIAGNOSIS — Z79.4 DIABETES MELLITUS TYPE 2, INSULIN DEPENDENT (H): Primary | ICD-10-CM

## 2019-02-14 NOTE — TELEPHONE ENCOUNTER
Called patient with below information, after patient giving last name and date of birth.  Jerrell Perez LPN ..............2/14/2019 2:37 PM

## 2019-02-15 DIAGNOSIS — E11.9 DIABETES MELLITUS TYPE 2, INSULIN DEPENDENT (H): ICD-10-CM

## 2019-02-15 DIAGNOSIS — Z79.4 DIABETES MELLITUS TYPE 2, INSULIN DEPENDENT (H): ICD-10-CM

## 2019-02-15 LAB
ANION GAP SERPL CALCULATED.3IONS-SCNC: 9 MMOL/L (ref 3–14)
BUN SERPL-MCNC: 28 MG/DL (ref 7–25)
CALCIUM SERPL-MCNC: 9.8 MG/DL (ref 8.6–10.3)
CHLORIDE SERPL-SCNC: 100 MMOL/L (ref 98–107)
CHOLEST SERPL-MCNC: 222 MG/DL
CO2 SERPL-SCNC: 26 MMOL/L (ref 21–31)
CREAT SERPL-MCNC: 1.25 MG/DL (ref 0.6–1.2)
GFR SERPL CREATININE-BSD FRML MDRD: 42 ML/MIN/{1.73_M2}
GLUCOSE SERPL-MCNC: 60 MG/DL (ref 70–105)
HBA1C MFR BLD: 6.7 % (ref 4–6)
HDLC SERPL-MCNC: 61 MG/DL (ref 23–92)
LDLC SERPL CALC-MCNC: 89 MG/DL
NONHDLC SERPL-MCNC: 161 MG/DL
POTASSIUM SERPL-SCNC: 4.4 MMOL/L (ref 3.5–5.1)
SODIUM SERPL-SCNC: 135 MMOL/L (ref 134–144)
TRIGL SERPL-MCNC: 362 MG/DL

## 2019-02-15 PROCEDURE — 36415 COLL VENOUS BLD VENIPUNCTURE: CPT | Performed by: INTERNAL MEDICINE

## 2019-02-15 PROCEDURE — 80061 LIPID PANEL: CPT | Performed by: INTERNAL MEDICINE

## 2019-02-15 PROCEDURE — 83036 HEMOGLOBIN GLYCOSYLATED A1C: CPT | Performed by: INTERNAL MEDICINE

## 2019-02-15 PROCEDURE — 80048 BASIC METABOLIC PNL TOTAL CA: CPT | Performed by: INTERNAL MEDICINE

## 2019-03-19 ENCOUNTER — MEDICAL CORRESPONDENCE (OUTPATIENT)
Dept: HEALTH INFORMATION MANAGEMENT | Facility: OTHER | Age: 69
End: 2019-03-19

## 2019-03-19 DIAGNOSIS — M06.9 CHRONIC RHEUMATIC ARTHRITIS (H): Primary | ICD-10-CM

## 2019-03-19 LAB
ALBUMIN SERPL-MCNC: 3.7 G/DL (ref 3.5–5.7)
ALT SERPL W P-5'-P-CCNC: 28 U/L (ref 7–52)
AST SERPL W P-5'-P-CCNC: 27 U/L (ref 13–39)
BASOPHILS # BLD AUTO: 0 10E9/L (ref 0–0.2)
BASOPHILS NFR BLD AUTO: 0.2 %
CREAT SERPL-MCNC: 1.26 MG/DL (ref 0.6–1.2)
CRP SERPL-MCNC: 3.1 MG/L
DIFFERENTIAL METHOD BLD: ABNORMAL
EOSINOPHIL # BLD AUTO: 0.1 10E9/L (ref 0–0.7)
EOSINOPHIL NFR BLD AUTO: 0.5 %
ERYTHROCYTE [DISTWIDTH] IN BLOOD BY AUTOMATED COUNT: 15.8 % (ref 10–15)
ERYTHROCYTE [SEDIMENTATION RATE] IN BLOOD BY WESTERGREN METHOD: 72 MM/H (ref 1–15)
GFR SERPL CREATININE-BSD FRML MDRD: 42 ML/MIN/{1.73_M2}
HCT VFR BLD AUTO: 39.5 % (ref 35–47)
HGB BLD-MCNC: 12.6 G/DL (ref 11.7–15.7)
IMM GRANULOCYTES # BLD: 0.1 10E9/L (ref 0–0.4)
IMM GRANULOCYTES NFR BLD: 0.6 %
LYMPHOCYTES # BLD AUTO: 3.1 10E9/L (ref 0.8–5.3)
LYMPHOCYTES NFR BLD AUTO: 18 %
MCH RBC QN AUTO: 28.9 PG (ref 26.5–33)
MCHC RBC AUTO-ENTMCNC: 31.9 G/DL (ref 31.5–36.5)
MCV RBC AUTO: 91 FL (ref 78–100)
MONOCYTES # BLD AUTO: 1.4 10E9/L (ref 0–1.3)
MONOCYTES NFR BLD AUTO: 8 %
NEUTROPHILS # BLD AUTO: 12.5 10E9/L (ref 1.6–8.3)
NEUTROPHILS NFR BLD AUTO: 72.7 %
PLATELET # BLD AUTO: 383 10E9/L (ref 150–450)
RBC # BLD AUTO: 4.36 10E12/L (ref 3.8–5.2)
WBC # BLD AUTO: 17.2 10E9/L (ref 4–11)

## 2019-03-19 PROCEDURE — 82040 ASSAY OF SERUM ALBUMIN: CPT

## 2019-03-19 PROCEDURE — 84450 TRANSFERASE (AST) (SGOT): CPT

## 2019-03-19 PROCEDURE — 84460 ALANINE AMINO (ALT) (SGPT): CPT

## 2019-03-19 PROCEDURE — 86140 C-REACTIVE PROTEIN: CPT

## 2019-03-19 PROCEDURE — 82565 ASSAY OF CREATININE: CPT

## 2019-03-19 PROCEDURE — 85652 RBC SED RATE AUTOMATED: CPT

## 2019-03-19 PROCEDURE — 85025 COMPLETE CBC W/AUTO DIFF WBC: CPT

## 2019-03-19 PROCEDURE — 36415 COLL VENOUS BLD VENIPUNCTURE: CPT

## 2019-04-02 DIAGNOSIS — M06.9 CHRONIC RHEUMATIC ARTHRITIS (H): ICD-10-CM

## 2019-04-02 LAB
ALBUMIN SERPL-MCNC: 3.5 G/DL (ref 3.5–5.7)
ALT SERPL W P-5'-P-CCNC: 25 U/L (ref 7–52)
AST SERPL W P-5'-P-CCNC: 24 U/L (ref 13–39)
BASOPHILS # BLD AUTO: 0 10E9/L (ref 0–0.2)
BASOPHILS NFR BLD AUTO: 0.3 %
CREAT SERPL-MCNC: 1.1 MG/DL (ref 0.6–1.2)
CRP SERPL-MCNC: 2.7 MG/L
DIFFERENTIAL METHOD BLD: ABNORMAL
EOSINOPHIL # BLD AUTO: 0.1 10E9/L (ref 0–0.7)
EOSINOPHIL NFR BLD AUTO: 0.6 %
ERYTHROCYTE [DISTWIDTH] IN BLOOD BY AUTOMATED COUNT: 15.9 % (ref 10–15)
ERYTHROCYTE [SEDIMENTATION RATE] IN BLOOD BY WESTERGREN METHOD: 60 MM/H (ref 1–15)
GFR SERPL CREATININE-BSD FRML MDRD: 49 ML/MIN/{1.73_M2}
HCT VFR BLD AUTO: 37.1 % (ref 35–47)
HGB BLD-MCNC: 12 G/DL (ref 11.7–15.7)
IMM GRANULOCYTES # BLD: 0.1 10E9/L (ref 0–0.4)
IMM GRANULOCYTES NFR BLD: 0.9 %
LYMPHOCYTES # BLD AUTO: 2.8 10E9/L (ref 0.8–5.3)
LYMPHOCYTES NFR BLD AUTO: 22.3 %
MCH RBC QN AUTO: 29.1 PG (ref 26.5–33)
MCHC RBC AUTO-ENTMCNC: 32.3 G/DL (ref 31.5–36.5)
MCV RBC AUTO: 90 FL (ref 78–100)
MONOCYTES # BLD AUTO: 1.2 10E9/L (ref 0–1.3)
MONOCYTES NFR BLD AUTO: 9.2 %
NEUTROPHILS # BLD AUTO: 8.5 10E9/L (ref 1.6–8.3)
NEUTROPHILS NFR BLD AUTO: 66.7 %
PLATELET # BLD AUTO: 320 10E9/L (ref 150–450)
RBC # BLD AUTO: 4.12 10E12/L (ref 3.8–5.2)
WBC # BLD AUTO: 12.7 10E9/L (ref 4–11)

## 2019-04-02 PROCEDURE — 84450 TRANSFERASE (AST) (SGOT): CPT

## 2019-04-02 PROCEDURE — 82040 ASSAY OF SERUM ALBUMIN: CPT

## 2019-04-02 PROCEDURE — 85652 RBC SED RATE AUTOMATED: CPT

## 2019-04-02 PROCEDURE — 82565 ASSAY OF CREATININE: CPT

## 2019-04-02 PROCEDURE — 86140 C-REACTIVE PROTEIN: CPT

## 2019-04-02 PROCEDURE — 84460 ALANINE AMINO (ALT) (SGPT): CPT

## 2019-04-02 PROCEDURE — 36415 COLL VENOUS BLD VENIPUNCTURE: CPT

## 2019-04-02 PROCEDURE — 85025 COMPLETE CBC W/AUTO DIFF WBC: CPT

## 2019-05-02 DIAGNOSIS — M06.9 CHRONIC RHEUMATIC ARTHRITIS (H): ICD-10-CM

## 2019-05-02 LAB
ALBUMIN SERPL-MCNC: 3.7 G/DL (ref 3.5–5.7)
ALT SERPL W P-5'-P-CCNC: 22 U/L (ref 7–52)
AST SERPL W P-5'-P-CCNC: 16 U/L (ref 13–39)
BASOPHILS # BLD AUTO: 0.1 10E9/L (ref 0–0.2)
BASOPHILS NFR BLD AUTO: 0.3 %
CREAT SERPL-MCNC: 1.55 MG/DL (ref 0.6–1.2)
CRP SERPL-MCNC: 8.8 MG/L
DIFFERENTIAL METHOD BLD: ABNORMAL
EOSINOPHIL # BLD AUTO: 0.1 10E9/L (ref 0–0.7)
EOSINOPHIL NFR BLD AUTO: 0.7 %
ERYTHROCYTE [DISTWIDTH] IN BLOOD BY AUTOMATED COUNT: 15.9 % (ref 10–15)
ERYTHROCYTE [SEDIMENTATION RATE] IN BLOOD BY WESTERGREN METHOD: 87 MM/H (ref 1–15)
GFR SERPL CREATININE-BSD FRML MDRD: 33 ML/MIN/{1.73_M2}
HCT VFR BLD AUTO: 38.8 % (ref 35–47)
HGB BLD-MCNC: 12.5 G/DL (ref 11.7–15.7)
IMM GRANULOCYTES # BLD: 0.1 10E9/L (ref 0–0.4)
IMM GRANULOCYTES NFR BLD: 0.6 %
LYMPHOCYTES # BLD AUTO: 3 10E9/L (ref 0.8–5.3)
LYMPHOCYTES NFR BLD AUTO: 18.5 %
MCH RBC QN AUTO: 29.3 PG (ref 26.5–33)
MCHC RBC AUTO-ENTMCNC: 32.2 G/DL (ref 31.5–36.5)
MCV RBC AUTO: 91 FL (ref 78–100)
MONOCYTES # BLD AUTO: 1.6 10E9/L (ref 0–1.3)
MONOCYTES NFR BLD AUTO: 9.9 %
NEUTROPHILS # BLD AUTO: 11.3 10E9/L (ref 1.6–8.3)
NEUTROPHILS NFR BLD AUTO: 70 %
PLATELET # BLD AUTO: 303 10E9/L (ref 150–450)
RBC # BLD AUTO: 4.27 10E12/L (ref 3.8–5.2)
WBC # BLD AUTO: 16.2 10E9/L (ref 4–11)

## 2019-05-02 PROCEDURE — 85652 RBC SED RATE AUTOMATED: CPT

## 2019-05-02 PROCEDURE — 86140 C-REACTIVE PROTEIN: CPT

## 2019-05-02 PROCEDURE — 36415 COLL VENOUS BLD VENIPUNCTURE: CPT

## 2019-05-02 PROCEDURE — 84450 TRANSFERASE (AST) (SGOT): CPT

## 2019-05-02 PROCEDURE — 84460 ALANINE AMINO (ALT) (SGPT): CPT

## 2019-05-02 PROCEDURE — 82565 ASSAY OF CREATININE: CPT

## 2019-05-02 PROCEDURE — 82040 ASSAY OF SERUM ALBUMIN: CPT

## 2019-05-02 PROCEDURE — 85025 COMPLETE CBC W/AUTO DIFF WBC: CPT

## 2019-05-31 DIAGNOSIS — M06.9 CHRONIC RHEUMATIC ARTHRITIS (H): ICD-10-CM

## 2019-05-31 LAB
ALBUMIN SERPL-MCNC: 3.9 G/DL (ref 3.5–5.7)
ALT SERPL W P-5'-P-CCNC: 24 U/L (ref 7–52)
AST SERPL W P-5'-P-CCNC: 19 U/L (ref 13–39)
BASOPHILS # BLD AUTO: 0 10E9/L (ref 0–0.2)
BASOPHILS NFR BLD AUTO: 0.1 %
CREAT SERPL-MCNC: 1.41 MG/DL (ref 0.6–1.2)
CRP SERPL-MCNC: 1.5 MG/L
DIFFERENTIAL METHOD BLD: ABNORMAL
EOSINOPHIL # BLD AUTO: 0 10E9/L (ref 0–0.7)
EOSINOPHIL NFR BLD AUTO: 0.1 %
ERYTHROCYTE [DISTWIDTH] IN BLOOD BY AUTOMATED COUNT: 16.1 % (ref 10–15)
ERYTHROCYTE [SEDIMENTATION RATE] IN BLOOD BY WESTERGREN METHOD: 38 MM/H (ref 1–15)
GFR SERPL CREATININE-BSD FRML MDRD: 37 ML/MIN/{1.73_M2}
HCT VFR BLD AUTO: 37.5 % (ref 35–47)
HGB BLD-MCNC: 12 G/DL (ref 11.7–15.7)
IMM GRANULOCYTES # BLD: 0.1 10E9/L (ref 0–0.4)
IMM GRANULOCYTES NFR BLD: 0.5 %
LYMPHOCYTES # BLD AUTO: 0.7 10E9/L (ref 0.8–5.3)
LYMPHOCYTES NFR BLD AUTO: 3.9 %
MCH RBC QN AUTO: 29.8 PG (ref 26.5–33)
MCHC RBC AUTO-ENTMCNC: 32 G/DL (ref 31.5–36.5)
MCV RBC AUTO: 93 FL (ref 78–100)
MONOCYTES # BLD AUTO: 0.9 10E9/L (ref 0–1.3)
MONOCYTES NFR BLD AUTO: 5.1 %
NEUTROPHILS # BLD AUTO: 15.4 10E9/L (ref 1.6–8.3)
NEUTROPHILS NFR BLD AUTO: 90.3 %
PLATELET # BLD AUTO: 283 10E9/L (ref 150–450)
RBC # BLD AUTO: 4.03 10E12/L (ref 3.8–5.2)
WBC # BLD AUTO: 17.1 10E9/L (ref 4–11)

## 2019-05-31 PROCEDURE — 36415 COLL VENOUS BLD VENIPUNCTURE: CPT

## 2019-05-31 PROCEDURE — 82565 ASSAY OF CREATININE: CPT

## 2019-05-31 PROCEDURE — 82040 ASSAY OF SERUM ALBUMIN: CPT

## 2019-05-31 PROCEDURE — 85025 COMPLETE CBC W/AUTO DIFF WBC: CPT

## 2019-05-31 PROCEDURE — 86140 C-REACTIVE PROTEIN: CPT

## 2019-05-31 PROCEDURE — 84460 ALANINE AMINO (ALT) (SGPT): CPT

## 2019-05-31 PROCEDURE — 85652 RBC SED RATE AUTOMATED: CPT

## 2019-05-31 PROCEDURE — 84450 TRANSFERASE (AST) (SGOT): CPT

## 2019-06-24 DIAGNOSIS — Z79.4 DIABETES MELLITUS TYPE 2, INSULIN DEPENDENT (H): ICD-10-CM

## 2019-06-24 DIAGNOSIS — E11.9 DIABETES MELLITUS TYPE 2, INSULIN DEPENDENT (H): ICD-10-CM

## 2019-06-26 RX ORDER — LANCETS 33 GAUGE
1 EACH MISCELLANEOUS 2 TIMES DAILY
Qty: 200 EACH | Refills: 1 | Status: SHIPPED | OUTPATIENT
Start: 2019-06-26 | End: 2019-07-08

## 2019-06-26 NOTE — TELEPHONE ENCOUNTER
Chart review shows that patient with upcoming appointment scheduled with PCP for 7/8/19. RN refill protocol passes. Writer will refill Rx as requested for a 6 month supply.    Prescription refilled per RN Medication Refill Policy..................Everett Cooper 6/26/2019 2:07 PM

## 2019-07-08 ENCOUNTER — OFFICE VISIT (OUTPATIENT)
Dept: INTERNAL MEDICINE | Facility: OTHER | Age: 69
End: 2019-07-08
Attending: INTERNAL MEDICINE
Payer: MEDICARE

## 2019-07-08 VITALS
BODY MASS INDEX: 25.28 KG/M2 | HEART RATE: 84 BPM | TEMPERATURE: 97.3 F | DIASTOLIC BLOOD PRESSURE: 76 MMHG | SYSTOLIC BLOOD PRESSURE: 132 MMHG | RESPIRATION RATE: 20 BRPM | WEIGHT: 145 LBS

## 2019-07-08 DIAGNOSIS — Z87.891 PERSONAL HISTORY OF TOBACCO USE: Primary | ICD-10-CM

## 2019-07-08 DIAGNOSIS — M05.9 SEROPOSITIVE RHEUMATOID ARTHRITIS (H): ICD-10-CM

## 2019-07-08 DIAGNOSIS — E53.8 VITAMIN B12 DEFICIENCY (NON ANEMIC): ICD-10-CM

## 2019-07-08 DIAGNOSIS — J44.9 CHRONIC OBSTRUCTIVE PULMONARY DISEASE, UNSPECIFIED COPD TYPE (H): ICD-10-CM

## 2019-07-08 DIAGNOSIS — E85.89 OTHER AMYLOIDOSIS (H): ICD-10-CM

## 2019-07-08 DIAGNOSIS — E78.5 HYPERLIPIDEMIA, UNSPECIFIED HYPERLIPIDEMIA TYPE: ICD-10-CM

## 2019-07-08 DIAGNOSIS — M54.50 LOW BACK PAIN WITHOUT SCIATICA, UNSPECIFIED BACK PAIN LATERALITY, UNSPECIFIED CHRONICITY: ICD-10-CM

## 2019-07-08 DIAGNOSIS — E11.9 DIABETES MELLITUS TYPE 2, INSULIN DEPENDENT (H): ICD-10-CM

## 2019-07-08 DIAGNOSIS — Z79.4 DIABETES MELLITUS TYPE 2, INSULIN DEPENDENT (H): ICD-10-CM

## 2019-07-08 DIAGNOSIS — K21.9 GASTROESOPHAGEAL REFLUX DISEASE, ESOPHAGITIS PRESENCE NOT SPECIFIED: ICD-10-CM

## 2019-07-08 DIAGNOSIS — E03.9 HYPOTHYROIDISM, UNSPECIFIED TYPE: ICD-10-CM

## 2019-07-08 DIAGNOSIS — N95.2 ATROPHIC VAGINITIS: ICD-10-CM

## 2019-07-08 LAB — HBA1C MFR BLD: 6.8 % (ref 4–6)

## 2019-07-08 PROCEDURE — G0463 HOSPITAL OUTPT CLINIC VISIT: HCPCS

## 2019-07-08 PROCEDURE — 36415 COLL VENOUS BLD VENIPUNCTURE: CPT | Mod: ZL | Performed by: INTERNAL MEDICINE

## 2019-07-08 PROCEDURE — 99214 OFFICE O/P EST MOD 30 MIN: CPT | Performed by: INTERNAL MEDICINE

## 2019-07-08 PROCEDURE — G0296 VISIT TO DETERM LDCT ELIG: HCPCS | Performed by: INTERNAL MEDICINE

## 2019-07-08 PROCEDURE — 83036 HEMOGLOBIN GLYCOSYLATED A1C: CPT | Mod: ZL | Performed by: INTERNAL MEDICINE

## 2019-07-08 PROCEDURE — G0463 HOSPITAL OUTPT CLINIC VISIT: HCPCS | Mod: 25

## 2019-07-08 RX ORDER — FORMOTEROL FUMARATE DIHYDRATE 20 UG/2ML
2 SOLUTION RESPIRATORY (INHALATION) 2 TIMES DAILY
Qty: 180 ML | Refills: 3 | Status: SHIPPED | OUTPATIENT
Start: 2019-07-08 | End: 2024-06-07

## 2019-07-08 RX ORDER — LANOLIN ALCOHOL/MO/W.PET/CERES
1000 CREAM (GRAM) TOPICAL DAILY
Qty: 90 TABLET | Refills: 3 | Status: SHIPPED | OUTPATIENT
Start: 2019-07-08 | End: 2020-07-29

## 2019-07-08 RX ORDER — TRAMADOL HYDROCHLORIDE 50 MG/1
TABLET ORAL
Qty: 90 TABLET | Refills: 1 | Status: SHIPPED | OUTPATIENT
Start: 2019-07-08 | End: 2019-12-19

## 2019-07-08 RX ORDER — FOLIC ACID 1 MG/1
1 TABLET ORAL DAILY
Qty: 90 TABLET | Refills: 3 | Status: SHIPPED | OUTPATIENT
Start: 2019-07-08 | End: 2020-11-02

## 2019-07-08 RX ORDER — BUDESONIDE 0.5 MG/2ML
0.5 INHALANT ORAL 2 TIMES DAILY
Qty: 180 AMPULE | Refills: 3 | Status: SHIPPED | OUTPATIENT
Start: 2019-07-08 | End: 2020-08-20

## 2019-07-08 RX ORDER — LANCETS 33 GAUGE
1 EACH MISCELLANEOUS 2 TIMES DAILY
Qty: 200 EACH | Refills: 1 | Status: SHIPPED | OUTPATIENT
Start: 2019-07-08 | End: 2020-07-14

## 2019-07-08 RX ORDER — LEVOTHYROXINE SODIUM 125 UG/1
125 TABLET ORAL DAILY
Qty: 90 TABLET | Refills: 3 | Status: SHIPPED | OUTPATIENT
Start: 2019-07-08 | End: 2020-08-10

## 2019-07-08 RX ORDER — LISINOPRIL 5 MG/1
5 TABLET ORAL DAILY
Status: ON HOLD | COMMUNITY
Start: 2019-07-08 | End: 2022-10-14

## 2019-07-08 RX ORDER — FOLIC ACID 1 MG/1
1 TABLET ORAL DAILY
COMMUNITY
Start: 2019-07-08 | End: 2019-07-08

## 2019-07-08 RX ORDER — LANOLIN ALCOHOL/MO/W.PET/CERES
1000 CREAM (GRAM) TOPICAL DAILY
COMMUNITY
Start: 2019-07-08 | End: 2019-07-08

## 2019-07-08 RX ORDER — TIOTROPIUM BROMIDE 18 UG/1
18 CAPSULE ORAL; RESPIRATORY (INHALATION) DAILY
COMMUNITY
Start: 2019-07-08 | End: 2024-06-07

## 2019-07-08 RX ORDER — MONTELUKAST SODIUM 10 MG/1
10 TABLET ORAL AT BEDTIME
Qty: 90 TABLET | Refills: 3 | Status: SHIPPED | OUTPATIENT
Start: 2019-07-08 | End: 2024-06-07

## 2019-07-08 RX ORDER — ESTRADIOL 10 UG/1
INSERT VAGINAL
Qty: 24 TABLET | Refills: 3 | Status: SHIPPED | OUTPATIENT
Start: 2019-07-08 | End: 2020-07-20

## 2019-07-08 RX ORDER — ATOVAQUONE 750 MG/5ML
1500 SUSPENSION ORAL DAILY
COMMUNITY
Start: 2019-07-08

## 2019-07-08 RX ORDER — SIMVASTATIN 40 MG
40 TABLET ORAL AT BEDTIME
Qty: 90 TABLET | Refills: 3 | Status: SHIPPED | OUTPATIENT
Start: 2019-07-08 | End: 2020-07-20

## 2019-07-08 ASSESSMENT — ENCOUNTER SYMPTOMS
HEMATOLOGIC/LYMPHATIC NEGATIVE: 1
ENDOCRINE NEGATIVE: 1
CONSTITUTIONAL NEGATIVE: 1
ALLERGIC/IMMUNOLOGIC NEGATIVE: 1

## 2019-07-08 ASSESSMENT — PATIENT HEALTH QUESTIONNAIRE - PHQ9: SUM OF ALL RESPONSES TO PHQ QUESTIONS 1-9: 0

## 2019-07-08 NOTE — PROGRESS NOTES
Lung Cancer Screening Shared Decision Making Visit     Laura Perez is not eligible for lung cancer screening on the basis of the information provided in my signed lung cancer screening order. Laura's smoking history is below the threshold and so it is not recommended.    Patient is not currently a smoker and so we did not discuss that the only way to prevent lung cancer is to not smoke. Smoking cessation assistance was not offered.      Chief Complaint:  Here for f/u on numerous issues.    HPI: She comes in today for a checkup and a review of medications as well as refill on medications.  She has an extensive past medical history which includes amyloidosis as well as her severe rheumatoid arthritis.  She goes to the [facility] on a regular basis.  She is now on methotrexate and Orencia therapy.  Her pulmonary therapy remains the same.  She did recently see her pulmonologist who recommended that I refill her tramadol and also recommended that she go off of her fenofibrate.  She is on moderate intensity statin therapy already.    She has really no specific complaints today other than some hot flashes.  She is wondering if it could be from her medications and we did discuss that today.  Nonetheless, no changes in her meds will be done.  I spent time today reviewing and updating her medication list.    Past Medical History:   Diagnosis Date     Amyloidosis (H)      Chronic kidney disease (CKD), stage III (moderate) (H)      Chronic obstructive pulmonary disease (H)     No Comments Provided     Diverticulosis of intestine without perforation or abscess without bleeding     Diffuse diverticulosis and history of diminutive hyperplastic colon     Essential (primary) hypertension     No Comments Provided     Hyperlipidemia     No Comments Provided     Hypothyroidism     No Comments Provided     Malignant neoplasm of colon (H)     9/2017     Osteoporosis      Rheumatoid arthritis (H)     Rheumatologist Dr. Gonzalez,  [phone], fax [phone]     Rosacea     No Comments Provided     Type 2 diabetes mellitus without complications (H)     Diabetes Mellitus, prednisone induced       Past Surgical History:   Procedure Laterality Date     ARTHROPLASTY,Right MTP 1 and 5 and left MTP 5  01/2001     CATARACT IOL, RT/LT Bilateral      COLON SURGERY Right 09/2017    For colon cancer     COLONOSCOPY  09/29/2008     COLONOSCOPY  2018    F/U in 5 years recommended secondary to polyps     LAPAROSCOPIC TUBAL LIGATION      No Comments Provided     Left MCP joint arthroplasty  02/05/1998     MTP 2, 3, 4 right foot  05/2000     RELEASE CARPAL TUNNEL Left 1991     Right long finger MCP repair  1996    [facility]     Right MCP 4 and 5 arthroplasty  03/01/2004     Right wrist arthroplasty  1990       Allergies   Allergen Reactions     Glyburide Anaphylaxis     Other reaction(s): Dizziness     Mosquitoes (Informational Only) Hives     Other [Seasonal Allergies] Hives     Deer Flies     Sulfa Drugs Anaphylaxis and Hives     Sulfamethoxazole W/Trimethoprim Hives and Itching     Sulfamethoxazole-Trimethoprim Anaphylaxis     Other reaction(s): Other - Describe In Comment Field  Rash, nausea, dizziness     Codeine Nausea and Nausea and Vomiting       Current Outpatient Medications   Medication Sig Dispense Refill     Abatacept (ORENCIA) 125 MG/ML SOAJ auto-injector Inject 1 mL (125 mg) Subcutaneous once a week       albuterol (2.5 MG/3ML) 0.083% neb solution Take 2.5 mg by nebulization every 6 hours as needed for shortness of breath / dyspnea or wheezing       aspirin 81 MG chewable tablet Take 81 mg by mouth daily with food       atovaquone (MEPRON) 750 MG/5ML suspension Take 10 mLs (1,500 mg) by mouth daily       blood glucose (ONETOUCH VERIO IQ) test strip Use to test blood sugar 2 times daily or as directed. 200 strip 3     budesonide (PULMICORT) 0.5 MG/2ML neb solution Take 2 mLs (0.5 mg) by nebulization 2 times daily 180 ampule 3      cyanocobalamin (VITAMIN B-12) 1000 MCG tablet Take 1 tablet (1,000 mcg) by mouth daily 90 tablet 3     EPINEPHrine (EPIPEN/ADRENACLICK/OR ANY BX GENERIC EQUIV) 0.3 MG/0.3ML injection 2-pack Inject 0.3 mLs (0.3 mg) into the muscle once as needed 0.9 mL 1     estradiol (YUVAFEM) 10 MCG TABS vaginal tablet PLACE 10 MCG VAGINALLY TWICE A WEEK. 24 tablet 3     folic acid (FOLVITE) 1 MG tablet Take 1 tablet (1 mg) by mouth daily 90 tablet 3     formoterol (PERFOROMIST) 20 MCG/2ML neb solution Take 2 mLs (20 mcg) by nebulization 2 times daily 180 mL 3     glucagon 1 MG kit Inject 1 mg into the muscle as needed for low blood sugar        glucose 4 g CHEW chewable tablet Take 1 tablet by mouth as needed for low blood sugar       insulin aspart prot & aspart (NOVOLOG MIX 70/30 PEN) (70-30) 100 UNIT/ML pen 45 units am and and 26 in evening 15 mL 11     insulin pen needle (BD LUCILLE U/F) 32G X 4 MM miscellaneous Use 1 pen needles daily or as directed. 200 each 3     levothyroxine (SYNTHROID) 125 MCG tablet Take 1 tablet (125 mcg) by mouth daily 90 tablet 3     lisinopril (PRINIVIL/ZESTRIL) 5 MG tablet Take 1 tablet (5 mg) by mouth daily       medical cannabis liquid Place 1 drop under the tongue as needed (pain) (This is NOT a prescription, and does not certify that the patient has a qualifying medical condition for medical cannabis.  The purpose of this order is  to document that the patient reports taking medical cannabis.)       medical cannabis liquid 1-2 sprays under the tongue as needed for pain       METHOTREXate, PF, (OTREXUP) 25 MG/0.4ML pen Inject 0.4 mLs (25 mg) Subcutaneous every 7 days       montelukast (SINGULAIR) 10 MG tablet Take 1 tablet (10 mg) by mouth At Bedtime 90 tablet 3     omeprazole (PRILOSEC) 20 MG DR capsule Take 1 capsule (20 mg) by mouth daily 90 capsule 3     ONETOUCH DELICA LANCETS 33G MISC 1 each by to test blood sugar route 2 times daily Dx: E11.9 200 each 1     predniSONE (DELTASONE) 1 MG tablet  Take 2 mg by mouth daily TOTAL DAILY DOSE = 12 MG       predniSONE (DELTASONE) 5 MG tablet Take 10 mg by mouth daily with food TOTAL DAILY DOSE = 12 MG       PROAIR  (90 Base) MCG/ACT inhaler INHALE 2 PUFFS INTO THE LUNGS EVERY 6 HOURS AS NEEDED FOR WHEEZIGN FOR UP  DAYS 3 Inhaler 3     simvastatin (ZOCOR) 40 MG tablet Take 1 tablet (40 mg) by mouth At Bedtime 90 tablet 3     tiotropium (SPIRIVA) 18 MCG inhaled capsule Inhale 1 capsule (18 mcg) into the lungs daily       traMADol (ULTRAM) 50 MG tablet One daily 90 tablet 1       Social History     Socioeconomic History     Marital status:      Spouse name: Not on file     Number of children: Not on file     Years of education: Not on file     Highest education level: Not on file   Occupational History     Not on file   Social Needs     Financial resource strain: Not on file     Food insecurity:     Worry: Not on file     Inability: Not on file     Transportation needs:     Medical: Not on file     Non-medical: Not on file   Tobacco Use     Smoking status: Former Smoker     Packs/day: 1.00     Years: 30.00     Pack years: 30.00     Types: Cigarettes     Last attempt to quit: 2005     Years since quittin.8     Smokeless tobacco: Never Used   Substance and Sexual Activity     Alcohol use: Yes     Alcohol/week: 1.5 oz     Comment: Rare     Drug use: Yes     Comment: Medical marijuana     Sexual activity: Not Currently   Lifestyle     Physical activity:     Days per week: Not on file     Minutes per session: Not on file     Stress: Not on file   Relationships     Social connections:     Talks on phone: Not on file     Gets together: Not on file     Attends Voodoo service: Not on file     Active member of club or organization: Not on file     Attends meetings of clubs or organizations: Not on file     Relationship status: Not on file     Intimate partner violence:     Fear of current or ex partner: Not on file     Emotionally abused: Not  on file     Physically abused: Not on file     Forced sexual activity: Not on file   Other Topics Concern     Not on file   Social History Narrative      , [place].  Does not work outside the home.  Four children are grown.  Lives in town.       Review of Systems   Constitutional: Negative.    Endocrine: Negative.    Skin: Negative.    Allergic/Immunologic: Negative.    Hematological: Negative.        Physical Exam   Constitutional: She appears well-developed and well-nourished. No distress.   Skin: She is not diaphoretic.   Numerous ecchymoses on her arms   Nursing note and vitals reviewed.      Assessment:      ICD-10-CM    1. Personal history of tobacco use Z87.891 Prof Fee: Shared Decision Making Visit for Lung Cancer Screening   2. Chronic obstructive pulmonary disease, unspecified COPD type (H) J44.9 budesonide (PULMICORT) 0.5 MG/2ML neb solution     formoterol (PERFOROMIST) 20 MCG/2ML neb solution     montelukast (SINGULAIR) 10 MG tablet   3. Diabetes mellitus type 2, insulin dependent (H) E11.9 Hemoglobin A1c    Z79.4 insulin aspart prot & aspart (NOVOLOG MIX 70/30 PEN) (70-30) 100 UNIT/ML pen     insulin pen needle (BD LUCILLE U/F) 32G X 4 MM miscellaneous     ONETOUCH DELICA LANCETS 33G MISC     blood glucose (ONETOUCH VERIO IQ) test strip   4. Other amyloidosis (H) E85.89    5. Low back pain without sciatica, unspecified back pain laterality, unspecified chronicity M54.5 traMADol (ULTRAM) 50 MG tablet   6. Atrophic vaginitis N95.2 estradiol (YUVAFEM) 10 MCG TABS vaginal tablet   7. Gastroesophageal reflux disease, esophagitis presence not specified K21.9 omeprazole (PRILOSEC) 20 MG DR capsule   8. Hypothyroidism, unspecified type E03.9 levothyroxine (SYNTHROID) 125 MCG tablet   9. Hyperlipidemia, unspecified hyperlipidemia type E78.5 simvastatin (ZOCOR) 40 MG tablet   10. Seropositive rheumatoid arthritis (H) M05.9 folic acid (FOLVITE) 1 MG tablet   11. Vitamin B12 deficiency (non anemic) E53.8  cyanocobalamin (VITAMIN B-12) 1000 MCG tablet       Plan: Overall she appears to be stable although she is on a little bit of a different regimen with her medications.  Refills were done as necessary including her tramadol that she takes 1 at bedtime because of her diffuse discomfort from her rheumatoid arthritis.  A1c drawn and pending, I will send her letter with the results as well as any recommendations.  She will continue with her specialty follow-ups as planned including monthly lab work for her methotrexate and Orencia.  Over 50% of a 30-minute visit spent in counseling and coordination of care.    ShouldIScreen

## 2019-07-08 NOTE — LETTER
July 8, 2019      Laura Perez  [address]  [facility] [address]        Dear Laura,    Below are the results of your recent labs:    Results for orders placed or performed in visit on 07/08/19   Hemoglobin A1c   Result Value Ref Range    Hemoglobin A1C 6.8 (H) 4.0 - 6.0 %        Your A1c looks excellent.  Congratulations on this report and keep up the good work.    Sincerely,        Raul Ortega MD  Internal Medicine

## 2019-07-08 NOTE — NURSING NOTE
"The patient is here today to discuss her medications and recent [facility] visits.  Haley Lugo LPN on 7/8/2019 at 10:18 AM  Chief Complaint   Patient presents with     RECHECK       Initial /76 (BP Location: Right arm, Patient Position: Sitting, Cuff Size: Adult Regular)   Pulse 84   Temp 97.3  F (36.3  C) (Tympanic)   Resp 20   Wt 65.8 kg (145 lb)   Breastfeeding? No   BMI 25.28 kg/m   Estimated body mass index is 25.28 kg/m  as calculated from the following:    Height as of 9/10/18: 1.613 m (5' 3.5\").    Weight as of this encounter: 65.8 kg (145 lb).  Medication Reconciliation: complete    Haley Lugo LPN    "

## 2019-09-10 DIAGNOSIS — M06.9 CHRONIC RHEUMATIC ARTHRITIS (H): ICD-10-CM

## 2019-09-10 LAB
ALBUMIN SERPL-MCNC: 3.7 G/DL (ref 3.5–5.7)
ALT SERPL W P-5'-P-CCNC: 27 U/L (ref 7–52)
AST SERPL W P-5'-P-CCNC: 19 U/L (ref 13–39)
BASOPHILS # BLD AUTO: 0.1 10E9/L (ref 0–0.2)
BASOPHILS NFR BLD AUTO: 0.4 %
CREAT SERPL-MCNC: 1.5 MG/DL (ref 0.6–1.2)
CRP SERPL-MCNC: 1.3 MG/L
DIFFERENTIAL METHOD BLD: ABNORMAL
EOSINOPHIL # BLD AUTO: 0.1 10E9/L (ref 0–0.7)
EOSINOPHIL NFR BLD AUTO: 0.6 %
ERYTHROCYTE [DISTWIDTH] IN BLOOD BY AUTOMATED COUNT: 15.6 % (ref 10–15)
ERYTHROCYTE [SEDIMENTATION RATE] IN BLOOD BY WESTERGREN METHOD: 38 MM/H (ref 1–15)
GFR SERPL CREATININE-BSD FRML MDRD: 34 ML/MIN/{1.73_M2}
HCT VFR BLD AUTO: 37.5 % (ref 35–47)
HGB BLD-MCNC: 12.2 G/DL (ref 11.7–15.7)
IMM GRANULOCYTES # BLD: 0.1 10E9/L (ref 0–0.4)
IMM GRANULOCYTES NFR BLD: 0.8 %
LYMPHOCYTES # BLD AUTO: 3.1 10E9/L (ref 0.8–5.3)
LYMPHOCYTES NFR BLD AUTO: 19.7 %
MCH RBC QN AUTO: 31 PG (ref 26.5–33)
MCHC RBC AUTO-ENTMCNC: 32.5 G/DL (ref 31.5–36.5)
MCV RBC AUTO: 95 FL (ref 78–100)
MONOCYTES # BLD AUTO: 1.5 10E9/L (ref 0–1.3)
MONOCYTES NFR BLD AUTO: 9.6 %
NEUTROPHILS # BLD AUTO: 10.9 10E9/L (ref 1.6–8.3)
NEUTROPHILS NFR BLD AUTO: 68.9 %
PLATELET # BLD AUTO: 276 10E9/L (ref 150–450)
RBC # BLD AUTO: 3.93 10E12/L (ref 3.8–5.2)
WBC # BLD AUTO: 15.9 10E9/L (ref 4–11)

## 2019-09-10 PROCEDURE — 85025 COMPLETE CBC W/AUTO DIFF WBC: CPT | Mod: ZL

## 2019-09-10 PROCEDURE — 84450 TRANSFERASE (AST) (SGOT): CPT | Mod: ZL

## 2019-09-10 PROCEDURE — 36415 COLL VENOUS BLD VENIPUNCTURE: CPT | Mod: ZL

## 2019-09-10 PROCEDURE — 86140 C-REACTIVE PROTEIN: CPT | Mod: ZL

## 2019-09-10 PROCEDURE — 84460 ALANINE AMINO (ALT) (SGPT): CPT | Mod: ZL

## 2019-09-10 PROCEDURE — 82565 ASSAY OF CREATININE: CPT | Mod: ZL

## 2019-09-10 PROCEDURE — 82040 ASSAY OF SERUM ALBUMIN: CPT | Mod: ZL

## 2019-09-10 PROCEDURE — 85652 RBC SED RATE AUTOMATED: CPT | Mod: ZL

## 2019-09-12 DIAGNOSIS — Z79.4 DIABETES MELLITUS TYPE 2, INSULIN DEPENDENT (H): ICD-10-CM

## 2019-09-12 DIAGNOSIS — E11.9 DIABETES MELLITUS TYPE 2, INSULIN DEPENDENT (H): ICD-10-CM

## 2019-09-16 NOTE — TELEPHONE ENCOUNTER
Novolog refilled on 7/8/19 15 ml x 11 refills to [facility].    Sharda Garcia RN on 9/16/2019 at 1:06 PM

## 2019-12-17 DIAGNOSIS — M54.50 LOW BACK PAIN WITHOUT SCIATICA, UNSPECIFIED BACK PAIN LATERALITY, UNSPECIFIED CHRONICITY: ICD-10-CM

## 2019-12-19 NOTE — TELEPHONE ENCOUNTER
Routing refill request to provider for review/approval because:  Drug not on the [facility] refill protocol     LOV: 7/8/19  Sharda Garcia RN on 12/19/2019 at 2:41 PM

## 2019-12-20 RX ORDER — TRAMADOL HYDROCHLORIDE 50 MG/1
TABLET ORAL
Qty: 90 TABLET | Refills: 0 | Status: ON HOLD | OUTPATIENT
Start: 2019-12-20 | End: 2020-01-25

## 2020-01-21 ENCOUNTER — TELEPHONE (OUTPATIENT)
Dept: INTERNAL MEDICINE | Facility: OTHER | Age: 70
End: 2020-01-21

## 2020-01-21 DIAGNOSIS — Z79.4 DIABETES MELLITUS TYPE 2, INSULIN DEPENDENT (H): Primary | ICD-10-CM

## 2020-01-21 DIAGNOSIS — E11.9 DIABETES MELLITUS TYPE 2, INSULIN DEPENDENT (H): Primary | ICD-10-CM

## 2020-01-21 NOTE — TELEPHONE ENCOUNTER
Called and notified pt that orders have been placed.     Chapincito Flanagan LPN .......  1/21/2020  2:03 PM  '

## 2020-01-25 ENCOUNTER — APPOINTMENT (OUTPATIENT)
Dept: GENERAL RADIOLOGY | Facility: OTHER | Age: 70
DRG: 194 | End: 2020-01-25
Attending: EMERGENCY MEDICINE
Payer: MEDICARE

## 2020-01-25 ENCOUNTER — HOSPITAL ENCOUNTER (INPATIENT)
Facility: OTHER | Age: 70
LOS: 4 days | Discharge: HOME HEALTH | DRG: 194 | End: 2020-01-29
Attending: EMERGENCY MEDICINE | Admitting: FAMILY MEDICINE
Payer: MEDICARE

## 2020-01-25 DIAGNOSIS — Z79.4 TYPE 2 DIABETES MELLITUS WITH STAGE 3 CHRONIC KIDNEY DISEASE, WITH LONG-TERM CURRENT USE OF INSULIN (H): ICD-10-CM

## 2020-01-25 DIAGNOSIS — R65.10 SIRS (SYSTEMIC INFLAMMATORY RESPONSE SYNDROME) (H): ICD-10-CM

## 2020-01-25 DIAGNOSIS — J44.89 OBSTRUCTIVE CHRONIC BRONCHITIS WITHOUT EXACERBATION (H): ICD-10-CM

## 2020-01-25 DIAGNOSIS — Z79.82 ENCOUNTER FOR LONG-TERM (CURRENT) USE OF ASPIRIN: ICD-10-CM

## 2020-01-25 DIAGNOSIS — Z87.891 PERSONAL HISTORY OF TOBACCO USE, PRESENTING HAZARDS TO HEALTH: ICD-10-CM

## 2020-01-25 DIAGNOSIS — Z79.51 LONG TERM CURRENT USE OF INHALED STEROID: ICD-10-CM

## 2020-01-25 DIAGNOSIS — J10.1 INFLUENZA B: ICD-10-CM

## 2020-01-25 DIAGNOSIS — E11.22 TYPE 2 DIABETES MELLITUS WITH STAGE 3 CHRONIC KIDNEY DISEASE, WITH LONG-TERM CURRENT USE OF INSULIN (H): ICD-10-CM

## 2020-01-25 DIAGNOSIS — M06.9 RHEUMATOID ARTHRITIS, INVOLVING UNSPECIFIED SITE, UNSPECIFIED RHEUMATOID FACTOR PRESENCE: ICD-10-CM

## 2020-01-25 DIAGNOSIS — N18.30 CHRONIC KIDNEY DISEASE, STAGE III (MODERATE) (H): ICD-10-CM

## 2020-01-25 DIAGNOSIS — E03.9 HYPOTHYROIDISM, UNSPECIFIED TYPE: ICD-10-CM

## 2020-01-25 DIAGNOSIS — R65.10 SYSTEMIC INFLAMMATORY RESPONSE SYNDROME DUE TO NON-INFECTIOUS PROCESS WITHOUT ACUTE ORGAN DYSFUNCTION (H): ICD-10-CM

## 2020-01-25 DIAGNOSIS — I50.9 HEART FAILURE, UNSPECIFIED HF CHRONICITY, UNSPECIFIED HEART FAILURE TYPE (H): ICD-10-CM

## 2020-01-25 DIAGNOSIS — N18.30 TYPE 2 DIABETES MELLITUS WITH STAGE 3 CHRONIC KIDNEY DISEASE, WITH LONG-TERM CURRENT USE OF INSULIN (H): ICD-10-CM

## 2020-01-25 DIAGNOSIS — Z79.899 ENCOUNTER FOR LONG-TERM (CURRENT) USE OF OTHER MEDICATIONS: ICD-10-CM

## 2020-01-25 PROBLEM — D63.1 ANEMIA OF CHRONIC KIDNEY FAILURE, STAGE 3 (MODERATE) (H): Status: ACTIVE | Noted: 2017-07-28

## 2020-01-25 PROBLEM — E85.89 OTHER AMYLOIDOSIS (H): Status: ACTIVE | Noted: 2018-11-07

## 2020-01-25 PROBLEM — J11.1 INFLUENZA: Status: ACTIVE | Noted: 2020-01-25

## 2020-01-25 PROBLEM — E11.9 DIABETES MELLITUS TYPE 2, INSULIN DEPENDENT (H): Status: ACTIVE | Noted: 2017-07-28

## 2020-01-25 PROBLEM — I10 HTN (HYPERTENSION): Status: ACTIVE | Noted: 2017-11-29

## 2020-01-25 LAB
ALBUMIN SERPL-MCNC: 3.8 G/DL (ref 3.5–5.7)
ALBUMIN UR-MCNC: 300 MG/DL
ALP SERPL-CCNC: 58 U/L (ref 34–104)
ALT SERPL W P-5'-P-CCNC: 38 U/L (ref 7–52)
ANION GAP SERPL CALCULATED.3IONS-SCNC: 14 MMOL/L (ref 3–14)
APPEARANCE UR: CLEAR
AST SERPL W P-5'-P-CCNC: 42 U/L (ref 13–39)
BASOPHILS # BLD AUTO: 0 10E9/L (ref 0–0.2)
BASOPHILS NFR BLD AUTO: 0.1 %
BILIRUB SERPL-MCNC: 0.5 MG/DL (ref 0.3–1)
BILIRUB UR QL STRIP: NEGATIVE
BUN SERPL-MCNC: 37 MG/DL (ref 7–25)
CALCIUM SERPL-MCNC: 9.5 MG/DL (ref 8.6–10.3)
CHLORIDE SERPL-SCNC: 98 MMOL/L (ref 98–107)
CO2 SERPL-SCNC: 22 MMOL/L (ref 21–31)
COLOR UR AUTO: YELLOW
CREAT SERPL-MCNC: 2.52 MG/DL (ref 0.6–1.2)
DIFFERENTIAL METHOD BLD: ABNORMAL
EOSINOPHIL # BLD AUTO: 0 10E9/L (ref 0–0.7)
EOSINOPHIL NFR BLD AUTO: 0 %
ERYTHROCYTE [DISTWIDTH] IN BLOOD BY AUTOMATED COUNT: 14.6 % (ref 10–15)
FLUAV+FLUBV RNA SPEC QL NAA+PROBE: NEGATIVE
FLUAV+FLUBV RNA SPEC QL NAA+PROBE: POSITIVE
GFR SERPL CREATININE-BSD FRML MDRD: 19 ML/MIN/{1.73_M2}
GLUCOSE SERPL-MCNC: 202 MG/DL (ref 70–105)
GLUCOSE UR STRIP-MCNC: NEGATIVE MG/DL
HBA1C MFR BLD: 6.8 % (ref 4–6)
HCO3 BLD-SCNC: 18 MMOL/L (ref 22–28)
HCO3 BLD-SCNC: 24 MMOL/L (ref 22–28)
HCT VFR BLD AUTO: 41.5 % (ref 35–47)
HGB BLD-MCNC: 13.3 G/DL (ref 11.7–15.7)
HGB UR QL STRIP: ABNORMAL
IMM GRANULOCYTES # BLD: 0.1 10E9/L (ref 0–0.4)
IMM GRANULOCYTES NFR BLD: 0.5 %
KETONES UR STRIP-MCNC: NEGATIVE MG/DL
LACTATE SERPL-SCNC: 1.8 MMOL/L (ref 0.5–2.2)
LEUKOCYTE ESTERASE UR QL STRIP: NEGATIVE
LIPASE SERPL-CCNC: 33 U/L (ref 11–82)
LYMPHOCYTES # BLD AUTO: 1.7 10E9/L (ref 0.8–5.3)
LYMPHOCYTES NFR BLD AUTO: 9.8 %
MAGNESIUM SERPL-MCNC: 1.7 MG/DL (ref 1.9–2.7)
MCH RBC QN AUTO: 30.6 PG (ref 26.5–33)
MCHC RBC AUTO-ENTMCNC: 32 G/DL (ref 31.5–36.5)
MCV RBC AUTO: 95 FL (ref 78–100)
MONOCYTES # BLD AUTO: 1.9 10E9/L (ref 0–1.3)
MONOCYTES NFR BLD AUTO: 10.7 %
MUCOUS THREADS #/AREA URNS LPF: PRESENT /LPF
NEUTROPHILS # BLD AUTO: 13.8 10E9/L (ref 1.6–8.3)
NEUTROPHILS NFR BLD AUTO: 78.9 %
NITRATE UR QL: NEGATIVE
O2/TOTAL GAS SETTING VFR VENT: 0 %
O2/TOTAL GAS SETTING VFR VENT: ABNORMAL %
OXYHGB MFR BLD: 90 %
OXYHGB MFR BLD: 97 %
PCO2 BLD: 34 MM HG (ref 35–45)
PCO2 BLD: 51 MM HG (ref 35–45)
PH BLD: 7.28 PH (ref 7.35–7.45)
PH BLD: 7.34 PH (ref 7.35–7.45)
PH UR STRIP: 6.5 PH (ref 5–7)
PLATELET # BLD AUTO: 184 10E9/L (ref 150–450)
PO2 BLD: 117 MM HG (ref 83–108)
PO2 BLD: 70 MM HG (ref 83–108)
POTASSIUM SERPL-SCNC: 4.8 MMOL/L (ref 3.5–5.1)
PROCALCITONIN SERPL-MCNC: 1.84 NG/ML
PROT SERPL-MCNC: 7.2 G/DL (ref 6.4–8.9)
RBC # BLD AUTO: 4.35 10E12/L (ref 3.8–5.2)
RBC #/AREA URNS AUTO: 5 /HPF
RSV RNA SPEC NAA+PROBE: NEGATIVE
SODIUM SERPL-SCNC: 134 MMOL/L (ref 134–144)
SOURCE: ABNORMAL
SP GR UR STRIP: 1.02 (ref 1–1.03)
SPECIMEN SOURCE: ABNORMAL
SQUAMOUS #/AREA URNS AUTO: 1 /HPF (ref 0–1)
UROBILINOGEN UR STRIP-MCNC: NORMAL MG/DL (ref 0–2)
WBC # BLD AUTO: 17.5 10E9/L (ref 4–11)
WBC #/AREA URNS AUTO: 1 /HPF

## 2020-01-25 PROCEDURE — 25000131 ZZH RX MED GY IP 250 OP 636 PS 637: Mod: GY | Performed by: FAMILY MEDICINE

## 2020-01-25 PROCEDURE — 81003 URINALYSIS AUTO W/O SCOPE: CPT | Performed by: EMERGENCY MEDICINE

## 2020-01-25 PROCEDURE — 83605 ASSAY OF LACTIC ACID: CPT | Performed by: EMERGENCY MEDICINE

## 2020-01-25 PROCEDURE — 36415 COLL VENOUS BLD VENIPUNCTURE: CPT | Performed by: EMERGENCY MEDICINE

## 2020-01-25 PROCEDURE — 36600 WITHDRAWAL OF ARTERIAL BLOOD: CPT | Performed by: FAMILY MEDICINE

## 2020-01-25 PROCEDURE — 87040 BLOOD CULTURE FOR BACTERIA: CPT | Performed by: EMERGENCY MEDICINE

## 2020-01-25 PROCEDURE — 85025 COMPLETE CBC W/AUTO DIFF WBC: CPT | Performed by: EMERGENCY MEDICINE

## 2020-01-25 PROCEDURE — 71045 X-RAY EXAM CHEST 1 VIEW: CPT

## 2020-01-25 PROCEDURE — 20000006 ZZH R&B ICU - GICH

## 2020-01-25 PROCEDURE — 96365 THER/PROPH/DIAG IV INF INIT: CPT | Performed by: EMERGENCY MEDICINE

## 2020-01-25 PROCEDURE — 96367 TX/PROPH/DG ADDL SEQ IV INF: CPT | Performed by: EMERGENCY MEDICINE

## 2020-01-25 PROCEDURE — 94640 AIRWAY INHALATION TREATMENT: CPT | Mod: 76

## 2020-01-25 PROCEDURE — 25000128 H RX IP 250 OP 636: Performed by: EMERGENCY MEDICINE

## 2020-01-25 PROCEDURE — 25800030 ZZH RX IP 258 OP 636: Performed by: FAMILY MEDICINE

## 2020-01-25 PROCEDURE — 5A09357 ASSISTANCE WITH RESPIRATORY VENTILATION, LESS THAN 24 CONSECUTIVE HOURS, CONTINUOUS POSITIVE AIRWAY PRESSURE: ICD-10-PCS | Performed by: FAMILY MEDICINE

## 2020-01-25 PROCEDURE — 40000275 ZZH STATISTIC RCP TIME EA 10 MIN

## 2020-01-25 PROCEDURE — 25000132 ZZH RX MED GY IP 250 OP 250 PS 637: Mod: GY | Performed by: EMERGENCY MEDICINE

## 2020-01-25 PROCEDURE — 25000125 ZZHC RX 250: Performed by: FAMILY MEDICINE

## 2020-01-25 PROCEDURE — 84145 PROCALCITONIN (PCT): CPT | Performed by: EMERGENCY MEDICINE

## 2020-01-25 PROCEDURE — 25000132 ZZH RX MED GY IP 250 OP 250 PS 637: Mod: GY | Performed by: FAMILY MEDICINE

## 2020-01-25 PROCEDURE — 94640 AIRWAY INHALATION TREATMENT: CPT

## 2020-01-25 PROCEDURE — 87631 RESP VIRUS 3-5 TARGETS: CPT | Performed by: EMERGENCY MEDICINE

## 2020-01-25 PROCEDURE — 99223 1ST HOSP IP/OBS HIGH 75: CPT | Mod: AI | Performed by: FAMILY MEDICINE

## 2020-01-25 PROCEDURE — 80053 COMPREHEN METABOLIC PANEL: CPT | Performed by: EMERGENCY MEDICINE

## 2020-01-25 PROCEDURE — 94660 CPAP INITIATION&MGMT: CPT

## 2020-01-25 PROCEDURE — 99285 EMERGENCY DEPT VISIT HI MDM: CPT | Mod: Z6 | Performed by: EMERGENCY MEDICINE

## 2020-01-25 PROCEDURE — 99285 EMERGENCY DEPT VISIT HI MDM: CPT | Mod: 25 | Performed by: EMERGENCY MEDICINE

## 2020-01-25 PROCEDURE — 25000125 ZZHC RX 250

## 2020-01-25 PROCEDURE — 83735 ASSAY OF MAGNESIUM: CPT | Performed by: FAMILY MEDICINE

## 2020-01-25 PROCEDURE — 25800030 ZZH RX IP 258 OP 636: Performed by: EMERGENCY MEDICINE

## 2020-01-25 PROCEDURE — 82805 BLOOD GASES W/O2 SATURATION: CPT | Performed by: FAMILY MEDICINE

## 2020-01-25 PROCEDURE — 83690 ASSAY OF LIPASE: CPT | Performed by: EMERGENCY MEDICINE

## 2020-01-25 PROCEDURE — 96366 THER/PROPH/DIAG IV INF ADDON: CPT | Performed by: EMERGENCY MEDICINE

## 2020-01-25 PROCEDURE — 83036 HEMOGLOBIN GLYCOSYLATED A1C: CPT | Performed by: FAMILY MEDICINE

## 2020-01-25 RX ORDER — AMOXICILLIN 250 MG
2 CAPSULE ORAL 2 TIMES DAILY PRN
Status: DISCONTINUED | OUTPATIENT
Start: 2020-01-25 | End: 2020-01-29 | Stop reason: HOSPADM

## 2020-01-25 RX ORDER — ALBUTEROL SULFATE 0.83 MG/ML
2.5 SOLUTION RESPIRATORY (INHALATION)
Status: DISCONTINUED | OUTPATIENT
Start: 2020-01-25 | End: 2020-01-29 | Stop reason: HOSPADM

## 2020-01-25 RX ORDER — POTASSIUM CHLORIDE 7.45 MG/ML
10 INJECTION INTRAVENOUS
Status: DISCONTINUED | OUTPATIENT
Start: 2020-01-25 | End: 2020-01-29 | Stop reason: HOSPADM

## 2020-01-25 RX ORDER — ALBUTEROL SULFATE 0.83 MG/ML
2.5 SOLUTION RESPIRATORY (INHALATION) EVERY 6 HOURS PRN
Status: DISCONTINUED | OUTPATIENT
Start: 2020-01-25 | End: 2020-01-25 | Stop reason: DRUGHIGH

## 2020-01-25 RX ORDER — ACETAMINOPHEN 325 MG/1
650 TABLET ORAL
Status: DISCONTINUED | OUTPATIENT
Start: 2020-01-25 | End: 2020-01-25

## 2020-01-25 RX ORDER — OSELTAMIVIR PHOSPHATE 30 MG/1
30 CAPSULE ORAL DAILY
Status: DISCONTINUED | OUTPATIENT
Start: 2020-01-25 | End: 2020-01-25

## 2020-01-25 RX ORDER — ONDANSETRON 2 MG/ML
4 INJECTION INTRAMUSCULAR; INTRAVENOUS EVERY 6 HOURS PRN
Status: DISCONTINUED | OUTPATIENT
Start: 2020-01-25 | End: 2020-01-29 | Stop reason: HOSPADM

## 2020-01-25 RX ORDER — NALOXONE HYDROCHLORIDE 0.4 MG/ML
.1-.4 INJECTION, SOLUTION INTRAMUSCULAR; INTRAVENOUS; SUBCUTANEOUS
Status: DISCONTINUED | OUTPATIENT
Start: 2020-01-25 | End: 2020-01-29 | Stop reason: HOSPADM

## 2020-01-25 RX ORDER — OXYCODONE HYDROCHLORIDE 5 MG/1
5-10 TABLET ORAL
Status: DISCONTINUED | OUTPATIENT
Start: 2020-01-25 | End: 2020-01-29 | Stop reason: HOSPADM

## 2020-01-25 RX ORDER — TRAMADOL HYDROCHLORIDE 50 MG/1
50 TABLET ORAL DAILY PRN
Status: DISCONTINUED | OUTPATIENT
Start: 2020-01-25 | End: 2020-01-29 | Stop reason: HOSPADM

## 2020-01-25 RX ORDER — ACETAMINOPHEN 650 MG/1
650 SUPPOSITORY RECTAL
Status: DISCONTINUED | OUTPATIENT
Start: 2020-01-25 | End: 2020-01-29 | Stop reason: HOSPADM

## 2020-01-25 RX ORDER — ACETAMINOPHEN 325 MG/1
650 TABLET ORAL EVERY 4 HOURS PRN
Status: DISCONTINUED | OUTPATIENT
Start: 2020-01-25 | End: 2020-01-29 | Stop reason: HOSPADM

## 2020-01-25 RX ORDER — AMOXICILLIN 250 MG
1 CAPSULE ORAL 2 TIMES DAILY PRN
Status: DISCONTINUED | OUTPATIENT
Start: 2020-01-25 | End: 2020-01-29 | Stop reason: HOSPADM

## 2020-01-25 RX ORDER — AZITHROMYCIN 500 MG/5ML
500 INJECTION, POWDER, LYOPHILIZED, FOR SOLUTION INTRAVENOUS ONCE
Status: COMPLETED | OUTPATIENT
Start: 2020-01-25 | End: 2020-01-25

## 2020-01-25 RX ORDER — PROCHLORPERAZINE 25 MG
12.5 SUPPOSITORY, RECTAL RECTAL EVERY 12 HOURS PRN
Status: DISCONTINUED | OUTPATIENT
Start: 2020-01-25 | End: 2020-01-29 | Stop reason: HOSPADM

## 2020-01-25 RX ORDER — FORMOTEROL FUMARATE DIHYDRATE 20 UG/2ML
2 SOLUTION RESPIRATORY (INHALATION) 2 TIMES DAILY
Status: DISCONTINUED | OUTPATIENT
Start: 2020-01-25 | End: 2020-01-26

## 2020-01-25 RX ORDER — ASPIRIN 81 MG/1
81 TABLET, CHEWABLE ORAL DAILY
Status: DISCONTINUED | OUTPATIENT
Start: 2020-01-25 | End: 2020-01-29 | Stop reason: HOSPADM

## 2020-01-25 RX ORDER — LIDOCAINE 40 MG/G
CREAM TOPICAL
Status: DISCONTINUED | OUTPATIENT
Start: 2020-01-25 | End: 2020-01-29 | Stop reason: HOSPADM

## 2020-01-25 RX ORDER — ONDANSETRON 4 MG/1
4 TABLET, ORALLY DISINTEGRATING ORAL EVERY 6 HOURS PRN
Status: DISCONTINUED | OUTPATIENT
Start: 2020-01-25 | End: 2020-01-29 | Stop reason: HOSPADM

## 2020-01-25 RX ORDER — BUDESONIDE 0.5 MG/2ML
0.5 INHALANT ORAL 2 TIMES DAILY
Status: DISCONTINUED | OUTPATIENT
Start: 2020-01-25 | End: 2020-01-29 | Stop reason: HOSPADM

## 2020-01-25 RX ORDER — DEXTROSE MONOHYDRATE 25 G/50ML
25-50 INJECTION, SOLUTION INTRAVENOUS
Status: DISCONTINUED | OUTPATIENT
Start: 2020-01-25 | End: 2020-01-29 | Stop reason: HOSPADM

## 2020-01-25 RX ORDER — ATOVAQUONE 750 MG/5ML
750 SUSPENSION ORAL DAILY
Status: DISCONTINUED | OUTPATIENT
Start: 2020-01-26 | End: 2020-01-29 | Stop reason: HOSPADM

## 2020-01-25 RX ORDER — PREDNISONE 1 MG/1
2 TABLET ORAL DAILY
Status: DISCONTINUED | OUTPATIENT
Start: 2020-01-25 | End: 2020-01-25 | Stop reason: DRUGHIGH

## 2020-01-25 RX ORDER — POLYETHYLENE GLYCOL 3350 17 G/17G
17 POWDER, FOR SOLUTION ORAL DAILY PRN
Status: DISCONTINUED | OUTPATIENT
Start: 2020-01-25 | End: 2020-01-29 | Stop reason: HOSPADM

## 2020-01-25 RX ORDER — PREDNISONE 10 MG/1
10 TABLET ORAL DAILY
Status: DISCONTINUED | OUTPATIENT
Start: 2020-01-25 | End: 2020-01-29 | Stop reason: HOSPADM

## 2020-01-25 RX ORDER — TRAMADOL HYDROCHLORIDE 50 MG/1
50 TABLET ORAL AT BEDTIME
COMMUNITY
End: 2020-02-21

## 2020-01-25 RX ORDER — TRAZODONE HYDROCHLORIDE 50 MG/1
50 TABLET, FILM COATED ORAL DAILY PRN
COMMUNITY
End: 2020-11-02

## 2020-01-25 RX ORDER — INSULIN GLARGINE 100 [IU]/ML
58 INJECTION, SOLUTION SUBCUTANEOUS AT BEDTIME
Status: DISCONTINUED | OUTPATIENT
Start: 2020-01-25 | End: 2020-01-28

## 2020-01-25 RX ORDER — DOCUSATE SODIUM 100 MG/1
100 CAPSULE, LIQUID FILLED ORAL 2 TIMES DAILY
Status: DISCONTINUED | OUTPATIENT
Start: 2020-01-25 | End: 2020-01-26

## 2020-01-25 RX ORDER — CEFTRIAXONE SODIUM 2 G
2 VIAL (EA) INJECTION ONCE
Status: COMPLETED | OUTPATIENT
Start: 2020-01-25 | End: 2020-01-25

## 2020-01-25 RX ORDER — LEVOTHYROXINE SODIUM 125 UG/1
125 TABLET ORAL DAILY
Status: DISCONTINUED | OUTPATIENT
Start: 2020-01-25 | End: 2020-01-29 | Stop reason: HOSPADM

## 2020-01-25 RX ORDER — PROCHLORPERAZINE MALEATE 5 MG
5 TABLET ORAL EVERY 6 HOURS PRN
Status: DISCONTINUED | OUTPATIENT
Start: 2020-01-25 | End: 2020-01-29 | Stop reason: HOSPADM

## 2020-01-25 RX ORDER — POTASSIUM CHLORIDE 1500 MG/1
20-40 TABLET, EXTENDED RELEASE ORAL
Status: DISCONTINUED | OUTPATIENT
Start: 2020-01-25 | End: 2020-01-29 | Stop reason: HOSPADM

## 2020-01-25 RX ORDER — MONTELUKAST SODIUM 5 MG/1
10 TABLET, CHEWABLE ORAL AT BEDTIME
Status: DISCONTINUED | OUTPATIENT
Start: 2020-01-25 | End: 2020-01-29 | Stop reason: HOSPADM

## 2020-01-25 RX ORDER — POTASSIUM CHLORIDE 1500 MG/1
20-40 TABLET, EXTENDED RELEASE ORAL
Status: DISCONTINUED | OUTPATIENT
Start: 2020-01-25 | End: 2020-01-25

## 2020-01-25 RX ORDER — ATOVAQUONE 750 MG/5ML
1500 SUSPENSION ORAL DAILY
Status: DISCONTINUED | OUTPATIENT
Start: 2020-01-25 | End: 2020-01-25 | Stop reason: DRUGHIGH

## 2020-01-25 RX ORDER — NICOTINE POLACRILEX 4 MG
15-30 LOZENGE BUCCAL
Status: DISCONTINUED | OUTPATIENT
Start: 2020-01-25 | End: 2020-01-29 | Stop reason: HOSPADM

## 2020-01-25 RX ORDER — MAGNESIUM SULFATE HEPTAHYDRATE 40 MG/ML
4 INJECTION, SOLUTION INTRAVENOUS EVERY 4 HOURS PRN
Status: DISCONTINUED | OUTPATIENT
Start: 2020-01-25 | End: 2020-01-25

## 2020-01-25 RX ORDER — OSELTAMIVIR PHOSPHATE 30 MG/1
30 CAPSULE ORAL DAILY
Status: DISCONTINUED | OUTPATIENT
Start: 2020-01-26 | End: 2020-01-28 | Stop reason: DRUGHIGH

## 2020-01-25 RX ORDER — PANTOPRAZOLE SODIUM 40 MG/1
40 TABLET, DELAYED RELEASE ORAL
Status: DISCONTINUED | OUTPATIENT
Start: 2020-01-25 | End: 2020-01-29 | Stop reason: HOSPADM

## 2020-01-25 RX ORDER — TIOTROPIUM BROMIDE 18 UG/1
18 CAPSULE ORAL; RESPIRATORY (INHALATION) DAILY
Status: DISCONTINUED | OUTPATIENT
Start: 2020-01-25 | End: 2020-01-25 | Stop reason: CLARIF

## 2020-01-25 RX ORDER — POTASSIUM CHLORIDE 7.45 MG/ML
10 INJECTION INTRAVENOUS
Status: DISCONTINUED | OUTPATIENT
Start: 2020-01-25 | End: 2020-01-25

## 2020-01-25 RX ORDER — MAGNESIUM SULFATE HEPTAHYDRATE 40 MG/ML
4 INJECTION, SOLUTION INTRAVENOUS EVERY 4 HOURS PRN
Status: DISCONTINUED | OUTPATIENT
Start: 2020-01-25 | End: 2020-01-29 | Stop reason: HOSPADM

## 2020-01-25 RX ORDER — SODIUM CHLORIDE 9 MG/ML
INJECTION, SOLUTION INTRAVENOUS CONTINUOUS
Status: DISCONTINUED | OUTPATIENT
Start: 2020-01-25 | End: 2020-01-27

## 2020-01-25 RX ORDER — SODIUM CHLORIDE, SODIUM LACTATE, POTASSIUM CHLORIDE, CALCIUM CHLORIDE 600; 310; 30; 20 MG/100ML; MG/100ML; MG/100ML; MG/100ML
1000 INJECTION, SOLUTION INTRAVENOUS CONTINUOUS
Status: DISCONTINUED | OUTPATIENT
Start: 2020-01-25 | End: 2020-01-25

## 2020-01-25 RX ADMIN — SODIUM CHLORIDE, POTASSIUM CHLORIDE, SODIUM LACTATE AND CALCIUM CHLORIDE 1000 ML: 600; 310; 30; 20 INJECTION, SOLUTION INTRAVENOUS at 11:10

## 2020-01-25 RX ADMIN — SODIUM CHLORIDE, POTASSIUM CHLORIDE, SODIUM LACTATE AND CALCIUM CHLORIDE 1000 ML: 600; 310; 30; 20 INJECTION, SOLUTION INTRAVENOUS at 09:52

## 2020-01-25 RX ADMIN — INSULIN GLARGINE 58 UNITS: 100 INJECTION, SOLUTION SUBCUTANEOUS at 23:01

## 2020-01-25 RX ADMIN — ALBUTEROL SULFATE 2.5 MG: 2.5 SOLUTION RESPIRATORY (INHALATION) at 18:31

## 2020-01-25 RX ADMIN — ACETAMINOPHEN 650 MG: 325 TABLET, FILM COATED ORAL at 10:15

## 2020-01-25 RX ADMIN — CEFTRIAXONE 2 G: 2 INJECTION, POWDER, FOR SOLUTION INTRAMUSCULAR; INTRAVENOUS at 09:54

## 2020-01-25 RX ADMIN — BUDESONIDE 0.5 MG: 0.5 INHALANT RESPIRATORY (INHALATION) at 22:06

## 2020-01-25 RX ADMIN — LEVOTHYROXINE SODIUM 125 MCG: 125 TABLET ORAL at 15:57

## 2020-01-25 RX ADMIN — OSELTAMIVIR PHOSPHATE 30 MG: 30 CAPSULE ORAL at 11:17

## 2020-01-25 RX ADMIN — SODIUM CHLORIDE: 9 INJECTION, SOLUTION INTRAVENOUS at 15:57

## 2020-01-25 RX ADMIN — PANTOPRAZOLE SODIUM 40 MG: 40 TABLET, DELAYED RELEASE ORAL at 15:58

## 2020-01-25 RX ADMIN — AZITHROMYCIN MONOHYDRATE 500 MG: 500 INJECTION, POWDER, LYOPHILIZED, FOR SOLUTION INTRAVENOUS at 10:00

## 2020-01-25 RX ADMIN — ALBUTEROL SULFATE 2.5 MG: 2.5 SOLUTION RESPIRATORY (INHALATION) at 22:06

## 2020-01-25 RX ADMIN — ALBUTEROL SULFATE 2.5 MG: 2.5 SOLUTION RESPIRATORY (INHALATION) at 15:15

## 2020-01-25 RX ADMIN — ASPIRIN 81 MG 81 MG: 81 TABLET ORAL at 15:58

## 2020-01-25 RX ADMIN — PREDNISONE 10 MG: 10 TABLET ORAL at 14:59

## 2020-01-25 ASSESSMENT — ENCOUNTER SYMPTOMS
ROS GI COMMENTS: INCONTINENT OF STOOL
VOMITING: 0
COUGH: 1
ARTHRALGIAS: 0
DYSURIA: 0
AGITATION: 0
FEVER: 1
CONFUSION: 1
DIARRHEA: 1
EYE REDNESS: 0

## 2020-01-25 ASSESSMENT — MIFFLIN-ST. JEOR: SCORE: 1142.31

## 2020-01-25 ASSESSMENT — ACTIVITIES OF DAILY LIVING (ADL)
TOILETING: 1-->ASSISTIVE EQUIPMENT
COGNITION: 0 - NO COGNITION ISSUES REPORTED
ADLS_ACUITY_SCORE: 21
FALL_HISTORY_WITHIN_LAST_SIX_MONTHS: NO
TRANSFERRING: 1-->ASSISTIVE EQUIPMENT
DRESS: 0-->INDEPENDENT
AMBULATION: 1-->ASSISTIVE EQUIPMENT
RETIRED_COMMUNICATION: 0-->UNDERSTANDS/COMMUNICATES WITHOUT DIFFICULTY
SWALLOWING: 0-->SWALLOWS FOODS/LIQUIDS WITHOUT DIFFICULTY
ADLS_ACUITY_SCORE: 17
RETIRED_EATING: 0-->INDEPENDENT
BATHING: 0-->INDEPENDENT

## 2020-01-25 NOTE — H&P
[facility]    History and Physical  Hospitalist       Date of Admission:  1/25/2020    Assessment & Plan   Laura Perez is a 69 year old female who presents with weakness, fatigue, fever and influenza B.     Principal Problem:    Influenza B  Hypoxia due to influenza. Not normally on  Oxygen prior to admission.  Procalcitonin elevated.  Chest x-ray does not appear to have an acute infiltrate.  She was given a dose of ceftriaxone and azithromycin in the emergency room.  I think her symptoms are all related to acute viral illness and so I will not continue antibiotics at this time.  -admit inpt  -tamiflu  -oxygen as needed to maintain sats >92%. Wean as able  -continue home meds  -nebs as needed  -IVF overnight  -fall risk  -ambulate with assistance    Diarrhea  -check stool culture  -IVF overnight    SEAN on CKD  -IVF  -hold nephrotoxins       Chronic diastolic CHF (congestive heart failure), NYHA class 2 (H), does not appear to have an acute exacerbation but at risk for volume overload.   -monitor  -judicious fluid use      COPD (chronic obstructive pulmonary disease) (H).  No obvious acute exacerbation.  She is chronically on steroids.  Current daily dose is 12 mg daily.  -Continue steroids 12 mg daily.  Does not need stress dose steroids at this time.  -Monitor.  -Question if she is on the atovaquone for PCP prevention or other.  Can continue at this time.      Diabetes mellitus type 2, insulin dependent (H).  Hemoglobin A1c of 6.8.  -Hold home 70/30.  Switch to Lantus And sliding scale.  -POCT glc      HTN (hypertension).  BP in the low normal range.  -Hold home antihypertensive at this time  -IV fluids  -Monitor    History of rheumatoid arthritis on several immune modulating medications.  Noted.  She is at risk of increased infection.  Has known influenza B.  Treating as above.  Monitor.  -Hold injectable medications.  Okay to continue prednisone.      Other amyloidosis (H), noted.        DVT Prophylaxis: Low Risk/Ambulatory with no VTE prophylaxis indicated  Code Status: Prior    Jeniffer Amaya    Primary Care Physician   [facility]    Chief Complaint   Cough, weakness, fever    History is obtained from the patient and chart review.    History of Present Illness   Laura Perez is a 69 year old female who presents with cough, fever, weakness.  Symptoms started on Wednesday or approximately 3 days ago.  First symptoms were mostly weakness.  She is also noted some nausea and vomiting.  Also diarrhea with several loose stools per day that are brown but only partially formed.  She has not had much of an appetite.  Of significance, she is chronically immunocompromised due to her medication needed for her rheumatoid arthritis.  She is not had any increase or change in her recent medications.  She has been compliant with her medications.  However, did not take her medications today because she is not feeling well.  She has not been able to eat and drink well.  Has had high fever up to 103.  Was brought to the emergency room by her spouse.  In the emergency room found to be influenza B positive.  She also has history of COPD on several inhalers.  Is not normally on oxygen.  Currently requiring 2 L by nasal cannula with improvement in her saturations.    Past Medical History    I have reviewed this patient's medical history and updated it with pertinent information if needed.   Past Medical History:   Diagnosis Date     Amyloidosis (H)      Chronic kidney disease (CKD), stage III (moderate) (H)      Chronic obstructive pulmonary disease (H)     No Comments Provided     Diverticulosis of intestine without perforation or abscess without bleeding     Diffuse diverticulosis and history of diminutive hyperplastic colon     Essential (primary) hypertension     No Comments Provided     Hyperlipidemia     No Comments Provided     Hypothyroidism     No Comments Provided     Malignant neoplasm of colon (H)      2017     Osteoporosis      Rheumatoid arthritis (H)     Rheumatologist Dr. Gonzalez, [phone], fax [phone]     Rosacea     No Comments Provided     Type 2 diabetes mellitus without complications (H)     Diabetes Mellitus, prednisone induced       Past Surgical History   I have reviewed this patient's surgical history and updated it with pertinent information if needed.  Past Surgical History:   Procedure Laterality Date     ARTHROPLASTY,Right MTP 1 and 5 and left MTP 5  2001     CATARACT IOL, RT/LT Bilateral      COLON SURGERY Right 2017    For colon cancer     COLONOSCOPY  2008     COLONOSCOPY  2018    F/U in 5 years recommended secondary to polyps     LAPAROSCOPIC TUBAL LIGATION      No Comments Provided     Left MCP joint arthroplasty  1998     MTP 2, 3, 4 right foot  2000     RELEASE CARPAL TUNNEL Left      Right long finger MCP repair      [facility]     Right MCP 4 and 5 arthroplasty  2004     Right wrist arthroplasty         Prior to Admission Medications   Prior to Admission Medications   Prescriptions Last Dose Informant Patient Reported? Taking?   Abatacept (ORENCIA) 125 MG/ML SOAJ auto-injector Unknown at Unknown time  Yes No   Sig: Inject 1 mL (125 mg) Subcutaneous once a week   EPINEPHrine (EPIPEN/ADRENACLICK/OR ANY BX GENERIC EQUIV) 0.3 MG/0.3ML injection 2-pack More than a month at Unknown time  No No   Sig: Inject 0.3 mLs (0.3 mg) into the muscle once as needed   METHOTREXate, PF, (OTREXUP) 25 MG/0.4ML pen Unknown at Unknown time  Yes No   Sig: Inject 0.4 mLs (25 mg) Subcutaneous every 7 days   ONETOUCH DELICA LANCETS 33G MISC 2020 at Unknown time  No Yes   Si each by to test blood sugar route 2 times daily Dx: E11.9   PROAIR  (90 Base) MCG/ACT inhaler Past Week at Unknown time  No Yes   Sig: INHALE 2 PUFFS INTO THE LUNGS EVERY 6 HOURS AS NEEDED FOR WHEEZIGN FOR UP  DAYS   albuterol (2.5 MG/3ML) 0.083% neb solution Past Week  at Unknown time Self Yes Yes   Sig: Take 2.5 mg by nebulization every 6 hours as needed for shortness of breath / dyspnea or wheezing   aspirin 81 MG chewable tablet Past Week at Unknown time Self Yes Yes   Sig: Take 81 mg by mouth daily with food   atovaquone (MEPRON) 750 MG/5ML suspension Past Week at Unknown time  Yes Yes   Sig: Take 10 mLs (1,500 mg) by mouth daily   blood glucose (ONETOUCH VERIO IQ) test strip 2020 at Unknown time  No Yes   Sig: Use to test blood sugar 2 times daily or as directed.   budesonide (PULMICORT) 0.5 MG/2ML neb solution Past Week at Unknown time  No Yes   Sig: Take 2 mLs (0.5 mg) by nebulization 2 times daily   cyanocobalamin (VITAMIN B-12) 1000 MCG tablet Past Week at Unknown time  No Yes   Sig: Take 1 tablet (1,000 mcg) by mouth daily   estradiol (YUVAFEM) 10 MCG TABS vaginal tablet Past Week at Unknown time  No Yes   Sig: PLACE 10 MCG VAGINALLY TWICE A WEEK.   folic acid (FOLVITE) 1 MG tablet Past Week at Unknown time  No Yes   Sig: Take 1 tablet (1 mg) by mouth daily   formoterol (PERFOROMIST) 20 MCG/2ML neb solution Past Week at Unknown time  No Yes   Sig: Take 2 mLs (20 mcg) by nebulization 2 times daily   glucagon 1 MG kit Unknown at Unknown time Self Yes No   Sig: Inject 1 mg into the muscle as needed for low blood sugar    glucose 4 g CHEW chewable tablet Unknown at Unknown time Self Yes No   Sig: Take 1 tablet by mouth as needed for low blood sugar   insulin aspart prot & aspart (NOVOLOG MIX 70/30 PEN) (70-30) 100 UNIT/ML pen Past Week at Unknown time  No Yes   Si units am and and 26 in evening   insulin pen needle (BD LUCILLE U/F) 32G X 4 MM miscellaneous Past Week at Unknown time  No Yes   Sig: Use 1 pen needles daily or as directed.   levothyroxine (SYNTHROID) 125 MCG tablet 2020 at Unknown time  No Yes   Sig: Take 1 tablet (125 mcg) by mouth daily   lisinopril (PRINIVIL/ZESTRIL) 5 MG tablet Past Week at Unknown time  Yes Yes   Sig: Take 1 tablet (5 mg) by  mouth daily   medical cannabis liquid 2020 at Unknown time Self Yes Yes   Si-2 sprays under the tongue as needed for pain   medical cannabis liquid 2020 at Unknown time Self Yes Yes   Sig: Place 1 drop under the tongue as needed (pain) (This is NOT a prescription, and does not certify that the patient has a qualifying medical condition for medical cannabis.  The purpose of this order is  to document that the patient reports taking medical cannabis.)   montelukast (SINGULAIR) 10 MG tablet 2020 at Unknown time  No Yes   Sig: Take 1 tablet (10 mg) by mouth At Bedtime   omeprazole (PRILOSEC) 20 MG DR capsule Past Week at Unknown time  No Yes   Sig: Take 1 capsule (20 mg) by mouth daily   predniSONE (DELTASONE) 1 MG tablet 2020 at Unknown time Self Yes Yes   Sig: Take 2 mg by mouth daily TOTAL DAILY DOSE = 12 MG   predniSONE (DELTASONE) 5 MG tablet 2020 at Unknown time Self Yes Yes   Sig: Take 10 mg by mouth daily with food TOTAL DAILY DOSE = 12 MG   simvastatin (ZOCOR) 40 MG tablet Past Week at Unknown time  No Yes   Sig: Take 1 tablet (40 mg) by mouth At Bedtime   tiotropium (SPIRIVA) 18 MCG inhaled capsule Past Week at Unknown time  Yes Yes   Sig: Inhale 1 capsule (18 mcg) into the lungs daily   traMADol (ULTRAM) 50 MG tablet Past Week at Unknown time  No Yes   Sig: TAKE 1 TABLET BY MOUTH EVERY DAY      Facility-Administered Medications: None     Allergies   Allergies   Allergen Reactions     Glyburide Anaphylaxis     Other reaction(s): Dizziness     Mosquitoes (Informational Only) Hives     Other [Seasonal Allergies] Hives     Deer Flies     Sulfa Drugs Anaphylaxis and Hives     Sulfamethoxazole W/Trimethoprim Hives and Itching     Sulfamethoxazole-Trimethoprim Anaphylaxis     Other reaction(s): Other - Describe In Comment Field  Rash, nausea, dizziness     Codeine Nausea and Nausea and Vomiting       Social History   I have reviewed this patient's social history and updated it with  pertinent information if needed. Laura Perez  reports that she quit smoking about 14 years ago. Her smoking use included cigarettes. She has a 30.00 pack-year smoking history. She has never used smokeless tobacco. She reports current alcohol use of about 2.5 standard drinks of alcohol per week. She reports current drug use.    Family History   I have reviewed this patient's family history and updated it with pertinent information if needed.   Family History   Problem Relation Age of Onset     Cancer Mother 50        uterus     Heart Disease Mother         MI     Colon Cancer Mother      Heart Disease Father         MI     Chronic Obstructive Pulmonary Disease Sister      Other - See Comments Sister         rubina dow     Colon Cancer Brother      Breast Cancer No family hx of         Cancer-breast       Review of Systems     REVIEW OF SYSTEMS:    Constitutional: normal energy and appetite, no recent sick contacts  Eyes: no changes in vision  Ears, nose, mouth, throat, and face: no mouth sores, dysphagia, or odynophagia  Respiratory: Today for cough and shortness of breath. No aspiration symptoms.   Cardiovascular: no chest pain, palpitations, orthopnea, increased lower extremity edema, or syncope.   Gastrointestinal: Positive for nausea, vomiting and diarrhea.  No abdominal pain or dysuria.  Genitourinary: no dysuria, hematuria, urgency or frequency.   Hematologic/lymphatic: no unintentional weight loss or night sweats.  Musculoskeletal: no pain to extremities or falls.   Neurological: no new weakness, tingling, numbness.   Psychiatric: no hallucinations ordelusions.  Endocrine:  blood sugars have been well controlled.     Additions to the above include: see HPI.     Physical Exam   Temp: 101.5  F (38.6  C) Temp src: Tympanic BP: 117/44 Pulse: 96 Heart Rate: 92 Resp: (!) 34 SpO2: 97 % O2 Device: Nasal cannula Oxygen Delivery: 2 LPM  Vital Signs with Ranges  Temp:  [101.5  F (38.6  C)] 101.5  F (38.6   C)  Pulse:  [] 96  Heart Rate:  [92-99] 92  Resp:  [13-34] 34  BP: (117-124)/(44-57) 117/44  SpO2:  [91 %-98 %] 97 %  144 lbs 0 oz    Constitutional: Wake and alert but appears acutely ill and fatigued appearing wearing nasal cannula oxygen.  Eyes: Extraocular muscles intact.  Pupils equal and round.  Nonicteric, noninjected  HEENT: Normocephalic, atraumatic.  Flushing of the cheeks.  Normal dentition.  Respiratory: Diminished throughout.  No wheezing or rhonchi.  Mild tachypnea but no accessory muscle use.  Cardiovascular: Regular rate.  No edema.  GI: Abdomen is soft, nontender, nondistended.  Skin: Warm, dry, intact.  No concerning rashes or lesions.  Musculoskeletal: Deformities is associated and expected for rheumatoid arthritis with some intrinsic muscle wasting.  Neurologic: No focal deficits.  Psychiatric: Appropriate affect and insight.    Data   Data reviewed today:  I personally reviewed the chest x-ray image(s) showing Nodule of the left upper lobe which is chronic.  No other acute infiltrate or effusion..  Recent Labs   Lab 01/25/20  0959   WBC 17.5*   HGB 13.3   MCV 95         POTASSIUM 4.8   CHLORIDE 98   CO2 22   BUN 37*   CR 2.52*   ANIONGAP 14   GIANNI 9.5   *   ALBUMIN 3.8   PROTTOTAL 7.2   BILITOTAL 0.5   ALKPHOS 58   ALT 38   AST 42*   LIPASE 33       No results found for this or any previous visit (from the past 24 hour(s)).

## 2020-01-25 NOTE — PHARMACY-ADMISSION MEDICATION HISTORY
Pharmacy -- Admission Medication Reconciliation    Prior to admission (PTA) medications were reviewed and the patient's PTA medication list was updated.    Sources Consulted: Patient spouse interview, [employer], Chart review    The reliability of this Medication Reconciliation is: Reliability: Reliable    The following significant changes were made:    Updated discharge pharmacy to [facility]    Updated Mepron dose to 750 mg daily    Removed 2mg prednisone order--patient currently takes 10 mg daily    Updated tramadol to HS (not prn)    Added trazodone prn    **Patient has not taken most medications since Wednesday with exception of the few noted in last doses  **Patient injects Orenscia/Methotrexate every Tuesday    In addition, the patient's allergies were reviewed with the patient and updated as follows:   Allergies: Glyburide; Mosquitoes (informational only); Other [seasonal allergies]; Sulfa drugs; Sulfamethoxazole w/trimethoprim; Sulfamethoxazole-trimethoprim; and Codeine    The pharmacist has reviewed with the patient that all personal medications should be removed from the building or locked in the belongings safe.  Patient shall only take medications ordered by the physician and administered by the nursing staff.       Medication barriers identified: none   Medication adherence concerns: none   Understanding of emergency medications: yes,  understands nebulizer/insulin/glucose control    Hetal uSllivan RPH, 1/25/2020,  3:31 PM

## 2020-01-25 NOTE — ED PROVIDER NOTES
History     Chief Complaint   Patient presents with     Fever     HPI  Laura Perez is a 69 year old female who comes in by ambulance from home with high fever, incontinence of stool, mental status change and coughing.  She is fairly confused and cannot give a real great history.  Her  states that she started getting sick about 3 days ago.  The last 2 days was significantly worse.  Last night however she was thinking clearly was not at all confused and was not incontinent of stool.  She does have a history of frequent pneumonia In part due to methotrexate use.    Allergies:  Allergies   Allergen Reactions     Glyburide Anaphylaxis     Other reaction(s): Dizziness     Mosquitoes (Informational Only) Hives     Other [Seasonal Allergies] Hives     Deer Flies     Sulfa Drugs Anaphylaxis and Hives     Sulfamethoxazole W/Trimethoprim Hives and Itching     Sulfamethoxazole-Trimethoprim Anaphylaxis     Other reaction(s): Other - Describe In Comment Field  Rash, nausea, dizziness     Codeine Nausea and Nausea and Vomiting       Problem List:    Patient Active Problem List    Diagnosis Date Noted     Influenza B 01/25/2020     Priority: Medium     Other amyloidosis (H) 11/07/2018     Priority: Medium     Avascular necrosis of left talus (H) 06/16/2018     Priority: Medium     Recurrent pneumonia 06/15/2018     Priority: Medium     Acne rosacea 01/26/2018     Priority: Medium     History of colonic polyps 01/26/2018     Priority: Medium     Overview:   diminutive at 25 cm       Hyperlipidemia 01/26/2018     Priority: Medium     Hypothyroidism 01/26/2018     Priority: Medium     Migraine headache 01/26/2018     Priority: Medium     Disorder of bone and cartilage 01/26/2018     Priority: Medium     Overview:   2007       Popliteal cyst, left 01/26/2018     Priority: Medium     Chronic kidney disease (CKD), stage III (moderate) (H) 12/11/2017     Priority: Medium     HTN (hypertension) 11/29/2017     Priority: Medium      Osteoporosis 11/27/2017     Priority: Medium     Malignant tumor of colon (H) 11/26/2017     Priority: Medium     Anemia of chronic kidney failure, stage 3 (moderate) (H) 07/28/2017     Priority: Medium     Diabetes mellitus type 2, insulin dependent (H) 07/28/2017     Priority: Medium     Chronic diastolic CHF (congestive heart failure), NYHA class 2 (H) 02/13/2016     Priority: Medium     Non-rheumatic mitral valve stenosis 02/13/2016     Priority: Medium     On prednisone therapy 01/15/2016     Priority: Medium     Orthostatic hypotension 01/15/2016     Priority: Medium     Secondary adrenal insufficiency (H) 01/15/2016     Priority: Medium     Seropositive rheumatoid arthritis (H) 01/15/2016     Priority: Medium     Lichen sclerosus et atrophicus 03/05/2015     Priority: Medium     COPD (chronic obstructive pulmonary disease) (H) 09/12/2014     Priority: Medium        Past Medical History:    Past Medical History:   Diagnosis Date     Amyloidosis (H)      Chronic kidney disease (CKD), stage III (moderate) (H)      Chronic obstructive pulmonary disease (H)      Diverticulosis of intestine without perforation or abscess without bleeding      Essential (primary) hypertension      Hyperlipidemia      Hypothyroidism      Malignant neoplasm of colon (H)      Osteoporosis      Rheumatoid arthritis (H)      Rosacea      Type 2 diabetes mellitus without complications (H)        Past Surgical History:    Past Surgical History:   Procedure Laterality Date     ARTHROPLASTY,Right MTP 1 and 5 and left MTP 5  01/2001     CATARACT IOL, RT/LT Bilateral      COLON SURGERY Right 09/2017    For colon cancer     COLONOSCOPY  09/29/2008     COLONOSCOPY  2018    F/U in 5 years recommended secondary to polyps     LAPAROSCOPIC TUBAL LIGATION      No Comments Provided     Left MCP joint arthroplasty  02/05/1998     MTP 2, 3, 4 right foot  05/2000     RELEASE CARPAL TUNNEL Left 1991     [place]     Right MCP 4 and 5 arthroplasty  2004     Right wrist arthroplasty         Family History:    Family History   Problem Relation Age of Onset     Cancer Mother 50        uterus     Heart Disease Mother         MI     Colon Cancer Mother      Heart Disease Father         MI     Chronic Obstructive Pulmonary Disease Sister      Other - See Comments Sister         rubina dow     Colon Cancer Brother      Breast Cancer No family hx of         Cancer-breast       Social History:  Marital Status:   [2]  Social History     Tobacco Use     Smoking status: Former Smoker     Packs/day: 1.00     Years: 30.00     Pack years: 30.00     Types: Cigarettes     Last attempt to quit: 2005     Years since quittin.4     Smokeless tobacco: Never Used   Substance Use Topics     Alcohol use: Yes     Alcohol/week: 2.5 standard drinks     Comment: Rare     Drug use: Yes     Comment: Medical marijuana        Medications:    albuterol (2.5 MG/3ML) 0.083% neb solution  aspirin 81 MG chewable tablet  atovaquone (MEPRON) 750 MG/5ML suspension  blood glucose (ONETOUCH VERIO IQ) test strip  budesonide (PULMICORT) 0.5 MG/2ML neb solution  cyanocobalamin (VITAMIN B-12) 1000 MCG tablet  estradiol (YUVAFEM) 10 MCG TABS vaginal tablet  folic acid (FOLVITE) 1 MG tablet  formoterol (PERFOROMIST) 20 MCG/2ML neb solution  insulin aspart prot & aspart (NOVOLOG MIX 70/30 PEN) (70-30) 100 UNIT/ML pen  insulin pen needle (BD LUCILLE U/F) 32G X 4 MM miscellaneous  levothyroxine (SYNTHROID) 125 MCG tablet  lisinopril (PRINIVIL/ZESTRIL) 5 MG tablet  medical cannabis liquid  medical cannabis liquid  montelukast (SINGULAIR) 10 MG tablet  omeprazole (PRILOSEC) 20 MG DR capsule  ONETOUCH DELICA LANCETS 33G MISC  predniSONE (DELTASONE) 1 MG tablet  predniSONE (DELTASONE) 5 MG tablet  PROAIR  (90 Base) MCG/ACT inhaler  simvastatin (ZOCOR) 40 MG tablet  tiotropium (SPIRIVA) 18 MCG inhaled capsule  traMADol (ULTRAM) 50 MG  "tablet  Abatacept (ORENCIA) 125 MG/ML SOAJ auto-injector  EPINEPHrine (EPIPEN/ADRENACLICK/OR ANY BX GENERIC EQUIV) 0.3 MG/0.3ML injection 2-pack  glucagon 1 MG kit  glucose 4 g CHEW chewable tablet  METHOTREXate, PF, (OTREXUP) 25 MG/0.4ML pen          Review of Systems   Constitutional: Positive for fever.   HENT: Positive for congestion.    Eyes: Negative for redness.   Respiratory: Positive for cough.    Cardiovascular: Negative for chest pain.   Gastrointestinal: Positive for diarrhea. Negative for vomiting.        Incontinent of stool   Genitourinary: Negative for dysuria.   Musculoskeletal: Negative for arthralgias.   Skin: Negative for rash.   Psychiatric/Behavioral: Positive for confusion. Negative for agitation.       Physical Exam   BP: 124/57  Pulse: 111  Heart Rate: 99  Temp: 101.5  F (38.6  C)  Resp: 28  Height: 160 cm (5' 3\")  Weight: 65.3 kg (144 lb)  SpO2: 91 %      Physical Exam  Vitals signs and nursing note reviewed.   Constitutional:       Appearance: Normal appearance.   HENT:      Head: Normocephalic and atraumatic.   Eyes:      Conjunctiva/sclera: Conjunctivae normal.   Cardiovascular:      Rate and Rhythm: Normal rate and regular rhythm.      Heart sounds: Normal heart sounds.   Pulmonary:      Effort: Pulmonary effort is normal.      Breath sounds: Normal breath sounds.      Comments: Faint wheezes throughout  Skin:     General: Skin is warm and dry.   Neurological:      General: No focal deficit present.      Mental Status: She is alert. She is disoriented.      GCS: GCS eye subscore is 4. GCS verbal subscore is 4. GCS motor subscore is 6.   Psychiatric:         Mood and Affect: Mood normal.         Behavior: Behavior normal.         ED Course     ED Course as of Jan 25 1129   Sat Jan 25, 2020   1053 Procalcitonin: 1.84     Procedures           Portable chest x-ray no obvious infiltrate to my view.  She does have what may be a left upper lobe nodule which I do not see on previous.    Results " for orders placed or performed during the hospital encounter of 01/25/20 (from the past 24 hour(s))   CBC with platelets differential   Result Value Ref Range    WBC 17.5 (H) 4.0 - 11.0 10e9/L    RBC Count 4.35 3.8 - 5.2 10e12/L    Hemoglobin 13.3 11.7 - 15.7 g/dL    Hematocrit 41.5 35.0 - 47.0 %    MCV 95 78 - 100 fl    MCH 30.6 26.5 - 33.0 pg    MCHC 32.0 31.5 - 36.5 g/dL    RDW 14.6 10.0 - 15.0 %    Platelet Count 184 150 - 450 10e9/L    Diff Method Automated Method     % Neutrophils 78.9 %    % Lymphocytes 9.8 %    % Monocytes 10.7 %    % Eosinophils 0.0 %    % Basophils 0.1 %    % Immature Granulocytes 0.5 %    Absolute Neutrophil 13.8 (H) 1.6 - 8.3 10e9/L    Absolute Lymphocytes 1.7 0.8 - 5.3 10e9/L    Absolute Monocytes 1.9 (H) 0.0 - 1.3 10e9/L    Absolute Eosinophils 0.0 0.0 - 0.7 10e9/L    Absolute Basophils 0.0 0.0 - 0.2 10e9/L    Abs Immature Granulocytes 0.1 0 - 0.4 10e9/L   Comprehensive metabolic panel   Result Value Ref Range    Sodium 134 134 - 144 mmol/L    Potassium 4.8 3.5 - 5.1 mmol/L    Chloride 98 98 - 107 mmol/L    Carbon Dioxide 22 21 - 31 mmol/L    Anion Gap 14 3 - 14 mmol/L    Glucose 202 (H) 70 - 105 mg/dL    Urea Nitrogen 37 (H) 7 - 25 mg/dL    Creatinine 2.52 (H) 0.60 - 1.20 mg/dL    GFR Estimate 19 (L) >60 mL/min/[1.73_m2]    GFR Estimate If Black 23 (L) >60 mL/min/[1.73_m2]    Calcium 9.5 8.6 - 10.3 mg/dL    Bilirubin Total 0.5 0.3 - 1.0 mg/dL    Albumin 3.8 3.5 - 5.7 g/dL    Protein Total 7.2 6.4 - 8.9 g/dL    Alkaline Phosphatase 58 34 - 104 U/L    ALT 38 7 - 52 U/L    AST 42 (H) 13 - 39 U/L   Lactic acid   Result Value Ref Range    Lactic Acid 1.8 0.5 - 2.2 mmol/L   Lipase   Result Value Ref Range    Lipase 33 11 - 82 U/L   Procalcitonin   Result Value Ref Range    Procalcitonin 1.84 ng/ml   Influenza A and B and RSV PCR   Result Value Ref Range    Specimen Description Nasopharyngeal     Influenza A PCR Negative NEG^Negative    Influenza B PCR Positive (A) NEG^Negative    Resp  Syncytial Virus Negative NEG^Negative   UA reflex to Microscopic and Culture   Result Value Ref Range    Color Urine Yellow     Appearance Urine Clear     Glucose Urine Negative NEG^Negative mg/dL    Bilirubin Urine Negative NEG^Negative    Ketones Urine Negative NEG^Negative mg/dL    Specific Gravity Urine 1.017 1.003 - 1.035    Blood Urine Trace (A) NEG^Negative    pH Urine 6.5 5.0 - 7.0 pH    Protein Albumin Urine 300 (A) NEG^Negative mg/dL    Urobilinogen mg/dL Normal 0.0 - 2.0 mg/dL    Nitrite Urine Negative NEG^Negative    Leukocyte Esterase Urine Negative NEG^Negative    Source Catheterized Urine     RBC Urine 5 (A) OTO2^O - 2 /HPF    WBC Urine 1 (A) OTO5^0 - 5 /HPF    Squamous Epithelial /HPF Urine 1 0 - 1 /HPF    Mucous Urine Present (A) NEG^Negative /LPF       Medications   sodium chloride (PF) 0.9% PF flush 3 mL (has no administration in time range)   sodium chloride (PF) 0.9% PF flush 3 mL (has no administration in time range)   lactated ringers BOLUS 1,000 mL (0 mLs Intravenous Stopped 1/25/20 1110)     Followed by   lactated ringers infusion (1,000 mLs Intravenous New Bag 1/25/20 1110)   acetaminophen (TYLENOL) tablet 650 mg (650 mg Oral Given 1/25/20 1015)     Or   acetaminophen (TYLENOL) Suppository 650 mg ( Rectal See Alternative 1/25/20 1015)   oseltamivir (TAMIFLU) capsule 30 mg (30 mg Oral Given 1/25/20 1117)   cefTRIAXone (ROCEPHIN) 2 g in NS (0 g Intravenous Stopped 1/25/20 1110)   azithromycin (ZITHROMAX) 500 mg in  mL (500 mg Intravenous Given 1/25/20 1000)       Assessments & Plan (with Medical Decision Making)     I have reviewed the nursing notes.    I have reviewed the findings, diagnosis, plan and need for follow up with the patient.  Patient is quite ill and toxic appearing.  On arrival she had a high fever and was tachycardic.  She has been tachypneic as well.  Initially I was concerned for sepsis so she was given a liter of IV fluids, Rocephin and Zithromax.  Lactic acid is  normal.  Procalcitonin shows her at moderate risk for systemic infection.  No obvious pneumonia on x-ray, however she does have influenza B.  She was also given some Tamiflu, decreased dosing due to her dehydration.  I have spoken with Dr. Amaya, hospitalist who has accepted her for admission    New Prescriptions    No medications on file       Final diagnoses:   Influenza B   SIRS (systemic inflammatory response syndrome) (H)       1/25/2020   [facility] AND \[facility]\""     Toney Almonte MD  01/25/20 1138

## 2020-01-25 NOTE — PROGRESS NOTES
RESPIRATORY THERAPY ASSESSMENT    Assessment:     Reason for Assessment: COPD (01/25/20 1400)    Pulmonary History:    Pulmonary Status: Smoking history, greater than or equal to 1 PPD (01/25/20 1400)    Smoking cessation information given: No     Surgical:  Surgical Status: No surgery or greater than/equal 4 weeks post-op (01/25/20 1400)    CXR:   Chest X-ray: Chronic changes or CXR pending (01/25/20 1400)    Respiratory Pattern:    Respiratory Pattern: Increased RR 21-25 (01/25/20 1400)    Breath Sounds:    Breath Sounds: Wheezing and/or rhonchi (01/25/20 1400)    Effectiveness of Cough:     Cough Effectiveness: Strong, productive (01/25/20 1400)    Mental Status:    Mental Status: Lethargic, follows commands (01/25/20 1400)    Level of Activity:     Acuity Level : Acuity 3 (11-15 points) (01/25/20 1400)    Current O2 Requirement:   O2 Required for SpO2>=92%: 1-3 liters (01/25/20 1400)    Patient uses home oxygen:   No    Does the patient have a diagnosis of COPD? Yes    Pt currently ordered on Spiriva.  Due to contraindication for DuoNeb with Spiriva, Albuterol Q4 w/a and Q2 prn will be ordered.      Kary Vitale, RT on 1/25/2020 at 2:27 PM

## 2020-01-25 NOTE — ED TRIAGE NOTES
Patient arrives via EMS with c/o fevers, nausea, vomiting , cough and loss of bowel and bladder control.  Is alert and oriented

## 2020-01-25 NOTE — PLAN OF CARE
Laura has been very sleepy, falling asleep during cares and assessments since arrival to [facility]. She is arousable to light touch and voice. Blood sugar is therapeutic and she is on RA now at 97%. She is oriented x4 when she is awake and alert. No confusion noted since transfer to this unit. Will continue to monitor.     Has frequent loose stools. Unable to obtain stool specimen yet due to thin and watery and absorbed into brief each time. She is incontinent of stool, not per her baseline. She could use a shower tomorrow when she is feeling stronger and more awake. Has been incontinent of stool for several days at home. Using barrier cream to her coccyx for erythema. Ls dim and coarse with some expiratory wheezes scattered.     Dr. Amaya answered  Benigno's questions at bedside this afternoon.

## 2020-01-26 LAB
ANION GAP SERPL CALCULATED.3IONS-SCNC: 10 MMOL/L (ref 3–14)
BUN SERPL-MCNC: 40 MG/DL (ref 7–25)
CALCIUM SERPL-MCNC: 8 MG/DL (ref 8.6–10.3)
CHLORIDE SERPL-SCNC: 102 MMOL/L (ref 98–107)
CO2 SERPL-SCNC: 21 MMOL/L (ref 21–31)
CREAT SERPL-MCNC: 2.08 MG/DL (ref 0.6–1.2)
ERYTHROCYTE [DISTWIDTH] IN BLOOD BY AUTOMATED COUNT: 15.2 % (ref 10–15)
GFR SERPL CREATININE-BSD FRML MDRD: 24 ML/MIN/{1.73_M2}
GLUCOSE SERPL-MCNC: 131 MG/DL (ref 70–105)
HCT VFR BLD AUTO: 35.3 % (ref 35–47)
HGB BLD-MCNC: 11.3 G/DL (ref 11.7–15.7)
MCH RBC QN AUTO: 30.8 PG (ref 26.5–33)
MCHC RBC AUTO-ENTMCNC: 32 G/DL (ref 31.5–36.5)
MCV RBC AUTO: 96 FL (ref 78–100)
PLATELET # BLD AUTO: 138 10E9/L (ref 150–450)
POTASSIUM SERPL-SCNC: 4.6 MMOL/L (ref 3.5–5.1)
RBC # BLD AUTO: 3.67 10E12/L (ref 3.8–5.2)
SODIUM SERPL-SCNC: 133 MMOL/L (ref 134–144)
WBC # BLD AUTO: 7.1 10E9/L (ref 4–11)

## 2020-01-26 PROCEDURE — 40000275 ZZH STATISTIC RCP TIME EA 10 MIN

## 2020-01-26 PROCEDURE — 25000125 ZZHC RX 250: Performed by: FAMILY MEDICINE

## 2020-01-26 PROCEDURE — 94640 AIRWAY INHALATION TREATMENT: CPT | Mod: 76

## 2020-01-26 PROCEDURE — 94660 CPAP INITIATION&MGMT: CPT

## 2020-01-26 PROCEDURE — 85027 COMPLETE CBC AUTOMATED: CPT | Performed by: FAMILY MEDICINE

## 2020-01-26 PROCEDURE — 12000000 ZZH R&B MED SURG/OB

## 2020-01-26 PROCEDURE — 25000125 ZZHC RX 250

## 2020-01-26 PROCEDURE — 36415 COLL VENOUS BLD VENIPUNCTURE: CPT | Performed by: FAMILY MEDICINE

## 2020-01-26 PROCEDURE — 94640 AIRWAY INHALATION TREATMENT: CPT

## 2020-01-26 PROCEDURE — 80048 BASIC METABOLIC PNL TOTAL CA: CPT | Performed by: FAMILY MEDICINE

## 2020-01-26 PROCEDURE — 25800030 ZZH RX IP 258 OP 636: Performed by: FAMILY MEDICINE

## 2020-01-26 PROCEDURE — 25000132 ZZH RX MED GY IP 250 OP 250 PS 637: Mod: GY | Performed by: FAMILY MEDICINE

## 2020-01-26 PROCEDURE — 25000131 ZZH RX MED GY IP 250 OP 636 PS 637: Mod: GY | Performed by: FAMILY MEDICINE

## 2020-01-26 PROCEDURE — 99233 SBSQ HOSP IP/OBS HIGH 50: CPT | Performed by: FAMILY MEDICINE

## 2020-01-26 PROCEDURE — 25000132 ZZH RX MED GY IP 250 OP 250 PS 637: Mod: GY | Performed by: INTERNAL MEDICINE

## 2020-01-26 RX ORDER — DIPHENHYDRAMINE HCL 25 MG
25 CAPSULE ORAL
Status: DISCONTINUED | OUTPATIENT
Start: 2020-01-26 | End: 2020-01-29 | Stop reason: HOSPADM

## 2020-01-26 RX ADMIN — TIOTROPIUM BROMIDE INHALATION SPRAY 2 PUFF: 3.12 SPRAY, METERED RESPIRATORY (INHALATION) at 07:56

## 2020-01-26 RX ADMIN — DIPHENHYDRAMINE HYDROCHLORIDE 25 MG: 25 CAPSULE ORAL at 20:27

## 2020-01-26 RX ADMIN — MONTELUKAST SODIUM 10 MG: 5 TABLET, CHEWABLE ORAL at 20:27

## 2020-01-26 RX ADMIN — OSELTAMIVIR PHOSPHATE 30 MG: 30 CAPSULE ORAL at 09:34

## 2020-01-26 RX ADMIN — TRAMADOL HYDROCHLORIDE 50 MG: 50 TABLET, FILM COATED ORAL at 20:26

## 2020-01-26 RX ADMIN — INSULIN GLARGINE 58 UNITS: 100 INJECTION, SOLUTION SUBCUTANEOUS at 23:05

## 2020-01-26 RX ADMIN — BUDESONIDE 0.5 MG: 0.5 INHALANT RESPIRATORY (INHALATION) at 19:13

## 2020-01-26 RX ADMIN — PANTOPRAZOLE SODIUM 40 MG: 40 TABLET, DELAYED RELEASE ORAL at 07:37

## 2020-01-26 RX ADMIN — SODIUM CHLORIDE: 9 INJECTION, SOLUTION INTRAVENOUS at 10:58

## 2020-01-26 RX ADMIN — ALBUTEROL SULFATE 2.5 MG: 2.5 SOLUTION RESPIRATORY (INHALATION) at 19:13

## 2020-01-26 RX ADMIN — LEVOTHYROXINE SODIUM 125 MCG: 125 TABLET ORAL at 09:34

## 2020-01-26 RX ADMIN — PREDNISONE 10 MG: 10 TABLET ORAL at 09:34

## 2020-01-26 RX ADMIN — ASPIRIN 81 MG 81 MG: 81 TABLET ORAL at 09:34

## 2020-01-26 RX ADMIN — ALBUTEROL SULFATE 2.5 MG: 2.5 SOLUTION RESPIRATORY (INHALATION) at 07:56

## 2020-01-26 RX ADMIN — ALBUTEROL SULFATE 2.5 MG: 2.5 SOLUTION RESPIRATORY (INHALATION) at 14:29

## 2020-01-26 RX ADMIN — BUDESONIDE 0.5 MG: 0.5 INHALANT RESPIRATORY (INHALATION) at 07:56

## 2020-01-26 RX ADMIN — ALBUTEROL SULFATE 2.5 MG: 2.5 SOLUTION RESPIRATORY (INHALATION) at 10:15

## 2020-01-26 RX ADMIN — ATOVAQUONE 750 MG: 750 SUSPENSION ORAL at 07:37

## 2020-01-26 ASSESSMENT — ACTIVITIES OF DAILY LIVING (ADL)
ADLS_ACUITY_SCORE: 23

## 2020-01-26 ASSESSMENT — MIFFLIN-ST. JEOR: SCORE: 1151.13

## 2020-01-26 NOTE — PROGRESS NOTES
Resting in bed.  States feels a little sob.  Sats good.  Prod cough.  Was just up to BR with NA.  Cont to monitor.  No requests o/w

## 2020-01-26 NOTE — PROGRESS NOTES
Pt transferred to ICU from medical for increased lethargy and drowsy, plan for BiPAP. Upon transfer pt is alert and talkative, she is oriented x4, her family arrived and is at the bedside, pt assessed, she is weak, expiratory wheezes, tachypnic, denies pain, BiPAP per RT.

## 2020-01-26 NOTE — PROGRESS NOTES
Report given to Wilma HAYS RN.  Pt into w/c for transfer to [facility] rm [id number] with Girish MT/NA.  All belongings accompanied.  IV infusing without dif.

## 2020-01-26 NOTE — PROGRESS NOTES
[facility]    Hospitalist Progress Note      Assessment & Plan   Laura Perez is a 69 year old female who was admitted on 1/25/2020 with with weakness, fatigue, fever and influenza B.      Principal Problem:    Influenza B  Hypoxia due to influenza.  Had increasing somnolence overnight.  ABG was checked and she was retaining CO2.  She was transferred to the unit and put on BiPAP.  Much improved this morning and actually off oxygen.  Mentating well.  Appetite has improved.  Procalcitonin elevated.  Chest x-ray does not appear to have an acute infiltrate.  She was given a dose of ceftriaxone and azithromycin in the emergency room.  I think her symptoms are all related to acute viral illness and so I will not continue antibiotics at this time.  -continue tamiflu  -oxygen as needed to maintain sats >92%. Wean as able  -continue home meds  -nebs as needed  -IVF overnight  -fall risk  -ambulate with assistance  -Okay to transfer back to general medical floor at this time.     Diarrhea  -check stool culture, has not had any bowel movement since admission.  -IVF overnight     SEAN on CKD  -IVF  -hold nephrotoxins       Chronic diastolic CHF (congestive heart failure), NYHA class 2 (H), does not appear to have an acute exacerbation but at risk for volume overload.   -monitor  -judicious fluid use       COPD (chronic obstructive pulmonary disease) (H).  No obvious acute exacerbation.  She is chronically on steroids.  Current daily dose is 12 mg daily.  -Continue steroids 12 mg daily.  Does not need stress dose steroids at this time.  -Monitor.  -Question if she is on the atovaquone for PCP prevention or other.  Can continue at this time.       Diabetes mellitus type 2, insulin dependent (H).  Hemoglobin A1c of 6.8.  -Hold home 70/30.  Switch to Lantus And sliding scale.  -POCT glc       HTN (hypertension).  BP in the low normal range.  -Hold home antihypertensive at this time  -IV  fluids  -Monitor     History of rheumatoid arthritis on several immune modulating medications.  Noted.  She is at risk of increased infection.  Has known influenza B.  Treating as above.  Monitor.  -Hold injectable medications.  Okay to continue prednisone.       Other amyloidosis (H), noted.        DVT Prophylaxis: Pneumatic Compression Devices  Code Status: Full Code    Jeniffer Amaya    Interval History   Had increasing somnolence overnight.  ABG was checked and she was noted to be retaining CO2.  She was transferred to the ICU and placed on a BiPAP.  She is much improved this morning.  Actually off of oxygen.  States she feels much better.  Has improved appetite.  Improved breathing.  No nausea or vomiting.  Has not had any bowel movement since admission.    -Data reviewed today: I reviewed all new labs and imaging results over the last 24 hours. I personally reviewed no images or EKG's today.    Physical Exam   Temp: 96.9  F (36.1  C) Temp src: Tympanic BP: 125/79 Pulse: 66 Heart Rate: 93 Resp: 23 SpO2: 96 % O2 Device: Nasal cannula Oxygen Delivery: 1 LPM  Vitals:    01/25/20 0938   Weight: 65.3 kg (144 lb)     Vital Signs with Ranges  Temp:  [96.8  F (36  C)-101.5  F (38.6  C)] 96.9  F (36.1  C)  Pulse:  [] 66  Heart Rate:  [63-99] 93  Resp:  [8-34] 23  BP: ()/(42-79) 125/79  FiO2 (%):  [30 %-40 %] 30 %  SpO2:  [91 %-100 %] 96 %  I/O last 3 completed shifts:  In: 1306.5 [P.O.:200; I.V.:1106.5]  Out: 400 [Urine:400]    Constitutional: Appears stated age but chronically ill-appearing with central obesity.  Respiratory: Diminished but no wheezing or rales  Cardiovascular: Regular rate.  No murmur.  GI: Abdomen is soft, nontender, nondistended.  Skin/Integumen: Scattered bruising.  Other: Intrinsic muscle wasting and joint findings consistent with her known rheumatoid arthritis.  Normal affect and insight.    Medications     sodium chloride 50 mL/hr at 01/25/20 1557       albuterol  2.5 mg Nebulization  Q4H While awake     aspirin  81 mg Oral Daily     atovaquone  750 mg Oral Daily     budesonide  0.5 mg Nebulization BID     docusate sodium  100 mg Oral BID     formoterol  2 mL Nebulization BID     insulin aspart  1-10 Units Subcutaneous TID AC     insulin aspart  1-7 Units Subcutaneous At Bedtime     insulin glargine  58 Units Subcutaneous At Bedtime     levothyroxine  125 mcg Oral Daily     montelukast  10 mg Oral At Bedtime     oseltamivir  30 mg Oral Daily     pantoprazole  40 mg Oral QAM AC     predniSONE  10 mg Oral Daily     sodium chloride (PF)  3 mL Intracatheter Q8H     tiotropium  2 puff Inhalation Daily       Data   Recent Labs   Lab 01/26/20  0445 01/25/20  0959   WBC 7.1 17.5*   HGB 11.3* 13.3   MCV 96 95   * 184   * 134   POTASSIUM 4.6 4.8   CHLORIDE 102 98   CO2 21 22   BUN 40* 37*   CR 2.08* 2.52*   ANIONGAP 10 14   GIANNI 8.0* 9.5   * 202*   ALBUMIN  --  3.8   PROTTOTAL  --  7.2   BILITOTAL  --  0.5   ALKPHOS  --  58   ALT  --  38   AST  --  42*   LIPASE  --  33       Recent Results (from the past 24 hour(s))   XR Chest Port 1 View    Narrative    PROCEDURE:  XR CHEST PORT 1 VW    HISTORY: sob. .    COMPARISON:  6/27/2018, 7/6/2016    FINDINGS:    The cardiomediastinal contours are stable.  Somewhat masslike consolidation is suggested in the left suprahilar  region, distinct from the masslike area seen on the prior from 2016.  No effusion or pneumothorax is seen.      Impression    IMPRESSION:  Nodular consolidation in the left lung. Consider  follow-up CT chest after appropriate clinical therapy.      PINEDA AYALA MD

## 2020-01-26 NOTE — PROGRESS NOTES
Patient transferred from ICU. Report received from Fernando. Patient is a/o x4, awake, smiling, chatting. Much improved from yesterday's admission to [facility].   On RA, VSS.    Wilma Leon RN on 1/26/2020 at 1:27 PM

## 2020-01-26 NOTE — PHARMACY-CONSULT NOTE
Pharmacy - Transfer Medication Reconciliation     The patient's transfer medication orders have been compared to the medication administration record and to the Prior to Admissions Medications list - any noted discrepancies were resolved with the MD.     Thank you. Pharmacy will continue to monitor.     Hetal Sullivan RPH ....................  1/26/2020   7:18 AM

## 2020-01-26 NOTE — PROGRESS NOTES
BiPAP removed at 0250, patient is alert and states she is feeling better. Offered to switch to CPAP for patient to wear to replace her home unit, patient refused saying she has not worn in it awhile. BiPAP/CPAP on standby at this time, placed patient on 2L.

## 2020-01-26 NOTE — PROGRESS NOTES
Patient transferred to ICU, family updated. Report given to ML Pastrana all questions answered at this time.       Elizabeth Allen RN on 1/25/2020 at 9:22 PM

## 2020-01-26 NOTE — PHARMACY-CONSULT NOTE
Pharmacy - Transfer Medication Reconciliation     The patient's transfer medication orders have been compared to the medication administration record and to the Prior to Admissions Medications list - any noted discrepancies were resolved with the MD.     Thank you. Pharmacy will continue to monitor.     Hetal Sullivan RPH ....................  1/26/2020   2:30 PM

## 2020-01-27 LAB
ANION GAP SERPL CALCULATED.3IONS-SCNC: 9 MMOL/L (ref 3–14)
BASOPHILS # BLD AUTO: 0 10E9/L (ref 0–0.2)
BASOPHILS NFR BLD AUTO: 0 %
BUN SERPL-MCNC: 29 MG/DL (ref 7–25)
CALCIUM SERPL-MCNC: 8.3 MG/DL (ref 8.6–10.3)
CHLORIDE SERPL-SCNC: 106 MMOL/L (ref 98–107)
CO2 SERPL-SCNC: 23 MMOL/L (ref 21–31)
CREAT SERPL-MCNC: 1.54 MG/DL (ref 0.6–1.2)
DIFFERENTIAL METHOD BLD: ABNORMAL
EOSINOPHIL # BLD AUTO: 0 10E9/L (ref 0–0.7)
EOSINOPHIL NFR BLD AUTO: 0 %
ERYTHROCYTE [DISTWIDTH] IN BLOOD BY AUTOMATED COUNT: 15.3 % (ref 10–15)
GFR SERPL CREATININE-BSD FRML MDRD: 33 ML/MIN/{1.73_M2}
GLUCOSE SERPL-MCNC: 67 MG/DL (ref 70–105)
HCT VFR BLD AUTO: 35.4 % (ref 35–47)
HGB BLD-MCNC: 11.4 G/DL (ref 11.7–15.7)
IMM GRANULOCYTES # BLD: 0 10E9/L (ref 0–0.4)
IMM GRANULOCYTES NFR BLD: 0.4 %
LYMPHOCYTES # BLD AUTO: 1.3 10E9/L (ref 0.8–5.3)
LYMPHOCYTES NFR BLD AUTO: 22.9 %
MAGNESIUM SERPL-MCNC: 1.8 MG/DL (ref 1.9–2.7)
MCH RBC QN AUTO: 30.7 PG (ref 26.5–33)
MCHC RBC AUTO-ENTMCNC: 32.2 G/DL (ref 31.5–36.5)
MCV RBC AUTO: 95 FL (ref 78–100)
MONOCYTES # BLD AUTO: 0.9 10E9/L (ref 0–1.3)
MONOCYTES NFR BLD AUTO: 15.5 %
NEUTROPHILS # BLD AUTO: 3.5 10E9/L (ref 1.6–8.3)
NEUTROPHILS NFR BLD AUTO: 61.2 %
PLATELET # BLD AUTO: 139 10E9/L (ref 150–450)
POTASSIUM SERPL-SCNC: 4 MMOL/L (ref 3.5–5.1)
RBC # BLD AUTO: 3.71 10E12/L (ref 3.8–5.2)
SODIUM SERPL-SCNC: 138 MMOL/L (ref 134–144)
WBC # BLD AUTO: 5.6 10E9/L (ref 4–11)

## 2020-01-27 PROCEDURE — 40000275 ZZH STATISTIC RCP TIME EA 10 MIN

## 2020-01-27 PROCEDURE — 25000125 ZZHC RX 250

## 2020-01-27 PROCEDURE — 36415 COLL VENOUS BLD VENIPUNCTURE: CPT | Performed by: FAMILY MEDICINE

## 2020-01-27 PROCEDURE — 12000000 ZZH R&B MED SURG/OB

## 2020-01-27 PROCEDURE — 25800030 ZZH RX IP 258 OP 636: Performed by: FAMILY MEDICINE

## 2020-01-27 PROCEDURE — 80048 BASIC METABOLIC PNL TOTAL CA: CPT | Performed by: FAMILY MEDICINE

## 2020-01-27 PROCEDURE — 25000132 ZZH RX MED GY IP 250 OP 250 PS 637: Mod: GY | Performed by: INTERNAL MEDICINE

## 2020-01-27 PROCEDURE — 25000125 ZZHC RX 250: Performed by: FAMILY MEDICINE

## 2020-01-27 PROCEDURE — 94640 AIRWAY INHALATION TREATMENT: CPT

## 2020-01-27 PROCEDURE — 85025 COMPLETE CBC W/AUTO DIFF WBC: CPT | Performed by: FAMILY MEDICINE

## 2020-01-27 PROCEDURE — 99232 SBSQ HOSP IP/OBS MODERATE 35: CPT | Performed by: FAMILY MEDICINE

## 2020-01-27 PROCEDURE — 87506 IADNA-DNA/RNA PROBE TQ 6-11: CPT | Performed by: FAMILY MEDICINE

## 2020-01-27 PROCEDURE — 25000132 ZZH RX MED GY IP 250 OP 250 PS 637: Mod: GY | Performed by: FAMILY MEDICINE

## 2020-01-27 PROCEDURE — 25000131 ZZH RX MED GY IP 250 OP 636 PS 637: Mod: GY | Performed by: FAMILY MEDICINE

## 2020-01-27 PROCEDURE — 83735 ASSAY OF MAGNESIUM: CPT | Performed by: FAMILY MEDICINE

## 2020-01-27 PROCEDURE — 94640 AIRWAY INHALATION TREATMENT: CPT | Mod: 76

## 2020-01-27 RX ORDER — HYDROCODONE BITARTRATE AND ACETAMINOPHEN 5; 325 MG/1; MG/1
0.5 TABLET ORAL EVERY 6 HOURS PRN
Status: DISCONTINUED | OUTPATIENT
Start: 2020-01-27 | End: 2020-01-27

## 2020-01-27 RX ADMIN — ALBUTEROL SULFATE 2.5 MG: 2.5 SOLUTION RESPIRATORY (INHALATION) at 10:48

## 2020-01-27 RX ADMIN — DIPHENHYDRAMINE HYDROCHLORIDE 25 MG: 25 CAPSULE ORAL at 19:56

## 2020-01-27 RX ADMIN — PANTOPRAZOLE SODIUM 40 MG: 40 TABLET, DELAYED RELEASE ORAL at 09:08

## 2020-01-27 RX ADMIN — OXYCODONE HYDROCHLORIDE 2.5 MG: 5 TABLET ORAL at 11:27

## 2020-01-27 RX ADMIN — ALBUTEROL SULFATE 2.5 MG: 2.5 SOLUTION RESPIRATORY (INHALATION) at 18:18

## 2020-01-27 RX ADMIN — ACETAMINOPHEN 650 MG: 325 TABLET, FILM COATED ORAL at 09:07

## 2020-01-27 RX ADMIN — ATOVAQUONE 750 MG: 750 SUSPENSION ORAL at 09:07

## 2020-01-27 RX ADMIN — ALBUTEROL SULFATE 2.5 MG: 2.5 SOLUTION RESPIRATORY (INHALATION) at 07:35

## 2020-01-27 RX ADMIN — OSELTAMIVIR PHOSPHATE 30 MG: 30 CAPSULE ORAL at 10:30

## 2020-01-27 RX ADMIN — LEVOTHYROXINE SODIUM 125 MCG: 125 TABLET ORAL at 10:31

## 2020-01-27 RX ADMIN — ASPIRIN 81 MG 81 MG: 81 TABLET ORAL at 10:30

## 2020-01-27 RX ADMIN — MONTELUKAST SODIUM 10 MG: 5 TABLET, CHEWABLE ORAL at 19:54

## 2020-01-27 RX ADMIN — SODIUM CHLORIDE: 9 INJECTION, SOLUTION INTRAVENOUS at 04:07

## 2020-01-27 RX ADMIN — BUDESONIDE 0.5 MG: 0.5 INHALANT RESPIRATORY (INHALATION) at 07:35

## 2020-01-27 RX ADMIN — TIOTROPIUM BROMIDE INHALATION SPRAY 2 PUFF: 3.12 SPRAY, METERED RESPIRATORY (INHALATION) at 07:35

## 2020-01-27 RX ADMIN — BUDESONIDE 0.5 MG: 0.5 INHALANT RESPIRATORY (INHALATION) at 18:18

## 2020-01-27 RX ADMIN — TRAMADOL HYDROCHLORIDE 50 MG: 50 TABLET, FILM COATED ORAL at 19:55

## 2020-01-27 RX ADMIN — ALBUTEROL SULFATE 2.5 MG: 2.5 SOLUTION RESPIRATORY (INHALATION) at 15:06

## 2020-01-27 RX ADMIN — PREDNISONE 10 MG: 10 TABLET ORAL at 10:30

## 2020-01-27 ASSESSMENT — MIFFLIN-ST. JEOR: SCORE: 1140.5

## 2020-01-27 ASSESSMENT — ACTIVITIES OF DAILY LIVING (ADL)
ADLS_ACUITY_SCORE: 22

## 2020-01-27 NOTE — PROGRESS NOTES
Attempted to administer 1900 nebulizer treatments with patient, patient was continuously coughing and stated she could not do them right now. Told patient to let nurse or RT know if she would like to try again. RT will attempt 2300 treatment later.

## 2020-01-27 NOTE — PLAN OF CARE
Uneventful night, VSS. No complaints of pain. Patient a/o and pleasant.     Elizabeth Allen RN on 1/27/2020 at 5:51 AM

## 2020-01-27 NOTE — PROGRESS NOTES
[facility]    Medicine Progress Note - Hospitalist Service       Date of Admission:  1/25/2020  Assessment & Plan   Principal Problem:    Influenza B  Hypoxia due to influenza.    -Stable with objective improvement, now on 1/2L O2, WBC normalized.  Subjectively no change.  -continue tamiflu  -oxygen as needed to maintain sats >92%. Wean as able  -continue home meds  -nebs as needed  -Continue IVF overnight  -fall risk  -ambulate with assistance       Diarrhea  -improved but now again with diarrhea this morning.  Check stool culture, has not had any bowel movement since admission.  -Continue IVF     SEAN on CKD  -Improved but not yet at baseline.  Continue IVF  -hold nephrotoxins       Chronic diastolic CHF (congestive heart failure), NYHA class 2 (H), does not appear to have an acute exacerbation but at risk for volume overload.   -monitor  -judicious fluid use       COPD (chronic obstructive pulmonary disease) (H).  No obvious acute exacerbation.  She is chronically on steroids.  Current daily dose is 12 mg daily.  -Continue steroids 12 mg daily.  Does not need stress dose steroids at this time.  -Monitor.  - Continue atovaquone at this time.       Diabetes mellitus type 2, insulin dependent (H).  Hemoglobin A1c of 6.8.  -Continue Lantus And sliding scale.  -POCT glc       HTN (hypertension).  BP in the low normal range.  -Hold home antihypertensive at this time  -IV fluids  -Monitor     History of rheumatoid arthritis on several immune modulating medications.  Noted.  She is at risk of increased infection.  Has known influenza B.  Treating as above.  Monitor.  -Hold injectable medications.  Okay to continue prednisone.       Other amyloidosis (H), noted.          Diet: Moderate Consistent CHO Diet    DVT Prophylaxis: Pneumatic Compression Devices  Farr Catheter: not present  Code Status: Full Code      Disposition Plan   Expected discharge: Tomorrow, recommended to prior living arrangement  once renal function normalized and able to maintain oral hydration.  Entered: Zack Frank MD 01/27/2020, 9:29 AM       The patient's care was discussed with the Patient.    Zack Frank MD  Hospitalist Service  [facility]    ______________________________________________________________________    Interval History   Patient reports that she feels worse today than yesterday.  Thought that her diarrhea was gone after not having had it since admission but this morning had recurrence.  Also reports harsh cough but no chest pain.  Feels a little short of breath with the cough.  Did not sleep well.  Also has persistent headache that has been bothersome since admission.  Not really worse but also has not resolved.  Drinking fairly well.  Still some nausea but no vomiting.  Appetite depressed.    Data reviewed today: I reviewed all medications, new labs and imaging results over the last 24 hours. I personally reviewed no images or EKG's today.    Physical Exam   Vital Signs: Temp: 99.4  F (37.4  C) Temp src: Tympanic BP: (!) 166/55 Pulse: 79 Heart Rate: 80 Resp: 18 SpO2: 92 % O2 Device: None (Room air)    Weight: 143 lbs 9.6 oz  Constitutional: awake, alert, cooperative, no apparent distress, and appears stated age  Respiratory: No increased work of breathing, good air exchange, clear to auscultation bilaterally, no crackles or wheezing  Cardiovascular: Regular rate and rhythm.  No murmurs rubs or gallops heard.   GI: Abdomen Soft, non-tender.  No masses, rebound or guarding.   Musculoskeletal: No acute synovitis.  Muscle strength age and body habitus appropriateas well as equal and even.   Neuropsychiatric: General: calm and normal eye contact  Orientation: oriented to self, place, time and situation  Memory and insight: memory for past and recent events intact and thought process normal    Data   Recent Labs   Lab 01/27/20  0408 01/26/20  0445 01/25/20  0959   WBC 5.6 7.1 17.5*   HGB 11.4*  11.3* 13.3   MCV 95 96 95   * 138* 184    133* 134   POTASSIUM 4.0 4.6 4.8   CHLORIDE 106 102 98   CO2 23 21 22   BUN 29* 40* 37*   CR 1.54* 2.08* 2.52*   ANIONGAP 9 10 14   GIANNI 8.3* 8.0* 9.5   GLC 67* 131* 202*   ALBUMIN  --   --  3.8   PROTTOTAL  --   --  7.2   BILITOTAL  --   --  0.5   ALKPHOS  --   --  58   ALT  --   --  38   AST  --   --  42*   LIPASE  --   --  33     No results found for this or any previous visit (from the past 24 hour(s)).

## 2020-01-27 NOTE — PROGRESS NOTES
Blood glucose 59, refused orange juice. Toast and milk provided to patient will recheck BG.    Elizabeth Allen RN on 1/27/2020 at 6:09 AM    BG recheck 76. Encouraging patient to finish toast.     Elizabeth Allen RN on 1/27/2020 at 6:39 AM

## 2020-01-27 NOTE — PROGRESS NOTES
SAFETY CHECKLIST  ID Bands and Risk clasps correct and in place (DNR, Fall risk, Allergy, Latex, Limb):  Yes  All Lines Reconciled and labeled correctly: Yes  Whiteboard updated:Yes  Environmental interventions (bed/chair alarm on, call light, side rails, restraints, sitter....): Yes  Wilma Meek RN on 1/27/2020 at 9:21 AM

## 2020-01-27 NOTE — PLAN OF CARE
Awake, a/ox4. Sitting up in bed eating. She is going to ambulate the hallways shortly. 94% on RA. Stable VS. 1 unit of insulin given at dinner for 146 c/s.     States she is feeling so much better than yesterday.     Wilma Leon RN on 1/26/2020 at 6:59 PM

## 2020-01-28 ENCOUNTER — APPOINTMENT (OUTPATIENT)
Dept: MRI IMAGING | Facility: OTHER | Age: 70
DRG: 194 | End: 2020-01-28
Attending: FAMILY MEDICINE
Payer: MEDICARE

## 2020-01-28 ENCOUNTER — APPOINTMENT (OUTPATIENT)
Dept: OCCUPATIONAL THERAPY | Facility: OTHER | Age: 70
DRG: 194 | End: 2020-01-28
Attending: FAMILY MEDICINE
Payer: MEDICARE

## 2020-01-28 ENCOUNTER — APPOINTMENT (OUTPATIENT)
Dept: PHYSICAL THERAPY | Facility: OTHER | Age: 70
DRG: 194 | End: 2020-01-28
Attending: FAMILY MEDICINE
Payer: MEDICARE

## 2020-01-28 LAB
ANION GAP SERPL CALCULATED.3IONS-SCNC: 9 MMOL/L (ref 3–14)
BUN SERPL-MCNC: 23 MG/DL (ref 7–25)
C COLI+JEJUNI+LARI FUSA STL QL NAA+PROBE: NOT DETECTED
CALCIUM SERPL-MCNC: 8.5 MG/DL (ref 8.6–10.3)
CHLORIDE SERPL-SCNC: 105 MMOL/L (ref 98–107)
CO2 SERPL-SCNC: 22 MMOL/L (ref 21–31)
CREAT SERPL-MCNC: 1.27 MG/DL (ref 0.6–1.2)
EC STX1 GENE STL QL NAA+PROBE: NOT DETECTED
EC STX2 GENE STL QL NAA+PROBE: NOT DETECTED
ENTERIC PATHOGEN COMMENT: NORMAL
GFR SERPL CREATININE-BSD FRML MDRD: 42 ML/MIN/{1.73_M2}
GLUCOSE SERPL-MCNC: 79 MG/DL (ref 70–105)
MAGNESIUM SERPL-MCNC: 1.6 MG/DL (ref 1.9–2.7)
NOROV GI+II ORF1-ORF2 JNC STL QL NAA+PR: NOT DETECTED
POTASSIUM SERPL-SCNC: 3.9 MMOL/L (ref 3.5–5.1)
POTASSIUM SERPL-SCNC: 4 MMOL/L (ref 3.5–5.1)
RVA NSP5 STL QL NAA+PROBE: NOT DETECTED
SALMONELLA SP RPOD STL QL NAA+PROBE: NOT DETECTED
SHIGELLA SP+EIEC IPAH STL QL NAA+PROBE: NOT DETECTED
SODIUM SERPL-SCNC: 136 MMOL/L (ref 134–144)
V CHOL+PARA RFBL+TRKH+TNAA STL QL NAA+PR: NOT DETECTED
Y ENTERO RECN STL QL NAA+PROBE: NOT DETECTED

## 2020-01-28 PROCEDURE — 25500064 ZZH RX 255 OP 636: Performed by: FAMILY MEDICINE

## 2020-01-28 PROCEDURE — 40000275 ZZH STATISTIC RCP TIME EA 10 MIN

## 2020-01-28 PROCEDURE — A9575 INJ GADOTERATE MEGLUMI 0.1ML: HCPCS | Performed by: FAMILY MEDICINE

## 2020-01-28 PROCEDURE — 25000131 ZZH RX MED GY IP 250 OP 636 PS 637: Mod: GY | Performed by: FAMILY MEDICINE

## 2020-01-28 PROCEDURE — 97161 PT EVAL LOW COMPLEX 20 MIN: CPT | Mod: GP

## 2020-01-28 PROCEDURE — 99233 SBSQ HOSP IP/OBS HIGH 50: CPT | Performed by: FAMILY MEDICINE

## 2020-01-28 PROCEDURE — 25000125 ZZHC RX 250

## 2020-01-28 PROCEDURE — 36415 COLL VENOUS BLD VENIPUNCTURE: CPT | Performed by: FAMILY MEDICINE

## 2020-01-28 PROCEDURE — 97530 THERAPEUTIC ACTIVITIES: CPT | Mod: GP

## 2020-01-28 PROCEDURE — 84132 ASSAY OF SERUM POTASSIUM: CPT | Performed by: FAMILY MEDICINE

## 2020-01-28 PROCEDURE — 97165 OT EVAL LOW COMPLEX 30 MIN: CPT | Mod: GO | Performed by: OCCUPATIONAL THERAPIST

## 2020-01-28 PROCEDURE — 25000132 ZZH RX MED GY IP 250 OP 250 PS 637: Mod: GY | Performed by: FAMILY MEDICINE

## 2020-01-28 PROCEDURE — 97535 SELF CARE MNGMENT TRAINING: CPT | Mod: GO | Performed by: OCCUPATIONAL THERAPIST

## 2020-01-28 PROCEDURE — 25000125 ZZHC RX 250: Performed by: FAMILY MEDICINE

## 2020-01-28 PROCEDURE — 94640 AIRWAY INHALATION TREATMENT: CPT

## 2020-01-28 PROCEDURE — 94640 AIRWAY INHALATION TREATMENT: CPT | Mod: 76

## 2020-01-28 PROCEDURE — 25000132 ZZH RX MED GY IP 250 OP 250 PS 637: Mod: GY | Performed by: INTERNAL MEDICINE

## 2020-01-28 PROCEDURE — 70553 MRI BRAIN STEM W/O & W/DYE: CPT

## 2020-01-28 PROCEDURE — 12000000 ZZH R&B MED SURG/OB

## 2020-01-28 PROCEDURE — 80048 BASIC METABOLIC PNL TOTAL CA: CPT | Performed by: FAMILY MEDICINE

## 2020-01-28 PROCEDURE — 83735 ASSAY OF MAGNESIUM: CPT | Performed by: FAMILY MEDICINE

## 2020-01-28 RX ORDER — GADOTERATE MEGLUMINE 376.9 MG/ML
15 INJECTION INTRAVENOUS ONCE
Status: COMPLETED | OUTPATIENT
Start: 2020-01-28 | End: 2020-01-28

## 2020-01-28 RX ORDER — LISINOPRIL 5 MG/1
5 TABLET ORAL DAILY
Status: DISCONTINUED | OUTPATIENT
Start: 2020-01-28 | End: 2020-01-29 | Stop reason: HOSPADM

## 2020-01-28 RX ORDER — OSELTAMIVIR PHOSPHATE 30 MG/1
30 CAPSULE ORAL 2 TIMES DAILY
Status: DISCONTINUED | OUTPATIENT
Start: 2020-01-28 | End: 2020-01-29 | Stop reason: HOSPADM

## 2020-01-28 RX ADMIN — ALBUTEROL SULFATE 2.5 MG: 2.5 SOLUTION RESPIRATORY (INHALATION) at 19:34

## 2020-01-28 RX ADMIN — ALBUTEROL SULFATE 2.5 MG: 2.5 SOLUTION RESPIRATORY (INHALATION) at 14:47

## 2020-01-28 RX ADMIN — LISINOPRIL 5 MG: 5 TABLET ORAL at 15:46

## 2020-01-28 RX ADMIN — GADOTERATE MEGLUMINE 15 ML: 376.9 INJECTION INTRAVENOUS at 13:02

## 2020-01-28 RX ADMIN — DIPHENHYDRAMINE HYDROCHLORIDE 25 MG: 25 CAPSULE ORAL at 20:13

## 2020-01-28 RX ADMIN — OSELTAMIVIR PHOSPHATE 30 MG: 30 CAPSULE ORAL at 20:13

## 2020-01-28 RX ADMIN — LEVOTHYROXINE SODIUM 125 MCG: 125 TABLET ORAL at 10:27

## 2020-01-28 RX ADMIN — MONTELUKAST SODIUM 10 MG: 5 TABLET, CHEWABLE ORAL at 20:12

## 2020-01-28 RX ADMIN — PREDNISONE 10 MG: 10 TABLET ORAL at 10:27

## 2020-01-28 RX ADMIN — PANTOPRAZOLE SODIUM 40 MG: 40 TABLET, DELAYED RELEASE ORAL at 09:15

## 2020-01-28 RX ADMIN — OXYCODONE HYDROCHLORIDE 2.5 MG: 5 TABLET ORAL at 20:57

## 2020-01-28 RX ADMIN — BUDESONIDE 0.5 MG: 0.5 INHALANT RESPIRATORY (INHALATION) at 19:36

## 2020-01-28 RX ADMIN — TIOTROPIUM BROMIDE INHALATION SPRAY 2 PUFF: 3.12 SPRAY, METERED RESPIRATORY (INHALATION) at 07:36

## 2020-01-28 RX ADMIN — BUDESONIDE 0.5 MG: 0.5 INHALANT RESPIRATORY (INHALATION) at 07:35

## 2020-01-28 RX ADMIN — ALBUTEROL SULFATE 2.5 MG: 2.5 SOLUTION RESPIRATORY (INHALATION) at 07:35

## 2020-01-28 RX ADMIN — ATOVAQUONE 750 MG: 750 SUSPENSION ORAL at 09:37

## 2020-01-28 RX ADMIN — OSELTAMIVIR PHOSPHATE 30 MG: 30 CAPSULE ORAL at 10:27

## 2020-01-28 RX ADMIN — TRAMADOL HYDROCHLORIDE 50 MG: 50 TABLET, FILM COATED ORAL at 20:12

## 2020-01-28 RX ADMIN — ALBUTEROL SULFATE 2.5 MG: 2.5 SOLUTION RESPIRATORY (INHALATION) at 10:35

## 2020-01-28 RX ADMIN — ASPIRIN 81 MG 81 MG: 81 TABLET ORAL at 10:27

## 2020-01-28 RX ADMIN — OXYCODONE HYDROCHLORIDE 2.5 MG: 5 TABLET ORAL at 04:46

## 2020-01-28 RX ADMIN — OXYCODONE HYDROCHLORIDE 2.5 MG: 5 TABLET ORAL at 13:31

## 2020-01-28 ASSESSMENT — ACTIVITIES OF DAILY LIVING (ADL)
ADLS_ACUITY_SCORE: 18
ADLS_ACUITY_SCORE: 15
ADLS_ACUITY_SCORE: 22
ADLS_ACUITY_SCORE: 15
ADLS_ACUITY_SCORE: 22
ADLS_ACUITY_SCORE: 22

## 2020-01-28 NOTE — PROGRESS NOTES
SAFETY CHECKLIST  ID Bands and Risk clasps correct and in place (DNR, Fall risk, Allergy, Latex, Limb):  Yes  All Lines Reconciled and labeled correctly: Yes  Whiteboard updated:Yes  Environmental interventions (bed/chair alarm on, call light, side rails, restraints, sitter....): Yes    Claudine Pineda RN on 1/28/2020 at 7:10 AM

## 2020-01-28 NOTE — PROGRESS NOTES
[facility]    Medicine Progress Note - Hospitalist Service       Date of Admission:  1/25/2020  Assessment & Plan   Principal Problem:    Influenza B  Hypoxia due to influenza.    -No longer requiring oxygen.  -continue tamiflu  -continue home meds  -nebs as needed  -Discontinue IV fluid and encourage oral hydration.  -fall risk  -ambulate with assistance  -Will likely need home care PT/OT.       Diarrhea  -improved, enteric panel negative.  Did not test for C. difficile but history suggests against this diagnosis especially with improvement today.       SEAN on CKD  -Improved and now back to baseline.  -hold nephrotoxins  -Consider Zofran as needed for vomiting as outpatient as this has been a recurring issue with dehydration during times of acute illness.       Chronic diastolic CHF (congestive heart failure), NYHA class 2 (H),   -Stable.  Discontinue IV fluid.       COPD (chronic obstructive pulmonary disease) (H).  No obvious acute exacerbation.  She is chronically on steroids.  Current daily dose is 12 mg daily.  -Continue steroids 12 mg daily.  Does not need stress dose steroids at this time.  -Monitor.  - Continue atovaquone at this time.       Diabetes mellitus type 2, insulin dependent (H).  Hemoglobin A1c of 6.8.  -Continue Lantus And sliding scale.  -POCT glc       HTN (hypertension).  BP in the low normal range.  -Restart home antihypertensives.     History of rheumatoid arthritis on several immune modulating medications.  Noted.  She is at risk of increased infection.  Has known influenza B.  Treating as above.  Monitor.  -Hold injectable medications.  Okay to continue prednisone.     Headache  Persistent headache behind right eye, worsening over several months to possibly a year.  Suspect cluster headache made worse by acute influenza.  Family and patient are concerned.  Will obtain MRI either as inpatient or outpatient depending on availability.      Other amyloidosis (H),  noted.        Diet: Moderate Consistent CHO Diet    DVT Prophylaxis: Pneumatic Compression Devices  Farr Catheter: not present  Code Status: Full Code      Disposition Plan   Expected discharge: Tomorrow, recommended to prior living arrangement once adequate pain management/ tolerating PO medications.  Entered: Zack Frank MD 01/28/2020, 12:41 PM       The patient's care was discussed with the Patient and Patient's Family.    Zack Frank MD  Hospitalist Service  [facility]    ______________________________________________________________________    Interval History   Patient reports that she has definitely improved since yesterday.  Headache has improved in intensity and the oxycodone has helped as well.  She does however still feel weak and tired.  Denies any shortness of breath or chest pain.  Has not been up moving much yet.     and patient are concerned about this persistent headache.  Has been present for months, perhaps up to a year.  Seems to be getting progressively worse.  Not associated with any focal neurologic deficits or any transient deficits from the past.    She has had a lot of issues in the past with fall risk but this has been actually much better over the past 12 months.  That being said has some generalized weakness due to acute illness and would benefit from PT/OT eval.    Data reviewed today: I reviewed all medications, new labs and imaging results over the last 24 hours.  I will review her MRI when complete.    Physical Exam   Vital Signs: Temp: 97.5  F (36.4  C) Temp src: Tympanic BP: (!) 144/68   Heart Rate: 72 Resp: 25 SpO2: 94 % O2 Device: None (Room air)    Weight: 143 lbs 9.6 oz  Constitutional: awake, alert, cooperative, no apparent distress, and appears stated age  Eyes: Lids and lashes normal, pupils equal, round and reactive to light, extra ocular muscles intact, sclera clear, conjunctiva normal  Respiratory: Regular rate and rhythm.  No  murmurs rubs or gallops heard.   Cardiovascular: Regularrate and rhythm.  No murmurs rubs or gallops heard.   GI: Abdomen Soft, non-tender.  No masses, rebound or guarding.   Musculoskeletal: Generalized weakness with no focal deficit.  Neurologic: No deficits in motor strength, sensation or cerebellar function.  Cranial nerves intact.  Reflexes symmetric.    Data   Recent Labs   Lab 01/28/20  0719 01/27/20  0408 01/26/20  0445 01/25/20  0959   WBC  --  5.6 7.1 17.5*   HGB  --  11.4* 11.3* 13.3   MCV  --  95 96 95   PLT  --  139* 138* 184    138 133* 134   POTASSIUM 4.0  3.9 4.0 4.6 4.8   CHLORIDE 105 106 102 98   CO2 22 23 21 22   BUN 23 29* 40* 37*   CR 1.27* 1.54* 2.08* 2.52*   ANIONGAP 9 9 10 14   GIANIN 8.5* 8.3* 8.0* 9.5   GLC 79 67* 131* 202*   ALBUMIN  --   --   --  3.8   PROTTOTAL  --   --   --  7.2   BILITOTAL  --   --   --  0.5   ALKPHOS  --   --   --  58   ALT  --   --   --  38   AST  --   --   --  42*   LIPASE  --   --   --  33     No results found for this or any previous visit (from the past 24 hour(s)).

## 2020-01-28 NOTE — PROGRESS NOTES
:    Met with patient to discuss discharge plans. It is patient's goal to return home with home care services. She reports that she would at least like to have home care set up, as she knows that her spouse would like her to have home care. She may see how she is doing at at home and then cancel home care if she feels it is unnecessary. Provided her with list of home care options and she selected [facility].     Pt/family was given the Medicare Compare list for Home Care, with associated star ratings to assist with choice for referrals/discharge planning Yes    Education was given to pt/family that star ratings are updated/maintained by Medicare and can be reviewed by visiting www.medicare.gov Yes    Faxed referral to [facility]. Called Nilam at [facility] and left voicemail notifying her of referral.      will continue to follow.     CARMEN Dexter on 1/28/2020 at 9:32 AM    Addendum:    Received call back from Nilam at [facility]. They are booked out until next week at this time.     CARMEN Dexter on 1/28/2020 at 9:48 AM    Addendum:    Called Nilam at [facility] and left voicemail with discharge update. Anticipated discharge tomorrow.  will continue to follow.     CARMEN Dexter on 1/28/2020 at 12:29 PM

## 2020-01-28 NOTE — PROGRESS NOTES
SAFETY CHECKLIST  ID Bands and Risk clasps correct and in place (DNR, Fall risk, Allergy, Latex, Limb):  Yes  All Lines Reconciled and labeled correctly: Yes  Whiteboard updated:Yes  Environmental interventions (bed/chair alarm on, call light, side rails, restraints, sitter....): Yes    Lori Mock RN on 1/27/2020 at 7:28 PM

## 2020-01-28 NOTE — PLAN OF CARE
Pt VS: Temp: 98.7  F (37.1  C) Temp src: Tympanic BP: (!) 161/58 Pulse: 79 Heart Rate: 77 Resp: 20 SpO2: 96 % O2 Device: None (Room air)     Temp elevated beginning of shift which have now normalized. Condition has improved throughout day. Complaints of headache and was given PRN Tylenol and Oxycodone, see MAR. Medications effective. A&O X4 and pleasant. Lung sounds expiratory wheezes throughout. Dyspnea with exertion. Congested cough noted. Heart rhythm regular. Skin intact except scattered bruising (more prominent to arms). Blanchable redness to bottom- barrier cream applied. Incontinent of urine and stool at times. Ambulating SBA. Wilma Meek RN on 1/27/2020 at 6:43 PM

## 2020-01-28 NOTE — PROGRESS NOTES
01/28/20 1300   Quick Adds   Type of Visit Initial PT Evaluation   Living Environment   Lives With spouse   Living Arrangements house   Home Accessibility no concerns   Transportation Anticipated family or friend will provide   Self-Care   Usual Activity Tolerance good   Current Activity Tolerance fair   Equipment Currently Used at Home none   Functional Level Prior   Ambulation 0-->independent   Transferring 0-->independent   Toileting 0-->independent   Bathing 0-->independent   Communication 0-->understands/communicates without difficulty   Swallowing 0-->swallows foods/liquids without difficulty   Cognition 0 - no cognition issues reported   Fall history within last six months no   Which of the above functional risks had a recent onset or change? ambulation;transferring;communication/speech;bathing;toileting;dressing   Prior Functional Level Comment ind prior to acute illness   General Information   Referring Physician Monika   Patient/Family Goals Statement Return home   Precautions/Limitations fall precautions   Weight-Bearing Status - LLE full weight-bearing   Weight-Bearing Status - RLE full weight-bearing   Cognitive Status Examination   Orientation orientation to person, place and time   Level of Consciousness alert   Follows Commands and Answers Questions 100% of the time   Personal Safety and Judgment intact   Memory intact   Pain Assessment   Patient Currently in Pain Yes, see Vital Sign flowsheet   Range of Motion (ROM)   ROM Comment WFL BLE   Strength   Strength Comments WFL BLE   Bed Mobility   Bed Mobility Comments CGA to min assist due to back pain   Transfer Skills   Transfer Comments CGA to SBA   Gait   Gait Comments CGA to SBA   General Therapy Interventions   Planned Therapy Interventions gait training;bed mobility training;transfer training   Clinical Impression   Criteria for Skilled Therapeutic Intervention yes, treatment indicated   PT Diagnosis Impaired mobility   Influenced by the  following impairments Weakness, decreased endurance   Functional limitations due to impairments Diminished endurance in walking, bed mobility, transfers   Clinical Presentation Stable/Uncomplicated   Clinical Decision Making (Complexity) Low complexity   Therapy Frequency Daily   Predicted Duration of Therapy Intervention (days/wks) 1 day   Anticipated Discharge Disposition Home with Home Therapy   Risk & Benefits of therapy have been explained Yes   Patient, Family & other staff in agreement with plan of care Yes

## 2020-01-28 NOTE — PLAN OF CARE
Discharge Planner OT   Patient plan for discharge: return home with homecare  Current status: close to or at baseline level of function  Barriers to return to prior living situation: none  Recommendations for discharge: pt would benefit from ongoing OT for increased strength and safety  Rationale for recommendations: see above       Entered by: Lupe Us 01/28/2020 1:46 PM

## 2020-01-28 NOTE — PROGRESS NOTES
01/28/20 1300   Signing Clinician's Name / Credentials   Signing clinician's name / credentials Desmond Pierce, SPT   Quick Adds   Rehab Discipline PT   Quick Adds Therapeutic Activity   Therapeutic Activity   Symptoms Noted During/After Treatment Fatigue;Increased pain   Treatment Detail Pt walked to bathroom and toileted with OT, SBA, min assist for bed mobility after due to back pain, requested and was provided cold pack for back pain, reported significant back and head pain   Additional Documentation   PT Plan Continue POC, d/c tomorrow

## 2020-01-28 NOTE — PLAN OF CARE
Patient is alert and oriented x 4. Reports pain in head and right eye of a level 4/10. Oxycodone and Tramadol administered per MAR. Follow up pain level was a 0/10. Lung sounds are clear in upper lobes and diminished in bases. Dyspnea on exertion. HS blood sugar was 168. Patient refused Novolog and Lantus. 0200 blood sugar was 77. Ambulated to the bathroom with a stand-by assist. No BM's throughout shift. Voiding without difficulty. Vital signs:  Temp: 97.6  F (36.4  C) Temp src: Tympanic BP: (!) 140/74   Heart Rate: 69 Resp: 20 SpO2: 93 % O2 Device: None (Room air)   Lori Mock RN on 1/28/2020 at 6:38 AM

## 2020-01-28 NOTE — PHARMACY
Pharmacy- Renal Dose Adjustment    Patient Active Problem List   Diagnosis     Acne rosacea     Anemia of chronic kidney failure, stage 3 (moderate) (H)     Chronic diastolic CHF (congestive heart failure), NYHA class 2 (H)     Chronic kidney disease (CKD), stage III (moderate) (H)     Malignant tumor of colon (H)     History of colonic polyps     COPD (chronic obstructive pulmonary disease) (H)     Diabetes mellitus type 2, insulin dependent (H)     HTN (hypertension)     Hyperlipidemia     Hypothyroidism     Lichen sclerosus et atrophicus     Migraine headache     Non-rheumatic mitral valve stenosis     On prednisone therapy     Orthostatic hypotension     Disorder of bone and cartilage     Osteoporosis     Popliteal cyst, left     Secondary adrenal insufficiency (H)     Seropositive rheumatoid arthritis (H)     Recurrent pneumonia     Avascular necrosis of left talus (H)     Other amyloidosis (H)     Influenza B     Influenza        Relevant Labs:  Recent Labs   Lab Test 01/27/20  0408 01/26/20  0445   WBC 5.6 7.1   HGB 11.4* 11.3*   * 138*        CrCl: 37      Intake/Output Summary (Last 24 hours) at 1/28/2020 1446  Last data filed at 1/28/2020 0910  Gross per 24 hour   Intake 600 ml   Output 1050 ml   Net -450 ml          Per Renal Dose Adjustment Protocol, will adjust:  Tamiflu 30mg daily treatment dosing to 30mg BID      Will continue to follow and make adjustments accordingly. Thank You.    Skye Narvaez [facility] ....................  1/28/2020   2:46 PM

## 2020-01-28 NOTE — PLAN OF CARE
Discharge Planner PT   Patient plan for discharge: Discharge home with home therapy  Current status: Endurance and strength below baseline  Barriers to return to prior living situation: Near baseline, appropriate to return tomorrow  Recommendations for discharge: Home with home PT  Rationale for recommendations: Pt function, strength, endurance still well below reported baseline       Entered by: Desmond Pierce 01/28/2020 1:45 PM

## 2020-01-28 NOTE — PROGRESS NOTES
01/28/20 1337   Quick Adds   Type of Visit Initial Occupational Therapy Evaluation   Living Environment   Lives With spouse   Living Arrangements house   Self-Care   Usual Activity Tolerance good   Current Activity Tolerance moderate   Equipment Currently Used at Home none   Functional Level   Ambulation 0-->independent   Transferring 0-->independent   Toileting 0-->independent   Bathing 0-->independent   Dressing 0-->independent   Eating 0-->independent   Communication 0-->understands/communicates without difficulty   Fall history within last six months no   Cognitive Status Examination   Orientation orientation to person, place and time   Mobility   Bed Mobility Bed mobility skill: Sit to supine   Bed Mobility Skill: Sit to Supine   Physical Assist/Nonphysical Assist: Sit/Supine supervision   Transfer Skill: Bed to Chair/Chair to Bed   Level of [place]: Bed to Chair stand-by assist   Transfer Skill: Sit to Stand   Level of [place]: Sit/Stand stand-by assist   Transfer Skill: Toilet Transfer   Level of [place]: Toilet stand-by assist   Toileting   Level of [place]: Toilet stand-by assist   General Therapy Interventions   Planned Therapy Interventions ADL retraining   Clinical Impression   Criteria for Skilled Therapeutic Interventions Met yes, treatment indicated   OT Diagnosis impaired self  cares and mobility   Influenced by the following impairments weakness   Assessment of Occupational Performance 1-3 Performance Deficits   Identified Performance Deficits self cares, mobility   Clinical Decision Making (Complexity) Low complexity   Therapy Frequency Daily   Predicted Duration of Therapy Intervention (days/wks) 1-2 days   Risks and Benefits of Treatment have been explained. Yes   Patient, Family & other staff in agreement with plan of care Yes   Total Evaluation Time   Total Evaluation Time (Minutes) 15

## 2020-01-29 VITALS
HEART RATE: 79 BPM | WEIGHT: 143.6 LBS | OXYGEN SATURATION: 95 % | SYSTOLIC BLOOD PRESSURE: 122 MMHG | TEMPERATURE: 97.6 F | BODY MASS INDEX: 25.45 KG/M2 | HEIGHT: 63 IN | RESPIRATION RATE: 18 BRPM | DIASTOLIC BLOOD PRESSURE: 65 MMHG

## 2020-01-29 LAB
MAGNESIUM SERPL-MCNC: 1.6 MG/DL (ref 1.9–2.7)
POTASSIUM SERPL-SCNC: 4.3 MMOL/L (ref 3.5–5.1)

## 2020-01-29 PROCEDURE — 25000132 ZZH RX MED GY IP 250 OP 250 PS 637: Mod: GY | Performed by: FAMILY MEDICINE

## 2020-01-29 PROCEDURE — 40000275 ZZH STATISTIC RCP TIME EA 10 MIN

## 2020-01-29 PROCEDURE — 99239 HOSP IP/OBS DSCHRG MGMT >30: CPT | Performed by: FAMILY MEDICINE

## 2020-01-29 PROCEDURE — 83735 ASSAY OF MAGNESIUM: CPT | Performed by: FAMILY MEDICINE

## 2020-01-29 PROCEDURE — 25000125 ZZHC RX 250

## 2020-01-29 PROCEDURE — 36415 COLL VENOUS BLD VENIPUNCTURE: CPT | Performed by: FAMILY MEDICINE

## 2020-01-29 PROCEDURE — 25000125 ZZHC RX 250: Performed by: FAMILY MEDICINE

## 2020-01-29 PROCEDURE — 94640 AIRWAY INHALATION TREATMENT: CPT

## 2020-01-29 PROCEDURE — 84132 ASSAY OF SERUM POTASSIUM: CPT | Performed by: FAMILY MEDICINE

## 2020-01-29 RX ORDER — ONDANSETRON 4 MG/1
4 TABLET, ORALLY DISINTEGRATING ORAL EVERY 6 HOURS PRN
Qty: 15 TABLET | Refills: 0 | Status: SHIPPED | OUTPATIENT
Start: 2020-01-29 | End: 2022-01-03

## 2020-01-29 RX ORDER — OXYCODONE HYDROCHLORIDE 5 MG/1
2.5 TABLET ORAL EVERY 6 HOURS PRN
Qty: 14 TABLET | Refills: 0 | Status: SHIPPED | OUTPATIENT
Start: 2020-01-29 | End: 2020-11-02

## 2020-01-29 RX ADMIN — TIOTROPIUM BROMIDE INHALATION SPRAY 2 PUFF: 3.12 SPRAY, METERED RESPIRATORY (INHALATION) at 06:06

## 2020-01-29 RX ADMIN — OXYCODONE HYDROCHLORIDE 2.5 MG: 5 TABLET ORAL at 05:26

## 2020-01-29 RX ADMIN — ALBUTEROL SULFATE 2.5 MG: 2.5 SOLUTION RESPIRATORY (INHALATION) at 06:02

## 2020-01-29 RX ADMIN — PANTOPRAZOLE SODIUM 40 MG: 40 TABLET, DELAYED RELEASE ORAL at 07:33

## 2020-01-29 RX ADMIN — ATOVAQUONE 750 MG: 750 SUSPENSION ORAL at 07:34

## 2020-01-29 RX ADMIN — BUDESONIDE 0.5 MG: 0.5 INHALANT RESPIRATORY (INHALATION) at 06:02

## 2020-01-29 ASSESSMENT — ACTIVITIES OF DAILY LIVING (ADL)
ADLS_ACUITY_SCORE: 15

## 2020-01-29 NOTE — PROGRESS NOTES
SAFETY CHECKLIST  ID Bands and Risk clasps correct and in place (DNR, Fall risk, Allergy, Latex, Limb):  Yes  All Lines Reconciled and labeled correctly: Yes  Whiteboard updated:Yes  Environmental interventions (bed/chair alarm on, call light, side rails, restraints, sitter....): Yes    Claudine Pineda RN on 1/29/2020 at 7:09 AM

## 2020-01-29 NOTE — PLAN OF CARE
"Patient is alert and oriented x 4. Reports right eye pressure and a headache of a level 4/10. Oxycodone and Ultram administered per MAR. Patient was sleeping comfortably for follow up assessment. Periorbital swelling bilaterally. Lung sounds have expiratory wheezes throughout lobes. Respirations 22. Dyspnea with exertion. SpO2 93-94% RA. Ambulating in room with a stand-by assist. Vital signs:  Temp: 98.3  F (36.8  C) Temp src: Tympanic BP: (!) 144/78   Heart Rate: 64 Resp: 22 SpO2: 94 % O2 Device: None (Room air) Oxygen Delivery: 1 LPM Height: 160 cm (5' 3\") Weight: 65.1 kg (143 lb 9.6 oz)  Estimated body mass index is 25.44 kg/m  as calculated from the following:    Height as of this encounter: 1.6 m (5' 3\").    Weight as of this encounter: 65.1 kg (143 lb 9.6 oz).      Lori Mock RN on 1/29/2020 at 5:56 AM    "

## 2020-01-29 NOTE — PROGRESS NOTES
Patient's own medication Atovaquone given back to patient. Claudine Pineda RN on 1/29/2020 at 7:43 AM

## 2020-01-29 NOTE — PROGRESS NOTES
Nurse to nurse megan Demarco about [facility] home care. All questions answered. Claudine Pineda RN on 1/29/2020 at 10:20 AM

## 2020-01-29 NOTE — DISCHARGE SUMMARY
[facility]  Hospitalist Discharge Summary       Date of Admission:  1/25/2020  Date of Discharge:  1/29/2020  Discharging Provider: Zack Frank MD      Discharge Diagnoses   Influenza B    Follow-ups Needed After Discharge   Follow-up Appointments     Follow-up and recommended labs and tests       Follow up with primary care provider, LANE SAWYER, within 7 days for   hospital follow- up.             Unresulted Labs Ordered in the Past 30 Days of this Admission     Date and Time Order Name Status Description    1/25/2020 0937 Blood culture Preliminary     1/25/2020 0937 Blood culture Preliminary       These results will be followed up by ordering physician or follow up physician    Discharge Disposition   Discharged to home  Condition at discharge: Stable    Hospital Course   Principal Problem:    Influenza B  Hypoxia due to influenza.    -No longer requiring oxygen.  -completed 5d of tamiflu  -continue home meds  -fall risk  -home care PT/OT.       Diarrhea  -resolved, enteric panel negative.  Did not test for C. difficile but history suggests against this diagnosis and now symptoms resolved for >2d.       SEAN on CKD  -Improved and now back to baseline.  -avoid nephrotoxins  -Consider Zofran as needed for vomiting as outpatient as this has been a recurring issue with dehydration during times of acute illness.       Chronic diastolic CHF (congestive heart failure), NYHA class 2 (H),   -Stable.         COPD (chronic obstructive pulmonary disease) (H).  No obvious acute exacerbation.  She is chronically on steroids.  Current daily dose is 12 mg daily.  -Continue steroids 12 mg daily.  Does not need stress dose steroids at this time. - Continue atovaquone at this time.       Diabetes mellitus type 2, insulin dependent (H).  Hemoglobin A1c of 6.8.  -Resume home regimen       HTN (hypertension).  BP in the low normal range.  -Continue home antihypertensives.     History of rheumatoid  arthritis on several immune modulating medications.       Headache  MRI with incidental findings which were discussed with her in detail.  Suspect cluster headache.  May use oxycodone sparingly.  Follow up with primary physician.      Other amyloidosis (H), noted.     Consultations This Hospital Stay   SOCIAL WORK IP CONSULT  PHYSICAL THERAPY ADULT IP CONSULT  OCCUPATIONAL THERAPY ADULT IP CONSULT    Code Status   Full Code    Time Spent on this Encounter   I, Zack Frank MD, personally saw the patient today and spent greater than 30 minutes discharging this patient.       Zack Frank MD  [facility]  ______________________________________________________________________    Physical Exam   Vital Signs: Temp: 97.6  F (36.4  C) Temp src: Tympanic BP: 122/65   Heart Rate: 70 Resp: 18 SpO2: 95 % O2 Device: None (Room air)    Weight: 143 lbs 9.6 oz  Constitutional: awake, alert, cooperative, no apparent distress, and appears stated age  Respiratory: Bilateral basilar crackles with no increased work of breathing.  Cardiovascular: Regular rate and rhythm.  No murmurs rubs or gallops heard.   GI: Abdomen Soft, non-tender.  No masses, rebound or guarding.   Genitourinary: No suprapubic or CVA tenderness  Skin: normal skin color, texture, turgor and no redness, warmth, or swelling  Musculoskeletal: No active synovitis.  Generalized weakness.  Neurologic: NO deficits  Neuropsychiatric: General: calm and normal eye contact  Level of consciousness: alert / normal  Memory and insight: memory for past and recent events intact and thought process normal       Primary Care Physician   LANE SAWYER    Discharge Orders      Home Care PT Referral for Hospital Discharge      Home Care OT Referral for Hospital Discharge      Reason for your hospital stay    You were diagnosed with influenza B.  You gradually improved but did have some residual weakness from being sick and in a hospital bed for so long.   You should follow through with physical therapy through home care.     Follow-up and recommended labs and tests     Follow up with primary care provider, LANE SAWYER, within 7 days for hospital follow- up.     Activity    Your activity upon discharge: activity as tolerated     When to contact your care team    Call your primary doctor if you have any of the following: temperature greater than 100.5.  Recurrent shortness of breath.  Dehydration due to poor oral intake or diarrhea.     MD face to face encounter    Documentation of Face to Face and Certification for Home Health Services    I certify that patient: Laura Perez is under my care and that I, or a nurse practitioner or physician's assistant working with me, had a face-to-face encounter that meets the physician face-to-face encounter requirements with this patient on: 1/29/2020.    This encounter with the patient was in whole, or in part, for the following medical condition, which is the primary reason for home health care: weakness due to prolonged hospitalization.    I certify that, based on my findings, the following services are medically necessary home health services: Occupational Therapy and Physical Therapy.    My clinical findings support the need for the above services because: Physical Therapy Services are needed to assess and treat the following functional impairments: weakness, fall risk. and Occupational Therapy Services are needed to assess and treat impairments in ADL due to prolonged illness.    Further, I certify that my clinical findings support that this patient is homebound (i.e. absences from home require considerable and taxing effort and are for medical reasons or [employer]ast[employer] services or infrequently or of short duration when for other reasons) because: Leaving home is medically contraindicated for the following reason(s): Infection risk / immunocompromised state where it is safer for them to receive services in the home.  Also  requires assistance of another person for any activity outside the home at present time due to potential for fall risk.    Based on the above findings. I certify that this patient is confined to the home and needs intermittent skilled nursing care, physical therapy and/or speech therapy.  The patient is under my care, and I have initiated the establishment of the plan of care.  This patient will be followed by a physician who will periodically review the plan of care.  Physician/Provider to provide follow up care: Raul Ortega    Attending hospital physician (the Medicare certified [place] provider): Zack Frank MD  Physician Signature: See electronic signature associated with these discharge orders.  Date: 1/29/2020     Full Code     Diet    Follow this diet upon discharge: Orders Placed This Encounter      Moderate Consistent CHO Diet       Significant Results and Procedures   Most Recent 3 CBC's:  Recent Labs   Lab Test 01/27/20  0408 01/26/20  0445 01/25/20  0959   WBC 5.6 7.1 17.5*   HGB 11.4* 11.3* 13.3   MCV 95 96 95   * 138* 184     Most Recent 3 BMP's:  Recent Labs   Lab Test 01/29/20  0400 01/28/20  0719 01/27/20  0408 01/26/20  0445   NA  --  136 138 133*   POTASSIUM 4.3 4.0  3.9 4.0 4.6   CHLORIDE  --  105 106 102   CO2  --  22 23 21   BUN  --  23 29* 40*   CR  --  1.27* 1.54* 2.08*   ANIONGAP  --  9 9 10   GIANNI  --  8.5* 8.3* 8.0*   GLC  --  79 67* 131*   ,   Results for orders placed or performed during the hospital encounter of 01/25/20   XR Chest Port 1 View    Narrative    PROCEDURE:  XR CHEST PORT 1 VW    HISTORY: sob. .    COMPARISON:  6/27/2018, 7/6/2016    FINDINGS:    The cardiomediastinal contours are stable.  Somewhat masslike consolidation is suggested in the left suprahilar  region, distinct from the masslike area seen on the prior from 2016.  No effusion or pneumothorax is seen.      Impression    IMPRESSION:  Nodular consolidation in the left lung. Consider  follow-up CT  chest after appropriate clinical therapy.      PINEDA AYALA MD   MR Brain w/o & w Contrast    Narrative    EXAM:  MR BRAIN W/O & W CONTRAST    HISTORY:  Headache, acute, normal neuro exam. .    TECHNIQUE:  Sagittal T1, axial T1, T2, FLAIR, diffusion, gradient as  well as axial and 3D postcontrast imaging of the whole brain was  performed.    MEDS/CONTRAST: 15ml Doteram    COMPARISON:  None.     FINDINGS:    Prominence of the ventricles and sulci is compatible with mild,  generalized volume loss. Two subcentimeter meningiomas overlying the  left cerebral convexity, without underlying edema. No abnormal  extra-axial fluid collection, hydrocephalus, or midline shift is seen.  The basal cisterns are preserved.    Patchy foci of T2/FLAIR hyperintense signal within the supratentorial  white matter and tuyet are most compatible with moderate chronic  microvascular ischemic change. No abnormal T2* gradient susceptibility  is identified. No restricted diffusion is present.  The major  intracranial vascular flow voids are preserved.    The T1 marrow signal is unremarkable.      Impression    IMPRESSION: Moderate chronic microvascular ischemic changes.    Two subcentimeter meningiomas overlying the left cerebral convexity,  unlikely symptomatic.    PINEDA AYALA MD       Discharge Medications   Current Discharge Medication List      START taking these medications    Details   ondansetron (ZOFRAN-ODT) 4 MG ODT tab Take 1 tablet (4 mg) by mouth every 6 hours as needed for nausea or vomiting  Qty: 15 tablet, Refills: 0    Associated Diagnoses: Influenza B      oxyCODONE (ROXICODONE) 5 MG tablet Take 0.5 tablets (2.5 mg) by mouth every 6 hours as needed for moderate to severe pain  Qty: 14 tablet, Refills: 0    Associated Diagnoses: Rheumatoid arthritis, involving unspecified site, unspecified rheumatoid factor presence (H)         CONTINUE these medications which have NOT CHANGED    Details   albuterol (2.5 MG/3ML)  0.083% neb solution Take 2.5 mg by nebulization every 6 hours as needed for shortness of breath / dyspnea or wheezing      aspirin 81 MG chewable tablet Take 81 mg by mouth daily with food      atovaquone (MEPRON) 750 MG/5ML suspension Take 750 mg by mouth daily       blood glucose (ONETOUCH VERIO IQ) test strip Use to test blood sugar 2 times daily or as directed.  Qty: 200 strip, Refills: 3    Associated Diagnoses: Diabetes mellitus type 2, insulin dependent (H)      budesonide (PULMICORT) 0.5 MG/2ML neb solution Take 2 mLs (0.5 mg) by nebulization 2 times daily  Qty: 180 ampule, Refills: 3    Associated Diagnoses: Chronic obstructive pulmonary disease, unspecified COPD type (H)      cyanocobalamin (VITAMIN B-12) 1000 MCG tablet Take 1 tablet (1,000 mcg) by mouth daily  Qty: 90 tablet, Refills: 3    Associated Diagnoses: Vitamin B12 deficiency (non anemic)      estradiol (YUVAFEM) 10 MCG TABS vaginal tablet PLACE 10 MCG VAGINALLY TWICE A WEEK.  Qty: 24 tablet, Refills: 3    Associated Diagnoses: Atrophic vaginitis      folic acid (FOLVITE) 1 MG tablet Take 1 tablet (1 mg) by mouth daily  Qty: 90 tablet, Refills: 3    Associated Diagnoses: Seropositive rheumatoid arthritis (H)      formoterol (PERFOROMIST) 20 MCG/2ML neb solution Take 2 mLs (20 mcg) by nebulization 2 times daily  Qty: 180 mL, Refills: 3    Associated Diagnoses: Chronic obstructive pulmonary disease, unspecified COPD type (H)      insulin aspart prot & aspart (NOVOLOG MIX 70/30 PEN) (70-30) 100 UNIT/ML pen 45 units am and and 26 in evening  Qty: 15 mL, Refills: 11    Associated Diagnoses: Diabetes mellitus type 2, insulin dependent (H)      insulin pen needle (BD LUCILLE U/F) 32G X 4 MM miscellaneous Use 1 pen needles daily or as directed.  Qty: 200 each, Refills: 3    Associated Diagnoses: Diabetes mellitus type 2, insulin dependent (H)      levothyroxine (SYNTHROID) 125 MCG tablet Take 1 tablet (125 mcg) by mouth daily  Qty: 90 tablet, Refills: 3     Associated Diagnoses: Hypothyroidism, unspecified type      lisinopril (PRINIVIL/ZESTRIL) 5 MG tablet Take 1 tablet (5 mg) by mouth daily      montelukast (SINGULAIR) 10 MG tablet Take 1 tablet (10 mg) by mouth At Bedtime  Qty: 90 tablet, Refills: 3    Associated Diagnoses: Chronic obstructive pulmonary disease, unspecified COPD type (H)      omeprazole (PRILOSEC) 20 MG DR capsule Take 1 capsule (20 mg) by mouth daily  Qty: 90 capsule, Refills: 3    Associated Diagnoses: Gastroesophageal reflux disease, esophagitis presence not specified      ONETOUCH DELICA LANCETS 33G MISC 1 each by to test blood sugar route 2 times daily Dx: E11.9  Qty: 200 each, Refills: 1    Associated Diagnoses: Diabetes mellitus type 2, insulin dependent (H)      predniSONE (DELTASONE) 5 MG tablet Take 10 mg by mouth daily with food TOTAL DAILY DOSE = 12 MG      PROAIR  (90 Base) MCG/ACT inhaler INHALE 2 PUFFS INTO THE LUNGS EVERY 6 HOURS AS NEEDED FOR WHEEZIGN FOR UP  DAYS  Qty: 3 Inhaler, Refills: 3    Associated Diagnoses: Chronic obstructive pulmonary disease, unspecified COPD type (H)      simvastatin (ZOCOR) 40 MG tablet Take 1 tablet (40 mg) by mouth At Bedtime  Qty: 90 tablet, Refills: 3    Associated Diagnoses: Hyperlipidemia, unspecified hyperlipidemia type      tiotropium (SPIRIVA) 18 MCG inhaled capsule Inhale 1 capsule (18 mcg) into the lungs daily      traMADol (ULTRAM) 50 MG tablet Take 50 mg by mouth At Bedtime      traZODone (DESYREL) 50 MG tablet Take 50 mg by mouth daily as needed       Abatacept (ORENCIA) 125 MG/ML SOAJ auto-injector Inject 1 mL (125 mg) Subcutaneous once a week      EPINEPHrine (EPIPEN/ADRENACLICK/OR ANY BX GENERIC EQUIV) 0.3 MG/0.3ML injection 2-pack Inject 0.3 mLs (0.3 mg) into the muscle once as needed  Qty: 0.9 mL, Refills: 1    Comments: Place on file currently at [facility] and wants available once she is out.  Associated Diagnoses: Other emphysema (H)      glucagon 1 MG kit Inject 1  mg into the muscle as needed for low blood sugar       glucose 4 g CHEW chewable tablet Take 1 tablet by mouth as needed for low blood sugar      !! medical cannabis liquid Place 1 drop under the tongue as needed (pain) (This is NOT a prescription, and does not certify that the patient has a qualifying medical condition for medical cannabis.  The purpose of this order is  to document that the patient reports taking medical cannabis.)      !! medical cannabis liquid 1-2 sprays under the tongue as needed for pain      METHOTREXate, PF, (OTREXUP) 25 MG/0.4ML pen Inject 0.4 mLs (25 mg) Subcutaneous every 7 days       !! - Potential duplicate medications found. Please discuss with provider.        Allergies   Allergies   Allergen Reactions     Glyburide Anaphylaxis     Other reaction(s): Dizziness     Mosquitoes (Informational Only) Hives     Other [Seasonal Allergies] Hives     Deer Flies     Sulfa Drugs Anaphylaxis and Hives     Sulfamethoxazole W/Trimethoprim Hives and Itching     Sulfamethoxazole-Trimethoprim Anaphylaxis     Other reaction(s): Other - Describe In Comment Field  Rash, nausea, dizziness     Codeine Nausea and Nausea and Vomiting

## 2020-01-29 NOTE — PROGRESS NOTES
SAFETY CHECKLIST  ID Bands and Risk clasps correct and in place (DNR, Fall risk, Allergy, Latex, Limb):  Yes  All Lines Reconciled and labeled correctly: Yes  Whiteboard updated:Yes  Environmental interventions (bed/chair alarm on, call light, side rails, restraints, sitter....): Yes    Lori Mock RN on 1/28/2020 at 7:27 PM

## 2020-01-29 NOTE — PHARMACY - DISCHARGE MEDICATION RECONCILIATION AND EDUCATION
Pharmacy:  Discharge Counseling and Medication Reconciliation    Laura Perez  [address]  [place] [place] [facility] [address]  [phone] (home)   69 year old female  PCP: Raul Ortega    Allergies: Glyburide; Mosquitoes (informational only); Other [seasonal allergies]; Sulfa drugs; Sulfamethoxazole w/trimethoprim; Sulfamethoxazole-trimethoprim; and Codeine    Discharge Counseling:    Pharmacist met with patient (and ) today to review the medication portion of the After Visit Summary (with an emphasis on NEW medications) and to address patient's questions/concerns.    Summary of Education: Counseled patient on new medications of Oxycodone and Ondansetron, including potential side effects, administration, and dose. Also counseled patient on potential increased sedation, trouble breathing, slowed reaction time in combination with patient's home Tramadol. I counseled her to only take Oxycodone when needed, and to space out by at least a couple hours from any doses of Tramadol. I also advised her to not take any more doses of either if she was having trouble breathing.    Materials Provided:  MedCounselor sheets printed from Clinical Pharmacology on: Ondansetron and Oxycodone    Discharge Medication Reconciliation:    It has been determined that the patient has an adequate supply of medications available or which can be obtained from the patient's preferred pharmacy, which she has confirmed as: [facility]    Thank you for the consult.    Moises Barfield [facility]........January 29, 2020 8:59 AM

## 2020-01-29 NOTE — PROGRESS NOTES
:    Faxed orders to [facility].  Left a message for Nilam at Home Care updating on discharge.  No further discharge planning needs.    ELIDA Smith on 1/29/2020 at 12:08 PM

## 2020-01-29 NOTE — PROGRESS NOTES
Patient has been discharged at this time via w/c with CNA. Spouse here to bring patient home. Discharge paperwork discussed. Personal belongings sent with. All questions answered. Claudine Pineda RN on 1/29/2020 at 9:28 AM

## 2020-01-30 ENCOUNTER — TELEPHONE (OUTPATIENT)
Dept: INTERNAL MEDICINE | Facility: OTHER | Age: 70
End: 2020-01-30

## 2020-01-30 NOTE — TELEPHONE ENCOUNTER
Transitional Care Management Phone Call    Summary of hospitalization:  [facility] discharge summary reviewed    DISCHARGE DIAGNOSIS: Influenza B    DATE OF DISCHARGE: 1/29/2020    Diagnostic Tests/Treatments reviewed.  Follow up needed: Home Care PT Referral for Hospital Discharge.  PT did call and patient stated that she could not make an appointment with them right now. They are going to call her back on Monday the 2/3/2020.    Post Discharge Medication Reconciliation: discharge medications reconciled, continue medications without change    Medications reviewed by: by myself, findings include Patient is not taking Tramadol. Stopped over 1 year go.    Problems taking medications regularly:  None    Problems adhering to non-medication therapy:  None    Other Healthcare Providers Involved in Patient s Care:         None    Update since discharge: improved.     Plan of care communicated with: patient    Just a friendly reminder that you appointment is:  Next 5 appointments (look out 90 days)    Feb 05, 2020  2:20 PM CST  Office Visit with Raul Ortega MD  [facility] ([facility] [address]  [place] [id number] [phone]          We encourage you to keep this appointment.  Please remember to bring all of your pills in their bottles (including any vitamins or over the counter pills) with you to your appointment.   The patient indicates understanding of these issues and agrees with the plan of care.   Other, see documentation. Patient stated she needs to reschedule her follow up due to personal reasons. Refused transfer to scheduling at this time. Will call back later to reschedule.     Was the patient contacted within the 2 business days or other approved timeframe?  Yes    Was the Medication reconciliation and management done since the patient was discharged? Yes    Emerald Matt RN  1/30/2020 9:12 AM

## 2020-02-03 ENCOUNTER — TELEPHONE (OUTPATIENT)
Dept: INTERNAL MEDICINE | Facility: OTHER | Age: 70
End: 2020-02-03

## 2020-02-03 PROCEDURE — G0180 MD CERTIFICATION HHA PATIENT: HCPCS | Performed by: INTERNAL MEDICINE

## 2020-02-03 NOTE — TELEPHONE ENCOUNTER
URGENT: per CMS best practice, response is requested within 24 hours.    Home Care regulation mandates that you are notified about drug discrepancies, interactions & contraindications. Response within a 24 hour timeframe is established by CMS as  best practice  for the delivery of home health care. Home Care is required to report if the 24 hour timeframe was met. The home health clinician will contact you again if this timeframe is not met or if the response does not address all concerns.       Situation:        The patient was admitted to [facility], after being discharged from [facility] on 01/29/10 Upon admission, a med reconciliation was completed to identify any drug discrepancies, interactions or contraindications. Home Care's drug regime review has revealed significant medication issues.     You are being contacted for clarifying orders related to the medication issues.     Background:        Assessment:       Patient is not taking the following medication which is on her clinic list:   Trazodone    Patient is taking the following medication differently then is on her clinic list:   She is taking prednisone 10 mg, (not 12 mg)    During a routine med rec, the following mod/major med interactions were noted:  Aspirin and budesonide  Aspirin and lisinopril  Aspirin and prednisone  Aspirin and methotrexate  Cyanocobalamin and omeprazole  Folic acid and methotrexate  levothyroxine and simvastatin  Lisinopril and simvastatin  Omeprazole and methotrexate      Recommendations:    Please evaluate this information and indicate below whether or not changes are required. A copy of the patient's drug interaction/contraindications report is available upon request.       CLINIC NURSE - If changes are made, please update the Epic medication profile.     Please sign this encounter with your electronic signature.

## 2020-02-03 NOTE — TELEPHONE ENCOUNTER
Spoke with Yi at [facility] and relayed message. Jayne Johnson LPN .............2/3/2020  3:54 PM

## 2020-02-06 ENCOUNTER — OFFICE VISIT (OUTPATIENT)
Dept: INTERNAL MEDICINE | Facility: OTHER | Age: 70
End: 2020-02-06
Attending: INTERNAL MEDICINE
Payer: MEDICARE

## 2020-02-06 VITALS
HEART RATE: 87 BPM | TEMPERATURE: 97.8 F | RESPIRATION RATE: 24 BRPM | SYSTOLIC BLOOD PRESSURE: 118 MMHG | DIASTOLIC BLOOD PRESSURE: 54 MMHG | BODY MASS INDEX: 25.19 KG/M2 | OXYGEN SATURATION: 95 % | WEIGHT: 142.2 LBS

## 2020-02-06 DIAGNOSIS — M06.9 CHRONIC RHEUMATIC ARTHRITIS (H): Primary | ICD-10-CM

## 2020-02-06 DIAGNOSIS — J44.9 CHRONIC OBSTRUCTIVE PULMONARY DISEASE, UNSPECIFIED COPD TYPE (H): ICD-10-CM

## 2020-02-06 DIAGNOSIS — N18.30 CHRONIC KIDNEY DISEASE (CKD), STAGE III (MODERATE) (H): ICD-10-CM

## 2020-02-06 DIAGNOSIS — J10.1 INFLUENZA B: Primary | ICD-10-CM

## 2020-02-06 DIAGNOSIS — E85.89 OTHER AMYLOIDOSIS (H): ICD-10-CM

## 2020-02-06 DIAGNOSIS — R93.89 ABNORMAL CXR: ICD-10-CM

## 2020-02-06 DIAGNOSIS — M06.9 CHRONIC RHEUMATIC ARTHRITIS (H): ICD-10-CM

## 2020-02-06 DIAGNOSIS — Z79.4 DIABETES MELLITUS TYPE 2, INSULIN DEPENDENT (H): ICD-10-CM

## 2020-02-06 DIAGNOSIS — E11.9 DIABETES MELLITUS TYPE 2, INSULIN DEPENDENT (H): ICD-10-CM

## 2020-02-06 PROBLEM — J11.1 INFLUENZA: Status: RESOLVED | Noted: 2020-01-25 | Resolved: 2020-02-06

## 2020-02-06 LAB
ALBUMIN SERPL-MCNC: 3.7 G/DL (ref 3.5–5.7)
ALT SERPL W P-5'-P-CCNC: 27 U/L (ref 7–52)
AST SERPL W P-5'-P-CCNC: 25 U/L (ref 13–39)
BASOPHILS # BLD AUTO: 0 10E9/L (ref 0–0.2)
BASOPHILS NFR BLD AUTO: 0.4 %
CREAT SERPL-MCNC: 1.28 MG/DL (ref 0.6–1.2)
CRP SERPL-MCNC: 0.8 MG/L
DIFFERENTIAL METHOD BLD: ABNORMAL
EOSINOPHIL # BLD AUTO: 0.1 10E9/L (ref 0–0.7)
EOSINOPHIL NFR BLD AUTO: 0.8 %
ERYTHROCYTE [DISTWIDTH] IN BLOOD BY AUTOMATED COUNT: 14.6 % (ref 10–15)
ERYTHROCYTE [SEDIMENTATION RATE] IN BLOOD BY WESTERGREN METHOD: 33 MM/H (ref 1–15)
GFR SERPL CREATININE-BSD FRML MDRD: 41 ML/MIN/{1.73_M2}
HBA1C MFR BLD: 6.4 % (ref 4–6)
HCT VFR BLD AUTO: 40.4 % (ref 35–47)
HGB BLD-MCNC: 13 G/DL (ref 11.7–15.7)
IMM GRANULOCYTES # BLD: 0.1 10E9/L (ref 0–0.4)
IMM GRANULOCYTES NFR BLD: 0.5 %
LYMPHOCYTES # BLD AUTO: 2.7 10E9/L (ref 0.8–5.3)
LYMPHOCYTES NFR BLD AUTO: 26 %
MCH RBC QN AUTO: 30.2 PG (ref 26.5–33)
MCHC RBC AUTO-ENTMCNC: 32.2 G/DL (ref 31.5–36.5)
MCV RBC AUTO: 94 FL (ref 78–100)
MONOCYTES # BLD AUTO: 1.4 10E9/L (ref 0–1.3)
MONOCYTES NFR BLD AUTO: 13.5 %
NEUTROPHILS # BLD AUTO: 6.2 10E9/L (ref 1.6–8.3)
NEUTROPHILS NFR BLD AUTO: 58.8 %
PLATELET # BLD AUTO: 435 10E9/L (ref 150–450)
RBC # BLD AUTO: 4.31 10E12/L (ref 3.8–5.2)
WBC # BLD AUTO: 10.5 10E9/L (ref 4–11)

## 2020-02-06 PROCEDURE — 86140 C-REACTIVE PROTEIN: CPT | Mod: ZL | Performed by: INTERNAL MEDICINE

## 2020-02-06 PROCEDURE — 82565 ASSAY OF CREATININE: CPT | Mod: ZL | Performed by: INTERNAL MEDICINE

## 2020-02-06 PROCEDURE — G0463 HOSPITAL OUTPT CLINIC VISIT: HCPCS

## 2020-02-06 PROCEDURE — 99214 OFFICE O/P EST MOD 30 MIN: CPT | Performed by: INTERNAL MEDICINE

## 2020-02-06 PROCEDURE — 36415 COLL VENOUS BLD VENIPUNCTURE: CPT | Mod: ZL | Performed by: INTERNAL MEDICINE

## 2020-02-06 PROCEDURE — 83036 HEMOGLOBIN GLYCOSYLATED A1C: CPT | Mod: ZL | Performed by: INTERNAL MEDICINE

## 2020-02-06 PROCEDURE — 85652 RBC SED RATE AUTOMATED: CPT | Mod: ZL | Performed by: INTERNAL MEDICINE

## 2020-02-06 PROCEDURE — 82040 ASSAY OF SERUM ALBUMIN: CPT | Mod: ZL | Performed by: INTERNAL MEDICINE

## 2020-02-06 PROCEDURE — 84460 ALANINE AMINO (ALT) (SGPT): CPT | Mod: ZL | Performed by: INTERNAL MEDICINE

## 2020-02-06 PROCEDURE — 84450 TRANSFERASE (AST) (SGOT): CPT | Mod: ZL | Performed by: INTERNAL MEDICINE

## 2020-02-06 PROCEDURE — 85025 COMPLETE CBC W/AUTO DIFF WBC: CPT | Mod: ZL | Performed by: INTERNAL MEDICINE

## 2020-02-06 ASSESSMENT — PAIN SCALES - GENERAL: PAINLEVEL: MODERATE PAIN (4)

## 2020-02-06 ASSESSMENT — ENCOUNTER SYMPTOMS
ALLERGIC/IMMUNOLOGIC NEGATIVE: 1
CONSTITUTIONAL NEGATIVE: 1
HEMATOLOGIC/LYMPHATIC NEGATIVE: 1
ENDOCRINE NEGATIVE: 1

## 2020-02-06 NOTE — NURSING NOTE
Laura Perez is a 69 year old female presenting today for: hospital follow up for influenza B, other issues  Medication Reconciliation: complete    Morena Posada LPN 2/6/2020 9:24 AM

## 2020-02-06 NOTE — PROGRESS NOTES
Chief Complaint:  Hospital f/u.    HPI: She is here for follow-up.  She had influenza B and was hospitalized.  Associated with this she had dehydration and acute kidney injury.  After appropriate therapy, she improved and her renal function returned to baseline.  She did get the flu shot.  She of course is immunosuppressed likely contributing to her severe illness from the influenza.    She had an x-ray of her chest that was done and was abnormal suggesting a nodular density in the left lung.  I reviewed this x-ray today and would concur and she needs follow-up on this.  I recommended chest CT and she is interested in having this done.    In addition, she is having some lower lumbar pain.  Unclear whether she has radiculopathy but she likely has osteoporosis with possibly even some compression fracture.  She is wondering about getting an MRI of this area but does not want to do it now as she is still recovering from the influenza.    She is due for some lab work for the [facility].  She would like to get an A1c as well.    Past Medical History:   Diagnosis Date     Amyloidosis (H)      Chronic kidney disease (CKD), stage III (moderate) (H)      Chronic obstructive pulmonary disease (H)     No Comments Provided     Diverticulosis of intestine without perforation or abscess without bleeding     Diffuse diverticulosis and history of diminutive hyperplastic colon     Essential (primary) hypertension     No Comments Provided     Hyperlipidemia     No Comments Provided     Hypothyroidism     No Comments Provided     Malignant neoplasm of colon (H)     9/2017     Osteoporosis      Rheumatoid arthritis (H)     Rheumatologist Dr. Gonzalez, [phone], fax [phone]     Rosacea     No Comments Provided     Type 2 diabetes mellitus without complications (H)     Diabetes Mellitus, prednisone induced       Past Surgical History:   Procedure Laterality Date     ARTHROPLASTY,Right MTP 1 and 5 and left MTP 5  01/2001      CATARACT IOL, RT/LT Bilateral      COLON SURGERY Right 09/2017    For colon cancer     COLONOSCOPY  09/29/2008     COLONOSCOPY  2018    F/U in 5 years recommended secondary to polyps     LAPAROSCOPIC TUBAL LIGATION      No Comments Provided     Left MCP joint arthroplasty  02/05/1998     MTP 2, 3, 4 right foot  05/2000     RELEASE CARPAL TUNNEL Left 1991     Right long finger MCP repair  1996    [place]     Right MCP 4 and 5 arthroplasty  03/01/2004     Right wrist arthroplasty  1990       Allergies   Allergen Reactions     Glyburide Anaphylaxis     Other reaction(s): Dizziness     Mosquitoes (Informational Only) Hives     Other [Seasonal Allergies] Hives     Deer Flies     Sulfa Drugs Anaphylaxis and Hives     Sulfamethoxazole W/Trimethoprim Hives and Itching     Sulfamethoxazole-Trimethoprim Anaphylaxis     Other reaction(s): Other - Describe In Comment Field  Rash, nausea, dizziness     Codeine Nausea and Nausea and Vomiting       Current Outpatient Medications   Medication Sig Dispense Refill     Abatacept (ORENCIA) 125 MG/ML SOAJ auto-injector Inject 1 mL (125 mg) Subcutaneous once a week       albuterol (2.5 MG/3ML) 0.083% neb solution Take 2.5 mg by nebulization every 6 hours as needed for shortness of breath / dyspnea or wheezing       aspirin 81 MG chewable tablet Take 81 mg by mouth daily with food       atovaquone (MEPRON) 750 MG/5ML suspension Take 750 mg by mouth daily        blood glucose (ONETOUCH VERIO IQ) test strip Use to test blood sugar 2 times daily or as directed. 200 strip 3     budesonide (PULMICORT) 0.5 MG/2ML neb solution Take 2 mLs (0.5 mg) by nebulization 2 times daily 180 ampule 3     cyanocobalamin (VITAMIN B-12) 1000 MCG tablet Take 1 tablet (1,000 mcg) by mouth daily 90 tablet 3     EPINEPHrine (EPIPEN/ADRENACLICK/OR ANY BX GENERIC EQUIV) 0.3 MG/0.3ML injection 2-pack Inject 0.3 mLs (0.3 mg) into the muscle once as needed 0.9 mL 1     estradiol (YUVAFEM) 10 MCG TABS vaginal tablet  PLACE 10 MCG VAGINALLY TWICE A WEEK. 24 tablet 3     folic acid (FOLVITE) 1 MG tablet Take 1 tablet (1 mg) by mouth daily 90 tablet 3     formoterol (PERFOROMIST) 20 MCG/2ML neb solution Take 2 mLs (20 mcg) by nebulization 2 times daily 180 mL 3     glucagon 1 MG kit Inject 1 mg into the muscle as needed for low blood sugar        glucose 4 g CHEW chewable tablet Take 1 tablet by mouth as needed for low blood sugar       insulin aspart prot & aspart (NOVOLOG MIX 70/30 PEN) (70-30) 100 UNIT/ML pen 45 units am and and 26 in evening 15 mL 11     insulin pen needle (BD LUCILLE U/F) 32G X 4 MM miscellaneous Use 1 pen needles daily or as directed. 200 each 3     levothyroxine (SYNTHROID) 125 MCG tablet Take 1 tablet (125 mcg) by mouth daily 90 tablet 3     lisinopril (PRINIVIL/ZESTRIL) 5 MG tablet Take 1 tablet (5 mg) by mouth daily       medical cannabis liquid Place 1 drop under the tongue as needed (pain) (This is NOT a prescription, and does not certify that the patient has a qualifying medical condition for medical cannabis.  The purpose of this order is  to document that the patient reports taking medical cannabis.)       medical cannabis liquid 1-2 sprays under the tongue as needed for pain       METHOTREXate, PF, (OTREXUP) 25 MG/0.4ML pen Inject 0.4 mLs (25 mg) Subcutaneous every 7 days       montelukast (SINGULAIR) 10 MG tablet Take 1 tablet (10 mg) by mouth At Bedtime 90 tablet 3     omeprazole (PRILOSEC) 20 MG DR capsule Take 1 capsule (20 mg) by mouth daily 90 capsule 3     ondansetron (ZOFRAN-ODT) 4 MG ODT tab Take 1 tablet (4 mg) by mouth every 6 hours as needed for nausea or vomiting 15 tablet 0     ONETOUCH DELICA LANCETS 33G MISC 1 each by to test blood sugar route 2 times daily Dx: E11.9 200 each 1     oxyCODONE (ROXICODONE) 5 MG tablet Take 0.5 tablets (2.5 mg) by mouth every 6 hours as needed for moderate to severe pain 14 tablet 0     predniSONE (DELTASONE) 5 MG tablet Take 10 mg by mouth daily with food  TOTAL DAILY DOSE = 12 MG       PROAIR  (90 Base) MCG/ACT inhaler INHALE 2 PUFFS INTO THE LUNGS EVERY 6 HOURS AS NEEDED FOR WHEEZIGN FOR UP  DAYS 3 Inhaler 3     simvastatin (ZOCOR) 40 MG tablet Take 1 tablet (40 mg) by mouth At Bedtime 90 tablet 3     tiotropium (SPIRIVA) 18 MCG inhaled capsule Inhale 1 capsule (18 mcg) into the lungs daily       traMADol (ULTRAM) 50 MG tablet Take 50 mg by mouth At Bedtime       traZODone (DESYREL) 50 MG tablet Take 50 mg by mouth daily as needed          Social History     Socioeconomic History     Marital status:      Spouse name: Not on file     Number of children: Not on file     Years of education: Not on file     Highest education level: Not on file   Occupational History     Not on file   Social Needs     Financial resource strain: Not on file     Food insecurity:     Worry: Not on file     Inability: Not on file     Transportation needs:     Medical: Not on file     Non-medical: Not on file   Tobacco Use     Smoking status: Former Smoker     Packs/day: 1.00     Years: 30.00     Pack years: 30.00     Types: Cigarettes     Last attempt to quit: 2005     Years since quittin.4     Smokeless tobacco: Never Used   Substance and Sexual Activity     Alcohol use: Yes     Alcohol/week: 2.5 standard drinks     Comment: Rare     Drug use: Yes     Comment: Medical marijuana     Sexual activity: Not Currently   Lifestyle     Physical activity:     Days per week: Not on file     Minutes per session: Not on file     Stress: Not on file   Relationships     Social connections:     Talks on phone: Not on file     Gets together: Not on file     Attends Judaism service: Not on file     Active member of club or organization: Not on file     Attends meetings of clubs or organizations: Not on file     Relationship status: Not on file     Intimate partner violence:     Fear of current or ex partner: Not on file     Emotionally abused: Not on file     Physically  abused: Not on file     Forced sexual activity: Not on file   Other Topics Concern     Not on file   Social History Narrative      , [place].  Does not work outside the home.  Four children are grown.  Lives in town.       Review of Systems   Constitutional: Negative.    Endocrine: Negative.    Skin: Negative.    Allergic/Immunologic: Negative.    Hematological: Negative.        Physical Exam  Vitals signs and nursing note reviewed.   Constitutional:       General: She is not in acute distress.     Appearance: Normal appearance. She is not ill-appearing, toxic-appearing or diaphoretic.      Comments: In wheelchair   Neurological:      Mental Status: She is alert.         Assessment:      ICD-10-CM    1. Influenza B J10.1    2. Chronic obstructive pulmonary disease, unspecified COPD type (H) J44.9    3. Other amyloidosis (H) E85.89    4. Chronic kidney disease (CKD), stage III (moderate) (H) N18.3    5. Abnormal CXR R93.89 CT Chest w/o Contrast   6. Diabetes mellitus type 2, insulin dependent (H) E11.9 Hemoglobin A1c    Z79.4        Plan: She appears to be over the influenza.  Lab pending, I will send a letter with the results.  CT scan of the chest is scheduled and I will let her know the results.  If at some point in time she would like to have an MRI of her lumbar spine I think that would be reasonable and I would certainly order that.  She will follow-up with the [facility] per all of her other medical issues as well as discussion regarding Prolia.

## 2020-02-06 NOTE — LETTER
February 6, 2020      Laura Perez  [address] [address]        Dear Laura,    Below are the results of your recent labs:    Results for orders placed or performed in visit on 02/06/20   Hemoglobin A1c     Status: Abnormal   Result Value Ref Range    Hemoglobin A1C 6.4 (H) 4.0 - 6.0 %   CRP, inflammation     Status: Abnormal   Result Value Ref Range    CRP Inflammation 0.8 (H) <0.5 mg/L   ESR: Erythrocyte sedimentation rate     Status: Abnormal   Result Value Ref Range    Sed Rate 33 (H) 1 - 15 mm/h   Albumin level     Status: None   Result Value Ref Range    Albumin 3.7 3.5 - 5.7 g/dL   ALT     Status: None   Result Value Ref Range    ALT 27 7 - 52 U/L   AST     Status: None   Result Value Ref Range    AST 25 13 - 39 U/L   Creatinine     Status: Abnormal   Result Value Ref Range    Creatinine 1.28 (H) 0.60 - 1.20 mg/dL    GFR Estimate 41 (L) >60 mL/min/[1.73_m2]    GFR Estimate If Black 50 (L) >60 mL/min/[1.73_m2]   CBC with platelets and differential     Status: Abnormal   Result Value Ref Range    WBC 10.5 4.0 - 11.0 10e9/L    RBC Count 4.31 3.8 - 5.2 10e12/L    Hemoglobin 13.0 11.7 - 15.7 g/dL    Hematocrit 40.4 35.0 - 47.0 %    MCV 94 78 - 100 fl    MCH 30.2 26.5 - 33.0 pg    MCHC 32.2 31.5 - 36.5 g/dL    RDW 14.6 10.0 - 15.0 %    Platelet Count 435 150 - 450 10e9/L    Diff Method Automated Method     % Neutrophils 58.8 %    % Lymphocytes 26.0 %    % Monocytes 13.5 %    % Eosinophils 0.8 %    % Basophils 0.4 %    % Immature Granulocytes 0.5 %    Absolute Neutrophil 6.2 1.6 - 8.3 10e9/L    Absolute Lymphocytes 2.7 0.8 - 5.3 10e9/L    Absolute Monocytes 1.4 (H) 0.0 - 1.3 10e9/L    Absolute Eosinophils 0.1 0.0 - 0.7 10e9/L    Absolute Basophils 0.0 0.0 - 0.2 10e9/L    Abs Immature Granulocytes 0.1 0 - 0.4 10e9/L        Overall your blood tests look fine.  This includes your diabetes testing.  Congratulations on this report.    Sincerely,        Raul Ortega MD  Internal Medicine  [facility]

## 2020-02-10 ENCOUNTER — HOSPITAL ENCOUNTER (OUTPATIENT)
Dept: CT IMAGING | Facility: OTHER | Age: 70
Discharge: HOME | End: 2020-02-10
Attending: INTERNAL MEDICINE | Admitting: INTERNAL MEDICINE
Payer: MEDICARE

## 2020-02-10 DIAGNOSIS — R93.89 ABNORMAL CXR: ICD-10-CM

## 2020-02-10 PROCEDURE — 71250 CT THORAX DX C-: CPT

## 2020-02-20 DIAGNOSIS — M05.9 SEROPOSITIVE RHEUMATOID ARTHRITIS (H): Primary | ICD-10-CM

## 2020-02-20 DIAGNOSIS — J10.1 INFLUENZA DUE TO INFLUENZA VIRUS, TYPE A, HUMAN: Primary | ICD-10-CM

## 2020-02-21 NOTE — TELEPHONE ENCOUNTER
"[facility] sent Rx request for the following:   TRAMADOL 50MG TABLETS  Sig:  TAKE 1 TABLET BY MOUTH EVERY DAY    Last Prescription Date:   Historical  Last Fill Qty/Refills:         Historical  Last Office Visit:              2/6/2020-medication reconciled as \"taking\" (Take 50 mg by mouth At Bedtime)  Future Office visit:           None    Routing refill request to provider for review/approval because:  Historical    Will route to PCP for consideration.    Jeanne Fletcher RN  ....................  2/21/2020   3:27 PM      "

## 2020-02-24 RX ORDER — TRAMADOL HYDROCHLORIDE 50 MG/1
TABLET ORAL
Qty: 30 TABLET | Refills: 5 | Status: SHIPPED | OUTPATIENT
Start: 2020-02-24 | End: 2020-09-14

## 2020-03-10 ENCOUNTER — HEALTH MAINTENANCE LETTER (OUTPATIENT)
Age: 70
End: 2020-03-10

## 2020-05-14 DIAGNOSIS — E11.9 DIABETES MELLITUS TYPE 2, INSULIN DEPENDENT (H): ICD-10-CM

## 2020-05-14 DIAGNOSIS — Z79.4 DIABETES MELLITUS TYPE 2, INSULIN DEPENDENT (H): ICD-10-CM

## 2020-05-18 NOTE — TELEPHONE ENCOUNTER
"Requested Prescriptions   Pending Prescriptions Disp Refills     insulin aspart prot & aspart (NOVOLOG MIX 70/30 FLEXPEN) (70-30) 100 UNIT/ML pen [Pharmacy Med Name: NOVOLOG MIX 70/30 FLXPEN 3ML (BLUE)] 15 mL 11     Sig: INJECT 45 UNITS EVERY MORNING AND 26 UNITS EVERY EVENING.       Insulin Mixes Protocol Passed - 5/14/2020  3:27 PM        Passed - Serum creatinine on file in past 12 months     Recent Labs   Lab Test 02/06/20  1006   CR 1.28*       Ok to refill medication if creatinine is low          Passed - HgbA1C in past 3 or 6 months     If HgbA1C is 8 or greater, it needs to be on file within the past 3 months.  If less than 8, must be on file within the past 6 months.     Recent Labs   Lab Test 02/06/20  1006  11/25/17  2133   A1C 6.4*   < >  --    [id number]  --   --  9.3*    < > = values in this interval not displayed.             Passed - Medication is active on mded list        Passed - Patient is age 18 or older        Passed - Recent (6 mo) or future (30 days) visit within the authorizing provider's specialty     Patient had office visit in the last 6 months or has a visit in the next 30 days with authorizing provider or within the authorizing provider's specialty.  See \"Patient Info\" tab in inbasket, or \"Choose Columns\" in Meds & Orders section of the refill encounter.                 Last Written Prescription Date:  07/08/2019  Last Fill Quantity: 15ml,   # refills: 11  Last Office Visit: 02/06/2020 with Raul Ortega MD  Future Office visit:   None noted.  Unable to complete prescription refill per RN medication refill policy. Will route to provider for review and consideration.  Leonor Day RN on 5/18/2020 at 8:55 AM            "

## 2020-05-27 DIAGNOSIS — E11.9 DIABETES MELLITUS TYPE 2, INSULIN DEPENDENT (H): ICD-10-CM

## 2020-05-27 DIAGNOSIS — Z79.4 DIABETES MELLITUS TYPE 2, INSULIN DEPENDENT (H): ICD-10-CM

## 2020-05-27 RX ORDER — INSULIN ASPART 100 [IU]/ML
INJECTION, SUSPENSION SUBCUTANEOUS
Qty: 15 ML | Refills: 11 | OUTPATIENT
Start: 2020-05-27

## 2020-05-27 NOTE — TELEPHONE ENCOUNTER
[facility] sent Rx request for the following:      INSULIN ASPA MIX 70/30 FLEXPEN, 3ML   Sig: INJECT 45 UNITS EVERY MORNING AND 26 UNITS EVERY EVENING.      Last Prescription Date:   5/18/2020  Last Fill Qty/Refills:         15ml, R-11    Last Office Visit:              7/8/2019   Future Office visit:           none      Redundant refill request refused: Too soon:    Renan Ruiz RN, BSN  ....................  5/27/2020   2:39 PM

## 2020-07-10 DIAGNOSIS — E11.9 DIABETES MELLITUS TYPE 2, INSULIN DEPENDENT (H): ICD-10-CM

## 2020-07-10 DIAGNOSIS — Z79.4 DIABETES MELLITUS TYPE 2, INSULIN DEPENDENT (H): ICD-10-CM

## 2020-07-13 DIAGNOSIS — E78.5 HYPERLIPIDEMIA, UNSPECIFIED HYPERLIPIDEMIA TYPE: ICD-10-CM

## 2020-07-13 DIAGNOSIS — K21.9 GASTROESOPHAGEAL REFLUX DISEASE, ESOPHAGITIS PRESENCE NOT SPECIFIED: ICD-10-CM

## 2020-07-14 RX ORDER — LANCETS 30 GAUGE
EACH MISCELLANEOUS
Qty: 100 EACH | Refills: 6 | Status: SHIPPED | OUTPATIENT
Start: 2020-07-14 | End: 2022-01-03

## 2020-07-14 NOTE — TELEPHONE ENCOUNTER
lov 2/6/20. Prescription approved per [facility] Refill Protocol.  Yessy Mari RN...........7/14/2020 3:28 PM

## 2020-07-16 NOTE — TELEPHONE ENCOUNTER
"Requested Prescriptions   Pending Prescriptions Disp Refills     simvastatin (ZOCOR) 40 MG tablet [Pharmacy Med Name: SIMVASTATIN 40MG TABLETS] 90 tablet 3     Sig: TAKE 1 TABLET(40 MG) BY MOUTH AT BEDTIME       Statins Protocol Failed - 7/13/2020 10:15 AM        Failed - LDL on file in past 12 months     Recent Labs   Lab Test 02/15/19  1257   LDL 89             Passed - No abnormal creatine kinase in past 12 months     Recent Labs   Lab Test 12/07/12  0952   CKT 48                Passed - Recent (12 mo) or future (30 days) visit within the authorizing provider's specialty     Patient has had an office visit with the authorizing provider or a provider within the authorizing providers department within the previous 12 mos or has a future within next 30 days. See \"Patient Info\" tab in inPolicyGeniussket, or \"Choose Columns\" in Meds & Orders section of the refill encounter.              Passed - Medication is active on med list        Passed - Patient is age 18 or older        Passed - No active pregnancy on record        Passed - No positive pregnancy test in past 12 months           omeprazole (PRILOSEC) 20 MG DR capsule [Pharmacy Med Name: OMEPRAZOLE 20MG CAPSULES] 90 capsule 3     Sig: TAKE 1 CAPSULE(20 MG) BY MOUTH DAILY       PPI Protocol Failed - 7/13/2020 10:15 AM        Failed - No diagnosis of osteoporosis on record        Passed - Not on Clopidogrel (unless Pantoprazole ordered)        Passed - Recent (12 mo) or future (30 days) visit within the authorizing provider's specialty     Patient has had an office visit with the authorizing provider or a provider within the authorizing providers department within the previous 12 mos or has a future within next 30 days. See \"Patient Info\" tab in inbasket, or \"Choose Columns\" in Meds & Orders section of the refill encounter.              Passed - Medication is active on med list        Passed - Patient is age 18 or older        Passed - No active pregnacy on record        " Passed - No positive pregnancy test in past 12 months               Last Written Prescription Date:  07/08/2019 both medications  Last Fill Quantity: 90,   # refills: 3  Last Office Visit: 02/06/2020 with Raul Ortega MD  Future Office visit:  None noted Unable to complete prescription refill per RN medication refill policy. Will route to provider for review and consideration.

## 2020-07-20 DIAGNOSIS — N95.2 ATROPHIC VAGINITIS: ICD-10-CM

## 2020-07-20 RX ORDER — ESTRADIOL 10 UG/1
INSERT VAGINAL
Qty: 24 TABLET | Refills: 3 | Status: SHIPPED | OUTPATIENT
Start: 2020-07-20 | End: 2021-09-15

## 2020-07-20 RX ORDER — SIMVASTATIN 40 MG
TABLET ORAL
Qty: 90 TABLET | Refills: 3 | Status: SHIPPED | OUTPATIENT
Start: 2020-07-20 | End: 2021-08-24

## 2020-07-23 ENCOUNTER — TELEPHONE (OUTPATIENT)
Dept: INTERNAL MEDICINE | Facility: OTHER | Age: 70
End: 2020-07-23

## 2020-07-23 DIAGNOSIS — N95.2 ATROPHIC VAGINITIS: Primary | ICD-10-CM

## 2020-07-23 DIAGNOSIS — K21.00 GASTROESOPHAGEAL REFLUX DISEASE WITH ESOPHAGITIS: ICD-10-CM

## 2020-07-23 RX ORDER — FAMOTIDINE 20 MG/1
20 TABLET, FILM COATED ORAL
Status: CANCELLED | OUTPATIENT
Start: 2020-07-23

## 2020-07-23 RX ORDER — FAMOTIDINE 10 MG
10 TABLET ORAL
Status: CANCELLED | OUTPATIENT
Start: 2020-07-23

## 2020-07-23 RX ORDER — FAMOTIDINE 40 MG/1
40 TABLET, FILM COATED ORAL DAILY
Qty: 90 TABLET | Refills: 3 | Status: SHIPPED | OUTPATIENT
Start: 2020-07-23 | End: 2020-11-02

## 2020-07-23 NOTE — TELEPHONE ENCOUNTER
Pharmacist from [facility] called to see if Reviewed and updated as needed this visit by clinical staff if Raul Ortega MD would like to make any medication changes.  Pharmacist spoke with patient and would like to consider the changes if Raul Ortega MD agrees.    Omeprazole to be changed to Pepcid/Famotidine.  Long term use of omeprazole increases the risk for Dementia.  If this change is approved, pharmacist suggests alternating daily between the Pepcid and Omeprazole for 2 weeks so there isn't any rebound acid reflux.    Estrogen tablets changed over to estrogen cream.  Patient reports taking this a couple of times per week for vaginal dryness.  This would reduce the risk for blood clots and risk for uterine cancer.    Potential orders pending.  Amanda Cheung LPN 7/23/2020 10:03 AM

## 2020-07-24 ENCOUNTER — TELEPHONE (OUTPATIENT)
Dept: INTERNAL MEDICINE | Facility: OTHER | Age: 70
End: 2020-07-24

## 2020-07-24 NOTE — TELEPHONE ENCOUNTER
Medication: Premarin vaginal cream 30gm    Not covered by patient's insurance.    Alternative:  Yuvafem tab MCG  Estradiol tabmcg    Noticed patient just switched from tablets.  Morena Posada LPN on 7/24/2020 at 1:57 PM

## 2020-07-24 NOTE — TELEPHONE ENCOUNTER
Patient hasn't picked up premarin yet and doesn't know the cost.  She would like to try it out first.    Patient does have questions, however, on her omeprazole and the famotidine she just picked up.  Wondering if she should take the two of them together or just one.  Morena Posada LPN on 7/24/2020 at 4:21 PM

## 2020-07-27 DIAGNOSIS — E53.8 VITAMIN B12 DEFICIENCY (NON ANEMIC): ICD-10-CM

## 2020-07-27 NOTE — TELEPHONE ENCOUNTER
After the patient's full name and date of birth was verified, the patient was notified of the below information.  Morena Posada LPN on 7/27/2020 at 8:22 AM

## 2020-07-29 RX ORDER — LANOLIN ALCOHOL/MO/W.PET/CERES
CREAM (GRAM) TOPICAL
Qty: 90 TABLET | Refills: 1 | Status: SHIPPED | OUTPATIENT
Start: 2020-07-29 | End: 2020-11-02

## 2020-07-29 NOTE — TELEPHONE ENCOUNTER
Prescription approved per [facility] Refill Protocol.  LOV: 2/6/2020  Sharda Garcia RN on 7/29/2020 at 4:28 PM

## 2020-08-09 DIAGNOSIS — E03.9 HYPOTHYROIDISM, UNSPECIFIED TYPE: ICD-10-CM

## 2020-08-10 RX ORDER — LEVOTHYROXINE SODIUM 125 UG/1
TABLET ORAL
Qty: 90 TABLET | Refills: 1 | Status: SHIPPED | OUTPATIENT
Start: 2020-08-10 | End: 2021-02-16

## 2020-08-10 NOTE — TELEPHONE ENCOUNTER
[facility] sent Rx request for the following: levothyroxine (SYNTHROID/LEVOTHROID) 125 MCG tablet  Sig TAKE 1 TABLET(125 MCG) BY MOUTH DAILY    Last Prescription Date:   7/8/19  Last Fill Qty/Refills:         90, R-3    Last Office Visit:              2/6/20   Future Office visit:           None    Routing refill request to provider for review/approval because:    Thyroid Protocol Failed  Normal TSH on file in past 12 months       Mary Leon RN on 8/10/2020 at 11:22 AM

## 2020-08-20 DIAGNOSIS — J44.9 CHRONIC OBSTRUCTIVE PULMONARY DISEASE, UNSPECIFIED COPD TYPE (H): ICD-10-CM

## 2020-08-20 RX ORDER — BUDESONIDE 0.5 MG/2ML
INHALANT ORAL
Qty: 180 AMPULE | Refills: 1 | Status: SHIPPED | OUTPATIENT
Start: 2020-08-20 | End: 2021-08-12

## 2020-08-20 NOTE — TELEPHONE ENCOUNTER
Prescription approved per [facility] Refill Protocol.  LOV: 2/6/2020  Sharda Garcia RN on 8/20/2020 at 12:00 PM

## 2020-09-13 DIAGNOSIS — M05.9 SEROPOSITIVE RHEUMATOID ARTHRITIS (H): ICD-10-CM

## 2020-09-14 RX ORDER — TRAMADOL HYDROCHLORIDE 50 MG/1
TABLET ORAL
Qty: 30 TABLET | Refills: 1 | Status: SHIPPED | OUTPATIENT
Start: 2020-09-14 | End: 2020-11-02

## 2020-09-14 NOTE — TELEPHONE ENCOUNTER
[facility] sent Rx request for the following: traMADol (ULTRAM) 50 MG tablet  Sig TAKE 1 TABLET BY MOUTH EVERY DAY    Last Prescription Date:   2/24/20  Last Fill Qty/Refills:         30, R-5    Last Office Visit:              2/6/20   Future Office visit:           None    Routing refill request to provider for review/approval because:  Drug not on the [facility], [facility] or [facility] refill protocol or controlled substance    Mary Leon RN on 9/14/2020 at 9:08 AM

## 2020-10-05 DIAGNOSIS — E11.9 DIABETES MELLITUS TYPE 2, INSULIN DEPENDENT (H): ICD-10-CM

## 2020-10-05 DIAGNOSIS — Z79.4 DIABETES MELLITUS TYPE 2, INSULIN DEPENDENT (H): ICD-10-CM

## 2020-10-05 NOTE — LETTER
October 6, 2020      Laura Perez  [address] [facility] [address]        Dear Laura,     A refill request was received from your pharmacy for pen needles.    Additional refills require an office visit with Dr. Ortega for diabetic follow up and labs.    Please call [phone] to schedule appointment.          Sincerely,      Refill Nurse

## 2020-10-06 RX ORDER — PEN NEEDLE, DIABETIC 32GX 5/32"
NEEDLE, DISPOSABLE MISCELLANEOUS
Qty: 100 EACH | Refills: 0 | Status: SHIPPED | OUTPATIENT
Start: 2020-10-06 | End: 2020-11-02

## 2020-10-06 NOTE — TELEPHONE ENCOUNTER
Prescription approved per [facility] Refill Protocol.    LOV: 2/6/2020    Patient is due for diabetic follow up and labs  Letter and my chart message sent  Sharda Garcia RN on 10/6/2020 at 12:19 PM

## 2020-11-02 ENCOUNTER — VIRTUAL VISIT (OUTPATIENT)
Dept: INTERNAL MEDICINE | Facility: OTHER | Age: 70
End: 2020-11-02
Attending: INTERNAL MEDICINE
Payer: MEDICARE

## 2020-11-02 DIAGNOSIS — Z79.4 DIABETES MELLITUS TYPE 2, INSULIN DEPENDENT (H): ICD-10-CM

## 2020-11-02 DIAGNOSIS — E53.8 VITAMIN B12 DEFICIENCY (NON ANEMIC): ICD-10-CM

## 2020-11-02 DIAGNOSIS — E11.9 DIABETES MELLITUS TYPE 2, INSULIN DEPENDENT (H): ICD-10-CM

## 2020-11-02 DIAGNOSIS — J44.9 CHRONIC OBSTRUCTIVE PULMONARY DISEASE, UNSPECIFIED COPD TYPE (H): ICD-10-CM

## 2020-11-02 DIAGNOSIS — M05.9 SEROPOSITIVE RHEUMATOID ARTHRITIS (H): ICD-10-CM

## 2020-11-02 PROCEDURE — 99443 PR PHYSICIAN TELEPHONE EVALUATION 21-30 MIN: CPT | Performed by: INTERNAL MEDICINE

## 2020-11-02 RX ORDER — ALBUTEROL SULFATE 90 UG/1
2 AEROSOL, METERED RESPIRATORY (INHALATION) EVERY 4 HOURS PRN
Qty: 3 INHALER | Refills: 3 | COMMUNITY
Start: 2020-11-02 | End: 2021-09-15

## 2020-11-02 RX ORDER — FOLIC ACID 1 MG/1
1 TABLET ORAL DAILY
Qty: 90 TABLET | Refills: 3 | Status: SHIPPED | OUTPATIENT
Start: 2020-11-02 | End: 2021-11-09

## 2020-11-02 RX ORDER — LANCETS 30 GAUGE
1 EACH MISCELLANEOUS 2 TIMES DAILY
Qty: 200 EACH | Refills: 3 | Status: SHIPPED | OUTPATIENT
Start: 2020-11-02 | End: 2022-11-28

## 2020-11-02 RX ORDER — TRAMADOL HYDROCHLORIDE 50 MG/1
50 TABLET ORAL DAILY
Qty: 30 TABLET | Refills: 1 | Status: SHIPPED | OUTPATIENT
Start: 2020-11-02 | End: 2021-01-18

## 2020-11-02 RX ORDER — PREDNISONE 5 MG/1
10 TABLET ORAL
COMMUNITY
Start: 2020-11-02 | End: 2022-01-03

## 2020-11-02 RX ORDER — PEN NEEDLE, DIABETIC 32GX 5/32"
NEEDLE, DISPOSABLE MISCELLANEOUS
Qty: 200 EACH | Refills: 3 | Status: SHIPPED | OUTPATIENT
Start: 2020-11-02 | End: 2021-12-21

## 2020-11-02 RX ORDER — BLOOD SUGAR DIAGNOSTIC
STRIP MISCELLANEOUS
Qty: 200 STRIP | Refills: 3 | Status: SHIPPED | OUTPATIENT
Start: 2020-11-02 | End: 2022-01-14

## 2020-11-02 RX ORDER — LANOLIN ALCOHOL/MO/W.PET/CERES
1000 CREAM (GRAM) TOPICAL DAILY
Qty: 90 TABLET | Refills: 3 | Status: SHIPPED | OUTPATIENT
Start: 2020-11-02 | End: 2022-01-03

## 2020-11-02 NOTE — PROGRESS NOTES
"Laura Perez is a 70 year old female who is being evaluated via a billable telephone visit.      The patient has been notified of following:     \"This telephone visit will be conducted via a call between you and your physician/provider. We have found that certain health care needs can be provided without the need for a physical exam.  This service lets us provide the care you need with a short phone conversation.  If a prescription is necessary we can send it directly to your pharmacy.  If lab work is needed we can place an order for that and you can then stop by our lab to have the test done at a later time.    Telephone visits are billed at different rates depending on your insurance coverage. During this emergency period, for some insurers they may be billed the same as an in-person visit.  Please reach out to your insurance provider with any questions.    If during the course of the call the physician/provider feels a telephone visit is not appropriate, you will not be charged for this service.\"    Patient has given verbal consent for Telephone visit?  Yes    Subjective     Laura Perez is a 70 year old female who presents via phone visit today for the following health issues:    HPI     Telephone visit was held with the patient today.  She is immunosuppressed and is avoiding coming into contact with any person or areas that would increase her risk for getting Covid infection.  This includes coming into the clinic, she would really rather not come in and have blood work done or anything else done.  She is due for a number of things including a vitamin D level, initiation of Prolia infusions, methotrexate lab as well as an A1c at a minimum.  She was last at the [facility] a year ago and they recommended that she start on Prolia for her osteoporosis but she has not done that as yet.    She tells me that she feels well.  She has no complaints or concerns.  She would like to just get her medications filled and " "may become in February if things have quieted down in regards to the coronavirus infection.  She would be due for a mammogram around that time as well.    Medications are reconciled.  She needs a number of refills today.    Review of Systems          Objective          Vitals:  No vitals were obtained today due to virtual visit.      PSYCH: Alert and oriented times 3; coherent speech, normal   rate and volume, able to articulate logical thoughts, able   to abstract reason, no tangential thoughts, no hallucinations   or delusions  Her affect is normal  RESP: No cough, no audible wheezing, able to talk in full sentences  Remainder of exam unable to be completed due to telephone visits            Assessment/Plan:    Assessment & Plan   Problem List Items Addressed This Visit        Respiratory    COPD (chronic obstructive pulmonary disease) (H)    Relevant Medications    predniSONE (DELTASONE) 5 MG tablet    albuterol (PROAIR HFA) 108 (90 Base) MCG/ACT inhaler       Endocrine    Diabetes mellitus type 2, insulin dependent (H)    Relevant Medications    predniSONE (DELTASONE) 5 MG tablet    insulin aspart prot & aspart (NOVOLOG MIX 70/30 FLEXPEN) (70-30) 100 UNIT/ML pen    insulin pen needle (BD LUCILLE U/F) 32G X 4 MM miscellaneous    blood glucose (ONETOUCH VERIO IQ) test strip    Lancets (ONETOUCH DELICA PLUS [id number]) MISC       Immune    Seropositive rheumatoid arthritis (H)    Relevant Medications    predniSONE (DELTASONE) 5 MG tablet    albuterol (PROAIR HFA) 108 (90 Base) MCG/ACT inhaler    traMADol (ULTRAM) 50 MG tablet    folic acid (FOLVITE) 1 MG tablet      Other Visit Diagnoses     Vitamin B12 deficiency (non anemic)        Relevant Medications    cyanocobalamin (VITAMIN B-12) 1000 MCG tablet             BMI:   Estimated body mass index is 25.19 kg/m  as calculated from the following:    Height as of 1/26/20: 1.6 m (5' 3\").    Weight as of 2/6/20: 64.5 kg (142 lb 3.2 oz).          Medications were refilled as " needed.  Assuming all goes well, she will next come in in February at which time she would need complete lab work including all of the above-mentioned lab, thyroid studies, lipid panel etc.  Would also get her lined up for a mammogram as well as Prolia infusions.  Notify sooner if there are problems or concerns.      No follow-ups on file.    LANE SAWYER MD  [facility]    Phone call duration:  27 minutes

## 2020-11-10 ENCOUNTER — MYC MEDICAL ADVICE (OUTPATIENT)
Dept: INTERNAL MEDICINE | Facility: OTHER | Age: 70
End: 2020-11-10

## 2020-11-11 ENCOUNTER — MEDICAL CORRESPONDENCE (OUTPATIENT)
Dept: HEALTH INFORMATION MANAGEMENT | Facility: OTHER | Age: 70
End: 2020-11-11

## 2020-11-11 DIAGNOSIS — E11.9 DIABETES MELLITUS TYPE 2, INSULIN DEPENDENT (H): Primary | ICD-10-CM

## 2020-11-11 DIAGNOSIS — M05.9 SEROPOSITIVE RHEUMATOID ARTHRITIS (H): ICD-10-CM

## 2020-11-11 DIAGNOSIS — E53.8 VITAMIN B12 DEFICIENCY (NON ANEMIC): ICD-10-CM

## 2020-11-11 DIAGNOSIS — Z79.4 LONG TERM (CURRENT) USE OF INSULIN (H): ICD-10-CM

## 2020-11-11 DIAGNOSIS — Z79.4 DIABETES MELLITUS TYPE 2, INSULIN DEPENDENT (H): Primary | ICD-10-CM

## 2020-11-11 DIAGNOSIS — E03.9 HYPOTHYROIDISM, UNSPECIFIED TYPE: ICD-10-CM

## 2020-11-11 NOTE — TELEPHONE ENCOUNTER
I have put in comprehensive lab orders.  I am not sure if [facility] wants anything additional.

## 2020-11-12 DIAGNOSIS — M06.9 CHRONIC RHEUMATIC ARTHRITIS (H): ICD-10-CM

## 2020-11-12 LAB
AST SERPL W P-5'-P-CCNC: 24 U/L (ref 13–39)
BASOPHILS # BLD AUTO: 0 10E9/L (ref 0–0.2)
BASOPHILS NFR BLD AUTO: 0.2 %
CREAT SERPL-MCNC: 1.35 MG/DL (ref 0.6–1.2)
DIFFERENTIAL METHOD BLD: ABNORMAL
EOSINOPHIL # BLD AUTO: 0 10E9/L (ref 0–0.7)
EOSINOPHIL NFR BLD AUTO: 0.1 %
ERYTHROCYTE [DISTWIDTH] IN BLOOD BY AUTOMATED COUNT: 14.4 % (ref 10–15)
GFR SERPL CREATININE-BSD FRML MDRD: 39 ML/MIN/{1.73_M2}
HCT VFR BLD AUTO: 36.2 % (ref 35–47)
HGB BLD-MCNC: 11.6 G/DL (ref 11.7–15.7)
IMM GRANULOCYTES # BLD: 0.1 10E9/L (ref 0–0.4)
IMM GRANULOCYTES NFR BLD: 0.4 %
LYMPHOCYTES # BLD AUTO: 1 10E9/L (ref 0.8–5.3)
LYMPHOCYTES NFR BLD AUTO: 8.5 %
MCH RBC QN AUTO: 31.2 PG (ref 26.5–33)
MCHC RBC AUTO-ENTMCNC: 32 G/DL (ref 31.5–36.5)
MCV RBC AUTO: 97 FL (ref 78–100)
MONOCYTES # BLD AUTO: 0.6 10E9/L (ref 0–1.3)
MONOCYTES NFR BLD AUTO: 4.9 %
NEUTROPHILS # BLD AUTO: 9.9 10E9/L (ref 1.6–8.3)
NEUTROPHILS NFR BLD AUTO: 85.9 %
PLATELET # BLD AUTO: 186 10E9/L (ref 150–450)
RBC # BLD AUTO: 3.72 10E12/L (ref 3.8–5.2)
WBC # BLD AUTO: 11.5 10E9/L (ref 4–11)

## 2020-11-12 PROCEDURE — 84450 TRANSFERASE (AST) (SGOT): CPT | Mod: ZL

## 2020-11-12 PROCEDURE — 82565 ASSAY OF CREATININE: CPT | Mod: ZL

## 2020-11-12 PROCEDURE — 36415 COLL VENOUS BLD VENIPUNCTURE: CPT | Mod: ZL

## 2020-11-12 PROCEDURE — 85025 COMPLETE CBC W/AUTO DIFF WBC: CPT | Mod: ZL

## 2020-11-13 ENCOUNTER — MYC MEDICAL ADVICE (OUTPATIENT)
Dept: INTERNAL MEDICINE | Facility: OTHER | Age: 70
End: 2020-11-13

## 2020-11-16 DIAGNOSIS — E53.8 VITAMIN B12 DEFICIENCY (NON ANEMIC): ICD-10-CM

## 2020-11-16 DIAGNOSIS — Z79.4 LONG TERM (CURRENT) USE OF INSULIN (H): ICD-10-CM

## 2020-11-16 DIAGNOSIS — E03.9 HYPOTHYROIDISM, UNSPECIFIED TYPE: ICD-10-CM

## 2020-11-16 DIAGNOSIS — E11.9 DIABETES MELLITUS TYPE 2, INSULIN DEPENDENT (H): ICD-10-CM

## 2020-11-16 DIAGNOSIS — Z79.4 DIABETES MELLITUS TYPE 2, INSULIN DEPENDENT (H): ICD-10-CM

## 2020-11-16 DIAGNOSIS — M05.9 SEROPOSITIVE RHEUMATOID ARTHRITIS (H): ICD-10-CM

## 2020-11-16 LAB
ALBUMIN SERPL-MCNC: 4.1 G/DL (ref 3.5–5.7)
ALP SERPL-CCNC: 67 U/L (ref 34–104)
ALT SERPL W P-5'-P-CCNC: 34 U/L (ref 7–52)
ANION GAP SERPL CALCULATED.3IONS-SCNC: 11 MMOL/L (ref 3–14)
AST SERPL W P-5'-P-CCNC: 28 U/L (ref 13–39)
BILIRUB SERPL-MCNC: 0.5 MG/DL (ref 0.3–1)
BUN SERPL-MCNC: 30 MG/DL (ref 7–25)
CALCIUM SERPL-MCNC: 9.3 MG/DL (ref 8.6–10.3)
CHLORIDE SERPL-SCNC: 101 MMOL/L (ref 98–107)
CHOLEST SERPL-MCNC: 224 MG/DL
CO2 SERPL-SCNC: 25 MMOL/L (ref 21–31)
CREAT SERPL-MCNC: 1.41 MG/DL (ref 0.6–1.2)
DEPRECATED CALCIDIOL+CALCIFEROL SERPL-MC: 16 NG/ML
ERYTHROCYTE [DISTWIDTH] IN BLOOD BY AUTOMATED COUNT: 14.4 % (ref 10–15)
GFR SERPL CREATININE-BSD FRML MDRD: 37 ML/MIN/{1.73_M2}
GLUCOSE SERPL-MCNC: 69 MG/DL (ref 70–105)
HBA1C MFR BLD: 6.3 % (ref 4–6)
HCT VFR BLD AUTO: 39.5 % (ref 35–47)
HDLC SERPL-MCNC: 81 MG/DL (ref 23–92)
HGB BLD-MCNC: 12.7 G/DL (ref 11.7–15.7)
LDLC SERPL CALC-MCNC: 80 MG/DL
MCH RBC QN AUTO: 32 PG (ref 26.5–33)
MCHC RBC AUTO-ENTMCNC: 32.2 G/DL (ref 31.5–36.5)
MCV RBC AUTO: 100 FL (ref 78–100)
NONHDLC SERPL-MCNC: 143 MG/DL
PLATELET # BLD AUTO: 210 10E9/L (ref 150–450)
POTASSIUM SERPL-SCNC: 4.4 MMOL/L (ref 3.5–5.1)
PROT SERPL-MCNC: 7 G/DL (ref 6.4–8.9)
RBC # BLD AUTO: 3.97 10E12/L (ref 3.8–5.2)
SODIUM SERPL-SCNC: 137 MMOL/L (ref 134–144)
T4 FREE SERPL-MCNC: 1.24 NG/DL (ref 0.6–1.6)
TRIGL SERPL-MCNC: 313 MG/DL
TSH SERPL DL<=0.05 MIU/L-ACNC: <0.2 IU/ML (ref 0.34–5.6)
VIT B12 SERPL-MCNC: 960 PG/ML (ref 180–914)
WBC # BLD AUTO: 13.8 10E9/L (ref 4–11)

## 2020-11-16 PROCEDURE — 84439 ASSAY OF FREE THYROXINE: CPT | Mod: ZL | Performed by: INTERNAL MEDICINE

## 2020-11-16 PROCEDURE — 85027 COMPLETE CBC AUTOMATED: CPT | Mod: ZL | Performed by: INTERNAL MEDICINE

## 2020-11-16 PROCEDURE — 84443 ASSAY THYROID STIM HORMONE: CPT | Mod: ZL | Performed by: INTERNAL MEDICINE

## 2020-11-16 PROCEDURE — 36415 COLL VENOUS BLD VENIPUNCTURE: CPT | Mod: ZL | Performed by: INTERNAL MEDICINE

## 2020-11-16 PROCEDURE — 82306 VITAMIN D 25 HYDROXY: CPT | Mod: ZL | Performed by: INTERNAL MEDICINE

## 2020-11-16 PROCEDURE — 80053 COMPREHEN METABOLIC PANEL: CPT | Mod: ZL | Performed by: INTERNAL MEDICINE

## 2020-11-16 PROCEDURE — 82607 VITAMIN B-12: CPT | Mod: ZL | Performed by: INTERNAL MEDICINE

## 2020-11-16 PROCEDURE — 83036 HEMOGLOBIN GLYCOSYLATED A1C: CPT | Mod: ZL | Performed by: INTERNAL MEDICINE

## 2020-11-16 PROCEDURE — 80061 LIPID PANEL: CPT | Mod: ZL | Performed by: INTERNAL MEDICINE

## 2020-11-24 DIAGNOSIS — Z79.4 DIABETES MELLITUS TYPE 2, INSULIN DEPENDENT (H): ICD-10-CM

## 2020-11-24 DIAGNOSIS — E11.9 DIABETES MELLITUS TYPE 2, INSULIN DEPENDENT (H): ICD-10-CM

## 2020-11-24 RX ORDER — BLOOD SUGAR DIAGNOSTIC
STRIP MISCELLANEOUS
Qty: 200 STRIP | Refills: 3 | OUTPATIENT
Start: 2020-11-24

## 2020-11-24 NOTE — TELEPHONE ENCOUNTER
[facility] sent Rx request for the following:      blood glucose (ONETOUCH VERIO IQ) test strip  Sig: Use to test blood sugar 2 times daily or as directed      Last Prescription Date:   11/2/2020  Last Fill Qty/Refills:         200, R-3    Last Office Visit:              11/2/2020   Future Office visit:           none      Prescription refused.  This is a duplicate request.  Renan Ruiz RN.......11/24/2020 8:38 AM

## 2020-12-27 ENCOUNTER — HEALTH MAINTENANCE LETTER (OUTPATIENT)
Age: 70
End: 2020-12-27

## 2021-01-16 DIAGNOSIS — M05.9 SEROPOSITIVE RHEUMATOID ARTHRITIS (H): ICD-10-CM

## 2021-01-18 RX ORDER — TRAMADOL HYDROCHLORIDE 50 MG/1
50 TABLET ORAL DAILY
Qty: 30 TABLET | Refills: 3 | Status: SHIPPED | OUTPATIENT
Start: 2021-01-18 | End: 2021-05-12

## 2021-01-18 NOTE — TELEPHONE ENCOUNTER
[facility] sent Rx request for the following:      TRAMADOL 50MG TABLETS  Sig: TAKE 1 TABLET(50 MG) BY MOUTH DAILY      Last Prescription Date:   11/2/2020  Last Fill Qty/Refills:         30, R-1    Last Office Visit:              11/2/2020   Future Office visit:           none    Routing refill request to provider for review/approval because:  Drug not on the [facility], [facility] or [facility] refill protocol or controlled substance    Unable to complete prescription refill per RN Medication Refill Policy.................... Renan Ruiz RN ....................  1/18/2021   9:02 AM

## 2021-02-16 DIAGNOSIS — E03.9 HYPOTHYROIDISM, UNSPECIFIED TYPE: ICD-10-CM

## 2021-02-16 RX ORDER — LEVOTHYROXINE SODIUM 125 UG/1
TABLET ORAL
Qty: 90 TABLET | Refills: 1 | Status: SHIPPED | OUTPATIENT
Start: 2021-02-16 | End: 2022-01-03

## 2021-02-16 NOTE — TELEPHONE ENCOUNTER
levothyroxine (SYNTHROID/LEVOTHROID) 125 MCG tablet  Sig: TAKE 1 TABLET(125 MCG) BY MOUTH DAILY  Last Written Prescription Date:  08/10/2020  Last Fill Quantity: 90,  # refills: 1   Last office visit: 2/6/2020 with prescribing provider   Future Office Visit: none    T4 Free   Date Value Ref Range Status   11/16/2020 1.24 0.60 - 1.60 ng/dL Final      Prescription refilled per RN Medication Refill Policy    Mita Sanz RN  ....................  2/16/2021   4:37 PM

## 2021-04-24 ENCOUNTER — HEALTH MAINTENANCE LETTER (OUTPATIENT)
Age: 71
End: 2021-04-24

## 2021-05-12 DIAGNOSIS — M05.9 SEROPOSITIVE RHEUMATOID ARTHRITIS (H): ICD-10-CM

## 2021-05-12 RX ORDER — TRAMADOL HYDROCHLORIDE 50 MG/1
TABLET ORAL
Qty: 30 TABLET | Refills: 2 | Status: SHIPPED | OUTPATIENT
Start: 2021-05-12 | End: 2021-08-12

## 2021-05-12 NOTE — TELEPHONE ENCOUNTER
Routing refill request to provider for review/approval because:  Drug not on the [facility] refill protocol     LOV: 11/2/2020    Sharda Garcia RN on 5/12/2021 at 9:54 AM

## 2021-05-25 ENCOUNTER — TELEPHONE (OUTPATIENT)
Dept: INTERNAL MEDICINE | Facility: OTHER | Age: 71
End: 2021-05-25

## 2021-05-25 DIAGNOSIS — Z79.4 DIABETES MELLITUS TYPE 2, INSULIN DEPENDENT (H): Primary | ICD-10-CM

## 2021-05-25 DIAGNOSIS — E11.9 DIABETES MELLITUS TYPE 2, INSULIN DEPENDENT (H): Primary | ICD-10-CM

## 2021-05-25 NOTE — TELEPHONE ENCOUNTER
Reason for call: Request a lab order.    Order requested for what kind of lab? A1c has a lab appt on 5/27 At 9:40 and is wondering if you would like A1c checked also    Who is your PCP? MAF    Preferred method for responding to this message: Telephone Call    Phone number patient can be reached at: Cell number on file:    Telephone Information:   Mobile [phone]       If we cannot reach you directly, may we leave a detailed response at the number you provided? Yes

## 2021-05-27 DIAGNOSIS — E11.9 DIABETES MELLITUS TYPE 2, INSULIN DEPENDENT (H): ICD-10-CM

## 2021-05-27 DIAGNOSIS — Z79.4 DIABETES MELLITUS TYPE 2, INSULIN DEPENDENT (H): ICD-10-CM

## 2021-05-27 DIAGNOSIS — M06.9 CHRONIC RHEUMATIC ARTHRITIS (H): ICD-10-CM

## 2021-05-27 LAB
AST SERPL W P-5'-P-CCNC: 26 U/L (ref 13–39)
BASOPHILS # BLD AUTO: 0 10E9/L (ref 0–0.2)
BASOPHILS NFR BLD AUTO: 0.3 %
CREAT SERPL-MCNC: 1.34 MG/DL (ref 0.6–1.2)
DIFFERENTIAL METHOD BLD: ABNORMAL
EOSINOPHIL # BLD AUTO: 0.1 10E9/L (ref 0–0.7)
EOSINOPHIL NFR BLD AUTO: 0.9 %
ERYTHROCYTE [DISTWIDTH] IN BLOOD BY AUTOMATED COUNT: 14.8 % (ref 10–15)
GFR SERPL CREATININE-BSD FRML MDRD: 39 ML/MIN/{1.73_M2}
HBA1C MFR BLD: 6 % (ref 4–6)
HCT VFR BLD AUTO: 36.9 % (ref 35–47)
HGB BLD-MCNC: 12 G/DL (ref 11.7–15.7)
IMM GRANULOCYTES # BLD: 0 10E9/L (ref 0–0.4)
IMM GRANULOCYTES NFR BLD: 0.4 %
LYMPHOCYTES # BLD AUTO: 2.4 10E9/L (ref 0.8–5.3)
LYMPHOCYTES NFR BLD AUTO: 26.8 %
MCH RBC QN AUTO: 32.2 PG (ref 26.5–33)
MCHC RBC AUTO-ENTMCNC: 32.5 G/DL (ref 31.5–36.5)
MCV RBC AUTO: 99 FL (ref 78–100)
MONOCYTES # BLD AUTO: 0.9 10E9/L (ref 0–1.3)
MONOCYTES NFR BLD AUTO: 9.5 %
NEUTROPHILS # BLD AUTO: 5.6 10E9/L (ref 1.6–8.3)
NEUTROPHILS NFR BLD AUTO: 62.1 %
PLATELET # BLD AUTO: 198 10E9/L (ref 150–450)
RBC # BLD AUTO: 3.73 10E12/L (ref 3.8–5.2)
WBC # BLD AUTO: 9.1 10E9/L (ref 4–11)

## 2021-05-27 PROCEDURE — 36415 COLL VENOUS BLD VENIPUNCTURE: CPT | Mod: ZL

## 2021-05-27 PROCEDURE — 85025 COMPLETE CBC W/AUTO DIFF WBC: CPT | Mod: ZL

## 2021-05-27 PROCEDURE — 82565 ASSAY OF CREATININE: CPT | Mod: ZL

## 2021-05-27 PROCEDURE — 83036 HEMOGLOBIN GLYCOSYLATED A1C: CPT | Mod: ZL

## 2021-05-27 PROCEDURE — 84450 TRANSFERASE (AST) (SGOT): CPT | Mod: ZL

## 2021-08-11 DIAGNOSIS — J44.9 CHRONIC OBSTRUCTIVE PULMONARY DISEASE, UNSPECIFIED COPD TYPE (H): ICD-10-CM

## 2021-08-11 DIAGNOSIS — M05.9 SEROPOSITIVE RHEUMATOID ARTHRITIS (H): ICD-10-CM

## 2021-08-12 RX ORDER — TRAMADOL HYDROCHLORIDE 50 MG/1
TABLET ORAL
Qty: 30 TABLET | Refills: 1 | Status: SHIPPED | OUTPATIENT
Start: 2021-08-12 | End: 2021-10-13

## 2021-08-12 RX ORDER — BUDESONIDE 0.5 MG/2ML
INHALANT ORAL
Qty: 360 ML | Refills: 3 | Status: SHIPPED | OUTPATIENT
Start: 2021-08-12 | End: 2023-04-28

## 2021-08-16 ENCOUNTER — TELEPHONE (OUTPATIENT)
Dept: INTERNAL MEDICINE | Facility: OTHER | Age: 71
End: 2021-08-16

## 2021-08-23 DIAGNOSIS — E78.5 HYPERLIPIDEMIA, UNSPECIFIED HYPERLIPIDEMIA TYPE: ICD-10-CM

## 2021-08-24 RX ORDER — SIMVASTATIN 40 MG
TABLET ORAL
Qty: 90 TABLET | Refills: 0 | Status: SHIPPED | OUTPATIENT
Start: 2021-08-24 | End: 2021-11-29

## 2021-08-24 NOTE — TELEPHONE ENCOUNTER
"Requested Prescriptions   Pending Prescriptions Disp Refills     simvastatin (ZOCOR) 40 MG tablet [Pharmacy Med Name: SIMVASTATIN 40MG TABLETS] 90 tablet 3     Sig: TAKE 1 TABLET(40 MG) BY MOUTH AT BEDTIME       Statins Protocol Passed - 8/23/2021 11:24 AM        Passed - LDL on file in past 12 months     Recent Labs   Lab Test 11/16/20  1401   LDL 80             Passed - No abnormal creatine kinase in past 12 months     No lab results found.             Passed - Recent (12 mo) or future (30 days) visit within the authorizing provider's specialty     Patient has had an office visit with the authorizing provider or a provider within the authorizing providers department within the previous 12 mos or has a future within next 30 days. See \"Patient Info\" tab in inbasket, or \"Choose Columns\" in Meds & Orders section of the refill encounter.              Passed - Medication is active on med list        Passed - Patient is age 18 or older        Passed - No active pregnancy on record        Passed - No positive pregnancy test in past 12 months         Prescription approved per [facility] Refill Protocol.  Leonor Day RN on 8/24/2021 at 10:10 AM        "

## 2021-09-14 DIAGNOSIS — J44.9 CHRONIC OBSTRUCTIVE PULMONARY DISEASE, UNSPECIFIED COPD TYPE (H): ICD-10-CM

## 2021-09-15 RX ORDER — ALBUTEROL SULFATE 90 UG/1
AEROSOL, METERED RESPIRATORY (INHALATION)
Qty: 18 G | Refills: 0 | Status: SHIPPED | OUTPATIENT
Start: 2021-09-15 | End: 2023-05-25

## 2021-09-15 NOTE — TELEPHONE ENCOUNTER
"[facility] sent Rx request for the following:   albuterol (PROAIR HFA/PROVENTIL HFA/VENTOLIN HFA) 108 (90 Base) MCG/ACT inhaler   SigINHALE 1-2 PUFFS BY MOUTH EVERY 4-6 HOURS AS NEEDED FOR SHORTNESS OF BREATH OR WHEEZING    Last Prescription Date:   11/02/2020  Last Fill Qty/Refills:         3 gabyhler, R-3    Last Office Visit:              11/02/2020 (Jordan)   Future Office visit:           None noted   Asthma Maintenance Inhalers - Anticholinergics Passed - 9/14/2021  1:55 PM        Passed - Patient is age 12 years or older        Passed - Recent (12 mo) or future (30 days) visit within the authorizing provider's specialty     Patient has had an office visit with the authorizing provider or a provider within the authorizing providers department within the previous 12 mos or has a future within next 30 days. See \"Patient Info\" tab in inbasket, or \"Choose Columns\" in Meds & Orders section of the refill encounter.              Passed - Medication is active on med list       Short-Acting Beta Agonist Inhalers Protocol  Passed - 9/14/2021  1:55 PM        Passed - Patient is age 12 or older        Passed - Recent (12 mo) or future (30 days) visit within the authorizing provider's specialty     Patient has had an office visit with the authorizing provider or a provider within the authorizing providers department within the previous 12 mos or has a future within next 30 days. See \"Patient Info\" tab in inbasket, or \"Choose Columns\" in Meds & Orders section of the refill encounter.              Passed - Medication is active on med list         Prescription approved per [facility] Refill Protocol.  Judy Bryant RN ....................  9/15/2021   3:29 PM      "

## 2021-09-17 ENCOUNTER — LAB (OUTPATIENT)
Dept: LAB | Facility: OTHER | Age: 71
End: 2021-09-17
Payer: MEDICARE

## 2021-09-17 DIAGNOSIS — M06.9 CHRONIC RHEUMATIC ARTHRITIS (H): ICD-10-CM

## 2021-09-17 DIAGNOSIS — Z01.89 LABORATORY EXAMINATION: Primary | ICD-10-CM

## 2021-09-17 LAB
AST SERPL W P-5'-P-CCNC: 25 U/L (ref 13–39)
BASOPHILS # BLD AUTO: 0 10E3/UL (ref 0–0.2)
BASOPHILS NFR BLD AUTO: 0 %
CREAT SERPL-MCNC: 1.3 MG/DL (ref 0.6–1.2)
EOSINOPHIL # BLD AUTO: 0.1 10E3/UL (ref 0–0.7)
EOSINOPHIL NFR BLD AUTO: 1 %
ERYTHROCYTE [DISTWIDTH] IN BLOOD BY AUTOMATED COUNT: 13.7 % (ref 10–15)
GFR SERPL CREATININE-BSD FRML MDRD: 41 ML/MIN/1.73M2
HCT VFR BLD AUTO: 37.8 % (ref 35–47)
HGB BLD-MCNC: 12.3 G/DL (ref 11.7–15.7)
IMM GRANULOCYTES # BLD: 0.1 10E3/UL
IMM GRANULOCYTES NFR BLD: 0 %
LYMPHOCYTES # BLD AUTO: 2 10E3/UL (ref 0.8–5.3)
LYMPHOCYTES NFR BLD AUTO: 17 %
MCH RBC QN AUTO: 31.5 PG (ref 26.5–33)
MCHC RBC AUTO-ENTMCNC: 32.5 G/DL (ref 31.5–36.5)
MCV RBC AUTO: 97 FL (ref 78–100)
MONOCYTES # BLD AUTO: 0.7 10E3/UL (ref 0–1.3)
MONOCYTES NFR BLD AUTO: 6 %
NEUTROPHILS # BLD AUTO: 8.5 10E3/UL (ref 1.6–8.3)
NEUTROPHILS NFR BLD AUTO: 76 %
NRBC # BLD AUTO: 0 10E3/UL
NRBC BLD AUTO-RTO: 0 /100
PLATELET # BLD AUTO: 246 10E3/UL (ref 150–450)
RBC # BLD AUTO: 3.9 10E6/UL (ref 3.8–5.2)
WBC # BLD AUTO: 11.4 10E3/UL (ref 4–11)

## 2021-09-17 PROCEDURE — 84450 TRANSFERASE (AST) (SGOT): CPT | Mod: ZL

## 2021-09-17 PROCEDURE — 85004 AUTOMATED DIFF WBC COUNT: CPT | Mod: ZL

## 2021-09-17 PROCEDURE — 36415 COLL VENOUS BLD VENIPUNCTURE: CPT | Mod: ZL

## 2021-09-17 PROCEDURE — 82565 ASSAY OF CREATININE: CPT | Mod: ZL

## 2021-10-09 ENCOUNTER — HEALTH MAINTENANCE LETTER (OUTPATIENT)
Age: 71
End: 2021-10-09

## 2021-10-11 DIAGNOSIS — M05.9 SEROPOSITIVE RHEUMATOID ARTHRITIS (H): ICD-10-CM

## 2021-10-13 RX ORDER — TRAMADOL HYDROCHLORIDE 50 MG/1
TABLET ORAL
Qty: 30 TABLET | Refills: 3 | Status: SHIPPED | OUTPATIENT
Start: 2021-10-13 | End: 2022-02-11

## 2021-10-13 NOTE — TELEPHONE ENCOUNTER
TRAMADOL 50MG TABLETS  Last Written Prescription Date:  8/12/21  Last Fill Quantity: 30,   # refills: 1  Last Office Visit: 11/2/20  Future Office visit:       Routing refill request to provider for review/approval because:  Drug not on the [facility], [facility] or [facility] refill protocol or controlled substance.  Unable to complete prescription refill per RNMedication Refill Policy.................... Reema Bender RN ....................  10/13/2021   1:40 PM

## 2021-11-08 DIAGNOSIS — M05.9 SEROPOSITIVE RHEUMATOID ARTHRITIS (H): ICD-10-CM

## 2021-11-08 NOTE — LETTER
November 9, 2021      Laura Perez  [address] [place] [facility] [address]        Dear Laura,           This letter is to remind you that you are due for your annual exam with Raul Ortega. Your last comprehensive visit was more than 12 months ago.    A refill request for Folic Acid has been sent to your provider for review and consideration. Additional refills require you to complete this appointment.    Please call the clinic at [phone] to schedule your appointment.    If you should require additional refills before your scheduled appointment, please contact your pharmacy and we will refill your medication until the date of your appointment.    If you are no longer seeing Raul Ortega for primary care, please call to let us know.       Thank you for choosing [facility] and [facility] for your health care needs.    Sincerely,    Refill RN  [facility]

## 2021-11-09 RX ORDER — FOLIC ACID 1 MG/1
TABLET ORAL
Qty: 90 TABLET | Refills: 3 | Status: SHIPPED | OUTPATIENT
Start: 2021-11-09 | End: 2022-01-03

## 2021-11-17 ENCOUNTER — LAB (OUTPATIENT)
Dept: LAB | Facility: OTHER | Age: 71
End: 2021-11-17
Attending: INTERNAL MEDICINE
Payer: MEDICARE

## 2021-11-17 ENCOUNTER — TELEPHONE (OUTPATIENT)
Dept: INTERNAL MEDICINE | Facility: OTHER | Age: 71
End: 2021-11-17
Payer: MEDICARE

## 2021-11-17 DIAGNOSIS — E11.9 DIABETES MELLITUS TYPE 2, INSULIN DEPENDENT (H): ICD-10-CM

## 2021-11-17 DIAGNOSIS — E11.9 DIABETES MELLITUS TYPE 2, INSULIN DEPENDENT (H): Primary | ICD-10-CM

## 2021-11-17 DIAGNOSIS — Z79.4 DIABETES MELLITUS TYPE 2, INSULIN DEPENDENT (H): Primary | ICD-10-CM

## 2021-11-17 DIAGNOSIS — Z01.89 ENCOUNTER FOR LABORATORY EXAMINATION: Primary | ICD-10-CM

## 2021-11-17 DIAGNOSIS — Z79.4 DIABETES MELLITUS TYPE 2, INSULIN DEPENDENT (H): ICD-10-CM

## 2021-11-17 DIAGNOSIS — M06.9 CHRONIC RHEUMATIC ARTHRITIS (H): ICD-10-CM

## 2021-11-17 LAB
AST SERPL W P-5'-P-CCNC: 29 U/L (ref 13–39)
BASOPHILS # BLD AUTO: 0 10E3/UL (ref 0–0.2)
BASOPHILS NFR BLD AUTO: 0 %
CREAT SERPL-MCNC: 1.38 MG/DL (ref 0.6–1.2)
EOSINOPHIL # BLD AUTO: 0 10E3/UL (ref 0–0.7)
EOSINOPHIL NFR BLD AUTO: 0 %
ERYTHROCYTE [DISTWIDTH] IN BLOOD BY AUTOMATED COUNT: 15.2 % (ref 10–15)
GFR SERPL CREATININE-BSD FRML MDRD: 38 ML/MIN/1.73M2
HBA1C MFR BLD: 6.3 % (ref 4–6.2)
HCT VFR BLD AUTO: 38.9 % (ref 35–47)
HGB BLD-MCNC: 12.4 G/DL (ref 11.7–15.7)
IMM GRANULOCYTES # BLD: 0.1 10E3/UL
IMM GRANULOCYTES NFR BLD: 1 %
LYMPHOCYTES # BLD AUTO: 1.1 10E3/UL (ref 0.8–5.3)
LYMPHOCYTES NFR BLD AUTO: 8 %
MCH RBC QN AUTO: 31.9 PG (ref 26.5–33)
MCHC RBC AUTO-ENTMCNC: 31.9 G/DL (ref 31.5–36.5)
MCV RBC AUTO: 100 FL (ref 78–100)
MONOCYTES # BLD AUTO: 0.6 10E3/UL (ref 0–1.3)
MONOCYTES NFR BLD AUTO: 5 %
NEUTROPHILS # BLD AUTO: 11.5 10E3/UL (ref 1.6–8.3)
NEUTROPHILS NFR BLD AUTO: 86 %
NRBC # BLD AUTO: 0 10E3/UL
NRBC BLD AUTO-RTO: 0 /100
PLATELET # BLD AUTO: 198 10E3/UL (ref 150–450)
RBC # BLD AUTO: 3.89 10E6/UL (ref 3.8–5.2)
WBC # BLD AUTO: 13.4 10E3/UL (ref 4–11)

## 2021-11-17 PROCEDURE — 36415 COLL VENOUS BLD VENIPUNCTURE: CPT | Mod: ZL

## 2021-11-17 PROCEDURE — 82565 ASSAY OF CREATININE: CPT | Mod: ZL

## 2021-11-17 PROCEDURE — 85025 COMPLETE CBC W/AUTO DIFF WBC: CPT | Mod: ZL

## 2021-11-17 PROCEDURE — 84450 TRANSFERASE (AST) (SGOT): CPT | Mod: ZL

## 2021-11-17 PROCEDURE — 83036 HEMOGLOBIN GLYCOSYLATED A1C: CPT | Mod: ZL

## 2021-11-17 NOTE — TELEPHONE ENCOUNTER
She is coming in for blood draw later today per [place] but patient wants her A1c done.   MAF is out, will you order?  Lori Puga CMA([facility])..................11/17/2021   10:03 AM

## 2021-11-26 DIAGNOSIS — E78.5 HYPERLIPIDEMIA, UNSPECIFIED HYPERLIPIDEMIA TYPE: ICD-10-CM

## 2021-11-29 RX ORDER — SIMVASTATIN 40 MG
TABLET ORAL
Qty: 90 TABLET | Refills: 0 | Status: SHIPPED | OUTPATIENT
Start: 2021-11-29 | End: 2022-03-14

## 2021-11-29 NOTE — TELEPHONE ENCOUNTER
Routing refill request to provider for review/approval because:  Statins Protocol Failed 11/26/2021 03:55 PM   Protocol Details  LDL on file in past 12 months    Recent (12 mo) or future (30 days) visit within the authorizing provider's specialty     LOV:   11/20/2020    Sharda Garcia RN on 11/29/2021 at 9:56 AM

## 2021-12-20 DIAGNOSIS — Z79.4 DIABETES MELLITUS TYPE 2, INSULIN DEPENDENT (H): ICD-10-CM

## 2021-12-20 DIAGNOSIS — E11.9 DIABETES MELLITUS TYPE 2, INSULIN DEPENDENT (H): ICD-10-CM

## 2021-12-21 RX ORDER — PEN NEEDLE, DIABETIC 32GX 5/32"
NEEDLE, DISPOSABLE MISCELLANEOUS
Qty: 200 EACH | Refills: 3 | Status: SHIPPED | OUTPATIENT
Start: 2021-12-21 | End: 2023-02-28

## 2021-12-21 NOTE — TELEPHONE ENCOUNTER
"Requested Prescriptions   Pending Prescriptions Disp Refills     BD PEN NEEDLE LUCILLE 2ND GEN 32G X 4 MM miscellaneous [Pharmacy Med Name: B-D LUCILLE 2ND GEN PEN NDL [id number]]       Sig: USE TO INJECT INSULIN TWICE DAILY       Diabetic Supplies Protocol Failed - 12/20/2021  9:55 AM        Failed - Recent (6 mo) or future (30 days) visit within the authorizing provider's specialty     Patient had office visit in the last 6 months or has a visit in the next 30 days with authorizing provider.  See \"Patient Info\" tab in inbasket, or \"Choose Columns\" in Meds & Orders section of the refill encounter.            Passed - Medication is active on med list        Passed - Patient is 18 years of age or older     Last Written Prescription Date:  11/2/20  Last Fill Quantity: 200,   # refills: 3  Last Office Visit: 11/2/20 marilyn Ortega  Future Office visit:none       Routing refill request to provider for review/approval because:  Unable to fill prescription refills per RN Medicine refill Policy.  Brenda J. Goodell, RN on 12/21/2021 at 3:10 PM      "

## 2021-12-22 DIAGNOSIS — K21.00 GASTROESOPHAGEAL REFLUX DISEASE WITH ESOPHAGITIS WITHOUT HEMORRHAGE: ICD-10-CM

## 2021-12-31 DIAGNOSIS — E53.8 VITAMIN B12 DEFICIENCY (NON ANEMIC): ICD-10-CM

## 2022-01-03 ENCOUNTER — VIRTUAL VISIT (OUTPATIENT)
Dept: INTERNAL MEDICINE | Facility: OTHER | Age: 72
End: 2022-01-03
Attending: INTERNAL MEDICINE
Payer: MEDICARE

## 2022-01-03 VITALS — RESPIRATION RATE: 16 BRPM

## 2022-01-03 DIAGNOSIS — E11.9 DIABETES MELLITUS TYPE 2, INSULIN DEPENDENT (H): ICD-10-CM

## 2022-01-03 DIAGNOSIS — E03.9 HYPOTHYROIDISM, UNSPECIFIED TYPE: ICD-10-CM

## 2022-01-03 DIAGNOSIS — Z79.4 DIABETES MELLITUS TYPE 2, INSULIN DEPENDENT (H): ICD-10-CM

## 2022-01-03 DIAGNOSIS — M05.9 SEROPOSITIVE RHEUMATOID ARTHRITIS (H): ICD-10-CM

## 2022-01-03 DIAGNOSIS — J44.9 CHRONIC OBSTRUCTIVE PULMONARY DISEASE, UNSPECIFIED COPD TYPE (H): Primary | ICD-10-CM

## 2022-01-03 DIAGNOSIS — M81.0 OSTEOPOROSIS, UNSPECIFIED OSTEOPOROSIS TYPE, UNSPECIFIED PATHOLOGICAL FRACTURE PRESENCE: ICD-10-CM

## 2022-01-03 DIAGNOSIS — E53.8 VITAMIN B12 DEFICIENCY (NON ANEMIC): ICD-10-CM

## 2022-01-03 DIAGNOSIS — K21.00 GASTROESOPHAGEAL REFLUX DISEASE WITH ESOPHAGITIS WITHOUT HEMORRHAGE: ICD-10-CM

## 2022-01-03 PROBLEM — J10.1 INFLUENZA B: Status: RESOLVED | Noted: 2020-01-25 | Resolved: 2022-01-03

## 2022-01-03 PROCEDURE — 99443 PR PHYSICIAN TELEPHONE EVALUATION 21-30 MIN: CPT | Mod: 95 | Performed by: INTERNAL MEDICINE

## 2022-01-03 RX ORDER — LANOLIN ALCOHOL/MO/W.PET/CERES
1000 CREAM (GRAM) TOPICAL DAILY
Qty: 90 TABLET | Refills: 3 | Status: SHIPPED | OUTPATIENT
Start: 2022-01-03 | End: 2023-01-31

## 2022-01-03 RX ORDER — LEVOTHYROXINE SODIUM 100 UG/1
100 TABLET ORAL DAILY
COMMUNITY
Start: 2022-01-03 | End: 2023-05-25

## 2022-01-03 RX ORDER — ABATACEPT 125 MG/ML
INJECTION, SOLUTION SUBCUTANEOUS
COMMUNITY
Start: 2021-12-20 | End: 2022-01-03

## 2022-01-03 RX ORDER — SIMETHICONE 125 MG/1
TABLET, CHEWABLE ORAL
COMMUNITY
Start: 2021-09-23 | End: 2022-01-03

## 2022-01-03 RX ORDER — PREDNISONE 5 MG/1
10 TABLET ORAL
COMMUNITY
Start: 2022-01-03

## 2022-01-03 RX ORDER — FOLIC ACID 1 MG/1
1 TABLET ORAL DAILY
Qty: 90 TABLET | Refills: 3 | Status: SHIPPED | OUTPATIENT
Start: 2022-01-03 | End: 2024-06-07

## 2022-01-03 RX ORDER — LANOLIN ALCOHOL/MO/W.PET/CERES
CREAM (GRAM) TOPICAL
Qty: 90 TABLET | Refills: 3 | Status: SHIPPED | OUTPATIENT
Start: 2022-01-03 | End: 2022-01-03

## 2022-01-03 RX ORDER — SIMETHICONE 125 MG/1
125 TABLET, CHEWABLE ORAL 4 TIMES DAILY PRN
Status: ON HOLD | COMMUNITY
Start: 2022-01-03 | End: 2022-10-14

## 2022-01-03 RX ORDER — PREDNISONE 1 MG/1
2 TABLET ORAL DAILY
Status: ON HOLD | COMMUNITY
Start: 2022-01-03 | End: 2023-11-11

## 2022-01-03 ASSESSMENT — PAIN SCALES - GENERAL: PAINLEVEL: SEVERE PAIN (7)

## 2022-01-03 NOTE — PROGRESS NOTES
Laura is a 71 year old who is being evaluated via a billable telephone visit.      What phone number would you like to be contacted at?  [phone]  How would you like to obtain your AVS? StacieDawn    Assessment & Plan   Problem List Items Addressed This Visit        Respiratory    COPD (chronic obstructive pulmonary disease) (H) - Primary    Relevant Medications    predniSONE (DELTASONE) 5 MG tablet    predniSONE (DELTASONE) 1 MG tablet       Endocrine    Diabetes mellitus type 2, insulin dependent (H)    Relevant Medications    predniSONE (DELTASONE) 5 MG tablet    predniSONE (DELTASONE) 1 MG tablet    levothyroxine (SYNTHROID/LEVOTHROID) 100 MCG tablet    Hypothyroidism    Relevant Medications    levothyroxine (SYNTHROID/LEVOTHROID) 100 MCG tablet       Musculoskeletal and Integumentary    Osteoporosis    Relevant Medications    predniSONE (DELTASONE) 5 MG tablet    predniSONE (DELTASONE) 1 MG tablet       Immune    Seropositive rheumatoid arthritis (H)    Relevant Medications    predniSONE (DELTASONE) 5 MG tablet    predniSONE (DELTASONE) 1 MG tablet    GAS-X EXTRA STRENGTH 125 MG chewable tablet    folic acid (FOLVITE) 1 MG tablet      Other Visit Diagnoses     Gastroesophageal reflux disease with esophagitis without hemorrhage        Relevant Medications    omeprazole (PRILOSEC) 20 MG DR capsule    Vitamin B12 deficiency (non anemic)        Relevant Medications    cyanocobalamin (VITAMIN B-12) 1000 MCG tablet         Medications are reconciled and refills were done as needed.  Obviously with her immunosuppressed status and her chronic medical problems we want to limit her exposure.  She is completely vaccinated.  Lab is reasonably up-to-date at this point in time.  Questions regarding Covid, treatment, etc. were reviewed.  No change in any of her medications.  Follow-up will be dependent on her clinical course.    22 minutes spent on the date of the encounter doing chart review, review of test results, patient  visit and documentation            No follow-ups on file.    LANE SAWYER MD  [facility]    Carmen Sanchez is a 71 year old who presents for the following health issues     HPI     Telephone visit with the patient today.  She needed some refills done.  She has not been in for some time.  She has a history of COPD and chronic bronchiectasis as well as recurrent pneumonia.  She also has immune suppression from her treatment for her rheumatoid arthritis.  She would be at very high risk for adverse outcome should she contract Covid.  She is avoiding exposure for that reason.    She has recently started on Prolia for her osteoporosis.  She will need another infusion in February and wonders if she can get that here.  I assured her that she could.    Her diabetes is doing well.  She had an A1c 2 months ago that looked acceptable.  Overall, she tells me that her breathing is stable and her other chronic medical problems remained stable.  She continues to be in contact with her rheumatology specialists at the [facility].  She is slowly trying to wean her prednisone.      Review of Systems   Constitutional, HEENT, cardiovascular, pulmonary, gi and gu systems are negative, except as otherwise noted.      Objective    Vitals - Patient Reported  Pain Score: Severe Pain (7)  Pain Loc: Shoulder        Physical Exam   healthy, alert and no distress  PSYCH: Alert and oriented times 3; coherent speech, normal   rate and volume, able to articulate logical thoughts, able   to abstract reason, no tangential thoughts, no hallucinations   or delusions  Her affect is normal  RESP: No cough, no audible wheezing, able to talk in full sentences  Remainder of exam unable to be completed due to telephone visits                Phone call duration: 22 minutes

## 2022-01-03 NOTE — NURSING NOTE
Pt presents to clinic today for a medication recheck.      Call phone [phone]  FOOD SECURITY SCREENING QUESTIONS:    The next two questions are to help us understand your food security.  If you are feeling you need any assistance in this area, we have resources available to support you today.    Hunger Vital Signs:  Within the past 12 months we worried whether our food would run out before we got money to buy more. Never  Within the past 12 months the food we bought just didn't last and we didn't have money to get more. Never    Medication Reconciliation: complete  Roc Santana LPN,LPN on 1/3/2022 at 2:41 PM    · Patient remains on Claritin 10 mg

## 2022-01-03 NOTE — TELEPHONE ENCOUNTER
"[facility] sent Rx request for the following:      Requested Prescriptions   Pending Prescriptions Disp Refills     cyanocobalamin (VITAMIN B-12) 1000 MCG tablet [Pharmacy Med Name: VITAMIN B-12 1000MCG TABLETS N/B] 90 tablet 3     Sig: TAKE 1 TABLET(1000 MCG) BY MOUTH DAILY       Vitamin Supplements (Adult) Protocol Failed - 12/31/2021  2:58 PM        Failed - Recent (12 mo) or future (30 days) visit within the authorizing provider's specialty     Patient has had an office visit with the authorizing provider or a provider within the authorizing providers department within the previous 12 mos or has a future within next 30 days. See \"Patient Info\" tab in inbasket, or \"Choose Columns\" in Meds & Orders section of the refill encounter.            Passed - High dose Vitamin D not ordered        Passed - Medication is active on med list           Patient due for annual review with PCP. Patient notified and is willing to schedule a virtual visit with PCP. Call transferred to scheduling.   Last Prescription Date:   11/2/2020  Last Fill Qty/Refills:         90, R-3    Last Office Visit:              11/2/2020 Jordan                                                               Future Office visit:           None noted   Routing refill request to provider for review/approval because:  Fails protocol. Unable to complete prescription refill per RN Medication Refill Policy.................... Yolanda Lanza RN ....................  1/3/2022   12:12 PM          "

## 2022-01-07 DIAGNOSIS — E11.9 DIABETES MELLITUS TYPE 2, INSULIN DEPENDENT (H): ICD-10-CM

## 2022-01-07 DIAGNOSIS — Z79.4 DIABETES MELLITUS TYPE 2, INSULIN DEPENDENT (H): ICD-10-CM

## 2022-01-11 RX ORDER — INSULIN ASPART 100 [IU]/ML
INJECTION, SUSPENSION SUBCUTANEOUS
Qty: 45 ML | Refills: 6 | Status: SHIPPED | OUTPATIENT
Start: 2022-01-11 | End: 2022-08-24

## 2022-01-11 NOTE — TELEPHONE ENCOUNTER
"[facility] sent Rx request for the following:   insulin aspart prot & aspart (NOVOLOG MIX 70/30 FLEXPEN) (70-30) 100 UNIT/ML pen  Sig: INJECT 45 UNITS EVERY MORNING, AND 26 UNITS EVERY EVENING    Last Prescription Date:   11/02/2020  Last Fill Qty/Refills:         45 mL, R-6    Last Office Visit:              01/03/2022 (Ortega-virtual)   Future Office visit:           None noted   Insulin Mixes Protocol Passed - 1/7/2022 11:54 AM        Passed - Serum creatinine on file in past 12 months     Recent Labs   Lab Test 11/17/21  1522   CR 1.38*       Ok to refill medication if creatinine is low          Passed - HgbA1C in past 3 or 6 months     If HgbA1C is 8 or greater, it needs to be on file within the past 3 months.  If less than 8, must be on file within the past 6 months.     Recent Labs   Lab Test 11/17/21  1518 07/24/18  1651 11/25/17  2133   A1C 6.3*   < >  --    [id number]  --   --  9.3*    < > = values in this interval not displayed.             Passed - Medication is active on mded list        Passed - Patient is age 18 or older        Passed - Recent (6 mo) or future (30 days) visit within the authorizing provider's specialty     Patient had office visit in the last 6 months or has a visit in the next 30 days with authorizing provider or within the authorizing provider's specialty.  See \"Patient Info\" tab in inbasket, or \"Choose Columns\" in Meds & Orders section of the refill encounter.             Unable to complete prescription refill per RN Medication Refill Policy.................... Judy Bryant RN ....................  1/11/2022   8:43 AM          "

## 2022-01-13 DIAGNOSIS — Z79.4 DIABETES MELLITUS TYPE 2, INSULIN DEPENDENT (H): ICD-10-CM

## 2022-01-13 DIAGNOSIS — E11.9 DIABETES MELLITUS TYPE 2, INSULIN DEPENDENT (H): ICD-10-CM

## 2022-01-13 DIAGNOSIS — N95.2 ATROPHIC VAGINITIS: ICD-10-CM

## 2022-01-14 RX ORDER — BLOOD SUGAR DIAGNOSTIC
STRIP MISCELLANEOUS
Qty: 200 STRIP | Refills: 3 | Status: SHIPPED | OUTPATIENT
Start: 2022-01-14 | End: 2023-01-31

## 2022-01-14 NOTE — TELEPHONE ENCOUNTER
Last office visit with Dr. Ortega was billable telephone visit on 1/3/2022.  Refilled blood glucose test strips.  Estradiol doesn't pass protocol.  Routing to PCP to address due to:  Hormone Replacement Therapy Failed 01/13/2022 01:48 PM   Protocol Details  Blood pressure under 140/90 in past 12 months    Patient has mammogram in past 2 years on file if age 50-75        Maureen Neves RN on 1/14/2022 at 3:26 PM

## 2022-01-16 DIAGNOSIS — N95.2 ATROPHIC VAGINITIS: ICD-10-CM

## 2022-01-16 RX ORDER — ESTRADIOL 10 UG/1
INSERT VAGINAL
Qty: 24 TABLET | Refills: 0 | Status: SHIPPED | OUTPATIENT
Start: 2022-01-16 | End: 2022-05-16

## 2022-01-18 RX ORDER — ESTRADIOL 10 UG/1
INSERT VAGINAL
Qty: 25 TABLET | OUTPATIENT
Start: 2022-01-18

## 2022-01-18 NOTE — TELEPHONE ENCOUNTER
Filled 01/16/2022 #24. Pharmacy alerted. Unable to complete prescription refill per RNMedication Refill Policy.................... Mansi Becker RN ....................  1/18/2022   1:43 PM      estradiol (VAGIFEM) 10 MCG TABS vaginal tablet 24 tablet 0 1/16/2022  No   Sig: INSERT 1 TABLET VAGINALLY 2 TIMES A WEEK   Sent to pharmacy as: Estradiol 10 MCG Vaginal Tablet (VAGIFEM)   Class: E-Prescribe   Order: [id number]   E-Prescribing Status: Receipt confirmed by pharmacy (1/16/2022  8:29 AM CST)     [id number] - [place] - [address]

## 2022-02-11 DIAGNOSIS — M05.9 SEROPOSITIVE RHEUMATOID ARTHRITIS (H): ICD-10-CM

## 2022-02-11 NOTE — TELEPHONE ENCOUNTER
[facility] sent Rx request for the following:      Requested Prescriptions   Pending Prescriptions Disp Refills     traMADol (ULTRAM) 50 MG tablet [Pharmacy Med Name: TRAMADOL 50MG TABLETS] 30 tablet      Sig: TAKE 1 TABLET(50 MG) BY MOUTH DAILY       There is no refill protocol information for this order          Last Prescription Date:   10/13/2021  Last Fill Qty/Refills:         30, R-3  Last Office Visit:              1/3/2022  Future Office visit:          None   Routing refill request to provider for review/approval because:  Drug not on the [facility], [facility] or [facility] refill protocol or controlled substance. Unable to complete prescription refill per RN Medication Refill Policy. Martita Ruth RN on 2/11/2022 at 4:11 PM

## 2022-02-14 RX ORDER — TRAMADOL HYDROCHLORIDE 50 MG/1
TABLET ORAL
Qty: 30 TABLET | Refills: 1 | Status: SHIPPED | OUTPATIENT
Start: 2022-02-14 | End: 2022-04-14

## 2022-02-21 ENCOUNTER — MYC MEDICAL ADVICE (OUTPATIENT)
Dept: INTERNAL MEDICINE | Facility: OTHER | Age: 72
End: 2022-02-21
Payer: MEDICARE

## 2022-02-21 DIAGNOSIS — M81.0 OSTEOPOROSIS, UNSPECIFIED OSTEOPOROSIS TYPE, UNSPECIFIED PATHOLOGICAL FRACTURE PRESENCE: Primary | ICD-10-CM

## 2022-02-24 DIAGNOSIS — Z51.81 ENCOUNTER FOR THERAPEUTIC DRUG MONITORING: Primary | ICD-10-CM

## 2022-02-25 NOTE — TELEPHONE ENCOUNTER
Patient is scheduled for 03/03/2022 at 10:30 for her Prolia Inj.   Yuliya Valencia on 2/25/2022 at 12:28 PM

## 2022-03-01 ENCOUNTER — ALLIED HEALTH/NURSE VISIT (OUTPATIENT)
Dept: FAMILY MEDICINE | Facility: OTHER | Age: 72
End: 2022-03-01
Attending: INTERNAL MEDICINE
Payer: MEDICARE

## 2022-03-01 DIAGNOSIS — M81.0 OSTEOPOROSIS, UNSPECIFIED OSTEOPOROSIS TYPE, UNSPECIFIED PATHOLOGICAL FRACTURE PRESENCE: Primary | ICD-10-CM

## 2022-03-01 DIAGNOSIS — Z51.81 ENCOUNTER FOR THERAPEUTIC DRUG MONITORING: ICD-10-CM

## 2022-03-01 LAB
ALBUMIN SERPL-MCNC: 4 G/DL (ref 3.5–5.7)
ALP SERPL-CCNC: 53 U/L (ref 34–104)
ALT SERPL W P-5'-P-CCNC: 27 U/L (ref 7–52)
ANION GAP SERPL CALCULATED.3IONS-SCNC: 8 MMOL/L (ref 3–14)
AST SERPL W P-5'-P-CCNC: 23 U/L (ref 13–39)
BILIRUB SERPL-MCNC: 0.4 MG/DL (ref 0.3–1)
BUN SERPL-MCNC: 28 MG/DL (ref 7–25)
CALCIUM SERPL-MCNC: 9.8 MG/DL (ref 8.6–10.3)
CHLORIDE BLD-SCNC: 103 MMOL/L (ref 98–107)
CO2 SERPL-SCNC: 27 MMOL/L (ref 21–31)
CREAT SERPL-MCNC: 1.45 MG/DL (ref 0.6–1.2)
GFR SERPL CREATININE-BSD FRML MDRD: 38 ML/MIN/1.73M2
GLUCOSE BLD-MCNC: 109 MG/DL (ref 70–105)
POTASSIUM BLD-SCNC: 4.9 MMOL/L (ref 3.5–5.1)
PROT SERPL-MCNC: 6.9 G/DL (ref 6.4–8.9)
SODIUM SERPL-SCNC: 138 MMOL/L (ref 134–144)

## 2022-03-01 PROCEDURE — 96372 THER/PROPH/DIAG INJ SC/IM: CPT | Performed by: INTERNAL MEDICINE

## 2022-03-01 PROCEDURE — 36415 COLL VENOUS BLD VENIPUNCTURE: CPT | Mod: ZL

## 2022-03-01 PROCEDURE — 84155 ASSAY OF PROTEIN SERUM: CPT | Mod: ZL

## 2022-03-01 PROCEDURE — 250N000011 HC RX IP 250 OP 636: Performed by: INTERNAL MEDICINE

## 2022-03-01 RX ADMIN — DENOSUMAB 60 MG: 60 INJECTION SUBCUTANEOUS at 17:09

## 2022-03-01 NOTE — PROGRESS NOTES
Specialty Medication: Prolia    LABS  Calcium:9.8  Creat:1.45      Verified patient's first and last name, and . Patient stated reason for visit today is to receive a Prolia injection. Patient denied any concerns with previous injections. Prolia removed from [facility] in Procedure room, allowed to sit out for 15 mins prior to administration. Prolia administered SubQ, as ordered. Administration of medication documented in MAR (see MAR for further information regarding dose, lot #, NDC #, expiration date). Patient encouraged to wait in lobby for 15 minutes post-injection and notify staff immediately of any reaction.     Opal Gill RN ....................  3/1/2022   3:57 PM

## 2022-03-10 DIAGNOSIS — E78.5 HYPERLIPIDEMIA, UNSPECIFIED HYPERLIPIDEMIA TYPE: ICD-10-CM

## 2022-03-10 NOTE — TELEPHONE ENCOUNTER
"  Requested Prescriptions   Pending Prescriptions Disp Refills     simvastatin (ZOCOR) 40 MG tablet [Pharmacy Med Name: SIMVASTATIN 40MG TABLETS] 90 tablet 0     Sig: TAKE 1 TABLET(40 MG) BY MOUTH AT BEDTIME       Statins Protocol Failed - 3/10/2022  2:04 PM        Failed - LDL on file in past 12 months     Recent Labs   Lab Test 11/16/20  1401   LDL 80             Passed - No abnormal creatine kinase in past 12 months     No lab results found.             Passed - Recent (12 mo) or future (30 days) visit within the authorizing provider's specialty     Patient has had an office visit with the authorizing provider or a provider within the authorizing providers department within the previous 12 mos or has a future within next 30 days. See \"Patient Info\" tab in inbasket, or \"Choose Columns\" in Meds & Orders section of the refill encounter.              Passed - Medication is active on med list        Passed - Patient is age 18 or older        Passed - No active pregnancy on record        Passed - No positive pregnancy test in past 12 months           Last Written Prescription Date: 11/29/2021  Last Fill Quantity: 90,   # refills: 0  Last Office Visit: 01/03/2022 Virtual Visit  Future Office visit:       Routing refill request to provider for review/approval.  Unable to complete prescription refill per RNMedication Refill Policy.................... Mansi Becker RN ....................  3/10/2022   3:58 PM              "

## 2022-03-14 RX ORDER — SIMVASTATIN 40 MG
TABLET ORAL
Qty: 90 TABLET | Refills: 0 | Status: SHIPPED | OUTPATIENT
Start: 2022-03-14 | End: 2022-06-12

## 2022-04-13 DIAGNOSIS — M05.9 SEROPOSITIVE RHEUMATOID ARTHRITIS (H): ICD-10-CM

## 2022-04-14 RX ORDER — TRAMADOL HYDROCHLORIDE 50 MG/1
TABLET ORAL
Qty: 30 TABLET | Refills: 3 | Status: SHIPPED | OUTPATIENT
Start: 2022-04-14 | End: 2022-08-11

## 2022-05-12 DIAGNOSIS — N95.2 ATROPHIC VAGINITIS: ICD-10-CM

## 2022-05-16 NOTE — TELEPHONE ENCOUNTER
[facility] sent Rx request for the following:      Requested Prescriptions   Pending Prescriptions Disp Refills     estradiol (VAGIFEM) 10 MCG TABS vaginal tablet [Pharmacy Med Name: ESTRADIOL 10MCG VAGINAL TABS 8S] 24 tablet 0     Sig: INSERT 1 TABLET VAGINALLY 2 TIMES A WEEK       Hormone Replacement Therapy Failed - 5/16/2022 11:43 AM        Failed - Blood pressure under 140/90 in past 12 months     BP Readings from Last 3 Encounters:   02/06/20 118/54   01/29/20 122/65   07/08/19 132/76           Failed - Patient has mammogram in past 2 years on file if age 50-75     Last Prescription Date:   1/16/22  Last Fill Qty/Refills:         24, R-0    Last Office Visit:              1/3/22   Future Office visit:           None    Kelsey Brody RN .............. 5/16/2022  11:43 AM

## 2022-05-17 RX ORDER — ESTRADIOL 10 UG/1
INSERT VAGINAL
Qty: 24 TABLET | Refills: 1 | Status: SHIPPED | OUTPATIENT
Start: 2022-05-17 | End: 2022-08-15

## 2022-05-21 ENCOUNTER — HEALTH MAINTENANCE LETTER (OUTPATIENT)
Age: 72
End: 2022-05-21

## 2022-06-10 DIAGNOSIS — E78.5 HYPERLIPIDEMIA, UNSPECIFIED HYPERLIPIDEMIA TYPE: ICD-10-CM

## 2022-06-10 NOTE — TELEPHONE ENCOUNTER
[facility][id number] sent Rx request for the following:      Requested Prescriptions   Pending Prescriptions Disp Refills     simvastatin (ZOCOR) 40 MG tablet [Pharmacy Med Name: SIMVASTATIN 40MG TABLETS] 90 tablet 0     Sig: TAKE 1 TABLET(40 MG) BY MOUTH AT BEDTIME       Statins Protocol Failed - 6/10/2022  1:40 PM        Failed - LDL on file in past 12 months     Recent Labs   Lab Test 11/16/20  1401   LDL 80               Last Prescription Date:   3/14/2022  Last Fill Qty/Refills:         90, R-0    Last Office Visit:              1/3/2022 (VIRTUAL)   Future Office visit:           NONE    Unable to complete prescription refill per RN Medication Refill Policy.     Opal Gill, RN on 6/10/2022 at 1:42 PM

## 2022-06-12 RX ORDER — SIMVASTATIN 40 MG
TABLET ORAL
Qty: 90 TABLET | Refills: 0 | Status: SHIPPED | OUTPATIENT
Start: 2022-06-12 | End: 2022-06-27

## 2022-06-15 DIAGNOSIS — E78.5 HYPERLIPIDEMIA, UNSPECIFIED HYPERLIPIDEMIA TYPE: ICD-10-CM

## 2022-06-15 RX ORDER — SIMVASTATIN 40 MG
TABLET ORAL
Qty: 90 TABLET | Refills: 0 | OUTPATIENT
Start: 2022-06-15

## 2022-06-15 NOTE — TELEPHONE ENCOUNTER
[facility] #[id number] sent Rx request for the following:      Requested Prescriptions   Pending Prescriptions Disp Refills     simvastatin (ZOCOR) 40 MG tablet [Pharmacy Med Name: SIMVASTATIN 40MG TABLETS] 90 tablet 0     Sig: TAKE 1 TABLET(40 MG) BY MOUTH AT BEDTIME       Statins Protocol Failed - 6/15/2022  2:17 PM        Failed - LDL on file in past 12 months     Recent Labs   Lab Test 11/16/20  1401   LDL 80            Last Prescription Date:   6/12/2022  Last Fill Qty/Refills:         90, R-0        Unable to complete prescription refill per RN Medication Refill Policy. Redundant refill request refused: Too soon:      Opal Gill, RN on 6/15/2022 at 2:18 PM

## 2022-06-24 ENCOUNTER — TELEPHONE (OUTPATIENT)
Dept: INTERNAL MEDICINE | Facility: OTHER | Age: 72
End: 2022-06-24

## 2022-06-24 ENCOUNTER — DOCUMENTATION ONLY (OUTPATIENT)
Dept: INTERNAL MEDICINE | Facility: OTHER | Age: 72
End: 2022-06-24

## 2022-06-24 DIAGNOSIS — E78.5 HYPERLIPIDEMIA, UNSPECIFIED HYPERLIPIDEMIA TYPE: ICD-10-CM

## 2022-06-24 NOTE — PROGRESS NOTES
Writer received a call from call center. Call center said that patient had questions regarding simvastatin medication refill. Informed call center that refill was sent in 06/12/22 to [facility] pharmacy. Call center said they would notify patient.     Shanna Nichole RN .............. 6/24/2022  1:09 PM

## 2022-06-24 NOTE — TELEPHONE ENCOUNTER
Pt is wondering about orders for new prescription.  Please call.    Rolando Lezama on 6/24/2022 at 1:05 PM

## 2022-06-27 ENCOUNTER — LAB (OUTPATIENT)
Dept: LAB | Facility: OTHER | Age: 72
End: 2022-06-27
Payer: MEDICARE

## 2022-06-27 DIAGNOSIS — M05.9 SEROPOSITIVE RHEUMATOID ARTHRITIS (H): Primary | ICD-10-CM

## 2022-06-27 DIAGNOSIS — E11.9 DIABETES MELLITUS TYPE 2, INSULIN DEPENDENT (H): ICD-10-CM

## 2022-06-27 DIAGNOSIS — Z79.4 DIABETES MELLITUS TYPE 2, INSULIN DEPENDENT (H): ICD-10-CM

## 2022-06-27 DIAGNOSIS — M05.9 SEROPOSITIVE RHEUMATOID ARTHRITIS (H): ICD-10-CM

## 2022-06-27 LAB
AST SERPL W P-5'-P-CCNC: 25 U/L (ref 13–39)
CREAT SERPL-MCNC: 1.29 MG/DL (ref 0.6–1.2)
ERYTHROCYTE [DISTWIDTH] IN BLOOD BY AUTOMATED COUNT: 14 % (ref 10–15)
GFR SERPL CREATININE-BSD FRML MDRD: 44 ML/MIN/1.73M2
HBA1C MFR BLD: 6.1 % (ref 4–6.2)
HCT VFR BLD AUTO: 36.4 % (ref 35–47)
HGB BLD-MCNC: 11.9 G/DL (ref 11.7–15.7)
MCH RBC QN AUTO: 33.3 PG (ref 26.5–33)
MCHC RBC AUTO-ENTMCNC: 32.7 G/DL (ref 31.5–36.5)
MCV RBC AUTO: 102 FL (ref 78–100)
PLATELET # BLD AUTO: 209 10E3/UL (ref 150–450)
RBC # BLD AUTO: 3.57 10E6/UL (ref 3.8–5.2)
WBC # BLD AUTO: 10.1 10E3/UL (ref 4–11)

## 2022-06-27 PROCEDURE — 84450 TRANSFERASE (AST) (SGOT): CPT | Mod: ZL

## 2022-06-27 PROCEDURE — 82565 ASSAY OF CREATININE: CPT | Mod: ZL

## 2022-06-27 PROCEDURE — 85027 COMPLETE CBC AUTOMATED: CPT | Mod: ZL

## 2022-06-27 PROCEDURE — 83036 HEMOGLOBIN GLYCOSYLATED A1C: CPT | Mod: ZL

## 2022-06-27 PROCEDURE — 36415 COLL VENOUS BLD VENIPUNCTURE: CPT | Mod: ZL

## 2022-06-27 RX ORDER — SIMVASTATIN 40 MG
40 TABLET ORAL AT BEDTIME
Qty: 90 TABLET | Refills: 0 | Status: SHIPPED | OUTPATIENT
Start: 2022-06-27 | End: 2022-09-21

## 2022-06-27 NOTE — TELEPHONE ENCOUNTER
Patient requesting a refill for her Simvastatin; prescription pended for [facility].    Zeina Martinez LPN on 6/27/2022 at 2:55 PM

## 2022-08-10 DIAGNOSIS — M05.9 SEROPOSITIVE RHEUMATOID ARTHRITIS (H): ICD-10-CM

## 2022-08-11 ENCOUNTER — TELEPHONE (OUTPATIENT)
Dept: INTERNAL MEDICINE | Facility: OTHER | Age: 72
End: 2022-08-11

## 2022-08-11 RX ORDER — TRAMADOL HYDROCHLORIDE 50 MG/1
TABLET ORAL
Qty: 30 TABLET | Refills: 5 | Status: SHIPPED | OUTPATIENT
Start: 2022-08-11 | End: 2022-12-12

## 2022-08-11 NOTE — TELEPHONE ENCOUNTER
Patient was seen at [place] and they recommend a new drug for patient that would be done as infusion.  There were no openings to get in and see MAF, so they are requesting a same day appmt so she can get on this medication right away.  Please call      Madeleine Soliman on 8/11/2022 at 8:06 AM

## 2022-08-11 NOTE — TELEPHONE ENCOUNTER
Called patient, she is going to try and get in with a different provider as [facility] does not have any openings today or tomorrow.    Pb Garcia, LPN on 8/11/2022 at 8:52 AM

## 2022-08-12 DIAGNOSIS — N95.2 ATROPHIC VAGINITIS: ICD-10-CM

## 2022-08-12 NOTE — TELEPHONE ENCOUNTER
[facility] sent Rx request for the following:      ESTRADIOL 10MCG VAGINAL TABS 8S      Last Prescription Date:   5/17/2022  Last Fill Qty/Refills:         24, R-1    Last Office Visit:              1/3/2022   Future Office visit:           8/24/2022    Renan Ruiz RN, BSN  ....................  8/12/2022   10:01 AM

## 2022-08-15 RX ORDER — ESTRADIOL 10 UG/1
INSERT VAGINAL
Qty: 24 TABLET | Refills: 1 | Status: SHIPPED | OUTPATIENT
Start: 2022-08-15 | End: 2023-05-25

## 2022-08-24 ENCOUNTER — VIRTUAL VISIT (OUTPATIENT)
Dept: INTERNAL MEDICINE | Facility: OTHER | Age: 72
End: 2022-08-24
Attending: INTERNAL MEDICINE
Payer: MEDICARE

## 2022-08-24 DIAGNOSIS — E11.9 DIABETES MELLITUS TYPE 2, INSULIN DEPENDENT (H): ICD-10-CM

## 2022-08-24 DIAGNOSIS — M81.0 OSTEOPOROSIS, UNSPECIFIED OSTEOPOROSIS TYPE, UNSPECIFIED PATHOLOGICAL FRACTURE PRESENCE: ICD-10-CM

## 2022-08-24 DIAGNOSIS — Z79.52 ON PREDNISONE THERAPY: Primary | ICD-10-CM

## 2022-08-24 DIAGNOSIS — Z79.4 DIABETES MELLITUS TYPE 2, INSULIN DEPENDENT (H): ICD-10-CM

## 2022-08-24 DIAGNOSIS — Z12.31 ENCOUNTER FOR SCREENING MAMMOGRAM FOR BREAST CANCER: ICD-10-CM

## 2022-08-24 PROCEDURE — 99442 PR PHYSICIAN TELEPHONE EVALUATION 11-20 MIN: CPT | Performed by: INTERNAL MEDICINE

## 2022-08-24 RX ORDER — MEPERIDINE HYDROCHLORIDE 50 MG/ML
25 INJECTION INTRAMUSCULAR; INTRAVENOUS; SUBCUTANEOUS EVERY 30 MIN PRN
Status: CANCELLED | OUTPATIENT
Start: 2022-08-24

## 2022-08-24 RX ORDER — METHYLPREDNISOLONE SODIUM SUCCINATE 125 MG/2ML
125 INJECTION, POWDER, LYOPHILIZED, FOR SOLUTION INTRAMUSCULAR; INTRAVENOUS
Status: CANCELLED
Start: 2022-09-01

## 2022-08-24 RX ORDER — ALBUTEROL SULFATE 0.83 MG/ML
2.5 SOLUTION RESPIRATORY (INHALATION)
Status: CANCELLED | OUTPATIENT
Start: 2022-08-24

## 2022-08-24 RX ORDER — DIPHENHYDRAMINE HYDROCHLORIDE 50 MG/ML
50 INJECTION INTRAMUSCULAR; INTRAVENOUS
Status: CANCELLED
Start: 2022-08-24

## 2022-08-24 RX ORDER — ALBUTEROL SULFATE 90 UG/1
1-2 AEROSOL, METERED RESPIRATORY (INHALATION)
Status: CANCELLED
Start: 2022-09-01

## 2022-08-24 RX ORDER — METHYLPREDNISOLONE SODIUM SUCCINATE 125 MG/2ML
125 INJECTION, POWDER, LYOPHILIZED, FOR SOLUTION INTRAMUSCULAR; INTRAVENOUS
Status: CANCELLED
Start: 2022-08-24

## 2022-08-24 RX ORDER — ALBUTEROL SULFATE 90 UG/1
1-2 AEROSOL, METERED RESPIRATORY (INHALATION)
Status: CANCELLED
Start: 2022-08-24

## 2022-08-24 RX ORDER — MEPERIDINE HYDROCHLORIDE 50 MG/ML
25 INJECTION INTRAMUSCULAR; INTRAVENOUS; SUBCUTANEOUS EVERY 30 MIN PRN
Status: CANCELLED | OUTPATIENT
Start: 2022-09-01

## 2022-08-24 RX ORDER — EPINEPHRINE 1 MG/ML
0.3 INJECTION, SOLUTION, CONCENTRATE INTRAVENOUS EVERY 5 MIN PRN
Status: CANCELLED | OUTPATIENT
Start: 2022-09-01

## 2022-08-24 RX ORDER — ALBUTEROL SULFATE 0.83 MG/ML
2.5 SOLUTION RESPIRATORY (INHALATION)
Status: CANCELLED | OUTPATIENT
Start: 2022-09-01

## 2022-08-24 RX ORDER — INSULIN ASPART 100 [IU]/ML
INJECTION, SUSPENSION SUBCUTANEOUS
Qty: 45 ML | Refills: 6 | COMMUNITY
Start: 2022-08-24 | End: 2022-12-12

## 2022-08-24 RX ORDER — DIPHENHYDRAMINE HYDROCHLORIDE 50 MG/ML
50 INJECTION INTRAMUSCULAR; INTRAVENOUS
Status: CANCELLED
Start: 2022-09-01

## 2022-08-24 RX ORDER — EPINEPHRINE 1 MG/ML
0.3 INJECTION, SOLUTION, CONCENTRATE INTRAVENOUS EVERY 5 MIN PRN
Status: CANCELLED | OUTPATIENT
Start: 2022-08-24

## 2022-08-24 NOTE — PROGRESS NOTES
Laura is a 72 year old who is being evaluated via a billable telephone visit.      What phone number would you like to be contacted at?   How would you like to obtain your AVS? MyChart    Assessment & Plan     On prednisone therapy  Have ordered Evmireyaeld    Diabetes mellitus type 2, insulin dependent (H)  Stable  - insulin aspart prot & aspart (NOVOLOG MIX 70/30 FLEXPEN) (70-30) 100 UNIT/ML pen; INJECT 40 UNITS EVERY MORNING, AND 20 UNITS EVERY EVENING    She is also in need of getting another infusion for Prolia which she had previously been getting at the [facility].  She also needs a mammogram.  These are ordered.             No follow-ups on file.    LANE SAWYER MD  [facility]    Subjective   Laura is a 72 year old, presenting for the following health issues:  No chief complaint on file.      HPI     Telephone visit with the patient today.  She is immunosuppressed.  [facility] has recommended that she get Evusheld.  I told her that sounded reasonable and I think she probably needs to get that in [place].  She is fine with that.  We also talked about her diabetes, control is excellent.  She is on a slightly lower dose of insulin and that was updated today.  She needs to get scheduled for mammogram.  She needs to get scheduled for a Prolia infusion for her osteoporosis.      Current Outpatient Medications   Medication     Abatacept (ORENCIA) 125 MG/ML SOAJ auto-injector     albuterol (2.5 MG/3ML) 0.083% neb solution     albuterol (PROAIR HFA/PROVENTIL HFA/VENTOLIN HFA) 108 (90 Base) MCG/ACT inhaler     aspirin 81 MG chewable tablet     atovaquone (MEPRON) 750 MG/5ML suspension     BD PEN NEEDLE LUCILLE 2ND GEN 32G X 4 MM miscellaneous     blood glucose (ONETOUCH VERIO IQ) test strip     budesonide (PULMICORT) 0.5 MG/2ML neb solution     cyanocobalamin (VITAMIN B-12) 1000 MCG tablet     EPINEPHrine (EPIPEN/ADRENACLICK/OR ANY BX GENERIC EQUIV) 0.3 MG/0.3ML injection 2-pack     estradiol  "(VAGIFEM) 10 MCG TABS vaginal tablet     folic acid (FOLVITE) 1 MG tablet     formoterol (PERFOROMIST) 20 MCG/2ML neb solution     GAS-X EXTRA STRENGTH 125 MG chewable tablet     glucagon 1 MG kit     glucose 4 g CHEW chewable tablet     insulin aspart prot & aspart (NOVOLOG MIX 70/30 FLEXPEN) (70-30) 100 UNIT/ML pen     Lancets (ONETOUCH DELICA PLUS [id number]) MISC     levothyroxine (SYNTHROID/LEVOTHROID) 100 MCG tablet     lisinopril (PRINIVIL/ZESTRIL) 5 MG tablet     medical cannabis liquid     METHOTREXate, PF, (OTREXUP) 25 MG/0.4ML pen     montelukast (SINGULAIR) 10 MG tablet     Needle, Disp, (HYPODERMIC NEEDLE) 27G X 1/2\" MISC     omeprazole (PRILOSEC) 20 MG DR capsule     predniSONE (DELTASONE) 1 MG tablet     predniSONE (DELTASONE) 5 MG tablet     simvastatin (ZOCOR) 40 MG tablet     tiotropium (SPIRIVA) 18 MCG inhaled capsule     traMADol (ULTRAM) 50 MG tablet     No current facility-administered medications for this visit.         Review of Systems   Constitutional, HEENT, cardiovascular, pulmonary, gi and gu systems are negative, except as otherwise noted.      Objective           Vitals:  No vitals were obtained today due to virtual visit.    Physical Exam   healthy, alert and no distress  PSYCH: Alert and oriented times 3; coherent speech, normal   rate and volume, able to articulate logical thoughts, able   to abstract reason, no tangential thoughts, no hallucinations   or delusions  Her affect is normal  RESP: No cough, no audible wheezing, able to talk in full sentences  Remainder of exam unable to be completed due to telephone visits                Phone call duration: 11 minutes    .  ..  "

## 2022-08-25 ENCOUNTER — TELEPHONE (OUTPATIENT)
Dept: INFUSION THERAPY | Facility: OTHER | Age: 72
End: 2022-08-25

## 2022-08-25 ENCOUNTER — INFUSION THERAPY VISIT (OUTPATIENT)
Dept: INFUSION THERAPY | Facility: OTHER | Age: 72
End: 2022-08-25
Attending: INTERNAL MEDICINE
Payer: MEDICARE

## 2022-08-25 DIAGNOSIS — Z79.52 ON PREDNISONE THERAPY: Primary | ICD-10-CM

## 2022-08-25 PROCEDURE — 250N000011 HC RX IP 250 OP 636: Performed by: INTERNAL MEDICINE

## 2022-08-25 PROCEDURE — M0220 HC INJECTION TIXAGEVIMAB & CILGAVIMAB (EVUSHELD): HCPCS | Performed by: INTERNAL MEDICINE

## 2022-08-25 RX ORDER — DIPHENHYDRAMINE HYDROCHLORIDE 50 MG/ML
50 INJECTION INTRAMUSCULAR; INTRAVENOUS
Status: CANCELLED
Start: 2022-08-25

## 2022-08-25 RX ORDER — ALBUTEROL SULFATE 0.83 MG/ML
2.5 SOLUTION RESPIRATORY (INHALATION)
Status: CANCELLED | OUTPATIENT
Start: 2022-08-25

## 2022-08-25 RX ORDER — METHYLPREDNISOLONE SODIUM SUCCINATE 125 MG/2ML
125 INJECTION, POWDER, LYOPHILIZED, FOR SOLUTION INTRAMUSCULAR; INTRAVENOUS
Status: CANCELLED
Start: 2022-08-25

## 2022-08-25 RX ORDER — ALBUTEROL SULFATE 90 UG/1
1-2 AEROSOL, METERED RESPIRATORY (INHALATION)
Status: CANCELLED
Start: 2022-08-25

## 2022-08-25 RX ORDER — MEPERIDINE HYDROCHLORIDE 25 MG/ML
25 INJECTION INTRAMUSCULAR; INTRAVENOUS; SUBCUTANEOUS EVERY 30 MIN PRN
Status: CANCELLED | OUTPATIENT
Start: 2022-08-25

## 2022-08-25 RX ORDER — EPINEPHRINE 1 MG/ML
0.3 INJECTION, SOLUTION, CONCENTRATE INTRAVENOUS EVERY 5 MIN PRN
Status: CANCELLED | OUTPATIENT
Start: 2022-08-25

## 2022-08-25 RX ADMIN — AZD7442 6 ML: KIT at 13:37

## 2022-09-01 ENCOUNTER — HOSPITAL ENCOUNTER (OUTPATIENT)
Dept: MAMMOGRAPHY | Facility: OTHER | Age: 72
Discharge: HOME | End: 2022-09-01
Attending: INTERNAL MEDICINE | Admitting: INTERNAL MEDICINE
Payer: MEDICARE

## 2022-09-01 DIAGNOSIS — Z12.31 ENCOUNTER FOR SCREENING MAMMOGRAM FOR BREAST CANCER: ICD-10-CM

## 2022-09-01 PROCEDURE — 77067 SCR MAMMO BI INCL CAD: CPT

## 2022-09-13 ENCOUNTER — ALLIED HEALTH/NURSE VISIT (OUTPATIENT)
Dept: FAMILY MEDICINE | Facility: OTHER | Age: 72
End: 2022-09-13
Attending: INTERNAL MEDICINE
Payer: MEDICARE

## 2022-09-13 DIAGNOSIS — M81.0 OSTEOPOROSIS, UNSPECIFIED OSTEOPOROSIS TYPE, UNSPECIFIED PATHOLOGICAL FRACTURE PRESENCE: Primary | ICD-10-CM

## 2022-09-13 LAB
ALBUMIN SERPL-MCNC: 3.9 G/DL (ref 3.5–5.7)
CALCIUM SERPL-MCNC: 9.6 MG/DL (ref 8.6–10.3)
CREAT SERPL-MCNC: 1.59 MG/DL (ref 0.6–1.2)
GFR SERPL CREATININE-BSD FRML MDRD: 34 ML/MIN/1.73M2

## 2022-09-13 PROCEDURE — 36415 COLL VENOUS BLD VENIPUNCTURE: CPT | Mod: ZL | Performed by: INTERNAL MEDICINE

## 2022-09-13 PROCEDURE — 82565 ASSAY OF CREATININE: CPT | Mod: ZL | Performed by: INTERNAL MEDICINE

## 2022-09-13 PROCEDURE — 250N000011 HC RX IP 250 OP 636: Performed by: INTERNAL MEDICINE

## 2022-09-13 PROCEDURE — 82040 ASSAY OF SERUM ALBUMIN: CPT | Mod: ZL | Performed by: INTERNAL MEDICINE

## 2022-09-13 PROCEDURE — 82310 ASSAY OF CALCIUM: CPT | Mod: ZL | Performed by: INTERNAL MEDICINE

## 2022-09-13 PROCEDURE — 96372 THER/PROPH/DIAG INJ SC/IM: CPT | Performed by: INTERNAL MEDICINE

## 2022-09-13 RX ORDER — DIPHENHYDRAMINE HYDROCHLORIDE 50 MG/ML
50 INJECTION INTRAMUSCULAR; INTRAVENOUS
Status: CANCELLED
Start: 2023-03-12

## 2022-09-13 RX ORDER — EPINEPHRINE 1 MG/ML
0.3 INJECTION, SOLUTION, CONCENTRATE INTRAVENOUS EVERY 5 MIN PRN
Status: CANCELLED | OUTPATIENT
Start: 2023-03-12

## 2022-09-13 RX ORDER — ALBUTEROL SULFATE 0.83 MG/ML
2.5 SOLUTION RESPIRATORY (INHALATION)
Status: CANCELLED | OUTPATIENT
Start: 2023-03-12

## 2022-09-13 RX ORDER — ALBUTEROL SULFATE 90 UG/1
1-2 AEROSOL, METERED RESPIRATORY (INHALATION)
Status: CANCELLED
Start: 2023-03-12

## 2022-09-13 RX ORDER — MEPERIDINE HYDROCHLORIDE 50 MG/ML
25 INJECTION INTRAMUSCULAR; INTRAVENOUS; SUBCUTANEOUS EVERY 30 MIN PRN
Status: CANCELLED | OUTPATIENT
Start: 2023-03-12

## 2022-09-13 RX ORDER — METHYLPREDNISOLONE SODIUM SUCCINATE 125 MG/2ML
125 INJECTION, POWDER, LYOPHILIZED, FOR SOLUTION INTRAMUSCULAR; INTRAVENOUS
Status: CANCELLED
Start: 2023-03-12

## 2022-09-13 RX ADMIN — DENOSUMAB 60 MG: 60 INJECTION SUBCUTANEOUS at 14:12

## 2022-09-13 NOTE — PROGRESS NOTES
"Patient states this is not a new medication, \" I have had this before when I was here and at the [facility]\".     Specialty Medication: Prolia    VITAL SIGNS  Height: Patient states, 5ft. 3 inches  Weight:Pateint states between 140-145 lbs    LABS  Calcium:9.6  Creat: 1.5    Verified patient's first and last name, and . Patient stated reason for visit today is to receive a Prolia injection. Patient denied any concerns with previous injections. Prolia removed from [facility] in Procedure room, allowed to sit out for 15 mins prior to administration. Prolia administered SubQ, as ordered. Administration of medication documented in MAR (see MAR for further information regarding dose, lot #, NDC #, expiration date). Patient encouraged to wait in lobby for 15 minutes post-injection and notify staff immediately of any reaction.     Mansi Becker RN ....................  2022   12:48 PM            "

## 2022-09-21 DIAGNOSIS — E78.5 HYPERLIPIDEMIA, UNSPECIFIED HYPERLIPIDEMIA TYPE: ICD-10-CM

## 2022-09-21 RX ORDER — SIMVASTATIN 40 MG
TABLET ORAL
Qty: 90 TABLET | Refills: 3 | Status: SHIPPED | OUTPATIENT
Start: 2022-09-21 | End: 2023-05-25

## 2022-09-21 NOTE — TELEPHONE ENCOUNTER
[facility] sent Rx request for the following:      Requested Prescriptions   Pending Prescriptions Disp Refills     simvastatin (ZOCOR) 40 MG tablet [Pharmacy Med Name: SIMVASTATIN 40MG TABLETS] 90 tablet 0     Sig: TAKE 1 TABLET(40 MG) BY MOUTH AT BEDTIME       Statins Protocol Failed - 9/21/2022 10:30 AM        Failed - LDL on file in past 12 months     Recent Labs   Lab Test 11/16/20  1401   LDL 80        Last Prescription Date:   6/27/22  Last Fill Qty/Refills:         90, R-0    Last Office Visit:              8/24/22   Future Office visit:           None    Kelsey Brody RN .............. 9/21/2022  10:31 AM

## 2022-10-13 ENCOUNTER — HOSPITAL ENCOUNTER (INPATIENT)
Facility: OTHER | Age: 72
LOS: 1 days | Discharge: HOME | DRG: 640 | End: 2022-10-15
Attending: EMERGENCY MEDICINE | Admitting: STUDENT IN AN ORGANIZED HEALTH CARE EDUCATION/TRAINING PROGRAM
Payer: MEDICARE

## 2022-10-13 ENCOUNTER — APPOINTMENT (OUTPATIENT)
Dept: GENERAL RADIOLOGY | Facility: OTHER | Age: 72
DRG: 640 | End: 2022-10-13
Attending: EMERGENCY MEDICINE
Payer: MEDICARE

## 2022-10-13 DIAGNOSIS — E87.5 HYPERPOTASSEMIA: ICD-10-CM

## 2022-10-13 DIAGNOSIS — Z11.52 ENCOUNTER FOR SCREENING LABORATORY TESTING FOR COVID-19 VIRUS: ICD-10-CM

## 2022-10-13 DIAGNOSIS — J18.9 RECURRENT PNEUMONIA: ICD-10-CM

## 2022-10-13 DIAGNOSIS — E87.5 HYPERKALEMIA: ICD-10-CM

## 2022-10-13 DIAGNOSIS — Z79.52 ON PREDNISONE THERAPY: ICD-10-CM

## 2022-10-13 DIAGNOSIS — J18.9 PNEUMONIA DUE TO INFECTIOUS ORGANISM, UNSPECIFIED LATERALITY, UNSPECIFIED PART OF LUNG: ICD-10-CM

## 2022-10-13 DIAGNOSIS — E85.89 OTHER AMYLOIDOSIS (H): ICD-10-CM

## 2022-10-13 DIAGNOSIS — M05.9 SEROPOSITIVE RHEUMATOID ARTHRITIS (H): ICD-10-CM

## 2022-10-13 DIAGNOSIS — Z87.891 PERSONAL HISTORY OF TOBACCO USE, PRESENTING HAZARDS TO HEALTH: ICD-10-CM

## 2022-10-13 DIAGNOSIS — F33.1 MODERATE EPISODE OF RECURRENT MAJOR DEPRESSIVE DISORDER (H): Primary | ICD-10-CM

## 2022-10-13 LAB
ALBUMIN SERPL-MCNC: 3.5 G/DL (ref 3.5–5.7)
ALP SERPL-CCNC: 53 U/L (ref 34–104)
ALT SERPL W P-5'-P-CCNC: 19 U/L (ref 7–52)
ANION GAP SERPL CALCULATED.3IONS-SCNC: 10 MMOL/L (ref 3–14)
AST SERPL W P-5'-P-CCNC: 19 U/L (ref 13–39)
BASOPHILS # BLD AUTO: 0.1 10E3/UL (ref 0–0.2)
BASOPHILS NFR BLD AUTO: 0 %
BILIRUB SERPL-MCNC: 1.5 MG/DL (ref 0.3–1)
BUN SERPL-MCNC: 29 MG/DL (ref 7–25)
CALCIUM SERPL-MCNC: 9.1 MG/DL (ref 8.6–10.3)
CHLORIDE BLD-SCNC: 102 MMOL/L (ref 98–107)
CO2 SERPL-SCNC: 21 MMOL/L (ref 21–31)
CREAT SERPL-MCNC: 1.83 MG/DL (ref 0.6–1.2)
EOSINOPHIL # BLD AUTO: 0 10E3/UL (ref 0–0.7)
EOSINOPHIL NFR BLD AUTO: 0 %
ERYTHROCYTE [DISTWIDTH] IN BLOOD BY AUTOMATED COUNT: 14.7 % (ref 10–15)
FLUAV RNA SPEC QL NAA+PROBE: NEGATIVE
FLUBV RNA RESP QL NAA+PROBE: NEGATIVE
GFR SERPL CREATININE-BSD FRML MDRD: 29 ML/MIN/1.73M2
GLUCOSE BLD-MCNC: 188 MG/DL (ref 70–105)
HCT VFR BLD AUTO: 32.9 % (ref 35–47)
HGB BLD-MCNC: 11.2 G/DL (ref 11.7–15.7)
HOLD SPECIMEN: NORMAL
HOLD SPECIMEN: NORMAL
IMM GRANULOCYTES # BLD: 0.2 10E3/UL
IMM GRANULOCYTES NFR BLD: 1 %
LACTATE SERPL-SCNC: 1.5 MMOL/L (ref 0.7–2)
LYMPHOCYTES # BLD AUTO: 1.5 10E3/UL (ref 0.8–5.3)
LYMPHOCYTES NFR BLD AUTO: 6 %
MCH RBC QN AUTO: 33 PG (ref 26.5–33)
MCHC RBC AUTO-ENTMCNC: 34 G/DL (ref 31.5–36.5)
MCV RBC AUTO: 97 FL (ref 78–100)
MONOCYTES # BLD AUTO: 0.9 10E3/UL (ref 0–1.3)
MONOCYTES NFR BLD AUTO: 4 %
NEUTROPHILS # BLD AUTO: 21 10E3/UL (ref 1.6–8.3)
NEUTROPHILS NFR BLD AUTO: 89 %
NRBC # BLD AUTO: 0 10E3/UL
NRBC BLD AUTO-RTO: 0 /100
NT-PROBNP SERPL-MCNC: 171 PG/ML (ref 0–100)
PLATELET # BLD AUTO: 189 10E3/UL (ref 150–450)
POTASSIUM BLD-SCNC: 5.2 MMOL/L (ref 3.5–5.1)
POTASSIUM BLD-SCNC: 5.2 MMOL/L (ref 3.5–5.1)
PROT SERPL-MCNC: 6.6 G/DL (ref 6.4–8.9)
RBC # BLD AUTO: 3.39 10E6/UL (ref 3.8–5.2)
RSV RNA SPEC NAA+PROBE: NEGATIVE
SARS-COV-2 RNA RESP QL NAA+PROBE: NEGATIVE
SODIUM SERPL-SCNC: 133 MMOL/L (ref 134–144)
TROPONIN I SERPL-MCNC: 218.1 PG/ML (ref 0–34)
TROPONIN I SERPL-MCNC: 231 PG/ML (ref 0–34)
WBC # BLD AUTO: 23.7 10E3/UL (ref 4–11)

## 2022-10-13 PROCEDURE — 93005 ELECTROCARDIOGRAM TRACING: CPT | Performed by: EMERGENCY MEDICINE

## 2022-10-13 PROCEDURE — 96375 TX/PRO/DX INJ NEW DRUG ADDON: CPT | Performed by: EMERGENCY MEDICINE

## 2022-10-13 PROCEDURE — 250N000011 HC RX IP 250 OP 636: Performed by: EMERGENCY MEDICINE

## 2022-10-13 PROCEDURE — 999N000157 HC STATISTIC RCP TIME EA 10 MIN

## 2022-10-13 PROCEDURE — 71045 X-RAY EXAM CHEST 1 VIEW: CPT

## 2022-10-13 PROCEDURE — 99285 EMERGENCY DEPT VISIT HI MDM: CPT | Mod: CS | Performed by: EMERGENCY MEDICINE

## 2022-10-13 PROCEDURE — 85025 COMPLETE CBC W/AUTO DIFF WBC: CPT | Performed by: EMERGENCY MEDICINE

## 2022-10-13 PROCEDURE — 94640 AIRWAY INHALATION TREATMENT: CPT

## 2022-10-13 PROCEDURE — 250N000012 HC RX MED GY IP 250 OP 636 PS 637: Performed by: EMERGENCY MEDICINE

## 2022-10-13 PROCEDURE — 87637 SARSCOV2&INF A&B&RSV AMP PRB: CPT | Performed by: EMERGENCY MEDICINE

## 2022-10-13 PROCEDURE — 99291 CRITICAL CARE FIRST HOUR: CPT | Mod: 25,CS | Performed by: EMERGENCY MEDICINE

## 2022-10-13 PROCEDURE — 82040 ASSAY OF SERUM ALBUMIN: CPT | Performed by: EMERGENCY MEDICINE

## 2022-10-13 PROCEDURE — 84132 ASSAY OF SERUM POTASSIUM: CPT | Performed by: EMERGENCY MEDICINE

## 2022-10-13 PROCEDURE — 250N000009 HC RX 250: Performed by: EMERGENCY MEDICINE

## 2022-10-13 PROCEDURE — 96374 THER/PROPH/DIAG INJ IV PUSH: CPT | Performed by: EMERGENCY MEDICINE

## 2022-10-13 PROCEDURE — 93010 ELECTROCARDIOGRAM REPORT: CPT | Performed by: INTERNAL MEDICINE

## 2022-10-13 PROCEDURE — 36415 COLL VENOUS BLD VENIPUNCTURE: CPT | Performed by: EMERGENCY MEDICINE

## 2022-10-13 PROCEDURE — 83880 ASSAY OF NATRIURETIC PEPTIDE: CPT | Performed by: EMERGENCY MEDICINE

## 2022-10-13 PROCEDURE — 80053 COMPREHEN METABOLIC PANEL: CPT | Performed by: EMERGENCY MEDICINE

## 2022-10-13 PROCEDURE — 258N000003 HC RX IP 258 OP 636: Performed by: EMERGENCY MEDICINE

## 2022-10-13 PROCEDURE — 250N000013 HC RX MED GY IP 250 OP 250 PS 637: Performed by: EMERGENCY MEDICINE

## 2022-10-13 PROCEDURE — 83605 ASSAY OF LACTIC ACID: CPT | Performed by: EMERGENCY MEDICINE

## 2022-10-13 PROCEDURE — 84484 ASSAY OF TROPONIN QUANT: CPT | Performed by: EMERGENCY MEDICINE

## 2022-10-13 PROCEDURE — C9803 HOPD COVID-19 SPEC COLLECT: HCPCS | Performed by: EMERGENCY MEDICINE

## 2022-10-13 RX ORDER — PREDNISONE 20 MG/1
60 TABLET ORAL ONCE
Status: COMPLETED | OUTPATIENT
Start: 2022-10-13 | End: 2022-10-13

## 2022-10-13 RX ORDER — AZITHROMYCIN 500 MG/5ML
500 INJECTION, POWDER, LYOPHILIZED, FOR SOLUTION INTRAVENOUS EVERY 24 HOURS
Status: DISCONTINUED | OUTPATIENT
Start: 2022-10-13 | End: 2022-10-14

## 2022-10-13 RX ORDER — ACETAMINOPHEN 325 MG/1
650 TABLET ORAL ONCE
Status: COMPLETED | OUTPATIENT
Start: 2022-10-13 | End: 2022-10-13

## 2022-10-13 RX ORDER — CEFTRIAXONE SODIUM 1 G/50ML
1 INJECTION, SOLUTION INTRAVENOUS ONCE
Status: COMPLETED | OUTPATIENT
Start: 2022-10-13 | End: 2022-10-13

## 2022-10-13 RX ORDER — SODIUM CHLORIDE 9 MG/ML
INJECTION, SOLUTION INTRAVENOUS CONTINUOUS
Status: DISCONTINUED | OUTPATIENT
Start: 2022-10-13 | End: 2022-10-14

## 2022-10-13 RX ORDER — IPRATROPIUM BROMIDE AND ALBUTEROL SULFATE 2.5; .5 MG/3ML; MG/3ML
3 SOLUTION RESPIRATORY (INHALATION) ONCE
Status: COMPLETED | OUTPATIENT
Start: 2022-10-13 | End: 2022-10-13

## 2022-10-13 RX ADMIN — CEFTRIAXONE SODIUM 1 G: 1 INJECTION, SOLUTION INTRAVENOUS at 21:10

## 2022-10-13 RX ADMIN — SODIUM CHLORIDE: 9 INJECTION, SOLUTION INTRAVENOUS at 19:45

## 2022-10-13 RX ADMIN — ACETAMINOPHEN 650 MG: 325 TABLET, FILM COATED ORAL at 19:43

## 2022-10-13 RX ADMIN — AZITHROMYCIN 500 MG: 500 INJECTION, POWDER, LYOPHILIZED, FOR SOLUTION INTRAVENOUS at 21:55

## 2022-10-13 RX ADMIN — PREDNISONE 60 MG: 20 TABLET ORAL at 19:53

## 2022-10-13 RX ADMIN — IPRATROPIUM BROMIDE AND ALBUTEROL SULFATE 3 ML: 2.5; .5 SOLUTION RESPIRATORY (INHALATION) at 20:34

## 2022-10-13 ASSESSMENT — ENCOUNTER SYMPTOMS
WHEEZING: 1
MUSCULOSKELETAL NEGATIVE: 1
GASTROINTESTINAL NEGATIVE: 1
EYES NEGATIVE: 1
ENDOCRINE NEGATIVE: 1
SHORTNESS OF BREATH: 1
NECK PAIN: 0
HEMATOLOGIC/LYMPHATIC NEGATIVE: 1
CARDIOVASCULAR NEGATIVE: 1
ALLERGIC/IMMUNOLOGIC NEGATIVE: 1
FEVER: 0
PSYCHIATRIC NEGATIVE: 1
CONSTITUTIONAL NEGATIVE: 1

## 2022-10-13 ASSESSMENT — ACTIVITIES OF DAILY LIVING (ADL)
ADLS_ACUITY_SCORE: 39
ADLS_ACUITY_SCORE: 39

## 2022-10-14 PROBLEM — E87.5 HYPERKALEMIA: Status: ACTIVE | Noted: 2022-10-14

## 2022-10-14 PROBLEM — J18.9 PNEUMONIA DUE TO INFECTIOUS ORGANISM, UNSPECIFIED LATERALITY, UNSPECIFIED PART OF LUNG: Status: ACTIVE | Noted: 2022-10-14

## 2022-10-14 PROBLEM — N17.9 ACUTE KIDNEY INJURY (H): Status: ACTIVE | Noted: 2022-10-14

## 2022-10-14 PROBLEM — E87.1 HYPONATREMIA: Status: ACTIVE | Noted: 2022-10-14

## 2022-10-14 LAB
ANION GAP SERPL CALCULATED.3IONS-SCNC: 9 MMOL/L (ref 3–14)
BUN SERPL-MCNC: 34 MG/DL (ref 7–25)
CALCIUM SERPL-MCNC: 8.2 MG/DL (ref 8.6–10.3)
CHLORIDE BLD-SCNC: 103 MMOL/L (ref 98–107)
CO2 SERPL-SCNC: 19 MMOL/L (ref 21–31)
CREAT SERPL-MCNC: 1.91 MG/DL (ref 0.6–1.2)
GFR SERPL CREATININE-BSD FRML MDRD: 27 ML/MIN/1.73M2
GLUCOSE BLD-MCNC: 334 MG/DL (ref 70–105)
GLUCOSE BLDC GLUCOMTR-MCNC: 247 MG/DL (ref 70–99)
GLUCOSE BLDC GLUCOMTR-MCNC: 261 MG/DL (ref 70–99)
GLUCOSE BLDC GLUCOMTR-MCNC: 311 MG/DL (ref 70–99)
GLUCOSE BLDC GLUCOMTR-MCNC: 337 MG/DL (ref 70–99)
HBA1C MFR BLD: 6.9 % (ref 4–6.2)
HOLD SPECIMEN: NORMAL
POTASSIUM BLD-SCNC: 4.7 MMOL/L (ref 3.5–5.1)
POTASSIUM BLD-SCNC: 6 MMOL/L (ref 3.5–5.1)
SODIUM SERPL-SCNC: 131 MMOL/L (ref 134–144)
TROPONIN I SERPL-MCNC: 117.3 PG/ML (ref 0–34)
TROPONIN I SERPL-MCNC: 142.5 PG/ML (ref 0–34)
TSH SERPL DL<=0.005 MIU/L-ACNC: 2.63 MU/L (ref 0.4–4)

## 2022-10-14 PROCEDURE — 250N000013 HC RX MED GY IP 250 OP 250 PS 637: Performed by: STUDENT IN AN ORGANIZED HEALTH CARE EDUCATION/TRAINING PROGRAM

## 2022-10-14 PROCEDURE — 84132 ASSAY OF SERUM POTASSIUM: CPT | Performed by: STUDENT IN AN ORGANIZED HEALTH CARE EDUCATION/TRAINING PROGRAM

## 2022-10-14 PROCEDURE — 94640 AIRWAY INHALATION TREATMENT: CPT

## 2022-10-14 PROCEDURE — 250N000012 HC RX MED GY IP 250 OP 636 PS 637: Performed by: STUDENT IN AN ORGANIZED HEALTH CARE EDUCATION/TRAINING PROGRAM

## 2022-10-14 PROCEDURE — 250N000009 HC RX 250: Performed by: STUDENT IN AN ORGANIZED HEALTH CARE EDUCATION/TRAINING PROGRAM

## 2022-10-14 PROCEDURE — 36415 COLL VENOUS BLD VENIPUNCTURE: CPT | Performed by: EMERGENCY MEDICINE

## 2022-10-14 PROCEDURE — 258N000003 HC RX IP 258 OP 636: Performed by: EMERGENCY MEDICINE

## 2022-10-14 PROCEDURE — 258N000003 HC RX IP 258 OP 636: Performed by: STUDENT IN AN ORGANIZED HEALTH CARE EDUCATION/TRAINING PROGRAM

## 2022-10-14 PROCEDURE — 250N000011 HC RX IP 250 OP 636: Performed by: STUDENT IN AN ORGANIZED HEALTH CARE EDUCATION/TRAINING PROGRAM

## 2022-10-14 PROCEDURE — 99223 1ST HOSP IP/OBS HIGH 75: CPT | Performed by: STUDENT IN AN ORGANIZED HEALTH CARE EDUCATION/TRAINING PROGRAM

## 2022-10-14 PROCEDURE — 84443 ASSAY THYROID STIM HORMONE: CPT | Performed by: STUDENT IN AN ORGANIZED HEALTH CARE EDUCATION/TRAINING PROGRAM

## 2022-10-14 PROCEDURE — 120N000001 HC R&B MED SURG/OB

## 2022-10-14 PROCEDURE — 82310 ASSAY OF CALCIUM: CPT | Performed by: EMERGENCY MEDICINE

## 2022-10-14 PROCEDURE — 93010 ELECTROCARDIOGRAM REPORT: CPT | Performed by: INTERNAL MEDICINE

## 2022-10-14 PROCEDURE — 93005 ELECTROCARDIOGRAM TRACING: CPT

## 2022-10-14 PROCEDURE — 36415 COLL VENOUS BLD VENIPUNCTURE: CPT | Performed by: STUDENT IN AN ORGANIZED HEALTH CARE EDUCATION/TRAINING PROGRAM

## 2022-10-14 PROCEDURE — 83036 HEMOGLOBIN GLYCOSYLATED A1C: CPT | Performed by: STUDENT IN AN ORGANIZED HEALTH CARE EDUCATION/TRAINING PROGRAM

## 2022-10-14 PROCEDURE — 84484 ASSAY OF TROPONIN QUANT: CPT | Performed by: STUDENT IN AN ORGANIZED HEALTH CARE EDUCATION/TRAINING PROGRAM

## 2022-10-14 PROCEDURE — 999N000157 HC STATISTIC RCP TIME EA 10 MIN

## 2022-10-14 PROCEDURE — 84484 ASSAY OF TROPONIN QUANT: CPT | Performed by: EMERGENCY MEDICINE

## 2022-10-14 PROCEDURE — 94640 AIRWAY INHALATION TREATMENT: CPT | Mod: 76

## 2022-10-14 RX ORDER — ONDANSETRON 2 MG/ML
4 INJECTION INTRAMUSCULAR; INTRAVENOUS EVERY 6 HOURS PRN
Status: DISCONTINUED | OUTPATIENT
Start: 2022-10-14 | End: 2022-10-15 | Stop reason: HOSPADM

## 2022-10-14 RX ORDER — CEFTRIAXONE SODIUM 1 G/50ML
1 INJECTION, SOLUTION INTRAVENOUS EVERY 24 HOURS
Status: DISCONTINUED | OUTPATIENT
Start: 2022-10-14 | End: 2022-10-15 | Stop reason: HOSPADM

## 2022-10-14 RX ORDER — AZITHROMYCIN 250 MG/1
250 TABLET, FILM COATED ORAL ONCE
Status: DISCONTINUED | OUTPATIENT
Start: 2022-10-14 | End: 2022-10-14

## 2022-10-14 RX ORDER — NALOXONE HYDROCHLORIDE 0.4 MG/ML
0.4 INJECTION, SOLUTION INTRAMUSCULAR; INTRAVENOUS; SUBCUTANEOUS
Status: DISCONTINUED | OUTPATIENT
Start: 2022-10-14 | End: 2022-10-15 | Stop reason: HOSPADM

## 2022-10-14 RX ORDER — MULTIVIT-MIN/IRON/FOLIC ACID/K 18-600-40
1 CAPSULE ORAL DAILY
COMMUNITY

## 2022-10-14 RX ORDER — NALOXONE HYDROCHLORIDE 0.4 MG/ML
0.2 INJECTION, SOLUTION INTRAMUSCULAR; INTRAVENOUS; SUBCUTANEOUS
Status: DISCONTINUED | OUTPATIENT
Start: 2022-10-14 | End: 2022-10-15 | Stop reason: HOSPADM

## 2022-10-14 RX ORDER — LISINOPRIL 10 MG/1
10 TABLET ORAL DAILY
COMMUNITY
Start: 2022-09-06 | End: 2024-06-07

## 2022-10-14 RX ORDER — PROCHLORPERAZINE 25 MG
12.5 SUPPOSITORY, RECTAL RECTAL EVERY 12 HOURS PRN
Status: DISCONTINUED | OUTPATIENT
Start: 2022-10-14 | End: 2022-10-15 | Stop reason: HOSPADM

## 2022-10-14 RX ORDER — ACETAMINOPHEN 325 MG/1
650 TABLET ORAL EVERY 6 HOURS PRN
Status: DISCONTINUED | OUTPATIENT
Start: 2022-10-14 | End: 2022-10-15 | Stop reason: HOSPADM

## 2022-10-14 RX ORDER — TRAMADOL HYDROCHLORIDE 50 MG/1
50 TABLET ORAL DAILY
Status: DISCONTINUED | OUTPATIENT
Start: 2022-10-14 | End: 2022-10-15 | Stop reason: HOSPADM

## 2022-10-14 RX ORDER — ASPIRIN 81 MG/1
81 TABLET, CHEWABLE ORAL DAILY
Status: DISCONTINUED | OUTPATIENT
Start: 2022-10-14 | End: 2022-10-15 | Stop reason: HOSPADM

## 2022-10-14 RX ORDER — FORMOTEROL FUMARATE DIHYDRATE 20 UG/2ML
2 SOLUTION RESPIRATORY (INHALATION) 2 TIMES DAILY
Status: DISCONTINUED | OUTPATIENT
Start: 2022-10-14 | End: 2022-10-15 | Stop reason: HOSPADM

## 2022-10-14 RX ORDER — LIDOCAINE 40 MG/G
CREAM TOPICAL
Status: DISCONTINUED | OUTPATIENT
Start: 2022-10-14 | End: 2022-10-15 | Stop reason: HOSPADM

## 2022-10-14 RX ORDER — ATORVASTATIN CALCIUM 10 MG/1
20 TABLET, FILM COATED ORAL AT BEDTIME
Status: DISCONTINUED | OUTPATIENT
Start: 2022-10-14 | End: 2022-10-15 | Stop reason: HOSPADM

## 2022-10-14 RX ORDER — MONTELUKAST SODIUM 5 MG/1
10 TABLET, CHEWABLE ORAL AT BEDTIME
Status: DISCONTINUED | OUTPATIENT
Start: 2022-10-14 | End: 2022-10-15 | Stop reason: HOSPADM

## 2022-10-14 RX ORDER — FOLIC ACID 1 MG/1
1 TABLET ORAL DAILY
Status: DISCONTINUED | OUTPATIENT
Start: 2022-10-14 | End: 2022-10-15 | Stop reason: HOSPADM

## 2022-10-14 RX ORDER — POLYETHYLENE GLYCOL 3350 17 G/17G
17 POWDER, FOR SOLUTION ORAL DAILY PRN
Status: DISCONTINUED | OUTPATIENT
Start: 2022-10-14 | End: 2022-10-15 | Stop reason: HOSPADM

## 2022-10-14 RX ORDER — ATOVAQUONE 750 MG/5ML
1500 SUSPENSION ORAL DAILY
Status: DISCONTINUED | OUTPATIENT
Start: 2022-10-14 | End: 2022-10-15 | Stop reason: HOSPADM

## 2022-10-14 RX ORDER — ACETAMINOPHEN 325 MG/1
650 TABLET ORAL EVERY 4 HOURS PRN
Status: DISCONTINUED | OUTPATIENT
Start: 2022-10-14 | End: 2022-10-14

## 2022-10-14 RX ORDER — AZITHROMYCIN 250 MG/1
250 TABLET, FILM COATED ORAL DAILY
Status: DISCONTINUED | OUTPATIENT
Start: 2022-10-14 | End: 2022-10-15 | Stop reason: HOSPADM

## 2022-10-14 RX ORDER — NICOTINE POLACRILEX 4 MG
15-30 LOZENGE BUCCAL
Status: DISCONTINUED | OUTPATIENT
Start: 2022-10-14 | End: 2022-10-15 | Stop reason: HOSPADM

## 2022-10-14 RX ORDER — BUDESONIDE 0.5 MG/2ML
0.5 INHALANT ORAL 2 TIMES DAILY
Status: DISCONTINUED | OUTPATIENT
Start: 2022-10-14 | End: 2022-10-15 | Stop reason: HOSPADM

## 2022-10-14 RX ORDER — CEFTRIAXONE SODIUM 1 G/50ML
1 INJECTION, SOLUTION INTRAVENOUS ONCE
Status: DISCONTINUED | OUTPATIENT
Start: 2022-10-14 | End: 2022-10-14

## 2022-10-14 RX ORDER — SODIUM CHLORIDE 9 MG/ML
INJECTION, SOLUTION INTRAVENOUS CONTINUOUS
Status: DISCONTINUED | OUTPATIENT
Start: 2022-10-14 | End: 2022-10-15

## 2022-10-14 RX ORDER — SODIUM CHLORIDE 9 MG/ML
INJECTION, SOLUTION INTRAVENOUS CONTINUOUS
Status: DISCONTINUED | OUTPATIENT
Start: 2022-10-14 | End: 2022-10-14

## 2022-10-14 RX ORDER — PROCHLORPERAZINE MALEATE 5 MG
5 TABLET ORAL EVERY 6 HOURS PRN
Status: DISCONTINUED | OUTPATIENT
Start: 2022-10-14 | End: 2022-10-15 | Stop reason: HOSPADM

## 2022-10-14 RX ORDER — PREDNISONE 20 MG/1
40 TABLET ORAL DAILY
Status: DISCONTINUED | OUTPATIENT
Start: 2022-10-14 | End: 2022-10-15 | Stop reason: HOSPADM

## 2022-10-14 RX ORDER — ALBUTEROL SULFATE 0.83 MG/ML
2.5 SOLUTION RESPIRATORY (INHALATION) EVERY 4 HOURS PRN
Status: DISCONTINUED | OUTPATIENT
Start: 2022-10-14 | End: 2022-10-15 | Stop reason: HOSPADM

## 2022-10-14 RX ORDER — ACETAMINOPHEN 650 MG/1
650 SUPPOSITORY RECTAL EVERY 6 HOURS PRN
Status: DISCONTINUED | OUTPATIENT
Start: 2022-10-14 | End: 2022-10-15 | Stop reason: HOSPADM

## 2022-10-14 RX ORDER — DEXTROSE MONOHYDRATE 25 G/50ML
25-50 INJECTION, SOLUTION INTRAVENOUS
Status: DISCONTINUED | OUTPATIENT
Start: 2022-10-14 | End: 2022-10-15 | Stop reason: HOSPADM

## 2022-10-14 RX ORDER — ONDANSETRON 4 MG/1
4 TABLET, ORALLY DISINTEGRATING ORAL EVERY 6 HOURS PRN
Status: DISCONTINUED | OUTPATIENT
Start: 2022-10-14 | End: 2022-10-15 | Stop reason: HOSPADM

## 2022-10-14 RX ORDER — LEVOTHYROXINE SODIUM 100 UG/1
100 TABLET ORAL DAILY
Status: DISCONTINUED | OUTPATIENT
Start: 2022-10-14 | End: 2022-10-15 | Stop reason: HOSPADM

## 2022-10-14 RX ORDER — PANTOPRAZOLE SODIUM 40 MG/1
40 TABLET, DELAYED RELEASE ORAL DAILY
Status: DISCONTINUED | OUTPATIENT
Start: 2022-10-14 | End: 2022-10-15 | Stop reason: HOSPADM

## 2022-10-14 RX ADMIN — ATORVASTATIN CALCIUM 20 MG: 10 TABLET, FILM COATED ORAL at 21:19

## 2022-10-14 RX ADMIN — SODIUM CHLORIDE: 9 INJECTION, SOLUTION INTRAVENOUS at 12:18

## 2022-10-14 RX ADMIN — BUDESONIDE INHALATION 0.5 MG: 0.5 SUSPENSION RESPIRATORY (INHALATION) at 18:27

## 2022-10-14 RX ADMIN — FOLIC ACID 1 MG: 1 TABLET ORAL at 12:14

## 2022-10-14 RX ADMIN — SODIUM ZIRCONIUM CYCLOSILICATE 5 G: 5 POWDER, FOR SUSPENSION ORAL at 08:26

## 2022-10-14 RX ADMIN — SODIUM ZIRCONIUM CYCLOSILICATE 5 G: 5 POWDER, FOR SUSPENSION ORAL at 21:18

## 2022-10-14 RX ADMIN — ACETAMINOPHEN 650 MG: 325 TABLET, FILM COATED ORAL at 21:24

## 2022-10-14 RX ADMIN — ASPIRIN 81 MG 81 MG: 81 TABLET ORAL at 12:14

## 2022-10-14 RX ADMIN — SODIUM CHLORIDE: 9 INJECTION, SOLUTION INTRAVENOUS at 04:33

## 2022-10-14 RX ADMIN — MONTELUKAST SODIUM 10 MG: 5 TABLET, CHEWABLE ORAL at 21:18

## 2022-10-14 RX ADMIN — UMECLIDINIUM 1 PUFF: 62.5 AEROSOL, POWDER ORAL at 11:30

## 2022-10-14 RX ADMIN — SODIUM CHLORIDE: 9 INJECTION, SOLUTION INTRAVENOUS at 01:35

## 2022-10-14 RX ADMIN — LEVOTHYROXINE SODIUM 100 MCG: 0.1 TABLET ORAL at 12:14

## 2022-10-14 RX ADMIN — FORMOTEROL FUMARATE DIHYDRATE 20 MCG: 20 SOLUTION RESPIRATORY (INHALATION) at 18:27

## 2022-10-14 RX ADMIN — INSULIN ASPART 15 UNITS: 100 INJECTION, SUSPENSION SUBCUTANEOUS at 18:19

## 2022-10-14 RX ADMIN — PANTOPRAZOLE SODIUM 40 MG: 40 TABLET, DELAYED RELEASE ORAL at 12:14

## 2022-10-14 RX ADMIN — PREDNISONE 40 MG: 20 TABLET ORAL at 14:43

## 2022-10-14 RX ADMIN — AZITHROMYCIN MONOHYDRATE 250 MG: 250 TABLET ORAL at 21:19

## 2022-10-14 RX ADMIN — ATOVAQUONE 1500 MG: 750 SUSPENSION ORAL at 15:00

## 2022-10-14 RX ADMIN — TRAMADOL HYDROCHLORIDE 50 MG: 50 TABLET, COATED ORAL at 12:14

## 2022-10-14 RX ADMIN — INSULIN ASPART 3 UNITS: 100 INJECTION, SOLUTION INTRAVENOUS; SUBCUTANEOUS at 21:33

## 2022-10-14 RX ADMIN — SODIUM ZIRCONIUM CYCLOSILICATE 5 G: 5 POWDER, FOR SUSPENSION ORAL at 17:21

## 2022-10-14 RX ADMIN — CEFTRIAXONE SODIUM 1 G: 1 INJECTION, SOLUTION INTRAVENOUS at 21:19

## 2022-10-14 ASSESSMENT — ACTIVITIES OF DAILY LIVING (ADL)
ADLS_ACUITY_SCORE: 35
ADLS_ACUITY_SCORE: 34
ADLS_ACUITY_SCORE: 39
ADLS_ACUITY_SCORE: 35
ADLS_ACUITY_SCORE: 34
ADLS_ACUITY_SCORE: 32
ADLS_ACUITY_SCORE: 35
ADLS_ACUITY_SCORE: 34
ADLS_ACUITY_SCORE: 35
ADLS_ACUITY_SCORE: 35

## 2022-10-14 NOTE — H&P
[facility]    History and Physical - Hospitalist Service       Date of Admission:  10/13/2022    Assessment & Plan      Laura Perez is a 72 year old woman with a past medical history of rheumatoid arthritis, COPD, diastolic heart failure, CKD stage III, anemia, hyperlipidemia, hypothyroidism, type 2 diabetes and amyloidosis who was admitted on 10/13/2022 secondary to weakness and hyperkalemia likely due to dietary indiscretion with her CKD.    Principal Problem:    Hyperkalemia  CKD stage III:  SEAN  Hyponatremia    Assessment: Potassium peaked at 6.0, likely secondary to her chronic kidney disease and dietary indiscretion with excessive potassium intake in the form of bananas and tomato juice recently.  Otherwise has dehydration and SEAN with abdomen creatinine of 1.91.  No peaked T waves on EKG.  Limit potassium intake, consult dietitian, IV fluids.  If does not respond to this and Lokelma will need to diuresis as well.    Plan:   -Admit to inpatient  -Recheck potassium now, trend every 8 hours  -Lokelma 3 times daily with meals  -Normal saline 100 mL/h  -If potassium not downtrending will add 1 L fluid bolus, furosemide 40 mg ID and consider shifting with D50 and insulin.    Active Problems:        Pneumonia due to infectious organism, unspecified laterality, unspecified part of lung  COPD (chronic obstructive pulmonary disease)    Assessment: Likely CAP. Treat as COPD Exacerbation as feeling SOB as well. Day 2 of Abx.   -Prednisone 40mg daily   -ceftriaxone 1g daily  -azithromycin 250mg daily      Troponin elevation  Likely from demand ischemia. Trend one time.         Diabetes mellitus type 2, insulin dependent (H)    Assessment: Prior to admission on NovoLog Mix 70/30 40 units in the morning, 20 units in the evening.  A1c of 6.9 on admission indicates excellent control.  May have worsening blood sugars in the setting of steroids for COPD exacerbation    Plan:   -NovoLog Mix 35 units a.m.,  15 units with supper  -4 times daily AC and at bedtime Gluc checks  -Medium sliding scale        HTN (hypertension)    Assessment: Prior to admission on lisinopril 10 mg daily.  Hold in setting of hyperkalemia.  Blood pressure under good control today.    Plan:   -Hold lisinopril 10 mg daily      Hyperlipidemia    Assessment: Continue home atorvastatin        Hypothyroidism    Assessment: Most recent TSH on file was from 2015.  Follows with outside endocrinology.  Recheck TSH in a.m.    Plan:   -TSH a.m.  -Continue home levothyroxine 100 mcg daily      Seropositive rheumatoid arthritis (H)    Other amyloidosis (H)    Assessment: Hold home biologic and methotrexate.  Continue home atovaquone.      Anemia of chronic kidney failure, stage 3 (moderate) (H)    Assessment: Hemoglobin at 11.2 on admission, likely contributed by her CKD stage III and her methotrexate use.  Continue to follow as indicated      Chronic diastolic CHF (congestive heart failure), NYHA class 2 (H)    Assessment: Chronic, not on diuretics.  Blood pressure under good control today.       Diet: Combination Diet Regular Diet Adult    DVT Prophylaxis: Pneumatic Compression Devices  Farr Catheter: Not present  Central Lines: None  Cardiac Monitoring: None  Code Status: Full Code      Clinically Significant Risk Factors Present on Admission        # Hyperkalemia: K = 6.0 mmol/L (Ref range: 3.5 - 5.1 mmol/L) on admission, will monitor as appropriate  # Hyponatremia: Na = 131 mmol/L (Ref range: 134 - 144 mmol/L) on admission, will monitor as appropriate  # Hypocalcemia: Ca = 8.2 mg/dL (Ref range: 8.6 - 10.3 mg/dL) and/or iCa = N/A on admission, will replace as needed        # Hypertension: home medication list includes antihypertensive(s)          Disposition Plan       Needs resolution of her hyperkalemia, likely 2-3 more days in the hospital      The patient's care was discussed with the Care Coordinator/, Patient and Patient's  Family.    Neil Chinchilla MD  Hospitalist Service  [facility]  Securely message with the i[employer] Web Console (learn more here)  Text page via [facility] Paging/Directory         ______________________________________________________________________    Chief Complaint   Weakness    History is obtained from the patient  Patient and her     History of Present Illness   Laura Perez is a 72 year old woman with a past medical history of rheumatoid arthritis, COPD, diastolic heart failure, CKD stage III, anemia, hyperlipidemia, hypothyroidism, type 2 diabetes and amyloidosis who was admitted on 10/13/2022 secondary to weakness and hyperkalemia likely due to dietary indiscretion with her CKD..    The weekend prior to admission the patient was visiting friends.  On the Monday after she started to develop more and more weakness and felt like she wanted to sleep all day long.  She states that her arthritis is also been bothering her more recently and that her breathing has progressively gotten worse.  She also endorses a history of amyloidosis and has CKD as a result.  She states she presented to the ED due to excessive fatigue and was wondering if she had developed pneumonia.  She denies any chest pain.  She has some mild shortness of breath.    The patient endorses limited fruit intake as a cause of her diarrhea but does endorse eating bananas fairly frequently.  She also states that she drinks 1 to 2 cans of tomato juice every day.  She is due to follow-up with her nephrologist in the near future.      Review of Systems    The 10 point Review of Systems is negative other than noted in the HPI or here.     Past Medical History    I have reviewed this patient's medical history and updated it with pertinent information if needed.   Past Medical History:   Diagnosis Date     Amyloidosis (H)      Chronic kidney disease (CKD), stage III (moderate) (H)      Chronic obstructive pulmonary disease (H)     No  Comments Provided     Diverticulosis of intestine without perforation or abscess without bleeding     Diffuse diverticulosis and history of diminutive hyperplastic colon     Essential (primary) hypertension     No Comments Provided     Hyperlipidemia     No Comments Provided     Hypothyroidism     No Comments Provided     Malignant neoplasm of colon (H)     2017     Osteoporosis     On Prolia     Rheumatoid arthritis (H)     Rheumatologist Dr. Gonzalez, [phone], fax [phone]     Rosacea     No Comments Provided     Type 2 diabetes mellitus without complications (H)     Diabetes Mellitus, prednisone induced       Past Surgical History   I have reviewed this patient's surgical history and updated it with pertinent information if needed.  Past Surgical History:   Procedure Laterality Date     ARTHROPLASTY,Right MTP 1 and 5 and left MTP 5  2001     CATARACT IOL, RT/LT Bilateral      COLON SURGERY Right 2017    For colon cancer     COLONOSCOPY  2008     COLONOSCOPY  2018    F/U in 5 years recommended secondary to polyps     LAPAROSCOPIC TUBAL LIGATION      No Comments Provided     Left MCP joint arthroplasty  1998     MTP 2, 3, 4 right foot  2000     RELEASE CARPAL TUNNEL Left      Right long finger MCP repair      [place]     Right MCP 4 and 5 arthroplasty  2004     Right wrist arthroplasty         Social History   I have reviewed this patient's social history and updated it with pertinent information if needed.  Social History     Tobacco Use     Smoking status: Former     Packs/day: 1.00     Years: 30.00     Pack years: 30.00     Types: Cigarettes     Quit date: 2005     Years since quittin.1     Smokeless tobacco: Never   Vaping Use     Vaping Use: Never used   Substance Use Topics     Alcohol use: Yes     Alcohol/week: 2.5 standard drinks     Comment: Rare     Drug use: Yes     Comment: Medical marijuana       Family History   I have reviewed this  "patient's family history and updated it with pertinent information if needed.  Family History   Problem Relation Age of Onset     Cancer Mother 50        uterus     Heart Disease Mother         MI     Colon Cancer Mother      Heart Disease Father         MI     Chronic Obstructive Pulmonary Disease Sister      Other - See Comments Sister         rubina dow     Colon Cancer Brother      Breast Cancer No family hx of         Cancer-breast       Prior to Admission Medications   Prior to Admission Medications   Prescriptions Last Dose Informant Patient Reported? Taking?   Abatacept (ORENCIA) 125 MG/ML SOAJ auto-injector 10/11/2022 at  Spouse/Significant Other, Self Yes No   Sig: Inject 1 mL (125 mg) Subcutaneous once a week   BD PEN NEEDLE LUCILLE 2ND GEN 32G X 4 MM miscellaneous Unknown at  Self No Yes   Sig: USE TO INJECT INSULIN TWICE DAILY   EPINEPHrine (EPIPEN/ADRENACLICK/OR ANY BX GENERIC EQUIV) 0.3 MG/0.3ML injection 2-pack not in awhile at  Spouse/Significant Other, Self No No   Sig: Inject 0.3 mLs (0.3 mg) into the muscle once as needed   Lancets (ONETOUCH DELICA PLUS [id number]) MISC Unknown at  Self No Yes   Si each 2 times daily   Methotrexate 25 MG/ML SOSY 10/11/2022 at na Self Yes Yes   Sig: Inject 10 mg Subcutaneous once a week   Needle, Disp, (HYPODERMIC NEEDLE) 27G X 1/2\" MISC Unknown at  Self Yes Yes   Sig: For methotrexate injections.   Vitamin D, Cholecalciferol, 25 MCG (1000 UT) TABS 10/12/2022 at am Self Yes Yes   Sig: Take 1 tablet by mouth daily   albuterol (PROAIR HFA/PROVENTIL HFA/VENTOLIN HFA) 108 (90 Base) MCG/ACT inhaler Past Week Self No Yes   Sig: INHALE 1-2 PUFFS BY MOUTH EVERY 4-6 HOURS AS NEEDED FOR SHORTNESS OF BREATH OR WHEEZING   aspirin 81 MG chewable tablet 10/12/2022 at am Spouse/Significant Other, Self Yes No   Sig: Take 81 mg by mouth daily with food   atovaquone (MEPRON) 750 MG/5ML suspension 10/12/2022 at am Spouse/Significant Other, Self Yes No   Sig: Take 1,500 " mg by mouth daily   blood glucose (ONETOUCH VERIO IQ) test strip Unknown at na Self No Yes   Sig: USE TO TEST BLOOD SUGAR TWICE DAILY OR AS DIRECTED   budesonide (PULMICORT) 0.5 MG/2ML neb solution 10/13/2022 at am Self No Yes   Sig: INHALE CONTENTS OF 1 VIAL(2 ML) VIA NEBULIZER TWICE DAILY   cyanocobalamin (VITAMIN B-12) 1000 MCG tablet 10/12/2022 at am Self No No   Sig: Take 1 tablet (1,000 mcg) by mouth daily   estradiol (VAGIFEM) 10 MCG TABS vaginal tablet 10/10/2022 at pm Self No No   Sig: INSERT 1 TABLET VAGINALLY 2 TIMES A WEEK   folic acid (FOLVITE) 1 MG tablet 10/12/2022 at am Self No No   Sig: Take 1 tablet (1 mg) by mouth daily   formoterol (PERFOROMIST) 20 MCG/2ML neb solution 10/13/2022 at am Spouse/Significant Other, Self No Yes   Sig: Take 2 mLs (20 mcg) by nebulization 2 times daily   glucagon 1 MG kit never used at na Spouse/Significant Other, Self Yes No   Sig: Inject 1 mg into the muscle as needed for low blood sugar    glucose 4 g CHEW chewable tablet More than a month at na Spouse/Significant Other, Self Yes Yes   Sig: Take 1 tablet by mouth as needed for low blood sugar   insulin aspart prot & aspart (NOVOLOG MIX 70/30 FLEXPEN) (70-30) 100 UNIT/ML pen 10/12/2022 at pm Self Yes No   Sig: INJECT 40 UNITS EVERY MORNING, AND 20 UNITS EVERY EVENING   levothyroxine (SYNTHROID/LEVOTHROID) 100 MCG tablet 10/12/2022 at am Self Yes No   Sig: Take 1 tablet (100 mcg) by mouth daily   lisinopril (ZESTRIL) 10 MG tablet 10/12/2022 at am Self Yes No   Sig: Take 10 mg by mouth daily   montelukast (SINGULAIR) 10 MG tablet 10/12/2022 at pm Spouse/Significant Other, Self No No   Sig: Take 1 tablet (10 mg) by mouth At Bedtime   omeprazole (PRILOSEC) 20 MG DR capsule 10/12/2022 at am Self No No   Sig: Take 1 capsule (20 mg) by mouth daily   predniSONE (DELTASONE) 1 MG tablet 10/12/2022 at am Self Yes No   Sig: Take 2 mg by mouth daily - take with the 5 mg tablet for a total dose of 7 mg   predniSONE (DELTASONE) 5 MG  tablet 10/12/2022 at am Self Yes No   Sig: Take 5 mg by mouth daily with food - take with two of the 1 mg tablets for a total dose of 7 mg   simvastatin (ZOCOR) 40 MG tablet 10/12/2022 at pm Self No No   Sig: TAKE 1 TABLET(40 MG) BY MOUTH AT BEDTIME   tiotropium (SPIRIVA) 18 MCG inhaled capsule 10/12/2022 at am Spouse/Significant Other, Self Yes No   Sig: Inhale 1 capsule (18 mcg) into the lungs daily   traMADol (ULTRAM) 50 MG tablet 10/12/2022 at pm Self No No   Sig: TAKE 1 TABLET(50 MG) BY MOUTH DAILY      Facility-Administered Medications: None     Allergies   Allergies   Allergen Reactions     Glyburide Anaphylaxis     Other reaction(s): Dizziness     Mosquitoes (Informational Only) Hives     Other [Seasonal Allergies] Hives     Deer Flies     Sulfa Drugs Anaphylaxis and Hives     Sulfamethoxazole W/Trimethoprim Hives and Itching     Sulfamethoxazole-Trimethoprim Anaphylaxis     Other reaction(s): Other - Describe In Comment Field  Rash, nausea, dizziness     Codeine Nausea and Nausea and Vomiting       Physical Exam   Vital Signs: Temp: 97.4  F (36.3  C) Temp src: Temporal BP: 118/54 Pulse: 74   Resp: 20 SpO2: 96 % O2 Device: None (Room air) Oxygen Delivery: 1 LPM  Weight: 140 lbs 11.2 oz    GENERAL: Pleasant 69-year-old woman covered by her  no acute distress  EYES:External eyes and pupil reactions were normal.  HEAD, EARS, NOSE, MOUTH, AND THROAT: Head and facial appearance were normal. Hearing was normal to voice and ear appearance was normal. Nose appearance was normal and there was no discharge. Oropharynx was normal.  NECK: Neck appearance was normal and there were no neck masses.  RESPIRATORY: Unlabored breathing, diminished in bilateral bases  CARDIOVASCULAR: regular rate and rhythm, no murmur, rubs or peripheral edema appreciated   GASTROINTESTINAL: The abdomen was normal in contour.  There was no mass, organ enlargement, or tenderness.  MUSCULOSKELETAL: Chronic: Radiation of bilateral  hands  LYMPHATIC: There were no enlarged cervical nodes.  SKIN/HAIR/NAILS: Skin color was normal and there were no important skin lesions.  Hair and nails were normal.  NEUROLOGIC: The patient was alert and fully oriented. Speech was normal. Cranial nerves (2, 3, 4, 6, 7, 9, 10, 12) were normal. Extremity motion was normal and symmetric.  PSYCHIATRIC:  Mood and affect were normal and the patient had normal recent and remote memory. The patient's judgment and insight were normal.      Data   Data reviewed today: I reviewed all medications, new labs and imaging results over the last 24 hours. I personally reviewed the EKG tracing showing No peaked T waves..    Recent Labs   Lab 10/14/22  1214 10/14/22  0712 10/14/22  0619 10/13/22  2223 10/13/22  1942   WBC  --   --   --   --  23.7*   HGB  --   --   --   --  11.2*   MCV  --   --   --   --  97   PLT  --   --   --   --  189   NA  --   --  131*  --  133*   POTASSIUM  --   --  6.0* 5.2* 5.2*   CHLORIDE  --   --  103  --  102   CO2  --   --  19*  --  21   BUN  --   --  34*  --  29*   CR  --   --  1.91*  --  1.83*   ANIONGAP  --   --  9  --  10   GIANNI  --   --  8.2*  --  9.1   * 311* 334*  --  188*   ALBUMIN  --   --   --   --  3.5   PROTTOTAL  --   --   --   --  6.6   BILITOTAL  --   --   --   --  1.5*   ALKPHOS  --   --   --   --  53   ALT  --   --   --   --  19   AST  --   --   --   --  19

## 2022-10-14 NOTE — PROGRESS NOTES
[facility] ADMISSION NOTE    Patient admitted to room 331 at approximately 0100 via cart from emergency room. Patient was accompanied by transport tech.     Verbal SBAR report received from ML Calles prior to patient arrival.     Patient ambulated to bed with stand-by assist. Patient alert and oriented X 3. Pain is controlled without any medications.  . Admission vital signs: Blood pressure 124/50, pulse 77, temperature 97.1  F (36.2  C), temperature source Tympanic, resp. rate 24, weight 63.8 kg (140 lb 11.2 oz), SpO2 97 %, not currently breastfeeding. Patient was oriented to plan of care, call light, bed controls, tv, telephone, bathroom and visiting hours.     Risk Assessment    The following safety risks were identified during admission: fall. Yellow risk band applied: YES.       Elvia South RN

## 2022-10-14 NOTE — ED PROVIDER NOTES
History     Chief Complaint   Patient presents with     Fever     HPI  Laura Perez is a 72 year old female who who presents today with complaints of a fever, generalized malaise and not feeling quite right.  Patient also states that recently she has been exposed to possibly someone with COVID.  Patient has a history of rheumatoid arthritis has been on immunosuppressive medications in the past and has a history of pneumonia.  Patient denies any abdominal pain or chest pain.  No additional complaints.    Allergies:  Allergies   Allergen Reactions     Glyburide Anaphylaxis     Other reaction(s): Dizziness     Mosquitoes (Informational Only) Hives     Other [Seasonal Allergies] Hives     Deer Flies     Sulfa Drugs Anaphylaxis and Hives     Sulfamethoxazole W/Trimethoprim Hives and Itching     Sulfamethoxazole-Trimethoprim Anaphylaxis     Other reaction(s): Other - Describe In Comment Field  Rash, nausea, dizziness     Codeine Nausea and Nausea and Vomiting       Problem List:    Patient Active Problem List    Diagnosis Date Noted     Other amyloidosis (H) 11/07/2018     Priority: Medium     Avascular necrosis of left talus (H) 06/16/2018     Priority: Medium     Recurrent pneumonia 06/15/2018     Priority: Medium     Acne rosacea 01/26/2018     Priority: Medium     History of colonic polyps 01/26/2018     Priority: Medium     Overview:   diminutive at 25 cm       Hyperlipidemia 01/26/2018     Priority: Medium     Hypothyroidism 01/26/2018     Priority: Medium     Migraine headache 01/26/2018     Priority: Medium     Disorder of bone and cartilage 01/26/2018     Priority: Medium     Overview:   2007       Popliteal cyst, left 01/26/2018     Priority: Medium     Chronic kidney disease (CKD), stage III (moderate) (H) 12/11/2017     Priority: Medium     HTN (hypertension) 11/29/2017     Priority: Medium     Osteoporosis 11/27/2017     Priority: Medium     Malignant tumor of colon (H) 11/26/2017     Priority: Medium      Anemia of chronic kidney failure, stage 3 (moderate) (H) 07/28/2017     Priority: Medium     Diabetes mellitus type 2, insulin dependent (H) 07/28/2017     Priority: Medium     Chronic diastolic CHF (congestive heart failure), NYHA class 2 (H) 02/13/2016     Priority: Medium     Non-rheumatic mitral valve stenosis 02/13/2016     Priority: Medium     On prednisone therapy 01/15/2016     Priority: Medium     Orthostatic hypotension 01/15/2016     Priority: Medium     Secondary adrenal insufficiency (H) 01/15/2016     Priority: Medium     Seropositive rheumatoid arthritis (H) 01/15/2016     Priority: Medium     Lichen sclerosus et atrophicus 03/05/2015     Priority: Medium     COPD (chronic obstructive pulmonary disease) (H) 09/12/2014     Priority: Medium        Past Medical History:    Past Medical History:   Diagnosis Date     Amyloidosis (H)      Chronic kidney disease (CKD), stage III (moderate) (H)      Chronic obstructive pulmonary disease (H)      Diverticulosis of intestine without perforation or abscess without bleeding      Essential (primary) hypertension      Hyperlipidemia      Hypothyroidism      Malignant neoplasm of colon (H)      Osteoporosis      Rheumatoid arthritis (H)      Rosacea      Type 2 diabetes mellitus without complications (H)        Past Surgical History:    Past Surgical History:   Procedure Laterality Date     ARTHROPLASTY,Right MTP 1 and 5 and left MTP 5  01/2001     CATARACT IOL, RT/LT Bilateral      COLON SURGERY Right 09/2017    For colon cancer     COLONOSCOPY  09/29/2008     COLONOSCOPY  2018    F/U in 5 years recommended secondary to polyps     LAPAROSCOPIC TUBAL LIGATION      No Comments Provided     Left MCP joint arthroplasty  02/05/1998     MTP 2, 3, 4 right foot  05/2000     RELEASE CARPAL TUNNEL Left 1991     Right long finger MCP repair  1996    [place]     Right MCP 4 and 5 arthroplasty  03/01/2004     Right wrist arthroplasty  1990       Family History:    Family  History   Problem Relation Age of Onset     Cancer Mother 50        uterus     Heart Disease Mother         MI     Colon Cancer Mother      Heart Disease Father         MI     Chronic Obstructive Pulmonary Disease Sister      Other - See Comments Sister         rubina dow     Colon Cancer Brother      Breast Cancer No family hx of         Cancer-breast       Social History:  Marital Status:   [2]  Social History     Tobacco Use     Smoking status: Former     Packs/day: 1.00     Years: 30.00     Pack years: 30.00     Types: Cigarettes     Quit date: 2005     Years since quittin.1     Smokeless tobacco: Never   Vaping Use     Vaping Use: Never used   Substance Use Topics     Alcohol use: Yes     Alcohol/week: 2.5 standard drinks     Comment: Rare     Drug use: Yes     Comment: Medical marijuana        Medications:    Abatacept (ORENCIA) 125 MG/ML SOAJ auto-injector  albuterol (2.5 MG/3ML) 0.083% neb solution  albuterol (PROAIR HFA/PROVENTIL HFA/VENTOLIN HFA) 108 (90 Base) MCG/ACT inhaler  aspirin 81 MG chewable tablet  atovaquone (MEPRON) 750 MG/5ML suspension  BD PEN NEEDLE LUCILLE 2ND GEN 32G X 4 MM miscellaneous  blood glucose (ONETOUCH VERIO IQ) test strip  budesonide (PULMICORT) 0.5 MG/2ML neb solution  cyanocobalamin (VITAMIN B-12) 1000 MCG tablet  EPINEPHrine (EPIPEN/ADRENACLICK/OR ANY BX GENERIC EQUIV) 0.3 MG/0.3ML injection 2-pack  estradiol (VAGIFEM) 10 MCG TABS vaginal tablet  folic acid (FOLVITE) 1 MG tablet  formoterol (PERFOROMIST) 20 MCG/2ML neb solution  GAS-X EXTRA STRENGTH 125 MG chewable tablet  glucagon 1 MG kit  glucose 4 g CHEW chewable tablet  insulin aspart prot & aspart (NOVOLOG MIX 70/30 FLEXPEN) (70-30) 100 UNIT/ML pen  Lancets (ONETOUCH DELICA PLUS [id number]) MISC  levothyroxine (SYNTHROID/LEVOTHROID) 100 MCG tablet  lisinopril (PRINIVIL/ZESTRIL) 5 MG tablet  medical cannabis liquid  METHOTREXate, PF, (OTREXUP) 25 MG/0.4ML pen  montelukast (SINGULAIR) 10 MG tablet  Needle,  "Disp, (HYPODERMIC NEEDLE) 27G X 1/2\" MISC  omeprazole (PRILOSEC) 20 MG DR capsule  predniSONE (DELTASONE) 1 MG tablet  predniSONE (DELTASONE) 5 MG tablet  simvastatin (ZOCOR) 40 MG tablet  tiotropium (SPIRIVA) 18 MCG inhaled capsule  traMADol (ULTRAM) 50 MG tablet          Review of Systems   Constitutional: Negative.  Negative for fever.   HENT: Negative.    Eyes: Negative.    Respiratory: Positive for shortness of breath and wheezing.    Cardiovascular: Negative.    Gastrointestinal: Negative.    Endocrine: Negative.    Genitourinary: Negative.    Musculoskeletal: Negative.  Negative for neck pain.   Skin: Negative.    Allergic/Immunologic: Negative.    Hematological: Negative.    Psychiatric/Behavioral: Negative.        Physical Exam   BP: 120/64  Pulse: 110  Temp: 100.4  F (38  C)  Resp: 22  Weight: 64.4 kg (142 lb)  SpO2: 91 %      Physical Exam  Constitutional:       General: She is not in acute distress.     Appearance: She is normal weight. She is not toxic-appearing.   HENT:      Head: Normocephalic and atraumatic.      Mouth/Throat:      Mouth: Mucous membranes are moist.   Cardiovascular:      Rate and Rhythm: Regular rhythm. Tachycardia present.      Pulses: Normal pulses.   Pulmonary:      Effort: Pulmonary effort is normal.   Musculoskeletal:      Cervical back: Normal range of motion and neck supple.   Skin:     General: Skin is warm.      Capillary Refill: Capillary refill takes less than 2 seconds.   Neurological:      General: No focal deficit present.      Mental Status: She is alert.   Psychiatric:         Mood and Affect: Mood normal.         Behavior: Behavior normal.         ED Course              ED Course as of 10/14/22 0011   Thu Oct 13, 2022   2221 Case discussed with Dr. Guajardo.  He agrees with choice of antibiotics and admission.  Wants to repeat troponin.  If troponin appears to be increasing rapidly, patient should be transferred to a facility that has cardiology capabilities.  " Otherwise patient can be admitted to our hospital.  Repeat troponin and potassium pending     Procedures         EKG - sinus tachycardia with T wave inversions in V2 and questionable ST elevation in lead III.       Results for orders placed or performed during the hospital encounter of 10/13/22 (from the past 24 hour(s))   CBC with platelets differential    Narrative    The following orders were created for panel order CBC with platelets differential.  Procedure                               Abnormality         Status                     ---------                               -----------         ------                     CBC with platelets and d...[[id number]]  Abnormal            Final result                 Please view results for these tests on the individual orders.   Comprehensive metabolic panel   Result Value Ref Range    Sodium 133 (L) 134 - 144 mmol/L    Potassium 5.2 (H) 3.5 - 5.1 mmol/L    Chloride 102 98 - 107 mmol/L    Carbon Dioxide (CO2) 21 21 - 31 mmol/L    Anion Gap 10 3 - 14 mmol/L    Urea Nitrogen 29 (H) 7 - 25 mg/dL    Creatinine 1.83 (H) 0.60 - 1.20 mg/dL    Calcium 9.1 8.6 - 10.3 mg/dL    Glucose 188 (H) 70 - 105 mg/dL    Alkaline Phosphatase 53 34 - 104 U/L    AST 19 13 - 39 U/L    ALT 19 7 - 52 U/L    Protein Total 6.6 6.4 - 8.9 g/dL    Albumin 3.5 3.5 - 5.7 g/dL    Bilirubin Total 1.5 (H) 0.3 - 1.0 mg/dL    GFR Estimate 29 (L) >60 mL/min/1.73m2   Lactic acid whole blood   Result Value Ref Range    Lactic Acid 1.5 0.7 - 2.0 mmol/L   Troponin I   Result Value Ref Range    Troponin I 231.0 (H) 0.0 - 34.0 pg/mL   Nt probnp inpatient (BNP)   Result Value Ref Range    N terminal Pro BNP Inpatient 171 (H) 0 - 100 pg/mL   CBC with platelets and differential   Result Value Ref Range    WBC Count 23.7 (H) 4.0 - 11.0 10e3/uL    RBC Count 3.39 (L) 3.80 - 5.20 10e6/uL    Hemoglobin 11.2 (L) 11.7 - 15.7 g/dL    Hematocrit 32.9 (L) 35.0 - 47.0 %    MCV 97 78 - 100 fL    MCH 33.0 26.5 - 33.0 pg    MCHC  34.0 31.5 - 36.5 g/dL    RDW 14.7 10.0 - 15.0 %    Platelet Count 189 150 - 450 10e3/uL    % Neutrophils 89 %    % Lymphocytes 6 %    % Monocytes 4 %    % Eosinophils 0 %    % Basophils 0 %    % Immature Granulocytes 1 %    NRBCs per 100 WBC 0 <1 /100    Absolute Neutrophils 21.0 (H) 1.6 - 8.3 10e3/uL    Absolute Lymphocytes 1.5 0.8 - 5.3 10e3/uL    Absolute Monocytes 0.9 0.0 - 1.3 10e3/uL    Absolute Eosinophils 0.0 0.0 - 0.7 10e3/uL    Absolute Basophils 0.1 0.0 - 0.2 10e3/uL    Absolute Immature Granulocytes 0.2 <=0.4 10e3/uL    Absolute NRBCs 0.0 10e3/uL   Extra Tube    Narrative    The following orders were created for panel order Extra Tube.  Procedure                               Abnormality         Status                     ---------                               -----------         ------                     Extra Blue Top Tube[[id number]]                              Final result               Extra Serum Separator Tu...[[id number]]                      Final result                 Please view results for these tests on the individual orders.   Extra Blue Top Tube   Result Value Ref Range    Hold Specimen JIC    Extra Serum Separator Tube (SST)   Result Value Ref Range    Hold Specimen JIC    Symptomatic; Unknown Influenza A/B & SARS-CoV2 (COVID-19) Virus PCR Multiplex Nose    Specimen: Nose; Swab   Result Value Ref Range    Influenza A PCR Negative Negative    Influenza B PCR Negative Negative    RSV PCR Negative Negative    SARS CoV2 PCR Negative Negative    Narrative    Testing was performed using the Xpert Xpress CoV2/Flu/RSV Assay on the [employer] GeneXpert Instrument. This test should be ordered for the detection of SARS-CoV-2 and influenza viruses in individuals who meet clinical and/or epidemiological criteria. Test performance is unknown in asymptomatic patients. This test is for in vitro diagnostic use under the FDA EUA for laboratories certified under CLIA to perform high or moderate complexity  testing. This test has not been FDA cleared or approved. A negative result does not rule out the presence of PCR inhibitors in the specimen or target RNA in concentration below the limit of detection for the assay. If only one viral target is positive but coinfection with multiple targets is suspected, the sample should be re-tested with another FDA cleared, approved, or authorized test, if coinfection would change clinical management. This test was validated by the [facility]. These laboratories are certified under the Clinical Laboratory Improvement Amendments of 1988 (CLIA-88) as qualified to perform high complexity laboratory testing.   XR Chest Port 1 View    Narrative    Exam:  XR CHEST PORT 1 VIEW    HISTORY: 72 year old with complaints of shortness of breath and fever.  R/o pneumonia.    COMPARISON:  2/10/2020, 1/25/2020    FINDINGS:     The cardiomediastinal contours are stable.      There is mild patchy opacity in the right mid lung. Minimal streaky  opacities in the lung bases, likely atelectasis or scarring. No  pleural effusion or pneumothorax.      No acute osseous abnormality.       Impression    IMPRESSION:      Mild patchy opacity in the right midlung may be due to an infectious  or inflammatory process such as pneumonia. Follow-up to resolution is  recommended.      DRAKE COLE MD         SYSTEM ID:  [id number]   Troponin I   Result Value Ref Range    Troponin I 218.1 (H) 0.0 - 34.0 pg/mL   Potassium   Result Value Ref Range    Potassium 5.2 (H) 3.5 - 5.1 mmol/L   Basic metabolic panel   Result Value Ref Range    Sodium 131 (L) 134 - 144 mmol/L    Potassium 6.0 (H) 3.5 - 5.1 mmol/L    Chloride 103 98 - 107 mmol/L    Carbon Dioxide (CO2) 19 (L) 21 - 31 mmol/L    Anion Gap 9 3 - 14 mmol/L    Urea Nitrogen 34 (H) 7 - 25 mg/dL    Creatinine 1.91 (H) 0.60 - 1.20 mg/dL    Calcium 8.2 (L) 8.6 - 10.3 mg/dL    Glucose 334 (H) 70 - 105 mg/dL    GFR Estimate 27 (L) >60 mL/min/1.73m2    Troponin I (now)   Result Value Ref Range    Troponin I 142.5 (H) 0.0 - 34.0 pg/mL   Extra Tube    Narrative    The following orders were created for panel order Extra Tube.  Procedure                               Abnormality         Status                     ---------                               -----------         ------                     Extra Purple Top Tube[[id number]]                            Final result                 Please view results for these tests on the individual orders.   Extra Purple Top Tube   Result Value Ref Range    Hold Specimen JI    Glucose by meter   Result Value Ref Range    GLUCOSE BY METER POCT 311 (H) 70 - 99 mg/dL       Medications   acetaminophen (TYLENOL) tablet 650 mg (has no administration in time range)   ipratropium - albuterol 0.5 mg/2.5 mg/3 mL (DUONEB) neb solution 3 mL (has no administration in time range)   sodium chloride 0.9% infusion (has no administration in time range)       Assessments & Plan (with Medical Decision Making)     72-year-old with complaints of shortness of breath, fever and diagnosed with pneumonia.  Notable findings include elevated white count and troponin of 231 which decreased to 218 on recheck.  Patient has no chest pain.  Patient treated with antibiotics.  Case discussed with on-call physician Dr. Guajardo who agrees to admit.  Repeat troponin as mentioned was lower and patient has no pain at this time.  Troponin to continue to be trended.  Potassium level noted.  Patient is on lisinopril.  Dr. Guajardo recommends holding off on any specific potassium lowering therapy and will recheck the potassium at 6:00 this morning. To be admitted to the floor.      New Prescriptions    No medications on file       Final diagnoses:   Pneumonia due to infectious organism, unspecified laterality, unspecified part of lung   Hyperkalemia       10/13/2022   [facility] AND [facility]     Olu Livingston MD  10/14/22 9191

## 2022-10-14 NOTE — PHARMACY-ADMISSION MEDICATION HISTORY
Pharmacy -- Admission Medication Reconciliation    Prior to admission (PTA) medications were reviewed and the patient's PTA medication list was updated.    Sources Consulted: Patient, [employer], patient home med list    The reliability of this Medication Reconciliation is: Reliability: Reliable    The following significant changes were made:  Removed albuterol nebs- per patient uses the inhaler   Updated lisinopril dose to 10 mg per [facility] records and pateint  Removed medical cannabis per patient   Updated dose of methotrexate per [facility]  Updated dose of prednisone per patient / [facility]  Added vitamin D per patient (completed 8 weeks of high dose)  Removed Gas-X per patient  Updated dose of atovaquone per patient / [facility]  Verified that patient's dose of insulin correct (insulin prot & aspart 70/30)    In addition, the patient's allergies were reviewed with the patient and updated as follows:   Allergies: Glyburide, Mosquitoes (informational only), Other [seasonal allergies], Sulfa drugs, Sulfamethoxazole w/trimethoprim, Sulfamethoxazole-trimethoprim, and Codeine    The pharmacist has reviewed with the patient that all personal medications should be removed from the building or locked in the belongings safe.  Patient shall only take medications ordered by the physician and administered by the nursing staff.       Medication barriers identified: none noted   Medication adherence concerns: none noted   Understanding of emergency medications: Patient states she can easily recognize low blood sugars and has rarely needed to use her glucose tablets. She has never used her glucagon kit - but does have it and understands it's use. Patient has an albuterol inhaler, an epipen, and a glucagon kit if needed (she will check the expiration dates)    Melissa Miranda [facility], 10/14/2022,  11:49 AM

## 2022-10-14 NOTE — PHARMACY-CONSULT NOTE
"Pharmacy: Patient Own Medication Identification    The requirements of Policy 13-03 from the Pharmacy Policy Manual have been met and the patient will be allowed to use their own supply of: atovaquone suspension and formoterol 20 mcg nebs.    Melissa Miranda RPH has verified the name, dose, route, and directions for each medication. The integrity has been assessed and deemed safe.     The product(s) have been labeled as being inspected by a pharmacist and the medication has been placed in the patient-specific bin in the medication room.    Nursing will remove medication at the appropriately scheduled times and document the administration on the MAR with the designation of \"patient own\".    Nursing to return medication back to patient at time of discharge.     Melissa Miranda RPH ....................  10/14/2022   2:23 PM    "

## 2022-10-14 NOTE — ED TRIAGE NOTES
Pt presents to ED from home via EMS for c/o fever and generalized weakness since Monday. Presents today with tachypnea, tachycardic in the 110s, temp 100.4. Hx COPD, AAO on arrival. PTA, given duoneb x1.  /64   Pulse 110   Temp 100.4  F (38  C) (Tympanic)   Resp 22   Wt 64.4 kg (142 lb)   SpO2 91%   BMI 25.15 kg/m         Triage Assessment     Row Name 10/13/22 1914       Triage Assessment (Adult)    Airway WDL WDL    Additional Documentation Breath Sounds (Group)       Respiratory WDL    Respiratory WDL X;rhythm/pattern;cough    Rhythm/Pattern, Respiratory tachypneic;shortness of breath    Cough Frequency infrequent    Cough Type bronchospastic       Breath Sounds    Breath Sounds All Fields    All Lung Fields Breath Sounds wheezes, expiratory       Skin Circulation/Temperature WDL    Skin Circulation/Temperature WDL WDL       Cardiac WDL    Cardiac WDL X;rhythm    Pulse Rate & Regularity tachycardic       Peripheral/Neurovascular WDL    Peripheral Neurovascular WDL WDL       Cognitive/Neuro/Behavioral WDL    Cognitive/Neuro/Behavioral WDL WDL

## 2022-10-14 NOTE — PROVIDER NOTIFICATION
10/14/22 0654   Valuables   Patient Belongings Remaining with Patient clothing;other (see comments)  (blanket)       [facility] will make every effort per our policy to help keep your items safe while in the hospital.  If you choose to keep any items at the bedside, we cannot be held responsible for any items that are lost or broken.      List items sent to safe: NA    I have reviewed my belongings list on admission and verify that it is correct.     Patient signature_______________________________  Date/Time_____________________    2nd Staff person if patient unable to sign __________________________  Date/Time ______________________      I have received all my belongings noted above at discharge.    Patient signature________________________________  Date/Time  __________________________

## 2022-10-14 NOTE — PROGRESS NOTES
Shift Summary: Pt remains on RA. Family brought in home Perforomist. Scheduled nebs and inhaler given. Pt tolerated well. No further respiratory intervention done. Jayna Tam RRT

## 2022-10-14 NOTE — PROGRESS NOTES
Diet instruction:   Visited with pt today for hyperkalemia diet recommendations. Discussed high potassium containing foods. Also discussed recommendations for CKD: low sodium, phosphorus and potassium. She has a hard time cooking, has difficulty lifting pans and plates. Her  does much of the cooking now but he often at work or has many hobbies that are out of the house. Discussed making meals and freezing them for convenience later. Recommend keeping a food log. She does have several intolerances with upset stomach or diarrhea as a result of eating fruit, spicy foods, or some meats. Provided with handouts and explanation. She had no questions at this time. She stated understanding and do expect compliance with recommendations.     Do feel she may benefit from therapy, especially at home with strengthening. Spoke with therapy today about this and they may visit with her tomorrow.     Sharri Collier RD on 10/14/2022 at 4:02 PM

## 2022-10-15 VITALS
DIASTOLIC BLOOD PRESSURE: 57 MMHG | BODY MASS INDEX: 25.53 KG/M2 | TEMPERATURE: 96.7 F | HEART RATE: 86 BPM | OXYGEN SATURATION: 97 % | WEIGHT: 144.1 LBS | RESPIRATION RATE: 20 BRPM | SYSTOLIC BLOOD PRESSURE: 148 MMHG

## 2022-10-15 LAB
ALBUMIN SERPL-MCNC: 3.2 G/DL (ref 3.5–5.7)
ALP SERPL-CCNC: 45 U/L (ref 34–104)
ALT SERPL W P-5'-P-CCNC: 19 U/L (ref 7–52)
ANION GAP SERPL CALCULATED.3IONS-SCNC: 9 MMOL/L (ref 3–14)
AST SERPL W P-5'-P-CCNC: 16 U/L (ref 13–39)
BILIRUB SERPL-MCNC: 0.4 MG/DL (ref 0.3–1)
BUN SERPL-MCNC: 37 MG/DL (ref 7–25)
CALCIUM SERPL-MCNC: 8.7 MG/DL (ref 8.6–10.3)
CHLORIDE BLD-SCNC: 104 MMOL/L (ref 98–107)
CO2 SERPL-SCNC: 20 MMOL/L (ref 21–31)
CREAT SERPL-MCNC: 1.46 MG/DL (ref 0.6–1.2)
GFR SERPL CREATININE-BSD FRML MDRD: 38 ML/MIN/1.73M2
GLUCOSE BLD-MCNC: 242 MG/DL (ref 70–105)
GLUCOSE BLDC GLUCOMTR-MCNC: 237 MG/DL (ref 70–99)
GLUCOSE BLDC GLUCOMTR-MCNC: 259 MG/DL (ref 70–99)
HOLD SPECIMEN: NORMAL
POTASSIUM BLD-SCNC: 4.6 MMOL/L (ref 3.5–5.1)
PROT SERPL-MCNC: 6.2 G/DL (ref 6.4–8.9)
SODIUM SERPL-SCNC: 133 MMOL/L (ref 134–144)

## 2022-10-15 PROCEDURE — 999N000157 HC STATISTIC RCP TIME EA 10 MIN

## 2022-10-15 PROCEDURE — 250N000012 HC RX MED GY IP 250 OP 636 PS 637: Performed by: STUDENT IN AN ORGANIZED HEALTH CARE EDUCATION/TRAINING PROGRAM

## 2022-10-15 PROCEDURE — 250N000013 HC RX MED GY IP 250 OP 250 PS 637: Performed by: STUDENT IN AN ORGANIZED HEALTH CARE EDUCATION/TRAINING PROGRAM

## 2022-10-15 PROCEDURE — 94640 AIRWAY INHALATION TREATMENT: CPT

## 2022-10-15 PROCEDURE — 250N000009 HC RX 250: Performed by: STUDENT IN AN ORGANIZED HEALTH CARE EDUCATION/TRAINING PROGRAM

## 2022-10-15 PROCEDURE — 36415 COLL VENOUS BLD VENIPUNCTURE: CPT | Performed by: STUDENT IN AN ORGANIZED HEALTH CARE EDUCATION/TRAINING PROGRAM

## 2022-10-15 PROCEDURE — 80053 COMPREHEN METABOLIC PANEL: CPT | Performed by: STUDENT IN AN ORGANIZED HEALTH CARE EDUCATION/TRAINING PROGRAM

## 2022-10-15 PROCEDURE — 99239 HOSP IP/OBS DSCHRG MGMT >30: CPT | Performed by: STUDENT IN AN ORGANIZED HEALTH CARE EDUCATION/TRAINING PROGRAM

## 2022-10-15 RX ORDER — DULOXETIN HYDROCHLORIDE 20 MG/1
20 CAPSULE, DELAYED RELEASE ORAL 2 TIMES DAILY
Qty: 180 CAPSULE | Refills: 1 | Status: SHIPPED | OUTPATIENT
Start: 2022-10-15 | End: 2023-01-18

## 2022-10-15 RX ORDER — CEFDINIR 300 MG/1
300 CAPSULE ORAL 2 TIMES DAILY
Qty: 6 CAPSULE | Refills: 0 | Status: SHIPPED | OUTPATIENT
Start: 2022-10-15 | End: 2022-12-12

## 2022-10-15 RX ORDER — PREDNISONE 20 MG/1
40 TABLET ORAL DAILY
Qty: 6 TABLET | Refills: 0 | Status: SHIPPED | OUTPATIENT
Start: 2022-10-15 | End: 2022-10-18

## 2022-10-15 RX ORDER — AZITHROMYCIN 250 MG/1
250 TABLET, FILM COATED ORAL DAILY
Qty: 3 TABLET | Refills: 0 | Status: SHIPPED | OUTPATIENT
Start: 2022-10-15 | End: 2022-10-18

## 2022-10-15 RX ADMIN — FOLIC ACID 1 MG: 1 TABLET ORAL at 09:17

## 2022-10-15 RX ADMIN — FORMOTEROL FUMARATE DIHYDRATE 20 MCG: 20 SOLUTION RESPIRATORY (INHALATION) at 06:52

## 2022-10-15 RX ADMIN — ATOVAQUONE 1500 MG: 750 SUSPENSION ORAL at 09:18

## 2022-10-15 RX ADMIN — UMECLIDINIUM 1 PUFF: 62.5 AEROSOL, POWDER ORAL at 06:52

## 2022-10-15 RX ADMIN — PREDNISONE 40 MG: 20 TABLET ORAL at 09:17

## 2022-10-15 RX ADMIN — PANTOPRAZOLE SODIUM 40 MG: 40 TABLET, DELAYED RELEASE ORAL at 09:18

## 2022-10-15 RX ADMIN — BUDESONIDE INHALATION 0.5 MG: 0.5 SUSPENSION RESPIRATORY (INHALATION) at 06:52

## 2022-10-15 RX ADMIN — ASPIRIN 81 MG 81 MG: 81 TABLET ORAL at 09:17

## 2022-10-15 RX ADMIN — LEVOTHYROXINE SODIUM 100 MCG: 0.1 TABLET ORAL at 09:18

## 2022-10-15 RX ADMIN — INSULIN ASPART 40 UNITS: 100 INJECTION, SUSPENSION SUBCUTANEOUS at 10:11

## 2022-10-15 RX ADMIN — SODIUM ZIRCONIUM CYCLOSILICATE 5 G: 5 POWDER, FOR SUSPENSION ORAL at 05:38

## 2022-10-15 ASSESSMENT — ACTIVITIES OF DAILY LIVING (ADL)
ADLS_ACUITY_SCORE: 35

## 2022-10-15 NOTE — PLAN OF CARE
Patient admitted for hyperkalemia which has resolved to 4.6. Dyspnea on exertion noted. O2 high 90's on room air. LS dim in bases with exp wheezing in upper lobes. Labored breathing after ambulating to the bathroom. PRN tylenol before bed d/t chronic arthritic pain. IV SL d/t hypertension per Dr. Amaya. BP resolved to 130/59. Slept throughout the night. Denies complaints.       Problem: Plan of Care - These are the overarching goals to be used throughout the patient stay.    Goal: Patient-Specific Goal (Individualized)    Outcome: Progressing   Goal Outcome Evaluation:      Plan of Care Reviewed With: patient    Overall Patient Progress: improvingOverall Patient Progress: improving

## 2022-10-15 NOTE — PLAN OF CARE
"NSG DISCHARGE NOTE    Patient discharged to home at 1:34 PM via wheel chair. Accompanied by son and staff. Discharge instructions reviewed with patient, opportunity offered to ask questions. Prescriptions sent to patients preferred pharmacy. All belongings sent with patient.    Felipa Sanchez RN  Problem: Plan of Care - These are the overarching goals to be used throughout the patient stay.    Goal: Plan of Care Review  Description: The Plan of Care Review/Shift note should be completed every shift.  The Outcome Evaluation is a brief statement about your assessment that the patient is improving, declining, or no change.  This information will be displayed automatically on your shift note.  Outcome: Met  Goal: Patient-Specific Goal (Individualized)  Description: You can add care plan individualizations to a care plan. Examples of Individualization might be:  \"Parent requests to be called daily at 9am for status\", \"I have a hard time hearing out of my right ear\", or \"Do not touch me to wake me up as it startles me\".  Outcome: Met  Goal: Absence of Hospital-Acquired Illness or Injury  Outcome: Met  Intervention: Identify and Manage Fall Risk  Recent Flowsheet Documentation  Taken 10/15/2022 0749 by Felipa Sanchez RN  Safety Promotion/Fall Prevention: safety round/check completed  Intervention: Prevent Skin Injury  Recent Flowsheet Documentation  Taken 10/15/2022 0749 by Felipa Sanchez RN  Body Position: position changed independently  Intervention: Prevent and Manage VTE (Venous Thromboembolism) Risk  Recent Flowsheet Documentation  Taken 10/15/2022 0749 by Felipa Sanchez RN  VTE Prevention/Management: patient refused intervention  Goal: Optimal Comfort and Wellbeing  Outcome: Met  Goal: Readiness for Transition of Care  Outcome: Met   Goal Outcome Evaluation:                        "

## 2022-10-15 NOTE — DISCHARGE SUMMARY
[facility]    Discharge Summary  Hospitalist    Date of Admission:  10/13/2022  Date of Discharge:  10/15/2022  Discharging Provider: Neil Chinchilla MD  Date of Service (when I saw the patient): 10/15/22    Discharge Diagnoses   Principal Problem:    Hyperkalemia (10/14/2022)  Active Problems:    Anemia of chronic kidney failure, stage 3 (moderate) (H) (7/28/2017)    Chronic diastolic CHF (congestive heart failure), NYHA class 2 (H) (2/13/2016)    Chronic kidney disease (CKD), stage III (moderate) (H) (12/11/2017)    COPD (chronic obstructive pulmonary disease) (H) (9/12/2014)    Diabetes mellitus type 2, insulin dependent (H) (7/28/2017)    HTN (hypertension) (11/29/2017)    Hyperlipidemia (1/26/2018)    Hypothyroidism (1/26/2018)    Seropositive rheumatoid arthritis (H) (1/15/2016)    Other amyloidosis (H) (11/7/2018)    Pneumonia due to infectious organism, unspecified laterality, unspecified part of lung (10/14/2022)    Hyponatremia (10/14/2022)    Acute kidney injury (H) (10/14/2022)      History of Present Illness   Laura Perez is a 72 year old woman with a past medical history of rheumatoid arthritis, COPD, diastolic heart failure, CKD stage III, anemia, hyperlipidemia, hypothyroidism, type 2 diabetes and amyloidosis who was admitted on 10/13/2022 secondary to weakness and hyperkalemia likely due to dietary indiscretion with her CKD as well as mild dehydration and SEAN.  Patient incidentally also had a recurrent pneumonia and was treated with a 5-day course of ceftriaxone converted to Omnicef and azithromycin on discharge for an additional 3-day course in addition to a 5-day total course of prednisone 40 mg daily.    Patient's potassium subsequently resolved with treatment of her acute kidney injury with a peak creatinine of 6.0.  She did receive 1 dose of Lokelma in the hospital.  Recommend that she undergo a low potassium diet and avoid drinking excessive amounts of tomato juice and  bananas.  She will hold her lisinopril and PCP at follow-up should recheck her potassium and if normalized may resume her lisinopril given her history of CKD.    Patient was also started on duloxetine for her chronic rheumatoid arthritis related pain.    Hospital Course   Laura Perez was admitted on 10/13/2022.  The following problems were addressed during her hospitalization:     Hyperkalemia  CKD stage III:  SEAN  Hypovolemic hyponatremia    Assessment: Potassium peaked at 6.0, likely secondary to her SEAN secondary to hypovolemia on chronic kidney disease and dietary indiscretion with excessive potassium intake in the form of bananas and tomato juice recently.  1 dose of Lokelma, IV fluids resolved her elevated potassium to the normal level on discharge.  Recheck potassium in clinic and resume lisinopril if normalized.    Plan:   -Lokelma complete  -Normal saline 100 mL/h  -Hold lisinopril, recheck potassium in clinic     Active Problems:          Pneumonia due to infectious organism, unspecified laterality, unspecified part of lung  COPD (chronic obstructive pulmonary disease)    Assessment: Likely CAP. Treat as COPD Exacerbation as feeling SOB as well. Day 2 of Abx.   -Prednisone 40mg daily   -ceftriaxone 1g daily  -azithromycin 250mg daily   -Discharge with 3 additional days of prednisone 40 mg daily, Omnicef, and azithromycin     Troponin elevation  Likely from demand ischemia. Trend one time.           Diabetes mellitus type 2, insulin dependent (H)    Assessment: Prior to admission on NovoLog Mix 70/30 40 units in the morning, 20 units in the evening.  A1c of 6.9 on admission indicates excellent control.  May have worsening blood sugars in the setting of steroids for COPD exacerbation    Plan:   -NovoLog resume 40 units a.m., 20 units p.m.  -4 times daily AC and at bedtime Gluc checks  -Medium sliding scale          HTN (hypertension)    Assessment: Prior to admission on lisinopril 10 mg daily.  Hold in  setting of hyperkalemia.  May resume on discharge if potassium normalizes    Plan:   -Hold lisinopril 10 mg daily on discharge       Hyperlipidemia    Assessment: Continue home atorvastatin          Hypothyroidism    Assessment: TSH on admission within normal range continue home levothyroxine 100 mcg daily    Plan:   -TSH a.m.  -Continue home levothyroxine 100 mcg daily       Seropositive rheumatoid arthritis (H)    Other amyloidosis (H)    Assessment: Hold home biologic and methotrexate.  Continue home atovaquone.       Anemia of chronic kidney failure, stage 3 (moderate) (H)    Assessment: Hemoglobin at 11.2 on admission, likely contributed by her CKD stage III and her methotrexate use.  Continue to follow as indicated       Chronic diastolic CHF (congestive heart failure), NYHA class 2 (H)    Assessment: Chronic, not on diuretics.  Blood pressure under good control today.    Neil Chinchilla MD    Significant Results and Procedures       Pending Results     Code Status   Full Code       Primary Care Physician   LANE [facility]    Physical Exam   Temp: (!) 96.7  F (35.9  C) Temp src: Tympanic BP: (!) 148/57 Pulse: 86   Resp: 20 SpO2: 97 % O2 Device: None (Room air)    Vitals:    10/13/22 1912 10/14/22 0100 10/15/22 0527   Weight: 64.4 kg (142 lb) 63.8 kg (140 lb 11.2 oz) 65.4 kg (144 lb 1.6 oz)     Vital Signs with Ranges  Temp:  [96.7  F (35.9  C)-98  F (36.7  C)] 96.7  F (35.9  C)  Pulse:  [] 86  Resp:  [18-20] 20  BP: (130-180)/(57-75) 148/57  SpO2:  [95 %-98 %] 97 %  I/O last 3 completed shifts:  In: -   Out: 750 [Urine:750]    Constitutional: Pleasant 72 year old year old female lying in bed in no acute distress   Eyes: Connect  ENT: Within normal  Respiratory: Diminished in bilateral bases  Cardiovascular: No significant peripheral edema, regular rate  GI: Soft nontender abdomen    Discharge Disposition   Discharged to home  Condition at discharge: Stable    Consultations This Hospital Stay   [facility] ADULT IP CONSULT    Time Spent on this Encounter   I, Neil Chinchilla MD, personally saw the patient today and spent greater than 30 minutes discharging this patient.    Discharge Orders      Reason for your hospital stay    You are in the hospital for high blood potassium.  This was likely due to increase potassium in your diet with the tomato juice and with being dehydrated.  I want you to hold your lisinopril until you follow-up after the hospitalization for recheck of your potassium.  We have also started duloxetine for your pain.  I have sent 2 antibiotics and a steroid to [facility] pharmacy.  I would like you to take these for the next 3 days.     Follow-up and recommended labs and tests     Follow up with primary care provider, LANE [facility], within 7 days for hospital follow- up.  At hospital follow-up recheck potassium and if has normalized restart her lisinopril given her CKD.  Ensure she has an appointment with the dietitian to review potassium containing foods and limit excessive potassium in her diet.     Activity    Your activity upon discharge: activity as tolerated     Diet    Follow this diet upon discharge: Orders Placed This Encounter      Combination Diet Regular Diet Adult     Discharge Medications   Current Discharge Medication List      START taking these medications    Details   azithromycin (ZITHROMAX) 250 MG tablet Take 1 tablet (250 mg) by mouth daily for 3 days  Qty: 3 tablet, Refills: 0    Associated Diagnoses: Recurrent pneumonia      cefdinir (OMNICEF) 300 MG capsule Take 1 capsule (300 mg) by mouth 2 times daily  Qty: 6 capsule, Refills: 0    Associated Diagnoses: Recurrent pneumonia      DULoxetine (CYMBALTA) 20 MG capsule Take 1 capsule (20 mg) by mouth 2 times daily  Qty: 180 capsule, Refills: 1    Associated Diagnoses: Moderate episode of recurrent major depressive disorder (H)      !! predniSONE (DELTASONE) 20 MG tablet Take 2 tablets (40 mg) by mouth daily for 3 days  Qty:  "6 tablet, Refills: 0    Associated Diagnoses: Recurrent pneumonia       !! - Potential duplicate medications found. Please discuss with provider.      CONTINUE these medications which have NOT CHANGED    Details   albuterol (PROAIR HFA/PROVENTIL HFA/VENTOLIN HFA) 108 (90 Base) MCG/ACT inhaler INHALE 1-2 PUFFS BY MOUTH EVERY 4-6 HOURS AS NEEDED FOR SHORTNESS OF BREATH OR WHEEZING  Qty: 18 g, Refills: 0    Associated Diagnoses: Chronic obstructive pulmonary disease, unspecified COPD type (H)      BD PEN NEEDLE LUCILLE 2ND GEN 32G X 4 MM miscellaneous USE TO INJECT INSULIN TWICE DAILY  Qty: 200 each, Refills: 3    Associated Diagnoses: Diabetes mellitus type 2, insulin dependent (H)      blood glucose (ONETOUCH VERIO IQ) test strip USE TO TEST BLOOD SUGAR TWICE DAILY OR AS DIRECTED  Qty: 200 strip, Refills: 3    Associated Diagnoses: Diabetes mellitus type 2, insulin dependent (H)      budesonide (PULMICORT) 0.5 MG/2ML neb solution INHALE CONTENTS OF 1 VIAL(2 ML) VIA NEBULIZER TWICE DAILY  Qty: 360 mL, Refills: 3    Associated Diagnoses: Chronic obstructive pulmonary disease, unspecified COPD type (H)      formoterol (PERFOROMIST) 20 MCG/2ML neb solution Take 2 mLs (20 mcg) by nebulization 2 times daily  Qty: 180 mL, Refills: 3    Associated Diagnoses: Chronic obstructive pulmonary disease, unspecified COPD type (H)      glucose 4 g CHEW chewable tablet Take 1 tablet by mouth as needed for low blood sugar      Lancets (ONETOUCH DELICA PLUS AIRFCI55E) MISC 1 each 2 times daily  Qty: 200 each, Refills: 3    Associated Diagnoses: Diabetes mellitus type 2, insulin dependent (H)      Methotrexate 25 MG/ML SOSY Inject 10 mg Subcutaneous once a week      Needle, Disp, (HYPODERMIC NEEDLE) 27G X 1/2\" MISC For methotrexate injections.      Vitamin D, Cholecalciferol, 25 MCG (1000 UT) TABS Take 1 tablet by mouth daily      Abatacept (ORENCIA) 125 MG/ML SOAJ auto-injector Inject 1 mL (125 mg) Subcutaneous once a week      aspirin 81 " MG chewable tablet Take 81 mg by mouth daily with food      atovaquone (MEPRON) 750 MG/5ML suspension Take 1,500 mg by mouth daily      cyanocobalamin (VITAMIN B-12) 1000 MCG tablet Take 1 tablet (1,000 mcg) by mouth daily  Qty: 90 tablet, Refills: 3    Associated Diagnoses: Vitamin B12 deficiency (non anemic)      EPINEPHrine (EPIPEN/ADRENACLICK/OR ANY BX GENERIC EQUIV) 0.3 MG/0.3ML injection 2-pack Inject 0.3 mLs (0.3 mg) into the muscle once as needed  Qty: 0.9 mL, Refills: 1    Comments: [facility] file [facility] at [facility] and wants available once she is out.  Associated Diagnoses: Other emphysema (H)      estradiol (VAGIFEM) 10 MCG TABS vaginal tablet INSERT 1 TABLET VAGINALLY 2 TIMES A WEEK  Qty: 24 tablet, Refills: 1    Comments: ZERO refills remain on this prescription. Your patient is requesting advance approval of refills for this medication to PREVENT ANY MISSED DOSES  Associated Diagnoses: Atrophic vaginitis      folic acid (FOLVITE) 1 MG tablet Take 1 tablet (1 mg) by mouth daily  Qty: 90 tablet, Refills: 3    Associated Diagnoses: Seropositive rheumatoid arthritis (H)      glucagon 1 MG kit Inject 1 mg into the muscle as needed for low blood sugar       insulin aspart prot & aspart (NOVOLOG MIX 70/30 FLEXPEN) (70-30) 100 UNIT/ML pen INJECT 40 UNITS EVERY MORNING, AND 20 UNITS EVERY EVENING  Qty: 45 mL, Refills: 6    Associated Diagnoses: Diabetes mellitus type 2, insulin dependent (H)      levothyroxine (SYNTHROID/LEVOTHROID) 100 MCG tablet Take 1 tablet (100 mcg) by mouth daily    Associated Diagnoses: Hypothyroidism, unspecified type      lisinopril (ZESTRIL) 10 MG tablet Take 10 mg by mouth daily      montelukast (SINGULAIR) 10 MG tablet Take 1 tablet (10 mg) by mouth At Bedtime  Qty: 90 tablet, Refills: 3    Associated Diagnoses: Chronic obstructive pulmonary disease, unspecified COPD type (H)      omeprazole (PRILOSEC) 20 MG DR capsule Take 1 capsule (20 mg) by mouth daily  Qty: 90 capsule,  Refills: 3    Associated Diagnoses: Gastroesophageal reflux disease with esophagitis without hemorrhage      !! predniSONE (DELTASONE) 1 MG tablet Take 2 mg by mouth daily - take with the 5 mg tablet for a total dose of 7 mg    Comments: --take with 5 mg      !! predniSONE (DELTASONE) 5 MG tablet Take 5 mg by mouth daily with food - take with two of the 1 mg tablets for a total dose of 7 mg      simvastatin (ZOCOR) 40 MG tablet TAKE 1 TABLET(40 MG) BY MOUTH AT BEDTIME  Qty: 90 tablet, Refills: 3    Associated Diagnoses: Hyperlipidemia, unspecified hyperlipidemia type      tiotropium (SPIRIVA) 18 MCG inhaled capsule Inhale 1 capsule (18 mcg) into the lungs daily      traMADol (ULTRAM) 50 MG tablet TAKE 1 TABLET(50 MG) BY MOUTH DAILY  Qty: 30 tablet, Refills: 5    Associated Diagnoses: Seropositive rheumatoid arthritis (H)       !! - Potential duplicate medications found. Please discuss with provider.        Allergies   Allergies   Allergen Reactions     Glyburide Anaphylaxis     Other reaction(s): Dizziness     Mosquitoes (Informational Only) Hives     Other [Seasonal Allergies] Hives     Deer Flies     Sulfa Drugs Anaphylaxis and Hives     Sulfamethoxazole W/Trimethoprim Hives and Itching     Sulfamethoxazole-Trimethoprim Anaphylaxis     Other reaction(s): Other - Describe In Comment Field  Rash, nausea, dizziness     Codeine Nausea and Nausea and Vomiting     Data   Most Recent 3 CBC's:  Recent Labs   Lab Test 10/13/22  1942 06/27/22  1359 11/17/21  1522   WBC 23.7* 10.1 13.4*   HGB 11.2* 11.9 12.4   MCV 97 102* 100    209 198      Most Recent 3 BMP's:  Recent Labs   Lab Test 10/15/22  0708 10/15/22  0656 10/15/22  0304 10/14/22  1623 10/14/22  1321 10/14/22  0712 10/14/22  0619 10/13/22  2223 10/13/22  1942   NA  --  133*  --   --   --   --  131*  --  133*   POTASSIUM  --  4.6  --   --  4.7  --  6.0*   < > 5.2*   CHLORIDE  --  104  --   --   --   --  103  --  102   CO2  --  20*  --   --   --   --  19*  --   21   BUN  --  37*  --   --   --   --  34*  --  29*   CR  --  1.46*  --   --   --   --  1.91*  --  1.83*   ANIONGAP  --  9  --   --   --   --  9  --  10   GIANNI  --  8.7  --   --   --   --  8.2*  --  9.1   * 242* 259*   < >  --    < > 334*  --  188*    < > = values in this interval not displayed.     Most Recent 2 LFT's:  Recent Labs   Lab Test 10/15/22  0656 10/13/22  1942   AST 16 19   ALT 19 19   ALKPHOS 45 53   BILITOTAL 0.4 1.5*     Most Recent INR's and Anticoagulation Dosing History:  Anticoagulation Dose History     Recent Dosing and Labs Latest Ref Rng & Units 6/15/2018    INR 0 - 1.3 1.22        Most Recent 3 Troponin's:  Recent Labs   Lab Test 06/27/18  0945 06/17/18  2300 06/17/18  2116   TROPI 0.041* 0.112* 0.080*     Most Recent Cholesterol Panel:  Recent Labs   Lab Test 11/16/20  1401   CHOL 224*   LDL 80   HDL 81   TRIG 313*     Most Recent 6 Bacteria Isolates From Any Culture (See EPIC Reports for Culture Details):  Recent Labs   Lab Test 01/25/20  1000 06/15/18  1830 06/15/18  1745   CULT No growth after 6 days  No growth after 6 days Urine culture negative (no growth of uropathogens). No growth after 6 days  No growth after 6 days     Most Recent TSH, T4 and A1c Labs:  Recent Labs   Lab Test 10/14/22  0630 10/14/22  0619 05/27/21  0942 11/16/20  1401   TSH  --  2.63  --   --    T4  --   --   --  1.24   A1C 6.9*  --    < > 6.3*    < > = values in this interval not displayed.     Results for orders placed or performed during the hospital encounter of 10/13/22   XR Chest Port 1 View    Narrative    Exam:  XR CHEST PORT 1 VIEW    HISTORY: 72 year old with complaints of shortness of breath and fever.  R/o pneumonia.    COMPARISON:  2/10/2020, 1/25/2020    FINDINGS:     The cardiomediastinal contours are stable.      There is mild patchy opacity in the right mid lung. Minimal streaky  opacities in the lung bases, likely atelectasis or scarring. No  pleural effusion or pneumothorax.      No acute  osseous abnormality.       Impression    IMPRESSION:      Mild patchy opacity in the right midlung may be due to an infectious  or inflammatory process such as pneumonia. Follow-up to resolution is  recommended.      DRAKE COLE MD         SYSTEM ID:  [id number]

## 2022-10-15 NOTE — PROGRESS NOTES
On RA. Administered Perforomist, Pulmicort Neb Tx's BID, Albuterol Neb Tx Q4hrs. PRN and Incruse Ellipta daily (substitute) per home regiment. NPC. BS-faint ex wheezes throughout. Recommend increase activity as tolerated. Continue to monitor.

## 2022-10-15 NOTE — PROGRESS NOTES
Patient no longer requires tele order and does not need sepsis protocol when it flags per Dr. Chinchilla. Felipa Sanchez RN 10/15/2022 8:17 AM

## 2022-10-15 NOTE — PHARMACY - DISCHARGE MEDICATION RECONCILIATION AND EDUCATION
Pharmacy:  Discharge Counseling and Medication Reconciliation    Laura PICKETT Destinybeverley  [address] [facility] [address]  [phone] (home)   72 year old female  PCP: Raul Ortega    Allergies: Glyburide, Mosquitoes (informational only), Other [seasonal allergies], Sulfa drugs, Sulfamethoxazole w/trimethoprim, Sulfamethoxazole-trimethoprim, and Codeine    Discharge Counseling:    Pharmacist met with patient (and/or family) today to review the medication portion of the After Visit Summary (with an emphasis on NEW medications) and to address patient's questions/concerns.    Summary of Education: educated on use, indication, duration and side effects of new medications. Educated on short term increase of prednisone dose.    Materials Provided:  MedCounselor sheets printed from Clinical Pharmacology on: cefdnir, azithromycin, duloxetine    Discharge Medication Reconciliation:    It has been determined that the patient has an adequate supply of medications available or which can be obtained from the patient's preferred pharmacy, which HE/SHE has confirmed as: [facility] and [facility].    Thank you for the consult.    Dejah Díaz RPH........October 15, 2022 1:16 PM

## 2022-10-15 NOTE — PLAN OF CARE
"Pt reports feeling better overall today.  Labs are improving. Mobility is improving. Lung sounds improving as well. IV in left arm occluded - started new IV in right arm.  Spouse visited.  No new complaints.  Continue to monitor for improvement.    Problem: Plan of Care - These are the overarching goals to be used throughout the patient stay.    Goal: Plan of Care Review  Description: The Plan of Care Review/Shift note should be completed every shift.  The Outcome Evaluation is a brief statement about your assessment that the patient is improving, declining, or no change.  This information will be displayed automatically on your shift note.  Outcome: Progressing  Flowsheets (Taken 10/14/2022 2015)  Plan of Care Reviewed With: patient  Overall Patient Progress: improving  Goal: Patient-Specific Goal (Individualized)  Description: You can add care plan individualizations to a care plan. Examples of Individualization might be:  \"Parent requests to be called daily at 9am for status\", \"I have a hard time hearing out of my right ear\", or \"Do not touch me to wake me up as it startles me\".  Outcome: Progressing  Goal: Absence of Hospital-Acquired Illness or Injury  Outcome: Progressing  Intervention: Identify and Manage Fall Risk  Recent Flowsheet Documentation  Taken 10/14/2022 1730 by Zonia Lyle RN  Safety Promotion/Fall Prevention:   safety round/check completed   clutter free environment maintained   increased rounding and observation   room organization consistent  Taken 10/14/2022 0730 by Zonia Lyle RN  Safety Promotion/Fall Prevention:   safety round/check completed   clutter free environment maintained   increased rounding and observation   room organization consistent  Intervention: Prevent Skin Injury  Recent Flowsheet Documentation  Taken 10/14/2022 1730 by Zonia Lyle RN  Body Position: position changed independently  Taken 10/14/2022 0730 by Zonia Lyle RN  Body Position: position " changed independently  Goal: Optimal Comfort and Wellbeing  Outcome: Progressing  Intervention: Monitor Pain and Promote Comfort  Recent Flowsheet Documentation  Taken 10/14/2022 1214 by Zonia Lyle, RN  Pain Management Interventions: medication (see MAR)  Goal: Readiness for Transition of Care  Outcome: Progressing   Goal Outcome Evaluation:      Plan of Care Reviewed With: patient    Overall Patient Progress: improvingOverall Patient Progress: improving

## 2022-10-16 LAB
ATRIAL RATE - MUSE: 101 BPM
ATRIAL RATE - MUSE: 77 BPM
DIASTOLIC BLOOD PRESSURE - MUSE: NORMAL MMHG
DIASTOLIC BLOOD PRESSURE - MUSE: NORMAL MMHG
INTERPRETATION ECG - MUSE: NORMAL
INTERPRETATION ECG - MUSE: NORMAL
P AXIS - MUSE: -27 DEGREES
P AXIS - MUSE: NORMAL DEGREES
PR INTERVAL - MUSE: 130 MS
PR INTERVAL - MUSE: 152 MS
QRS DURATION - MUSE: 72 MS
QRS DURATION - MUSE: 84 MS
QT - MUSE: 328 MS
QT - MUSE: 384 MS
QTC - MUSE: 425 MS
QTC - MUSE: 434 MS
R AXIS - MUSE: -9 DEGREES
R AXIS - MUSE: 6 DEGREES
SYSTOLIC BLOOD PRESSURE - MUSE: NORMAL MMHG
SYSTOLIC BLOOD PRESSURE - MUSE: NORMAL MMHG
T AXIS - MUSE: 144 DEGREES
T AXIS - MUSE: 89 DEGREES
VENTRICULAR RATE- MUSE: 101 BPM
VENTRICULAR RATE- MUSE: 77 BPM

## 2022-10-17 ENCOUNTER — PATIENT OUTREACH (OUTPATIENT)
Dept: INTERNAL MEDICINE | Facility: OTHER | Age: 72
End: 2022-10-17

## 2022-10-17 DIAGNOSIS — J43.8 OTHER EMPHYSEMA (H): ICD-10-CM

## 2022-10-17 RX ORDER — EPINEPHRINE 0.3 MG/.3ML
0.3 INJECTION SUBCUTANEOUS
Qty: 0.9 ML | Refills: 1 | Status: SHIPPED | OUTPATIENT
Start: 2022-10-17 | End: 2023-05-25

## 2022-10-17 NOTE — TELEPHONE ENCOUNTER
Transitional Care Management Phone Call    Summary of hospitalization:  [facility] discharge summary reviewed  DISCHARGE DIAGNOSIS:  Hyperkalemia: Anemia of chronic kidney failure, stage 3 (moderate) (H) (7/28/2017)    Chronic diastolic CHF (congestive heart failure), NYHA class 2 (H) (2/13/2016)    Chronic kidney disease (CKD), stage III (moderate) (H) (12/11/2017)    COPD (chronic obstructive pulmonary disease) (H) (9/12/2014)    Diabetes mellitus type 2, insulin dependent (H) (7/28/2017)    HTN (hypertension) (11/29/2017)    Hyperlipidemia (1/26/2018)    Hypothyroidism (1/26/2018)    Seropositive rheumatoid arthritis (H) (1/15/2016)    Other amyloidosis (H) (11/7/2018)    Pneumonia due to infectious organism, unspecified laterality, unspecified part of lung (10/14/2022)    Hyponatremia (10/14/2022)    Acute kidney injury (H) (10/14/2022)  DATE OF DISCHARGE: 10/15/22    Diagnostic Tests/Treatments reviewed.  Follow up needed: Potassium lab    Post Discharge Medication Reconciliation: discharge medications reconciled, continue medications without change. Has not started Cymbalta yet, is picking tht up from the pharmacy.   Medications reviewed by: by myself    Problems taking medications regularly:  None  Problems adhering to non-medication therapy:  None    Other Healthcare Providers Involved in Patient s Care:         None  Update since discharge: improved.   Plan of care communicated with patient  Just a friendly reminder that you appointment is   Next 5 appointments (look out 90 days)    Oct 26, 2022  1:00 PM  Office Visit with Ann-Marie Rocha NP  [facility] ([facility] ) [address]  [place] [id number] [phone]      .  We encourage you to keep this appointment.  Please remember to bring all of your pills in their bottles (including any vitamins or over the counter pills) with you to your appointment.    The patient indicates understanding of these issues and agrees with the plan of care.   Yes    Was the patient contacted within the 2 business days or other approved timeframe?  Yes  Was the Medication reconciliation and management done since the patient was discharged? No    Martita Ruth RN  10/17/2022 10:43 AM

## 2022-11-02 ENCOUNTER — TELEPHONE (OUTPATIENT)
Dept: INTERNAL MEDICINE | Facility: OTHER | Age: 72
End: 2022-11-02

## 2022-11-02 DIAGNOSIS — J44.9 CHRONIC OBSTRUCTIVE PULMONARY DISEASE, UNSPECIFIED COPD TYPE (H): Primary | ICD-10-CM

## 2022-11-02 DIAGNOSIS — J44.9 CHRONIC OBSTRUCTIVE PULMONARY DISEASE, UNSPECIFIED COPD TYPE (H): ICD-10-CM

## 2022-11-02 NOTE — TELEPHONE ENCOUNTER
DME (Durable Medical Equipment) Orders and Documentation  Orders Placed This Encounter   Procedures     Nebulizer and Supplies Order      The patient was assessed and it was determined the patient is in need of the following listed DME Supplies/Equipment. Please complete supporting documentation below to demonstrate medical necessity.      Nebulizer Documentation  The patient was seen 11/02/2022. After assessment, the patient will need to be treated with ongoing nebulizer for treatment/management of COPD and asthma.

## 2022-11-02 NOTE — TELEPHONE ENCOUNTER
Andre at [facility] called and patients neb quit working and said it's a high priority that he gets a approval to refill today--

## 2022-11-03 RX ORDER — PEAK FLOW METER
EACH MISCELLANEOUS
Qty: 1 EACH | Refills: 0 | Status: SHIPPED | OUTPATIENT
Start: 2022-11-03 | End: 2023-11-03

## 2022-11-28 DIAGNOSIS — E11.9 DIABETES MELLITUS TYPE 2, INSULIN DEPENDENT (H): Primary | ICD-10-CM

## 2022-11-28 DIAGNOSIS — Z79.4 DIABETES MELLITUS TYPE 2, INSULIN DEPENDENT (H): Primary | ICD-10-CM

## 2022-11-28 RX ORDER — LANCETS 30 GAUGE
1 EACH MISCELLANEOUS 2 TIMES DAILY
Qty: 200 EACH | Refills: 0 | Status: SHIPPED | OUTPATIENT
Start: 2022-11-28 | End: 2022-11-30

## 2022-11-28 NOTE — TELEPHONE ENCOUNTER
Requested Prescriptions   Pending Prescriptions Disp Refills     Lancets (ONETOUCH DELICA PLUS [id number]) MISC 200 each 3     Si each 2 times daily   Last Prescription Date:   20  Last Fill Qty/Refills:         200, R-3    Last Office Visit:              22 (VV)   Future Office visit:           None    eKlsey Brody RN .............. 2022  9:50 AM

## 2022-11-28 NOTE — TELEPHONE ENCOUNTER
Reason for call: Medication or medication refill    Name of medication requested: one touch Delica lancets     Are you out of the medication? yes    What pharmacy do you use? [facility]    Preferred method for responding to this message: Telephone Call    Phone number patient can be reached at: Home number on file [phone] (home)    If we cannot reach you directly, may we leave a detailed response at the number you provided? Yes     Cornelia Day on 11/28/2022 at 9:47 AM

## 2022-11-30 DIAGNOSIS — Z79.4 DIABETES MELLITUS TYPE 2, INSULIN DEPENDENT (H): ICD-10-CM

## 2022-11-30 DIAGNOSIS — E11.9 DIABETES MELLITUS TYPE 2, INSULIN DEPENDENT (H): ICD-10-CM

## 2022-11-30 RX ORDER — LANCETS 30 GAUGE
EACH MISCELLANEOUS
Qty: 200 EACH | Refills: 5 | Status: SHIPPED | OUTPATIENT
Start: 2022-11-30 | End: 2024-01-19

## 2022-12-09 ENCOUNTER — MYC MEDICAL ADVICE (OUTPATIENT)
Dept: INTERNAL MEDICINE | Facility: OTHER | Age: 72
End: 2022-12-09

## 2022-12-09 DIAGNOSIS — E11.9 DIABETES MELLITUS TYPE 2, INSULIN DEPENDENT (H): Primary | ICD-10-CM

## 2022-12-09 DIAGNOSIS — Z79.4 DIABETES MELLITUS TYPE 2, INSULIN DEPENDENT (H): Primary | ICD-10-CM

## 2022-12-12 RX ORDER — INSULIN LISPRO 100 [IU]/ML
INJECTION, SUSPENSION SUBCUTANEOUS
Qty: 60 ML | Refills: 3 | Status: SHIPPED | OUTPATIENT
Start: 2022-12-12 | End: 2023-05-25

## 2022-12-12 NOTE — TELEPHONE ENCOUNTER
Pharmacy is needing a new prescription for the change to Humalog.  Patient was called and updated that this will be sent.    Rin Flanagan LPN....................  12/12/2022   12:16 PM

## 2022-12-12 NOTE — TELEPHONE ENCOUNTER
Notify her that the insulin will be slightly different and she may need to make adjustments in dosing

## 2022-12-12 NOTE — TELEPHONE ENCOUNTER
Patient was called and given instructions on medication change and dosage.    Rin Flanagan LPN....................  12/12/2022   4:42 PM

## 2023-01-10 ENCOUNTER — MYC MEDICAL ADVICE (OUTPATIENT)
Dept: INTERNAL MEDICINE | Facility: OTHER | Age: 73
End: 2023-01-10

## 2023-01-18 ENCOUNTER — OFFICE VISIT (OUTPATIENT)
Dept: INTERNAL MEDICINE | Facility: OTHER | Age: 73
End: 2023-01-18
Attending: INTERNAL MEDICINE
Payer: MEDICARE

## 2023-01-18 VITALS
SYSTOLIC BLOOD PRESSURE: 152 MMHG | TEMPERATURE: 97.9 F | BODY MASS INDEX: 25.51 KG/M2 | WEIGHT: 144 LBS | OXYGEN SATURATION: 95 % | RESPIRATION RATE: 18 BRPM | DIASTOLIC BLOOD PRESSURE: 64 MMHG | HEART RATE: 100 BPM

## 2023-01-18 DIAGNOSIS — M05.9 SEROPOSITIVE RHEUMATOID ARTHRITIS (H): Primary | ICD-10-CM

## 2023-01-18 PROCEDURE — 99213 OFFICE O/P EST LOW 20 MIN: CPT | Performed by: INTERNAL MEDICINE

## 2023-01-18 PROCEDURE — G0463 HOSPITAL OUTPT CLINIC VISIT: HCPCS

## 2023-01-18 RX ORDER — TRAMADOL HYDROCHLORIDE 50 MG/1
50 TABLET ORAL EVERY 6 HOURS PRN
Qty: 30 TABLET | Refills: 5 | Status: SHIPPED | OUTPATIENT
Start: 2023-01-18 | End: 2023-05-25

## 2023-01-18 ASSESSMENT — ENCOUNTER SYMPTOMS
CONSTITUTIONAL NEGATIVE: 1
RESPIRATORY NEGATIVE: 1

## 2023-01-18 ASSESSMENT — PAIN SCALES - GENERAL: PAINLEVEL: SEVERE PAIN (6)

## 2023-01-18 NOTE — PROGRESS NOTES
ICD-10-CM    1. Seropositive rheumatoid arthritis (H)  M05.9 traMADol (ULTRAM) 50 MG tablet        I refilled her tramadol, dispense 30 with 5 refills.  I explained to them that she would need to be seen in 6 months to have this refilled again.  She will follow-up at the [facility] ER next month for all of her usual testing.      Carmen Sanchez is a 72 year old, presenting for the following health issues:  Recheck Medication      She is in today to have her tramadol refilled.  She takes 1 tramadol tablet at night to help with her RA pain and allow her to sleep at night.  Nothing to suggest abuse, misuse or diversion.   website review was done today and looks acceptable.  I have not put her on a narcotic contract, I did have no concerns about her use of this medication.  It has been recommended by her physicians at the [facility] ER that she stay on this as well.  She has been on Ambien in the past but that has caused her to have falls and other complications.    History of Present Illness       Reason for visit:  Medication    She eats 0-1 servings of fruits and vegetables daily.She consumes 0 sweetened beverage(s) daily.She exercises with enough effort to increase her heart rate 10 to 19 minutes per day.  She exercises with enough effort to increase her heart rate 7 days per week.   She is taking medications regularly.         Current Outpatient Medications   Medication     Abatacept (ORENCIA) 125 MG/ML SOAJ auto-injector     albuterol (PROAIR HFA/PROVENTIL HFA/VENTOLIN HFA) 108 (90 Base) MCG/ACT inhaler     aspirin 81 MG chewable tablet     atovaquone (MEPRON) 750 MG/5ML suspension     BD PEN NEEDLE LUCILLE 2ND GEN 32G X 4 MM miscellaneous     blood glucose (ONETOUCH VERIO IQ) test strip     budesonide (PULMICORT) 0.5 MG/2ML neb solution     cyanocobalamin (VITAMIN B-12) 1000 MCG tablet     EPINEPHrine (ANY BX GENERIC EQUIV) 0.3 MG/0.3ML injection 2-pack     estradiol (VAGIFEM) 10 MCG TABS vaginal tablet      "folic acid (FOLVITE) 1 MG tablet     formoterol (PERFOROMIST) 20 MCG/2ML neb solution     glucagon 1 MG kit     glucose 4 g CHEW chewable tablet     insulin lispro protamine-insulin lispro (HUMALOG MIX 75/25 KWIKPEN) (75-25) 100 UNIT/ML pen     Lancets (ONETOUCH DELICA PLUS [id number]) MISC     levothyroxine (SYNTHROID/LEVOTHROID) 100 MCG tablet     lisinopril (ZESTRIL) 10 MG tablet     Methotrexate 25 MG/ML SOSY     montelukast (SINGULAIR) 10 MG tablet     Nebulizers (VIOS AEROSOL DELIVERY SYSTEM) MISC     Needle, Disp, (HYPODERMIC NEEDLE) 27G X 1/2\" MISC     omeprazole (PRILOSEC) 20 MG DR capsule     predniSONE (DELTASONE) 1 MG tablet     predniSONE (DELTASONE) 5 MG tablet     simvastatin (ZOCOR) 40 MG tablet     tiotropium (SPIRIVA) 18 MCG inhaled capsule     traMADol (ULTRAM) 50 MG tablet     Vitamin D, Cholecalciferol, 25 MCG (1000 UT) TABS     No current facility-administered medications for this visit.         Review of Systems   Constitutional: Negative.    Respiratory: Negative.             Objective    BP (!) 152/64   Pulse 100   Temp 97.9  F (36.6  C) (Temporal)   Resp 18   Wt 65.3 kg (144 lb)   SpO2 95%   BMI 25.51 kg/m    Body mass index is 25.51 kg/m .  Physical Exam  Vitals and nursing note reviewed.   Constitutional:       General: She is not in acute distress.     Appearance: Normal appearance. She is not ill-appearing, toxic-appearing or diaphoretic.      Comments: In wheelchair   Neurological:      Mental Status: She is alert.                            "

## 2023-01-30 DIAGNOSIS — E11.9 DIABETES MELLITUS TYPE 2, INSULIN DEPENDENT (H): ICD-10-CM

## 2023-01-30 DIAGNOSIS — Z79.4 DIABETES MELLITUS TYPE 2, INSULIN DEPENDENT (H): ICD-10-CM

## 2023-01-31 DIAGNOSIS — E53.8 VITAMIN B12 DEFICIENCY (NON ANEMIC): ICD-10-CM

## 2023-01-31 RX ORDER — LANOLIN ALCOHOL/MO/W.PET/CERES
CREAM (GRAM) TOPICAL
Qty: 90 TABLET | Refills: 3 | Status: SHIPPED | OUTPATIENT
Start: 2023-01-31 | End: 2023-11-20

## 2023-01-31 RX ORDER — BLOOD SUGAR DIAGNOSTIC
STRIP MISCELLANEOUS
Qty: 200 STRIP | Refills: 3 | Status: SHIPPED | OUTPATIENT
Start: 2023-01-31 | End: 2024-08-05

## 2023-02-17 ENCOUNTER — LAB (OUTPATIENT)
Dept: LAB | Facility: OTHER | Age: 73
End: 2023-02-17
Payer: MEDICARE

## 2023-02-17 DIAGNOSIS — Z01.89 EXAMINATION FOR, LABORATORY: Primary | ICD-10-CM

## 2023-02-17 PROCEDURE — 36415 COLL VENOUS BLD VENIPUNCTURE: CPT | Mod: ZL

## 2023-02-17 PROCEDURE — 99000 SPECIMEN HANDLING OFFICE-LAB: CPT

## 2023-02-27 DIAGNOSIS — E11.9 DIABETES MELLITUS TYPE 2, INSULIN DEPENDENT (H): Primary | ICD-10-CM

## 2023-02-27 DIAGNOSIS — Z79.4 DIABETES MELLITUS TYPE 2, INSULIN DEPENDENT (H): Primary | ICD-10-CM

## 2023-02-27 DIAGNOSIS — K21.00 GASTROESOPHAGEAL REFLUX DISEASE WITH ESOPHAGITIS WITHOUT HEMORRHAGE: ICD-10-CM

## 2023-02-28 RX ORDER — PEN NEEDLE, DIABETIC 32GX 5/32"
NEEDLE, DISPOSABLE MISCELLANEOUS
Qty: 200 EACH | Refills: 2 | Status: SHIPPED | OUTPATIENT
Start: 2023-02-28 | End: 2023-11-20

## 2023-02-28 NOTE — TELEPHONE ENCOUNTER
[facility] sent Rx request for the following:      Requested Prescriptions   Pending Prescriptions Disp Refills     BD PEN NEEDLE LUCILLE 2ND GEN 32G X 4 MM miscellaneous [Pharmacy Med Name: B-D LUCILLE 2ND GEN PEN NDL [id number]]       Sig: USE TO INJECT INSULIN TWICE DAILY   Last Prescription Date:   12/21/21  Last Fill Qty/Refills:         200, R-3         omeprazole (PRILOSEC) 20 MG DR capsule [Pharmacy Med Name: OMEPRAZOLE 20MG CAPSULES] 90 capsule 3     Sig: TAKE 1 CAPSULE(20 MG) BY MOUTH DAILY   Last Prescription Date:   1/3/22  Last Fill Qty/Refills:         90, R-3      PPI Protocol Failed - 2/28/2023 12:08 PM        Failed - No diagnosis of osteoporosis on record     Last Office Visit:              1/18/23   Future Office visit:           None    In clinical absence of patient's primary, Raul Ortega, patient is requesting that this message be sent to the covering provider for consideration please.    Kelsey Brdoy RN .............. 2/28/2023  12:12 PM

## 2023-03-07 ENCOUNTER — TRANSFERRED RECORDS (OUTPATIENT)
Dept: HEALTH INFORMATION MANAGEMENT | Facility: OTHER | Age: 73
End: 2023-03-07
Payer: MEDICARE

## 2023-03-07 LAB — RETINOPATHY: NEGATIVE

## 2023-03-23 ENCOUNTER — ALLIED HEALTH/NURSE VISIT (OUTPATIENT)
Dept: FAMILY MEDICINE | Facility: OTHER | Age: 73
End: 2023-03-23
Attending: INTERNAL MEDICINE
Payer: MEDICARE

## 2023-03-23 DIAGNOSIS — M81.0 OSTEOPOROSIS, UNSPECIFIED OSTEOPOROSIS TYPE, UNSPECIFIED PATHOLOGICAL FRACTURE PRESENCE: Primary | ICD-10-CM

## 2023-03-23 LAB
CALCIUM SERPL-MCNC: 9 MG/DL (ref 8.8–10.2)
CREAT SERPL-MCNC: 1.67 MG/DL (ref 0.51–0.95)
GFR SERPL CREATININE-BSD FRML MDRD: 32 ML/MIN/1.73M2

## 2023-03-23 PROCEDURE — 36415 COLL VENOUS BLD VENIPUNCTURE: CPT | Mod: ZL | Performed by: INTERNAL MEDICINE

## 2023-03-23 PROCEDURE — 250N000011 HC RX IP 250 OP 636: Performed by: INTERNAL MEDICINE

## 2023-03-23 PROCEDURE — 82310 ASSAY OF CALCIUM: CPT | Mod: ZL | Performed by: INTERNAL MEDICINE

## 2023-03-23 PROCEDURE — 82565 ASSAY OF CREATININE: CPT | Mod: ZL | Performed by: INTERNAL MEDICINE

## 2023-03-23 PROCEDURE — 96372 THER/PROPH/DIAG INJ SC/IM: CPT | Performed by: INTERNAL MEDICINE

## 2023-03-23 RX ORDER — MEPERIDINE HYDROCHLORIDE 50 MG/ML
25 INJECTION INTRAMUSCULAR; INTRAVENOUS; SUBCUTANEOUS EVERY 30 MIN PRN
Status: CANCELLED | OUTPATIENT
Start: 2023-09-08

## 2023-03-23 RX ORDER — ALBUTEROL SULFATE 0.83 MG/ML
2.5 SOLUTION RESPIRATORY (INHALATION)
Status: CANCELLED | OUTPATIENT
Start: 2023-09-08

## 2023-03-23 RX ORDER — DIPHENHYDRAMINE HYDROCHLORIDE 50 MG/ML
50 INJECTION INTRAMUSCULAR; INTRAVENOUS
Status: CANCELLED
Start: 2023-09-08

## 2023-03-23 RX ORDER — METHYLPREDNISOLONE SODIUM SUCCINATE 125 MG/2ML
125 INJECTION, POWDER, LYOPHILIZED, FOR SOLUTION INTRAMUSCULAR; INTRAVENOUS
Status: CANCELLED
Start: 2023-09-08

## 2023-03-23 RX ORDER — ALBUTEROL SULFATE 90 UG/1
1-2 AEROSOL, METERED RESPIRATORY (INHALATION)
Status: CANCELLED
Start: 2023-09-08

## 2023-03-23 RX ORDER — EPINEPHRINE 1 MG/ML
0.3 INJECTION, SOLUTION, CONCENTRATE INTRAVENOUS EVERY 5 MIN PRN
Status: CANCELLED | OUTPATIENT
Start: 2023-09-08

## 2023-03-23 RX ADMIN — DENOSUMAB 60 MG: 60 INJECTION SUBCUTANEOUS at 10:46

## 2023-03-23 NOTE — PROGRESS NOTES
Specialty Medication: Prolia    VITAL SIGNS  Weight: 65.3 kg    LABS  Calcium: 9.0  Creat: 1.67    Verified patient's first and last name, and . Patient stated reason for visit today is to receive a Prolia injection. Patient denied any concerns with previous injections. Prolia removed from [facility] in Procedure room, allowed to sit out for 15 mins prior to administration. Prolia administered SubQ, as ordered. Administration of medication documented in MAR (see MAR for further information regarding dose, lot #, NDC #, expiration date).     ZARI COREY RN on 3/23/2023 at 11:09 AM

## 2023-04-24 DIAGNOSIS — J44.9 CHRONIC OBSTRUCTIVE PULMONARY DISEASE, UNSPECIFIED COPD TYPE (H): ICD-10-CM

## 2023-04-28 RX ORDER — BUDESONIDE 0.5 MG/2ML
INHALANT ORAL
Qty: 360 ML | Refills: 3 | Status: SHIPPED | OUTPATIENT
Start: 2023-04-28 | End: 2023-05-25

## 2023-04-28 NOTE — TELEPHONE ENCOUNTER
[facility] sent Rx request for the following:    BUDESONIDE 0.5MG/2ML VIALS 2ML  Last Prescription Date:   8/12/21  Last Fill Qty/Refills:         360 mL, R-3    Last Office Visit:              1/18/23   Future Office visit:           5/25/23  In clinical absence of patient's primary, Raul Ortega, patient is requesting that this message be sent to the covering provider for consideration please.  Clari Burrell RN on 4/28/2023 at 9:48 AM

## 2023-05-04 DIAGNOSIS — Z79.4 DIABETES MELLITUS TYPE 2, INSULIN DEPENDENT (H): ICD-10-CM

## 2023-05-04 DIAGNOSIS — E11.9 DIABETES MELLITUS TYPE 2, INSULIN DEPENDENT (H): ICD-10-CM

## 2023-05-04 RX ORDER — LANCETS 30 GAUGE
EACH MISCELLANEOUS
Qty: 200 EACH | Refills: 5 | OUTPATIENT
Start: 2023-05-04

## 2023-05-04 NOTE — TELEPHONE ENCOUNTER
[facility] sent Rx request for the following:      Redundant refill request refused: Too soon:    Lancets (ONETOUCH DELICA PLUS [id number]) MISC 200 each 5 11/30/2022  No   Sig: USE TO CHECK BLOOD SUGAR TWICE DAILY.   Sent to pharmacy as: OneTouch Delica Plus [id number]   Class: E-Prescribe   Notes to Pharmacy: ZERO refills remain on this prescription. Your patient is requesting advance approval of refills for this medication to PREVENT ANY MISSED DOSES   Order: [id number]   E-Prescribing Status: Receipt confirmed by pharmacy (11/30/2022  2:33 PM CST)     [id number] - [place], [place] - [address]     Kelsey Brody RN .............. 5/4/2023  5:27 PM

## 2023-05-12 DIAGNOSIS — J44.9 CHRONIC OBSTRUCTIVE PULMONARY DISEASE, UNSPECIFIED COPD TYPE (H): ICD-10-CM

## 2023-05-16 RX ORDER — BUDESONIDE 0.5 MG/2ML
INHALANT ORAL
Qty: 360 ML | Refills: 3 | OUTPATIENT
Start: 2023-05-16

## 2023-05-16 NOTE — TELEPHONE ENCOUNTER
[facility] sent Rx request for the following:   budesonide (PULMICORT) 0.5 MG/2ML neb solution  Last Prescription Date:   4/28/23  Last Fill Qty/Refills:         360 mL, R-3    Last Office Visit:              1/18/23   Future Office visit:           5/25/23  In clinical absence of patient's primary, Raul Ortega, patient is requesting that this message be sent to the covering provider for consideration please.  Clari Burrell RN on 5/16/2023 at 10:38 AM

## 2023-05-25 ENCOUNTER — OFFICE VISIT (OUTPATIENT)
Dept: PEDIATRICS | Facility: OTHER | Age: 73
End: 2023-05-25
Attending: INTERNAL MEDICINE
Payer: MEDICARE

## 2023-05-25 VITALS
BODY MASS INDEX: 25.21 KG/M2 | SYSTOLIC BLOOD PRESSURE: 122 MMHG | OXYGEN SATURATION: 100 % | RESPIRATION RATE: 16 BRPM | HEART RATE: 92 BPM | DIASTOLIC BLOOD PRESSURE: 60 MMHG | TEMPERATURE: 97.7 F | WEIGHT: 142.3 LBS

## 2023-05-25 DIAGNOSIS — Z79.4 DIABETES MELLITUS TYPE 2, INSULIN DEPENDENT (H): ICD-10-CM

## 2023-05-25 DIAGNOSIS — M05.9 SEROPOSITIVE RHEUMATOID ARTHRITIS (H): ICD-10-CM

## 2023-05-25 DIAGNOSIS — J44.9 COPD, VERY SEVERE (H): ICD-10-CM

## 2023-05-25 DIAGNOSIS — Z76.89 ENCOUNTER TO ESTABLISH CARE: Primary | ICD-10-CM

## 2023-05-25 DIAGNOSIS — E78.5 HYPERLIPIDEMIA, UNSPECIFIED HYPERLIPIDEMIA TYPE: ICD-10-CM

## 2023-05-25 DIAGNOSIS — D47.2 MGUS (MONOCLONAL GAMMOPATHY OF UNKNOWN SIGNIFICANCE): ICD-10-CM

## 2023-05-25 DIAGNOSIS — E11.9 DIABETES MELLITUS TYPE 2, INSULIN DEPENDENT (H): ICD-10-CM

## 2023-05-25 DIAGNOSIS — N18.30 ANEMIA OF CHRONIC KIDNEY FAILURE, STAGE 3 (MODERATE) (H): ICD-10-CM

## 2023-05-25 DIAGNOSIS — M81.0 OSTEOPOROSIS, UNSPECIFIED OSTEOPOROSIS TYPE, UNSPECIFIED PATHOLOGICAL FRACTURE PRESENCE: ICD-10-CM

## 2023-05-25 DIAGNOSIS — G89.29 OTHER CHRONIC PAIN: ICD-10-CM

## 2023-05-25 DIAGNOSIS — D63.1 ANEMIA OF CHRONIC KIDNEY FAILURE, STAGE 3 (MODERATE) (H): ICD-10-CM

## 2023-05-25 DIAGNOSIS — K21.00 GASTROESOPHAGEAL REFLUX DISEASE WITH ESOPHAGITIS WITHOUT HEMORRHAGE: ICD-10-CM

## 2023-05-25 DIAGNOSIS — N95.2 ATROPHIC VAGINITIS: ICD-10-CM

## 2023-05-25 DIAGNOSIS — N18.32 STAGE 3B CHRONIC KIDNEY DISEASE (H): ICD-10-CM

## 2023-05-25 DIAGNOSIS — J44.9 CHRONIC OBSTRUCTIVE PULMONARY DISEASE, UNSPECIFIED COPD TYPE (H): ICD-10-CM

## 2023-05-25 DIAGNOSIS — I50.32 CHRONIC DIASTOLIC CHF (CONGESTIVE HEART FAILURE), NYHA CLASS 2 (H): ICD-10-CM

## 2023-05-25 DIAGNOSIS — J43.8 OTHER EMPHYSEMA (H): ICD-10-CM

## 2023-05-25 DIAGNOSIS — M10.9 GOUT, UNSPECIFIED CAUSE, UNSPECIFIED CHRONICITY, UNSPECIFIED SITE: ICD-10-CM

## 2023-05-25 PROBLEM — N17.9 ACUTE KIDNEY INJURY (H): Status: RESOLVED | Noted: 2022-10-14 | Resolved: 2023-05-25

## 2023-05-25 PROBLEM — E85.89 OTHER AMYLOIDOSIS (H): Chronic | Status: ACTIVE | Noted: 2018-11-07

## 2023-05-25 PROBLEM — E03.9 ACQUIRED HYPOTHYROIDISM: Chronic | Status: ACTIVE | Noted: 2018-01-26

## 2023-05-25 PROBLEM — M89.9 DISORDER OF BONE AND CARTILAGE: Status: RESOLVED | Noted: 2018-01-26 | Resolved: 2023-05-25

## 2023-05-25 PROBLEM — M94.9 DISORDER OF BONE AND CARTILAGE: Status: RESOLVED | Noted: 2018-01-26 | Resolved: 2023-05-25

## 2023-05-25 PROBLEM — J18.9 PNEUMONIA DUE TO INFECTIOUS ORGANISM, UNSPECIFIED LATERALITY, UNSPECIFIED PART OF LUNG: Status: RESOLVED | Noted: 2022-10-14 | Resolved: 2023-05-25

## 2023-05-25 LAB
ANION GAP SERPL CALCULATED.3IONS-SCNC: 13 MMOL/L (ref 7–15)
BUN SERPL-MCNC: 40.8 MG/DL (ref 8–23)
CALCIUM SERPL-MCNC: 9.1 MG/DL (ref 8.8–10.2)
CHLORIDE SERPL-SCNC: 108 MMOL/L (ref 98–107)
CHOLEST SERPL-MCNC: 195 MG/DL
CREAT SERPL-MCNC: 1.64 MG/DL (ref 0.51–0.95)
CREAT UR-MCNC: 195.8 MG/DL
CREAT UR-MCNC: 200 MG/DL
DEPRECATED HCO3 PLAS-SCNC: 21 MMOL/L (ref 22–29)
GFR SERPL CREATININE-BSD FRML MDRD: 32 ML/MIN/1.73M2
GLUCOSE SERPL-MCNC: 86 MG/DL (ref 70–99)
HBA1C MFR BLD: 6.5 % (ref 4–6.2)
HDLC SERPL-MCNC: 87 MG/DL
HOLD SPECIMEN: NORMAL
LDLC SERPL CALC-MCNC: 64 MG/DL
MICROALBUMIN UR-MCNC: 102.7 MG/L
MICROALBUMIN/CREAT UR: 52.45 MG/G CR (ref 0–25)
NONHDLC SERPL-MCNC: 108 MG/DL
POTASSIUM SERPL-SCNC: 5.1 MMOL/L (ref 3.4–5.3)
SODIUM SERPL-SCNC: 142 MMOL/L (ref 136–145)
TRIGL SERPL-MCNC: 219 MG/DL

## 2023-05-25 PROCEDURE — 82570 ASSAY OF URINE CREATININE: CPT | Mod: ZL | Performed by: INTERNAL MEDICINE

## 2023-05-25 PROCEDURE — 99214 OFFICE O/P EST MOD 30 MIN: CPT | Performed by: INTERNAL MEDICINE

## 2023-05-25 PROCEDURE — 80061 LIPID PANEL: CPT | Mod: ZL | Performed by: INTERNAL MEDICINE

## 2023-05-25 PROCEDURE — 80346 BENZODIAZEPINES1-12: CPT | Mod: ZL | Performed by: INTERNAL MEDICINE

## 2023-05-25 PROCEDURE — 82310 ASSAY OF CALCIUM: CPT | Mod: ZL | Performed by: INTERNAL MEDICINE

## 2023-05-25 PROCEDURE — 36415 COLL VENOUS BLD VENIPUNCTURE: CPT | Mod: ZL | Performed by: INTERNAL MEDICINE

## 2023-05-25 PROCEDURE — 90677 PCV20 VACCINE IM: CPT

## 2023-05-25 PROCEDURE — 80354 DRUG SCREENING FENTANYL: CPT | Mod: ZL | Performed by: INTERNAL MEDICINE

## 2023-05-25 PROCEDURE — G0463 HOSPITAL OUTPT CLINIC VISIT: HCPCS | Mod: 25

## 2023-05-25 PROCEDURE — 80360 METHYLPHENIDATE: CPT | Mod: ZL | Performed by: INTERNAL MEDICINE

## 2023-05-25 PROCEDURE — 83036 HEMOGLOBIN GLYCOSYLATED A1C: CPT | Mod: ZL | Performed by: INTERNAL MEDICINE

## 2023-05-25 RX ORDER — ALBUTEROL SULFATE 90 UG/1
AEROSOL, METERED RESPIRATORY (INHALATION)
Qty: 18 G | Refills: 3 | Status: SHIPPED | OUTPATIENT
Start: 2023-05-25 | End: 2023-11-20

## 2023-05-25 RX ORDER — ESTRADIOL 10 UG/1
10 INSERT VAGINAL
Qty: 24 TABLET | Refills: 3 | Status: SHIPPED | OUTPATIENT
Start: 2023-05-25 | End: 2024-04-26

## 2023-05-25 RX ORDER — BUDESONIDE 0.5 MG/2ML
0.5 INHALANT ORAL 2 TIMES DAILY
Qty: 360 ML | Refills: 3 | Status: SHIPPED | OUTPATIENT
Start: 2023-05-25 | End: 2024-05-06

## 2023-05-25 RX ORDER — SIMVASTATIN 40 MG
40 TABLET ORAL AT BEDTIME
Qty: 90 TABLET | Refills: 3 | Status: SHIPPED | OUTPATIENT
Start: 2023-05-25 | End: 2024-05-15

## 2023-05-25 RX ORDER — INSULIN LISPRO 100 [IU]/ML
INJECTION, SUSPENSION SUBCUTANEOUS
Qty: 60 ML | Refills: 3 | Status: SHIPPED | OUTPATIENT
Start: 2023-05-25 | End: 2024-06-07

## 2023-05-25 RX ORDER — TRAMADOL HYDROCHLORIDE 50 MG/1
50 TABLET ORAL
Qty: 30 TABLET | Refills: 5 | Status: SHIPPED | OUTPATIENT
Start: 2023-05-25 | End: 2023-11-20

## 2023-05-25 RX ORDER — LEVOTHYROXINE SODIUM 112 UG/1
1 TABLET ORAL DAILY
COMMUNITY
Start: 2023-03-01 | End: 2024-06-07

## 2023-05-25 RX ORDER — EPINEPHRINE 0.3 MG/.3ML
0.3 INJECTION SUBCUTANEOUS
Qty: 0.9 ML | Refills: 1 | Status: SHIPPED | OUTPATIENT
Start: 2023-05-25

## 2023-05-25 ASSESSMENT — PATIENT HEALTH QUESTIONNAIRE - PHQ9
10. IF YOU CHECKED OFF ANY PROBLEMS, HOW DIFFICULT HAVE THESE PROBLEMS MADE IT FOR YOU TO DO YOUR WORK, TAKE CARE OF THINGS AT HOME, OR GET ALONG WITH OTHER PEOPLE: NOT DIFFICULT AT ALL
SUM OF ALL RESPONSES TO PHQ QUESTIONS 1-9: 3
SUM OF ALL RESPONSES TO PHQ QUESTIONS 1-9: 3

## 2023-05-25 ASSESSMENT — PAIN SCALES - GENERAL: PAINLEVEL: MODERATE PAIN (5)

## 2023-05-25 NOTE — LETTER
Opioid / Opioid Plus Controlled Substance Agreement    This is an agreement between you and your provider about the safe and appropriate use of controlled substance/opioids prescribed by your care team. Controlled substances are medicines that can cause physical and mental dependence (abuse).    There are strict laws about having and using these medicines. We here at [facility] are committing to working with you in your efforts to get better. To support you in this work, we ll help you schedule regular office appointments for medicine refills. If we must cancel or change your appointment for any reason, we ll make sure you have enough medicine to last until your next appointment.     As a Provider, I will:  Listen carefully to your concerns and treat you with respect.   Recommend a treatment plan that I believe is in your best interest. This plan may involve therapies other than opioid pain medication.   Talk with you often about the possible benefits, and the risk of harm of any medicine that we prescribe for you.   Provide a plan on how to taper (discontinue or go off) using this medicine if the decision is made to stop its use.    As a Patient, I understand that opioid(s):   Are a controlled substance prescribed by my care team to help me function or work and manage my condition(s).   Are strong medicines and can cause serious side effects such as:  Drowsiness, which can seriously affect my driving ability  A lower breathing rate, enough to cause death  Harm to my thinking ability   Depression   Abuse of and addiction to this medicine  Need to be taken exactly as prescribed. Combining opioids with certain medicines or chemicals (such as illegal drugs, sedatives, sleeping pills, and benzodiazepines) can be dangerous or even fatal. If I stop opioids suddenly, I may have severe withdrawal symptoms.  Do not work for all types of pain nor for all patients. If they re not helpful, I may be asked to stop  them.    The risks, benefits and side effects of these medicine(s) were explained to me. I agree that:  I will take part in other treatments as advised by my care team. This may be psychiatry or counseling, physical therapy, behavioral therapy, group treatment or a referral to a specialist.     I will keep all my appointments. I understand that this is part of the monitoring of opioids. My care team may require an office visit for EVERY opioid/controlled substance refill. If I miss appointments or don t follow instructions, my care team may stop my medicine.    I will take my medicines as prescribed. I will not change the dose or schedule unless my care team tells me to. There will be no refills if I run out early.     I may be asked to come to the clinic and complete a urine drug test or complete a pill count at any time. If I don t give a urine sample or participate in a pill count, the care team may stop my medicine.    I will only receive prescriptions from this clinic for chronic pain. If I am treated by another provider for acute pain issues, I will tell them that I am taking opioid pain medication for chronic pain and that I have a treatment agreement with this provider. I will inform my [facility] care team within one business day if I am given a prescription for any pain medication by another healthcare provider. My [facility] care team can contact other providers and pharmacists about my use of any medicines.    It is up to me to make sure that I don t run out of my medicines on weekends or holidays. If my care team is willing to refill my opioid prescription without a visit, I must request refills only during office hours. Refills may take up to 3 business days to process. I will use one pharmacy to fill all my opioid and other controlled substance prescriptions. I will notify the clinic about any changes to my insurance or medication availability.    I am responsible for my prescriptions.  If the medicine/prescription is lost, stolen or destroyed, it will not be replaced. I also agree not to share controlled substance medicines with anyone.    I am aware I should not use any illegal or recreational drugs. I agree not to drink alcohol unless my care team says I can.       If I enroll in the [place] [facility] Cannabis program, I will tell my care team prior to my next refill.     I will tell my care team right away if I become pregnant, have a new medical problem treated outside of my regular clinic, or have a change in my medications.    I understand that this medicine can affect my thinking, judgment and reaction time. Alcohol and drugs affect the brain and body, which can affect the safety of my driving. Being under the influence of alcohol or drugs can affect my decision-making, behaviors, personal safety, and the safety of others. Driving while impaired (DWI) can occur if a person is driving, operating, or in physical control of a car, motorcycle, boat, snowmobile, ATV, motorbike, off-road vehicle, or any other motor vehicle ([place] Statute 169A.20). I understand the risk if I choose to drive or operate any vehicle or machinery.    I understand that if I do not follow any of the conditions above, my prescriptions or treatment may be stopped or changed.          Opioids  What You Need to Know    What are opioids?   Opioids are pain medicines that must be prescribed by a doctor. They are also known as narcotics.     Examples are:   morphine (MS Contin, Amisha)  oxycodone (Oxycontin)  oxycodone and acetaminophen (Percocet)  hydrocodone and acetaminophen (Vicodin, Norco)   fentanyl patch (Duragesic)   hydromorphone (Dilaudid)   methadone  codeine (Tylenol #3)     What do opioids do well?   Opioids are best for severe short-term pain such as after a surgery or injury. They may work well for cancer pain. They may help some people with long-lasting (chronic) pain.     What do opioids NOT do well?   Opioids  never get rid of pain entirely, and they don t work well for most patients with chronic pain. Opioids don t reduce swelling, one of the causes of pain.                                    Other ways to manage chronic pain and improve function include:     Treat the health problem that may be causing pain  Anti-inflammation medicines, which reduce swelling and tenderness, such as ibuprofen (Advil, Motrin) or naproxen (Aleve)  Acetaminophen (Tylenol)  Antidepressants and anti-seizure medicines, especially for nerve pain  Topical treatments such as patches or creams  Injections or nerve blocks  Chiropractic or osteopathic treatment  Acupuncture, massage, deep breathing, meditation, visual imagery, aromatherapy  Use heat or ice at the pain site  Physical therapy   Exercise  Stop smoking  Take part in therapy       Risks and side effects     Talk to your doctor before you start or decide to keep taking opioids. Possible side effects include:    Lowering your breathing rate enough to cause death  Overdose, including death, especially if taking higher than prescribed doses  Worse depression symptoms; less pleasure in things you usually enjoy  Feeling tired or sluggish  Slower thoughts or cloudy thinking  Being more sensitive to pain over time; pain is harder to control  Trouble sleeping or restless sleep  Changes in hormone levels (for example, less testosterone)  Changes in sex drive or ability to have sex  Constipation  Unsafe driving  Itching and sweating  Dizziness  Nausea, throwing up and dry mouth    What else should I know about opioids?    Opioids may lead to dependence, tolerance, or addiction.    Dependence means that if you stop or reduce the medicine too quickly, you will have withdrawal symptoms. These include loose poop (diarrhea), jitters, flu-like symptoms, nervousness and tremors. Dependence is not the same as addiction.                     Tolerance means needing higher doses over time to get the same  effect. This may increase the chance of serious side effects.    Addiction is when people improperly use a substance that harms their body, their mind or their relations with others. Use of opiates can cause a relapse of addiction if you have a history of drug or alcohol abuse.    People who have used opioids for a long time may have a lower quality of life, worse depression, higher levels of pain and more visits to doctors.    You can overdose on opioids. Take these steps to lower your risk of overdose:    Recognize the signs:  Signs of overdose include decrease or loss of consciousness (blackout), slowed breathing, trouble waking up and blue lips. If someone is worried about overdose, they should call 911.    Talk to your doctor about Narcan (naloxone).   If you are at risk for overdose, you may be given a prescription for Narcan. This medicine very quickly reverses the effects of opioids.   If you overdose, a friend or family member can give you Narcan while waiting for the ambulance. They need to know the signs of overdose and how to give Narcan.     Don't use alcohol or street drugs.   Taking them with opioids can cause death.    Do not take any of these medicines unless your doctor says it s OK. Taking these with opioids can cause death:  Benzodiazepines, such as lorazepam (Ativan), alprazolam (Xanax) or diazepam (Valium)  Muscle relaxers, such as cyclobenzaprine (Flexeril)  Sleeping pills like zolpidem (Ambien)   Other opioids      How to keep you and other people safe while taking opioids:    Never share your opioids with others.  Opioid medicines are regulated by the Drug Enforcement Agency (RICHIE). Selling or sharing medications is a criminal act.    2. Be sure to store opioids in a secure place, locked up if possible. Young children can easily swallow them and overdose.    3. When you are traveling with your medicines, keep them in the original bottles. If you use a pill box, be sure you also carry a copy  of your medicine list from your clinic or pharmacy.    4. Safe disposal of opioids    Most pharmacies have places to get rid of medicine, called disposal kiosks. Medicine disposal options are also available in every [facility]. Search your county and  medication disposal  to find more options. You can find more details at:  https://www.[email or web address].[email or web address]./living-green/managing-unwanted-medications     I agree that my provider, clinic care team, and pharmacy may work with any city, state or federal law enforcement agency that investigates the misuse, sale, or other diversion of my controlled medicine. I will allow my provider to discuss my care with, or share a copy of, this agreement with any other treating provider, pharmacy or emergency room where I receive care.    I have read this agreement and have asked questions about anything I did not understand.    _______________________________________________________  Patient Signature - Laura Perez _____________________                   Date     _______________________________________________________  Provider Signature - Gio Dooley MD   _____________________                   Date     _______________________________________________________  Witness Signature (required if provider not present while patient signing)   _____________________                   Date

## 2023-05-25 NOTE — PROGRESS NOTES
Assessment & Plan   1. Encounter to establish care      2. Anemia of chronic kidney failure, stage 3 (moderate) (H)  At goal, no change.  - Albumin Random Urine Quantitative with Creat Ratio; Future  - BASIC METABOLIC PANEL; Future  - Albumin Random Urine Quantitative with Creat Ratio  - BASIC METABOLIC PANEL    3. Diabetes mellitus type 2, insulin dependent (H)  At goal, no change.  - Lipid Profile; Future  - HEMOGLOBIN A1C; Future  - FOOT EXAM  - insulin lispro protamine-insulin lispro (HUMALOG MIX 75/25 KWIKPEN) (75-25) 100 UNIT/ML pen; 40 units in the morning, 20 units in the evening  Dispense: 60 mL; Refill: 3  - Lipid Profile  - HEMOGLOBIN A1C    4. Atrophic vaginitis  At goal, no change.  - estradiol (VAGIFEM) 10 MCG TABS vaginal tablet; Place 1 tablet (10 mcg) vaginally twice a week  Dispense: 24 tablet; Refill: 3    5. Gastroesophageal reflux disease with esophagitis without hemorrhage  At goal, no change.  - omeprazole (PRILOSEC) 20 MG DR capsule; Take 1 capsule (20 mg) by mouth daily  Dispense: 90 capsule; Refill: 3    6. Seropositive rheumatoid arthritis (H)  We initiated a pain contract today.  I discussed follow-up in clinic every 6 months for prescription and office visits only.  - traMADol (ULTRAM) 50 MG tablet; Take 1 tablet (50 mg) by mouth nightly as needed for severe pain  Dispense: 30 tablet; Refill: 5  - Drug Confirmation Panel Urine with Creatinine; Future  - Drug Confirmation Panel Urine with Creatinine    7. Hyperlipidemia, unspecified hyperlipidemia type  At goal, no change.  - simvastatin (ZOCOR) 40 MG tablet; Take 1 tablet (40 mg) by mouth At Bedtime  Dispense: 90 tablet; Refill: 3    8. Other emphysema (H)  At goal, no change.  - EPINEPHrine (ANY BX GENERIC EQUIV) 0.3 MG/0.3ML injection 2-pack; Inject 0.3 mLs (0.3 mg) into the muscle once as needed for anaphylaxis  Dispense: 0.9 mL; Refill: 1    9. Chronic obstructive pulmonary disease, unspecified COPD type (H)  At goal, no change.  -  albuterol (PROAIR HFA/PROVENTIL HFA/VENTOLIN HFA) 108 (90 Base) MCG/ACT inhaler; INHALE 1-2 PUFFS BY MOUTH EVERY 4-6 HOURS AS NEEDED FOR SHORTNESS OF BREATH OR WHEEZING  Dispense: 18 g; Refill: 3  - budesonide (PULMICORT) 0.5 MG/2ML neb solution; Take 2 mLs (0.5 mg) by nebulization 2 times daily  Dispense: 360 mL; Refill: 3    10. Chronic diastolic CHF (congestive heart failure), NYHA class 2 (H)  At goal, no change.    11. Stage 3b chronic kidney disease (H)  At goal, no change.    12. Gout, unspecified cause, unspecified chronicity, unspecified site  At goal, no change.    13. MGUS (monoclonal gammopathy of unknown significance)  At goal, no change    14. COPD, very severe (H)  At goal, no change.    15. Osteoporosis, unspecified osteoporosis type, unspecified pathological fracture presence  At goal, no change.  - denosumab (PROLIA) injection 60 mg  - denosumab (PROLIA) 60 MG/ML SOSY injection; Inject 1 mL (60 mg) Subcutaneous every 6 months  Dispense: 1 mL; Refill: 1    16. Other chronic pain  - Drug Confirmation Panel Urine with Creatinine; Future  - Drug Confirmation Panel Urine with Creatinine      Return in about 6 months (around 11/25/2023), or if symptoms worsen or fail to improve, for med management.    SignedGio MD, FAAP, FACP  Internal Medicine & Pediatrics    Subjective   Laura Perez is a 73 year old female who presents for [facility] primary [facility].  Previous physician was Dr. Ortega.  She has had rheumatoid arthritis since she was 17 years old.  She has been on a variety of different immunosuppressive therapies over the years.  She started out with 20 aspirins a day and gold salts therapy.  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  She lives with her  who helps take care of her.  She  receives her specialty care at [facility].    Objective   Vitals: /60   Pulse 92   Temp 97.7  F (36.5  C) (Tympanic)   Resp 16   Wt 64.5 kg (142 lb 4.8 oz)   LMP  (LMP Unknown)   SpO2 100%   Breastfeeding No   BMI 25.21 kg/m      General: well appearing  Neck: No JVD, no bruits  CV: Regular rate and rhythm, no murmur, rub or gallop  Pulm: Clear to auscultation bilaterally, no wheezing, rales or rhonchi  Neuro: Grossly intact  Musculoskeletal: No lower extremity edema.  Ulnar deviation of fingers  Skin: Very thin appearing skin almost translucent with some bruising present  Psychiatry: Normal affect and insight.    Review and Analysis of Data   I personally reviewed the following:  External notes: Yes I reviewed several notes from [facility] via care everywhere  Results: Yes I reviewed lab work, most recent echocardiograms, most recent pulmonary function testing, etc.  Use of an independent historian: Yes I spoke with her   Independent review of a test performed by another physician: No  Discussion of management with another physician: No  Moderate risk of morbidity from additional diagnostic testing and/or treatment.

## 2023-05-25 NOTE — NURSING NOTE
"Chief Complaint   Patient presents with     Recheck Medication     Establish Care         Initial /60   Pulse 92   Temp 97.7  F (36.5  C) (Tympanic)   Resp 16   Wt 64.5 kg (142 lb 4.8 oz)   LMP  (LMP Unknown)   SpO2 100%   Breastfeeding No   BMI 25.21 kg/m   Estimated body mass index is 25.21 kg/m  as calculated from the following:    Height as of 1/26/20: 1.6 m (5' 3\").    Weight as of this encounter: 64.5 kg (142 lb 4.8 oz).         Norma J. Gosselin, KIKI   "

## 2023-05-30 LAB
N-NORTRAMADOL/CREAT UR CFM: ABNORMAL NG/MG{CREAT}
O-NORTRAMADOL UR CFM-MCNC: ABNORMAL NG/ML
TRAMADOL CTO UR CFM-MCNC: ABNORMAL NG/ML
TRAMADOL/CREAT UR: ABNORMAL

## 2023-07-13 ENCOUNTER — OFFICE VISIT (OUTPATIENT)
Dept: PEDIATRICS | Facility: OTHER | Age: 73
End: 2023-07-13
Attending: INTERNAL MEDICINE
Payer: MEDICARE

## 2023-07-13 VITALS
RESPIRATION RATE: 18 BRPM | WEIGHT: 144.3 LBS | HEART RATE: 84 BPM | OXYGEN SATURATION: 96 % | BODY MASS INDEX: 25.56 KG/M2 | SYSTOLIC BLOOD PRESSURE: 134 MMHG | DIASTOLIC BLOOD PRESSURE: 76 MMHG | TEMPERATURE: 98.4 F

## 2023-07-13 DIAGNOSIS — E85.89 OTHER AMYLOIDOSIS (H): ICD-10-CM

## 2023-07-13 DIAGNOSIS — Z79.52 ON PREDNISONE THERAPY: ICD-10-CM

## 2023-07-13 DIAGNOSIS — M05.9 SEROPOSITIVE RHEUMATOID ARTHRITIS (H): Primary | ICD-10-CM

## 2023-07-13 PROCEDURE — 86481 TB AG RESPONSE T-CELL SUSP: CPT | Mod: ZL | Performed by: INTERNAL MEDICINE

## 2023-07-13 PROCEDURE — 99214 OFFICE O/P EST MOD 30 MIN: CPT | Performed by: INTERNAL MEDICINE

## 2023-07-13 PROCEDURE — 36415 COLL VENOUS BLD VENIPUNCTURE: CPT | Mod: ZL | Performed by: INTERNAL MEDICINE

## 2023-07-13 PROCEDURE — G0463 HOSPITAL OUTPT CLINIC VISIT: HCPCS

## 2023-07-13 RX ORDER — ALBUTEROL SULFATE 0.83 MG/ML
2.5 SOLUTION RESPIRATORY (INHALATION)
Status: CANCELLED | OUTPATIENT
Start: 2023-07-31

## 2023-07-13 RX ORDER — NEEDLES, SAFETY 22GX1 1/2"
NEEDLE, DISPOSABLE MISCELLANEOUS
COMMUNITY
Start: 2023-06-19

## 2023-07-13 RX ORDER — ACETAMINOPHEN 325 MG/1
650 TABLET ORAL ONCE
Status: CANCELLED
Start: 2023-07-31 | End: 2023-07-31

## 2023-07-13 RX ORDER — METHYLPREDNISOLONE SODIUM SUCCINATE 125 MG/2ML
125 INJECTION, POWDER, LYOPHILIZED, FOR SOLUTION INTRAMUSCULAR; INTRAVENOUS ONCE
Status: CANCELLED
Start: 2023-07-31 | End: 2023-07-31

## 2023-07-13 RX ORDER — METHYLPREDNISOLONE SODIUM SUCCINATE 125 MG/2ML
125 INJECTION, POWDER, LYOPHILIZED, FOR SOLUTION INTRAMUSCULAR; INTRAVENOUS
Status: CANCELLED
Start: 2023-07-31

## 2023-07-13 RX ORDER — DIPHENHYDRAMINE HYDROCHLORIDE 50 MG/ML
50 INJECTION INTRAMUSCULAR; INTRAVENOUS
Status: CANCELLED
Start: 2023-07-31

## 2023-07-13 RX ORDER — MEPERIDINE HYDROCHLORIDE 50 MG/ML
25 INJECTION INTRAMUSCULAR; INTRAVENOUS; SUBCUTANEOUS EVERY 30 MIN PRN
Status: CANCELLED | OUTPATIENT
Start: 2023-07-31

## 2023-07-13 RX ORDER — HEPARIN SODIUM,PORCINE 10 UNIT/ML
5-20 VIAL (ML) INTRAVENOUS DAILY PRN
Status: CANCELLED | OUTPATIENT
Start: 2023-07-31

## 2023-07-13 RX ORDER — HEPARIN SODIUM (PORCINE) LOCK FLUSH IV SOLN 100 UNIT/ML 100 UNIT/ML
5 SOLUTION INTRAVENOUS
Status: CANCELLED | OUTPATIENT
Start: 2023-07-31

## 2023-07-13 RX ORDER — EPINEPHRINE 1 MG/ML
0.3 INJECTION, SOLUTION, CONCENTRATE INTRAVENOUS EVERY 5 MIN PRN
Status: CANCELLED | OUTPATIENT
Start: 2023-07-31

## 2023-07-13 RX ORDER — ALBUTEROL SULFATE 90 UG/1
1-2 AEROSOL, METERED RESPIRATORY (INHALATION)
Status: CANCELLED
Start: 2023-07-31

## 2023-07-13 RX ORDER — DIPHENHYDRAMINE HCL 25 MG
25 CAPSULE ORAL ONCE
Status: CANCELLED
Start: 2023-07-31 | End: 2023-07-31

## 2023-07-13 ASSESSMENT — PATIENT HEALTH QUESTIONNAIRE - PHQ9
SUM OF ALL RESPONSES TO PHQ QUESTIONS 1-9: 9
10. IF YOU CHECKED OFF ANY PROBLEMS, HOW DIFFICULT HAVE THESE PROBLEMS MADE IT FOR YOU TO DO YOUR WORK, TAKE CARE OF THINGS AT HOME, OR GET ALONG WITH OTHER PEOPLE: SOMEWHAT DIFFICULT
SUM OF ALL RESPONSES TO PHQ QUESTIONS 1-9: 9

## 2023-07-13 ASSESSMENT — PAIN SCALES - GENERAL: PAINLEVEL: SEVERE PAIN (6)

## 2023-07-13 NOTE — NURSING NOTE
Chief Complaint   Patient presents with     RECHECK         Medication Reconciliation: complete    Wilma Duran, LPN

## 2023-07-13 NOTE — PROGRESS NOTES
Assessment & Plan       ICD-10-CM    1. Rheumatoid arthritis, seropositive  M05.9 Quantiferon-TB Gold Plus     Quantiferon-TB Gold Plus      2. Other amyloidosis (H)  E85.89       3. On prednisone therapy  Z79.52          --I placed orders for infusion therapy to coincide with when her injectable supply will run out   -- We plan to start Orencia infusions 750 mg every 4 weeks, at the direction of Dr. Nathalia Fay in the division of rheumatology at [facility]    Return if symptoms worsen or fail to improve.    Signed, Gio Dooley MD, FAAP, FACP  Internal Medicine & Pediatrics    Subjective   Laura Perez is a 73 year old female who presents for help with Orencia.  She has had rheumatoid arthritis for many years complicated by amyloidosis.  She has been on nearly every rheumatoid arthritis medication throughout the years.  Most recently she has been on Orencia which has been one of the only medications that started working for her and continued to work for her.  She has been receiving these as home injections on a weekly basis.  Her insurance will no longer cover these.  The cost for this is over $70,000 per year.  Through consultation with her specialty office at the [facility] it was decided that her best option was to get the Orencia infusion every month through the infusion center.  I received a note from Dr. Fay today.  Please see media file.    Objective   Vitals: /76   Pulse 84   Temp 98.4  F (36.9  C) (Tympanic)   Resp 18   Wt 65.5 kg (144 lb 4.8 oz)   LMP  (LMP Unknown)   SpO2 96%   Breastfeeding No   BMI 25.56 kg/m      General: well appearing  Psychiatry: Normal affect and insight.    Review and Analysis of Data   I personally reviewed the following:  External notes: Yes I reviewed notes from the [facility]  Results: Yes Reviewed previous hepatitis B and QuantiFERON gold testing  Use of an independent historian: Yes I spoke with her   Independent review of a test performed by  another physician: No  Discussion of management with another physician: No  Moderate risk of morbidity from additional diagnostic testing and/or treatment.

## 2023-07-15 LAB
GAMMA INTERFERON BACKGROUND BLD IA-ACNC: 0.01 IU/ML
M TB IFN-G BLD-IMP: NEGATIVE
M TB IFN-G CD4+ BCKGRND COR BLD-ACNC: 9.99 IU/ML
MITOGEN IGNF BCKGRD COR BLD-ACNC: 0 IU/ML
MITOGEN IGNF BCKGRD COR BLD-ACNC: 0.01 IU/ML
QUANTIFERON MITOGEN: 10 IU/ML
QUANTIFERON NIL TUBE: 0.01 IU/ML
QUANTIFERON TB1 TUBE: 0.01 IU/ML
QUANTIFERON TB2 TUBE: 0.02

## 2023-08-01 ENCOUNTER — HOSPITAL ENCOUNTER (OUTPATIENT)
Dept: INFUSION THERAPY | Facility: OTHER | Age: 73
Discharge: HOME | End: 2023-08-01
Attending: INTERNAL MEDICINE | Admitting: INTERNAL MEDICINE
Payer: MEDICARE

## 2023-08-01 VITALS
SYSTOLIC BLOOD PRESSURE: 121 MMHG | TEMPERATURE: 98.2 F | BODY MASS INDEX: 25.69 KG/M2 | HEART RATE: 82 BPM | DIASTOLIC BLOOD PRESSURE: 47 MMHG | WEIGHT: 145 LBS

## 2023-08-01 DIAGNOSIS — M05.9 SEROPOSITIVE RHEUMATOID ARTHRITIS (H): ICD-10-CM

## 2023-08-01 DIAGNOSIS — E85.89 OTHER AMYLOIDOSIS (H): ICD-10-CM

## 2023-08-01 DIAGNOSIS — Z79.52 ON PREDNISONE THERAPY: Primary | ICD-10-CM

## 2023-08-01 PROCEDURE — 258N000003 HC RX IP 258 OP 636: Performed by: INTERNAL MEDICINE

## 2023-08-01 PROCEDURE — 96365 THER/PROPH/DIAG IV INF INIT: CPT

## 2023-08-01 PROCEDURE — 250N000028 HC RX JA MOD (INTRAVENOUS), IP 250 OP 636: Mod: JZ,JA | Performed by: INTERNAL MEDICINE

## 2023-08-01 RX ORDER — ALBUTEROL SULFATE 0.83 MG/ML
2.5 SOLUTION RESPIRATORY (INHALATION)
Status: CANCELLED | OUTPATIENT
Start: 2023-08-29

## 2023-08-01 RX ORDER — HEPARIN SODIUM (PORCINE) LOCK FLUSH IV SOLN 100 UNIT/ML 100 UNIT/ML
5 SOLUTION INTRAVENOUS
Status: CANCELLED | OUTPATIENT
Start: 2023-08-29

## 2023-08-01 RX ORDER — EPINEPHRINE 1 MG/ML
0.3 INJECTION, SOLUTION, CONCENTRATE INTRAVENOUS EVERY 5 MIN PRN
Status: CANCELLED | OUTPATIENT
Start: 2023-08-29

## 2023-08-01 RX ORDER — ALBUTEROL SULFATE 90 UG/1
1-2 AEROSOL, METERED RESPIRATORY (INHALATION)
Status: CANCELLED
Start: 2023-08-29

## 2023-08-01 RX ORDER — DIPHENHYDRAMINE HYDROCHLORIDE 50 MG/ML
50 INJECTION INTRAMUSCULAR; INTRAVENOUS
Status: CANCELLED
Start: 2023-08-29

## 2023-08-01 RX ORDER — HEPARIN SODIUM,PORCINE 10 UNIT/ML
5-20 VIAL (ML) INTRAVENOUS DAILY PRN
Status: CANCELLED | OUTPATIENT
Start: 2023-08-29

## 2023-08-01 RX ORDER — MEPERIDINE HYDROCHLORIDE 50 MG/ML
25 INJECTION INTRAMUSCULAR; INTRAVENOUS; SUBCUTANEOUS EVERY 30 MIN PRN
Status: CANCELLED | OUTPATIENT
Start: 2023-08-29

## 2023-08-01 RX ORDER — METHYLPREDNISOLONE SODIUM SUCCINATE 125 MG/2ML
125 INJECTION, POWDER, LYOPHILIZED, FOR SOLUTION INTRAMUSCULAR; INTRAVENOUS
Status: CANCELLED
Start: 2023-08-29

## 2023-08-01 RX ADMIN — SODIUM CHLORIDE 750 MG: 9 INJECTION, SOLUTION INTRAVENOUS at 13:52

## 2023-08-01 RX ADMIN — SODIUM CHLORIDE 250 ML: 9 INJECTION, SOLUTION INTRAVENOUS at 13:30

## 2023-08-01 NOTE — NURSING NOTE
Infusion Nursing Note:  Laura PICKETT Destinybeverley presents today for orencia.    Patient seen by provider today: No   present during visit today: Not Applicable.    Note: patient unable to void during appointment will drop sample back off    Intravenous Access:  Peripheral IV placed.    Treatment Conditions:  Not Applicable.      Post Infusion Assessment:  Patient tolerated infusion without incident.  Blood return noted pre and post infusion.  Site patent and intact, free from redness, edema or discomfort.  No evidence of extravasations.  Access discontinued per protocol.       Discharge Plan:   Discharge instructions reviewed with: Patient and Family.  Patient and/or family verbalized understanding of discharge instructions and all questions answered.  AVS to patient via [employer].  Patient will return 8/29 for next appointment.   Patient discharged in stable condition accompanied by: .  Departure Mode: Ambulatory.      Jean S. Hammann, RN

## 2023-08-01 NOTE — NURSING NOTE
~~~ NOTE: If the patient answers yes to any of the questions below, hold the infusion and contact ordering provider or on-call provider.    Do you currently have any signs of illness or infection or are you on any antibiotics? No  Have you recently had an elevated temperature, fever, chills, productive cough, coughing for 3 weeks or longer or hemoptysis, abnormal vital signs, night sweats, chest pain or have you noticed a decrease in your appetite, unexplained weight loss or fatigue? No  Have you had any new, sudden, or worsening abdominal pain? No  Do you have any open wounds or new incisions? (exclude for patients with hidradenitis suppurativa) No  Have you recently been diagnosed with any new nervous system diseases (ie. Multiple sclerosis, Guillain [place], seizures, neurological changes) or cancer diagnosis? Are you on any form of radiation or chemotherapy? No  Have there been any other new onset medical symptoms? No  Are you pregnant or breast feeding or do you have plans of pregnancy in the future? No; N/A  Do you have any upcoming hospitalizations or surgeries? Does not include esophagogastroduodenoscopy, colonoscopy, endoscopic retrograde cholangiopancreatography (ERCP), endoscopic ultrasound (EUS), dental procedures (including cleanings, fillings, implants, extractions)  or joint aspiration/steroid injections No  Have you or anyone in your household received a live vaccination in the past 4 weeks? Please note: No live vaccines while on biologic/chemotherapy until 6 months after the last treatment. Patient can receive the flu vaccine (shot only).  It is optimal for the patient to get it mid cycle, but it can be given at any time as long as it is not on the day of the infusion. No  If applicable to prescribed medication, confirm negative PPD or quantiferon gold MTB. If positive, verify has negative chest x-ray or the patient is at least 4 weeks post initiation of INH/B6 therapy and have clearance from provider  before infusion (Y/N:[id number])  If applicable to prescribed medication, confirm negative hepatitis B surface antigen or hepatitis C. If positive, clearance from provider before infusion. (Y/N: [id number])  Rheumatology patients receiving tocilizumab (Actemra): If labs were drawn within the past week, hold dosing until cleared to infuse If AST/ALT > 2 X upper limit normal; ANC < 1.0. ; N/A  Patients receiving belimumab (Benlysta): Have you been having any signs of worsening depression or suicidal ideations? No; N/A

## 2023-08-02 ENCOUNTER — LAB (OUTPATIENT)
Dept: LAB | Facility: OTHER | Age: 73
End: 2023-08-02
Payer: MEDICARE

## 2023-08-02 DIAGNOSIS — M05.9 SEROPOSITIVE RHEUMATOID ARTHRITIS (H): Primary | Chronic | ICD-10-CM

## 2023-08-02 LAB — HOLD SPECIMEN: NORMAL

## 2023-08-29 ENCOUNTER — HOSPITAL ENCOUNTER (OUTPATIENT)
Dept: INFUSION THERAPY | Facility: OTHER | Age: 73
Discharge: HOME | End: 2023-08-29
Attending: INTERNAL MEDICINE | Admitting: INTERNAL MEDICINE
Payer: MEDICARE

## 2023-08-29 ENCOUNTER — MYC MEDICAL ADVICE (OUTPATIENT)
Dept: PEDIATRICS | Facility: OTHER | Age: 73
End: 2023-08-29

## 2023-08-29 VITALS
SYSTOLIC BLOOD PRESSURE: 147 MMHG | RESPIRATION RATE: 20 BRPM | TEMPERATURE: 97.8 F | DIASTOLIC BLOOD PRESSURE: 65 MMHG | HEART RATE: 92 BPM

## 2023-08-29 DIAGNOSIS — Z79.52 ON PREDNISONE THERAPY: Primary | ICD-10-CM

## 2023-08-29 DIAGNOSIS — E85.89 OTHER AMYLOIDOSIS (H): ICD-10-CM

## 2023-08-29 DIAGNOSIS — M05.9 SEROPOSITIVE RHEUMATOID ARTHRITIS (H): ICD-10-CM

## 2023-08-29 LAB
ALBUMIN UR-MCNC: 70 MG/DL
APPEARANCE UR: ABNORMAL
BILIRUB UR QL STRIP: NEGATIVE
COLOR UR AUTO: YELLOW
GLUCOSE UR STRIP-MCNC: NEGATIVE MG/DL
HGB UR QL STRIP: NEGATIVE
HYALINE CASTS: 3 /LPF
KETONES UR STRIP-MCNC: NEGATIVE MG/DL
LEUKOCYTE ESTERASE UR QL STRIP: ABNORMAL
MUCOUS THREADS #/AREA URNS LPF: PRESENT /LPF
NITRATE UR QL: NEGATIVE
PH UR STRIP: 5.5 [PH] (ref 5–9)
RBC URINE: 2 /HPF
SP GR UR STRIP: 1.02 (ref 1–1.03)
SQUAMOUS EPITHELIAL: 18 /HPF
UROBILINOGEN UR STRIP-MCNC: NORMAL MG/DL
WBC URINE: 2 /HPF

## 2023-08-29 PROCEDURE — 96374 THER/PROPH/DIAG INJ IV PUSH: CPT

## 2023-08-29 PROCEDURE — 87086 URINE CULTURE/COLONY COUNT: CPT | Performed by: INTERNAL MEDICINE

## 2023-08-29 PROCEDURE — 250N000028 HC RX JA MOD (INTRAVENOUS), IP 250 OP 636: Mod: JZ,JA | Performed by: INTERNAL MEDICINE

## 2023-08-29 PROCEDURE — 258N000003 HC RX IP 258 OP 636: Performed by: INTERNAL MEDICINE

## 2023-08-29 PROCEDURE — 81003 URINALYSIS AUTO W/O SCOPE: CPT | Performed by: INTERNAL MEDICINE

## 2023-08-29 RX ORDER — HEPARIN SODIUM,PORCINE 10 UNIT/ML
5-20 VIAL (ML) INTRAVENOUS DAILY PRN
Status: CANCELLED | OUTPATIENT
Start: 2023-09-26

## 2023-08-29 RX ORDER — ALLOPURINOL 100 MG/1
100 TABLET ORAL DAILY
COMMUNITY

## 2023-08-29 RX ORDER — MEPERIDINE HYDROCHLORIDE 50 MG/ML
25 INJECTION INTRAMUSCULAR; INTRAVENOUS; SUBCUTANEOUS EVERY 30 MIN PRN
Status: CANCELLED | OUTPATIENT
Start: 2023-09-26

## 2023-08-29 RX ORDER — METHYLPREDNISOLONE SODIUM SUCCINATE 125 MG/2ML
125 INJECTION, POWDER, LYOPHILIZED, FOR SOLUTION INTRAMUSCULAR; INTRAVENOUS
Status: CANCELLED
Start: 2023-09-26

## 2023-08-29 RX ORDER — EPINEPHRINE 1 MG/ML
0.3 INJECTION, SOLUTION, CONCENTRATE INTRAVENOUS EVERY 5 MIN PRN
Status: CANCELLED | OUTPATIENT
Start: 2023-09-26

## 2023-08-29 RX ORDER — DIPHENHYDRAMINE HYDROCHLORIDE 50 MG/ML
50 INJECTION INTRAMUSCULAR; INTRAVENOUS
Status: CANCELLED
Start: 2023-09-26

## 2023-08-29 RX ORDER — ALBUTEROL SULFATE 0.83 MG/ML
2.5 SOLUTION RESPIRATORY (INHALATION)
Status: CANCELLED | OUTPATIENT
Start: 2023-09-26

## 2023-08-29 RX ORDER — ALBUTEROL SULFATE 90 UG/1
1-2 AEROSOL, METERED RESPIRATORY (INHALATION)
Status: CANCELLED
Start: 2023-09-26

## 2023-08-29 RX ORDER — HEPARIN SODIUM (PORCINE) LOCK FLUSH IV SOLN 100 UNIT/ML 100 UNIT/ML
5 SOLUTION INTRAVENOUS
Status: CANCELLED | OUTPATIENT
Start: 2023-09-26

## 2023-08-29 RX ADMIN — SODIUM CHLORIDE 750 MG: 9 INJECTION, SOLUTION INTRAVENOUS at 13:58

## 2023-08-29 RX ADMIN — SODIUM CHLORIDE 250 ML: 9 INJECTION, SOLUTION INTRAVENOUS at 13:33

## 2023-08-29 NOTE — NURSING NOTE
Infusion Nursing Note:  Laura Perez presents today for Orencia.    Patient seen by provider today: No   present during visit today: Not Applicable.    Note: N/A.      Intravenous Access:  Peripheral IV placed to left antecubital space with brisk blood return    Treatment Conditions:  Biological Infusion Checklist:  ~~~ NOTE: If the patient answers yes to any of the questions below, hold the infusion and contact ordering provider or on-call provider.    Have you recently had an elevated temperature, fever, chills, productive cough, coughing for 3 weeks or longer or hemoptysis,  abnormal vital signs, night sweats,  chest pain or have you noticed a decrease in your appetite, unexplained weight loss or fatigue? No  Do you have any open wounds or new incisions? No  Do you have any upcoming hospitalizations or surgeries? Does not include esophagogastroduodenoscopy, colonoscopy, endoscopic retrograde cholangiopancreatography (ERCP), endoscopic ultrasound (EUS), dental procedures or joint aspiration/steroid injections No  Do you currently have any signs of illness or infection or are you on any antibiotics? No  Have you had any new, sudden or worsening abdominal pain? No  Have you or anyone in your household received a live vaccination in the past 4 weeks? Please note: No live vaccines while on biologic/chemotherapy until 6 months after the last treatment. Patient can receive the flu vaccine (shot only), pneumovax and the Covid vaccine. It is optimal for the patient to get these vaccines mid cycle, but they can be given at any time as long as it is not on the day of the infusion. No  Have you recently been diagnosed with any new nervous system diseases (ie. Multiple sclerosis, Guillain [place], seizures, neurological changes) or cancer diagnosis? Are you on any form of radiation or chemotherapy? No  Are you pregnant or breast feeding or do you have plans of pregnancy in the future? No  Have you been having any  signs of worsening depression or suicidal ideations?  (benlysta only) NA    Have there been any other new onset medical symptoms? No  Have you had any new blood clots? (IVIG only) NA      Post Infusion Assessment:  Patient tolerated infusion without incident.  Blood return noted pre and post infusion.  Site patent and intact, free from redness, edema or discomfort.  No evidence of extravasations.  Access discontinued per protocol.  Biologic Infusion Post Education: Call the triage nurse at your clinic or seek medical attention if you have chills and/or temperature greater than or equal to 100.5, uncontrolled nausea/vomiting, diarrhea, constipation, dizziness, shortness of breath, chest pain, heart palpitations, weakness or any other new or concerning symptoms, questions or concerns.  You cannot have any live virus vaccines prior to or during treatment or up to 6 months post infusion.  If you have an upcoming surgery, medical procedure or dental procedure during treatment, this should be discussed with your ordering physician and your surgeon/dentist.  If you are having any concerning symptom, if you are unsure if you should get your next infusion or wish to speak to a provider before your next infusion, please call your care coordinator or triage nurse at your clinic to notify them so we can adequately serve you.       Discharge Plan:   Discharge instructions reviewed with: Patient and Family.  Patient and/or family verbalized understanding of discharge instructions and all questions answered.  Copy of AVS reviewed with patient and/or family.  Patient will return  for next appointment.  Patient discharged in stable condition accompanied by: self and .  Departure Mode: Wheelchair with spouses assist.  UA results reviewed by provider..  No further U/A to be ordered or completed with treatment per triage PK MEREDITH from PCP.      Reema Bender RN,hi

## 2023-08-30 NOTE — TELEPHONE ENCOUNTER
If she is symptomatic, she should be seen.    Signed, Gio Dooley MD, FAAP, FACP  Internal Medicine & Pediatrics

## 2023-08-31 LAB — BACTERIA UR CULT: NORMAL

## 2023-09-21 DIAGNOSIS — N18.32 STAGE 3B CHRONIC KIDNEY DISEASE (H): ICD-10-CM

## 2023-09-21 DIAGNOSIS — M81.0 OSTEOPOROSIS, UNSPECIFIED OSTEOPOROSIS TYPE, UNSPECIFIED PATHOLOGICAL FRACTURE PRESENCE: Primary | ICD-10-CM

## 2023-09-21 RX ORDER — ALBUTEROL SULFATE 90 UG/1
1-2 AEROSOL, METERED RESPIRATORY (INHALATION)
Status: CANCELLED
Start: 2023-09-21

## 2023-09-21 RX ORDER — ALBUTEROL SULFATE 0.83 MG/ML
2.5 SOLUTION RESPIRATORY (INHALATION)
Status: CANCELLED | OUTPATIENT
Start: 2023-09-21

## 2023-09-21 RX ORDER — DIPHENHYDRAMINE HYDROCHLORIDE 50 MG/ML
50 INJECTION INTRAMUSCULAR; INTRAVENOUS
Status: CANCELLED
Start: 2023-09-21

## 2023-09-21 RX ORDER — METHYLPREDNISOLONE SODIUM SUCCINATE 125 MG/2ML
125 INJECTION, POWDER, LYOPHILIZED, FOR SOLUTION INTRAMUSCULAR; INTRAVENOUS
Status: CANCELLED
Start: 2023-09-21

## 2023-09-21 RX ORDER — EPINEPHRINE 1 MG/ML
0.3 INJECTION, SOLUTION, CONCENTRATE INTRAVENOUS EVERY 5 MIN PRN
Status: CANCELLED | OUTPATIENT
Start: 2023-09-21

## 2023-09-21 RX ORDER — MEPERIDINE HYDROCHLORIDE 50 MG/ML
25 INJECTION INTRAMUSCULAR; INTRAVENOUS; SUBCUTANEOUS EVERY 30 MIN PRN
Status: CANCELLED | OUTPATIENT
Start: 2023-09-21

## 2023-09-21 NOTE — PROGRESS NOTES
Prolia therapy plan contains  orders. Plan sent to Dr. Dooley for review and re-signing.     ZARI COREY RN on 2023 at 2:36 PM    
Skin normal color for race, warm, dry and intact. No evidence of rash.

## 2023-09-26 ENCOUNTER — HOSPITAL ENCOUNTER (OUTPATIENT)
Dept: INFUSION THERAPY | Facility: OTHER | Age: 73
Discharge: HOME | End: 2023-09-26
Attending: INTERNAL MEDICINE
Payer: MEDICARE

## 2023-09-26 ENCOUNTER — ALLIED HEALTH/NURSE VISIT (OUTPATIENT)
Dept: FAMILY MEDICINE | Facility: OTHER | Age: 73
End: 2023-09-26
Attending: INTERNAL MEDICINE
Payer: MEDICARE

## 2023-09-26 VITALS
BODY MASS INDEX: 25.73 KG/M2 | DIASTOLIC BLOOD PRESSURE: 60 MMHG | HEIGHT: 63 IN | SYSTOLIC BLOOD PRESSURE: 138 MMHG | WEIGHT: 145.2 LBS

## 2023-09-26 VITALS
WEIGHT: 145.3 LBS | RESPIRATION RATE: 18 BRPM | BODY MASS INDEX: 25.74 KG/M2 | TEMPERATURE: 97.8 F | SYSTOLIC BLOOD PRESSURE: 125 MMHG | DIASTOLIC BLOOD PRESSURE: 52 MMHG | HEART RATE: 77 BPM

## 2023-09-26 DIAGNOSIS — Z79.52 ON PREDNISONE THERAPY: Primary | ICD-10-CM

## 2023-09-26 DIAGNOSIS — M05.9 SEROPOSITIVE RHEUMATOID ARTHRITIS (H): ICD-10-CM

## 2023-09-26 DIAGNOSIS — M05.9 SEROPOSITIVE RHEUMATOID ARTHRITIS (H): Chronic | ICD-10-CM

## 2023-09-26 DIAGNOSIS — E85.89 OTHER AMYLOIDOSIS (H): ICD-10-CM

## 2023-09-26 DIAGNOSIS — N18.32 STAGE 3B CHRONIC KIDNEY DISEASE (H): Primary | ICD-10-CM

## 2023-09-26 DIAGNOSIS — M81.0 OSTEOPOROSIS, UNSPECIFIED OSTEOPOROSIS TYPE, UNSPECIFIED PATHOLOGICAL FRACTURE PRESENCE: ICD-10-CM

## 2023-09-26 LAB
ALBUMIN UR-MCNC: 50 MG/DL
APPEARANCE UR: ABNORMAL
BACTERIA #/AREA URNS HPF: ABNORMAL /HPF
BILIRUB UR QL STRIP: NEGATIVE
CALCIUM SERPL-MCNC: 8.9 MG/DL (ref 8.8–10.2)
COLOR UR AUTO: YELLOW
CREAT SERPL-MCNC: 1.87 MG/DL (ref 0.51–0.95)
EGFRCR SERPLBLD CKD-EPI 2021: 28 ML/MIN/1.73M2
GLUCOSE UR STRIP-MCNC: NEGATIVE MG/DL
HGB UR QL STRIP: NEGATIVE
HYALINE CASTS: 1 /LPF
KETONES UR STRIP-MCNC: NEGATIVE MG/DL
LEUKOCYTE ESTERASE UR QL STRIP: ABNORMAL
MUCOUS THREADS #/AREA URNS LPF: PRESENT /LPF
NITRATE UR QL: NEGATIVE
PH UR STRIP: 5.5 [PH] (ref 5–9)
RBC URINE: 5 /HPF
SP GR UR STRIP: 1.02 (ref 1–1.03)
SQUAMOUS EPITHELIAL: 10 /HPF
UROBILINOGEN UR STRIP-MCNC: NORMAL MG/DL
WBC URINE: 15 /HPF

## 2023-09-26 PROCEDURE — 82310 ASSAY OF CALCIUM: CPT | Mod: ZL | Performed by: INTERNAL MEDICINE

## 2023-09-26 PROCEDURE — 250N000011 HC RX IP 250 OP 636: Mod: JZ | Performed by: INTERNAL MEDICINE

## 2023-09-26 PROCEDURE — 250N000028 HC RX JA MOD (INTRAVENOUS), IP 250 OP 636: Mod: JZ,JA | Performed by: INTERNAL MEDICINE

## 2023-09-26 PROCEDURE — 82565 ASSAY OF CREATININE: CPT | Mod: ZL | Performed by: INTERNAL MEDICINE

## 2023-09-26 PROCEDURE — 258N000003 HC RX IP 258 OP 636: Performed by: INTERNAL MEDICINE

## 2023-09-26 PROCEDURE — 81001 URINALYSIS AUTO W/SCOPE: CPT | Performed by: INTERNAL MEDICINE

## 2023-09-26 PROCEDURE — 96372 THER/PROPH/DIAG INJ SC/IM: CPT | Performed by: INTERNAL MEDICINE

## 2023-09-26 PROCEDURE — 96365 THER/PROPH/DIAG IV INF INIT: CPT

## 2023-09-26 PROCEDURE — 36415 COLL VENOUS BLD VENIPUNCTURE: CPT | Mod: ZL | Performed by: INTERNAL MEDICINE

## 2023-09-26 RX ORDER — DIPHENHYDRAMINE HYDROCHLORIDE 50 MG/ML
50 INJECTION INTRAMUSCULAR; INTRAVENOUS
Status: CANCELLED
Start: 2023-10-24

## 2023-09-26 RX ORDER — HEPARIN SODIUM,PORCINE 10 UNIT/ML
5-20 VIAL (ML) INTRAVENOUS DAILY PRN
Status: CANCELLED | OUTPATIENT
Start: 2023-10-24

## 2023-09-26 RX ORDER — ALBUTEROL SULFATE 90 UG/1
1-2 AEROSOL, METERED RESPIRATORY (INHALATION)
Status: CANCELLED
Start: 2023-10-24

## 2023-09-26 RX ORDER — METHYLPREDNISOLONE SODIUM SUCCINATE 125 MG/2ML
125 INJECTION, POWDER, LYOPHILIZED, FOR SOLUTION INTRAMUSCULAR; INTRAVENOUS
Status: CANCELLED
Start: 2023-10-24

## 2023-09-26 RX ORDER — EPINEPHRINE 1 MG/ML
0.3 INJECTION, SOLUTION, CONCENTRATE INTRAVENOUS EVERY 5 MIN PRN
Status: CANCELLED | OUTPATIENT
Start: 2024-03-17

## 2023-09-26 RX ORDER — MEPERIDINE HYDROCHLORIDE 50 MG/ML
25 INJECTION INTRAMUSCULAR; INTRAVENOUS; SUBCUTANEOUS EVERY 30 MIN PRN
Status: CANCELLED | OUTPATIENT
Start: 2023-10-24

## 2023-09-26 RX ORDER — MEPERIDINE HYDROCHLORIDE 50 MG/ML
25 INJECTION INTRAMUSCULAR; INTRAVENOUS; SUBCUTANEOUS EVERY 30 MIN PRN
Status: CANCELLED | OUTPATIENT
Start: 2024-03-17

## 2023-09-26 RX ORDER — EPINEPHRINE 1 MG/ML
0.3 INJECTION, SOLUTION, CONCENTRATE INTRAVENOUS EVERY 5 MIN PRN
Status: CANCELLED | OUTPATIENT
Start: 2023-10-24

## 2023-09-26 RX ORDER — HEPARIN SODIUM (PORCINE) LOCK FLUSH IV SOLN 100 UNIT/ML 100 UNIT/ML
5 SOLUTION INTRAVENOUS
Status: CANCELLED | OUTPATIENT
Start: 2023-10-24

## 2023-09-26 RX ORDER — ALBUTEROL SULFATE 0.83 MG/ML
2.5 SOLUTION RESPIRATORY (INHALATION)
Status: CANCELLED | OUTPATIENT
Start: 2023-10-24

## 2023-09-26 RX ORDER — DIPHENHYDRAMINE HYDROCHLORIDE 50 MG/ML
50 INJECTION INTRAMUSCULAR; INTRAVENOUS
Status: CANCELLED
Start: 2024-03-17

## 2023-09-26 RX ORDER — METHYLPREDNISOLONE SODIUM SUCCINATE 125 MG/2ML
125 INJECTION, POWDER, LYOPHILIZED, FOR SOLUTION INTRAMUSCULAR; INTRAVENOUS
Status: CANCELLED
Start: 2024-03-17

## 2023-09-26 RX ORDER — ALBUTEROL SULFATE 90 UG/1
1-2 AEROSOL, METERED RESPIRATORY (INHALATION)
Status: CANCELLED
Start: 2024-03-17

## 2023-09-26 RX ORDER — ALBUTEROL SULFATE 0.83 MG/ML
2.5 SOLUTION RESPIRATORY (INHALATION)
Status: CANCELLED | OUTPATIENT
Start: 2024-03-17

## 2023-09-26 RX ADMIN — SODIUM CHLORIDE 250 ML: 9 INJECTION, SOLUTION INTRAVENOUS at 13:42

## 2023-09-26 RX ADMIN — SODIUM CHLORIDE 750 MG: 9 INJECTION, SOLUTION INTRAVENOUS at 13:45

## 2023-09-26 RX ADMIN — DENOSUMAB 60 MG: 60 INJECTION SUBCUTANEOUS at 12:52

## 2023-09-26 NOTE — NURSING NOTE
~~~ NOTE: If the patient answers yes to any of the questions below, hold the infusion and contact ordering provider or on-call provider.    Have you recently had an elevated temperature, fever, chills, productive cough, coughing for 3 weeks or longer or hemoptysis,  abnormal vital signs, night sweats,  chest pain or have you noticed a decrease in your appetite, unexplained weight loss or fatigue? No  Do you have any open wounds or new incisions? No  Do you have any recent or upcoming hospitalizations, surgeries or dental procedures? No  Do you currently have or recently have had any signs of illness or infection or are you on any antibiotics? No  Have you had any new, sudden or worsening abdominal pain? No  Have you or anyone in your household received a live vaccination in the past 4 weeks? Please note:  No live vaccines while on biologic/chemotherapy until 6 months after the last treatment.  Patient can receive the flu vaccine (shot only) and the pneumovax.  It is optimal for the patient to get these vaccines mid cycle, but they can be given at any time as long as it is not on the day of the infusion. No  Have you recently been diagnosed with any new nervous system diseases (ie. Multiple sclerosis, Guillain [place], seizures, neurological changes) or cancer diagnosis? Are you on any form of radiation or chemotherapy? No  Are you pregnant or breast feeding or do you have plans of pregnancy in the future? No  Have you been having any signs of worsening depression or suicidal ideations?  (benlysta only) No  Have there been any other new onset medical symptoms? No  Yolanda Lanza RN ....................  9/26/2023   1:00 PM

## 2023-09-26 NOTE — PROGRESS NOTES
Clinic Administered Medication Documentation    Prolia Documentation    Lab results from today's visit were reviewed with Dr. Dooley. Verbal order received from Dr. Dooley to proceed with Prolia injection today. Writer informed patient that, per the instructions given by her PCP, she should follow up soon with the nephrologist at [facility] regarding her kidney labs. Patient and her  verbalized understanding.  states that he will reach out to the nephrologist soon regarding this.     Indication: Prolia  (denosumab) is a prescription medicine used to treat osteoporosis in patients who:   Are at high risk for fracture, meaning patients who have had a fracture related to osteoporosis, or who have multiple risk factors for fracture.  Cannot use another osteoporosis medicine or other osteoporosis medicines did not work well.  The timeline for early/late injections would be 4 weeks early and any time after the 6 month anjali. If a patient receives their injection late, then the subsequent injection would be 6 months from the date that they actually received the injection.    When was the last injection?  3/23/23  Was the last injection at least 6 months ago? Yes  Has the prior authorization been completed?  Yes  Is there an active order (written within the past 365 days, with administrations remaining, not ) in the chart?  Yes  Patient denies any dental work involving the bone (e.g. tooth extraction or dental implants) in the past 4 weeks?  Yes  Patient denies plans for any dental work involving the bone (e.g. tooth extraction or dental implants) in the next 4 weeks? Yes    The following steps were completed to comply with the REMS program for Prolia:  Reviewed information in the Medication Guide and Patient Counseling Chart, including the serious risks of Prolia  and the symptoms of each risk.  Advised patient to seek prompt medical attention if they have signs or symptoms of any of the serious  risks.  Provided each patient a copy of the Medication Guide and Patient Brochure.    Prior to injection, verified patient identity using patient's name and date of birth. Medication was administered. Please see MAR and medication order for additional information. Patient instructed to remain in clinic for 15 minutes and report any adverse reaction to staff immediately but patient declined.    Vial/Syringe: Syringe  Was this medication supplied by the patient? No  Verified that the patient has refills remaining in their prescription.    ZARI COREY RN on 9/26/2023 at 12:54 PM

## 2023-09-26 NOTE — NURSING NOTE
"Infusion Nursing Note:  Laura Perez presents today for Orencia .    Patient seen by provider today: No   present during visit today: Not Applicable.    Note: Prior to discharge, patient and  questioning results from UA. Notified patient that results may be abnormal but that ordering provider would have to review. Left message with ordering provider's (Dr. Dooley) nurse to notify of new results.    1555: Message also sent to Dr. Dooley to notify him that patient is requesting return call to discuss UA results.     1620: Called patient and after verifying name and , informed her of Dr. Dooley's response regarding her UA: \"I asked the nurses to remove the Ua from her orders.  See previous encounters.  If she has symptoms she should be seen.\"  Informed patient that if she develops any fevers, chills, or any other worsening symptoms to return to clinic or [facility] for further evaluation. Patient verbalized understanding.     Intravenous Access:  Labs drawn without difficulty.  Peripheral IV placed.    Treatment Conditions:  N/A       UA RESULTS:  Recent Labs   Lab Test 23  1302 20  1020 18  1013   COLOR Yellow   < > Yellow   APPEARANCE Slightly Cloudy*   < > Clear   URINEGLC Negative   < > 250*   URINEBILI Negative   < > Small*   URINEKETONE Negative   < > Negative   SG 1.017   < > 1.020   UBLD Negative   < > Negative   URINEPH 5.5   < > 6.5   PROTEIN 50*   < > 30*   UROBILINOGEN  --   --  0.2   NITRITE Negative   < > Negative   LEUKEST Moderate*   < > Trace*   RBCU 5*   < > O - 2   WBCU 15*   < > 0 - 5    < > = values in this interval not displayed.         Post Infusion Assessment:  Patient tolerated infusion without incident.  Blood return noted pre and post infusion.  Site patent and intact, free from redness, edema or discomfort.  No evidence of extravasations.  Access discontinued per protocol.    Biologic Infusion Post Education: Call the triage nurse at your clinic or " seek medical attention if you have chills and/or temperature greater than or equal to 100.5, uncontrolled nausea/vomiting, diarrhea, constipation, dizziness, shortness of breath, chest pain, heart palpitations, weakness or any other new or concerning symptoms, questions or concerns.  You cannot have any live virus vaccines prior to or during treatment or up to 6 months post infusion.  If you have an upcoming surgery, medical procedure or dental procedure during treatment, this should be discussed with your ordering physician and your surgeon/dentist.  If you are having any concerning symptom, if you are unsure if you should get your next infusion or wish to speak to a provider before your next infusion, please call your care coordinator or triage nurse at your clinic to notify them so we can adequately serve you.       Discharge Plan:   Discharge instructions reviewed with: Patient and .  Patient and/or family verbalized understanding of discharge instructions and all questions answered.  Copy of AVS declined. Patient will return 10/24/23 for next appointment.  AVS to patient via [employer].   Patient discharged in stable condition accompanied by: .  Departure Mode: Wheelchair.      Emerald Kerns RN

## 2023-10-24 ENCOUNTER — HOSPITAL ENCOUNTER (OUTPATIENT)
Dept: INFUSION THERAPY | Facility: OTHER | Age: 73
Discharge: HOME | End: 2023-10-24
Attending: INTERNAL MEDICINE | Admitting: INTERNAL MEDICINE
Payer: MEDICARE

## 2023-10-24 VITALS
HEART RATE: 94 BPM | SYSTOLIC BLOOD PRESSURE: 152 MMHG | BODY MASS INDEX: 25.69 KG/M2 | RESPIRATION RATE: 20 BRPM | TEMPERATURE: 97.9 F | WEIGHT: 145 LBS | DIASTOLIC BLOOD PRESSURE: 64 MMHG

## 2023-10-24 DIAGNOSIS — E85.89 OTHER AMYLOIDOSIS (H): ICD-10-CM

## 2023-10-24 DIAGNOSIS — Z79.52 ON PREDNISONE THERAPY: Primary | ICD-10-CM

## 2023-10-24 DIAGNOSIS — M05.9 SEROPOSITIVE RHEUMATOID ARTHRITIS (H): ICD-10-CM

## 2023-10-24 PROCEDURE — 258N000003 HC RX IP 258 OP 636: Performed by: INTERNAL MEDICINE

## 2023-10-24 PROCEDURE — 96365 THER/PROPH/DIAG IV INF INIT: CPT

## 2023-10-24 PROCEDURE — 250N000028 HC RX JA MOD (INTRAVENOUS), IP 250 OP 636: Mod: JZ,JA | Performed by: INTERNAL MEDICINE

## 2023-10-24 RX ORDER — HEPARIN SODIUM (PORCINE) LOCK FLUSH IV SOLN 100 UNIT/ML 100 UNIT/ML
5 SOLUTION INTRAVENOUS
Status: CANCELLED | OUTPATIENT
Start: 2023-11-21

## 2023-10-24 RX ORDER — ALBUTEROL SULFATE 0.83 MG/ML
2.5 SOLUTION RESPIRATORY (INHALATION)
Status: CANCELLED | OUTPATIENT
Start: 2023-11-21

## 2023-10-24 RX ORDER — HEPARIN SODIUM,PORCINE 10 UNIT/ML
5-20 VIAL (ML) INTRAVENOUS DAILY PRN
Status: CANCELLED | OUTPATIENT
Start: 2023-11-21

## 2023-10-24 RX ORDER — EPINEPHRINE 1 MG/ML
0.3 INJECTION, SOLUTION, CONCENTRATE INTRAVENOUS EVERY 5 MIN PRN
Status: CANCELLED | OUTPATIENT
Start: 2023-11-21

## 2023-10-24 RX ORDER — DIPHENHYDRAMINE HYDROCHLORIDE 50 MG/ML
50 INJECTION INTRAMUSCULAR; INTRAVENOUS
Status: CANCELLED
Start: 2023-11-21

## 2023-10-24 RX ORDER — ALBUTEROL SULFATE 90 UG/1
1-2 AEROSOL, METERED RESPIRATORY (INHALATION)
Status: CANCELLED
Start: 2023-11-21

## 2023-10-24 RX ORDER — MEPERIDINE HYDROCHLORIDE 50 MG/ML
25 INJECTION INTRAMUSCULAR; INTRAVENOUS; SUBCUTANEOUS EVERY 30 MIN PRN
Status: CANCELLED | OUTPATIENT
Start: 2023-11-21

## 2023-10-24 RX ORDER — METHYLPREDNISOLONE SODIUM SUCCINATE 125 MG/2ML
125 INJECTION, POWDER, LYOPHILIZED, FOR SOLUTION INTRAMUSCULAR; INTRAVENOUS
Status: CANCELLED
Start: 2023-11-21

## 2023-10-24 RX ADMIN — SODIUM CHLORIDE 250 ML: 9 INJECTION, SOLUTION INTRAVENOUS at 13:21

## 2023-10-24 RX ADMIN — SODIUM CHLORIDE 750 MG: 9 INJECTION, SOLUTION INTRAVENOUS at 13:36

## 2023-10-24 NOTE — NURSING NOTE
Infusion Nursing Note:  Laura PICKETT Destinybeverley presents today for Orencia.    Patient seen by provider today: No   present during visit today: Not Applicable.    Note: BP's slightly elevated today, pt denies any other symptoms. Encouraged patient to monitor at home and follow up with PCP as needed.       Intravenous Access:  Peripheral IV placed.    Treatment Conditions:  Not Applicable.      Post Infusion Assessment:  Patient tolerated infusion without incident.  Blood return noted pre and post infusion.  Site patent and intact, free from redness, edema or discomfort.  No evidence of extravasations.  Access discontinued per protocol.     EDUCATION POST BIOLOGICAL/CHEMOTHERAPY INFUSION  Call the triage nurse at your clinic or seek medical attention if you have chills and/or temperature greater than or equal to 100.5, uncontrolled nausea/vomiting, diarrhea, constipation, dizziness, shortness of breath, chest pain, heart palpitations, weakness or any other new or concerning symptoms, questions or concerns.  You can not have any live virus vaccines prior to or during treatment or up to 6 months post infusion.  If you have an upcoming surgery, medical procedure or dental procedure during treatment, this should be discussed with your ordering physician and your surgeon/dentist.  If you are having any concerning symptom, if you are unsure if you should get your next infusion or wish to speak to a provider before your next infusion, please call your care coordinator or triage nurse at your clinic to notify them so we can adequately serve you.       Discharge Plan:   Discharge instructions reviewed with: Patient and spouse.  Patient and/or family verbalized understanding of discharge instructions and all questions answered.  AVS to patient via [employer].  Patient will return 11-21-23 for next appointment.   Patient discharged in stable condition accompanied by: self and .  Departure Mode: Wheelchair.      Emerald  Saumya RN

## 2023-10-24 NOTE — NURSING NOTE
~~~ NOTE: If the patient answers yes to any of the questions below, hold the infusion and contact ordering provider or on-call provider.    Do you currently have any signs of illness or infection or are you on any antibiotics? No  Have you recently had an elevated temperature, fever, chills, productive cough, coughing for 3 weeks or longer or hemoptysis, abnormal vital signs, night sweats, chest pain or have you noticed a decrease in your appetite, unexplained weight loss or fatigue? No  Have you had any new, sudden, or worsening abdominal pain? No  Do you have any open wounds or new incisions? (exclude for patients with hidradenitis suppurativa) No  Have you recently been diagnosed with any new nervous system diseases (ie. Multiple sclerosis, Guillain [place], seizures, neurological changes) or cancer diagnosis? Are you on any form of radiation or chemotherapy? No  Have there been any other new onset medical symptoms? No  Are you pregnant or breast feeding or do you have plans of pregnancy in the future? No; N/A  Do you have any upcoming hospitalizations or surgeries? Does not include esophagogastroduodenoscopy, colonoscopy, endoscopic retrograde cholangiopancreatography (ERCP), endoscopic ultrasound (EUS), dental procedures (including cleanings, fillings, implants, extractions)  or joint aspiration/steroid injections No  Have you or anyone in your household received a live vaccination in the past 4 weeks? Please note: No live vaccines while on biologic/chemotherapy until 6 months after the last treatment. Patient can receive the flu vaccine (shot only).  It is optimal for the patient to get it mid cycle, but it can be given at any time as long as it is not on the day of the infusion. No  If applicable to prescribed medication, confirm negative PPD or quantiferon gold MTB. If positive, verify has negative chest x-ray or the patient is at least 4 weeks post initiation of INH/B6 therapy and have clearance from provider  before infusion N/A  If applicable to prescribed medication, confirm negative hepatitis B surface antigen or hepatitis C. If positive, clearance from provider before infusion. N/A  Rheumatology patients receiving tocilizumab (Actemra): If labs were drawn within the past week, hold dosing until cleared to infuse If AST/ALT > 2 X upper limit normal; ANC < 1.0. NO; N/A  Patients receiving belimumab (Benlysta): Have you been having any signs of worsening depression or suicidal ideations? No; N/A

## 2023-11-03 DIAGNOSIS — J44.9 CHRONIC OBSTRUCTIVE PULMONARY DISEASE, UNSPECIFIED COPD TYPE (H): ICD-10-CM

## 2023-11-07 RX ORDER — PEAK FLOW METER
EACH MISCELLANEOUS
Qty: 1 EACH | Refills: 0 | Status: SHIPPED | OUTPATIENT
Start: 2023-11-07

## 2023-11-07 NOTE — TELEPHONE ENCOUNTER
[facility] sent Rx request for the following:      Requested Prescriptions   Pending Prescriptions Disp Refills    Nebulizers (VIOS AEROSOL DELIVERY SYSTEM) MISC 1 each 0     Sig: USE AS DIRECTED       There is no refill protocol information for this order          Last Prescription Date:   11/3/22  Last Fill Qty/Refills:         1, R-0    Last Office Visit:              7/13/23   Future Office visit:           11/27/23    Patient reports her current Nebulizer stopped working and she is using her portable. Routing to provider for review. Martita Ruth RN on 11/7/2023 at 3:09 PM

## 2023-11-10 ENCOUNTER — APPOINTMENT (OUTPATIENT)
Dept: CT IMAGING | Facility: OTHER | Age: 73
DRG: 177 | End: 2023-11-10
Attending: EMERGENCY MEDICINE
Payer: MEDICARE

## 2023-11-10 ENCOUNTER — HOSPITAL ENCOUNTER (INPATIENT)
Facility: OTHER | Age: 73
LOS: 2 days | Discharge: HOME | DRG: 177 | End: 2023-11-12
Attending: EMERGENCY MEDICINE | Admitting: INTERNAL MEDICINE
Payer: MEDICARE

## 2023-11-10 ENCOUNTER — VIRTUAL VISIT (OUTPATIENT)
Dept: FAMILY MEDICINE | Facility: OTHER | Age: 73
End: 2023-11-10
Attending: INTERNAL MEDICINE
Payer: MEDICARE

## 2023-11-10 ENCOUNTER — APPOINTMENT (OUTPATIENT)
Dept: GENERAL RADIOLOGY | Facility: OTHER | Age: 73
DRG: 177 | End: 2023-11-10
Attending: EMERGENCY MEDICINE
Payer: MEDICARE

## 2023-11-10 DIAGNOSIS — N17.9 AKI (ACUTE KIDNEY INJURY) (H): ICD-10-CM

## 2023-11-10 DIAGNOSIS — R53.83 LETHARGY: Primary | ICD-10-CM

## 2023-11-10 DIAGNOSIS — U07.1 COVID-19: ICD-10-CM

## 2023-11-10 LAB
ALBUMIN SERPL BCG-MCNC: 3.8 G/DL (ref 3.5–5.2)
ALBUMIN SERPL BCG-MCNC: 3.8 G/DL (ref 3.5–5.2)
ALP SERPL-CCNC: 90 U/L (ref 35–104)
ALP SERPL-CCNC: 94 U/L (ref 35–104)
ALT SERPL W P-5'-P-CCNC: 77 U/L (ref 0–50)
ALT SERPL W P-5'-P-CCNC: 77 U/L (ref 0–50)
ANION GAP SERPL CALCULATED.3IONS-SCNC: 12 MMOL/L (ref 7–15)
AST SERPL W P-5'-P-CCNC: 193 U/L (ref 0–45)
AST SERPL W P-5'-P-CCNC: 196 U/L (ref 0–45)
BASE EXCESS BLDV CALC-SCNC: -2.8 MMOL/L (ref -7.7–1.9)
BASOPHILS # BLD AUTO: 0 10E3/UL (ref 0–0.2)
BASOPHILS NFR BLD AUTO: 0 %
BILIRUB DIRECT SERPL-MCNC: <0.2 MG/DL (ref 0–0.3)
BILIRUB SERPL-MCNC: 0.5 MG/DL
BILIRUB SERPL-MCNC: 0.5 MG/DL
BUN SERPL-MCNC: 33.9 MG/DL (ref 8–23)
CALCIUM SERPL-MCNC: 9 MG/DL (ref 8.8–10.2)
CHLORIDE SERPL-SCNC: 101 MMOL/L (ref 98–107)
CREAT SERPL-MCNC: 2.5 MG/DL (ref 0.51–0.95)
CREAT SERPL-MCNC: 2.5 MG/DL (ref 0.51–0.95)
DEPRECATED HCO3 PLAS-SCNC: 23 MMOL/L (ref 22–29)
EGFRCR SERPLBLD CKD-EPI 2021: 20 ML/MIN/1.73M2
EGFRCR SERPLBLD CKD-EPI 2021: 20 ML/MIN/1.73M2
EOSINOPHIL # BLD AUTO: 0 10E3/UL (ref 0–0.7)
EOSINOPHIL NFR BLD AUTO: 0 %
ERYTHROCYTE [DISTWIDTH] IN BLOOD BY AUTOMATED COUNT: 15.3 % (ref 10–15)
FLUAV RNA SPEC QL NAA+PROBE: NEGATIVE
FLUBV RNA RESP QL NAA+PROBE: NEGATIVE
GLUCOSE SERPL-MCNC: 154 MG/DL (ref 70–99)
HCO3 BLDV-SCNC: 25 MMOL/L (ref 21–28)
HCT VFR BLD AUTO: 35.2 % (ref 35–47)
HGB BLD-MCNC: 11.3 G/DL (ref 11.7–15.7)
HOLD SPECIMEN: NORMAL
IMM GRANULOCYTES # BLD: 0.2 10E3/UL
IMM GRANULOCYTES NFR BLD: 1 %
LACTATE SERPL-SCNC: 1.9 MMOL/L (ref 0.7–2)
LACTATE SERPL-SCNC: 3.4 MMOL/L (ref 0.7–2)
LYMPHOCYTES # BLD AUTO: 0.9 10E3/UL (ref 0.8–5.3)
LYMPHOCYTES NFR BLD AUTO: 7 %
MCH RBC QN AUTO: 33.2 PG (ref 26.5–33)
MCHC RBC AUTO-ENTMCNC: 32.1 G/DL (ref 31.5–36.5)
MCV RBC AUTO: 104 FL (ref 78–100)
MONOCYTES # BLD AUTO: 1.9 10E3/UL (ref 0–1.3)
MONOCYTES NFR BLD AUTO: 15 %
NEUTROPHILS # BLD AUTO: 9.5 10E3/UL (ref 1.6–8.3)
NEUTROPHILS NFR BLD AUTO: 77 %
NRBC # BLD AUTO: 0 10E3/UL
NRBC BLD AUTO-RTO: 0 /100
O2/TOTAL GAS SETTING VFR VENT: 21 %
OXYHGB MFR BLDV: 64 % (ref 70–75)
PCO2 BLDV: 54 MM HG (ref 40–50)
PH BLDV: 7.27 [PH] (ref 7.32–7.43)
PLATELET # BLD AUTO: 189 10E3/UL (ref 150–450)
PO2 BLDV: 40 MM HG (ref 25–47)
POTASSIUM SERPL-SCNC: 5.6 MMOL/L (ref 3.4–5.3)
PROCALCITONIN SERPL IA-MCNC: 1.08 NG/ML
PROT SERPL-MCNC: 6.8 G/DL (ref 6.4–8.3)
PROT SERPL-MCNC: 6.8 G/DL (ref 6.4–8.3)
RBC # BLD AUTO: 3.4 10E6/UL (ref 3.8–5.2)
RSV RNA SPEC NAA+PROBE: NEGATIVE
SARS-COV-2 RNA RESP QL NAA+PROBE: POSITIVE
SODIUM SERPL-SCNC: 136 MMOL/L (ref 135–145)
WBC # BLD AUTO: 12.4 10E3/UL (ref 4–11)

## 2023-11-10 PROCEDURE — 36415 COLL VENOUS BLD VENIPUNCTURE: CPT | Performed by: EMERGENCY MEDICINE

## 2023-11-10 PROCEDURE — 82565 ASSAY OF CREATININE: CPT | Performed by: INTERNAL MEDICINE

## 2023-11-10 PROCEDURE — 80053 COMPREHEN METABOLIC PANEL: CPT | Performed by: EMERGENCY MEDICINE

## 2023-11-10 PROCEDURE — 250N000009 HC RX 250: Performed by: FAMILY MEDICINE

## 2023-11-10 PROCEDURE — 250N000011 HC RX IP 250 OP 636: Mod: JZ | Performed by: EMERGENCY MEDICINE

## 2023-11-10 PROCEDURE — 87040 BLOOD CULTURE FOR BACTERIA: CPT | Performed by: EMERGENCY MEDICINE

## 2023-11-10 PROCEDURE — 120N000001 HC R&B MED SURG/OB

## 2023-11-10 PROCEDURE — 250N000009 HC RX 250: Performed by: EMERGENCY MEDICINE

## 2023-11-10 PROCEDURE — 85025 COMPLETE CBC W/AUTO DIFF WBC: CPT | Performed by: EMERGENCY MEDICINE

## 2023-11-10 PROCEDURE — 250N000011 HC RX IP 250 OP 636: Mod: JZ | Performed by: INTERNAL MEDICINE

## 2023-11-10 PROCEDURE — 250N000013 HC RX MED GY IP 250 OP 250 PS 637: Performed by: INTERNAL MEDICINE

## 2023-11-10 PROCEDURE — XW033E5 INTRODUCTION OF REMDESIVIR ANTI-INFECTIVE INTO PERIPHERAL VEIN, PERCUTANEOUS APPROACH, NEW TECHNOLOGY GROUP 5: ICD-10-PCS | Performed by: EMERGENCY MEDICINE

## 2023-11-10 PROCEDURE — 96375 TX/PRO/DX INJ NEW DRUG ADDON: CPT | Performed by: EMERGENCY MEDICINE

## 2023-11-10 PROCEDURE — 999N000157 HC STATISTIC RCP TIME EA 10 MIN

## 2023-11-10 PROCEDURE — 99222 1ST HOSP IP/OBS MODERATE 55: CPT | Mod: AI | Performed by: INTERNAL MEDICINE

## 2023-11-10 PROCEDURE — 99285 EMERGENCY DEPT VISIT HI MDM: CPT | Mod: 25 | Performed by: EMERGENCY MEDICINE

## 2023-11-10 PROCEDURE — 258N000003 HC RX IP 258 OP 636: Performed by: EMERGENCY MEDICINE

## 2023-11-10 PROCEDURE — 99285 EMERGENCY DEPT VISIT HI MDM: CPT | Performed by: EMERGENCY MEDICINE

## 2023-11-10 PROCEDURE — 96361 HYDRATE IV INFUSION ADD-ON: CPT | Performed by: EMERGENCY MEDICINE

## 2023-11-10 PROCEDURE — 71250 CT THORAX DX C-: CPT | Mod: MG

## 2023-11-10 PROCEDURE — C9803 HOPD COVID-19 SPEC COLLECT: HCPCS | Performed by: EMERGENCY MEDICINE

## 2023-11-10 PROCEDURE — 83605 ASSAY OF LACTIC ACID: CPT | Performed by: EMERGENCY MEDICINE

## 2023-11-10 PROCEDURE — 94640 AIRWAY INHALATION TREATMENT: CPT | Mod: 76

## 2023-11-10 PROCEDURE — 250N000013 HC RX MED GY IP 250 OP 250 PS 637: Performed by: EMERGENCY MEDICINE

## 2023-11-10 PROCEDURE — 82248 BILIRUBIN DIRECT: CPT | Performed by: EMERGENCY MEDICINE

## 2023-11-10 PROCEDURE — 82805 BLOOD GASES W/O2 SATURATION: CPT | Performed by: EMERGENCY MEDICINE

## 2023-11-10 PROCEDURE — 84145 PROCALCITONIN (PCT): CPT | Performed by: INTERNAL MEDICINE

## 2023-11-10 PROCEDURE — 87637 SARSCOV2&INF A&B&RSV AMP PRB: CPT | Performed by: EMERGENCY MEDICINE

## 2023-11-10 PROCEDURE — 94640 AIRWAY INHALATION TREATMENT: CPT

## 2023-11-10 PROCEDURE — 96374 THER/PROPH/DIAG INJ IV PUSH: CPT | Performed by: EMERGENCY MEDICINE

## 2023-11-10 PROCEDURE — 71045 X-RAY EXAM CHEST 1 VIEW: CPT

## 2023-11-10 RX ORDER — ALBUTEROL SULFATE 0.83 MG/ML
2.5 SOLUTION RESPIRATORY (INHALATION) ONCE
Status: COMPLETED | OUTPATIENT
Start: 2023-11-10 | End: 2023-11-10

## 2023-11-10 RX ORDER — ENOXAPARIN SODIUM 100 MG/ML
40 INJECTION SUBCUTANEOUS EVERY 24 HOURS
Status: DISCONTINUED | OUTPATIENT
Start: 2023-11-10 | End: 2023-11-11

## 2023-11-10 RX ORDER — SIMVASTATIN 40 MG
40 TABLET ORAL AT BEDTIME
Status: DISCONTINUED | OUTPATIENT
Start: 2023-11-10 | End: 2023-11-11

## 2023-11-10 RX ORDER — ACETAMINOPHEN 650 MG/1
650 SUPPOSITORY RECTAL EVERY 6 HOURS PRN
Status: DISCONTINUED | OUTPATIENT
Start: 2023-11-10 | End: 2023-11-12 | Stop reason: HOSPADM

## 2023-11-10 RX ORDER — ALLOPURINOL 100 MG/1
100 TABLET ORAL DAILY
Status: DISCONTINUED | OUTPATIENT
Start: 2023-11-10 | End: 2023-11-12 | Stop reason: HOSPADM

## 2023-11-10 RX ORDER — SODIUM POLYSTYRENE SULFONATE 15 G/60ML
15 SUSPENSION ORAL; RECTAL ONCE
Status: COMPLETED | OUTPATIENT
Start: 2023-11-10 | End: 2023-11-10

## 2023-11-10 RX ORDER — ONDANSETRON 4 MG/1
4 TABLET, ORALLY DISINTEGRATING ORAL EVERY 6 HOURS PRN
Status: DISCONTINUED | OUTPATIENT
Start: 2023-11-10 | End: 2023-11-12 | Stop reason: HOSPADM

## 2023-11-10 RX ORDER — ACETAMINOPHEN 325 MG/1
650 TABLET ORAL EVERY 6 HOURS PRN
Status: DISCONTINUED | OUTPATIENT
Start: 2023-11-10 | End: 2023-11-12 | Stop reason: HOSPADM

## 2023-11-10 RX ORDER — ONDANSETRON 2 MG/ML
4 INJECTION INTRAMUSCULAR; INTRAVENOUS EVERY 6 HOURS PRN
Status: DISCONTINUED | OUTPATIENT
Start: 2023-11-10 | End: 2023-11-12 | Stop reason: HOSPADM

## 2023-11-10 RX ORDER — LISINOPRIL 10 MG/1
10 TABLET ORAL DAILY
Status: DISCONTINUED | OUTPATIENT
Start: 2023-11-10 | End: 2023-11-12 | Stop reason: HOSPADM

## 2023-11-10 RX ORDER — SODIUM CHLORIDE 9 MG/ML
INJECTION, SOLUTION INTRAVENOUS CONTINUOUS
Status: DISCONTINUED | OUTPATIENT
Start: 2023-11-10 | End: 2023-11-12 | Stop reason: HOSPADM

## 2023-11-10 RX ORDER — ASPIRIN 81 MG/1
81 TABLET, CHEWABLE ORAL DAILY
Status: DISCONTINUED | OUTPATIENT
Start: 2023-11-10 | End: 2023-11-12 | Stop reason: HOSPADM

## 2023-11-10 RX ORDER — ACETAMINOPHEN 325 MG/1
650 TABLET ORAL ONCE
Status: COMPLETED | OUTPATIENT
Start: 2023-11-10 | End: 2023-11-10

## 2023-11-10 RX ORDER — LEVOTHYROXINE SODIUM 112 UG/1
112 TABLET ORAL DAILY
Status: DISCONTINUED | OUTPATIENT
Start: 2023-11-10 | End: 2023-11-12 | Stop reason: HOSPADM

## 2023-11-10 RX ORDER — DEXAMETHASONE SODIUM PHOSPHATE 10 MG/ML
6 INJECTION, SOLUTION INTRAMUSCULAR; INTRAVENOUS ONCE
Status: COMPLETED | OUTPATIENT
Start: 2023-11-10 | End: 2023-11-10

## 2023-11-10 RX ADMIN — SODIUM POLYSTYRENE SULFONATE 15 G: 15 SUSPENSION ORAL; RECTAL at 23:19

## 2023-11-10 RX ADMIN — DEXAMETHASONE SODIUM PHOSPHATE 6 MG: 10 INJECTION, SOLUTION INTRAMUSCULAR; INTRAVENOUS at 18:05

## 2023-11-10 RX ADMIN — ALBUTEROL SULFATE 2.5 MG: 2.5 SOLUTION RESPIRATORY (INHALATION) at 17:05

## 2023-11-10 RX ADMIN — ALBUTEROL SULFATE 2.5 MG: 2.5 SOLUTION RESPIRATORY (INHALATION) at 20:03

## 2023-11-10 RX ADMIN — SODIUM CHLORIDE: 9 INJECTION, SOLUTION INTRAVENOUS at 21:40

## 2023-11-10 RX ADMIN — SODIUM CHLORIDE 500 ML: 9 INJECTION, SOLUTION INTRAVENOUS at 16:50

## 2023-11-10 RX ADMIN — ACETAMINOPHEN 650 MG: 325 TABLET, FILM COATED ORAL at 16:50

## 2023-11-10 RX ADMIN — ASPIRIN 81 MG CHEWABLE TABLET 81 MG: 81 TABLET CHEWABLE at 21:40

## 2023-11-10 RX ADMIN — LEVOTHYROXINE SODIUM 112 MCG: 0.11 TABLET ORAL at 21:39

## 2023-11-10 RX ADMIN — LISINOPRIL 10 MG: 10 TABLET ORAL at 21:39

## 2023-11-10 RX ADMIN — ENOXAPARIN SODIUM 40 MG: 40 INJECTION SUBCUTANEOUS at 21:40

## 2023-11-10 RX ADMIN — REMDESIVIR 200 MG: 100 INJECTION, POWDER, LYOPHILIZED, FOR SOLUTION INTRAVENOUS at 19:25

## 2023-11-10 RX ADMIN — SODIUM CHLORIDE: 9 INJECTION, SOLUTION INTRAVENOUS at 16:50

## 2023-11-10 RX ADMIN — ALLOPURINOL 100 MG: 100 TABLET ORAL at 21:39

## 2023-11-10 ASSESSMENT — ENCOUNTER SYMPTOMS
SPEECH DIFFICULTY: 0
CONSTIPATION: 0
FEVER: 1
DYSURIA: 0
NAUSEA: 1
AGITATION: 0
ABDOMINAL PAIN: 1
FACIAL ASYMMETRY: 0
CHILLS: 1
ARTHRALGIAS: 0
DIARRHEA: 1
SHORTNESS OF BREATH: 1
PALPITATIONS: 0
CHEST TIGHTNESS: 0
VOMITING: 0

## 2023-11-10 ASSESSMENT — ACTIVITIES OF DAILY LIVING (ADL)
ADLS_ACUITY_SCORE: 39
ADLS_ACUITY_SCORE: 33

## 2023-11-10 NOTE — PROGRESS NOTES
Laura is a 73 year old who is being evaluated via a billable telephone visit.      Outgoing call-  answered phone and refused to allow me to speak to patient. He said she is too sick to be spoken to. I informed him I needed to speak to her in order to continue the call for checking in. The  allowed her to say yes but immediately took the phone away when I asked name and  and was upset that I asked her that. He informed me he tested positive 2 days ago with COVID. Last night his wife became ill but he didn't want to test her. He said that her breathing is shallow and she has laid in bed all day. He also informed me he wasn't sure she took insulin last night and isn't sure what her blood glucose is. While asking what her symptoms were the  told me he needed to take care of something and needed someone to call him, then hung up.    What phone number would you like to be contacted at? [phone]  How would you like to obtain your AVS? MyChart    Distant Location (provider location):  On-site    Subjective   Laura is a 73 year old, presenting for the following health issues:  Covid Concern    HPI     COVID-19 Symptom Review  How many days ago did these symptoms start? Last night    Are any of the following symptoms significant for you?  New or worsening difficulty breathing? Yes  Please describe what kind of difficulty you are having breathing:Moderate dyspnea (short of breath with ADLs, shortness of breath that limits the ability to walk up one flight of stairs without needing to rest)  Worsening cough? No  Fever or chills? Yes, I felt feverish or had chills.  Headache: YES  Sore throat: N/A  Chest pain: N/A  Diarrhea: N/A  Body aches? N/A    What treatments has patient tried? NA   Does patient live in a nursing home, group home, or shelter? N/A  Does patient have a way to get food/medications during quarantined?       Objective         Vitals:  No vitals were obtained today due to virtual  visit.    Physical Exam   fatigued  PSYCH: Alert and oriented times 3; coherent speech, normal   rate and volume, able to articulate logical thoughts, able   to abstract reason, no tangential thoughts, no hallucinations   or delusions  Her affect is normal and NA  RESP: No cough, no audible wheezing, able to talk in full sentences  Remainder of exam unable to be completed due to telephone visits

## 2023-11-10 NOTE — PROGRESS NOTES
Patient significant other spoke to nursing staff, and refused to allow patient to speak on the phone due to severe illness.  He stated she was too sick, short of breath and not eating.  He is unsure what her diabetic management today.  He states he tested positive for COVID-19 however patient herself has not been tested.    Provider did not speak to patient for telephone visit.  Provider advised nursing to call patient to stay due to severity of patient's illness a telephone visit is not appropriate, and I would recommend patient to be evaluated in the emergency department.  Patient significant other called 911 due to patient's declining health.

## 2023-11-10 NOTE — ED PROVIDER NOTES
"  History     Chief Complaint   Patient presents with    Fever    Covid Concern     HPI  Laura Perez is a 73 year old female who comes in via ambulance from her home.  Calling out for possible COVID.  Her  tested positive for COVID just a few days ago.  Yesterday she started feeling poorly.  She reports feeling short of breath with activity, she is fever and a cough.  She is just feeling somewhat weak.  Some nausea but no vomiting.  She is drinking liquids but has not tried to eat anything today.  She reports \"stomach ache\" which can get pretty bad at times, she reports diarrhea maybe 2 or 3 times today.  Pain is better after the diarrhea.  When asked, she said that the stool did look pretty black.  Urinary changes.  No chest pain.    Allergies:  Allergies   Allergen Reactions    Glyburide Anaphylaxis     Other reaction(s): Dizziness    Mosquitoes (Informational Only) Hives    Other [Seasonal Allergies] Hives     Deer Flies    Sulfa Antibiotics Anaphylaxis and Hives    Sulfamethoxazole-Trimethoprim Anaphylaxis     Other reaction(s): Other - Describe In Comment Field  Rash, nausea, dizziness    Sulfamethoxazole-Trimethoprim Hives and Itching    Duloxetine      Other reaction(s): Other (see comments)  Falls and dizziness    Codeine Nausea and Nausea and Vomiting       Problem List:    Patient Active Problem List    Diagnosis Date Noted    MGUS (monoclonal gammopathy of unknown significance) 05/25/2023     Priority: Medium    Hyperkalemia 10/14/2022     Priority: Medium    Hyponatremia 10/14/2022     Priority: Medium    Other amyloidosis (H) 11/07/2018     Priority: Medium    Avascular necrosis of left talus (H) 06/16/2018     Priority: Medium    Recurrent pneumonia 06/15/2018     Priority: Medium    Acne rosacea 01/26/2018     Priority: Medium    History of colonic polyps 01/26/2018     Priority: Medium     Overview:   diminutive at 25 cm      Hyperlipidemia 01/26/2018     Priority: Medium    Acquired " hypothyroidism 01/26/2018     Priority: Medium    Migraine headache 01/26/2018     Priority: Medium    Popliteal cyst, left 01/26/2018     Priority: Medium    Stage 3b chronic kidney disease (H) 12/11/2017     Priority: Medium    HTN (hypertension) 11/29/2017     Priority: Medium    Osteoporosis 11/27/2017     Priority: Medium    Malignant tumor of colon (H) 11/26/2017     Priority: Medium    Anemia of chronic kidney failure, stage 3 (moderate) (H) 07/28/2017     Priority: Medium    Diabetes mellitus type 2, insulin dependent (H) 07/28/2017     Priority: Medium    Chronic diastolic CHF (congestive heart failure), NYHA class 2 (H) 02/13/2016     Priority: Medium    Non-rheumatic mitral valve stenosis 02/13/2016     Priority: Medium    On prednisone therapy 01/15/2016     Priority: Medium    Orthostatic hypotension 01/15/2016     Priority: Medium    Secondary adrenal insufficiency (H24) 01/15/2016     Priority: Medium    Rheumatoid arthritis, seropositive 01/15/2016     Priority: Medium    Lichen sclerosus et atrophicus 03/05/2015     Priority: Medium    COPD, very severe (H) 09/12/2014     Priority: Medium        Past Medical History:    Past Medical History:   Diagnosis Date    Amyloidosis (H)     Chronic kidney disease (CKD), stage III (moderate) (H)     Chronic obstructive pulmonary disease (H)     Diverticulosis of intestine without perforation or abscess without bleeding     Essential (primary) hypertension     Hyperlipidemia     Hypothyroidism     Malignant neoplasm of colon (H)     Osteoporosis     Rheumatoid arthritis (H)     Rosacea     Type 2 diabetes mellitus without complications (H)        Past Surgical History:    Past Surgical History:   Procedure Laterality Date    ARTHROPLASTY,Right MTP 1 and 5 and left MTP 5  01/2001    CATARACT IOL, RT/LT Bilateral     COLON SURGERY Right 09/2017    For colon cancer    COLONOSCOPY  09/29/2008    COLONOSCOPY  10/11/2018    F/U in 5 years recommended secondary to  "polyps    LAPAROSCOPIC TUBAL LIGATION      No Comments Provided    Left MCP joint arthroplasty  1998    MTP 2, 3, 4 right foot  2000    RELEASE CARPAL TUNNEL Left     Right long finger MCP repair  1996    [place]    Right MCP 4 and 5 arthroplasty  2004    Right wrist arthroplasty         Family History:    Family History   Problem Relation Age of Onset    Cancer Mother 50        uterus    Heart Disease Mother         MI    Colon Cancer Mother     Heart Disease Father         MI    Chronic Obstructive Pulmonary Disease Sister     Other - See Comments Sister         rubina dow    Colon Cancer Brother     Breast Cancer No family hx of         Cancer-breast       Social History:  Marital Status:   [2]  Social History     Tobacco Use    Smoking status: Former     Packs/day: 1.00     Years: 30.00     Additional pack years: 0.00     Total pack years: 30.00     Types: Cigarettes     Quit date: 2005     Years since quittin.2    Smokeless tobacco: Never   Vaping Use    Vaping Use: Never used   Substance Use Topics    Alcohol use: Not Currently     Alcohol/week: 2.5 standard drinks of alcohol     Types: 3 Standard drinks or equivalent per week     Comment: Rare    Drug use: Yes     Comment: Medical marijuana        Medications:    Abatacept (ORENCIA) 125 MG/ML SOAJ auto-injector  albuterol (PROAIR HFA/PROVENTIL HFA/VENTOLIN HFA) 108 (90 Base) MCG/ACT inhaler  allopurinol (ZYLOPRIM) 100 MG tablet  aspirin 81 MG chewable tablet  atovaquone (MEPRON) 750 MG/5ML suspension  B-D TB SYRINGE 27G X \" 1 ML MISC  budesonide (PULMICORT) 0.5 MG/2ML neb solution  cyanocobalamin (VITAMIN B-12) 1000 MCG tablet  denosumab (PROLIA) 60 MG/ML SOSY injection  EPINEPHrine (ANY BX GENERIC EQUIV) 0.3 MG/0.3ML injection 2-pack  estradiol (VAGIFEM) 10 MCG TABS vaginal tablet  folic acid (FOLVITE) 1 MG tablet  formoterol (PERFOROMIST) 20 MCG/2ML neb solution  glucagon 1 MG kit  glucose 4 g CHEW chewable " "tablet  insulin lispro protamine-insulin lispro (HUMALOG MIX 75/25 KWIKPEN) (75-25) 100 UNIT/ML pen  insulin pen needle (BD PEN NEEDLE LUCILLE 2ND GEN) 32G X 4 MM miscellaneous  Lancets (ONETOUCH DELICA PLUS [id number]) MISC  levothyroxine (SYNTHROID/LEVOTHROID) 112 MCG tablet  lisinopril (ZESTRIL) 10 MG tablet  Methotrexate 25 MG/ML SOSY  montelukast (SINGULAIR) 10 MG tablet  Nebulizers (VIOS AEROSOL DELIVERY SYSTEM) MISC  Needle, Disp, (HYPODERMIC NEEDLE) 27G X 1/2\" MISC  omeprazole (PRILOSEC) 20 MG DR capsule  ONETOUCH VERIO IQ test strip  predniSONE (DELTASONE) 1 MG tablet  predniSONE (DELTASONE) 5 MG tablet  simvastatin (ZOCOR) 40 MG tablet  tiotropium (SPIRIVA) 18 MCG inhaled capsule  traMADol (ULTRAM) 50 MG tablet  Vitamin D, Cholecalciferol, 25 MCG (1000 UT) TABS          Review of Systems   Constitutional:  Positive for chills and fever.   HENT:  Negative for congestion.    Eyes:  Negative for visual disturbance.   Respiratory:  Positive for shortness of breath. Negative for chest tightness.    Cardiovascular:  Negative for chest pain and palpitations.   Gastrointestinal:  Positive for abdominal pain, diarrhea and nausea. Negative for constipation and vomiting.   Genitourinary:  Negative for dysuria.   Musculoskeletal:  Negative for arthralgias.   Skin:  Negative for rash.   Neurological:  Negative for syncope, facial asymmetry and speech difficulty.   Psychiatric/Behavioral:  Negative for agitation.        Physical Exam   BP: 132/53  Pulse: 113  Temp: (!) 102.5  F (39.2  C)  Resp: 30  Height: 160 cm (5' 3\")  Weight: 65.8 kg (145 lb)  SpO2: (!) 88 %      Physical Exam  Vitals and nursing note reviewed.   Constitutional:       Appearance: Normal appearance.   HENT:      Head: Normocephalic and atraumatic.      Mouth/Throat:      Mouth: Mucous membranes are moist.   Eyes:      Conjunctiva/sclera: Conjunctivae normal.   Cardiovascular:      Rate and Rhythm: Normal rate and regular rhythm.      Heart sounds: Normal " heart sounds.   Pulmonary:      Effort: No respiratory distress.      Comments: Some diffuse wheezes throughout  Abdominal:      General: Bowel sounds are normal.      Palpations: Abdomen is soft.   Musculoskeletal:      Comments: No edema bilaterally.   Skin:     General: Skin is warm and dry.   Neurological:      Mental Status: She is alert and oriented to person, place, and time.   Psychiatric:         Mood and Affect: Mood normal.         Behavior: Behavior normal.         ED Course                 Procedures           Results for orders placed or performed during the hospital encounter of 11/10/23 (from the past 24 hour(s))   [place] Draw    Narrative    The following orders were created for panel order [place] Draw.  Procedure                               Abnormality         Status                     ---------                               -----------         ------                     Extra Blood Culture Bottle[[id number]]                       Final result               Extra Blue Top Tube[[id number]]                              Final result               Extra Red Top Tube[[id number]]                               Final result               Extra Green Top (Lithium...[[id number]]                      Final result               Extra Purple Top Tube[[id number]]                            Final result               Extra Urine Collection[[id number]]                           In process                 Extra Green Top (Lithium...[[id number]]                      Final result                 Please view results for these tests on the individual orders.   Lactic Acid STAT   Result Value Ref Range    Lactic Acid 3.4 (H) 0.7 - 2.0 mmol/L   Extra Blood Culture Bottle   Result Value Ref Range    Hold Specimen x    Extra Blue Top Tube   Result Value Ref Range    Hold Specimen x    Extra Red Top Tube   Result Value Ref Range    Hold Specimen x    Extra Green Top (Lithium Heparin) Tube   Result Value Ref Range    Hold  Specimen x    Extra Purple Top Tube   Result Value Ref Range    Hold Specimen x    Extra Green Top (Lithium Heparin) ON ICE   Result Value Ref Range    Hold Specimen x    CBC with platelets differential    Narrative    The following orders were created for panel order CBC with platelets differential.  Procedure                               Abnormality         Status                     ---------                               -----------         ------                     CBC with platelets and d...[[id number]]  Abnormal            Final result                 Please view results for these tests on the individual orders.   Comprehensive metabolic panel   Result Value Ref Range    Sodium 136 135 - 145 mmol/L    Potassium 5.6 (H) 3.4 - 5.3 mmol/L    Carbon Dioxide (CO2) 23 22 - 29 mmol/L    Anion Gap 12 7 - 15 mmol/L    Urea Nitrogen 33.9 (H) 8.0 - 23.0 mg/dL    Creatinine 2.50 (H) 0.51 - 0.95 mg/dL    GFR Estimate 20 (L) >60 mL/min/1.73m2    Calcium 9.0 8.8 - 10.2 mg/dL    Chloride 101 98 - 107 mmol/L    Glucose 154 (H) 70 - 99 mg/dL    Alkaline Phosphatase 94 35 - 104 U/L     (H) 0 - 45 U/L    ALT 77 (H) 0 - 50 U/L    Protein Total 6.8 6.4 - 8.3 g/dL    Albumin 3.8 3.5 - 5.2 g/dL    Bilirubin Total 0.5 <=1.2 mg/dL   CBC with platelets and differential   Result Value Ref Range    WBC Count 12.4 (H) 4.0 - 11.0 10e3/uL    RBC Count 3.40 (L) 3.80 - 5.20 10e6/uL    Hemoglobin 11.3 (L) 11.7 - 15.7 g/dL    Hematocrit 35.2 35.0 - 47.0 %     (H) 78 - 100 fL    MCH 33.2 (H) 26.5 - 33.0 pg    MCHC 32.1 31.5 - 36.5 g/dL    RDW 15.3 (H) 10.0 - 15.0 %    Platelet Count 189 150 - 450 10e3/uL    % Neutrophils 77 %    % Lymphocytes 7 %    % Monocytes 15 %    % Eosinophils 0 %    % Basophils 0 %    % Immature Granulocytes 1 %    NRBCs per 100 WBC 0 <1 /100    Absolute Neutrophils 9.5 (H) 1.6 - 8.3 10e3/uL    Absolute Lymphocytes 0.9 0.8 - 5.3 10e3/uL    Absolute Monocytes 1.9 (H) 0.0 - 1.3 10e3/uL    Absolute Eosinophils  0.0 0.0 - 0.7 10e3/uL    Absolute Basophils 0.0 0.0 - 0.2 10e3/uL    Absolute Immature Granulocytes 0.2 <=0.4 10e3/uL    Absolute NRBCs 0.0 10e3/uL   Symptomatic Influenza A/B, RSV, & SARS-CoV2 PCR (COVID-19) Nose    Specimen: Nose; Swab   Result Value Ref Range    Influenza A PCR Negative Negative    Influenza B PCR Negative Negative    RSV PCR Negative Negative    SARS CoV2 PCR Positive (A) Negative    Narrative    Testing was performed using the Xpert Xpress CoV2/Flu/RSV Assay on the Cepheid GeneXpert Instrument. This test should be ordered for the detection of SARS-CoV-2, influenza, and RSV viruses in individuals who meet clinical and/or epidemiological criteria. Test performance is unknown in asymptomatic patients. This test is for in vitro diagnostic use under the FDA EUA for laboratories certified under CLIA to perform high or moderate complexity testing. This test has not been FDA cleared or approved. A negative result does not rule out the presence of PCR inhibitors in the specimen or target RNA in concentration below the limit of detection for the assay. If only one viral target is positive but coinfection with multiple targets is suspected, the sample should be re-tested with another FDA cleared, approved, or authorized test, if coinfection would change clinical management. This test was validated by the [facility] [employer]. These laboratories are certified under the Clinical Laboratory Improvement Amendments of 1988 (CLIA-88) as qualified to perform high complexity laboratory testing.   Blood gas venous and oxyhgb   Result Value Ref Range    pH Venous 7.27 (L) 7.32 - 7.43    pCO2 Venous 54 (H) 40 - 50 mm Hg    pO2 Venous 40 25 - 47 mm Hg    Bicarbonate Venous 25 21 - 28 mmol/L    FIO2 21     Oxyhemoglobin Venous 64 (L) 70 - 75 %    Base Excess/Deficit -2.8 -7.7 - 1.9 mmol/L   XR Chest Port 1 View    Narrative    Procedure:XR CHEST PORT 1 VIEW    Clinical history:Female, 73 years,  Fever    Technique: Single view was obtained.    Comparison: 10/13/2022    Findings: The cardiac silhouette is within normal limits.. The  pulmonary vasculature is within normal limits.    The lungs are clear. Bony structures are unremarkable.      Impression    Impression:   No acute abnormality. No evidence of acute or active cardiopulmonary  disease.    KAUSHIK KING MD         SYSTEM ID:  [id number]   CT Chest w/o Contrast    Narrative    CT CHEST W/O CONTRAST  11/10/2023 6:27 PM    CLINICAL HISTORY: Female, age 73 years,  covid, ?pneumonia;    Comparison:  Chest x-ray 11/10/2023; CT scan of chest 2/10/2020    TECHNIQUE:  CT scan was performed of the chest without  contrast.  Axial, sagittal and coronal images were reviewed. If present, MIP  and/or 3-D images were constructed by the technologist.    FINDINGS:   Emphysematous changes, solid and groundglass nodules throughout the  parenchyma of both lungs are similar in appearance compared to the  prior study. Lungs demonstrate no evidence of an acute abnormality.    The heart and vessels demonstrate dense coronary artery  calcifications. There is no evidence of pathologic lymph node  enlargement. Visualized portions of the upper abdomen demonstrate  generalized decrease in density of the liver. Low dense lesions are  seen in the left kidney. Dense calcifications are seen throughout the  arterial structures.    Bony structures demonstrate no evidence of an acute abnormality.  Chronic appearing wedging of the T11 vertebral body is again seen.  Nonacute appearing inferior and superior endplate fracture is seen at  T1.     Skin and subcutaneous fat demonstrate no acute abnormality.      Impression    IMPRESSION:   No evidence of acute abnormality.    Nonacute appearing T1 fracture has developed when compared to the  prior study. Otherwise, nonacute changes are similar in appearance in  comparison to prior study. These include emphysematous changes with a  number  of solid and groundglass nodules throughout the parenchyma of  both lungs.      This facility minimizes radiation dose by adjusting the mA and/or kV  according to each patient size.      This CT scan was performed using one or more the following dose  reduction techniques:    -Automated exposure control,  -Adjustment of the mA and/or kV according to patient's size, and/or,  -Use of iterative reconstruction technique.    KAUSHIK KING MD         SYSTEM ID:  [id number]       Medications   sodium chloride (PF) 0.9% PF flush 3 mL (has no administration in time range)   sodium chloride (PF) 0.9% PF flush 3 mL ( Intracatheter Canceled Entry 11/10/23 1647)   sodium chloride 0.9 % infusion ( Intravenous $New Bag 11/10/23 1650)   sodium chloride 0.9% BOLUS 500 mL ( Intravenous Canceled Entry 11/10/23 1810)   remdesivir 200 mg in sodium chloride 0.9 % 250 mL intermittent infusion (has no administration in time range)   sodium chloride 0.9% BOLUS 500 mL (0 mLs Intravenous Stopped 11/10/23 1751)   acetaminophen (TYLENOL) tablet 650 mg (650 mg Oral $Given 11/10/23 1650)   albuterol (PROVENTIL) neb solution 2.5 mg (2.5 mg Nebulization $Given 11/10/23 1705)   dexAMETHasone PF (DECADRON) injection 6 mg (6 mg Intravenous $Given 11/10/23 1805)       Assessments & Plan (with Medical Decision Making)     I have reviewed the nursing notes.    I have reviewed the findings, diagnosis, plan and need for follow up with the patient.  Patient here with fevers, elevated lactic acid increased creatinine and decreased GFR.  COVID is positive and I believe that is most likely the root cause here.  Initial chest x-ray is reassuring.  I called Dr. Alonzo,  hospitalist who will accept patient.  He did ask however that I get CT scan of chest first to rule out subtle pneumonia.  This has been ordered.  I will also start her on some remdesivir and dexamethasone.      1908 CT scan returned reassuring as above.  I spoke with Dr. Alonzo again.  He has  excepted the patient and is currently writing orders.      New Prescriptions    No medications on file       Final diagnoses:   COVID-19   SEAN (acute kidney injury) (H24)       11/10/2023   [facility]       Toney Almonte MD  11/10/23 7300

## 2023-11-10 NOTE — PROGRESS NOTES
Albuterol neb given, Insp/Exp wheezing T/O, respirations 30's. SATS 95% on R.A. Increased wheezing after neb tx. Ilsa Johnson, RT

## 2023-11-10 NOTE — ED TRIAGE NOTES
"Pt arrives via [facility]. Pt has known exposure to covid. SOB, fever, and cough. /53   Pulse 113   Temp (!) 102.5  F (39.2  C) (Tympanic)   Resp 30   Ht 1.6 m (5' 3\")   Wt 65.8 kg (145 lb)   LMP  (LMP Unknown)   SpO2 (!) 88%   BMI 25.69 kg/m        Ariana Rodriguez RN on 11/10/2023 at 4:32 PM       Triage Assessment (Adult)       Row Name 11/10/23 1630          Respiratory WDL    Respiratory WDL X;all     Rhythm/Pattern, Respiratory shortness of breath     Expansion/Accessory Muscles/Retractions abdominal muscle use        Cardiac WDL    Cardiac WDL X;all     Pulse Rate & Regularity tachycardic        Cognitive/Neuro/Behavioral WDL    Cognitive/Neuro/Behavioral WDL WDL                     "

## 2023-11-11 ENCOUNTER — APPOINTMENT (OUTPATIENT)
Dept: OCCUPATIONAL THERAPY | Facility: OTHER | Age: 73
DRG: 177 | End: 2023-11-11
Attending: INTERNAL MEDICINE
Payer: MEDICARE

## 2023-11-11 ENCOUNTER — APPOINTMENT (OUTPATIENT)
Dept: PHYSICAL THERAPY | Facility: OTHER | Age: 73
DRG: 177 | End: 2023-11-11
Payer: MEDICARE

## 2023-11-11 LAB
ALBUMIN SERPL BCG-MCNC: 3.1 G/DL (ref 3.5–5.2)
ALBUMIN UR-MCNC: 30 MG/DL
ALLEN'S TEST: YES
ALP SERPL-CCNC: 79 U/L (ref 35–104)
ALT SERPL W P-5'-P-CCNC: 81 U/L (ref 0–50)
ANION GAP SERPL CALCULATED.3IONS-SCNC: 13 MMOL/L (ref 7–15)
APPEARANCE UR: CLEAR
AST SERPL W P-5'-P-CCNC: 198 U/L (ref 0–45)
BASE EXCESS BLDA CALC-SCNC: -6 MMOL/L (ref -9–1.8)
BILIRUB SERPL-MCNC: 0.6 MG/DL
BILIRUB UR QL STRIP: NEGATIVE
BUN SERPL-MCNC: 35.5 MG/DL (ref 8–23)
CALCIUM SERPL-MCNC: 7.7 MG/DL (ref 8.8–10.2)
CHLORIDE SERPL-SCNC: 104 MMOL/L (ref 98–107)
COLOR UR AUTO: YELLOW
CREAT SERPL-MCNC: 2.19 MG/DL (ref 0.51–0.95)
D DIMER PPP FEU-MCNC: 0.85 UG/ML FEU (ref 0–0.5)
DEPRECATED HCO3 PLAS-SCNC: 17 MMOL/L (ref 22–29)
EGFRCR SERPLBLD CKD-EPI 2021: 23 ML/MIN/1.73M2
ERYTHROCYTE [DISTWIDTH] IN BLOOD BY AUTOMATED COUNT: 15.1 % (ref 10–15)
GLUCOSE BLDC GLUCOMTR-MCNC: 240 MG/DL (ref 70–99)
GLUCOSE BLDC GLUCOMTR-MCNC: 250 MG/DL (ref 70–99)
GLUCOSE BLDC GLUCOMTR-MCNC: 256 MG/DL (ref 70–99)
GLUCOSE BLDC GLUCOMTR-MCNC: 293 MG/DL (ref 70–99)
GLUCOSE BLDC GLUCOMTR-MCNC: 321 MG/DL (ref 70–99)
GLUCOSE BLDC GLUCOMTR-MCNC: 333 MG/DL (ref 70–99)
GLUCOSE SERPL-MCNC: 347 MG/DL (ref 70–99)
GLUCOSE UR STRIP-MCNC: >1000 MG/DL
HBA1C MFR BLD: 7.2 % (ref 4–6.2)
HCO3 BLD-SCNC: 19 MMOL/L (ref 21–28)
HCT VFR BLD AUTO: 30.6 % (ref 35–47)
HGB BLD-MCNC: 10 G/DL (ref 11.7–15.7)
HGB UR QL STRIP: ABNORMAL
HOLD SPECIMEN: NORMAL
HYALINE CASTS: 1 /LPF
KETONES UR STRIP-MCNC: NEGATIVE MG/DL
LEUKOCYTE ESTERASE UR QL STRIP: NEGATIVE
MCH RBC QN AUTO: 33.7 PG (ref 26.5–33)
MCHC RBC AUTO-ENTMCNC: 32.7 G/DL (ref 31.5–36.5)
MCV RBC AUTO: 103 FL (ref 78–100)
MUCOUS THREADS #/AREA URNS LPF: PRESENT /LPF
NITRATE UR QL: NEGATIVE
O2/TOTAL GAS SETTING VFR VENT: 21 %
PCO2 BLD: 37 MM HG (ref 35–45)
PH BLD: 7.33 [PH] (ref 7.35–7.45)
PH UR STRIP: 6 [PH] (ref 5–9)
PLATELET # BLD AUTO: 135 10E3/UL (ref 150–450)
PO2 BLD: 85 MM HG (ref 80–105)
POTASSIUM SERPL-SCNC: 5.6 MMOL/L (ref 3.4–5.3)
PROT SERPL-MCNC: 5.8 G/DL (ref 6.4–8.3)
RBC # BLD AUTO: 2.97 10E6/UL (ref 3.8–5.2)
RBC URINE: 1 /HPF
SODIUM SERPL-SCNC: 134 MMOL/L (ref 135–145)
SP GR UR STRIP: 1.01 (ref 1–1.03)
UROBILINOGEN UR STRIP-MCNC: NORMAL MG/DL
WBC # BLD AUTO: 7.4 10E3/UL (ref 4–11)
WBC URINE: <1 /HPF

## 2023-11-11 PROCEDURE — 94640 AIRWAY INHALATION TREATMENT: CPT

## 2023-11-11 PROCEDURE — 80053 COMPREHEN METABOLIC PANEL: CPT | Performed by: INTERNAL MEDICINE

## 2023-11-11 PROCEDURE — 258N000003 HC RX IP 258 OP 636: Performed by: INTERNAL MEDICINE

## 2023-11-11 PROCEDURE — 83036 HEMOGLOBIN GLYCOSYLATED A1C: CPT | Performed by: INTERNAL MEDICINE

## 2023-11-11 PROCEDURE — 85379 FIBRIN DEGRADATION QUANT: CPT | Performed by: INTERNAL MEDICINE

## 2023-11-11 PROCEDURE — 81001 URINALYSIS AUTO W/SCOPE: CPT | Performed by: INTERNAL MEDICINE

## 2023-11-11 PROCEDURE — 120N000001 HC R&B MED SURG/OB

## 2023-11-11 PROCEDURE — 36600 WITHDRAWAL OF ARTERIAL BLOOD: CPT | Performed by: INTERNAL MEDICINE

## 2023-11-11 PROCEDURE — 250N000011 HC RX IP 250 OP 636: Mod: JZ | Performed by: INTERNAL MEDICINE

## 2023-11-11 PROCEDURE — 94640 AIRWAY INHALATION TREATMENT: CPT | Mod: 76

## 2023-11-11 PROCEDURE — 258N000003 HC RX IP 258 OP 636: Performed by: EMERGENCY MEDICINE

## 2023-11-11 PROCEDURE — 250N000012 HC RX MED GY IP 250 OP 636 PS 637: Performed by: INTERNAL MEDICINE

## 2023-11-11 PROCEDURE — 97530 THERAPEUTIC ACTIVITIES: CPT | Mod: GO

## 2023-11-11 PROCEDURE — 97161 PT EVAL LOW COMPLEX 20 MIN: CPT | Mod: GP

## 2023-11-11 PROCEDURE — 99233 SBSQ HOSP IP/OBS HIGH 50: CPT | Performed by: INTERNAL MEDICINE

## 2023-11-11 PROCEDURE — 85018 HEMOGLOBIN: CPT | Performed by: INTERNAL MEDICINE

## 2023-11-11 PROCEDURE — 36415 COLL VENOUS BLD VENIPUNCTURE: CPT | Performed by: INTERNAL MEDICINE

## 2023-11-11 PROCEDURE — 82803 BLOOD GASES ANY COMBINATION: CPT | Performed by: INTERNAL MEDICINE

## 2023-11-11 PROCEDURE — 97116 GAIT TRAINING THERAPY: CPT | Mod: GP

## 2023-11-11 PROCEDURE — 999N000157 HC STATISTIC RCP TIME EA 10 MIN

## 2023-11-11 PROCEDURE — 97165 OT EVAL LOW COMPLEX 30 MIN: CPT | Mod: GO

## 2023-11-11 PROCEDURE — 250N000013 HC RX MED GY IP 250 OP 250 PS 637: Performed by: INTERNAL MEDICINE

## 2023-11-11 RX ORDER — BUDESONIDE 0.5 MG/2ML
0.5 INHALANT ORAL 2 TIMES DAILY
Status: DISCONTINUED | OUTPATIENT
Start: 2023-11-11 | End: 2023-11-11

## 2023-11-11 RX ORDER — TRAMADOL HYDROCHLORIDE 50 MG/1
50 TABLET ORAL
Status: DISCONTINUED | OUTPATIENT
Start: 2023-11-11 | End: 2023-11-12 | Stop reason: HOSPADM

## 2023-11-11 RX ORDER — ATORVASTATIN CALCIUM 20 MG/1
20 TABLET, FILM COATED ORAL AT BEDTIME
Status: DISCONTINUED | OUTPATIENT
Start: 2023-11-11 | End: 2023-11-12 | Stop reason: HOSPADM

## 2023-11-11 RX ORDER — ALBUTEROL SULFATE 90 UG/1
2 AEROSOL, METERED RESPIRATORY (INHALATION) EVERY 6 HOURS PRN
Status: DISCONTINUED | OUTPATIENT
Start: 2023-11-11 | End: 2023-11-12 | Stop reason: HOSPADM

## 2023-11-11 RX ORDER — NALOXONE HYDROCHLORIDE 0.4 MG/ML
0.4 INJECTION, SOLUTION INTRAMUSCULAR; INTRAVENOUS; SUBCUTANEOUS
Status: DISCONTINUED | OUTPATIENT
Start: 2023-11-11 | End: 2023-11-12 | Stop reason: HOSPADM

## 2023-11-11 RX ORDER — NALOXONE HYDROCHLORIDE 0.4 MG/ML
0.2 INJECTION, SOLUTION INTRAMUSCULAR; INTRAVENOUS; SUBCUTANEOUS
Status: DISCONTINUED | OUTPATIENT
Start: 2023-11-11 | End: 2023-11-12 | Stop reason: HOSPADM

## 2023-11-11 RX ORDER — INSULIN LISPRO 100 [IU]/ML
40 INJECTION, SUSPENSION SUBCUTANEOUS
Status: DISCONTINUED | OUTPATIENT
Start: 2023-11-12 | End: 2023-11-11 | Stop reason: DRUGHIGH

## 2023-11-11 RX ORDER — NICOTINE POLACRILEX 4 MG
15-30 LOZENGE BUCCAL
Status: DISCONTINUED | OUTPATIENT
Start: 2023-11-11 | End: 2023-11-12 | Stop reason: HOSPADM

## 2023-11-11 RX ORDER — MONTELUKAST SODIUM 5 MG/1
10 TABLET, CHEWABLE ORAL AT BEDTIME
Status: DISCONTINUED | OUTPATIENT
Start: 2023-11-11 | End: 2023-11-12 | Stop reason: HOSPADM

## 2023-11-11 RX ORDER — TIOTROPIUM BROMIDE 18 UG/1
18 CAPSULE ORAL; RESPIRATORY (INHALATION) DAILY
Status: DISCONTINUED | OUTPATIENT
Start: 2023-11-11 | End: 2023-11-11

## 2023-11-11 RX ORDER — PANTOPRAZOLE SODIUM 40 MG/1
40 TABLET, DELAYED RELEASE ORAL DAILY
Status: DISCONTINUED | OUTPATIENT
Start: 2023-11-11 | End: 2023-11-12 | Stop reason: HOSPADM

## 2023-11-11 RX ORDER — ATOVAQUONE 750 MG/5ML
1500 SUSPENSION ORAL DAILY
Status: DISCONTINUED | OUTPATIENT
Start: 2023-11-11 | End: 2023-11-12 | Stop reason: HOSPADM

## 2023-11-11 RX ORDER — FORMOTEROL FUMARATE DIHYDRATE 20 UG/2ML
2 SOLUTION RESPIRATORY (INHALATION) 2 TIMES DAILY
Status: DISCONTINUED | OUTPATIENT
Start: 2023-11-11 | End: 2023-11-11

## 2023-11-11 RX ORDER — ATOVAQUONE 750 MG/5ML
1500 SUSPENSION ORAL DAILY
Status: DISCONTINUED | OUTPATIENT
Start: 2023-11-11 | End: 2023-11-11

## 2023-11-11 RX ORDER — DEXTROSE MONOHYDRATE 25 G/50ML
25-50 INJECTION, SOLUTION INTRAVENOUS
Status: DISCONTINUED | OUTPATIENT
Start: 2023-11-11 | End: 2023-11-12 | Stop reason: HOSPADM

## 2023-11-11 RX ORDER — ENOXAPARIN SODIUM 100 MG/ML
30 INJECTION SUBCUTANEOUS EVERY 24 HOURS
Status: DISCONTINUED | OUTPATIENT
Start: 2023-11-11 | End: 2023-11-12

## 2023-11-11 RX ORDER — CLONIDINE HYDROCHLORIDE 0.1 MG/1
0.1 TABLET ORAL 3 TIMES DAILY PRN
Status: DISCONTINUED | OUTPATIENT
Start: 2023-11-11 | End: 2023-11-12 | Stop reason: HOSPADM

## 2023-11-11 RX ADMIN — CLONIDINE HYDROCHLORIDE 0.1 MG: 0.1 TABLET ORAL at 20:11

## 2023-11-11 RX ADMIN — ALBUTEROL SULFATE 2 PUFF: 90 AEROSOL, METERED RESPIRATORY (INHALATION) at 12:19

## 2023-11-11 RX ADMIN — LEVOTHYROXINE SODIUM 112 MCG: 0.11 TABLET ORAL at 09:15

## 2023-11-11 RX ADMIN — PANTOPRAZOLE SODIUM 40 MG: 40 TABLET, DELAYED RELEASE ORAL at 12:46

## 2023-11-11 RX ADMIN — UMECLIDINIUM 1 PUFF: 62.5 AEROSOL, POWDER ORAL at 12:19

## 2023-11-11 RX ADMIN — INSULIN ASPART 4 UNITS: 100 INJECTION, SOLUTION INTRAVENOUS; SUBCUTANEOUS at 18:53

## 2023-11-11 RX ADMIN — SODIUM CHLORIDE: 9 INJECTION, SOLUTION INTRAVENOUS at 07:51

## 2023-11-11 RX ADMIN — ENOXAPARIN SODIUM 30 MG: 30 INJECTION SUBCUTANEOUS at 22:17

## 2023-11-11 RX ADMIN — REMDESIVIR 100 MG: 100 INJECTION, POWDER, LYOPHILIZED, FOR SOLUTION INTRAVENOUS at 16:13

## 2023-11-11 RX ADMIN — ALLOPURINOL 100 MG: 100 TABLET ORAL at 09:16

## 2023-11-11 RX ADMIN — MONTELUKAST SODIUM 10 MG: 5 TABLET, CHEWABLE ORAL at 22:17

## 2023-11-11 RX ADMIN — SODIUM CHLORIDE: 9 INJECTION, SOLUTION INTRAVENOUS at 20:11

## 2023-11-11 RX ADMIN — TRAMADOL HYDROCHLORIDE 50 MG: 50 TABLET, COATED ORAL at 22:17

## 2023-11-11 RX ADMIN — ATOVAQUONE 1500 MG: 750 SUSPENSION ORAL at 17:06

## 2023-11-11 RX ADMIN — SODIUM CHLORIDE 50 ML: 9 INJECTION, SOLUTION INTRAVENOUS at 16:18

## 2023-11-11 RX ADMIN — ACETAMINOPHEN 650 MG: 325 TABLET, FILM COATED ORAL at 16:30

## 2023-11-11 RX ADMIN — INSULIN ASPART 16 UNITS: 100 INJECTION, SUSPENSION SUBCUTANEOUS at 17:21

## 2023-11-11 RX ADMIN — SODIUM ZIRCONIUM CYCLOSILICATE 10 G: 10 POWDER, FOR SUSPENSION ORAL at 12:46

## 2023-11-11 RX ADMIN — ASPIRIN 81 MG CHEWABLE TABLET 81 MG: 81 TABLET CHEWABLE at 09:15

## 2023-11-11 RX ADMIN — ATORVASTATIN CALCIUM 20 MG: 20 TABLET, FILM COATED ORAL at 22:17

## 2023-11-11 RX ADMIN — INSULIN ASPART 3 UNITS: 100 INJECTION, SOLUTION INTRAVENOUS; SUBCUTANEOUS at 12:47

## 2023-11-11 RX ADMIN — DEXAMETHASONE 6 MG: 2 TABLET ORAL at 09:16

## 2023-11-11 RX ADMIN — ALBUTEROL SULFATE 2 PUFF: 90 AEROSOL, METERED RESPIRATORY (INHALATION) at 17:59

## 2023-11-11 ASSESSMENT — ACTIVITIES OF DAILY LIVING (ADL)
ADLS_ACUITY_SCORE: 39
ADLS_ACUITY_SCORE: 33
ADLS_ACUITY_SCORE: 38
ADLS_ACUITY_SCORE: 39
ADLS_ACUITY_SCORE: 38
ADLS_ACUITY_SCORE: 39
ADLS_ACUITY_SCORE: 40
ADLS_ACUITY_SCORE: 39
ADLS_ACUITY_SCORE: 33
ADLS_ACUITY_SCORE: 39

## 2023-11-11 NOTE — PHARMACY-ADMISSION MEDICATION HISTORY
Pharmacist Admission Medication History    Admission medication history is complete. The information provided in this note is only as accurate as the sources available at the time of the update.    Information Source(s): Patient via phone interview  -[facility]  -10/30 [facility] note    Pertinent Information: per 10/30 [facility] note, Methotrexate dose decreased from 25 mg weekly to 12.5 mg weekly due to worsening renal function (pt takes Methotrexate on Tuesdays). Prednisone also increased from 7 mg/day to 10 mg/day at that visit.     -pt receives Orencia infusions every 28 days at [facility] infusion pharmacy (last received 10/24), also receives Prolia subcutaneous injections every 6 months (last received 9/26).    -takes daily Atovaquone for pneumonia prophylaxis due to being on several immunosuppressant medications- per [facility] this has not been filled since 8/15, but patient reports she ended up with extra at one point- confirms she takes daily, except when she is sick- confirms last dose was Thursday.    Changes made to PTA medication list:  Added: None  Deleted:   Prednisone 1 mg tabs  Duplicate Prolia  Changed:   Prednisone 5 mg directions, dose (increased to 10 mg/day, as above)  Methotrexate dose  Orencia dose, route  Allopurinol dose      Medication Affordability: no concerns noted       Allergies reviewed with patient and updates made in EHR: yes- duplicate Bactrim allergy removed.      Medication History Completed By: Moises Barfield RPH 11/11/2023 10:57 AM

## 2023-11-11 NOTE — PLAN OF CARE
Goal Outcome Evaluation:      Plan of Care Reviewed With: patient    Overall Patient Progress: improving    Outcome Evaluation: VS stable, afebrile, continent to the bathroom with gait belt and walker    Pt alert and oriented x 4. VS stable with elevated BP. Pt is afebrile. Pt is on special precautions. LS expiratory wheezing in all lobes. Pt has blanchable redness on her buttocks. Pt is ambulating to the bathroom with her walker and gait belt. Pt has been continent on shift today. She is receiving remdesivir IV. She is infusing IV normal saline. Pt's blood sugar has been in the 300's at the end of shift today. Given PRN and scheduled insulin.      Nannette Warren RN .......  11/11/2023  6:49 PM

## 2023-11-11 NOTE — PROVIDER NOTIFICATION
11/10/23 2100   Initial Information   Patient Belongings remains with patient   Patient Belongings Remaining with Patient clothing  (robe)   Did you bring any home meds/supplements to the hospital?  No     [facility] [place] will make every effort per our policy to help keep your items safe while in the hospital.  If you choose to keep any items at the bedside, we cannot be held responsible for any items that are lost or broken.      List items sent to safe: NA    I have reviewed my belongings list on admission and verify that it is correct.     Patient signature_______________________________  Date/Time_____________________    2nd Staff person if patient unable to sign __________________________  Date/Time ______________________      I have received all my belongings noted above at discharge.    Patient signature________________________________  Date/Time  __________________________

## 2023-11-11 NOTE — H&P
"[facility]    History and Physical - Hospitalist Service       Date of Admission:  11/10/2023    Assessment & Plan      Laura Perez is a 73 year old female admitted on 11/10/2023.     COVID-19 infection  Acute hypoxic and hypercarbic respiratory failure due to COVID-19 infection  Patient will be given BiPAP for hypercarbia  Check VBG in a.m.  Remdesivir and dexamethasone will be given  Lovenox for DVT prophylaxis    Hyperkalemia  Potassium is 5.6 because of which patient will be given Kayexalate x1 and BMP followed in the morning    COPD with acute exacerbation  On the basis of COVID-19 infection  Will give inhalers as well as steroids as mentioned above    Hypertension  Type 2 diabetes mellitus acute kidney injury  Hypercholesteremia  Hypothyroidism      Medication reconciliation was not done therefore medications will be completed when medication reconciliation is done      Diet:  Cardiac diabetic diet  DVT Prophylaxis: Enoxaparin (Lovenox) SQ  Farr Catheter: Not present  Lines: None     Code Status:  Full code    Clinically Significant Risk Factors Present on Admission        # Hyperkalemia: Highest K = 5.6 mmol/L in last 2 days, will monitor as appropriate         # Drug Induced Platelet Defect: home medication list includes an antiplatelet medication  # Acute Kidney Injury, unspecified: based on a >150% or 0.3 mg/dL increase in last creatinine compared to past 90 day average, will monitor renal function  # Hypertension: Noted on problem list     # DMII: A1C = N/A within past 6 months   # Overweight: Estimated body mass index is 25.69 kg/m  as calculated from the following:    Height as of this encounter: 1.6 m (5' 3\").    Weight as of this encounter: 65.8 kg (145 lb).       # COPD: noted on problem list        Disposition Plan   Admit to inpatient     The patient's care was discussed with the Patient.    Casey Alonzo MD  [facility]  Securely message with the " [employer] Web Console (learn more here)  Text page via [facility] Paging/Directory      ______________________________________________________________________    Chief Complaint   Cough    History is obtained from the patient, medical records and my discussion with emergency department physician      History of Present Illness   Laura Perez is a pleasant 73 year old lady with past medical history of CKD stage III, amyloidosis hypercholesteremia, hypothyroidism, colon cancer diagnosed 9/2017, rheumatoid arthritis type 2 diabetes mellitus, hypertension, COPD.    Recently her  has been diagnosed with COVID-19 infection.  Both of them her taken vaccination in September 2023.  For the past 2 days patient has had cough generalized body aches and pains headache diarrhea and lethargy.  Her oxygen saturation was 88% when she came in.  She had tachycardia.  Her white blood cell count was elevated at 12.4.  Her COVID-19 test was came back positive.    Review of Systems    headache, blurring of vision or double vision, neck pain jaw pain or shoulder pain, chest pain, shortness of breath, cough or increased sputum production, nausea or vomiting, abdominal pain, diarrhea or constipation.  Denies any urinary symptoms of burning or increased frequency. Denies any weakness of upper or lower extremities or any seizure activity. Fever or chills. Bleeding from anywhere else.  No rashes or cellulitis.      Past Medical History    I have reviewed this patient's medical history and updated it with pertinent information if needed.   Past Medical History:   Diagnosis Date    Amyloidosis (H)     Chronic kidney disease (CKD), stage III (moderate) (H)     Chronic obstructive pulmonary disease (H)     No Comments Provided    Diverticulosis of intestine without perforation or abscess without bleeding     Diffuse diverticulosis and history of diminutive hyperplastic colon    Essential (primary) hypertension     No Comments Provided     Hyperlipidemia     No Comments Provided    Hypothyroidism     No Comments Provided    Malignant neoplasm of colon (H)     2017    Osteoporosis     On Prolia    Rheumatoid arthritis (H)     Rheumatologist Dr. Gonzalez, [phone], fax [phone]    Rosacea     No Comments Provided    Type 2 diabetes mellitus without complications (H)     Diabetes Mellitus, prednisone induced       Past Surgical History   I have reviewed this patient's surgical history and updated it with pertinent information if needed.  Past Surgical History:   Procedure Laterality Date    ARTHROPLASTY,Right MTP 1 and 5 and left MTP 5  2001    CATARACT IOL, RT/LT Bilateral     COLON SURGERY Right 2017    For colon cancer    COLONOSCOPY  2008    COLONOSCOPY  10/11/2018    F/U in 5 years recommended secondary to polyps    LAPAROSCOPIC TUBAL LIGATION      No Comments Provided    Left MCP joint arthroplasty  1998    MTP 2, 3, 4 right foot  2000    RELEASE CARPAL TUNNEL Left     Right long finger MCP repair      [place]    Right MCP 4 and 5 arthroplasty  2004    Right wrist arthroplasty         Social History   I have reviewed this patient's social history and updated it with pertinent information if needed.  Social History     Tobacco Use    Smoking status: Former     Packs/day: 1.00     Years: 30.00     Additional pack years: 0.00     Total pack years: 30.00     Types: Cigarettes     Quit date: 2005     Years since quittin.2    Smokeless tobacco: Never   Vaping Use    Vaping Use: Never used   Substance Use Topics    Alcohol use: Not Currently     Alcohol/week: 2.5 standard drinks of alcohol     Types: 3 Standard drinks or equivalent per week     Comment: Rare    Drug use: Yes     Comment: Medical marijuana       Family History   I have reviewed this patient's family history and updated it with pertinent information if needed.  Family History   Problem Relation Age of Onset    Cancer Mother 50  "       uterus    Heart Disease Mother         MI    Colon Cancer Mother     Heart Disease Father         MI    Chronic Obstructive Pulmonary Disease Sister     Other - See Comments Sister         rubina dow    Colon Cancer Brother     Breast Cancer No family hx of         Cancer-breast       Prior to Admission Medications   Prior to Admission Medications   Prescriptions Last Dose Informant Patient Reported? Taking?   Abatacept (ORENCIA) 125 MG/ML SOAJ auto-injector  Spouse/Significant Other, Self Yes No   Sig: Inject 1 mL (125 mg) Subcutaneous once a week   B-D TB SYRINGE 27G X 1/2\" 1 ML MISC   Yes No   Sig: USE AS DIRECTED FOR METHOTREXATE INJECTIONS   EPINEPHrine (ANY BX GENERIC EQUIV) 0.3 MG/0.3ML injection 2-pack   No No   Sig: Inject 0.3 mLs (0.3 mg) into the muscle once as needed for anaphylaxis   Lancets (ONETOUCH DELICA PLUS [id number]) MISC   No No   Sig: USE TO CHECK BLOOD SUGAR TWICE DAILY.   Methotrexate 25 MG/ML SOSY  Self Yes No   Sig: Inject 10 mg Subcutaneous once a week   Nebulizers (VIOS AEROSOL DELIVERY SYSTEM) MISC   No No   Sig: USE AS DIRECTED   Needle, Disp, (HYPODERMIC NEEDLE) 27G X 1/2\" MISC  Self Yes No   Sig: For methotrexate injections.   ONETOUCH VERIO IQ test strip   No No   Sig: USE TO TEST BLOOD SUGAR TWICE DAILY OR AS DIRECTED   Vitamin D, Cholecalciferol, 25 MCG (1000 UT) TABS  Self Yes No   Sig: Take 1 tablet by mouth daily   albuterol (PROAIR HFA/PROVENTIL HFA/VENTOLIN HFA) 108 (90 Base) MCG/ACT inhaler   No No   Sig: INHALE 1-2 PUFFS BY MOUTH EVERY 4-6 HOURS AS NEEDED FOR SHORTNESS OF BREATH OR WHEEZING   allopurinol (ZYLOPRIM) 100 MG tablet   Yes No   Sig: Take 25 mg by mouth daily   aspirin 81 MG chewable tablet  Spouse/Significant Other, Self Yes No   Sig: Take 81 mg by mouth daily with food   atovaquone (MEPRON) 750 MG/5ML suspension  Spouse/Significant Other, Self Yes No   Sig: Take 1,500 mg by mouth daily   budesonide (PULMICORT) 0.5 MG/2ML neb solution   No No   Sig: " Take 2 mLs (0.5 mg) by nebulization 2 times daily   cyanocobalamin (VITAMIN B-12) 1000 MCG tablet   No No   Sig: TAKE 1 TABLET(1000 MCG) BY MOUTH DAILY   denosumab (PROLIA) 60 MG/ML SOSY injection   No No   Sig: Inject 1 mL (60 mg) Subcutaneous every 6 months   estradiol (VAGIFEM) 10 MCG TABS vaginal tablet   No No   Sig: Place 1 tablet (10 mcg) vaginally twice a week   folic acid (FOLVITE) 1 MG tablet  Self No No   Sig: Take 1 tablet (1 mg) by mouth daily   formoterol (PERFOROMIST) 20 MCG/2ML neb solution  Spouse/Significant Other, Self No No   Sig: Take 2 mLs (20 mcg) by nebulization 2 times daily   glucagon 1 MG kit  Spouse/Significant Other, Self Yes No   Sig: Inject 1 mg into the muscle as needed for low blood sugar    glucose 4 g CHEW chewable tablet  Spouse/Significant Other, Self Yes No   Sig: Take 1 tablet by mouth as needed for low blood sugar   insulin lispro protamine-insulin lispro (HUMALOG MIX 75/25 KWIKPEN) (75-25) 100 UNIT/ML pen   No No   Si units in the morning, 20 units in the evening   insulin pen needle (BD PEN NEEDLE LUCILLE 2ND GEN) 32G X 4 MM miscellaneous   No No   Sig: Use to administer insulin twice daily. Dispense as covered by insurance. Dx. Code: E11.9.   levothyroxine (SYNTHROID/LEVOTHROID) 112 MCG tablet   Yes No   Sig: Take 1 tablet by mouth daily   lisinopril (ZESTRIL) 10 MG tablet  Self Yes No   Sig: Take 10 mg by mouth daily   montelukast (SINGULAIR) 10 MG tablet  Spouse/Significant Other, Self No No   Sig: Take 1 tablet (10 mg) by mouth At Bedtime   omeprazole (PRILOSEC) 20 MG DR capsule   No No   Sig: Take 1 capsule (20 mg) by mouth daily   predniSONE (DELTASONE) 1 MG tablet  Self Yes No   Sig: Take 2 mg by mouth daily - take with the 5 mg tablet for a total dose of 7 mg   predniSONE (DELTASONE) 5 MG tablet  Self Yes No   Sig: Take 5 mg by mouth daily with food - take with two of the 1 mg tablets for a total dose of 7 mg   simvastatin (ZOCOR) 40 MG tablet   No No   Sig: Take 1  tablet (40 mg) by mouth At Bedtime   tiotropium (SPIRIVA) 18 MCG inhaled capsule  Spouse/Significant Other, Self Yes No   Sig: Inhale 1 capsule (18 mcg) into the lungs daily   traMADol (ULTRAM) 50 MG tablet   No No   Sig: Take 1 tablet (50 mg) by mouth nightly as needed for severe pain      Facility-Administered Medications Last Administration Doses Remaining   denosumab (PROLIA) injection 60 mg None recorded 1        Allergies   Allergies   Allergen Reactions    Glyburide Anaphylaxis     Other reaction(s): Dizziness    Mosquitoes (Informational Only) Hives    Other [Seasonal Allergies] Hives     Deer Flies    Sulfa Antibiotics Anaphylaxis and Hives    Sulfamethoxazole-Trimethoprim Anaphylaxis     Other reaction(s): Other - Describe In Comment Field  Rash, nausea, dizziness    Sulfamethoxazole-Trimethoprim Hives and Itching    Duloxetine      Other reaction(s): Other (see comments)  Falls and dizziness    Codeine Nausea and Nausea and Vomiting       Physical Exam   Vital Signs: Temp: (!) 101  F (38.3  C) Temp src: Tympanic BP: 92/51 Pulse: 101   Resp: 23 SpO2: 92 % O2 Device: None (Room air) Oxygen Delivery: 2 LPM  Weight: 145 lbs 0 oz      GENERAL: Patient does not look in any acute distress  HEENT: EOM+, Conjunctiva is clear   NECK:  no Jugular Venous distention  HEART: S1 S2 regular  Rate and Rhythm,    LUNGS: Respirations are not laboured, Lungs are clear to auscultation Crepitations or Wheezing   ABDOMEN: Soft, there is no tenderness, Bowel Sounds are  Positive   LOWER LIMBS: no Pedal Edema  Bilaterally   CNS:  Alert,  Oriented x 3, Moving all the Four Limbs       Data        Imaging results reviewed over the past 24 hrs:   Recent Results (from the past 24 hour(s))   XR Chest Port 1 View    Narrative    Procedure:XR CHEST PORT 1 VIEW    Clinical history:Female, 73 years, Fever    Technique: Single view was obtained.    Comparison: 10/13/2022    Findings: The cardiac silhouette is within normal limits..  The  pulmonary vasculature is within normal limits.    The lungs are clear. Bony structures are unremarkable.      Impression    Impression:   No acute abnormality. No evidence of acute or active cardiopulmonary  disease.    KAUSHIK KING MD         SYSTEM ID:  [id number]   CT Chest w/o Contrast    Narrative    CT CHEST W/O CONTRAST  11/10/2023 6:27 PM    CLINICAL HISTORY: Female, age 73 years,  covid, ?pneumonia;    Comparison:  Chest x-ray 11/10/2023; CT scan of chest 2/10/2020    TECHNIQUE:  CT scan was performed of the chest without  contrast.  Axial, sagittal and coronal images were reviewed. If present, MIP  and/or 3-D images were constructed by the technologist.    FINDINGS:   Emphysematous changes, solid and groundglass nodules throughout the  parenchyma of both lungs are similar in appearance compared to the  prior study. Lungs demonstrate no evidence of an acute abnormality.    The heart and vessels demonstrate dense coronary artery  calcifications. There is no evidence of pathologic lymph node  enlargement. Visualized portions of the upper abdomen demonstrate  generalized decrease in density of the liver. Low dense lesions are  seen in the left kidney. Dense calcifications are seen throughout the  arterial structures.    Bony structures demonstrate no evidence of an acute abnormality.  Chronic appearing wedging of the T11 vertebral body is again seen.  Nonacute appearing inferior and superior endplate fracture is seen at  T1.     Skin and subcutaneous fat demonstrate no acute abnormality.      Impression    IMPRESSION:   No evidence of acute abnormality.    Nonacute appearing T1 fracture has developed when compared to the  prior study. Otherwise, nonacute changes are similar in appearance in  comparison to prior study. These include emphysematous changes with a  number of solid and groundglass nodules throughout the parenchyma of  both lungs.      This facility minimizes radiation dose by adjusting the  mA and/or kV  according to each patient size.      This CT scan was performed using one or more the following dose  reduction techniques:    -Automated exposure control,  -Adjustment of the mA and/or kV according to patient's size, and/or,  -Use of iterative reconstruction technique.    KAUSHIK KING MD         SYSTEM ID:  [id number]     Recent Labs   Lab 11/10/23  1638   WBC 12.4*   HGB 11.3*   *         POTASSIUM 5.6*   CHLORIDE 101   CO2 23   BUN 33.9*   CR 2.50*   ANIONGAP 12   GIANNI 9.0   *   ALBUMIN 3.8   PROTTOTAL 6.8   BILITOTAL 0.5   ALKPHOS 94   ALT 77*   *

## 2023-11-11 NOTE — PHARMACY
Pharmacy- Renal Dose Adjustment    Patient Active Problem List   Diagnosis    Acne rosacea    Anemia of chronic kidney failure, stage 3 (moderate) (H)    Chronic diastolic CHF (congestive heart failure), NYHA class 2 (H)    Stage 3b chronic kidney disease (H)    Malignant tumor of colon (H)    History of colonic polyps    COPD, very severe (H)    Diabetes mellitus type 2, insulin dependent (H)    HTN (hypertension)    Hyperlipidemia    Acquired hypothyroidism    Lichen sclerosus et atrophicus    Migraine headache    Non-rheumatic mitral valve stenosis    On prednisone therapy    Orthostatic hypotension    Osteoporosis    Popliteal cyst, left    Secondary adrenal insufficiency (H24)    Rheumatoid arthritis, seropositive    Recurrent pneumonia    Avascular necrosis of left talus (H)    Other amyloidosis (H)    Hyperkalemia    Hyponatremia    MGUS (monoclonal gammopathy of unknown significance)    SEAN (acute kidney injury) (H24)    COVID-19        Relevant Labs:  Recent Labs   Lab Test 11/11/23  0540 11/10/23  1638   WBC 7.4 12.4*   HGB 10.0* 11.3*   * 189        CrCl: 21 mL/min      Intake/Output Summary (Last 24 hours) at 11/11/2023 0803  Last data filed at 11/11/2023 0750  Gross per 24 hour   Intake 1005 ml   Output --   Net 1005 ml          Per Renal Dose Adjustment Protocol, will adjust:  -Enoxaparin to 30 mg Q24H (decreased from 40 mg) due to CrCl 15-29 mL/min.      Will continue to follow and make adjustments accordingly. Thank You.    [facility] located within [facility] ....................  11/11/2023   8:03 AM

## 2023-11-11 NOTE — PROGRESS NOTES
RN received verbal order from physician, Dr. Gil, to hold BIPAP at this time due to stable vital signs and no increased work of breathing.  Am ABG ordered.

## 2023-11-11 NOTE — PROGRESS NOTES
Called MD Alonzo regarding continuous BIPAP order. Verbal order to hold BIPAP at this time. ABGs to be drawn in the AM. Patient is vitally stable. O2 greater than 92% on room air. Breaths unlabored.

## 2023-11-11 NOTE — PROGRESS NOTES
11/11/23 4603   Appointment Info   Signing Clinician's Name / Credentials (PT) Alee High PT   Living Environment   People in Home spouse   Current Living Arrangements house   Home Accessibility stairs within home  (ramp to enter)   Transportation Anticipated family or friend will provide  (drives on rare occasion)   Living Environment Comments Pt lives in home with ramp to enter. Does not need to go to basement. Spouse  assists as needed and does most of the cooking, laundry, driving. Has a .  Has a walk in shower, grab bars in shower and by toilet. Has walkers at home but uses scooter/power chair for community mobility. Does not use device in home.   Self-Care   Usual Activity Tolerance moderate   Current Activity Tolerance poor   Equipment Currently Used at Home wheelchair, power;other (see comments);grab bar, toilet;grab bar, tub/shower   Fall history within last six months no   Activity/Exercise/Self-Care Comment Patient reports she is typically independent with her household mobility. Occassionally has difficulty stepping over the step for her walk-in shower.   General Information   Onset of Illness/Injury or Date of Surgery 11/10/23   Referring Physician Casey Alonzo MD   Patient/Family Therapy Goals Statement (PT) to return home and to return to baseline activity, breathing   Pertinent History of Current Problem (include personal factors and/or comorbidities that impact the POC) Pt was admitted on 11-20-23 with acute kidney injury, COVID-19. She has increased SOB, decreased activity tolerance. Has a complex medical hx including RA, COPD, chronic kidney disease. She is on room air but noted to have SOB.   Existing Precautions/Restrictions   (COVID-19 positive)   Weight-Bearing Status - LLE full weight-bearing   Weight-Bearing Status - RLE full weight-bearing   Cognition   Affect/Mental Status (Cognition) WFL   Orientation Status (Cognition) oriented x 4   Pain Assessment   Patient Currently in  Pain No   Range of Motion (ROM)   ROM Comment decreased ROM at foot and ankles, functional ROM at hips and knees with screening   Strength (Manual Muscle Testing)   Strength Comments Generalized weakness noted with functional activity, screening   Bed Mobility   Bed Mobility Limitations impaired ability to control trunk for mobility;decreased ability to use arms for pushing/pulling;decreased ability to use legs for bridging/pushing   Impairments Contributing to Impaired Bed Mobility decreased strength   Comment, (Bed Mobility) moderate assist for supine to sit with use of bed rails. CGA for sit to supine. Sit to stand with min assist/CGA with FWW.   Transfers   Comment, (Transfers) Sit to stand with min assist/CGA with FWW   Gait/Stairs (Locomotion)   Wolf Level (Gait) contact guard;minimum assist (75% patient effort)   Assistive Device (Gait) walker, front-wheeled   Distance in Feet (Gait) 40 feet X 1   Pattern (Gait) step-through   Deviations/Abnormal Patterns (Gait) stride length decreased;jonathan decreased   Maintains Weight-bearing Status (Gait) able to maintain   Comment, (Gait/Stairs) SOB with ambulation. O2 levels dropped to 89%% with ambulation on room air but returned to 90s quickly. Very shallow breathing noted both and rest and with activity   Clinical Impression   Criteria for Skilled Therapeutic Intervention Yes, treatment indicated   PT Diagnosis (PT) weakness, impaired mobility, SOB   Influenced by the following impairments weakness, decreased endurance, SOB   Functional limitations due to impairments gait, transfers, bed mobility   Clinical Presentation (PT Evaluation Complexity) stable   Clinical Decision Making (Complexity) low complexity   Planned Therapy Interventions (PT) bed mobility training;gait training;transfer training   Risk & Benefits of therapy have been explained patient   PT Total Evaluation Time   PT Daryl, Low Complexity Minutes (36315) 20   Physical Therapy Goals   PT  Frequency Daily   PT Predicted Duration/Target Date for Goal Attainment 11/14/23   PT Goals Bed Mobility;Transfers;Gait   PT: Bed Mobility Modified independent;Supine to/from sit;Rolling   PT: Transfers Supervision/stand-by assist;Sit to/from stand;Bed to/from chair;Assistive device   PT: Gait Supervision/stand-by assist;Rolling walker;100 feet;50 feet   Interventions   Interventions Quick Adds Gait Training;Therapeutic Activity   Gait Training   Gait Training Minutes (33469) 15   Symptoms Noted During/After Treatment (Gait Training) shortness of breath;fatigue   Treatment Detail/Skilled Intervention ambulated in room   Distance in Feet 40 feet   Zion Level (Gait Training) minimum assist (75% patient effort)   Physical Assistance Level (Gait Training) 1 person assist  (to CGA)   Weight Bearing (Gait Training) full weight-bearing   Assistive Device (Gait Training) rolling walker   Gait Analysis Deviations decreased jonathan;decreased step length;decreased weight-shifting ability   Impairments (Gait Analysis/Training) strength decreased  (SOB)   PT Discharge Planning   PT Plan Continue PT   PT Discharge Recommendation (DC Rec) home with assist;home with home care physical therapy   PT Rationale for DC Rec To promote improved strength, endurance, and safe mobility skills   PT Brief overview of current status Pt ambulated in room with FWW for 40 feet with Min assist to CGA  of one with SOB, fatigue and weakness noted. She required min assist for supine to sit and CGA for remaining transfers. O2 levels dropped to 89% with activity with quick recovery noted on room air but significant SOB, shallow breathing noted.   PT Equipment Needed at Discharge walker, rolling   Total Session Time   Timed Code Treatment Minutes 15   Total Session Time (sum of timed and untimed services) 35   Alee High, PT on 11/11/2023 at 1:18 PM

## 2023-11-11 NOTE — PLAN OF CARE
Patient's O2 stable on room air. Shortness of breath noted. Labored breathing after activity. Lung sounds coarse/wheezy throughout. Patient c/o generalized weakness. A1 to bedside commode. Frequent loose, watery stools. Incontinent of bowel and bladder. Buttocks red; skin barrier cream applied. Afebrile. VSS.

## 2023-11-11 NOTE — PROGRESS NOTES
Hospitalist Medicine Progress Note   [facility]       Laura Perez is a 73 year old lady with COPD, HTN, T2DM, hypercholesteremia, hypothyroidism, stage III CKD, amyloidosis, colon cancer diagnosed in September 2017 with [facility] with symptoms of headache body ache and generalized weakness hypoxia and tested positive for COVID-19.  Procalcitonin was also positive but there was significant CT scan of the chest.  Had a hyperkalemia for which Kayexalate was given the first day and Lokelma the second day of her hospitalization.        Date of Admission:  11/10/2023  Assessment & Plan     COVID-19 infection  Acute hypoxic and hypercarbic respiratory failure due to COVID-19 infection  Patient will be given BiPAP for hypercarbia  Check VBG in a.m.  Remdesivir and dexamethasone was started 11/10/2023  Lovenox for DVT prophylaxis  D-dimer is elevated will check ultrasound lower extremities to rule out DVT in a patient with elevated creatinine which is improving     Hyperkalemia  Potassium is 5.6 because of which patient received Kayexalate x1 on 11/10/2023 continues to be 5.6 because of which Lokelma was given 11/11/2023     COPD with acute exacerbation  On the basis of COVID-19 infection  Will give inhalers as well as steroids as mentioned above       Type 2 diabetes mellitus  -start sliding scale insulin as well as insulin 70/30 was started as insulin 75/25 was not available    Acute kidney injury -Will hold lisinopril.  With improving creatinine    Hypercholesteremia atorvastatin will be continued  Hypothyroidism levothyroxine home dose will be continued  Hypertension lisinopril is on hold because of kidney failure we will give patient as needed clonidine            Plan:   Hold lisinopril  Start sliding scale insulin  Restart home medications including insulin  CMP in a.m.  Check another UA as procalcitonin is elevated  Clonidine 0.1 mg 3 times daily as needed for systolic blood pressure  greater than 160  Ultrasound lower extremities to rule out DVT with elevated D-dimer    Diet: Consistent Carbohydrate Diet Moderate Consistent Carb (60 g CHO per Meal) Diet    DVT Prophylaxis: Enoxaparin (Lovenox) SQ  Farr Catheter: Not present  Code Status: Full Code               The patient's care was discussed with the Patient.    Casey Alonzo MD  Hospitalist Service  [facility]    ______________________________________________________________________    Interval History     Symptoms   Patient says that her generalized weakness feels 50% better as compared to when she came in  She still has cough with occasional sputum production  There is no chest pain      Review of Systems:   Denies any fever    Data reviewed today: I reviewed all medications, new labs and imaging results over the last 24 hours.     Physical Exam   Vital Signs: Temp: 96.8  F (36  C) Temp src: Tympanic BP: (!) 165/74 Pulse: 92   Resp: 26 SpO2: 97 % O2 Device: None (Room air) Oxygen Delivery: 2 LPM  Weight: 151 lbs 3.2 oz    GENERAL: Patient does not look in any acute distress  HEENT: EOM+, Conjunctiva is clear   NECK:  no Jugular Venous distention  HEART: S1 S2 regular  Rate and Rhythm,    LUNGS: Respirations are not laboured, Lungs are clear to auscultation Crepitations or Wheezing   ABDOMEN: Soft, there is no tenderness, Bowel Sounds are  Positive   LOWER LIMBS: no Pedal Edema  Bilaterally   CNS:  Alert,  Oriented x 3, Moving all the Four Limbs       Data   Recent Labs   Lab 11/11/23  1146 11/11/23  0731 11/11/23  0540 11/10/23  1638   WBC  --   --  7.4 12.4*   HGB  --   --  10.0* 11.3*   MCV  --   --  103* 104*   PLT  --   --  135* 189   NA  --   --  134* 136   POTASSIUM  --   --  5.6* 5.6*   CHLORIDE  --   --  104 101   CO2  --   --  17* 23   BUN  --   --  35.5* 33.9*   CR  --   --  2.19* 2.50*  2.50*   ANIONGAP  --   --  13 12   GIANNI  --   --  7.7* 9.0   * 293* 347* 154*   ALBUMIN  --   --  3.1* 3.8  3.8    PROTTOTAL  --   --  5.8* 6.8  6.8   BILITOTAL  --   --  0.6 0.5  0.5   ALKPHOS  --   --  79 90  94   ALT  --   --  81* 77*  77*   AST  --   --  198* 196*  193*         Recent Results (from the past 24 hour(s))   XR Chest Port 1 View    Narrative    Procedure:XR CHEST PORT 1 VIEW    Clinical history:Female, 73 years, Fever    Technique: Single view was obtained.    Comparison: 10/13/2022    Findings: The cardiac silhouette is within normal limits.. The  pulmonary vasculature is within normal limits.    The lungs are clear. Bony structures are unremarkable.      Impression    Impression:   No acute abnormality. No evidence of acute or active cardiopulmonary  disease.    KAUSHIK KING MD         SYSTEM ID:  [id number]   CT Chest w/o Contrast    Narrative    CT CHEST W/O CONTRAST  11/10/2023 6:27 PM    CLINICAL HISTORY: Female, age 73 years,  covid, ?pneumonia;    Comparison:  Chest x-ray 11/10/2023; CT scan of chest 2/10/2020    TECHNIQUE:  CT scan was performed of the chest without  contrast.  Axial, sagittal and coronal images were reviewed. If present, MIP  and/or 3-D images were constructed by the technologist.    FINDINGS:   Emphysematous changes, solid and groundglass nodules throughout the  parenchyma of both lungs are similar in appearance compared to the  prior study. Lungs demonstrate no evidence of an acute abnormality.    The heart and vessels demonstrate dense coronary artery  calcifications. There is no evidence of pathologic lymph node  enlargement. Visualized portions of the upper abdomen demonstrate  generalized decrease in density of the liver. Low dense lesions are  seen in the left kidney. Dense calcifications are seen throughout the  arterial structures.    Bony structures demonstrate no evidence of an acute abnormality.  Chronic appearing wedging of the T11 vertebral body is again seen.  Nonacute appearing inferior and superior endplate fracture is seen at  T1.     Skin and subcutaneous  fat demonstrate no acute abnormality.      Impression    IMPRESSION:   No evidence of acute abnormality.    Nonacute appearing T1 fracture has developed when compared to the  prior study. Otherwise, nonacute changes are similar in appearance in  comparison to prior study. These include emphysematous changes with a  number of solid and groundglass nodules throughout the parenchyma of  both lungs.      This facility minimizes radiation dose by adjusting the mA and/or kV  according to each patient size.      This CT scan was performed using one or more the following dose  reduction techniques:    -Automated exposure control,  -Adjustment of the mA and/or kV according to patient's size, and/or,  -Use of iterative reconstruction technique.    KAUSHIK KING MD         SYSTEM ID:  [id number]

## 2023-11-11 NOTE — PHARMACY
"Pharmacy: Patient Own Medication Identification    The requirements of Policy 13-03 from the Pharmacy Policy Manual have been met and the patient will be allowed to use their own supply of: Atovaquone 750 mg/5 mL suspension.    Moises Barfield RPH has verified the name, dose, route, and directions for each medication. The integrity has been assessed and deemed safe.     The product(s) have been labeled as being inspected by a pharmacist and the medication has been placed in the patient-specific bin in the medication room.    Nursing will remove medication at the appropriately scheduled times and document the administration on the MAR with the designation of \"patient own\".    Nursing to return medication back to patient at time of discharge.     Moises Barfield RPH ....................  11/11/2023   4:49 PM    "

## 2023-11-11 NOTE — PROGRESS NOTES
"[facility] ADMISSION NOTE    Patient admitted to room 352 at approximately 2100 via cart from emergency room. Patient was accompanied by transport tech.     Verbal SBAR report received from ML Stein prior to patient arrival.     Patient trasferred to bed via self. Patient alert and oriented X 3. The patient is not having any pain.  . Admission vital signs: Blood pressure 107/50, pulse 89, temperature 99.5  F (37.5  C), temperature source Tympanic, resp. rate 24, height 1.6 m (5' 3\"), weight 68.6 kg (151 lb 3.2 oz), SpO2 93%, not currently breastfeeding. Patient was oriented to plan of care, call light, bed controls, tv, telephone, bathroom, and visiting hours.     Risk Assessment    The following safety risks were identified during admission: fall. Yellow risk band applied: YES.       Elvia South RN      "

## 2023-11-11 NOTE — PLAN OF CARE
SAFETY CHECKLIST  ID Bands and Risk clasps correct and in place (DNR, Fall risk, Allergy, Latex, Limb):  Yes  All Lines Reconciled and labeled correctly: Yes  Whiteboard updated:Yes  Environmental interventions: Yes  Verify Tele #: N/A    Nannette Warren RN .......  11/11/2023  7:33 AM

## 2023-11-11 NOTE — PROGRESS NOTES
11/11/23 1200   Appointment Info   Signing Clinician's Name / Credentials (OT) Tracy Palacios, OTR/L   Living Environment   People in Home spouse   Current Living Arrangements house   Home Accessibility stairs within home   Number of Stairs, Within Home, Primary   (Stairs to basement- not necessary for her to do)   Transportation Anticipated family or friend will provide   Living Environment Comments Pt lives in a home with ramped entry and basement.  She reports she does not have to go to basement.  She lives with her spouse who assists with cooking, cleaning, laundry, and does most of the driving.  She has a walk-in shower with grab bars and a grab bar near her toilet.   Self-Care   Usual Activity Tolerance moderate   Current Activity Tolerance poor   Equipment Currently Used at Home wheelchair, power;other (see comments);grab bar, toilet;grab bar, tub/shower  (power scooter)   Fall history within last six months no   Activity/Exercise/Self-Care Comment Patient reports she is typically independent with her self-cars.  Occassionally has difficulty stepping over the step for her walk-in shower.   General Information   Onset of Illness/Injury or Date of Surgery 11/10/23   Referring Physician Dr. Gil   Patient/Family Therapy Goal Statement (OT) To return home with .   Existing Precautions/Restrictions fall   General Observations and Info Pt is 73-year-old female admitted with COVID-19 and SEAN.  She lives at home with her  who also has COVID-19 at this time.  She is independent with self-cares at baseline, but  assists with cooking, cleaning and laundry.  She has history of COPD and uses power scooter for longer distances at baseline but does not typically use an AD in the house.   Cognitive Status Examination   Orientation Status orientation to person, place and time   Pain Assessment   Patient Currently in Pain No   Range of Motion Comprehensive   General Range of Motion bilateral upper  extremity ROM WFL   Strength Comprehensive (MMT)   Comment, General Manual Muscle Testing (MMT) Assessment Generalized weakness noted   Bed Mobility   Bed Mobility supine-sit;sit-supine   Supine-Sit Upton (Bed Mobility) moderate assist (50% patient effort)   Sit-Supine [place] (Bed Mobility) contact guard;1 person to manage equipment   Assistive Device (Bed Mobility) bed rails   Transfers   Transfers sit-stand transfer   Sit-Stand Transfer   Sit-Stand [place] (Transfers) contact guard;1 person to manage equipment   Assistive Device (Sit-Stand Transfers) walker, front-wheeled   Clinical Impression   Criteria for Skilled Therapeutic Interventions Met (OT) Yes, treatment indicated   OT Diagnosis Decreased independence with ADLs and functional mobility   OT Problem List-Impairments impacting ADL activity tolerance impaired;strength;mobility   Assessment of Occupational Performance 1-3 Performance Deficits   Planned Therapy Interventions (OT) ADL retraining;transfer training;progressive activity/exercise   Clinical Decision Making Complexity (OT) problem focused assessment/low complexity   Risk & Benefits of therapy have been explained evaluation/treatment results reviewed;care plan/treatment goals reviewed;participants voiced agreement with care plan;participants included;patient   OT Total Evaluation Time   OT Eval, Low Complexity Minutes (86144) 15   OT Goals   Therapy Frequency (OT) Daily   OT Predicted Duration/Target Date for Goal Attainment 11/15/23   OT Goals Upper Body Dressing;Lower Body Dressing;Toilet Transfer/Toileting   OT: Upper Body Dressing Supervision/stand-by assist   OT: Lower Body Dressing Supervision/stand-by assist   OT: Toilet Transfer/Toileting Supervision/stand-by assist;toilet transfer;cleaning and garment management   Interventions   Interventions Quick Adds Therapeutic Activity   Therapeutic Activities   Therapeutic Activity Minutes (06980) 15   Symptoms noted during/after  treatment fatigue;shortness of breath;increase work of breath   Treatment Detail/Skilled Intervention Pt completed supine to sitting at EOB with Mod A.  Required A to don 1 of 2 socks.  Completed functional ambulation to door and back x 2 with FWW and CGA.  Patient has increased SOB and fatigue following ambulation.  Her O2 sats dropped to 89-90% on RA with ambulation but recovered to mid-90s within 1-2 minutes.   OT Discharge Planning   OT Plan Continue OT   OT Discharge Recommendation (DC Rec) home with assist;home with home care occupational therapy   OT Rationale for DC Rec Patient requires increased assist with self-cares at this time.  Will continue to assess for discharge recommendation.   OT Brief overview of current status Pt completed supine to sitting at EOB with Mod A. Required A to don 1 of 2 socks. Completed functional ambulation to door and back x 2 with FWW and CGA. Patient has increased SOB and fatigue following ambulation. Her O2 sats dropped to 89-90% on RA with ambulation but recovered to mid-90s within 1-2 minutes.   Total Session Time   Timed Code Treatment Minutes 15   Total Session Time (sum of timed and untimed services) 30

## 2023-11-12 ENCOUNTER — APPOINTMENT (OUTPATIENT)
Dept: OCCUPATIONAL THERAPY | Facility: OTHER | Age: 73
DRG: 177 | End: 2023-11-12
Payer: MEDICARE

## 2023-11-12 ENCOUNTER — APPOINTMENT (OUTPATIENT)
Dept: PHYSICAL THERAPY | Facility: OTHER | Age: 73
DRG: 177 | End: 2023-11-12
Payer: MEDICARE

## 2023-11-12 ENCOUNTER — APPOINTMENT (OUTPATIENT)
Dept: ULTRASOUND IMAGING | Facility: OTHER | Age: 73
DRG: 177 | End: 2023-11-12
Attending: INTERNAL MEDICINE
Payer: MEDICARE

## 2023-11-12 VITALS
TEMPERATURE: 97.2 F | OXYGEN SATURATION: 97 % | BODY MASS INDEX: 26.93 KG/M2 | HEIGHT: 63 IN | DIASTOLIC BLOOD PRESSURE: 72 MMHG | SYSTOLIC BLOOD PRESSURE: 170 MMHG | RESPIRATION RATE: 16 BRPM | HEART RATE: 75 BPM | WEIGHT: 152 LBS

## 2023-11-12 LAB
ALBUMIN SERPL BCG-MCNC: 3.2 G/DL (ref 3.5–5.2)
ALP SERPL-CCNC: 90 U/L (ref 35–104)
ALT SERPL W P-5'-P-CCNC: 85 U/L (ref 0–50)
ANION GAP SERPL CALCULATED.3IONS-SCNC: 14 MMOL/L (ref 7–15)
AST SERPL W P-5'-P-CCNC: 145 U/L (ref 0–45)
BILIRUB SERPL-MCNC: 0.4 MG/DL
BUN SERPL-MCNC: 38.7 MG/DL (ref 8–23)
CALCIUM SERPL-MCNC: 8.1 MG/DL (ref 8.8–10.2)
CHLORIDE SERPL-SCNC: 108 MMOL/L (ref 98–107)
CREAT SERPL-MCNC: 1.51 MG/DL (ref 0.51–0.95)
CRP SERPL-MCNC: 98.86 MG/L
DEPRECATED HCO3 PLAS-SCNC: 16 MMOL/L (ref 22–29)
EGFRCR SERPLBLD CKD-EPI 2021: 36 ML/MIN/1.73M2
ERYTHROCYTE [DISTWIDTH] IN BLOOD BY AUTOMATED COUNT: 14.6 % (ref 10–15)
GLUCOSE BLDC GLUCOMTR-MCNC: 150 MG/DL (ref 70–99)
GLUCOSE BLDC GLUCOMTR-MCNC: 158 MG/DL (ref 70–99)
GLUCOSE BLDC GLUCOMTR-MCNC: 175 MG/DL (ref 70–99)
GLUCOSE SERPL-MCNC: 187 MG/DL (ref 70–99)
HCT VFR BLD AUTO: 33.9 % (ref 35–47)
HGB BLD-MCNC: 11.2 G/DL (ref 11.7–15.7)
MCH RBC QN AUTO: 33.5 PG (ref 26.5–33)
MCHC RBC AUTO-ENTMCNC: 33 G/DL (ref 31.5–36.5)
MCV RBC AUTO: 102 FL (ref 78–100)
PLATELET # BLD AUTO: 159 10E3/UL (ref 150–450)
POTASSIUM SERPL-SCNC: 4.2 MMOL/L (ref 3.4–5.3)
PROT SERPL-MCNC: 6.2 G/DL (ref 6.4–8.3)
RBC # BLD AUTO: 3.34 10E6/UL (ref 3.8–5.2)
SODIUM SERPL-SCNC: 138 MMOL/L (ref 135–145)
WBC # BLD AUTO: 11.5 10E3/UL (ref 4–11)

## 2023-11-12 PROCEDURE — 99239 HOSP IP/OBS DSCHRG MGMT >30: CPT | Performed by: INTERNAL MEDICINE

## 2023-11-12 PROCEDURE — 97535 SELF CARE MNGMENT TRAINING: CPT | Mod: GO

## 2023-11-12 PROCEDURE — 999N000157 HC STATISTIC RCP TIME EA 10 MIN

## 2023-11-12 PROCEDURE — 250N000013 HC RX MED GY IP 250 OP 250 PS 637: Performed by: INTERNAL MEDICINE

## 2023-11-12 PROCEDURE — 97116 GAIT TRAINING THERAPY: CPT | Mod: GP

## 2023-11-12 PROCEDURE — 93970 EXTREMITY STUDY: CPT | Mod: TC

## 2023-11-12 PROCEDURE — 250N000012 HC RX MED GY IP 250 OP 636 PS 637: Performed by: INTERNAL MEDICINE

## 2023-11-12 PROCEDURE — 85027 COMPLETE CBC AUTOMATED: CPT | Performed by: INTERNAL MEDICINE

## 2023-11-12 PROCEDURE — 86140 C-REACTIVE PROTEIN: CPT | Performed by: INTERNAL MEDICINE

## 2023-11-12 PROCEDURE — 258N000003 HC RX IP 258 OP 636: Performed by: EMERGENCY MEDICINE

## 2023-11-12 PROCEDURE — 97530 THERAPEUTIC ACTIVITIES: CPT | Mod: GO

## 2023-11-12 PROCEDURE — 80053 COMPREHEN METABOLIC PANEL: CPT | Performed by: INTERNAL MEDICINE

## 2023-11-12 PROCEDURE — 36415 COLL VENOUS BLD VENIPUNCTURE: CPT | Performed by: INTERNAL MEDICINE

## 2023-11-12 PROCEDURE — 94640 AIRWAY INHALATION TREATMENT: CPT | Mod: 76

## 2023-11-12 PROCEDURE — 250N000011 HC RX IP 250 OP 636: Mod: JZ | Performed by: INTERNAL MEDICINE

## 2023-11-12 PROCEDURE — 97530 THERAPEUTIC ACTIVITIES: CPT | Mod: GP

## 2023-11-12 PROCEDURE — 258N000003 HC RX IP 258 OP 636: Performed by: INTERNAL MEDICINE

## 2023-11-12 RX ORDER — ENOXAPARIN SODIUM 100 MG/ML
40 INJECTION SUBCUTANEOUS EVERY 24 HOURS
Status: DISCONTINUED | OUTPATIENT
Start: 2023-11-12 | End: 2023-11-12 | Stop reason: HOSPADM

## 2023-11-12 RX ORDER — DEXAMETHASONE 1 MG
TABLET ORAL
Qty: 30 TABLET | Refills: 0 | Status: SHIPPED | OUTPATIENT
Start: 2023-11-12 | End: 2024-06-07

## 2023-11-12 RX ORDER — DEXAMETHASONE 1 MG
1 TABLET ORAL 2 TIMES DAILY WITH MEALS
Qty: 30 TABLET | Refills: 0 | Status: SHIPPED | OUTPATIENT
Start: 2023-11-12 | End: 2023-11-12

## 2023-11-12 RX ADMIN — INSULIN ASPART 1 UNITS: 100 INJECTION, SOLUTION INTRAVENOUS; SUBCUTANEOUS at 08:23

## 2023-11-12 RX ADMIN — UMECLIDINIUM 1 PUFF: 62.5 AEROSOL, POWDER ORAL at 07:49

## 2023-11-12 RX ADMIN — DEXAMETHASONE 6 MG: 2 TABLET ORAL at 09:02

## 2023-11-12 RX ADMIN — PANTOPRAZOLE SODIUM 40 MG: 40 TABLET, DELAYED RELEASE ORAL at 09:01

## 2023-11-12 RX ADMIN — INSULIN ASPART 1 UNITS: 100 INJECTION, SOLUTION INTRAVENOUS; SUBCUTANEOUS at 12:18

## 2023-11-12 RX ADMIN — SODIUM CHLORIDE 50 ML: 9 INJECTION, SOLUTION INTRAVENOUS at 13:53

## 2023-11-12 RX ADMIN — SODIUM CHLORIDE: 9 INJECTION, SOLUTION INTRAVENOUS at 06:26

## 2023-11-12 RX ADMIN — ATOVAQUONE 1500 MG: 750 SUSPENSION ORAL at 09:01

## 2023-11-12 RX ADMIN — REMDESIVIR 100 MG: 100 INJECTION, POWDER, LYOPHILIZED, FOR SOLUTION INTRAVENOUS at 13:47

## 2023-11-12 RX ADMIN — LEVOTHYROXINE SODIUM 112 MCG: 0.11 TABLET ORAL at 09:02

## 2023-11-12 RX ADMIN — ALLOPURINOL 100 MG: 100 TABLET ORAL at 09:01

## 2023-11-12 RX ADMIN — ASPIRIN 81 MG CHEWABLE TABLET 81 MG: 81 TABLET CHEWABLE at 09:01

## 2023-11-12 ASSESSMENT — ACTIVITIES OF DAILY LIVING (ADL)
ADLS_ACUITY_SCORE: 38
ADLS_ACUITY_SCORE: 38
ADLS_ACUITY_SCORE: 39
ADLS_ACUITY_SCORE: 38
ADLS_ACUITY_SCORE: 39
ADLS_ACUITY_SCORE: 39

## 2023-11-12 NOTE — PROGRESS NOTES
11/12/23 1100   Appointment Info   Signing Clinician's Name / Credentials (PT) Alee High PT   Therapeutic Activity   Therapeutic Activities: dynamic activities to improve functional performance Minutes (25018) 20   Symptoms Noted During/After Treatment Shortness of breath;Fatigue   Treatment Detail/Skilled Intervention Sit <> stand transfers from recliner, bed and toilet with CGA plus assist of one for IV. Requires verbal cues for safe use of walker. Sat at sink with OT for cares.   Gait Training   Gait Training Minutes (34151) 25   Symptoms Noted During/After Treatment (Gait Training) shortness of breath;fatigue   Treatment Detail/Skilled Intervention ambulated in room   Distance in Feet 75 feet X1, 10 feet X 1   Harnett Level (Gait Training) contact guard   Physical Assistance Level (Gait Training) 1 person + 1 person to manage equipment   Weight Bearing (Gait Training) full weight-bearing   Assistive Device (Gait Training) rolling walker   Gait Analysis Deviations decreased jonathan;decreased step length   Impairments (Gait Analysis/Training) strength decreased   PT Discharge Planning   PT Plan Continue PT   PT Discharge Recommendation (DC Rec) home with assist;home with home care physical therapy   PT Rationale for DC Rec To promote improved strength, endurance, and safe mobility skills   PT Brief overview of current status Pt improved today with less SOB noted. Tolerated much more activity, walking in room 75 feet with FWW, performing personal cares (sitting) in bathroom. SOB noted for last 5 minutes. Maintained O2 levels on room air.   PT Equipment Needed at Discharge walker, rolling   Total Session Time   Timed Code Treatment Minutes 45   Total Session Time (sum of timed and untimed services) 45     Alee High PT on 11/12/2023 at 11:34 AM

## 2023-11-12 NOTE — PLAN OF CARE
Discharge Note    Data:  Laura Perez discharged to home at 1545 via wheel chair. Accompanied by spouse and staff.    Action:  Written discharge/follow-up instructions were provided to patient and spouse. Prescriptions sent to patients preferred pharmacy. All belongings sent with patient.    Response:  Patient and spouse verbalized understanding of discharge instructions, reason for discharge, and necessary follow-up appointments. Patient will notify primary care provider of any additional questions.     Nannette Warren RN .......  11/12/2023  3:59 PM

## 2023-11-12 NOTE — PHARMACY - DISCHARGE MEDICATION RECONCILIATION AND EDUCATION
Pharmacy:  Discharge Counseling and Medication Reconciliation    Laura Perez  [address] [facility]  [place] [address]  [phone] (home)   73 year old female  PCP: Gio Dooley    Allergies: Glyburide, Mosquitoes (informational only), Other [seasonal allergies], Sulfa antibiotics, Sulfamethoxazole-trimethoprim, Duloxetine, and Codeine    Discharge Counseling:    Pharmacist did not meet with patient in person due to patient's COVID-positive status but I called patient's room and spoke with patient via telephone.    Summary of Education: Counseled patient on new medication of Dexamethasone- patient is to complete taper regimen beginning tomorrow. Patient is to HOLD her Prednisone while taking Dexamethasone, then resume after completion of taper. Advised patient to watch blood sugars closely while on Dexamethasone as higher blood sugars can result.    Materials Provided:  MedCounselor sheets printed from Clinical Pharmacology on: Dexamethasone    Patient also provided own Atovaquone suspension for use while here- reminded Elsa (RN) to return to patient upon discharge, and this was iterated to the patient as well.    Discharge Medication Reconciliation:    It has been determined that the patient has an adequate supply of medications available or which can be obtained from the patient's preferred pharmacy, which she has confirmed as: [facility].    Thank you for the consult.    Moises Barfield [facility]........November 12, 2023 1:52 PM

## 2023-11-12 NOTE — PROGRESS NOTES
11/12/23 1257   Appointment Info   Signing Clinician's Name / Credentials (OT) Tracy Palacios OTR/L   Interventions   Interventions Quick Adds Self-Care/Home Management   Self-Care/Home Management   Self-Care/Home Mgmt/ADL, Compensatory, Meal Prep Minutes (99856) 30   Symptoms Noted During/After Treatment (Meal Preparation/Planning Training) fatigue;shortness of breath   Treatment Detail/Skilled Intervention Patient participated in self-cares in bathroom including grooming, sponge bathing, and toileting.  She was able to complete grooming tasks (tooth brushing, face washing, hand washing) seated at sink with set-up A.  She completed sponge bath seated at sink with Mod A.  She completed toileting with Min A for hygiene d/t IV placements.   Townsend Level (Grooming Training) contact guard   Assistance (Grooming Training) set-up required   [place]send Level (Bathing Training) moderate assist (50% patient effort)   Assistance (Bathing Training) 1 person assist   Lower Body Dressing Training Assistance minimum assist (75% patient effort)   Lower Body Dressing Training Assistance 1 person assist   [place]send Level (Toilet Training) moderate assist (50% patient effort)   Assistance (Toilet Training) 1 person assist   Therapeutic Activities   Therapeutic Activity Minutes (04581) 15   Symptoms noted during/after treatment fatigue;shortness of breath;increase work of breath   Treatment Detail/Skilled Intervention Pt completed in-room ambulation with CGA and FWW and A x another to manage IV pole.  Able to complete 3 laps from bedside recliner to door and back.  Patient had slight increase in SOB and reports fatigue upon completion.   OT Discharge Planning   OT Plan Continue OT   OT Discharge Recommendation (DC Rec) home with assist;home with home care occupational therapy   OT Rationale for DC Rec Patient requires increased assist with self-cares at this time.  Will continue to assess continued OT need- possibly home  care?   OT Brief overview of current status Pt completed in-room ambulation x ~75' with mild increase in SOB and reported fatigue with CGA using FWW.  Completed grooming, sponge bathing, and dressing sitting at sink with set-up for grooming and Mod A for sponge bathing and dressing.  Completed toileting with mod A for hygiene d/t IV placements.   Total Session Time   Timed Code Treatment Minutes 45   Total Session Time (sum of timed and untimed services) 45

## 2023-11-12 NOTE — PROGRESS NOTES
Patient on room air saturating in the mid 90's.    MDI given as ordered and tolerated well.    Riley Salter, RT

## 2023-11-12 NOTE — PROGRESS NOTES
Pt started MDI's today. Pt needs help to push albuterol MDI. She can get the treatment but had to do an inhalation over x2 due to timing. Pt has cleared up wheezes from her first dose. R.A 93%. Pt is aware to inform staff if breathing gets worse. Will monitor.   Ilsa Johnson, RT

## 2023-11-12 NOTE — PLAN OF CARE
Goal Outcome Evaluation:      Plan of Care Reviewed With: patient, spouse    Overall Patient Progress: improving    Outcome Evaluation: VS stable, afebrile, continent to the bathroom with gait belt and walker, discharging home today    Pt alert and oriented x 4. VS stable, afebrile. Pt is ambulating in her room with stand  by assist with her walker and gait belt. Pt is on IV remdesivir that she it tolerating well. Diarrhea has mostly resolved with stools being small formed pellets that are tan in color. Pt has blanchable redness on her buttocks that is mild. Pt denies pain. Plan is for patient to discharge home with her  today.    Nannette Warren RN .......  11/12/2023  2:54 PM

## 2023-11-12 NOTE — PLAN OF CARE
Patient denies pain. Denies shortness of breath. Dyspnea on exertion noted. Expiratory wheezing in lung bases; clear in upper lobes. Patient reports improved number of loose stools. No changes to redness on buttocks. Afebrile. VSS. SBA to ambulate.

## 2023-11-12 NOTE — PHARMACY
Pharmacy- Renal Dose Adjustment    Patient Active Problem List   Diagnosis    Acne rosacea    Anemia of chronic kidney failure, stage 3 (moderate) (H)    Chronic diastolic CHF (congestive heart failure), NYHA class 2 (H)    Stage 3b chronic kidney disease (H)    Malignant tumor of colon (H)    History of colonic polyps    COPD, very severe (H)    Diabetes mellitus type 2, insulin dependent (H)    HTN (hypertension)    Hyperlipidemia    Acquired hypothyroidism    Lichen sclerosus et atrophicus    Migraine headache    Non-rheumatic mitral valve stenosis    On prednisone therapy    Orthostatic hypotension    Osteoporosis    Popliteal cyst, left    Secondary adrenal insufficiency (H24)    Rheumatoid arthritis, seropositive    Recurrent pneumonia    Avascular necrosis of left talus (H)    Other amyloidosis (H)    Hyperkalemia    Hyponatremia    MGUS (monoclonal gammopathy of unknown significance)    SEAN (acute kidney injury) (H24)    COVID-19        Relevant Labs:  Recent Labs   Lab Test 11/12/23  0538 11/11/23  0540   WBC 11.5* 7.4   HGB 11.2* 10.0*    135*        CrCl: 30.4 mL/min      Intake/Output Summary (Last 24 hours) at 11/12/2023 0938  Last data filed at 11/12/2023 0936  Gross per 24 hour   Intake 1250 ml   Output 2350 ml   Net -1100 ml          Per Renal Dose Adjustment Protocol, will adjust:  -Enoxaparin back to original ordered dose of 40 mg Q24H (from 30 mg)- indication of VTE ppx and CrCl now > 30 mL/min.      Will continue to follow and make adjustments accordingly. Thank You.    Moises Barfield [facility] ....................  11/12/2023   9:38 AM

## 2023-11-12 NOTE — DISCHARGE SUMMARY
"[facility]  Hospitalist Discharge Summary      Date of Admission:  11/10/2023  Date of Discharge:  11/12/2023  Discharging Provider: Casey Alonzo MD  Discharge Service: Hospitalist Service    Discharge Diagnoses     Acute hypoxic and hypercarbic respiratory failure due to COVID-19 infection   COVID-19 infection   Hyperkalemia   COPD with acute exacerbation   Acute kidney injury  Type 2 diabetes mellitus   Hypercholesteremia   Hypothyroidism   Hypertension     Clinically Significant Risk Factors     # DMII: A1C = 7.2 % (Ref range: 4.0 - 6.2 %) within past 6 months  # Overweight: Estimated body mass index is 26.93 kg/m  as calculated from the following:    Height as of this encounter: 1.6 m (5' 3\").    Weight as of this encounter: 68.9 kg (152 lb).       Follow-ups Needed After Discharge   Follow-up Appointments     Follow-up and recommended labs and tests       Follow up with primary care provider, Gio Dooley, within 7 days   for hospital follow- up.  The following labs/tests are recommended:   BMP, Pro-calcitonin (was 1 in hospital with no signs of UTI or Pneumonia)   .            Unresulted Labs Ordered in the Past 30 Days of this Admission       Date and Time Order Name Status Description    11/10/2023  4:46 PM Blood Culture Arm, Right Preliminary     11/10/2023  4:46 PM Blood Culture Peripheral Blood Preliminary         These results will be followed up by Gio Dooley      Discharge Disposition   Discharged to home  Condition at discharge: Stable    Hospital Course   Laura Perez is a 73 year old lady with COPD, HTN, T2DM, hypercholesteremia, hypothyroidism, stage III CKD, amyloidosis, colon cancer diagnosed in September 2017 came to the [facility] with symptoms of headache body ache and generalized weakness hypoxia and tested positive for COVID-19 on 11/10/2023.  She was started on remdesivir which she received for 3 days.  Her oxygen saturations were " decreased less than 90% because of which she was also started on dexamethasone in place of her chronic prednisone use.  She had minimally elevated.Pro-calcitonin without any evidence of pneumonia on CT scan of the chest nor any evidence of UTI.  She had hyperkalemia with potassium elevated at 5.6 which has since decreased to 4.2 after treatment with Kayexalate the first day and Lokelma the second day of her hospitalization.  She had positive D-dimer with elevated creatinine.  Ultrasound venous scan of the lower extremities was negative for DVT.  Her shortness of breath, cough, generalized weakness have all progressively improved and patient wanted to go home and has been discharged home with a tapering dose of dexamethasone and completing 3 days of remdesivir treatment in the hospital.   Her baseline creatinine of 1.87 had increased to 2.5 at admission because of which she was given IV fluids and withholding of lisinopril creatinine decreased to 1.51 on the day of discharge and potassium decreased to 4.2.  BMP needs to be closely monitored after discharge.  Patient was given enoxaparin for DVT prophylaxis in the hospital.    Consultations This Hospital Stay   PHYSICAL THERAPY ADULT IP CONSULT  OCCUPATIONAL THERAPY ADULT IP CONSULT    Code Status   Full Code    Time Spent on this Encounter   I, Casey Alonzo MD, personally saw the patient today and spent greater than 30 minutes discharging this patient.       Casey Alonzo MD  [facility]  [address]   [place] [address]  Phone: [phone]  Fax: [phone]  ______________________________________________________________________    Physical Exam   Vital Signs: Temp: 97.2  F (36.2  C) Temp src: Tympanic BP: (!) 170/72 Pulse: 75   Resp: 16 SpO2: 97 % O2 Device: None (Room air)    Weight: 152 lbs 0 oz  GENERAL: Patient does not look in any acute distress  HEENT: EOM+, Conjunctiva is clear   NECK:  no Jugular Venous  distention  HEART: S1 S2 regular  Rate and Rhythm,    LUNGS: Respirations are not laboured, Lungs are clear to auscultation Crepitations or Wheezing   ABDOMEN: Soft, there is no tenderness, Bowel Sounds are  Positive   LOWER LIMBS: no Pedal Edema  Bilaterally   CNS:  Alert,  Oriented x 3, Moving all the Four Limbs        Primary Care Physician   Gio Dooley    Discharge Orders      Reason for your hospital stay    came to [facility] with symptoms of headache body ache and generalized weakness hypoxia and tested positive for COVID-19     Follow-up and recommended labs and tests     Follow up with primary care provider, Gio Dooley, within 7 days for hospital follow- up.  The following labs/tests are recommended:   BMP, Pro-calcitonin (was 1 in hospital with no signs of UTI or Pneumonia) .     Activity    Your activity upon discharge: activity as tolerated     When to contact your care team    Call your primary doctor if you have any of the following: temperature greater than 100 F,  increased shortness of breath, Cough, Chest pain, or Confusion .     Diet    Follow this diet upon discharge: Orders Placed This Encounter      Consistent Carbohydrate Diet Moderate Consistent Carb (60 g CHO per Meal) Renal Diet       Significant Results and Procedures   Most Recent 3 CBC's:  Recent Labs   Lab Test 11/12/23  0538 11/11/23  0540 11/10/23  1638   WBC 11.5* 7.4 12.4*   HGB 11.2* 10.0* 11.3*   * 103* 104*    135* 189     Most Recent 3 BMP's:  Recent Labs   Lab Test 11/12/23  1151 11/12/23  0817 11/12/23  0538 11/11/23  0731 11/11/23  0540 11/10/23  1638   NA  --   --  138  --  134* 136   POTASSIUM  --   --  4.2  --  5.6* 5.6*   CHLORIDE  --   --  108*  --  104 101   CO2  --   --  16*  --  17* 23   BUN  --   --  38.7*  --  35.5* 33.9*   CR  --   --  1.51*  --  2.19* 2.50*  2.50*   ANIONGAP  --   --  14  --  13 12   GIANNI  --   --  8.1*  --  7.7* 9.0   * 158* 187*   < > 347* 154*     < > = values in this interval not displayed.     Most Recent 2 LFT's:  Recent Labs   Lab Test 11/12/23  0538 11/11/23  0540   * 198*   ALT 85* 81*   ALKPHOS 90 79   BILITOTAL 0.4 0.6     Most Recent 3 INR's:  Recent Labs   Lab Test 06/15/18  1745   INR 1.22     Most Recent 3 Troponin's:  Recent Labs   Lab Test 06/27/18  0945 06/17/18  2300 06/17/18  2116   TROPI 0.041* 0.112* 0.080*     Most Recent 3 BNP's:  Recent Labs   Lab Test 10/13/22  1942 06/15/18  1745   NTBNPI 171* 49     Most Recent D-dimer:  Recent Labs   Lab Test 11/11/23  1147 06/27/18  0945   DD 0.85*  --    DIMER  --  <200     Most Recent 6 glucoses:  Recent Labs   Lab Test 11/12/23  1151 11/12/23  0817 11/12/23  0538 11/12/23  0215 11/11/23  2108 11/11/23  1846   * 158* 187* 175* 256* 333*     Most Recent Urinalysis:  Recent Labs   Lab Test 11/11/23  2000 01/25/20  1020 06/27/18  1013   COLOR Yellow   < > Yellow   APPEARANCE Clear   < > Clear   URINEGLC >1000*   < > 250*   URINEBILI Negative   < > Small*   URINEKETONE Negative   < > Negative   SG 1.012   < > 1.020   UBLD Small*   < > Negative   URINEPH 6.0   < > 6.5   PROTEIN 30*   < > 30*   UROBILINOGEN  --   --  0.2   NITRITE Negative   < > Negative   LEUKEST Negative   < > Trace*   RBCU 1   < > O - 2   WBCU <1   < > 0 - 5    < > = values in this interval not displayed.     Most Recent ESR & CRP:  Recent Labs   Lab Test 11/12/23  0538 02/06/20  1006   SED  --  33*   CRP  --  0.8*   CRPI 98.86*  --    ,   Results for orders placed or performed during the hospital encounter of 11/10/23   XR Chest Port 1 View    Narrative    Procedure:XR CHEST PORT 1 VIEW    Clinical history:Female, 73 years, Fever    Technique: Single view was obtained.    Comparison: 10/13/2022    Findings: The cardiac silhouette is within normal limits.. The  pulmonary vasculature is within normal limits.    The lungs are clear. Bony structures are unremarkable.      Impression    Impression:   No acute  abnormality. No evidence of acute or active cardiopulmonary  disease.    KAUSHIK KING MD         SYSTEM ID:  [id number]   CT Chest w/o Contrast    Narrative    CT CHEST W/O CONTRAST  11/10/2023 6:27 PM    CLINICAL HISTORY: Female, age 73 years,  covid, ?pneumonia;    Comparison:  Chest x-ray 11/10/2023; CT scan of chest 2/10/2020    TECHNIQUE:  CT scan was performed of the chest without  contrast.  Axial, sagittal and coronal images were reviewed. If present, MIP  and/or 3-D images were constructed by the technologist.    FINDINGS:   Emphysematous changes, solid and groundglass nodules throughout the  parenchyma of both lungs are similar in appearance compared to the  prior study. Lungs demonstrate no evidence of an acute abnormality.    The heart and vessels demonstrate dense coronary artery  calcifications. There is no evidence of pathologic lymph node  enlargement. Visualized portions of the upper abdomen demonstrate  generalized decrease in density of the liver. Low dense lesions are  seen in the left kidney. Dense calcifications are seen throughout the  arterial structures.    Bony structures demonstrate no evidence of an acute abnormality.  Chronic appearing wedging of the T11 vertebral body is again seen.  Nonacute appearing inferior and superior endplate fracture is seen at  T1.     Skin and subcutaneous fat demonstrate no acute abnormality.      Impression    IMPRESSION:   No evidence of acute abnormality.    Nonacute appearing T1 fracture has developed when compared to the  prior study. Otherwise, nonacute changes are similar in appearance in  comparison to prior study. These include emphysematous changes with a  number of solid and groundglass nodules throughout the parenchyma of  both lungs.      This facility minimizes radiation dose by adjusting the mA and/or kV  according to each patient size.      This CT scan was performed using one or more the following dose  reduction techniques:    -Automated  exposure control,  -Adjustment of the mA and/or kV according to patient's size, and/or,  -Use of iterative reconstruction technique.    KAUSHIK KING MD         SYSTEM ID:  [id number]   US Lower Extremity Venous Duplex Bilateral    Narrative    PROCEDURE INFORMATION:   Exam: US Duplex Lower Extremity Veins, Bilateral   Exam date and time: 11/12/2023 8:22 AM   Age: 73 years old   Clinical indication: Abnormal findings; Abnormal lab test; Elevated d-dimer;   Additional info: Elevated d-dimer in a patient with shortness of breath and   elevated creatinine     TECHNIQUE:   Imaging protocol: Real-time duplex ultrasound of the bilateral extremities with   2-D gray scale, color Doppler flow and spectral waveform analysis including   responses to compression and other maneuvers (when performed) with image   documentation. Complete exam focused on the lower extremity veins.     COMPARISON:   No relevant prior studies available.     FINDINGS:   Right deep veins: Unremarkable. The common femoral, femoral, proximal profunda   femoral and popliteal veins are patent without thrombus. Normal Doppler   waveforms. Normal compressibility and/or augmentation response.  No DVT seen in   the visualized calf veins.    Left deep veins: Unremarkable. The common femoral, femoral, proximal profunda   femoral and popliteal veins are patent without thrombus. Normal Doppler   waveforms. Normal compressibility and/or augmentation response.  No DVT seen in   the visualized calf veins.      Superficial veins: Bilateral saphenofemoral junctions are patent without   thrombus.     Soft tissues: Unremarkable.       Impression    IMPRESSION:   No evidence of deep vein thrombosis.     THIS DOCUMENT HAS BEEN ELECTRONICALLY SIGNED BY KAYLEE GUERRERO MD       Discharge Medications   Current Discharge Medication List        START taking these medications    Details   dexAMETHasone (DECADRON) 1 MG tablet Take 1 tablet (1 mg) by mouth 2 times daily (with  "meals)  Qty: 30 tablet, Refills: 0    Comments: 6 mg Oral  x 2 days then 4 mg x 3 days then 2 mg x 3 days then resume your Prednisone  Associated Diagnoses: COVID-19           CONTINUE these medications which have NOT CHANGED    Details   ABATACEPT IV Inject 750 mg into the vein every 28 days      albuterol (PROAIR HFA/PROVENTIL HFA/VENTOLIN HFA) 108 (90 Base) MCG/ACT inhaler INHALE 1-2 PUFFS BY MOUTH EVERY 4-6 HOURS AS NEEDED FOR SHORTNESS OF BREATH OR WHEEZING  Qty: 18 g, Refills: 3    Comments: Pharmacy may dispense brand covered by insurance (Proair, or proventil or ventolin or generic albuterol inhaler)  Associated Diagnoses: Chronic obstructive pulmonary disease, unspecified COPD type (H)      allopurinol (ZYLOPRIM) 100 MG tablet Take 100 mg by mouth daily      aspirin 81 MG chewable tablet Take 81 mg by mouth daily with food      atovaquone (MEPRON) 750 MG/5ML suspension Take 1,500 mg by mouth daily      B-D TB SYRINGE 27G X 1/2\" 1 ML MISC USE AS DIRECTED FOR METHOTREXATE INJECTIONS      budesonide (PULMICORT) 0.5 MG/2ML neb solution Take 2 mLs (0.5 mg) by nebulization 2 times daily  Qty: 360 mL, Refills: 3    Associated Diagnoses: Chronic obstructive pulmonary disease, unspecified COPD type (H)      cyanocobalamin (VITAMIN B-12) 1000 MCG tablet TAKE 1 TABLET(1000 MCG) BY MOUTH DAILY  Qty: 90 tablet, Refills: 3    Associated Diagnoses: Vitamin B12 deficiency (non anemic)      EPINEPHrine (ANY BX GENERIC EQUIV) 0.3 MG/0.3ML injection 2-pack Inject 0.3 mLs (0.3 mg) into the muscle once as needed for anaphylaxis  Qty: 0.9 mL, Refills: 1    Associated Diagnoses: Other emphysema (H)      folic acid (FOLVITE) 1 MG tablet Take 1 tablet (1 mg) by mouth daily  Qty: 90 tablet, Refills: 3    Associated Diagnoses: Seropositive rheumatoid arthritis (H)      formoterol (PERFOROMIST) 20 MCG/2ML neb solution Take 2 mLs (20 mcg) by nebulization 2 times daily  Qty: 180 mL, Refills: 3    Associated Diagnoses: Chronic " obstructive pulmonary disease, unspecified COPD type (H)      glucagon 1 MG kit Inject 1 mg into the muscle as needed for low blood sugar       glucose 4 g CHEW chewable tablet Take 1 tablet by mouth as needed for low blood sugar      insulin lispro protamine-insulin lispro (HUMALOG MIX 75/25 KWIKPEN) (75-25) 100 UNIT/ML pen 40 units in the morning, 20 units in the evening  Qty: 60 mL, Refills: 3    Associated Diagnoses: Diabetes mellitus type 2, insulin dependent (H)      insulin pen needle (BD PEN NEEDLE LUCILLE 2ND GEN) 32G X 4 MM miscellaneous Use to administer insulin twice daily. Dispense as covered by insurance. Dx. Code: E11.9.  Qty: 200 each, Refills: 2    Associated Diagnoses: Diabetes mellitus type 2, insulin dependent (H)      Lancets (ONETOUCH DELICA PLUS DEOXAE09M) MISC USE TO CHECK BLOOD SUGAR TWICE DAILY.  Qty: 200 each, Refills: 5    Comments: ZERO refills remain on this prescription. Your patient is requesting advance approval of refills for this medication to PREVENT ANY MISSED DOSES  Associated Diagnoses: Diabetes mellitus type 2, insulin dependent (H)      levothyroxine (SYNTHROID/LEVOTHROID) 112 MCG tablet Take 1 tablet by mouth daily      Methotrexate 25 MG/ML SOSY Inject 12.5 mg Subcutaneous once a week (On Tuesdays)      Nebulizers (VIOS AEROSOL DELIVERY SYSTEM) MISC USE AS DIRECTED  Qty: 1 each, Refills: 0    Associated Diagnoses: Chronic obstructive pulmonary disease, unspecified COPD type (H)      omeprazole (PRILOSEC) 20 MG DR capsule Take 1 capsule (20 mg) by mouth daily  Qty: 90 capsule, Refills: 3    Associated Diagnoses: Gastroesophageal reflux disease with esophagitis without hemorrhage      ONETOUCH VERIO IQ test strip USE TO TEST BLOOD SUGAR TWICE DAILY OR AS DIRECTED  Qty: 200 strip, Refills: 3    Associated Diagnoses: Diabetes mellitus type 2, insulin dependent (H)      predniSONE (DELTASONE) 5 MG tablet Take 10 mg by mouth daily with food      tiotropium (SPIRIVA) 18 MCG inhaled  "capsule Inhale 1 capsule (18 mcg) into the lungs daily      Vitamin D, Cholecalciferol, 25 MCG (1000 UT) TABS Take 1 tablet by mouth daily      denosumab (PROLIA) 60 MG/ML SOSY injection Inject 1 mL (60 mg) Subcutaneous every 6 months  Qty: 1 mL, Refills: 1    Associated Diagnoses: Osteoporosis, unspecified osteoporosis type, unspecified pathological fracture presence      estradiol (VAGIFEM) 10 MCG TABS vaginal tablet Place 1 tablet (10 mcg) vaginally twice a week  Qty: 24 tablet, Refills: 3    Associated Diagnoses: Atrophic vaginitis      lisinopril (ZESTRIL) 10 MG tablet Take 10 mg by mouth daily      montelukast (SINGULAIR) 10 MG tablet Take 1 tablet (10 mg) by mouth At Bedtime  Qty: 90 tablet, Refills: 3    Associated Diagnoses: Chronic obstructive pulmonary disease, unspecified COPD type (H)      Needle, Disp, (HYPODERMIC NEEDLE) 27G X 1/2\" MISC For methotrexate injections.      simvastatin (ZOCOR) 40 MG tablet Take 1 tablet (40 mg) by mouth At Bedtime  Qty: 90 tablet, Refills: 3    Associated Diagnoses: Hyperlipidemia, unspecified hyperlipidemia type      traMADol (ULTRAM) 50 MG tablet Take 1 tablet (50 mg) by mouth nightly as needed for severe pain  Qty: 30 tablet, Refills: 5    Associated Diagnoses: Seropositive rheumatoid arthritis (H)           Allergies   Allergies   Allergen Reactions    Glyburide Anaphylaxis     Other reaction(s): Dizziness    Mosquitoes (Informational Only) Hives    Other [Seasonal Allergies] Hives     Deer Flies    Sulfa Antibiotics Anaphylaxis and Hives    Sulfamethoxazole-Trimethoprim Anaphylaxis     Other reaction(s): Other - Describe In Comment Field  Rash, nausea, dizziness    Duloxetine      Other reaction(s): Other (see comments)  Falls and dizziness    Codeine Nausea and Nausea and Vomiting     "

## 2023-11-12 NOTE — PLAN OF CARE
SAFETY CHECKLIST  ID Bands and Risk clasps correct and in place (DNR, Fall risk, Allergy, Latex, Limb):  Yes  All Lines Reconciled and labeled correctly: Yes  Whiteboard updated:Yes  Environmental interventions: Yes  Verify Tele #: 1    Nannette Warren RN .......  11/12/2023  7:30 AM

## 2023-11-14 ENCOUNTER — PATIENT OUTREACH (OUTPATIENT)
Dept: PEDIATRICS | Facility: OTHER | Age: 73
End: 2023-11-14
Payer: MEDICARE

## 2023-11-14 NOTE — TELEPHONE ENCOUNTER
Transitional Care Management Phone Call      Summary of hospitalization:  [facility] discharge summary reviewed    DISCHARGE DIAGNOSIS: Acute hypoxic and hypercarbic respiratory failure due to COVID-19 infection   COVID-19 infection   Hyperkalemia   COPD with acute exacerbation   Acute kidney injury  Type 2 diabetes mellitus   Hypercholesteremia   Hypothyroidism   Hypertension     DATE OF DISCHARGE: 11/12/2023    Diagnostic Tests/Treatments reviewed.  Follow up needed: BMP, Pro-Calcitonin    Post Discharge Medication Reconciliation: discharge medications reconciled and changed, per note/orders      Medications reviewed by: by myself    Problems taking medications regularly:  None    Problems adhering to non-medication therapy:  None    Other Healthcare Providers Involved in Patient's Care:         None    Update since discharge: improved.     Plan of care communicated with: patient    Just a friendly reminder that you appointment is:  Next 5 appointments (look out 90 days)      Nov 20, 2023 10:40 AM  Office Visit with Gio Dooley MD  [facility] ([facility] ) [address]  [place] [address]  [phone]     Nov 27, 2023  3:40 PM  SHORT with Gio Dooley MD  [facility] ([facility] ) [address]  [place] [address]  [phone]              We encourage you to keep this appointment.    Please remember to bring all of your pills in their bottles (including any vitamins or over the counter pills) with you to your appointment.     The patient indicates understanding of these issues and agrees with the plan of care.   Yes    Was the patient contacted within the 2 business days or other approved timeframe?  Yes    Was the Medication reconciliation and management done since the patient was discharged? Yes  Cindy Hoffmann RN   11/14/2023 8:05 AM

## 2023-11-15 LAB
BACTERIA BLD CULT: NO GROWTH
BACTERIA BLD CULT: NO GROWTH

## 2023-11-20 ENCOUNTER — OFFICE VISIT (OUTPATIENT)
Dept: PEDIATRICS | Facility: OTHER | Age: 73
End: 2023-11-20
Attending: INTERNAL MEDICINE
Payer: MEDICARE

## 2023-11-20 ENCOUNTER — TELEPHONE (OUTPATIENT)
Dept: INFUSION THERAPY | Facility: OTHER | Age: 73
End: 2023-11-20

## 2023-11-20 VITALS
RESPIRATION RATE: 24 BRPM | TEMPERATURE: 98.4 F | BODY MASS INDEX: 25.4 KG/M2 | DIASTOLIC BLOOD PRESSURE: 68 MMHG | OXYGEN SATURATION: 97 % | WEIGHT: 143.4 LBS | HEART RATE: 102 BPM | SYSTOLIC BLOOD PRESSURE: 138 MMHG

## 2023-11-20 DIAGNOSIS — J44.9 CHRONIC OBSTRUCTIVE PULMONARY DISEASE, UNSPECIFIED COPD TYPE (H): ICD-10-CM

## 2023-11-20 DIAGNOSIS — M05.9 SEROPOSITIVE RHEUMATOID ARTHRITIS (H): ICD-10-CM

## 2023-11-20 DIAGNOSIS — E53.8 VITAMIN B12 DEFICIENCY (NON ANEMIC): ICD-10-CM

## 2023-11-20 DIAGNOSIS — E11.9 DIABETES MELLITUS TYPE 2, INSULIN DEPENDENT (H): ICD-10-CM

## 2023-11-20 DIAGNOSIS — R77.0 LOW SERUM ALBUMIN: ICD-10-CM

## 2023-11-20 DIAGNOSIS — Z09 HOSPITAL DISCHARGE FOLLOW-UP: Primary | ICD-10-CM

## 2023-11-20 DIAGNOSIS — Z79.4 DIABETES MELLITUS TYPE 2, INSULIN DEPENDENT (H): ICD-10-CM

## 2023-11-20 LAB
ALBUMIN SERPL BCG-MCNC: 3.8 G/DL (ref 3.5–5.2)
ANION GAP SERPL CALCULATED.3IONS-SCNC: 12 MMOL/L (ref 7–15)
BUN SERPL-MCNC: 50.1 MG/DL (ref 8–23)
CALCIUM SERPL-MCNC: 9 MG/DL (ref 8.8–10.2)
CHLORIDE SERPL-SCNC: 102 MMOL/L (ref 98–107)
CREAT SERPL-MCNC: 1.24 MG/DL (ref 0.51–0.95)
DEPRECATED HCO3 PLAS-SCNC: 20 MMOL/L (ref 22–29)
EGFRCR SERPLBLD CKD-EPI 2021: 45 ML/MIN/1.73M2
GLUCOSE SERPL-MCNC: 60 MG/DL (ref 70–99)
POTASSIUM SERPL-SCNC: 4.8 MMOL/L (ref 3.4–5.3)
PREALB SERPL-MCNC: 30.7 MG/DL (ref 20–40)
SODIUM SERPL-SCNC: 134 MMOL/L (ref 135–145)

## 2023-11-20 PROCEDURE — 82310 ASSAY OF CALCIUM: CPT | Mod: ZL | Performed by: INTERNAL MEDICINE

## 2023-11-20 PROCEDURE — G0463 HOSPITAL OUTPT CLINIC VISIT: HCPCS

## 2023-11-20 PROCEDURE — 82040 ASSAY OF SERUM ALBUMIN: CPT | Mod: ZL | Performed by: INTERNAL MEDICINE

## 2023-11-20 PROCEDURE — 84134 ASSAY OF PREALBUMIN: CPT | Mod: ZL | Performed by: INTERNAL MEDICINE

## 2023-11-20 PROCEDURE — 99495 TRANSJ CARE MGMT MOD F2F 14D: CPT | Performed by: INTERNAL MEDICINE

## 2023-11-20 PROCEDURE — 36415 COLL VENOUS BLD VENIPUNCTURE: CPT | Mod: ZL | Performed by: INTERNAL MEDICINE

## 2023-11-20 RX ORDER — ALBUTEROL SULFATE 90 UG/1
AEROSOL, METERED RESPIRATORY (INHALATION)
Qty: 18 G | Refills: 11 | Status: SHIPPED | OUTPATIENT
Start: 2023-11-20 | End: 2024-06-07

## 2023-11-20 RX ORDER — LANOLIN ALCOHOL/MO/W.PET/CERES
1000 CREAM (GRAM) TOPICAL DAILY
Qty: 90 TABLET | Refills: 3 | Status: SHIPPED | OUTPATIENT
Start: 2023-11-20 | End: 2024-06-07

## 2023-11-20 RX ORDER — TRAMADOL HYDROCHLORIDE 50 MG/1
50 TABLET ORAL
Qty: 30 TABLET | Refills: 5 | Status: SHIPPED | OUTPATIENT
Start: 2023-11-20 | End: 2024-05-06

## 2023-11-20 RX ORDER — PEN NEEDLE, DIABETIC 32GX 5/32"
NEEDLE, DISPOSABLE MISCELLANEOUS
Qty: 200 EACH | Refills: 11 | Status: SHIPPED | OUTPATIENT
Start: 2023-11-20 | End: 2024-06-07

## 2023-11-20 ASSESSMENT — PATIENT HEALTH QUESTIONNAIRE - PHQ9
SUM OF ALL RESPONSES TO PHQ QUESTIONS 1-9: 1
10. IF YOU CHECKED OFF ANY PROBLEMS, HOW DIFFICULT HAVE THESE PROBLEMS MADE IT FOR YOU TO DO YOUR WORK, TAKE CARE OF THINGS AT HOME, OR GET ALONG WITH OTHER PEOPLE: NOT DIFFICULT AT ALL
SUM OF ALL RESPONSES TO PHQ QUESTIONS 1-9: 1

## 2023-11-20 ASSESSMENT — PAIN SCALES - GENERAL: PAINLEVEL: NO PAIN (0)

## 2023-11-20 NOTE — NURSING NOTE
"Chief Complaint   Patient presents with    Hospital F/U     covid       Initial /68   Pulse 102   Temp 98.4  F (36.9  C) (Tympanic)   Resp 24   Wt 65 kg (143 lb 6.4 oz)   LMP  (LMP Unknown)   SpO2 97%   BMI 25.40 kg/m   Estimated body mass index is 25.4 kg/m  as calculated from the following:    Height as of 11/10/23: 1.6 m (5' 3\").    Weight as of this encounter: 65 kg (143 lb 6.4 oz).  Medication Review: complete    The next two questions are to help us understand your food security.  If you are feeling you need any assistance in this area, we have resources available to support you today.          11/20/2023   SDOH- Food Insecurity   Within the past 12 months, did you worry that your food would run out before you got money to buy more? N    N   Within the past 12 months, did the food you bought just not last and you didn t have money to get more? N    N         Health Care Directive:  Patient does not have a Health Care Directive or Living Will: Discussed advance care planning with patient; however, patient declined at this time.    Norma J. Gosselin, LPN      "

## 2023-11-20 NOTE — NURSING NOTE
Noted that patient is post covid and a call was placed to [facility]. The recommendation was she be symptom free for 7-10 days. Call placed to patient and after verifying date of birth we discussed the Rheumatologists recommendation and she was in agreement to hold treatment for a week, she still has cough and voice is still raspy. Appointment rescheduled to 11/29.   Jean S. Hammann, RN on 11/20/2023 at 3:33 PM

## 2023-11-20 NOTE — PROGRESS NOTES
Assessment & Plan   1. Hospital discharge follow-up  She seems to have returned to baseline after COVID-19 infection.    2. Chronic obstructive pulmonary disease, unspecified COPD type (H)  At goal, no change.  - albuterol (PROAIR HFA/PROVENTIL HFA/VENTOLIN HFA) 108 (90 Base) MCG/ACT inhaler; INHALE 1-2 PUFFS BY MOUTH EVERY 4-6 HOURS AS NEEDED FOR SHORTNESS OF BREATH OR WHEEZING  Dispense: 18 g; Refill: 11    3. Diabetes mellitus type 2, insulin dependent (H)  At goal, no change.  - insulin pen needle (BD PEN NEEDLE LUCILLE 2ND GEN) 32G X 4 MM miscellaneous; Use to administer insulin twice daily. Dispense as covered by insurance. Dx. Code: E11.9.  Dispense: 200 each; Refill: 11  - Basic metabolic panel; Future  - Basic metabolic panel    4. Seropositive rheumatoid arthritis (H)  At goal, no change.  - traMADol (ULTRAM) 50 MG tablet; Take 1 tablet (50 mg) by mouth nightly as needed for severe pain  Dispense: 30 tablet; Refill: 5    5. Vitamin B12 deficiency (non anemic)  At goal, no change.  - cyanocobalamin (VITAMIN B-12) 1000 MCG tablet; Take 1 tablet (1,000 mcg) by mouth daily  Dispense: 90 tablet; Refill: 3    6. Low serum albumin  Hopefully this was a brief dip due to the infection  - Albumin; Future  - Prealbumin; Future  - Albumin  - Prealbumin      Ms. Perez was recently hospitalized for COVID-19, appears to be doing well since discharge.  Discharge summary and recommendations for outpatient provider are reviewed. Based on what occurred in the visit today:  Previous medication(s) were discontinued or altered? No  Previous medication(s) were suspended pending consultation? No  New medication(s) started? No     -- Medication reconciliation and management was performed today    Patient Instructions    -- Ask Rheum about timing of Orencia    -- Nephrology as planned      Return if symptoms worsen or fail to improve.    Signed, Gio Dooley MD, FAAP, FACP  Internal Medicine & Pediatrics    Carmen   Laura Perez  is a 73 year old female who presents for hospital discharge follow-up.    Hospital: [facility]  Date of discharge: 11/12/23  Date of post discharge contact: 11/14/23    She is doing well after discharge and feels back to baseline.  She is just coughing slightly.  She does this routinely with COPD.  She will be seeing her nephrologist soon.  She wants to know if she should hold her dose of Orencia tomorrow.    Objective   Vitals: /68   Pulse 102   Temp 98.4  F (36.9  C) (Tympanic)   Resp 24   Wt 65 kg (143 lb 6.4 oz)   LMP  (LMP Unknown)   SpO2 97%   BMI 25.40 kg/m      Cardiovascular: Irregularly irregular  Pulmonary: Scattered end expiratory wheezes, no rhonchi.  No increased work of breathing    Review and Analysis of Data   I personally reviewed the following:  External notes: No  Results: Yes most recent laboratory data reviewed  Use of an independent historian: Yes spoke with   Independent review of a test performed by another physician: No  Discussion of management with another physician: No  Moderate risk of morbidity from additional diagnostic testing and/or treatment.    Billing:   Type of Medical Decision Making Face-to-Face Visit within 7 Days Face-to-Face Visit within 14 days   Moderate Complexity 15820 [id number]   High Complexity 57500 [id number]

## 2023-12-05 ENCOUNTER — TELEPHONE (OUTPATIENT)
Dept: PEDIATRICS | Facility: OTHER | Age: 73
End: 2023-12-05
Payer: MEDICARE

## 2023-12-05 ENCOUNTER — HOSPITAL ENCOUNTER (OUTPATIENT)
Dept: INFUSION THERAPY | Facility: OTHER | Age: 73
Discharge: HOME | End: 2023-12-05
Attending: INTERNAL MEDICINE | Admitting: INTERNAL MEDICINE
Payer: MEDICARE

## 2023-12-05 VITALS
WEIGHT: 143.2 LBS | TEMPERATURE: 98 F | HEART RATE: 94 BPM | DIASTOLIC BLOOD PRESSURE: 64 MMHG | SYSTOLIC BLOOD PRESSURE: 131 MMHG | BODY MASS INDEX: 25.37 KG/M2 | RESPIRATION RATE: 22 BRPM

## 2023-12-05 DIAGNOSIS — E85.89 OTHER AMYLOIDOSIS (H): ICD-10-CM

## 2023-12-05 DIAGNOSIS — M05.9 SEROPOSITIVE RHEUMATOID ARTHRITIS (H): ICD-10-CM

## 2023-12-05 DIAGNOSIS — Z79.52 ON PREDNISONE THERAPY: Primary | ICD-10-CM

## 2023-12-05 PROCEDURE — 96365 THER/PROPH/DIAG IV INF INIT: CPT

## 2023-12-05 PROCEDURE — 250N000028 HC RX JA MOD (INTRAVENOUS), IP 250 OP 636: Mod: JZ,JA | Performed by: INTERNAL MEDICINE

## 2023-12-05 PROCEDURE — 258N000003 HC RX IP 258 OP 636: Performed by: INTERNAL MEDICINE

## 2023-12-05 RX ORDER — EPINEPHRINE 1 MG/ML
0.3 INJECTION, SOLUTION, CONCENTRATE INTRAVENOUS EVERY 5 MIN PRN
Status: CANCELLED | OUTPATIENT
Start: 2023-12-19

## 2023-12-05 RX ORDER — DIPHENHYDRAMINE HYDROCHLORIDE 50 MG/ML
50 INJECTION INTRAMUSCULAR; INTRAVENOUS
Status: CANCELLED
Start: 2023-12-19

## 2023-12-05 RX ORDER — ALBUTEROL SULFATE 0.83 MG/ML
2.5 SOLUTION RESPIRATORY (INHALATION)
Status: CANCELLED | OUTPATIENT
Start: 2023-12-19

## 2023-12-05 RX ORDER — HEPARIN SODIUM (PORCINE) LOCK FLUSH IV SOLN 100 UNIT/ML 100 UNIT/ML
5 SOLUTION INTRAVENOUS
Status: CANCELLED | OUTPATIENT
Start: 2023-12-19

## 2023-12-05 RX ORDER — ALBUTEROL SULFATE 90 UG/1
1-2 AEROSOL, METERED RESPIRATORY (INHALATION)
Status: CANCELLED
Start: 2023-12-19

## 2023-12-05 RX ORDER — MEPERIDINE HYDROCHLORIDE 50 MG/ML
25 INJECTION INTRAMUSCULAR; INTRAVENOUS; SUBCUTANEOUS EVERY 30 MIN PRN
Status: CANCELLED | OUTPATIENT
Start: 2023-12-19

## 2023-12-05 RX ORDER — HEPARIN SODIUM,PORCINE 10 UNIT/ML
5-20 VIAL (ML) INTRAVENOUS DAILY PRN
Status: CANCELLED | OUTPATIENT
Start: 2023-12-19

## 2023-12-05 RX ORDER — METHYLPREDNISOLONE SODIUM SUCCINATE 125 MG/2ML
125 INJECTION, POWDER, LYOPHILIZED, FOR SOLUTION INTRAMUSCULAR; INTRAVENOUS
Status: CANCELLED
Start: 2023-12-19

## 2023-12-05 RX ADMIN — SODIUM CHLORIDE 750 MG: 9 INJECTION, SOLUTION INTRAVENOUS at 12:16

## 2023-12-05 RX ADMIN — SODIUM CHLORIDE 250 ML: 9 INJECTION, SOLUTION INTRAVENOUS at 11:58

## 2023-12-05 NOTE — TELEPHONE ENCOUNTER
I spoke to Dr. Figueroa. I recommended it would be best to pull the tooth so as to treat the infection.      Signed, Gio Dooley MD, FAAP, FACP  Internal Medicine & Pediatrics

## 2023-12-05 NOTE — TELEPHONE ENCOUNTER
Please call Dr. Roberto Figueroa Dentist.   This patient had infusion today and now is in his chair and needs a tooth pulled.  She also has appointments at the [facility] tomorrow.   He wants to talk about options or pulling this tooth.              Chante Flanagan on 12/5/2023 at 2:30 PM

## 2023-12-05 NOTE — NURSING NOTE
Infusion Nursing Note:  Laura Perez presents today for Orencia.    Patient seen by provider today: No   present during visit today: Not Applicable.    Note: N/A.      Intravenous Access:  Peripheral IV placed.    Treatment Conditions:  Biological Infusion Checklist: Completed     Post Infusion Assessment:  Patient tolerated infusion without incident.  Blood return noted pre and post infusion.  Site patent and intact, free from redness, edema or discomfort.  No evidence of extravasations.  Access discontinued per protocol.    Biologic Infusion Post Education: Call the triage nurse at your clinic or seek medical attention if you have chills and/or temperature greater than or equal to 100.5, uncontrolled nausea/vomiting, diarrhea, constipation, dizziness, shortness of breath, chest pain, heart palpitations, weakness or any other new or concerning symptoms, questions or concerns.  You cannot have any live virus vaccines prior to or during treatment or up to 6 months post infusion.  If you have an upcoming surgery, medical procedure or dental procedure during treatment, this should be discussed with your ordering physician and your surgeon/dentist.  If you are having any concerning symptom, if you are unsure if you should get your next infusion or wish to speak to a provider before your next infusion, please call your care coordinator or triage nurse at your clinic to notify them so we can adequately serve you.       Discharge Plan:   Discharge instructions reviewed with: Patient and .  Patient and/or family verbalized understanding of discharge instructions and all questions answered.  Copy of AVS reviewed with patient and/or family.  Patient will return 1-2-24 for next appointment.  Patient discharged in stable condition accompanied by: self and .  Departure Mode: Wheelchair.      Emerald Kerns RN

## 2023-12-05 NOTE — NURSING NOTE
~~~ NOTE: If the patient answers yes to any of the questions below, hold the infusion and contact ordering provider or on-call provider.    Do you currently have any signs of illness or infection or are you on any antibiotics? No  Have you recently had an elevated temperature, fever, chills, productive cough, coughing for 3 weeks or longer or hemoptysis, abnormal vital signs, night sweats, chest pain or have you noticed a decrease in your appetite, unexplained weight loss or fatigue? No  Have you had any new, sudden, or worsening abdominal pain? No  Do you have any open wounds or new incisions? (exclude for patients with hidradenitis suppurativa) No  Have you recently been diagnosed with any new nervous system diseases (ie. Multiple sclerosis, Guillain [place], seizures, neurological changes) or cancer diagnosis? Are you on any form of radiation or chemotherapy? No  Have there been any other new onset medical symptoms? No  Are you pregnant or breast feeding or do you have plans of pregnancy in the future? No; N/A  Do you have any upcoming hospitalizations or surgeries? Does not include esophagogastroduodenoscopy, colonoscopy, endoscopic retrograde cholangiopancreatography (ERCP), endoscopic ultrasound (EUS), dental procedures (including cleanings, fillings, implants, extractions)  or joint aspiration/steroid injections No   Have you or anyone in your household received a live vaccination in the past 4 weeks? Please note: No live vaccines while on biologic/chemotherapy until 6 months after the last treatment. Patient can receive the flu vaccine (shot only).  It is optimal for the patient to get it mid cycle, but it can be given at any time as long as it is not on the day of the infusion. No  If applicable to prescribed medication, confirm negative PPD or quantiferon gold MTB. If positive, verify has negative chest x-ray or the patient is at least 4 weeks post initiation of INH/B6 therapy and have clearance from  provider before infusion.   If applicable to prescribed medication, confirm negative hepatitis B surface antigen or hepatitis C. If positive, clearance from provider before infusion.  Rheumatology patients receiving tocilizumab (Actemra): If labs were drawn within the past week, hold dosing until cleared to infuse If AST/ALT > 2 X upper limit normal; ANC < 1.0. NO;  Patients receiving belimumab (Benlysta): Have you been having any signs of worsening depression or suicidal ideations? No

## 2023-12-16 ENCOUNTER — HEALTH MAINTENANCE LETTER (OUTPATIENT)
Age: 73
End: 2023-12-16

## 2024-01-02 ENCOUNTER — HOSPITAL ENCOUNTER (OUTPATIENT)
Dept: INFUSION THERAPY | Facility: OTHER | Age: 74
Discharge: HOME | End: 2024-01-02
Attending: INTERNAL MEDICINE | Admitting: INTERNAL MEDICINE
Payer: MEDICARE

## 2024-01-02 VITALS
DIASTOLIC BLOOD PRESSURE: 58 MMHG | HEART RATE: 102 BPM | WEIGHT: 145 LBS | TEMPERATURE: 97.5 F | SYSTOLIC BLOOD PRESSURE: 127 MMHG | RESPIRATION RATE: 16 BRPM | BODY MASS INDEX: 25.69 KG/M2

## 2024-01-02 DIAGNOSIS — M05.9 SEROPOSITIVE RHEUMATOID ARTHRITIS (H): ICD-10-CM

## 2024-01-02 DIAGNOSIS — Z79.52 ON PREDNISONE THERAPY: Primary | ICD-10-CM

## 2024-01-02 DIAGNOSIS — E85.89 OTHER AMYLOIDOSIS (H): ICD-10-CM

## 2024-01-02 PROCEDURE — 250N000028 HC RX JA MOD (INTRAVENOUS), IP 250 OP 636: Mod: JZ,JA | Performed by: INTERNAL MEDICINE

## 2024-01-02 PROCEDURE — 258N000003 HC RX IP 258 OP 636: Performed by: INTERNAL MEDICINE

## 2024-01-02 PROCEDURE — 96365 THER/PROPH/DIAG IV INF INIT: CPT

## 2024-01-02 RX ORDER — MEPERIDINE HYDROCHLORIDE 50 MG/ML
25 INJECTION INTRAMUSCULAR; INTRAVENOUS; SUBCUTANEOUS EVERY 30 MIN PRN
Status: CANCELLED | OUTPATIENT
Start: 2024-01-30

## 2024-01-02 RX ORDER — EPINEPHRINE 1 MG/ML
0.3 INJECTION, SOLUTION, CONCENTRATE INTRAVENOUS EVERY 5 MIN PRN
Status: CANCELLED | OUTPATIENT
Start: 2024-01-30

## 2024-01-02 RX ORDER — ALBUTEROL SULFATE 0.83 MG/ML
2.5 SOLUTION RESPIRATORY (INHALATION)
Status: CANCELLED | OUTPATIENT
Start: 2024-01-30

## 2024-01-02 RX ORDER — HEPARIN SODIUM,PORCINE 10 UNIT/ML
5-20 VIAL (ML) INTRAVENOUS DAILY PRN
Status: CANCELLED | OUTPATIENT
Start: 2024-01-30

## 2024-01-02 RX ORDER — METHYLPREDNISOLONE SODIUM SUCCINATE 125 MG/2ML
125 INJECTION, POWDER, LYOPHILIZED, FOR SOLUTION INTRAMUSCULAR; INTRAVENOUS
Status: CANCELLED
Start: 2024-01-30

## 2024-01-02 RX ORDER — DIPHENHYDRAMINE HYDROCHLORIDE 50 MG/ML
50 INJECTION INTRAMUSCULAR; INTRAVENOUS
Status: CANCELLED
Start: 2024-01-30

## 2024-01-02 RX ORDER — HEPARIN SODIUM (PORCINE) LOCK FLUSH IV SOLN 100 UNIT/ML 100 UNIT/ML
5 SOLUTION INTRAVENOUS
Status: CANCELLED | OUTPATIENT
Start: 2024-01-30

## 2024-01-02 RX ORDER — ALBUTEROL SULFATE 90 UG/1
1-2 AEROSOL, METERED RESPIRATORY (INHALATION)
Status: CANCELLED
Start: 2024-01-30

## 2024-01-02 RX ADMIN — SODIUM CHLORIDE 250 ML: 9 INJECTION, SOLUTION INTRAVENOUS at 11:58

## 2024-01-02 RX ADMIN — SODIUM CHLORIDE 750 MG: 9 INJECTION, SOLUTION INTRAVENOUS at 12:18

## 2024-01-02 NOTE — NURSING NOTE
Infusion Nursing Note:  Laura PICKETT Destinybeverley presents today for Orencia.    Patient seen by provider today: No   present during visit today: Not Applicable.    Note: No labs ordered and patient verbalizes that she will have completed in cities at [place] if needed.      Intravenous Access:  Peripheral IV placed to left antecubital space.    Treatment Conditions:  Biological Infusion Checklist:  ~~~ NOTE: If the patient answers yes to any of the questions below, hold the infusion and contact ordering provider or on-call provider.    Have you recently had an elevated temperature, fever, chills, productive cough, coughing for 3 weeks or longer or hemoptysis,  abnormal vital signs, night sweats,  chest pain or have you noticed a decrease in your appetite, unexplained weight loss or fatigue? No  Do you have any open wounds or new incisions? No  Do you have any upcoming hospitalizations or surgeries? Does not include esophagogastroduodenoscopy, colonoscopy, endoscopic retrograde cholangiopancreatography (ERCP), endoscopic ultrasound (EUS), dental procedures or joint aspiration/steroid injections No  Do you currently have any signs of illness or infection or are you on any antibiotics? No  Have you had any new, sudden or worsening abdominal pain? No  Have you or anyone in your household received a live vaccination in the past 4 weeks? Please note: No live vaccines while on biologic/chemotherapy until 6 months after the last treatment. Patient can receive the flu vaccine (shot only), pneumovax and the Covid vaccine. It is optimal for the patient to get these vaccines mid cycle, but they can be given at any time as long as it is not on the day of the infusion. No  Have you recently been diagnosed with any new nervous system diseases (ie. Multiple sclerosis, Guillain [place], seizures, neurological changes) or cancer diagnosis? Are you on any form of radiation or chemotherapy? No  Are you pregnant or breast feeding or do you  have plans of pregnancy in the future? No  Have you been having any signs of worsening depression or suicidal ideations?  (benlysta only) NA  Have there been any other new onset medical symptoms? No  Have you had any new blood clots? (IVIG only) NA      Post Infusion Assessment:  Patient tolerated infusion without incident.  Blood return noted pre and post infusion.  Site patent and intact, free from redness, edema or discomfort.  No evidence of extravasations.  Access discontinued per protocol.  Biologic Infusion Post Education: Call the triage nurse at your clinic or seek medical attention if you have chills and/or temperature greater than or equal to 100.5, uncontrolled nausea/vomiting, diarrhea, constipation, dizziness, shortness of breath, chest pain, heart palpitations, weakness or any other new or concerning symptoms, questions or concerns.  You cannot have any live virus vaccines prior to or during treatment or up to 6 months post infusion.  If you have an upcoming surgery, medical procedure or dental procedure during treatment, this should be discussed with your ordering physician and your surgeon/dentist.  If you are having any concerning symptom, if you are unsure if you should get your next infusion or wish to speak to a provider before your next infusion, please call your care coordinator or triage nurse at your clinic to notify them so we can adequately serve you.       Discharge Plan:   Discharge instructions reviewed with: Patient.  Patient and/or family verbalized understanding of discharge instructions and all questions answered.  Declined Copy of AVS reviewed.  Patient will return 28 days for next appointment.  Patient discharged in stable condition accompanied by: self and .  Departure Mode: Wheelchair with spouse assist.  Patient has an appt down at [facility] tomorrow.      Reema Bender RN

## 2024-01-19 DIAGNOSIS — E11.9 DIABETES MELLITUS TYPE 2, INSULIN DEPENDENT (H): Primary | ICD-10-CM

## 2024-01-19 DIAGNOSIS — Z79.4 DIABETES MELLITUS TYPE 2, INSULIN DEPENDENT (H): Primary | ICD-10-CM

## 2024-01-19 RX ORDER — LANCETS 30 GAUGE
1 EACH MISCELLANEOUS 2 TIMES DAILY
Qty: 200 EACH | Refills: 0 | Status: SHIPPED | OUTPATIENT
Start: 2024-01-19 | End: 2024-04-23

## 2024-01-19 NOTE — TELEPHONE ENCOUNTER
[facility] sent Rx request for the following:      Requested Prescriptions   Pending Prescriptions Disp Refills    Lancets (ONETOUCH DELICA PLUS [id number]) MISC 200 each 5     Si each 2 times daily     Last Prescription Date:   22  Last Fill Qty/Refills:         200, R-5    Last Office Visit:              23   Future Office visit:           None    Prescription approved per [facility] Refill Protocol.    Kelsey Brody RN .............. 2024  3:12 PM

## 2024-01-22 ENCOUNTER — MEDICAL CORRESPONDENCE (OUTPATIENT)
Dept: HEALTH INFORMATION MANAGEMENT | Facility: OTHER | Age: 74
End: 2024-01-22
Payer: MEDICARE

## 2024-01-30 ENCOUNTER — HOSPITAL ENCOUNTER (OUTPATIENT)
Dept: INFUSION THERAPY | Facility: OTHER | Age: 74
Discharge: HOME | End: 2024-01-30
Attending: INTERNAL MEDICINE | Admitting: INTERNAL MEDICINE
Payer: MEDICARE

## 2024-01-30 VITALS
TEMPERATURE: 97.9 F | WEIGHT: 148.2 LBS | BODY MASS INDEX: 26.25 KG/M2 | SYSTOLIC BLOOD PRESSURE: 136 MMHG | RESPIRATION RATE: 22 BRPM | DIASTOLIC BLOOD PRESSURE: 69 MMHG | HEART RATE: 90 BPM

## 2024-01-30 DIAGNOSIS — E85.89 OTHER AMYLOIDOSIS (H): ICD-10-CM

## 2024-01-30 DIAGNOSIS — Z79.52 ON PREDNISONE THERAPY: Primary | ICD-10-CM

## 2024-01-30 DIAGNOSIS — M05.9 SEROPOSITIVE RHEUMATOID ARTHRITIS (H): ICD-10-CM

## 2024-01-30 PROCEDURE — 96365 THER/PROPH/DIAG IV INF INIT: CPT

## 2024-01-30 PROCEDURE — 258N000003 HC RX IP 258 OP 636: Performed by: INTERNAL MEDICINE

## 2024-01-30 PROCEDURE — 250N000028 HC RX JA MOD (INTRAVENOUS), IP 250 OP 636: Mod: JZ,JA | Performed by: INTERNAL MEDICINE

## 2024-01-30 RX ORDER — HEPARIN SODIUM (PORCINE) LOCK FLUSH IV SOLN 100 UNIT/ML 100 UNIT/ML
5 SOLUTION INTRAVENOUS
Status: CANCELLED | OUTPATIENT
Start: 2024-02-27

## 2024-01-30 RX ORDER — HEPARIN SODIUM,PORCINE 10 UNIT/ML
5-20 VIAL (ML) INTRAVENOUS DAILY PRN
Status: CANCELLED | OUTPATIENT
Start: 2024-02-27

## 2024-01-30 RX ORDER — EPINEPHRINE 1 MG/ML
0.3 INJECTION, SOLUTION, CONCENTRATE INTRAVENOUS EVERY 5 MIN PRN
Status: CANCELLED | OUTPATIENT
Start: 2024-02-27

## 2024-01-30 RX ORDER — DIPHENHYDRAMINE HYDROCHLORIDE 50 MG/ML
50 INJECTION INTRAMUSCULAR; INTRAVENOUS
Status: CANCELLED
Start: 2024-02-27

## 2024-01-30 RX ORDER — MEPERIDINE HYDROCHLORIDE 50 MG/ML
25 INJECTION INTRAMUSCULAR; INTRAVENOUS; SUBCUTANEOUS EVERY 30 MIN PRN
Status: CANCELLED | OUTPATIENT
Start: 2024-02-27

## 2024-01-30 RX ORDER — ALBUTEROL SULFATE 90 UG/1
1-2 AEROSOL, METERED RESPIRATORY (INHALATION)
Status: CANCELLED
Start: 2024-02-27

## 2024-01-30 RX ORDER — ALBUTEROL SULFATE 0.83 MG/ML
2.5 SOLUTION RESPIRATORY (INHALATION)
Status: CANCELLED | OUTPATIENT
Start: 2024-02-27

## 2024-01-30 RX ORDER — METHYLPREDNISOLONE SODIUM SUCCINATE 125 MG/2ML
125 INJECTION, POWDER, LYOPHILIZED, FOR SOLUTION INTRAMUSCULAR; INTRAVENOUS
Status: CANCELLED
Start: 2024-02-27

## 2024-01-30 RX ADMIN — SODIUM CHLORIDE 250 ML: 9 INJECTION, SOLUTION INTRAVENOUS at 13:36

## 2024-01-30 RX ADMIN — SODIUM CHLORIDE 750 MG: 9 INJECTION, SOLUTION INTRAVENOUS at 13:56

## 2024-01-30 NOTE — NURSING NOTE
~~~ NOTE: If the patient answers yes to any of the questions below, hold the infusion and contact ordering provider or on-call provider.    Do you currently have any signs of illness or infection or are you on any antibiotics? No  Have you recently had an elevated temperature, fever, chills, productive cough, coughing for 3 weeks or longer or hemoptysis, abnormal vital signs, night sweats, chest pain or have you noticed a decrease in your appetite, unexplained weight loss or fatigue? No  Have you had any new, sudden, or worsening abdominal pain? No  Do you have any open wounds or new incisions? (exclude for patients with hidradenitis suppurativa) No  Have you recently been diagnosed with any new nervous system diseases (ie. Multiple sclerosis, Guillain [place], seizures, neurological changes) or cancer diagnosis? Are you on any form of radiation or chemotherapy? No  Have there been any other new onset medical symptoms? No  Are you pregnant or breast feeding or do you have plans of pregnancy in the future? No;   Do you have any upcoming hospitalizations or surgeries? Does not include esophagogastroduodenoscopy, colonoscopy, endoscopic retrograde cholangiopancreatography (ERCP), endoscopic ultrasound (EUS), dental procedures (including cleanings, fillings, implants, extractions)  or joint aspiration/steroid injections No  Have you or anyone in your household received a live vaccination in the past 4 weeks? Please note: No live vaccines while on biologic/chemotherapy until 6 months after the last treatment. Patient can receive the flu vaccine (shot only).  It is optimal for the patient to get it mid cycle, but it can be given at any time as long as it is not on the day of the infusion. No  If applicable to prescribed medication, confirm negative PPD or quantiferon gold MTB. If positive, verify has negative chest x-ray or the patient is at least 4 weeks post initiation of INH/B6 therapy and have clearance from provider  before infusion  If applicable to prescribed medication, confirm negative hepatitis B surface antigen or hepatitis C. If positive, clearance from provider before infusion.  Rheumatology patients receiving tocilizumab (Actemra): If labs were drawn within the past week, hold dosing until cleared to infuse If AST/ALT > 2 X upper limit normal; ANC < 1.0. NO  Patients receiving belimumab (Benlysta): Have you been having any signs of worsening depression or suicidal ideations? No

## 2024-01-30 NOTE — NURSING NOTE
Infusion Nursing Note:  Laura Perez presents today for Orencia.    Patient seen by provider today: No   present during visit today: Not Applicable.    Note: N/A.      Intravenous Access:  Peripheral IV placed.    Treatment Conditions:  Biological Infusion Checklist: N/A    Post Infusion Assessment:  Patient tolerated infusion without incident.  Blood return noted pre and post infusion.  Site patent and intact, free from redness, edema or discomfort.  No evidence of extravasations.  Access discontinued per protocol.    Biologic Infusion Post Education: Call the triage nurse at your clinic or seek medical attention if you have chills and/or temperature greater than or equal to 100.5, uncontrolled nausea/vomiting, diarrhea, constipation, dizziness, shortness of breath, chest pain, heart palpitations, weakness or any other new or concerning symptoms, questions or concerns.  You cannot have any live virus vaccines prior to or during treatment or up to 6 months post infusion.  If you have an upcoming surgery, medical procedure or dental procedure during treatment, this should be discussed with your ordering physician and your surgeon/dentist.  If you are having any concerning symptom, if you are unsure if you should get your next infusion or wish to speak to a provider before your next infusion, please call your care coordinator or triage nurse at your clinic to notify them so we can adequately serve you.       Discharge Plan:   Discharge instructions reviewed with: Patient and .  Patient and/or family verbalized understanding of discharge instructions and all questions answered.  Copy of AVS declined. Will return 2/7/24 for next appointment.  AVS to patient via [employer].   Patient discharged in stable condition accompanied by: self and .  Departure Mode: Wheelchair.      Emerald Kerns RN

## 2024-02-21 ENCOUNTER — TELEPHONE (OUTPATIENT)
Dept: PEDIATRICS | Facility: OTHER | Age: 74
End: 2024-02-21
Payer: MEDICARE

## 2024-02-21 ENCOUNTER — MEDICAL CORRESPONDENCE (OUTPATIENT)
Dept: HEALTH INFORMATION MANAGEMENT | Facility: OTHER | Age: 74
End: 2024-02-21
Payer: MEDICARE

## 2024-02-21 NOTE — TELEPHONE ENCOUNTER
Chart accessed to fill out CMN form for diabetic test strips and lancets. Form routed to Dr. Dooley's physical inbasket for review. Kelsey Brody RN .............. 2/21/2024  2:23 PM     19-Sep-2019 17:37

## 2024-02-27 ENCOUNTER — HOSPITAL ENCOUNTER (OUTPATIENT)
Dept: INFUSION THERAPY | Facility: OTHER | Age: 74
Discharge: HOME | End: 2024-02-27
Attending: INTERNAL MEDICINE | Admitting: INTERNAL MEDICINE
Payer: MEDICARE

## 2024-02-27 VITALS
DIASTOLIC BLOOD PRESSURE: 57 MMHG | HEART RATE: 92 BPM | TEMPERATURE: 97.8 F | SYSTOLIC BLOOD PRESSURE: 136 MMHG | BODY MASS INDEX: 26.54 KG/M2 | WEIGHT: 149.8 LBS | RESPIRATION RATE: 20 BRPM

## 2024-02-27 DIAGNOSIS — E85.89 OTHER AMYLOIDOSIS (H): ICD-10-CM

## 2024-02-27 DIAGNOSIS — M05.9 SEROPOSITIVE RHEUMATOID ARTHRITIS (H): ICD-10-CM

## 2024-02-27 DIAGNOSIS — Z79.52 ON PREDNISONE THERAPY: Primary | ICD-10-CM

## 2024-02-27 PROCEDURE — 258N000003 HC RX IP 258 OP 636: Performed by: INTERNAL MEDICINE

## 2024-02-27 PROCEDURE — 96365 THER/PROPH/DIAG IV INF INIT: CPT

## 2024-02-27 PROCEDURE — 250N000028 HC RX JA MOD (INTRAVENOUS), IP 250 OP 636: Mod: JZ,JA | Performed by: INTERNAL MEDICINE

## 2024-02-27 RX ORDER — HEPARIN SODIUM (PORCINE) LOCK FLUSH IV SOLN 100 UNIT/ML 100 UNIT/ML
5 SOLUTION INTRAVENOUS
Status: CANCELLED | OUTPATIENT
Start: 2024-03-26

## 2024-02-27 RX ORDER — ALBUTEROL SULFATE 90 UG/1
1-2 AEROSOL, METERED RESPIRATORY (INHALATION)
Status: CANCELLED
Start: 2024-03-26

## 2024-02-27 RX ORDER — EPINEPHRINE 1 MG/ML
0.3 INJECTION, SOLUTION, CONCENTRATE INTRAVENOUS EVERY 5 MIN PRN
Status: CANCELLED | OUTPATIENT
Start: 2024-03-26

## 2024-02-27 RX ORDER — METHYLPREDNISOLONE SODIUM SUCCINATE 125 MG/2ML
125 INJECTION, POWDER, LYOPHILIZED, FOR SOLUTION INTRAMUSCULAR; INTRAVENOUS
Status: CANCELLED
Start: 2024-03-26

## 2024-02-27 RX ORDER — HEPARIN SODIUM,PORCINE 10 UNIT/ML
5-20 VIAL (ML) INTRAVENOUS DAILY PRN
Status: CANCELLED | OUTPATIENT
Start: 2024-03-26

## 2024-02-27 RX ORDER — DIPHENHYDRAMINE HYDROCHLORIDE 50 MG/ML
50 INJECTION INTRAMUSCULAR; INTRAVENOUS
Status: CANCELLED
Start: 2024-03-26

## 2024-02-27 RX ORDER — ALBUTEROL SULFATE 0.83 MG/ML
2.5 SOLUTION RESPIRATORY (INHALATION)
Status: CANCELLED | OUTPATIENT
Start: 2024-03-26

## 2024-02-27 RX ORDER — MEPERIDINE HYDROCHLORIDE 50 MG/ML
25 INJECTION INTRAMUSCULAR; INTRAVENOUS; SUBCUTANEOUS EVERY 30 MIN PRN
Status: CANCELLED | OUTPATIENT
Start: 2024-03-26

## 2024-02-27 RX ADMIN — SODIUM CHLORIDE 750 MG: 9 INJECTION, SOLUTION INTRAVENOUS at 14:05

## 2024-02-27 RX ADMIN — SODIUM CHLORIDE 250 ML: 9 INJECTION, SOLUTION INTRAVENOUS at 13:42

## 2024-02-27 NOTE — NURSING NOTE
Infusion Nursing Note:  Laura PICKETT Destinybeverley presents today for Orencia.    Patient seen by provider today: No   present during visit today: Not Applicable.    Note: N/A.    Intravenous Access:  Peripheral IV placed.    Treatment Conditions:  Biological Infusion Checklist: completed    Post Infusion Assessment:  Patient tolerated infusion without incident.  Blood return noted pre and post infusion.  Site patent and intact, free from redness, edema or discomfort.  No evidence of extravasations.  Access discontinued per protocol.  Biologic Infusion Post Education: Call the triage nurse at your clinic or seek medical attention if you have chills and/or temperature greater than or equal to 100.5, uncontrolled nausea/vomiting, diarrhea, constipation, dizziness, shortness of breath, chest pain, heart palpitations, weakness or any other new or concerning symptoms, questions or concerns.  You cannot have any live virus vaccines prior to or during treatment or up to 6 months post infusion.  If you have an upcoming surgery, medical procedure or dental procedure during treatment, this should be discussed with your ordering physician and your surgeon/dentist.  If you are having any concerning symptom, if you are unsure if you should get your next infusion or wish to speak to a provider before your next infusion, please call your care coordinator or triage nurse at your clinic to notify them so we can adequately serve you.     Discharge Plan:   Discharge instructions reviewed with: Patient.  Patient and/or family verbalized understanding of discharge instructions and all questions answered.  Copy of AVS reviewed with patient and/or family.  Patient will return 3/26/24 for next appointment.  AVS to patient via [employer].    Patient discharged in stable condition accompanied by: self.  Departure Mode:wheelchair.      Yolanda Lanza RN

## 2024-02-27 NOTE — NURSING NOTE
~~~ NOTE: If the patient answers yes to any of the questions below, hold the infusion and contact ordering provider or on-call provider.    Have you recently had an elevated temperature, fever, chills, productive cough, coughing for 3 weeks or longer or hemoptysis,  abnormal vital signs, night sweats,  chest pain or have you noticed a decrease in your appetite, unexplained weight loss or fatigue? No  Do you have any open wounds or new incisions? No  Do you have any recent or upcoming hospitalizations, surgeries or dental procedures? No  Do you currently have or recently have had any signs of illness or infection or are you on any antibiotics? No  Have you had any new, sudden or worsening abdominal pain? No  Have you or anyone in your household received a live vaccination in the past 4 weeks? Please note:  No live vaccines while on biologic/chemotherapy until 6 months after the last treatment.  Patient can receive the flu vaccine (shot only) and the pneumovax.  It is optimal for the patient to get these vaccines mid cycle, but they can be given at any time as long as it is not on the day of the infusion. No  Have you recently been diagnosed with any new nervous system diseases (ie. Multiple sclerosis, Guillain [place], seizures, neurological changes) or cancer diagnosis? Are you on any form of radiation or chemotherapy? No  Are you pregnant or breast feeding or do you have plans of pregnancy in the future? No  Have you been having any signs of worsening depression or suicidal ideations?  (benlysta only) No  Have there been any other new onset medical symptoms? No  Yolanda Lanza RN ....................  2/27/2024   1:30 PM

## 2024-03-26 ENCOUNTER — HOSPITAL ENCOUNTER (OUTPATIENT)
Dept: INFUSION THERAPY | Facility: OTHER | Age: 74
Discharge: HOME | End: 2024-03-26
Attending: INTERNAL MEDICINE | Admitting: INTERNAL MEDICINE
Payer: MEDICARE

## 2024-03-26 VITALS
DIASTOLIC BLOOD PRESSURE: 75 MMHG | HEART RATE: 77 BPM | TEMPERATURE: 97 F | RESPIRATION RATE: 18 BRPM | BODY MASS INDEX: 26.57 KG/M2 | SYSTOLIC BLOOD PRESSURE: 157 MMHG | WEIGHT: 150 LBS

## 2024-03-26 DIAGNOSIS — Z79.52 ON PREDNISONE THERAPY: Primary | ICD-10-CM

## 2024-03-26 DIAGNOSIS — E85.89 OTHER AMYLOIDOSIS (H): ICD-10-CM

## 2024-03-26 DIAGNOSIS — M05.9 SEROPOSITIVE RHEUMATOID ARTHRITIS (H): ICD-10-CM

## 2024-03-26 PROCEDURE — 96365 THER/PROPH/DIAG IV INF INIT: CPT

## 2024-03-26 PROCEDURE — 258N000003 HC RX IP 258 OP 636: Performed by: INTERNAL MEDICINE

## 2024-03-26 PROCEDURE — 250N000028 HC RX JA MOD (INTRAVENOUS), IP 250 OP 636: Mod: JZ,JA | Performed by: INTERNAL MEDICINE

## 2024-03-26 RX ORDER — MEPERIDINE HYDROCHLORIDE 50 MG/ML
25 INJECTION INTRAMUSCULAR; INTRAVENOUS; SUBCUTANEOUS EVERY 30 MIN PRN
Status: CANCELLED | OUTPATIENT
Start: 2024-04-23

## 2024-03-26 RX ORDER — ALBUTEROL SULFATE 0.83 MG/ML
2.5 SOLUTION RESPIRATORY (INHALATION)
Status: CANCELLED | OUTPATIENT
Start: 2024-04-23

## 2024-03-26 RX ORDER — EPINEPHRINE 1 MG/ML
0.3 INJECTION, SOLUTION, CONCENTRATE INTRAVENOUS EVERY 5 MIN PRN
Status: CANCELLED | OUTPATIENT
Start: 2024-04-23

## 2024-03-26 RX ORDER — HEPARIN SODIUM,PORCINE 10 UNIT/ML
5-20 VIAL (ML) INTRAVENOUS DAILY PRN
Status: CANCELLED | OUTPATIENT
Start: 2024-04-23

## 2024-03-26 RX ORDER — ALBUTEROL SULFATE 90 UG/1
1-2 AEROSOL, METERED RESPIRATORY (INHALATION)
Status: CANCELLED
Start: 2024-04-23

## 2024-03-26 RX ORDER — DIPHENHYDRAMINE HYDROCHLORIDE 50 MG/ML
50 INJECTION INTRAMUSCULAR; INTRAVENOUS
Status: CANCELLED
Start: 2024-04-23

## 2024-03-26 RX ORDER — METHYLPREDNISOLONE SODIUM SUCCINATE 125 MG/2ML
125 INJECTION, POWDER, LYOPHILIZED, FOR SOLUTION INTRAMUSCULAR; INTRAVENOUS
Status: CANCELLED
Start: 2024-04-23

## 2024-03-26 RX ORDER — HEPARIN SODIUM (PORCINE) LOCK FLUSH IV SOLN 100 UNIT/ML 100 UNIT/ML
5 SOLUTION INTRAVENOUS
Status: CANCELLED | OUTPATIENT
Start: 2024-04-23

## 2024-03-26 RX ADMIN — SODIUM CHLORIDE 750 MG: 9 INJECTION, SOLUTION INTRAVENOUS at 14:35

## 2024-03-26 RX ADMIN — SODIUM CHLORIDE 250 ML: 9 INJECTION, SOLUTION INTRAVENOUS at 13:57

## 2024-03-26 NOTE — NURSING NOTE
~~~ NOTE: If the patient answers yes to any of the questions below, hold the infusion and contact ordering provider or on-call provider.    Do you currently have any signs of illness or infection or are you on any antibiotics? No  Have you recently had an elevated temperature, fever, chills, productive cough, coughing for 3 weeks or longer or hemoptysis, abnormal vital signs, night sweats, chest pain or have you noticed a decrease in your appetite, unexplained weight loss or fatigue? No  Have you had any new, sudden, or worsening abdominal pain? No  Do you have any open wounds or new incisions? (exclude for patients with hidradenitis suppurativa) No  Have you recently been diagnosed with any new nervous system diseases (ie. Multiple sclerosis, Guillain [place], seizures, neurological changes) or cancer diagnosis? Are you on any form of radiation or chemotherapy? No  Have there been any other new onset medical symptoms? No  Are you pregnant or breast feeding or do you have plans of pregnancy in the future? No; N/A  Do you have any upcoming hospitalizations or surgeries? Does not include esophagogastroduodenoscopy, colonoscopy, endoscopic retrograde cholangiopancreatography (ERCP), endoscopic ultrasound (EUS), dental procedures (including cleanings, fillings, implants, extractions)  or joint aspiration/steroid injections No  Have you or anyone in your household received a live vaccination in the past 4 weeks? Please note: No live vaccines while on biologic/chemotherapy until 6 months after the last treatment. Patient can receive the flu vaccine (shot only).  It is optimal for the patient to get it mid cycle, but it can be given at any time as long as it is not on the day of the infusion. No  If applicable to prescribed medication, confirm negative PPD or quantiferon gold MTB. If positive, verify has negative chest x-ray or the patient is at least 4 weeks post initiation of INH/B6 therapy and have clearance from provider  before infusion. TB negative 7/13/2023  If applicable to prescribed medication, confirm negative hepatitis B surface antigen or hepatitis C. If positive, clearance from provider before infusion. Hepatitis Bnegative 8/10/2022  Rheumatology patients receiving tocilizumab (Actemra): If labs were drawn within the past week, hold dosing until cleared to infuse If AST/ALT > 2 X upper limit normal; ANC < 1.0. NO; N/A  Patients receiving belimumab (Benlysta): Have you been having any signs of worsening depression or suicidal ideations? No; N/A

## 2024-03-26 NOTE — NURSING NOTE
Infusion Nursing Note:  Laura Perez presents today for Orencia.    Patient seen by provider today: No   present during visit today: Not Applicable.    Note: Patient's blood pressure noted to be elevated at time of discharge at 157/75. Patient states that she is asymptomatic and declines further evaluation at this time. Patient and spouse educated to continue to monitor blood pressure at home and to present to ED or follow-up with PCP if blood pressure does not improve or should signs and symptoms of hypertension arise. Patient and spouse verbalize understanding and are in agreement with the plan.       Intravenous Access:  Peripheral IV placed.    Treatment Conditions:  Biological infusion checklist completed prior to infusion.      Post Infusion Assessment:  Patient tolerated infusion without incident.  Blood return noted pre and post infusion.  Site patent and intact, free from redness, edema or discomfort.  No evidence of extravasations.  Access discontinued per protocol.  Biologic Infusion Post Education: Call the triage nurse at your clinic or seek medical attention if you have chills and/or temperature greater than or equal to 100.5, uncontrolled nausea/vomiting, diarrhea, constipation, dizziness, shortness of breath, chest pain, heart palpitations, weakness or any other new or concerning symptoms, questions or concerns.  You cannot have any live virus vaccines prior to or during treatment or up to 6 months post infusion.  If you have an upcoming surgery, medical procedure or dental procedure during treatment, this should be discussed with your ordering physician and your surgeon/dentist.  If you are having any concerning symptom, if you are unsure if you should get your next infusion or wish to speak to a provider before your next infusion, please call your care coordinator or triage nurse at your clinic to notify them so we can adequately serve you.       Discharge Plan:   Discharge instructions  reviewed with: Patient and Family.  Patient and/or family verbalized understanding of discharge instructions and all questions answered.  AVS to patient via payleven.  Patient will return 4/23/2024 for next appointment.   Patient discharged in stable condition accompanied by: .  Departure Mode: Wheelchair.      Lesa William RN

## 2024-04-01 ENCOUNTER — ALLIED HEALTH/NURSE VISIT (OUTPATIENT)
Dept: FAMILY MEDICINE | Facility: OTHER | Age: 74
End: 2024-04-01
Payer: MEDICARE

## 2024-04-01 DIAGNOSIS — N18.32 STAGE 3B CHRONIC KIDNEY DISEASE (H): ICD-10-CM

## 2024-04-01 DIAGNOSIS — M81.0 OSTEOPOROSIS, UNSPECIFIED OSTEOPOROSIS TYPE, UNSPECIFIED PATHOLOGICAL FRACTURE PRESENCE: Primary | ICD-10-CM

## 2024-04-01 LAB
CALCIUM SERPL-MCNC: 9.8 MG/DL (ref 8.8–10.2)
CREAT SERPL-MCNC: 1.7 MG/DL (ref 0.51–0.95)
EGFRCR SERPLBLD CKD-EPI 2021: 31 ML/MIN/1.73M2

## 2024-04-01 PROCEDURE — 82565 ASSAY OF CREATININE: CPT | Mod: ZL | Performed by: INTERNAL MEDICINE

## 2024-04-01 PROCEDURE — 96372 THER/PROPH/DIAG INJ SC/IM: CPT | Performed by: INTERNAL MEDICINE

## 2024-04-01 PROCEDURE — 82310 ASSAY OF CALCIUM: CPT | Mod: ZL | Performed by: INTERNAL MEDICINE

## 2024-04-01 PROCEDURE — 250N000011 HC RX IP 250 OP 636: Mod: JZ | Performed by: INTERNAL MEDICINE

## 2024-04-01 PROCEDURE — 36415 COLL VENOUS BLD VENIPUNCTURE: CPT | Mod: ZL | Performed by: INTERNAL MEDICINE

## 2024-04-01 RX ORDER — ALBUTEROL SULFATE 0.83 MG/ML
2.5 SOLUTION RESPIRATORY (INHALATION)
Status: CANCELLED | OUTPATIENT
Start: 2024-04-23

## 2024-04-01 RX ORDER — DIPHENHYDRAMINE HYDROCHLORIDE 50 MG/ML
50 INJECTION INTRAMUSCULAR; INTRAVENOUS
Status: CANCELLED
Start: 2024-04-23

## 2024-04-01 RX ORDER — ALBUTEROL SULFATE 90 UG/1
1-2 AEROSOL, METERED RESPIRATORY (INHALATION)
Start: 2024-09-20

## 2024-04-01 RX ORDER — DIPHENHYDRAMINE HYDROCHLORIDE 50 MG/ML
50 INJECTION INTRAMUSCULAR; INTRAVENOUS
Start: 2024-09-20

## 2024-04-01 RX ORDER — METHYLPREDNISOLONE SODIUM SUCCINATE 125 MG/2ML
125 INJECTION, POWDER, LYOPHILIZED, FOR SOLUTION INTRAMUSCULAR; INTRAVENOUS
Start: 2024-09-20

## 2024-04-01 RX ORDER — METHYLPREDNISOLONE SODIUM SUCCINATE 125 MG/2ML
125 INJECTION, POWDER, LYOPHILIZED, FOR SOLUTION INTRAMUSCULAR; INTRAVENOUS
Status: CANCELLED
Start: 2024-04-23

## 2024-04-01 RX ORDER — ALBUTEROL SULFATE 90 UG/1
1-2 AEROSOL, METERED RESPIRATORY (INHALATION)
Status: CANCELLED
Start: 2024-04-23

## 2024-04-01 RX ORDER — HEPARIN SODIUM,PORCINE 10 UNIT/ML
5-20 VIAL (ML) INTRAVENOUS DAILY PRN
Status: CANCELLED | OUTPATIENT
Start: 2024-04-23

## 2024-04-01 RX ORDER — MEPERIDINE HYDROCHLORIDE 50 MG/ML
25 INJECTION INTRAMUSCULAR; INTRAVENOUS; SUBCUTANEOUS EVERY 30 MIN PRN
OUTPATIENT
Start: 2024-09-20

## 2024-04-01 RX ORDER — HEPARIN SODIUM (PORCINE) LOCK FLUSH IV SOLN 100 UNIT/ML 100 UNIT/ML
5 SOLUTION INTRAVENOUS
Status: CANCELLED | OUTPATIENT
Start: 2024-04-23

## 2024-04-01 RX ORDER — MEPERIDINE HYDROCHLORIDE 50 MG/ML
25 INJECTION INTRAMUSCULAR; INTRAVENOUS; SUBCUTANEOUS EVERY 30 MIN PRN
Status: CANCELLED | OUTPATIENT
Start: 2024-04-23

## 2024-04-01 RX ORDER — EPINEPHRINE 1 MG/ML
0.3 INJECTION, SOLUTION, CONCENTRATE INTRAVENOUS EVERY 5 MIN PRN
OUTPATIENT
Start: 2024-09-20

## 2024-04-01 RX ORDER — EPINEPHRINE 1 MG/ML
0.3 INJECTION, SOLUTION, CONCENTRATE INTRAVENOUS EVERY 5 MIN PRN
Status: CANCELLED | OUTPATIENT
Start: 2024-04-23

## 2024-04-01 RX ORDER — ALBUTEROL SULFATE 0.83 MG/ML
2.5 SOLUTION RESPIRATORY (INHALATION)
OUTPATIENT
Start: 2024-09-20

## 2024-04-01 RX ADMIN — DENOSUMAB 60 MG: 60 INJECTION SUBCUTANEOUS at 09:50

## 2024-04-01 NOTE — PROGRESS NOTES
Clinic Administered Medication Documentation      Prolia Documentation    Indication: Prolia  (denosumab) is a prescription medicine used to treat osteoporosis in patients who:   Are at high risk for fracture, meaning patients who have had a fracture related to osteoporosis, or who have multiple risk factors for fracture.  Cannot use another osteoporosis medicine or other osteoporosis medicines did not work well.  The timeline for early/late injections would be 4 weeks early and any time after the 6 month anjali. If a patient receives their injection late, then the subsequent injection would be 6 months from the date that they actually received the injection.    When was the last injection?  23  Was the last injection at least 6 months ago? Yes  Has the prior authorization been completed?  Yes  Is there an active order (written within the past 365 days, with administrations remaining, not ) in the chart?  Yes  Patient denies any dental work involving the bone (e.g. tooth extraction or dental implants) in the past 4 weeks?  Yes  Patient denies plans for any dental work involving the bone (e.g. tooth extraction or dental implants) in the next 4 weeks? Yes    The following steps were completed to comply with the REMS program for Prolia:  Reviewed information in the Medication Guide and Patient Counseling Chart, including the serious risks of Prolia  and the symptoms of each risk.  Advised patient to seek prompt medical attention if they have signs or symptoms of any of the serious risks.  Provided each patient a copy of the Medication Guide and Patient Brochure.    Prior to injection, verified patient identity using patient's name and date of birth. Medication was administered. Please see MAR and medication order for additional information. Patient instructed to remain in clinic for 15 minutes and report any adverse reaction to staff immediately.    Vial/Syringe: Syringe  Was this medication supplied by the  patient? No  Verified that the patient has refills remaining in their prescription.    Sarita Orta RN on 4/1/2024 at 9:10 AM

## 2024-04-18 DIAGNOSIS — Z79.4 DIABETES MELLITUS TYPE 2, INSULIN DEPENDENT (H): ICD-10-CM

## 2024-04-18 DIAGNOSIS — E11.9 DIABETES MELLITUS TYPE 2, INSULIN DEPENDENT (H): ICD-10-CM

## 2024-04-20 DIAGNOSIS — N95.2 ATROPHIC VAGINITIS: ICD-10-CM

## 2024-04-23 RX ORDER — LANCETS 30 GAUGE
EACH MISCELLANEOUS
Qty: 200 EACH | Refills: 1 | Status: SHIPPED | OUTPATIENT
Start: 2024-04-23 | End: 2024-06-07

## 2024-04-23 NOTE — TELEPHONE ENCOUNTER
Prescription refilled per RN Medication Refill Policy..................Maureen Neves RN 4/23/2024 1:30 PM

## 2024-04-25 NOTE — TELEPHONE ENCOUNTER
[facility] sent Rx request for the following:      Requested Prescriptions   Pending Prescriptions Disp Refills    estradiol (VAGIFEM) 10 MCG TABS vaginal tablet [Pharmacy Med Name: ESTRADIOL 10MCG VAGINAL TABS 8S] 24 tablet 3     Sig: INSERT 1 TABLET(10 MCG) VAGINALLY 2 TIMES A WEEK       Hormone Replacement Therapy Failed - 4/25/2024  4:19 PM        Failed - Blood pressure under 140/90 in past 12 months     BP Readings from Last 3 Encounters:   03/26/24 (!) 157/75   02/27/24 136/57   01/30/24 136/69   No data recorded       Last Prescription Date:   5/25/23  Last Fill Qty/Refills:         24, R-3    Last Office Visit:              11/20/23   Future Office visit:           None    Unable to complete prescription refill per RN Medication Refill Policy.     Kelsey Brody RN .............. 4/25/2024  4:20 PM

## 2024-04-26 RX ORDER — ESTRADIOL 10 UG/1
INSERT VAGINAL
Qty: 24 TABLET | Refills: 1 | Status: SHIPPED | OUTPATIENT
Start: 2024-04-26

## 2024-04-29 ENCOUNTER — HOSPITAL ENCOUNTER (OUTPATIENT)
Dept: INFUSION THERAPY | Facility: OTHER | Age: 74
Discharge: HOME | End: 2024-04-29
Attending: INTERNAL MEDICINE | Admitting: INTERNAL MEDICINE
Payer: MEDICARE

## 2024-04-29 VITALS
TEMPERATURE: 96.2 F | DIASTOLIC BLOOD PRESSURE: 66 MMHG | SYSTOLIC BLOOD PRESSURE: 149 MMHG | WEIGHT: 149 LBS | BODY MASS INDEX: 26.39 KG/M2 | HEART RATE: 75 BPM

## 2024-04-29 DIAGNOSIS — M05.9 SEROPOSITIVE RHEUMATOID ARTHRITIS (H): ICD-10-CM

## 2024-04-29 DIAGNOSIS — E85.89 OTHER AMYLOIDOSIS (H): ICD-10-CM

## 2024-04-29 DIAGNOSIS — Z79.52 ON PREDNISONE THERAPY: Primary | ICD-10-CM

## 2024-04-29 PROCEDURE — 96365 THER/PROPH/DIAG IV INF INIT: CPT

## 2024-04-29 PROCEDURE — 258N000003 HC RX IP 258 OP 636: Performed by: INTERNAL MEDICINE

## 2024-04-29 PROCEDURE — 250N000028 HC RX JA MOD (INTRAVENOUS), IP 250 OP 636: Mod: JZ,JA | Performed by: INTERNAL MEDICINE

## 2024-04-29 RX ORDER — HEPARIN SODIUM,PORCINE 10 UNIT/ML
5-20 VIAL (ML) INTRAVENOUS DAILY PRN
Status: CANCELLED | OUTPATIENT
Start: 2024-05-21

## 2024-04-29 RX ORDER — MEPERIDINE HYDROCHLORIDE 50 MG/ML
25 INJECTION INTRAMUSCULAR; INTRAVENOUS; SUBCUTANEOUS EVERY 30 MIN PRN
Status: CANCELLED | OUTPATIENT
Start: 2024-05-21

## 2024-04-29 RX ORDER — METHYLPREDNISOLONE SODIUM SUCCINATE 125 MG/2ML
125 INJECTION, POWDER, LYOPHILIZED, FOR SOLUTION INTRAMUSCULAR; INTRAVENOUS
Status: CANCELLED
Start: 2024-05-21

## 2024-04-29 RX ORDER — DIPHENHYDRAMINE HYDROCHLORIDE 50 MG/ML
50 INJECTION INTRAMUSCULAR; INTRAVENOUS
Status: CANCELLED
Start: 2024-05-21

## 2024-04-29 RX ORDER — HEPARIN SODIUM (PORCINE) LOCK FLUSH IV SOLN 100 UNIT/ML 100 UNIT/ML
5 SOLUTION INTRAVENOUS
Status: CANCELLED | OUTPATIENT
Start: 2024-05-21

## 2024-04-29 RX ORDER — ALBUTEROL SULFATE 90 UG/1
1-2 AEROSOL, METERED RESPIRATORY (INHALATION)
Status: CANCELLED
Start: 2024-05-21

## 2024-04-29 RX ORDER — EPINEPHRINE 1 MG/ML
0.3 INJECTION, SOLUTION, CONCENTRATE INTRAVENOUS EVERY 5 MIN PRN
Status: CANCELLED | OUTPATIENT
Start: 2024-05-21

## 2024-04-29 RX ORDER — ALBUTEROL SULFATE 0.83 MG/ML
2.5 SOLUTION RESPIRATORY (INHALATION)
Status: CANCELLED | OUTPATIENT
Start: 2024-05-21

## 2024-04-29 RX ADMIN — SODIUM CHLORIDE 250 ML: 9 INJECTION, SOLUTION INTRAVENOUS at 13:08

## 2024-04-29 RX ADMIN — SODIUM CHLORIDE 750 MG: 9 INJECTION, SOLUTION INTRAVENOUS at 13:48

## 2024-04-29 NOTE — NURSING NOTE
~~~ NOTE: If the patient answers yes to any of the questions below, hold the infusion and contact ordering provider or on-call provider.    Do you currently have any signs of illness or infection or are you on any antibiotics? No  Have you recently had an elevated temperature, fever, chills, productive cough, coughing for 3 weeks or longer or hemoptysis, abnormal vital signs, night sweats, chest pain or have you noticed a decrease in your appetite, unexplained weight loss or fatigue? No  Have you had any new, sudden, or worsening abdominal pain? No  Do you have any open wounds or new incisions? (exclude for patients with hidradenitis suppurativa) No  Have you recently been diagnosed with any new nervous system diseases (ie. Multiple sclerosis, Guillain [place], seizures, neurological changes) or cancer diagnosis? Are you on any form of radiation or chemotherapy? No  Have there been any other new onset medical symptoms? No  Are you pregnant or breast feeding or do you have plans of pregnancy in the future? No; N/A  Do you have any upcoming hospitalizations or surgeries? Does not include esophagogastroduodenoscopy, colonoscopy, endoscopic retrograde cholangiopancreatography (ERCP), endoscopic ultrasound (EUS), dental procedures (including cleanings, fillings, implants, extractions)  or joint aspiration/steroid injections No  Have you or anyone in your household received a live vaccination in the past 4 weeks? Please note: No live vaccines while on biologic/chemotherapy until 6 months after the last treatment. Patient can receive the flu vaccine (shot only).  It is optimal for the patient to get it mid cycle, but it can be given at any time as long as it is not on the day of the infusion. No  If applicable to prescribed medication, confirm negative PPD or quantiferon gold MTB. If positive, verify has negative chest x-ray or the patient is at least 4 weeks post initiation of INH/B6 therapy and have clearance from provider  before infusion (Y/N:[id number])  If applicable to prescribed medication, confirm negative hepatitis B surface antigen or hepatitis C. If positive, clearance from provider before infusion. (Y/N: [id number])  Rheumatology patients receiving tocilizumab (Actemra): If labs were drawn within the past week, hold dosing until cleared to infuse If AST/ALT > 2 X upper limit normal; ANC < 1.0. NO; N/A  Patients receiving belimumab (Benlysta): Have you been having any signs of worsening depression or suicidal ideations? No; N/A  Yolanda Lanza RN .................... 4/29/2024   12:58 PM

## 2024-04-29 NOTE — NURSING NOTE
Infusion Nursing Note:  Laura PICKETT Destinybeverley presents today for Orencia.    Patient seen by provider today: No   present during visit today: Not Applicable.    Note: N/A.    Intravenous Access:  Peripheral IV placed.    Treatment Conditions:  Biological Infusion Checklist:completed    Post Infusion Assessment:  Patient tolerated infusion without incident.  Blood return noted pre and post infusion.  Site patent and intact, free from redness, edema or discomfort.  No evidence of extravasations.  Access discontinued per protocol.  Biologic Infusion Post Education: Call the triage nurse at your clinic or seek medical attention if you have chills and/or temperature greater than or equal to 100.5, uncontrolled nausea/vomiting, diarrhea, constipation, dizziness, shortness of breath, chest pain, heart palpitations, weakness or any other new or concerning symptoms, questions or concerns.  You cannot have any live virus vaccines prior to or during treatment or up to 6 months post infusion.  If you have an upcoming surgery, medical procedure or dental procedure during treatment, this should be discussed with your ordering physician and your surgeon/dentist.  If you are having any concerning symptom, if you are unsure if you should get your next infusion or wish to speak to a provider before your next infusion, please call your care coordinator or triage nurse at your clinic to notify them so we can adequately serve you.     Discharge Plan:   Discharge instructions reviewed with: Patient.  Patient and/or family verbalized understanding of discharge instructions and all questions answered.  Copy of AVS declined by patient and/or family.  Patient will return 6/3/24 for next appointment.  AVS to patient via [employer].    Patient discharged in stable condition accompanied by: self.  Departure Mode: Ambulatory.      Yolanda Lanza RN

## 2024-04-29 NOTE — TELEPHONE ENCOUNTER
" Disp Refills Start End ARTHUR   folic acid (FOLVITE) 1 MG tablet 90 tablet 3 11/2/2020  No   Sig - Route: Take 1 tablet (1 mg) by mouth daily - Oral         LOV: 11/2/2020  Future Office visit: No future appointment scheduled at this time. Reminder sent to patient to schedule an appointment.    Routing refill request to provider for review/approval because:  Failed protocol    Requested Prescriptions   Pending Prescriptions Disp Refills     folic acid (FOLVITE) 1 MG tablet [Pharmacy Med Name: FOLIC ACID 1MG TABLETS] 90 tablet 3     Sig: TAKE 1 TABLET(1 MG) BY MOUTH DAILY       Vitamin Supplements (Adult) Protocol Failed - 11/8/2021  1:09 PM        Failed - Recent (12 mo) or future (30 days) visit within the authorizing provider's specialty     Patient has had an office visit with the authorizing provider or a provider within the authorizing providers department within the previous 12 mos or has a future within next 30 days. See \"Patient Info\" tab in inbasket, or \"Choose Columns\" in Meds & Orders section of the refill encounter.              Passed - High dose Vitamin D not ordered        Passed - Medication is active on med list         Unable to complete prescription refill per RN Medication Refill Policy.................... Praveena Mendes RN ....................  11/9/2021   10:54 AM        " 4 = No assist / stand by assistance

## 2024-05-04 ENCOUNTER — HEALTH MAINTENANCE LETTER (OUTPATIENT)
Age: 74
End: 2024-05-04

## 2024-05-06 DIAGNOSIS — M05.9 SEROPOSITIVE RHEUMATOID ARTHRITIS (H): ICD-10-CM

## 2024-05-06 DIAGNOSIS — J44.9 CHRONIC OBSTRUCTIVE PULMONARY DISEASE, UNSPECIFIED COPD TYPE (H): ICD-10-CM

## 2024-05-06 RX ORDER — TRAMADOL HYDROCHLORIDE 50 MG/1
50 TABLET ORAL
Qty: 30 TABLET | Refills: 0 | Status: SHIPPED | OUTPATIENT
Start: 2024-05-06 | End: 2024-06-07

## 2024-05-06 RX ORDER — BUDESONIDE 0.5 MG/2ML
0.5 INHALANT ORAL 2 TIMES DAILY
Qty: 360 ML | Refills: 4 | Status: SHIPPED | OUTPATIENT
Start: 2024-05-06

## 2024-05-06 NOTE — TELEPHONE ENCOUNTER
Schedule II medications require an office refill only.  Munira refill provided today.  Schedule office visit.    Signed, Gio Dooley MD, FAAP, FACP  Internal Medicine & Pediatrics

## 2024-05-06 NOTE — TELEPHONE ENCOUNTER
[facility]-Reason for call: Medication or medication refill    Have you contacted your pharmacy regarding this refill? Yes    If No, direct the patient to contact the Pharmacy and discontinue telephone note using Erroneous Encounter.  If Yes, continue.    Name of medication requested: Tramadol    How many days of medication do you have left? 7    What pharmacy do you use? [facility]    Preferred method for responding to this message: Telephone Call    Phone number patient can be reached at: Home number on file [phone] (home)    If we cannot reach you directly, may we leave a detailed response at the number you provided? Yes

## 2024-05-06 NOTE — TELEPHONE ENCOUNTER
[facility] sent Rx request for the following:      Requested Prescriptions   Pending Prescriptions Disp Refills    traMADol (ULTRAM) 50 MG tablet 30 tablet 5     Sig: Take 1 tablet (50 mg) by mouth nightly as needed for severe pain       There is no refill protocol information for this order          Last Prescription Date:   11/20/23  Last Fill Qty/Refills:         30, R-5    Last Office Visit:              11/20/23   Future Office visit:             Next 5 appointments (look out 90 days)      Jun 07, 2024  3:40 PM  (Arrive by 3:25 PM)  Provider Visit with Gio Dooley MD  [facility] ([facility] ) [address]  [place] [id number] [phone]           Routing refill request to provider for review/approval because:  Drug not on the [facility] refill protocol   Sharda Garcia RN on 5/6/2024 at 9:02 AM

## 2024-05-07 ENCOUNTER — MEDICAL CORRESPONDENCE (OUTPATIENT)
Dept: HEALTH INFORMATION MANAGEMENT | Facility: OTHER | Age: 74
End: 2024-05-07
Payer: MEDICARE

## 2024-05-08 ENCOUNTER — HOSPITAL ENCOUNTER (OUTPATIENT)
Dept: RESPIRATORY THERAPY | Facility: OTHER | Age: 74
Discharge: HOME | End: 2024-05-08
Attending: INTERNAL MEDICINE | Admitting: INTERNAL MEDICINE
Payer: MEDICARE

## 2024-05-08 PROCEDURE — 999N000157 HC STATISTIC RCP TIME EA 10 MIN

## 2024-05-08 PROCEDURE — 94625 PHY/QHP OP PULM RHB W/O MNTR: CPT

## 2024-05-08 NOTE — PROGRESS NOTES
Patient here for Initial Pulmonary Rehab visit today. Pt did Six Minute Walk test and walked for 419 feet on Room Air, taking two breaks totaling 2 minutes. Pt had a PFT from within the last year from [facility] in [place] on 01/03/2024 and was able to use results from that. FEV1 is 33% of predicted values. FVC is 54% of predicted values. FEV1/FVC is 61% of predicted values. Writer and patient then went over pre class assessment questionnaires and filled out ITP's. Pt then taken to cardiac rehab center where the program is held and orientated to equipment and routine. Session lasted 110 minutes.    Burak Coleman, RT

## 2024-05-10 DIAGNOSIS — E78.5 HYPERLIPIDEMIA, UNSPECIFIED HYPERLIPIDEMIA TYPE: ICD-10-CM

## 2024-05-11 ENCOUNTER — MYC REFILL (OUTPATIENT)
Dept: PEDIATRICS | Facility: OTHER | Age: 74
End: 2024-05-11
Payer: MEDICARE

## 2024-05-11 DIAGNOSIS — M05.9 SEROPOSITIVE RHEUMATOID ARTHRITIS (H): ICD-10-CM

## 2024-05-11 RX ORDER — TRAMADOL HYDROCHLORIDE 50 MG/1
50 TABLET ORAL
Qty: 30 TABLET | Refills: 0 | Status: CANCELLED | OUTPATIENT
Start: 2024-05-11

## 2024-05-13 NOTE — TELEPHONE ENCOUNTER
Patient comment: Laura has an appointment to see Dr Dooley June 7th.  She will run out of her once daily Tramadol before then.  We would like the prescription renewed  so she doesn t run out.  Thank you.     traMADol (ULTRAM) 50 MG tablet 30 tablet 0 5/6/2024 -- No   Sig - Route: Take 1 tablet (50 mg) by mouth nightly as needed for severe pain - Oral   Sent to pharmacy as: traMADol HCl 50 MG Oral Tablet (ULTRAM)   Class: E-Prescribe   Order: [id number]   E-Prescribing Status: Receipt confirmed by pharmacy (5/6/2024 11:07 AM CDT)     [id number] - [place], [place] - [address]     Pt informed of prescription via readfyt. Kelsey Brody RN .............. 5/13/2024  8:48 AM

## 2024-05-14 ENCOUNTER — HOSPITAL ENCOUNTER (OUTPATIENT)
Dept: RESPIRATORY THERAPY | Facility: OTHER | Age: 74
Discharge: HOME | End: 2024-05-14
Attending: INTERNAL MEDICINE | Admitting: INTERNAL MEDICINE
Payer: MEDICARE

## 2024-05-14 PROCEDURE — 94625 PHY/QHP OP PULM RHB W/O MNTR: CPT

## 2024-05-14 PROCEDURE — 999N000157 HC STATISTIC RCP TIME EA 10 MIN

## 2024-05-14 NOTE — PROGRESS NOTES
Pt here for Pulmonary Rehab today. Pt was orientated to stretches and Upper Body exercises today. Pt did the Upper Body exercises today for 5 reps a piece with no added weight. Pre Exercise vitals: HR 89, /60, and SpO2 95% on Room Air. Exercise vitals: HR 94 and SpO2 96% on Room Air. Post Exercise vitals: HR 91, /92, and SpO2 97% on Room Air. Eduction today was a speech presented by Fernanda from Occupational Therapy about continuing exercise options after the Pulmonary Rehab program has concluded, as well as benefits of exercise with Pulmonary Impairments. Pt instructed to read the book A Guide for People with Chronic Lung Disease pg's 3-14 and 38-43 on Lung function and types of Pulmonary diseases, as well as learning how to get control of breathing for Thursdays class. Session lasted 90 minutes.    Burak Coleman, RT

## 2024-05-15 RX ORDER — SIMVASTATIN 40 MG
40 TABLET ORAL AT BEDTIME
Qty: 90 TABLET | Refills: 0 | Status: SHIPPED | OUTPATIENT
Start: 2024-05-15 | End: 2024-06-07

## 2024-05-15 NOTE — TELEPHONE ENCOUNTER
[facility] sent Rx request for the following:      Requested Prescriptions   Pending Prescriptions Disp Refills    simvastatin (ZOCOR) 40 MG tablet [Pharmacy Med Name: SIMVASTATIN 40MG TABLETS] 90 tablet 3     Sig: TAKE 1 TABLET(40 MG) BY MOUTH AT BEDTIME       Antihyperlipidemic agents Passed - 5/15/2024 11:09 AM       Last Prescription Date:   5/25/23  Last Fill Qty/Refills:         90, R-3    Last Office Visit:              11/20/23   Future Office visit:             Next 5 appointments (look out 90 days)      Jun 07, 2024  3:40 PM  (Arrive by 3:25 PM)  Provider Visit with Gio Dooley MD  [facility] ([facility] ) [address]  [place] [id number] [phone]           Prescription approved per [facility] Refill Protocol.  Sharda Garcia RN on 5/15/2024 at 11:11 AM

## 2024-05-16 ENCOUNTER — HOSPITAL ENCOUNTER (OUTPATIENT)
Dept: RESPIRATORY THERAPY | Facility: OTHER | Age: 74
Discharge: HOME | End: 2024-05-16
Attending: INTERNAL MEDICINE
Payer: MEDICARE

## 2024-05-16 ENCOUNTER — HOSPITAL ENCOUNTER (OUTPATIENT)
Dept: MAMMOGRAPHY | Facility: OTHER | Age: 74
Discharge: HOME | End: 2024-05-16
Attending: INTERNAL MEDICINE
Payer: MEDICARE

## 2024-05-16 DIAGNOSIS — Z12.31 VISIT FOR SCREENING MAMMOGRAM: ICD-10-CM

## 2024-05-16 PROCEDURE — 94625 PHY/QHP OP PULM RHB W/O MNTR: CPT

## 2024-05-16 PROCEDURE — 999N000157 HC STATISTIC RCP TIME EA 10 MIN

## 2024-05-16 PROCEDURE — 77063 BREAST TOMOSYNTHESIS BI: CPT

## 2024-05-16 NOTE — PROGRESS NOTES
Pt here for Pulmonary Rehab today. Pt was orientated to stretches and Lower Body exercises today. Pt did the Lower exercises today for 5 reps a piece with no added weight. Pre Exercise vitals: HR 91, /82, and SpO2 98% on Room Air. Exercise vitals: HR 92 and SpO2 99% on Room Air. Post Exercise vitals: HR 87, /81 SpO2 94% on Room Air. Education today was a lesson from the book A Guide for People with Chronic Lung Disease pg's 3-14 on Normal Lung Function, types of Pulmonary Impairments such as Chronic Bronchitis, Emphysema, Asthma, Bronchiectasis, Cystic Fibrosis, and Interstitial Lung Disease. Then another lesson from A Guide for People with Chronic Lung Disease pg's 39-43 on How to Control your Breathing such as Positioning, Controlling stress, Pursed-Lip breathing, and a short excerpt about Bronchodilators and Wheezing. Pt instructed to read A Guide for People with Chronic Lung Disease pg's 52-59 on Exercise and further information of Controlled Breathing. Session lasted 90 minutes.    Burak Coleman, RT

## 2024-05-21 ENCOUNTER — HOSPITAL ENCOUNTER (OUTPATIENT)
Dept: RESPIRATORY THERAPY | Facility: OTHER | Age: 74
Discharge: HOME | End: 2024-05-21
Attending: INTERNAL MEDICINE | Admitting: INTERNAL MEDICINE
Payer: MEDICARE

## 2024-05-21 PROCEDURE — 999N000157 HC STATISTIC RCP TIME EA 10 MIN

## 2024-05-21 PROCEDURE — 94625 PHY/QHP OP PULM RHB W/O MNTR: CPT

## 2024-05-21 NOTE — PROGRESS NOTES
Patient here for Pulmonary Rehab today. Pt did Upper body exercises for 5 reps a piece with no added weight. Pt exercised on the NuStep at level 1 for 38 minutes, with MET levels of 2.0. Pre exercise vitals: HR 87, /82, SpO2 97% on Room Air. Exercise vitals: HR 73 and SpO2 97% on Room Air. Post Exercise vitals: HR 94, /84, SpO2 96% on Room Air. Education today was a lesson from A Guide for People with Chronic Lung disease pg's 52-59 and Living Well with Chronic Lung Disease pg's 18-21 on Benefits of Exercise and Exercise equipment, Increasing endurance, and Increasing flexibility. Pt instructed to read A Guide for People with Chronic Lung disease pg. 51 on Energy conservation. Session today lasted 90 minutes.    Burak Coleman, RT

## 2024-05-23 ENCOUNTER — HOSPITAL ENCOUNTER (OUTPATIENT)
Dept: RESPIRATORY THERAPY | Facility: OTHER | Age: 74
Discharge: HOME | End: 2024-05-23
Attending: INTERNAL MEDICINE | Admitting: INTERNAL MEDICINE
Payer: MEDICARE

## 2024-05-23 PROCEDURE — 999N000157 HC STATISTIC RCP TIME EA 10 MIN

## 2024-05-23 PROCEDURE — 94625 PHY/QHP OP PULM RHB W/O MNTR: CPT

## 2024-05-23 NOTE — PROGRESS NOTES
Pt here for Pulmonary Rehab today. Pt did Lower Body exercises for 5 reps a piece with no added weight. Pt exercised on the NuStep at level 1 for 42 minutes, maintaining MET levels of 2.2. Pre exercise vitals: HR 84, /81, and SpO2 96% on Room Air. Exercise vitals: HR 97 and SpO2 97% on Room Air. Post Exercise vitals: , /84, and SpO2 95% on Room Air. Education today was a lesson from the book A Guide for People with Chronic Lung Disease pg's 50-51 on ways to Save Energy and Living Well with Chronic Lung Disease pg's 24-30 on further ways to Save Energy, highlighting the four P's which are Planning, Prioritizing, Positioning, and Pacing oneself. Along with reminders on Effective Breathing, Moving Smarter, Setting up and moving effectively around the house, as well as Running Errands and Contingencies if someone can not effectively do those. Patient instructed to read A Guide for People with Chronic Lung Disease pg's 15-33 on Medications for breathing. Today's session lasted 90 minutes.    Burak Coleman, RT

## 2024-05-28 ENCOUNTER — HOSPITAL ENCOUNTER (OUTPATIENT)
Dept: RESPIRATORY THERAPY | Facility: OTHER | Age: 74
Discharge: HOME | End: 2024-05-28
Attending: INTERNAL MEDICINE | Admitting: INTERNAL MEDICINE
Payer: MEDICARE

## 2024-05-28 PROCEDURE — 94625 PHY/QHP OP PULM RHB W/O MNTR: CPT

## 2024-05-28 PROCEDURE — 999N000157 HC STATISTIC RCP TIME EA 10 MIN

## 2024-05-28 NOTE — PROGRESS NOTES
Pt here for Pulmonary Rehab today. Pt did Upper Body exercises for 5 reps a piece with no added weight. Pt exercised on the NuStep at level 1 for 37 minutes, maintaining MET levels of 2.3. Pre exercise vitals: HR 94, /84, and SpO2 96% on Room Air. Exercise Vitals: HR 95 and SpO2 95% on Room Air. Post Exercise vitals: HR 97, /76, and SpO2 99% on Room Air. Education today was a lesson from the book A Guide for People with Chronic Lung Disease pg's 15-33 on Medications for the Respiratory System. Topic covered Bronchodilators such as Quick acting and Long acting bronchodilators, Anti-inflammatory medicines such as Steroids whether inhaled or oral, Antibiotics, cough medicines, and how to take them along with overviews of devices such as inhalers and nebulizer machines. Today's session lasted 90 minutes.    Burak Coleman, RT

## 2024-05-30 ENCOUNTER — HOSPITAL ENCOUNTER (OUTPATIENT)
Dept: RESPIRATORY THERAPY | Facility: OTHER | Age: 74
Discharge: HOME | End: 2024-05-30
Attending: INTERNAL MEDICINE | Admitting: INTERNAL MEDICINE
Payer: MEDICARE

## 2024-05-30 DIAGNOSIS — K21.00 GASTROESOPHAGEAL REFLUX DISEASE WITH ESOPHAGITIS WITHOUT HEMORRHAGE: ICD-10-CM

## 2024-05-30 PROCEDURE — 94625 PHY/QHP OP PULM RHB W/O MNTR: CPT

## 2024-05-30 PROCEDURE — 999N000157 HC STATISTIC RCP TIME EA 10 MIN

## 2024-05-30 NOTE — PROGRESS NOTES
Pt here for Pulmonary Rehab today. Pt did Lower Body exercises for 5 reps a piece with no added weight. Pt exercised on the NuStep at level 2 for 40 minutes, maintaining MET levels of 2.5. Pre exercise vitals: , /78, and SpO2 99% on Room Air. Exercise vitals: Hr 105 and SpO2 95% on Room Air. Post exercise vitals: , /70, and SpO2 95% on Room Air. Education today was a Power point presentation presented by Torrie the Pharmacist on COPD and Respiratory medications. Pt instructed to read A Guide for People with Chronic Lung disease pg's 34-37 on Home Oxygen. Session lasted 90 minutes.    Burak Coleman, RT

## 2024-06-01 NOTE — TELEPHONE ENCOUNTER
[facility] sent Rx request for the following:      Requested Prescriptions   Pending Prescriptions Disp Refills    omeprazole (PRILOSEC) 20 MG DR capsule [Pharmacy Med Name: OMEPRAZOLE 20MG CAPSULES] 90 capsule 3     Sig: TAKE 1 CAPSULE(20 MG) BY MOUTH DAILY       PPI Protocol Passed - 6/1/2024 11:27 AM     Last Prescription Date:   5/25/23  Last Fill Qty/Refills:         90, R-3    Last Office Visit:              5/25/23   Future Office visit:             Next 5 appointments (look out 90 days)      Jun 07, 2024  3:40 PM  (Arrive by 3:25 PM)  Provider Visit with Gio Dooley MD  [facility] ([facility] ) [address]  [place] [id number] [phone]           High  Drug-Drug: Methotrexate and omeprazoleProton pump inhibitors may decrease the renal elimination of Methotrexate and potentially increase toxicity related to Methotrexate, especially in patients receiving high-dose intravenous methotrexate for the treatment of cancer.  Details    Clari Burrell RN on 6/1/2024 at 11:29 AM

## 2024-06-03 ENCOUNTER — HOSPITAL ENCOUNTER (OUTPATIENT)
Dept: INFUSION THERAPY | Facility: OTHER | Age: 74
Discharge: HOME | End: 2024-06-03
Attending: INTERNAL MEDICINE | Admitting: INTERNAL MEDICINE
Payer: MEDICARE

## 2024-06-03 VITALS
BODY MASS INDEX: 26.18 KG/M2 | RESPIRATION RATE: 18 BRPM | HEART RATE: 88 BPM | WEIGHT: 147.8 LBS | TEMPERATURE: 97.9 F | DIASTOLIC BLOOD PRESSURE: 88 MMHG | SYSTOLIC BLOOD PRESSURE: 154 MMHG

## 2024-06-03 DIAGNOSIS — M05.9 SEROPOSITIVE RHEUMATOID ARTHRITIS (H): ICD-10-CM

## 2024-06-03 DIAGNOSIS — Z79.52 ON PREDNISONE THERAPY: Primary | ICD-10-CM

## 2024-06-03 DIAGNOSIS — E85.89 OTHER AMYLOIDOSIS (H): ICD-10-CM

## 2024-06-03 PROCEDURE — 96365 THER/PROPH/DIAG IV INF INIT: CPT

## 2024-06-03 PROCEDURE — 258N000003 HC RX IP 258 OP 636: Performed by: INTERNAL MEDICINE

## 2024-06-03 PROCEDURE — 250N000028 HC RX JA MOD (INTRAVENOUS), IP 250 OP 636: Mod: JZ,JA | Performed by: INTERNAL MEDICINE

## 2024-06-03 RX ORDER — HEPARIN SODIUM (PORCINE) LOCK FLUSH IV SOLN 100 UNIT/ML 100 UNIT/ML
5 SOLUTION INTRAVENOUS
Status: CANCELLED | OUTPATIENT
Start: 2024-06-18

## 2024-06-03 RX ORDER — ALBUTEROL SULFATE 90 UG/1
1-2 AEROSOL, METERED RESPIRATORY (INHALATION)
Status: CANCELLED
Start: 2024-06-18

## 2024-06-03 RX ORDER — HEPARIN SODIUM,PORCINE 10 UNIT/ML
5-20 VIAL (ML) INTRAVENOUS DAILY PRN
Status: CANCELLED | OUTPATIENT
Start: 2024-06-18

## 2024-06-03 RX ORDER — METHYLPREDNISOLONE SODIUM SUCCINATE 125 MG/2ML
125 INJECTION, POWDER, LYOPHILIZED, FOR SOLUTION INTRAMUSCULAR; INTRAVENOUS
Status: CANCELLED
Start: 2024-06-18

## 2024-06-03 RX ORDER — DIPHENHYDRAMINE HYDROCHLORIDE 50 MG/ML
50 INJECTION INTRAMUSCULAR; INTRAVENOUS
Status: CANCELLED
Start: 2024-06-18

## 2024-06-03 RX ORDER — ALBUTEROL SULFATE 0.83 MG/ML
2.5 SOLUTION RESPIRATORY (INHALATION)
Status: CANCELLED | OUTPATIENT
Start: 2024-06-18

## 2024-06-03 RX ORDER — MEPERIDINE HYDROCHLORIDE 50 MG/ML
25 INJECTION INTRAMUSCULAR; INTRAVENOUS; SUBCUTANEOUS EVERY 30 MIN PRN
Status: CANCELLED | OUTPATIENT
Start: 2024-06-18

## 2024-06-03 RX ORDER — EPINEPHRINE 1 MG/ML
0.3 INJECTION, SOLUTION, CONCENTRATE INTRAVENOUS EVERY 5 MIN PRN
Status: CANCELLED | OUTPATIENT
Start: 2024-06-18

## 2024-06-03 RX ADMIN — SODIUM CHLORIDE 750 MG: 9 INJECTION, SOLUTION INTRAVENOUS at 13:42

## 2024-06-03 RX ADMIN — SODIUM CHLORIDE 250 ML: 9 INJECTION, SOLUTION INTRAVENOUS at 13:29

## 2024-06-03 NOTE — NURSING NOTE
Infusion Nursing Note:  Laura Perez presents today for O[place]cia.    Patient seen by provider today: No   present during visit today: Not Applicable.    Note: N/A.      Intravenous Access:  Peripheral IV placed to right antecubital space.    Treatment Conditions:  Biological Infusion Checklist:  ~~~ NOTE: If the patient answers yes to any of the questions below, hold the infusion and contact ordering provider or on-call provider.    Have you recently had an elevated temperature, fever, chills, productive cough, coughing for 3 weeks or longer or hemoptysis,  abnormal vital signs, night sweats,  chest pain or have you noticed a decrease in your appetite, unexplained weight loss or fatigue? No  Do you have any open wounds or new incisions? No  Do you have any upcoming hospitalizations or surgeries? Does not include esophagogastroduodenoscopy, colonoscopy, endoscopic retrograde cholangiopancreatography (ERCP), endoscopic ultrasound (EUS), dental procedures or joint aspiration/steroid injections No  Do you currently have any signs of illness or infection or are you on any antibiotics? No  Have you had any new, sudden or worsening abdominal pain? No  Have you or anyone in your household received a live vaccination in the past 4 weeks? Please note: No live vaccines while on biologic/chemotherapy until 6 months after the last treatment. Patient can receive the flu vaccine (shot only), pneumovax and the Covid vaccine. It is optimal for the patient to get these vaccines mid cycle, but they can be given at any time as long as it is not on the day of the infusion. No  Have you recently been diagnosed with any new nervous system diseases (ie. Multiple sclerosis, Guillain Laclede, seizures, neurological changes) or cancer diagnosis? Are you on any form of radiation or chemotherapy? No  Are you pregnant or breast feeding or do you have plans of pregnancy in the future? No  Have you been having any signs of worsening  depression or suicidal ideations?  (benlysta only)NA  Have there been any other new onset medical symptoms? No  Have you had any new blood clots? (IVIG only) NA      Post Infusion Assessment:  Patient tolerated infusion without incident.  Blood return noted pre and post infusion.  Site patent and intact, free from redness, edema or discomfort.  No evidence of extravasations.  Access discontinued per protocol.  Biologic Infusion Post Education: Call the triage nurse at your clinic or seek medical attention if you have chills and/or temperature greater than or equal to 100.5, uncontrolled nausea/vomiting, diarrhea, constipation, dizziness, shortness of breath, chest pain, heart palpitations, weakness or any other new or concerning symptoms, questions or concerns.  You cannot have any live virus vaccines prior to or during treatment or up to 6 months post infusion.  If you have an upcoming surgery, medical procedure or dental procedure during treatment, this should be discussed with your ordering physician and your surgeon/dentist.  If you are having any concerning symptom, if you are unsure if you should get your next infusion or wish to speak to a provider before your next infusion, please call your care coordinator or triage nurse at your clinic to notify them so we can adequately serve you.       Discharge Plan:   Patient and/or family verbalized understanding of discharge instructions and all questions answered.  Declined Copy of AVS reviewed with patient and/or family.    Patient will return 28 days for next appointment.   Patient discharged in stable condition accompanied by: self and .  Departure Mode: Ambulatory.      Reema Bender RN

## 2024-06-04 ENCOUNTER — HOSPITAL ENCOUNTER (OUTPATIENT)
Dept: RESPIRATORY THERAPY | Facility: OTHER | Age: 74
Discharge: HOME | End: 2024-06-04
Attending: INTERNAL MEDICINE | Admitting: INTERNAL MEDICINE
Payer: MEDICARE

## 2024-06-04 PROCEDURE — 94625 PHY/QHP OP PULM RHB W/O MNTR: CPT

## 2024-06-04 PROCEDURE — 999N000157 HC STATISTIC RCP TIME EA 10 MIN

## 2024-06-04 NOTE — PROGRESS NOTES
Patient her for Pulmonary Rehab today. Pt did Upper Body exercises for 5 reps a piece with no added weight. Pt exercised on the NuStep at level 2 for 40 minutes, maintaining MET levels of 2.7. Pre exercise vitals: HR 68, /80, and SpO2 100% on Room Air. Exercise vitals: HR 68 and SpO2 94% on Room Air. Post exercise vitals: HR 75, /72, and SpO2 97% on Room Air. Education today was a lesson from the book A Guide for People with Chronic Lung Disease pg's 34-37 on Home Oxygen use, Oxygen Safety, and Travelling with Oxygen. Dr. Dooley came to class today to meet with patient for Mid Point evaluations. Total session time today was 90 minutes.    Burak Coleman, RT

## 2024-06-06 ENCOUNTER — HOSPITAL ENCOUNTER (OUTPATIENT)
Dept: RESPIRATORY THERAPY | Facility: OTHER | Age: 74
Discharge: HOME | End: 2024-06-06
Attending: INTERNAL MEDICINE | Admitting: INTERNAL MEDICINE
Payer: MEDICARE

## 2024-06-06 PROCEDURE — 94625 PHY/QHP OP PULM RHB W/O MNTR: CPT

## 2024-06-06 PROCEDURE — 999N000157 HC STATISTIC RCP TIME EA 10 MIN

## 2024-06-06 NOTE — PROGRESS NOTES
"Pt here for Pulmonary Rehab today. Pt did Lower Body exercises for 5 reps a piece with no added weight. Pt exercised on the NuStep at level 2 for 40 minutes, maintaining MET levels of 2.8. Pre exercise vitals: HR 68, /78, and SpO2 95% on Room Air. Exercise vitals:  and SpO2 98% on Room Air. Post exercise vitals: , /72, and SpO2 98% on Room Air. Education today was a lesson from the book A Guide for People with Chronic Lung Disease pg's 44-47 on Airway Clearance and techniques on how that is accomplished such as Controlled coughing, staying hydrated to keep mucous thing. Lesson also taught from handouts given to patient in program binder on \"Cruz coughing\", Airway relaxation, postural drainage, Progressive Muscle Relaxation (PMR), and Visualizing a relaxing environment. Pt instructed to read A Guide for People with Chronic Lung Disease pg's 72-75 on Foods and Nutrition. Total session today lasted 90 minutes.    Burak Coleman, RT    "

## 2024-06-07 ENCOUNTER — OFFICE VISIT (OUTPATIENT)
Dept: PEDIATRICS | Facility: OTHER | Age: 74
End: 2024-06-07
Attending: PHYSICIAN ASSISTANT
Payer: MEDICARE

## 2024-06-07 VITALS
BODY MASS INDEX: 26.4 KG/M2 | WEIGHT: 149 LBS | RESPIRATION RATE: 20 BRPM | OXYGEN SATURATION: 95 % | TEMPERATURE: 98 F | HEIGHT: 63 IN | HEART RATE: 90 BPM

## 2024-06-07 DIAGNOSIS — I10 PRIMARY HYPERTENSION: ICD-10-CM

## 2024-06-07 DIAGNOSIS — J44.9 COPD, VERY SEVERE (H): ICD-10-CM

## 2024-06-07 DIAGNOSIS — Z79.84 LONG TERM (CURRENT) USE OF ORAL HYPOGLYCEMIC DRUGS: ICD-10-CM

## 2024-06-07 DIAGNOSIS — E53.8 VITAMIN B12 DEFICIENCY (NON ANEMIC): ICD-10-CM

## 2024-06-07 DIAGNOSIS — J98.4 MIXED RESTRICTIVE AND OBSTRUCTIVE LUNG DISEASE (H): ICD-10-CM

## 2024-06-07 DIAGNOSIS — E85.89 OTHER AMYLOIDOSIS (H): ICD-10-CM

## 2024-06-07 DIAGNOSIS — M05.9 SEROPOSITIVE RHEUMATOID ARTHRITIS (H): ICD-10-CM

## 2024-06-07 DIAGNOSIS — E03.9 ACQUIRED HYPOTHYROIDISM: Chronic | ICD-10-CM

## 2024-06-07 DIAGNOSIS — K21.00 GASTROESOPHAGEAL REFLUX DISEASE WITH ESOPHAGITIS WITHOUT HEMORRHAGE: ICD-10-CM

## 2024-06-07 DIAGNOSIS — Z79.52 ON PREDNISONE THERAPY: ICD-10-CM

## 2024-06-07 DIAGNOSIS — E78.5 HYPERLIPIDEMIA, UNSPECIFIED HYPERLIPIDEMIA TYPE: ICD-10-CM

## 2024-06-07 DIAGNOSIS — M87.072 AVASCULAR NECROSIS OF LEFT TALUS (H): ICD-10-CM

## 2024-06-07 DIAGNOSIS — N18.32 STAGE 3B CHRONIC KIDNEY DISEASE (H): ICD-10-CM

## 2024-06-07 DIAGNOSIS — D84.9 IMMUNOSUPPRESSED STATUS (H): ICD-10-CM

## 2024-06-07 DIAGNOSIS — C18.9 MALIGNANT NEOPLASM OF COLON, UNSPECIFIED PART OF COLON (H): ICD-10-CM

## 2024-06-07 DIAGNOSIS — Z79.4 DIABETES MELLITUS TYPE 2, INSULIN DEPENDENT (H): ICD-10-CM

## 2024-06-07 DIAGNOSIS — J44.9 CHRONIC OBSTRUCTIVE PULMONARY DISEASE, UNSPECIFIED COPD TYPE (H): ICD-10-CM

## 2024-06-07 DIAGNOSIS — E11.9 DIABETES MELLITUS TYPE 2, INSULIN DEPENDENT (H): ICD-10-CM

## 2024-06-07 DIAGNOSIS — Z00.00 ENCOUNTER FOR MEDICARE ANNUAL WELLNESS EXAM: Primary | ICD-10-CM

## 2024-06-07 DIAGNOSIS — M81.0 OSTEOPOROSIS, UNSPECIFIED OSTEOPOROSIS TYPE, UNSPECIFIED PATHOLOGICAL FRACTURE PRESENCE: ICD-10-CM

## 2024-06-07 DIAGNOSIS — E11.21 DIABETIC NEPHROPATHY ASSOCIATED WITH TYPE 2 DIABETES MELLITUS (H): ICD-10-CM

## 2024-06-07 DIAGNOSIS — I50.32 CHRONIC DIASTOLIC CHF (CONGESTIVE HEART FAILURE), NYHA CLASS 2 (H): ICD-10-CM

## 2024-06-07 DIAGNOSIS — M05.10 RHEUMATOID LUNG DISEASE (H): ICD-10-CM

## 2024-06-07 DIAGNOSIS — Z51.81 MEDICATION MONITORING ENCOUNTER: ICD-10-CM

## 2024-06-07 DIAGNOSIS — Z79.51 LONG TERM (CURRENT) USE OF INHALED STEROIDS: ICD-10-CM

## 2024-06-07 DIAGNOSIS — N18.31 ANEMIA IN STAGE 3A CHRONIC KIDNEY DISEASE (H): ICD-10-CM

## 2024-06-07 DIAGNOSIS — J43.9 MIXED RESTRICTIVE AND OBSTRUCTIVE LUNG DISEASE (H): ICD-10-CM

## 2024-06-07 DIAGNOSIS — D63.1 ANEMIA IN STAGE 3A CHRONIC KIDNEY DISEASE (H): ICD-10-CM

## 2024-06-07 DIAGNOSIS — F11.90 CHRONIC, CONTINUOUS USE OF OPIOIDS: ICD-10-CM

## 2024-06-07 PROBLEM — N18.9 ANEMIA IN CHRONIC KIDNEY DISEASE: Status: ACTIVE | Noted: 2018-06-28

## 2024-06-07 PROBLEM — R19.7 DIARRHEA: Status: RESOLVED | Noted: 2021-09-03 | Resolved: 2024-06-07

## 2024-06-07 PROBLEM — E87.5 HYPERKALEMIA: Status: RESOLVED | Noted: 2022-10-14 | Resolved: 2024-06-07

## 2024-06-07 PROBLEM — Z99.81 DEPENDENCE ON SUPPLEMENTAL OXYGEN: Status: ACTIVE | Noted: 2018-06-28

## 2024-06-07 PROBLEM — N17.9 AKI (ACUTE KIDNEY INJURY) (H): Status: RESOLVED | Noted: 2023-11-10 | Resolved: 2024-06-07

## 2024-06-07 PROBLEM — R10.13 EPIGASTRIC PAIN: Status: ACTIVE | Noted: 2021-09-03

## 2024-06-07 PROBLEM — M93.98 OSTEOCHONDROPATHY, UNSPECIFIED OTHER: Status: ACTIVE | Noted: 2018-06-28

## 2024-06-07 PROBLEM — N39.3 STRESS INCONTINENCE: Status: ACTIVE | Noted: 2017-02-09

## 2024-06-07 PROBLEM — F41.9 ANXIETY DISORDER, UNSPECIFIED: Status: ACTIVE | Noted: 2018-06-28

## 2024-06-07 PROBLEM — R80.1 PERSISTENT PROTEINURIA: Status: ACTIVE | Noted: 2018-10-16

## 2024-06-07 PROBLEM — J47.9 BRONCHIECTASIS (H): Status: ACTIVE | Noted: 2019-07-16

## 2024-06-07 PROBLEM — M53.3 SACROILIAC PAIN: Status: ACTIVE | Noted: 2018-04-12

## 2024-06-07 PROBLEM — E87.1 HYPONATREMIA: Status: RESOLVED | Noted: 2022-10-14 | Resolved: 2024-06-07

## 2024-06-07 PROBLEM — R19.7 DIARRHEA: Status: ACTIVE | Noted: 2021-09-03

## 2024-06-07 PROBLEM — D50.9 IRON DEFICIENCY ANEMIA: Status: ACTIVE | Noted: 2018-11-16

## 2024-06-07 PROBLEM — B00.1 HERPESVIRAL VESICULAR DERMATITIS: Status: ACTIVE | Noted: 2018-06-28

## 2024-06-07 PROBLEM — M54.50 LOW BACK PAIN: Status: ACTIVE | Noted: 2018-06-28

## 2024-06-07 PROBLEM — R10.13 DYSPEPSIA: Status: ACTIVE | Noted: 2021-09-03

## 2024-06-07 PROBLEM — G89.29 OTHER CHRONIC PAIN: Status: ACTIVE | Noted: 2018-06-28

## 2024-06-07 PROBLEM — Z85.038 HISTORY OF MALIGNANT NEOPLASM OF COLON: Status: ACTIVE | Noted: 2021-09-03

## 2024-06-07 PROBLEM — R68.81 EARLY SATIETY: Status: ACTIVE | Noted: 2021-09-03

## 2024-06-07 PROBLEM — D17.71 ANGIOMYOLIPOMA OF KIDNEY: Status: ACTIVE | Noted: 2017-02-09

## 2024-06-07 LAB
ANION GAP SERPL CALCULATED.3IONS-SCNC: 13 MMOL/L (ref 7–15)
BUN SERPL-MCNC: 32.7 MG/DL (ref 8–23)
CALCIUM SERPL-MCNC: 9.2 MG/DL (ref 8.8–10.2)
CHLORIDE SERPL-SCNC: 99 MMOL/L (ref 98–107)
CHOLEST SERPL-MCNC: 214 MG/DL
CREAT SERPL-MCNC: 1.46 MG/DL (ref 0.51–0.95)
CREAT UR-MCNC: 103.8 MG/DL
CREAT UR-MCNC: 104 MG/DL
DEPRECATED HCO3 PLAS-SCNC: 24 MMOL/L (ref 22–29)
EGFRCR SERPLBLD CKD-EPI 2021: 37 ML/MIN/1.73M2
FASTING STATUS PATIENT QL REPORTED: NO
FASTING STATUS PATIENT QL REPORTED: NO
GLUCOSE SERPL-MCNC: 223 MG/DL (ref 70–99)
HBA1C MFR BLD: 6.9 % (ref 4–6.2)
HDLC SERPL-MCNC: 102 MG/DL
LDLC SERPL CALC-MCNC: 77 MG/DL
MICROALBUMIN UR-MCNC: 634.3 MG/L
MICROALBUMIN/CREAT UR: 611.08 MG/G CR (ref 0–25)
NONHDLC SERPL-MCNC: 112 MG/DL
PHOSPHATE SERPL-MCNC: 3.1 MG/DL (ref 2.5–4.5)
POTASSIUM SERPL-SCNC: 4.7 MMOL/L (ref 3.4–5.3)
SODIUM SERPL-SCNC: 136 MMOL/L (ref 135–145)
TRIGL SERPL-MCNC: 177 MG/DL

## 2024-06-07 PROCEDURE — 84100 ASSAY OF PHOSPHORUS: CPT | Mod: ZL | Performed by: INTERNAL MEDICINE

## 2024-06-07 PROCEDURE — 80048 BASIC METABOLIC PNL TOTAL CA: CPT | Mod: ZL | Performed by: INTERNAL MEDICINE

## 2024-06-07 PROCEDURE — 80353 DRUG SCREENING COCAINE: CPT | Mod: ZL | Performed by: INTERNAL MEDICINE

## 2024-06-07 PROCEDURE — 80366 DRUG SCREENING PREGABALIN: CPT | Mod: ZL | Performed by: INTERNAL MEDICINE

## 2024-06-07 PROCEDURE — 82570 ASSAY OF URINE CREATININE: CPT | Mod: ZL | Performed by: INTERNAL MEDICINE

## 2024-06-07 PROCEDURE — 83970 ASSAY OF PARATHORMONE: CPT | Mod: ZL | Performed by: INTERNAL MEDICINE

## 2024-06-07 PROCEDURE — 80061 LIPID PANEL: CPT | Mod: ZL | Performed by: INTERNAL MEDICINE

## 2024-06-07 PROCEDURE — 99207 PR FOOT EXAM NO CHARGE: CPT | Performed by: INTERNAL MEDICINE

## 2024-06-07 PROCEDURE — 99214 OFFICE O/P EST MOD 30 MIN: CPT | Mod: 25 | Performed by: INTERNAL MEDICINE

## 2024-06-07 PROCEDURE — 36415 COLL VENOUS BLD VENIPUNCTURE: CPT | Mod: ZL | Performed by: INTERNAL MEDICINE

## 2024-06-07 PROCEDURE — G0439 PPPS, SUBSEQ VISIT: HCPCS | Performed by: INTERNAL MEDICINE

## 2024-06-07 PROCEDURE — 83036 HEMOGLOBIN GLYCOSYLATED A1C: CPT | Mod: ZL | Performed by: INTERNAL MEDICINE

## 2024-06-07 PROCEDURE — G0463 HOSPITAL OUTPT CLINIC VISIT: HCPCS

## 2024-06-07 RX ORDER — LANOLIN ALCOHOL/MO/W.PET/CERES
1000 CREAM (GRAM) TOPICAL DAILY
Qty: 90 TABLET | Refills: 4 | Status: SHIPPED | OUTPATIENT
Start: 2024-06-07

## 2024-06-07 RX ORDER — IPRATROPIUM BROMIDE AND ALBUTEROL SULFATE 2.5; .5 MG/3ML; MG/3ML
1 SOLUTION RESPIRATORY (INHALATION) EVERY 6 HOURS PRN
Qty: 90 ML | Refills: 3 | Status: SHIPPED | OUTPATIENT
Start: 2024-06-07

## 2024-06-07 RX ORDER — SIMVASTATIN 40 MG
40 TABLET ORAL AT BEDTIME
Qty: 90 TABLET | Refills: 4 | Status: SHIPPED | OUTPATIENT
Start: 2024-06-07

## 2024-06-07 RX ORDER — LISINOPRIL 10 MG/1
10 TABLET ORAL DAILY
Qty: 90 TABLET | Refills: 4 | Status: SHIPPED | OUTPATIENT
Start: 2024-06-07

## 2024-06-07 RX ORDER — LEVOTHYROXINE SODIUM 112 UG/1
112 TABLET ORAL DAILY
Qty: 90 TABLET | Refills: 4 | Status: SHIPPED | OUTPATIENT
Start: 2024-06-07

## 2024-06-07 RX ORDER — TIOTROPIUM BROMIDE 18 UG/1
18 CAPSULE ORAL; RESPIRATORY (INHALATION) DAILY
Qty: 90 CAPSULE | Refills: 4 | Status: SHIPPED | OUTPATIENT
Start: 2024-06-07

## 2024-06-07 RX ORDER — METHOTREXATE 25 MG/ML
12.5 INJECTION INTRA-ARTERIAL; INTRAMUSCULAR; INTRATHECAL; INTRAVENOUS
COMMUNITY
Start: 2023-12-07

## 2024-06-07 RX ORDER — INSULIN LISPRO 100 [IU]/ML
INJECTION, SUSPENSION SUBCUTANEOUS
Qty: 60 ML | Refills: 4 | Status: SHIPPED | OUTPATIENT
Start: 2024-06-07 | End: 2024-06-09

## 2024-06-07 RX ORDER — BLOOD SUGAR DIAGNOSTIC
STRIP MISCELLANEOUS
Qty: 200 STRIP | Refills: 11 | Status: CANCELLED | OUTPATIENT
Start: 2024-06-07

## 2024-06-07 RX ORDER — FOLIC ACID 1 MG/1
1 TABLET ORAL DAILY
Qty: 90 TABLET | Refills: 4 | Status: SHIPPED | OUTPATIENT
Start: 2024-06-07

## 2024-06-07 RX ORDER — ALBUTEROL SULFATE 90 UG/1
AEROSOL, METERED RESPIRATORY (INHALATION)
Qty: 18 G | Refills: 11 | Status: SHIPPED | OUTPATIENT
Start: 2024-06-07

## 2024-06-07 RX ORDER — PEN NEEDLE, DIABETIC 32GX 5/32"
NEEDLE, DISPOSABLE MISCELLANEOUS
Qty: 200 EACH | Refills: 11 | Status: SHIPPED | OUTPATIENT
Start: 2024-06-07

## 2024-06-07 RX ORDER — LANCETS 30 GAUGE
1 EACH MISCELLANEOUS 2 TIMES DAILY
Qty: 200 EACH | Refills: 11 | Status: SHIPPED | OUTPATIENT
Start: 2024-06-07

## 2024-06-07 RX ORDER — MONTELUKAST SODIUM 10 MG/1
10 TABLET ORAL AT BEDTIME
Qty: 90 TABLET | Refills: 4 | Status: SHIPPED | OUTPATIENT
Start: 2024-06-07

## 2024-06-07 RX ORDER — TRAMADOL HYDROCHLORIDE 50 MG/1
50 TABLET ORAL
Qty: 30 TABLET | Refills: 5 | Status: SHIPPED | OUTPATIENT
Start: 2024-06-07

## 2024-06-07 RX ORDER — FORMOTEROL FUMARATE DIHYDRATE 20 UG/2ML
2 SOLUTION RESPIRATORY (INHALATION) 2 TIMES DAILY
Qty: 180 ML | Refills: 4 | Status: SHIPPED | OUTPATIENT
Start: 2024-06-07

## 2024-06-07 SDOH — HEALTH STABILITY: PHYSICAL HEALTH: ON AVERAGE, HOW MANY DAYS PER WEEK DO YOU ENGAGE IN MODERATE TO STRENUOUS EXERCISE (LIKE A BRISK WALK)?: 0 DAYS

## 2024-06-07 ASSESSMENT — PATIENT HEALTH QUESTIONNAIRE - PHQ9
SUM OF ALL RESPONSES TO PHQ QUESTIONS 1-9: 4
10. IF YOU CHECKED OFF ANY PROBLEMS, HOW DIFFICULT HAVE THESE PROBLEMS MADE IT FOR YOU TO DO YOUR WORK, TAKE CARE OF THINGS AT HOME, OR GET ALONG WITH OTHER PEOPLE: NOT DIFFICULT AT ALL
SUM OF ALL RESPONSES TO PHQ QUESTIONS 1-9: 4

## 2024-06-07 ASSESSMENT — PAIN SCALES - GENERAL: PAINLEVEL: SEVERE PAIN (6)

## 2024-06-07 ASSESSMENT — SOCIAL DETERMINANTS OF HEALTH (SDOH): HOW OFTEN DO YOU GET TOGETHER WITH FRIENDS OR RELATIVES?: ONCE A WEEK

## 2024-06-07 NOTE — LETTER

## 2024-06-07 NOTE — LETTER
My Depression Action Plan  Name: Laura Perez   Date of Birth 1950  Date: 6/7/2024    My doctor: Gio Dooley   My clinic: [facility]  [address]  [place] [id number] [phone]            GREEN    ZONE   Good Control    What it looks like:   Things are going generally well. You have normal ups and downs. You may even feel depressed from time to time, but bad moods usually last less than a day.   What you need to do:  Continue to care for yourself (see self care plan)  Check your depression survival kit and update it as needed  Follow your physician s recommendations including any medication.  Do not stop taking medication unless you consult with your physician first.             YELLOW         ZONE Getting Worse    What it looks like:   Depression is starting to interfere with your life.   It may be hard to get out of bed; you may be starting to isolate yourself from others.  Symptoms of depression are starting to last most all day and this has happened for several days.   You may have suicidal thoughts but they are not constant.   What you need to do:     Call your care team. Your response to treatment will improve if you keep your care team informed of your progress. Yellow periods are signs an adjustment may need to be made.     Continue your self-care.  Just get dressed and ready for the day.  Don't give yourself time to talk yourself out of it.    Talk to someone in your support network.    Open up your Depression Self-Care Plan/Wellness Kit.             RED    ZONE Medical Alert - Get Help    What it looks like:   Depression is seriously interfering with your life.   You may experience these or other symptoms: You can t get out of bed most days, can t work or engage in other necessary activities, you have trouble taking care of basic hygiene, or basic responsibilities, thoughts of suicide or death that will not go away, self-injurious  behavior.     What you need to do:  Call your care team and request a same-day appointment. If they are not available (weekends or after hours) call your local crisis line, emergency room or 911.          Depression Self-Care Plan / Wellness Kit    Many people find that medication and therapy are helpful treatments for managing depression. In addition, making small changes to your everyday life can help to boost your mood and improve your wellbeing. Below are some tips for you to consider. Be sure to talk with your medical provider and/or behavioral health consultant if your symptoms are worsening or not improving.     Sleep   Sleep hygiene  means all of the habits that support good, restful sleep. It includes maintaining a consistent bedtime and wake time, using your bedroom only for sleeping or sex, and keeping the bedroom dark and free of distractions like a computer, smartphone, or television.     Develop a Healthy Routine  Maintain good hygiene. Get out of bed in the morning, make your bed, brush your teeth, take a shower, and get dressed. Don t spend too much time viewing media that makes you feel stressed. Find time to relax each day.    Exercise  Get some form of exercise every day. This will help reduce pain and release endorphins, the  feel good  chemicals in your brain. It can be as simple as just going for a walk or doing some gardening, anything that will get you moving.      Diet  Strive to eat healthy foods, including fruits and vegetables. Drink plenty of water. Avoid excessive sugar, caffeine, alcohol, and other mood-altering substances.     Stay Connected with Others  Stay in touch with friends and family members.    Manage Your Mood  Try deep breathing, massage therapy, biofeedback, or meditation. Take part in fun activities when you can. Try to find something to smile about each day.     Psychotherapy  Be open to working with a therapist if your provider recommends it.     Medication  Be sure to  take your medication as prescribed. Most anti-depressants need to be taken every day. It usually takes several weeks for medications to work. Not all medicines work for all people. It is important to follow-up with your provider to make sure you have a treatment plan that is working for you. Do not stop your medication abruptly without first discussing it with your provider.    Crisis Resources   These hotlines are for both adults and children. They and are open 24 hours a day, 7 days a week unless noted otherwise.    National Suicide Prevention Lifeline   988 or 1-800-797-AFKL (4684)    Crisis Text Line    www.crisistextline.org  Text HOME to 267613 from anywhere in the [place], anytime, about any type of crisis. A live, trained crisis counselor will receive the text and respond quickly.    [facility] Lifeline for LGBTQ Youth  A national crisis intervention and suicide lifeline for LGBTQ youth under 25. Provides a safe place to talk without judgement. Call 1-988.479.2673; text START to 811676 or visit www.thetrevorproject.org to talk to a trained counselor.    For [facility] crisis numbers, visit the [facility] website at:  https://[place].gov/dhs/people-we-serve/adults/health-care/mental-health/resources/crisis-contacts.jsp

## 2024-06-07 NOTE — LETTER
"My Heart Failure Action Plan  Name: Laura Perez   YOB: 1950  Date: 6/7/2024   My doctor:   Gio Dooley [facility]   [address]  [place] [address] [phone] My Diagnosis: HF-pEF (EF > 40%)  My Ejection Fraction: No results found for: \"LVEF\"  Over 50%  My Exercise Goal: Start exercise slowly - to begin, do a few minutes of exercise, several times a day. Increase your time and speed [id number] to build tolerance, with a goal of 30 minutes of exercise daily. Steady, slow, and consistent exercise is both safe and healthy. Stop and rest when you feel tired or become short of breath. Do not push yourself on days when you don t feel well.       My Weight Plan:   Wt Readings from Last 2 Encounters:   06/07/24 67.6 kg (149 lb)   06/03/24 67 kg (147 lb 12.8 oz)     Weigh yourself daily using the same scale. If you gain more than 2 pounds in 24 hours or 5 pounds in 7 days. call the clinic    My Diet Goal: No added salt    Emergency Room Visits:    Our goal is to improve your quality of life and help you avoid a visit to the emergency room or hospital.  If we work together, we can achieve this goal. But, if you feel you need to call 911 or go to the emergency room, please do so.  If you go to the emergency room, please bring your list of medicines and your daily weight chart with you.    Each day ask yourself:  Is my weight up?  Do I have swelling?  Do I have trouble breathing?  How did I sleep?  Other problems?       GREEN ZONE     Weight gained is no more than 2 pounds a day or 5 pounds a in 7 days.  No swelling in feet, ankles, legs or stomach.  No more swelling than usual.  No more trouble breathing than usual.  No change in my sleep.  No other problems. What should I do?  I am doing fine. I will take my medicine, follow my diet, see my doctor, exercise, and watch for symptoms.           YELLOW ZONE         Weight gain of more than 2 pounds in " one day or 5 pounds in 7 days.  New swelling in ankle, leg, knee or thigh.  Bloating in belly, pants feel tighter.  Swelling in hands or face.  Coughing or trouble breathing while walking or talking.  Harder to breathe last night.  Have trouble sleeping, wake up short of breath.  Unusually tired.  Not eating.  Nausea, vomiting, or diarrhea  Pain in my chest or bad  leg cramps.  Feel weak or dizzy. What should I do?  I need to take action and call my doctor or nurse today.                 RED ZONE         Weight gain of 5 pounds overnight.  Chest pain or pressure that does not go away.  Feel less alert.  Wheezing or have trouble breathing when at rest.  Cannot sleep lying down.  Cannot take my medicines.  Pass out or faint. What should I do?  I need to call my doctor or nurse now!  Call 911 if I have chest pain or cannot breathe.

## 2024-06-07 NOTE — LETTER
My COPD Action Plan     Name: Laura Perez    YOB: 1950   Date: 6/7/2024    My doctor: Gio Dooley MD   My clinic: [facility]    [address]  [place] [id number] [phone]  My Controller Medicine: budesonide   Dose:      My Rescue Medicine: duoneb, albuterol   Dose:      My Flare Up Medicine: duoneb, albuterol   Dose:      My COPD Severity: Severe = FeV1 < 30%-49%      Use of Oxygen: As Previously Prescribed     Make sure you've had your pneumonia   vaccines.          GREEN ZONE       Doing well today    Usual level of activity and exercise  Usual amount of cough and mucus  No shortness of breath  Usual level of health (thinking clearly, sleeping well, feel like eating) Actions:    Take daily medicines  Use oxygen as prescribed  Follow regular exercise and diet plan  Avoid cigarette smoke and other irritants that harm the lungs           YELLOW ZONE          Having a bad day or flare up    Short of breath more than usual  A lot more sputum (mucus) than usual  Sputum looks yellow, green, tan, brown or bloody  More coughing or wheezing  Fever or chills  Less energy; trouble completing activities  Trouble thinking or focusing  Using quick relief inhaler or nebulizer more often  Poor sleep; symptoms wake me up  Do not feel like eating Actions:    Get plenty of rest  Take daily medicines  Use quick relief inhaler every 4 hours  If you use oxygen, call you doctor to see if you should adjust your oxygen  Do breathing exercises or other things to help you relax  Let a loved one, friend or neighbor know you are feeling worse  Call your care team if you have 2 or more symptoms.  Start taking steroids or antibiotics if directed by your care team           RED ZONE       Need medical care now    Severe shortness of breath (feel you can't breathe)  Fever, chills  Not enough breath to do any activity  Trouble coughing up mucus, walking or talking  Blood in  mucus  Frequent coughing Rescue medicines are not working  Not able to sleep because of breathing  Feel confused or drowsy  Chest pain    Actions:    Call your health care team.  If you cannot reach your care team, call 911 or go to the emergency room.        Annual Reminders:  Meet with Care Team, Flu Shot every Fall  Pharmacy: [id number] - [address]

## 2024-06-07 NOTE — PATIENT INSTRUCTIONS
" -- Try tart cherry juice concentrate for gout    Patient Education   Preventive Care Advice   This is general advice we often give to help people stay healthy. Your care team may have specific advice just for you. Please talk to your care team about your own preventive care needs.  Lifestyle  Exercise at least 150 minutes each week (30 minutes a day, 5 days a week).  Do muscle strengthening activities 2 days a week. These help control your weight and prevent disease.  No smoking.  Wear sunscreen to prevent skin cancer.  Have your home tested for radon every 2 to 5 years. Radon is a colorless, odorless gas that can harm your lungs. To learn more, go to www.health.[facility].[place].us and search for \"Radon in Homes.\"  Keep guns unloaded and locked up in a safe place like a safe or gun vault, or, use a gun lock and hide the keys. Always lock away bullets separately. To learn more, visit [employer].[place].gov and search for \"safe gun storage.\"  Nutrition  Eat 5 or more servings of fruits and vegetables each day.  Try wheat bread, brown rice and whole grain pasta (instead of white bread, rice, and pasta).  Get enough calcium and vitamin D. Check the label on foods and aim for 100% of the RDA (recommended daily allowance).  Regular exams  Have a dental exam and cleaning every 6 months.  See your health care team every year to talk about:  Any changes in your health.  Any medicines your care team has prescribed.  Preventive care, family planning, and ways to prevent chronic diseases.  Shots (vaccines)   HPV shots (up to age 26), if you've never had them before.  Hepatitis B shots (up to age 59), if you've never had them before.  COVID-19 shot: Get this shot when it's due.  Flu shot: Get a flu shot every year.  Tetanus shot: Get a tetanus shot every 10 years.  Pneumococcal, hepatitis A, and RSV shots: Ask your care team if you need these based on your risk.  Shingles shot (for age 50 and up).  General health tests  Diabetes " screening:  Starting at age 35, Get screened for diabetes at least every 3 years.  If you are younger than age 35, ask your care team if you should be screened for diabetes.  Cholesterol test: At age 39, start having a cholesterol test every 5 years, or more often if advised.  Bone density scan (DEXA): At age 50, ask your care team if you should have this scan for osteoporosis (brittle bones).  Hepatitis C: Get tested at least once in your life.  Abdominal aortic aneurysm screening: Talk to your doctor about having this screening if you:  Have ever smoked; and  Are biologically male; and  Are between the ages of 65 and 75.  STIs (sexually transmitted infections)  Before age 24: Ask your care team if you should be screened for STIs.  After age 24: Get screened for STIs if you're at risk. You are at risk for STIs (including HIV) if:  You are sexually active with more than one person.  You don't use condoms every time.  You or a partner was diagnosed with a sexually transmitted infection.  If you are at risk for HIV, ask about PrEP medicine to prevent HIV.  Get tested for HIV at least once in your life, whether you are at risk for HIV or not.  Cancer screening tests  Cervical cancer screening: If you have a cervix, begin getting regular cervical cancer screening tests at age 21. Most people who have regular screenings with normal results can stop after age 65. Talk about this with your provider.  Breast cancer scan (mammogram): If you've ever had breasts, begin having regular mammograms starting at age 40. This is a scan to check for breast cancer.  Colon cancer screening: It is important to start screening for colon cancer at age 45.  Have a colonoscopy test every 10 years (or more often if you're at risk) Or, ask your provider about stool tests like a FIT test every year or Cologuard test every 3 years.  To learn more about your testing options, visit: www.[employer]/036782.pdf.  For help making a decision, visit:  jonny/ph77030.  Prostate cancer screening test: If you have a prostate and are age 55 to 69, ask your provider if you would benefit from a yearly prostate cancer screening test.  Lung cancer screening: If you are a current or former smoker age 50 to 80, ask your care team if ongoing lung cancer screenings are right for you.  For informational purposes only. Not to replace the advice of your health care provider. Copyright   2023 [facility]. All rights reserved. Clinically reviewed by the [facility]. [employer] 288956 - REV 04/24.    Learning About Activities of Daily Living  What are activities of daily living?     Activities of daily living (ADLs) are the basic self-care tasks you do every day. These include eating, bathing, dressing, and moving around.  As you age, and if you have health problems, you may find that it's harder to do some of these tasks. If so, your doctor can suggest ideas that may help.  To measure what kind of help you may need, your doctor will ask how well you are able to do ADLs. Let your doctor know if there are any tasks that you are having trouble doing. This is an important first step to getting help. And when you have the help you need, you can stay as independent as possible.  How will a doctor assess your ADLs?  Asking about ADLs is part of a routine health checkup your doctor will likely do as you age. Your health check might be done in a doctor's office, in your home, or at a hospital. The goal is to find out if you are having any problems that could make it hard to care for yourself or that make it unsafe for you to be on your own.  To measure your ADLs, your doctor will ask how hard it is for you to do routine tasks. Your doctor may also want to know if you have changed the way you do a task because of a health problem. Your doctor may watch how you:  Walk back and forth.  Keep your balance while you stand or walk.  Move from sitting to  standing or from a bed to a chair.  Button or unbutton a shirt or sweater.  Remove and put on your shoes.  It's common to feel a little worried or anxious if you find you can't do all the things you used to be able to do. Talking with your doctor about ADLs is a way to make sure you're as safe as possible and able to care for yourself as well as you can. You may want to bring a caregiver, friend, or family member to your checkup. They can help you talk to your doctor.  Follow-up care is a key part of your treatment and safety. Be sure to make and go to all appointments, and call your doctor if you are having problems. It's also a good idea to know your test results and keep a list of the medicines you take.  Current as of: October 24, 2023               Content Version: 14.0    2462-9034 [employer].   Care instructions adapted under license by your healthcare professional. If you have questions about a medical condition or this instruction, always ask your healthcare professional. [employer] disclaims any warranty or liability for your use of this information.      Preventing Falls: Care Instructions  Injuries and health problems such as trouble walking or poor eyesight can increase your risk of falling. So can some medicines. But there are things you can do to help prevent falls. You can exercise to get stronger. You can also arrange your home to make it safer.    Talk to your doctor about the medicines you take. Ask if any of them increase the risk of falls and whether they can be changed or stopped.   Try to exercise regularly. It can help improve your strength and balance. This can help lower your risk of falling.     Practice fall safety and prevention.    Wear low-heeled shoes that fit well and give your feet good support. Talk to your doctor if you have foot problems that make this hard.  Carry a cellphone or wear a medical alert device that you can use to call for help.  Use  "stepladders instead of chairs to reach high objects. Don't climb if you're at risk for falls. Ask for help, if needed.  Wear the correct eyeglasses, if you need them.    Make your home safer.    Remove rugs, cords, clutter, and furniture from walkways.  Keep your house well lit. Use night-lights in hallways and bathrooms.  Install and use sturdy handrails on stairways.  Wear nonskid footwear, even inside. Don't walk barefoot or in socks without shoes.    Be safe outside.    Use handrails, curb cuts, and ramps whenever possible.  Keep your hands free by using a shoulder bag or backpack.  Try to walk in well-lit areas. Watch out for uneven ground, changes in pavement, and debris.  Be careful in the winter. Walk on the grass or gravel when sidewalks are slippery. Use de-icer on steps and walkways. Add non-slip devices to shoes.    Put grab bars and nonskid mats in your shower or tub and near the toilet. Try to use a shower chair or bath bench when bathing.   Get into a tub or shower by putting in your weaker leg first. Get out with your strong side first. Have a phone or medical alert device in the bathroom with you.   Where can you learn more?  Go to https://www.[employer].net/patiented  Enter G117 in the search box to learn more about \"Preventing Falls: Care Instructions.\"  Current as of: July 17, 2023               Content Version: 14.0    3414-0667 [employer].   Care instructions adapted under license by your healthcare professional. If you have questions about a medical condition or this instruction, always ask your healthcare professional. [employer] disclaims any warranty or liability for your use of this information.      Bladder Training: Care Instructions  Your Care Instructions     Bladder training is used to treat urge incontinence and stress incontinence. Urge incontinence means that the need to urinate comes on so fast that you can't get to a toilet in time. Stress incontinence " means that you leak urine because of pressure on your bladder. For example, it may happen when you laugh, cough, or lift something heavy.  Bladder training can increase how long you can wait before you have to urinate. It can also help your bladder hold more urine. And it can give you better control over the urge to urinate.  It is important to remember that bladder training takes a few weeks to a few months to make a difference. You may not see results right away, but don't give up.  Follow-up care is a key part of your treatment and safety. Be sure to make and go to all appointments, and call your doctor if you are having problems. It's also a good idea to know your test results and keep a list of the medicines you take.  How can you care for yourself at home?  Work with your doctor to come up with a bladder training program that is right for you. You may use one or more of the following methods.  Delayed urination  In the beginning, try to keep from urinating for 5 minutes after you first feel the need to go.  While you wait, take deep, slow breaths to relax. Kegel exercises can also help you delay the need to go to the bathroom.  After some practice, when you can easily wait 5 minutes to urinate, try to wait 10 minutes before you urinate.  Slowly increase the waiting period until you are able to control when you have to urinate.  Scheduled urination  Empty your bladder when you first wake up in the morning.  Schedule times throughout the day when you will urinate.  Start by going to the bathroom every hour, even if you don't need to go.  Slowly increase the time between trips to the bathroom.  When you have found a schedule that works well for you, keep doing it.  If you wake up during the night and have to urinate, do it. Apply your schedule to waking hours only.  Kegel exercises  These tighten and strengthen pelvic muscles, which can help you control the flow of urine. (If doing these exercises causes pain,  "stop doing them and talk with your doctor.) To do Kegel exercises:  Squeeze your muscles as if you were trying not to pass gas. Or squeeze your muscles as if you were stopping the flow of urine. Your belly, legs, and buttocks shouldn't move.  Hold the squeeze for 3 seconds, then relax for 5 to 10 seconds.  Start with 3 seconds, then add 1 second each week until you are able to squeeze for 10 seconds.  Repeat the exercise 10 times a session. Do 3 to 8 sessions a day.  When should you call for help?  Watch closely for changes in your health, and be sure to contact your doctor if:    Your incontinence is getting worse.     You do not get better as expected.   Where can you learn more?  Go to https://www.[employer].net/patiented  Enter V684 in the search box to learn more about \"Bladder Training: Care Instructions.\"  Current as of: November 15, 2023               Content Version: 14.0    1829-9266 [employer].   Care instructions adapted under license by your healthcare professional. If you have questions about a medical condition or this instruction, always ask your healthcare professional. [employer] disclaims any warranty or liability for your use of this information.      Substance Use Disorder: Care Instructions  Overview     You can improve your life and health by stopping your use of alcohol or drugs. When you don't drink or use drugs, you may feel and sleep better. You may get along better with your family, friends, and coworkers. There are medicines and programs that can help with substance use disorder.  How can you care for yourself at home?  Here are some ways to help you stay sober and prevent relapse.  If you have been given medicine to help keep you sober or reduce your cravings, be sure to take it exactly as prescribed.  Talk to your doctor about programs that can help you stop using drugs or drinking alcohol.  Do not keep alcohol or drugs in your home.  Plan ahead. Think about " what you'll say if other people ask you to drink or use drugs. Try not to spend time with people who drink or use drugs.  Use the time and money spent on drinking or drugs to do something that's important to you.  Preventing a relapse  Have a plan to deal with relapse. Learn to recognize changes in your thinking that lead you to drink or use drugs. Get help before you start to drink or use drugs again.  Try to stay away from situations, friends, or places that may lead you to drink or use drugs.  If you feel the need to drink alcohol or use drugs again, seek help right away. Call a trusted friend or family member. Some people get support from organizations such as Narcotics Anonymous or [employer] or from treatment facilities.  If you relapse, get help as soon as you can. Some people make a plan with another person that outlines what they want that person to do for them if they relapse. The plan usually includes how to handle the relapse and who to notify in case of relapse.  Don't give up. Remember that a relapse doesn't mean that you have failed. Use the experience to learn the triggers that lead you to drink or use drugs. Then quit again. Recovery is a lifelong process. Many people have several relapses before they are able to quit for good.  Follow-up care is a key part of your treatment and safety. Be sure to make and go to all appointments, and call your doctor if you are having problems. It's also a good idea to know your test results and keep a list of the medicines you take.  When should you call for help?   Call 911  anytime you think you may need emergency care. For example, call if you or someone else:    Has overdosed or has withdrawal signs. Be sure to tell the emergency workers that you are or someone else is using or trying to quit using drugs. Overdose or withdrawal signs may include:  Losing consciousness.  Seizure.  Seeing or hearing things that aren't there (hallucinations).     Is thinking  "or talking about suicide or harming others.   Where to get help 24 hours a day, 7 days a week   If you or someone you know talks about suicide, self-harm, a mental health crisis, a substance use crisis, or any other kind of emotional distress, get help right away. You can:    Call the Suicide and Crisis Lifeline at 988.     Call 4-261-014-TALK (1-877.909.4483).     Text HOME to 506652 to access the Crisis Text Line.   Consider saving these numbers in your phone.  Go to [employer] for more information or to chat online.  Call your doctor now or seek immediate medical care if:    You are having withdrawal symptoms. These may include nausea or vomiting, sweating, shakiness, and anxiety.   Watch closely for changes in your health, and be sure to contact your doctor if:    You have a relapse.     You need more help or support to stop.   Where can you learn more?  Go to https://www.[employer]/patiented  Enter H573 in the search box to learn more about \"Substance Use Disorder: Care Instructions.\"  Current as of: November 15, 2023               Content Version: 14.0    5716-3103 [employer].   Care instructions adapted under license by your healthcare professional. If you have questions about a medical condition or this instruction, always ask your healthcare professional. [employer] disclaims any warranty or liability for your use of this information.      Chronic Pain: Care Instructions  Your Care Instructions     Chronic pain is pain that lasts a long time (months or even years) and may or may not have a clear cause. It is different from acute pain, which usually does have a clear cause--like an injury or illness--and gets better over time. Chronic pain:  Lasts over time but may vary from day to day.  Does not go away despite efforts to end it.  May disrupt your sleep and lead to fatigue.  May cause depression or anxiety.  May make your muscles tense, causing more pain.  Can disrupt " your work, hobbies, home life, and relationships with friends and family.  Chronic pain is a very real condition. It is not just in your head. Treatment can help and usually includes several methods used together, such as medicines, physical therapy, exercise, and other treatments. Learning how to relax and changing negative thought patterns can also help you cope.  Chronic pain is complex. Taking an active role in your treatment will help you better manage your pain. Tell your doctor if you have trouble dealing with your pain. You may have to try several things before you find what works best for you.  Follow-up care is a key part of your treatment and safety. Be sure to make and go to all appointments, and call your doctor if you are having problems. It's also a good idea to know your test results and keep a list of the medicines you take.  How can you care for yourself at home?  Pace yourself. Break up large jobs into smaller tasks. Save harder tasks for days when you have less pain, or go back and forth between hard tasks and easier ones. Take rest breaks.  Relax, and reduce stress. Relaxation techniques such as deep breathing or meditation can help.  Keep moving. Gentle, daily exercise can help reduce pain over the long run. Try low- or no-impact exercises such as walking, swimming, and stationary biking. Do stretches to stay flexible.  Try heat, cold packs, and massage.  Get enough sleep. Chronic pain can make you tired and drain your energy. Talk with your doctor if you have trouble sleeping because of pain.  Think positive. Your thoughts can affect your pain level. Do things that you enjoy to distract yourself when you have pain instead of focusing on the pain. See a movie, read a book, listen to music, or spend time with a friend.  If you think you are depressed, talk to your doctor about treatment.  Keep a daily pain diary. Record how your moods, thoughts, sleep patterns, activities, and medicine affect  your pain. You may find that your pain is worse during or after certain activities or when you are feeling a certain emotion. Having a record of your pain can help you and your doctor find the best ways to treat your pain.  Take pain medicines exactly as directed.  If the doctor gave you a prescription medicine for pain, take it as prescribed.  If you are not taking a prescription pain medicine, ask your doctor if you can take an over-the-counter medicine.  Reducing constipation caused by pain medicine  Talk to your doctor about a laxative. If a laxative doesn't work, your doctor may suggest a prescription medicine.  Include fruits, vegetables, beans, and whole grains in your diet each day. These foods are high in fiber.  If your doctor recommends it, get more exercise. Walking is a good choice. Bit by bit, increase the amount you walk every day. Try for at least 30 minutes on most days of the week.  Schedule time each day for a bowel movement. A daily routine may help. Take your time and do not strain when having a bowel movement.  When should you call for help?   Call your doctor now or seek immediate medical care if:    Your pain gets worse or is out of control.     You feel down or blue, or you do not enjoy things like you once did. You may be depressed, which is common in people with chronic pain. Depression can be treated.     You have vomiting or cramps for more than 2 hours.   Watch closely for changes in your health, and be sure to contact your doctor if:    You cannot sleep because of pain.     You are very worried or anxious about your pain.     You have trouble taking your pain medicine.     You have any concerns about your pain medicine.     You have trouble with bowel movements, such as:  No bowel movement in 3 days.  Blood in the anal area, in your stool, or on the toilet paper.  Diarrhea for more than 24 hours.   Where can you learn more?  Go to https://www.healthwise.net/patiented  Enter N004 in the  "search box to learn more about \"Chronic Pain: Care Instructions.\"  Current as of: July 10, 2023               Content Version: 14.0    7988-8452 [employer].   Care instructions adapted under license by your healthcare professional. If you have questions about a medical condition or this instruction, always ask your healthcare professional. [employer] disclaims any warranty or liability for your use of this information.      "

## 2024-06-07 NOTE — PROGRESS NOTES
Preventive Care Visit  [facility]  Gio Dooley MD, Internal Medicine  Jun 7, 2024      1. Encounter for Medicare annual wellness exam    2. Stage 3b chronic kidney disease (H)  Stable, due for lab. Considered SGLT2, seeing Nephrology soon.  - Parathyroid Hormone Intact; Future  - Phosphorus; Future  - Albumin Random Urine Quantitative with Creat Ratio; Future  - BASIC METABOLIC PANEL; Future  - lisinopril (ZESTRIL) 10 MG tablet; Take 1 tablet (10 mg) by mouth daily  Dispense: 90 tablet; Refill: 4    3. Diabetes mellitus type 2, insulin dependent (H)  At goal, no change.  Due for lab.  - Adult Eye  Referral; Future  - HEMOGLOBIN A1C; Future  - Lipid Profile; Future  - NC FOOT EXAM NO CHARGE  - insulin lispro protamine-insulin lispro (HUMALOG MIX 75/25 KWIKPEN) (75-25) 100 UNIT/ML pen; 40 units in the morning, 20 units in the evening  Dispense: 60 mL; Refill: 4  - insulin pen needle (BD PEN NEEDLE LUCILLE 2ND GEN) 32G X 4 MM miscellaneous; Use to administer insulin twice daily. Dispense as covered by insurance. Dx. Code: E11.9.  Dispense: 200 each; Refill: 11  - Lancets (ONETOUCH DELICA PLUS [id number]) MISC; 1 each by In Vitro route 2 times daily  Dispense: 200 each; Refill: 11    4. Chronic obstructive pulmonary disease, unspecified COPD type (H)  Completing pulmonary rehab for which I applauded.  Discussed options and decided to add some DuoNebs.  She has a complicated pattern of mixed restrictive and obstructive lung pattern due to rheumatoid arthritis, COPD, etc.  - albuterol (PROAIR HFA/PROVENTIL HFA/VENTOLIN HFA) 108 (90 Base) MCG/ACT inhaler; INHALE 1-2 PUFFS BY MOUTH EVERY 4-6 HOURS AS NEEDED FOR SHORTNESS OF BREATH OR WHEEZING  Dispense: 18 g; Refill: 11  - formoterol (PERFOROMIST) 20 MCG/2ML neb solution; Take 2 mLs (20 mcg) by nebulization 2 times daily  Dispense: 180 mL; Refill: 4  - montelukast (SINGULAIR) 10 MG tablet; Take 1 tablet (10 mg) by mouth at bedtime   Dispense: 90 tablet; Refill: 4    5. Vitamin B12 deficiency (non anemic)  At goal, no change.  - cyanocobalamin (VITAMIN B-12) 1000 MCG tablet; Take 1 tablet (1,000 mcg) by mouth daily  Dispense: 90 tablet; Refill: 4    6. Osteoporosis, unspecified osteoporosis type, unspecified pathological fracture presence  At goal, no change.  - denosumab (PROLIA) 60 MG/ML SOSY injection; Inject 1 mL (60 mg) Subcutaneous every 6 months  Dispense: 1 mL; Refill: 1    7. Seropositive rheumatoid arthritis (H)  She follows with [facility]ology at [facility].  She has been on a low-dose tramadol at bedtime for years.  We discussed buprenorphine.  - folic acid (FOLVITE) 1 MG tablet; Take 1 tablet (1 mg) by mouth daily  Dispense: 90 tablet; Refill: 4  - traMADol (ULTRAM) 50 MG tablet; Take 1 tablet (50 mg) by mouth nightly as needed for severe pain  Dispense: 30 tablet; Refill: 5    8. Gastroesophageal reflux disease with esophagitis without hemorrhage  At goal, no change.  - omeprazole (PRILOSEC) 20 MG DR capsule; Take 1 capsule (20 mg) by mouth daily  Dispense: 90 capsule; Refill: 4    9. Hyperlipidemia, unspecified hyperlipidemia type  At goal, no change.  - simvastatin (ZOCOR) 40 MG tablet; Take 1 tablet (40 mg) by mouth at bedtime  Dispense: 90 tablet; Refill: 4    10. Immunosuppressed status (H24)  11. Other amyloidosis (H)  12. Avascular necrosis of left talus (H)  Follows with [place] rheumatology    13. Malignant neoplasm of colon, unspecified part of colon (H)  Follows with [place] gastroenterology    14. Chronic diastolic CHF (congestive heart failure), NYHA class 2 (H)  Follows with [place] cardiology    15. Diabetic nephropathy associated with type 2 diabetes mellitus (H)    16. Anemia in stage 3a chronic kidney disease (H)  Will be seeing [place] nephrology soon    17. Long term (current) use of oral hypoglycemic drugs    18. Long term (current) use of inhaled steroids    19. On prednisone therapy    20. 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 COPD, very severe (H)  - tiotropium (SPIRIVA) 18 MCG inhaled capsule; Inhale 1 capsule (18 mcg) into the lungs daily  Dispense: 90 capsule; Refill: 4  - ipratropium - albuterol 0.5 mg/2.5 mg/3 mL (DUONEB) 0.5-2.5 (3) MG/3ML neb solution; Take 1 vial (3 mLs) by nebulization every 6 hours as needed for shortness of breath, wheezing or cough  Dispense: 90 mL; Refill: 3    22. Rheumatoid lung disease (H)    23. Acquired hypothyroidism  At goal, no change.  - levothyroxine (SYNTHROID/LEVOTHROID) 112 MCG tablet; Take 1 tablet (112 mcg) by mouth daily  Dispense: 90 tablet; Refill: 4    24. Primary hypertension  At goal, no change.  - lisinopril (ZESTRIL) 10 MG tablet; Take 1 tablet (10 mg) by mouth daily  Dispense: 90 tablet; Refill: 4    25. Chronic, continuous use of opioids  Contract signed today.  Drug screen updated.  [facility] database reviewed.  - Drug Confirmation Panel Urine with Creatinine; Future    26. Medication monitoring encounter  - Drug Confirmation Panel Urine with Creatinine; Future      Patient Instructions    -- Try tart cherry juice concentrate for gout      Signed, Gio Dooley MD, FAAP, FACP  Internal Medicine & Pediatrics      Subjective   Laura is a 74 year old, presenting for the following:  Medicare Visit and Recheck Medication          Health Care Directive  Patient does not have a Health Care Directive or Living Will:     History of Present Illness       COPD:  She presents for follow up of COPD.  Overall, COPD symptoms are stable since last visit.  She has same as usual fatigue or shortness of breath with  exertion and same as usual shortness of breath at rest.  She rarely coughs and does not have change in sputum. No recent fever. She can walk the length of 1-2 rooms without stopping to rest. She can walk 1 flights of stairs without resting. The patient has had no ED, urgent care, or hospital admissions because of COPD since the last visit.     Hypertension: She presents for follow up of hypertension.  She does not check blood pressure  regularly outside of the clinic. Outside blood pressures have been over 140/90. She does not follow a low salt diet.     She eats 2-3 servings of fruits and vegetables daily.She consumes 0 sweetened beverage(s) daily.   She is taking medications regularly.                6/7/2024   General Health   How would you rate your overall physical health? (!) POOR   Feel stress (tense, anxious, or unable to sleep) To some extent   (!) STRESS CONCERN      6/7/2024   Nutrition   Diet: Low salt    Diabetic    Carbohydrate counting         6/7/2024   Exercise   Days per week of moderate/strenous exercise 0 days   (!) EXERCISE CONCERN      6/7/2024   Social Factors   Frequency of gathering with friends or relatives Once a week   Worry food won't last until get money to buy more No   Food not last or not have enough money for food? No   Do you have housing?  Yes   Are you worried about losing your housing? No   Lack of transportation? No   Unable to get utilities (heat,electricity)? No         6/7/2024   Fall Risk   Fallen 2 or more times in the past year? No    No   Trouble with walking or balance? Yes    Yes   Reason Gait Speed Test Not Completed Patient does not tolerate an upright or standing position (e.g. wheelchair)          6/7/2024   Activities of Daily Living- Home Safety   Needs help with the following daily activites Transportation    Shopping    Preparing meals    Housework    Laundry   Safety concerns in the home None of the above         6/7/2024   Dental   Dentist two times every  year? Yes         2024   Hearing Screening   Hearing concerns? None of the above         2024   Driving Risk Screening   Patient/family members have concerns about driving No         2024   General Alertness/Fatigue Screening   Have you been more tired than usual lately? No         2024   Urinary Incontinence Screening   Bothered by leaking urine in past 6 months Yes          Today's PHQ-9 Score:       2024     3:11 PM   PHQ-9 SCORE   PHQ-9 Total Score MyChart 4 (Minimal depression)   PHQ-9 Total Score 4         2024   Substance Use   Alcohol more than 3/day or more than 7/wk No   Do you have a current opioid prescription? (!) YES   How severe/bad is pain from 1 to 10? 6/10   Do you use any other substances recreationally? (!) ALCOHOL       Social History     Tobacco Use    Smoking status: Former     Current packs/day: 0.00     Average packs/day: 1 pack/day for 30.0 years (30.0 ttl pk-yrs)     Types: Cigarettes     Start date: 1975     Quit date: 2005     Years since quittin.7    Smokeless tobacco: Never   Vaping Use    Vaping status: Never Used   Substance Use Topics    Alcohol use: Not Currently     Alcohol/week: 2.5 standard drinks of alcohol     Types: 3 Standard drinks or equivalent per week     Comment: Rare    Drug use: Yes     Comment: Medical marijuana           2024   LAST FHS-7 RESULTS   1st degree relative breast or ovarian cancer No    No   Any relative bilateral breast cancer No    No   Any male have breast cancer No    No   Any ONE woman have BOTH breast AND ovarian cancer No    No   Any woman with breast cancer before 50yrs No    No   2 or more relatives with breast AND/OR ovarian cancer No    No   2 or more relatives with breast AND/OR bowel cancer Yes    Yes            ASCVD Risk   The 10-year ASCVD risk score (Suyapa AQUINO, et al., 2019) is: 48.8%    Values used to calculate the score:      Age: 74 years      Sex: Female      Is Non-  ": No      Diabetic: Yes      Tobacco smoker: No      Systolic Blood Pressure: 154 mmHg      Is BP treated: Yes      HDL Cholesterol: 87 mg/dL      Total Cholesterol: 195 mg/dL            Reviewed and updated as needed this visit by Provider                      Current providers sharing in care for this patient include:  Patient Care Team:  Gio Dooley MD as PCP - General (Internal Medicine)  Gio Dooley MD as Assigned PCP  Gio Dooley MD as Assigned Pain Medication Provider    The following health maintenance items are reviewed in Epic and correct as of today:  Health Maintenance   Topic Date Due    PARATHYROID  Never done    PHOSPHORUS  Never done    MICROALBUMIN  11/25/2023    EYE EXAM  03/07/2024    COVID-19 Vaccine (8 - 2023-24 season) 05/10/2024    A1C  05/11/2024    BMP  05/20/2024    LIPID  05/25/2024    ALT  11/12/2024    CBC  11/12/2024    HEMOGLOBIN  11/12/2024    PHQ-9  12/07/2024    MEDICARE ANNUAL WELLNESS VISIT  06/07/2025    DIABETIC FOOT EXAM  06/07/2025    FALL RISK ASSESSMENT  06/07/2025    HF ACTION PLAN  06/07/2027    ADVANCE CARE PLANNING  06/07/2029    COLORECTAL CANCER SCREENING  09/09/2031    DTAP/TDAP/TD IMMUNIZATION (11 - Td or Tdap) 09/17/2031    DEXA  10/30/2038    TSH W/FREE T4 REFLEX  Completed    SPIROMETRY  Completed    HEPATITIS C SCREENING  Completed    INFLUENZA VACCINE  Completed    Pneumococcal Vaccine: 65+ Years  Completed    URINALYSIS  Completed    ALK PHOS  Completed    ZOSTER IMMUNIZATION  Completed    RSV VACCINE (Pregnancy & 60+)  Completed    COPD ACTION PLAN  Addressed    DEPRESSION ACTION PLAN  Addressed    IPV IMMUNIZATION  Aged Out    HPV IMMUNIZATION  Aged Out    MENINGITIS IMMUNIZATION  Aged Out    RSV MONOCLONAL ANTIBODY  Aged Out    MAMMO SCREENING  Discontinued    LUNG CANCER SCREENING  Discontinued            Objective    Exam  Pulse 90   Temp 98  F (36.7  C) (Tympanic)   Resp 20   Ht 1.588 m (5' 2.5\")   Wt " "67.6 kg (149 lb)   LMP 06/07/2004 (Approximate)   SpO2 95%   Breastfeeding No   BMI 26.82 kg/m     Estimated body mass index is 26.82 kg/m  as calculated from the following:    Height as of this encounter: 1.588 m (5' 2.5\").    Weight as of this encounter: 67.6 kg (149 lb).    Physical Exam  Gen: Alert, NAD.  Neck: No carotid bruits  CV: RRR no m/r/g  Pulm: CTAB, no w/r/r. No increased work of breathing  Msk: No LE edema  Neuro: Grossly intact  Skin: No concerning lesions.  Psychiatric: Normal affect and insight. Does not appear anxious or depressed.    Foot Exam:  6/7/2024  Reduced to monofilament bilaterally in great toes.  Skin intact without erythema.    Foot/Ankle Musculoskeletal Exam          6/7/2024   Mini Cog   Clock Draw Score 0 Abnormal   3 Item Recall 3 objects recalled   Mini Cog Total Score 3              Signed Electronically by: Gio Dooley MD    "

## 2024-06-07 NOTE — NURSING NOTE
"Chief Complaint   Patient presents with    Medicare Visit    Recheck Medication       Initial Pulse 90   Temp 98  F (36.7  C) (Tympanic)   Resp 20   Ht 1.588 m (5' 2.5\")   Wt 67.6 kg (149 lb)   LMP 06/07/2004 (Approximate)   SpO2 95%   Breastfeeding No   BMI 26.82 kg/m   Estimated body mass index is 26.82 kg/m  as calculated from the following:    Height as of this encounter: 1.588 m (5' 2.5\").    Weight as of this encounter: 67.6 kg (149 lb).  Medication Review: complete    The next two questions are to help us understand your food security.  If you are feeling you need any assistance in this area, we have resources available to support you today.          6/7/2024   SDOH- Food Insecurity   Within the past 12 months, did you worry that your food would run out before you got money to buy more? N   Within the past 12 months, did the food you bought just not last and you didn t have money to get more? N         Health Care Directive:  Patient does not have a Health Care Directive or Living Will: Discussed advance care planning with patient; however, patient declined at this time.    Norma J. Gosselin, LPN      "

## 2024-06-08 LAB — PTH-INTACT SERPL-MCNC: 114 PG/ML (ref 15–65)

## 2024-06-09 ENCOUNTER — MYC REFILL (OUTPATIENT)
Dept: PEDIATRICS | Facility: OTHER | Age: 74
End: 2024-06-09
Payer: MEDICARE

## 2024-06-09 DIAGNOSIS — E11.9 DIABETES MELLITUS TYPE 2, INSULIN DEPENDENT (H): ICD-10-CM

## 2024-06-09 DIAGNOSIS — Z79.4 DIABETES MELLITUS TYPE 2, INSULIN DEPENDENT (H): ICD-10-CM

## 2024-06-10 ENCOUNTER — PATIENT OUTREACH (OUTPATIENT)
Dept: GASTROENTEROLOGY | Facility: CLINIC | Age: 74
End: 2024-06-10
Payer: MEDICARE

## 2024-06-10 RX ORDER — INSULIN LISPRO 100 [IU]/ML
INJECTION, SUSPENSION SUBCUTANEOUS
Qty: 60 ML | Refills: 4 | Status: SHIPPED | OUTPATIENT
Start: 2024-06-10

## 2024-06-11 ENCOUNTER — MEDICAL CORRESPONDENCE (OUTPATIENT)
Dept: HEALTH INFORMATION MANAGEMENT | Facility: OTHER | Age: 74
End: 2024-06-11

## 2024-06-11 ENCOUNTER — HOSPITAL ENCOUNTER (OUTPATIENT)
Dept: RESPIRATORY THERAPY | Facility: OTHER | Age: 74
Discharge: HOME | End: 2024-06-11
Attending: INTERNAL MEDICINE | Admitting: INTERNAL MEDICINE
Payer: MEDICARE

## 2024-06-11 PROCEDURE — 94625 PHY/QHP OP PULM RHB W/O MNTR: CPT

## 2024-06-11 PROCEDURE — 999N000157 HC STATISTIC RCP TIME EA 10 MIN

## 2024-06-11 NOTE — PROGRESS NOTES
Pt here for Pulmonary Rehab today. Pt did Upper Body exercises for 5 reps a piece with no added weight. Pt exercised on the NuStep at level 2 for 32 minutes, maintaining MET levels of 3.0. Pre exercise vitals: , /74, SpO2 98% on Room Air. Exercise Vitals:  and SpO2 93% on Room Air. Post exercise vitals: HR 68, /74, and SpO2 99% on Room Air. Eduction today was a lesson from the book A Guide for People with Chronic Lung Disease pg's 72-75 and the Book Living Well with Chronic Lung Disease pg's 50-51 on Foods and Nutrition, Caloric demand and expenditure due to Work of Breathing, GERD, and importance of bodyweight. Lesson also pulled from handouts in class binder for recipes further information. Total session time today lasted 90 minutes.    Burak Coleman, RT

## 2024-06-20 ENCOUNTER — HOSPITAL ENCOUNTER (OUTPATIENT)
Dept: RESPIRATORY THERAPY | Facility: OTHER | Age: 74
Discharge: HOME | End: 2024-06-20
Attending: INTERNAL MEDICINE | Admitting: INTERNAL MEDICINE
Payer: MEDICARE

## 2024-06-20 PROCEDURE — 94625 PHY/QHP OP PULM RHB W/O MNTR: CPT

## 2024-06-20 PROCEDURE — 999N000157 HC STATISTIC RCP TIME EA 10 MIN

## 2024-06-20 NOTE — PROGRESS NOTES
Pt here for Pulmonary Rehab today. Pt did Lower Body exercises for 5 reps a piece with no added weight. Pt exercised on the NuStep at level 3 for 40 minutes, maintaining MET levels of 3.2. Pre exercise vitals: , /86, and SpO2 95% on Room Air. Exercise vitals:  and SpO2 95% on Room Air. Post exercise vitals: , /75, and SpO2 95% on Room Air. Education today was a lesson from the book A Guide for People with Chronic Lung Disease pg's 60-67 on Emotional Health such as Sadness/Depression, Anger and Control, Relaxation exercises, and Sexual feelings and adjustments. Dorota from Social work also gave a guest presentation today on Psychosocial aspects of life and community resources. Pt instructed to read A Guide for People with Chronic Lung Disease pg's 48-49, 70-71, and 79 on Staying Healthy and Sleep. Total session time today lasted 90 minutes.    Burak Coleman, RT

## 2024-06-25 ENCOUNTER — HOSPITAL ENCOUNTER (OUTPATIENT)
Dept: RESPIRATORY THERAPY | Facility: OTHER | Age: 74
Discharge: HOME | End: 2024-06-25
Attending: INTERNAL MEDICINE | Admitting: INTERNAL MEDICINE
Payer: MEDICARE

## 2024-06-25 PROCEDURE — 94625 PHY/QHP OP PULM RHB W/O MNTR: CPT

## 2024-06-25 PROCEDURE — 999N000157 HC STATISTIC RCP TIME EA 10 MIN

## 2024-06-25 NOTE — PROGRESS NOTES
Pt here for Pulmonary Rehab today. Pt did Upper Body exercises for 5 reps a piece with no added weight. Pt exercised on the NuStep at level 3 for 46 minutes, maintaining MET levels of 3.4. Pre exercises vitals: HR 86, /85, and SpO2 94% on Room Air. Exercise vitals:  and SpO2 92% on Room Air. Post exercise vitals: HR 97, /84, and SpO2 95% on Room Air. Education today was a lesson from the book A Guide for People with Chronic Lung Disease pg's 48-49 on Infection prevention and when to call the Doctor, pg's 70-71 on Air pollution, Weather, and Household fumes, and pg 79 on Sleep and Positive Airway Pressure Devices. Pt instructed to read A Guide for People with Chronic Lung Diseases pg's 68-69 on Quitting smoking for Thursdays class. Total session time today lasted 90 minutes.    Burak Coleman, RT

## 2024-06-27 ENCOUNTER — HOSPITAL ENCOUNTER (OUTPATIENT)
Dept: RESPIRATORY THERAPY | Facility: OTHER | Age: 74
Discharge: HOME | End: 2024-06-27
Attending: INTERNAL MEDICINE | Admitting: INTERNAL MEDICINE
Payer: MEDICARE

## 2024-06-27 PROCEDURE — 94625 PHY/QHP OP PULM RHB W/O MNTR: CPT

## 2024-06-27 PROCEDURE — 999N000157 HC STATISTIC RCP TIME EA 10 MIN

## 2024-06-27 NOTE — PROGRESS NOTES
Pt here for Pulmonary Rehab today. Pt did Lower Body exercises for 5 reps a piece with no added weight. Pt exercised on the NuStep at level 3 for 45 minutes, maintaining MET levels of 3.4. Pre exercise vitals: , /78, and SpO2 96% on Room Air. Exercise vitals:  and SpO2 100% on Room Air. Post exercise vitals: HR 99, /68, and SpO2 94% on Room Air. Education today was from the book A Guide for People with Chronic Lung Disease pg's 68-69 on Smoking and Smoking Cessation, as well a handouts from the patients class binder on Allergens, Irritants, Molds, Smoking Cessation, and when to call the Doctor. Total session time today lasted 90 minutes.    Burak Coleman, RT

## 2024-07-01 ENCOUNTER — TELEPHONE (OUTPATIENT)
Dept: PEDIATRICS | Facility: OTHER | Age: 74
End: 2024-07-01
Payer: MEDICARE

## 2024-07-01 NOTE — TELEPHONE ENCOUNTER
Patient called and is having some dental work including pulling of a tooth and would like to change appointment for infusion for next week because of the risk of infection. Appointment desk notified.  Jean S. Hammann, RN on 7/1/2024 at 10:35 AM

## 2024-07-02 ENCOUNTER — HOSPITAL ENCOUNTER (OUTPATIENT)
Dept: RESPIRATORY THERAPY | Facility: OTHER | Age: 74
Discharge: HOME | End: 2024-07-02
Attending: INTERNAL MEDICINE | Admitting: INTERNAL MEDICINE
Payer: MEDICARE

## 2024-07-02 PROCEDURE — 999N000157 HC STATISTIC RCP TIME EA 10 MIN

## 2024-07-02 PROCEDURE — 94625 PHY/QHP OP PULM RHB W/O MNTR: CPT

## 2024-07-02 NOTE — PROGRESS NOTES
Pt here for Pulmonary Rehab today. Pt started out today's session by undergoing the post class Spirometry and Six Minute Walk testing to determine progress during the duration of class. Pt then completed post Pulmonary Rehab assessment paperwork and interview with writer to determine of patient has met goals or is still making progress on personal goals set at beginning of program. No Education performed today. Pre Session vitals: HR 90, /75, and SpO2 96% on Room Air. Post Session vitals: HR 98, /85, and SpO2 93% on Room Air. Total session time today 90 minutes.    Burak Coleman, RT

## 2024-07-08 ENCOUNTER — HOSPITAL ENCOUNTER (OUTPATIENT)
Dept: INFUSION THERAPY | Facility: OTHER | Age: 74
Discharge: HOME | End: 2024-07-08
Attending: INTERNAL MEDICINE | Admitting: INTERNAL MEDICINE
Payer: MEDICARE

## 2024-07-08 VITALS
WEIGHT: 148 LBS | HEART RATE: 92 BPM | RESPIRATION RATE: 20 BRPM | BODY MASS INDEX: 26.64 KG/M2 | DIASTOLIC BLOOD PRESSURE: 65 MMHG | TEMPERATURE: 97.3 F | SYSTOLIC BLOOD PRESSURE: 151 MMHG

## 2024-07-08 DIAGNOSIS — M05.9 SEROPOSITIVE RHEUMATOID ARTHRITIS (H): ICD-10-CM

## 2024-07-08 DIAGNOSIS — E85.89 OTHER AMYLOIDOSIS (H): ICD-10-CM

## 2024-07-08 DIAGNOSIS — Z79.52 ON PREDNISONE THERAPY: Primary | ICD-10-CM

## 2024-07-08 PROCEDURE — 96365 THER/PROPH/DIAG IV INF INIT: CPT

## 2024-07-08 PROCEDURE — 258N000003 HC RX IP 258 OP 636: Performed by: INTERNAL MEDICINE

## 2024-07-08 PROCEDURE — 250N000028 HC RX JA MOD (INTRAVENOUS), IP 250 OP 636: Mod: JA,JZ | Performed by: INTERNAL MEDICINE

## 2024-07-08 RX ORDER — DIPHENHYDRAMINE HYDROCHLORIDE 50 MG/ML
50 INJECTION INTRAMUSCULAR; INTRAVENOUS
Status: CANCELLED
Start: 2024-07-16

## 2024-07-08 RX ORDER — EPINEPHRINE 1 MG/ML
0.3 INJECTION, SOLUTION, CONCENTRATE INTRAVENOUS EVERY 5 MIN PRN
Status: CANCELLED | OUTPATIENT
Start: 2024-07-16

## 2024-07-08 RX ORDER — HEPARIN SODIUM,PORCINE 10 UNIT/ML
5-20 VIAL (ML) INTRAVENOUS DAILY PRN
Status: CANCELLED | OUTPATIENT
Start: 2024-07-16

## 2024-07-08 RX ORDER — ALBUTEROL SULFATE 0.83 MG/ML
2.5 SOLUTION RESPIRATORY (INHALATION)
Status: CANCELLED | OUTPATIENT
Start: 2024-07-16

## 2024-07-08 RX ORDER — METHYLPREDNISOLONE SODIUM SUCCINATE 125 MG/2ML
125 INJECTION, POWDER, LYOPHILIZED, FOR SOLUTION INTRAMUSCULAR; INTRAVENOUS
Status: CANCELLED
Start: 2024-07-16

## 2024-07-08 RX ORDER — HEPARIN SODIUM (PORCINE) LOCK FLUSH IV SOLN 100 UNIT/ML 100 UNIT/ML
5 SOLUTION INTRAVENOUS
Status: CANCELLED | OUTPATIENT
Start: 2024-07-16

## 2024-07-08 RX ORDER — MEPERIDINE HYDROCHLORIDE 50 MG/ML
25 INJECTION INTRAMUSCULAR; INTRAVENOUS; SUBCUTANEOUS EVERY 30 MIN PRN
Status: CANCELLED | OUTPATIENT
Start: 2024-07-16

## 2024-07-08 RX ORDER — ALBUTEROL SULFATE 90 UG/1
1-2 AEROSOL, METERED RESPIRATORY (INHALATION)
Status: CANCELLED
Start: 2024-07-16

## 2024-07-08 RX ADMIN — SODIUM CHLORIDE 750 MG: 9 INJECTION, SOLUTION INTRAVENOUS at 14:21

## 2024-07-08 RX ADMIN — SODIUM CHLORIDE 250 ML: 9 INJECTION, SOLUTION INTRAVENOUS at 13:48

## 2024-07-08 NOTE — NURSING NOTE
~~~ NOTE: If the patient answers yes to any of the questions below, hold the infusion and contact ordering provider or on-call provider.    Do you currently have any signs of illness or infection or are you on any antibiotics? No  Have you recently had an elevated temperature, fever, chills, productive cough, coughing for 3 weeks or longer or hemoptysis, abnormal vital signs, night sweats, chest pain or have you noticed a decrease in your appetite, unexplained weight loss or fatigue? No  Have you had any new, sudden, or worsening abdominal pain? No  Do you have any open wounds or new incisions? (exclude for patients with hidradenitis suppurativa) No  Have you recently been diagnosed with any new nervous system diseases (ie. Multiple sclerosis, Guillain Bancroft, seizures, neurological changes) or cancer diagnosis? Are you on any form of radiation or chemotherapy? No  Have there been any other new onset medical symptoms? No  Are you pregnant or breast feeding or do you have plans of pregnancy in the future? No; N/A  Do you have any upcoming hospitalizations or surgeries? Does not include esophagogastroduodenoscopy, colonoscopy, endoscopic retrograde cholangiopancreatography (ERCP), endoscopic ultrasound (EUS), dental procedures (including cleanings, fillings, implants, extractions)  or joint aspiration/steroid injections No  Have you or anyone in your household received a live vaccination in the past 4 weeks? Please note: No live vaccines while on biologic/chemotherapy until 6 months after the last treatment. Patient can receive the flu vaccine (shot only).  It is optimal for the patient to get it mid cycle, but it can be given at any time as long as it is not on the day of the infusion. No  If applicable to prescribed medication, confirm negative PPD or quantiferon gold MTB. If positive, verify has negative chest x-ray or the patient is at least 4 weeks post initiation of INH/B6 therapy and have clearance from provider  before infusion (Y/N:[id number])  If applicable to prescribed medication, confirm negative hepatitis B surface antigen or hepatitis C. If positive, clearance from provider before infusion. (Y/N: [id number])  Rheumatology patients receiving tocilizumab (Actemra): If labs were drawn within the past week, hold dosing until cleared to infuse If AST/ALT > 2 X upper limit normal; ANC < 1.0. NO; N/A  Patients receiving belimumab (Benlysta): Have you been having any signs of worsening depression or suicidal ideations? No; N/A  Yolanda Lanza RN ....................  7/8/2024   1:31 PM

## 2024-07-08 NOTE — NURSING NOTE
Infusion Nursing Note:  Laura Perez presents today for O[place]cia.    Patient seen by provider today: No   present during visit today: Not Applicable.    Note: N/A.    Intravenous Access:  Peripheral IV placed.    Treatment Conditions:  Biological Infusion Checklistcompleted    Post Infusion Assessment:  Patient tolerated infusion without incident.  Blood return noted pre and post infusion.  Site patent and intact, free from redness, edema or discomfort.  No evidence of extravasations.  Access discontinued per protocol.  Biologic Infusion Post Education: Call the triage nurse at your clinic or seek medical attention if you have chills and/or temperature greater than or equal to 100.5, uncontrolled nausea/vomiting, diarrhea, constipation, dizziness, shortness of breath, chest pain, heart palpitations, weakness or any other new or concerning symptoms, questions or concerns.  You cannot have any live virus vaccines prior to or during treatment or up to 6 months post infusion.  If you have an upcoming surgery, medical procedure or dental procedure during treatment, this should be discussed with your ordering physician and your surgeon/dentist.  If you are having any concerning symptom, if you are unsure if you should get your next infusion or wish to speak to a provider before your next infusion, please call your care coordinator or triage nurse at your clinic to notify them so we can adequately serve you.     Discharge Plan:   Discharge instructions reviewed with: Patient.  Patient and/or family verbalized understanding of discharge instructions and all questions answered.  Copy of AVS declined by patient and/or family.  Patient will schedule on way out for next appointment.  AVS to patient via [employer].    Patient discharged in stable condition accompanied by: self and .  Departure Mode: Wheelchair.      Yolanda Lanza RN

## 2024-07-09 ENCOUNTER — HOSPITAL ENCOUNTER (OUTPATIENT)
Dept: RESPIRATORY THERAPY | Facility: OTHER | Age: 74
Discharge: HOME | End: 2024-07-09
Attending: INTERNAL MEDICINE | Admitting: INTERNAL MEDICINE
Payer: MEDICARE

## 2024-07-09 PROCEDURE — 94625 PHY/QHP OP PULM RHB W/O MNTR: CPT

## 2024-07-09 PROCEDURE — 999N000157 HC STATISTIC RCP TIME EA 10 MIN

## 2024-07-09 NOTE — PROGRESS NOTES
Pt here for Pulmonary Rehab today. Pt did Lower Body Exercises for 5 reps a piece with no added weight. Pt exercised on the NuStep at level 3 for 40 minutes, maintaining MET levels of 3.5. Pre exercise vitals: HR 92, /86, and SpO2 97% on Room Air. Exercise vitals:  and SpO2 95% on Room Air. Post Exercise vitals: HR 98, /84, and SpO2 96% on Room Air. Education today was from the book Living Well with Chronic Lung Disease pg's 60-61 on Advanced Directives and Treatment plans. Total session time today lasted 90 minutes.    Burak Coleman, RT

## 2024-07-30 DIAGNOSIS — J44.9 CHRONIC OBSTRUCTIVE PULMONARY DISEASE, UNSPECIFIED COPD TYPE (H): ICD-10-CM

## 2024-08-01 DIAGNOSIS — M05.9 SEROPOSITIVE RHEUMATOID ARTHRITIS (H): ICD-10-CM

## 2024-08-01 DIAGNOSIS — E85.89 OTHER AMYLOIDOSIS (H): ICD-10-CM

## 2024-08-01 DIAGNOSIS — Z79.52 ON PREDNISONE THERAPY: Primary | ICD-10-CM

## 2024-08-01 RX ORDER — BUDESONIDE 0.5 MG/2ML
INHALANT ORAL
Qty: 360 ML | Refills: 4 | OUTPATIENT
Start: 2024-08-01

## 2024-08-01 NOTE — TELEPHONE ENCOUNTER
[facility] sent Rx request for the following:      Redundant refill request refused: Too soon:  budesonide (PULMICORT) 0.5 MG/2ML neb solution 360 mL 4 5/6/2024 -- No   Sig - Route: Take 2 mLs (0.5 mg) by nebulization 2 times daily - Nebulization   Sent to pharmacy as: Budesonide 0.5 MG/2ML Inhalation Suspension (PULMICORT)   Class: E-Prescribe   Order: [id number]   E-Prescribing Status: Receipt confirmed by pharmacy (5/6/2024 11:06 AM CDT)     Printout Tracking    External Result Report     Pharmacy    [id number] - [place], [place] - [address]     Kelsey Brody RN .............. 8/1/2024  12:55 PM

## 2024-08-02 RX ORDER — EPINEPHRINE 1 MG/ML
0.3 INJECTION, SOLUTION, CONCENTRATE INTRAVENOUS EVERY 5 MIN PRN
Status: CANCELLED | OUTPATIENT
Start: 2024-08-02

## 2024-08-02 RX ORDER — DIPHENHYDRAMINE HYDROCHLORIDE 50 MG/ML
50 INJECTION INTRAMUSCULAR; INTRAVENOUS
Status: CANCELLED
Start: 2024-08-02

## 2024-08-02 RX ORDER — ALBUTEROL SULFATE 90 UG/1
1-2 AEROSOL, METERED RESPIRATORY (INHALATION)
Status: CANCELLED
Start: 2024-08-02

## 2024-08-02 RX ORDER — METHYLPREDNISOLONE SODIUM SUCCINATE 125 MG/2ML
125 INJECTION, POWDER, LYOPHILIZED, FOR SOLUTION INTRAMUSCULAR; INTRAVENOUS
Status: CANCELLED
Start: 2024-08-02

## 2024-08-02 RX ORDER — ALBUTEROL SULFATE 0.83 MG/ML
2.5 SOLUTION RESPIRATORY (INHALATION)
Status: CANCELLED | OUTPATIENT
Start: 2024-08-02

## 2024-08-05 ENCOUNTER — HOSPITAL ENCOUNTER (OUTPATIENT)
Dept: INFUSION THERAPY | Facility: OTHER | Age: 74
Discharge: HOME | End: 2024-08-05
Attending: INTERNAL MEDICINE | Admitting: INTERNAL MEDICINE
Payer: MEDICARE

## 2024-08-05 VITALS
TEMPERATURE: 97.2 F | WEIGHT: 147 LBS | SYSTOLIC BLOOD PRESSURE: 134 MMHG | BODY MASS INDEX: 26.46 KG/M2 | HEART RATE: 91 BPM | RESPIRATION RATE: 20 BRPM | DIASTOLIC BLOOD PRESSURE: 59 MMHG

## 2024-08-05 DIAGNOSIS — Z79.4 DIABETES MELLITUS TYPE 2, INSULIN DEPENDENT (H): ICD-10-CM

## 2024-08-05 DIAGNOSIS — M05.9 SEROPOSITIVE RHEUMATOID ARTHRITIS (H): ICD-10-CM

## 2024-08-05 DIAGNOSIS — E11.9 DIABETES MELLITUS TYPE 2, INSULIN DEPENDENT (H): ICD-10-CM

## 2024-08-05 DIAGNOSIS — E85.89 OTHER AMYLOIDOSIS (H): ICD-10-CM

## 2024-08-05 DIAGNOSIS — Z79.52 ON PREDNISONE THERAPY: Primary | ICD-10-CM

## 2024-08-05 PROCEDURE — 258N000003 HC RX IP 258 OP 636: Performed by: INTERNAL MEDICINE

## 2024-08-05 PROCEDURE — 96365 THER/PROPH/DIAG IV INF INIT: CPT

## 2024-08-05 PROCEDURE — 250N000028 HC RX JA MOD (INTRAVENOUS), IP 250 OP 636: Mod: JA | Performed by: INTERNAL MEDICINE

## 2024-08-05 RX ORDER — ALBUTEROL SULFATE 90 UG/1
1-2 AEROSOL, METERED RESPIRATORY (INHALATION)
Status: CANCELLED
Start: 2024-08-09

## 2024-08-05 RX ORDER — DIPHENHYDRAMINE HYDROCHLORIDE 50 MG/ML
50 INJECTION INTRAMUSCULAR; INTRAVENOUS
Status: CANCELLED
Start: 2024-08-09

## 2024-08-05 RX ORDER — ALBUTEROL SULFATE 0.83 MG/ML
2.5 SOLUTION RESPIRATORY (INHALATION)
Status: CANCELLED | OUTPATIENT
Start: 2024-08-09

## 2024-08-05 RX ORDER — EPINEPHRINE 1 MG/ML
0.3 INJECTION, SOLUTION, CONCENTRATE INTRAVENOUS EVERY 5 MIN PRN
Status: CANCELLED | OUTPATIENT
Start: 2024-08-09

## 2024-08-05 RX ORDER — METHYLPREDNISOLONE SODIUM SUCCINATE 125 MG/2ML
125 INJECTION, POWDER, LYOPHILIZED, FOR SOLUTION INTRAMUSCULAR; INTRAVENOUS
Status: CANCELLED
Start: 2024-08-09

## 2024-08-05 RX ADMIN — SODIUM CHLORIDE 250 ML: 9 INJECTION, SOLUTION INTRAVENOUS at 13:13

## 2024-08-05 RX ADMIN — SODIUM CHLORIDE 750 MG: 9 INJECTION, SOLUTION INTRAVENOUS at 13:27

## 2024-08-05 NOTE — NURSING NOTE
Infusion Nursing Note:  Laura Perez presents today for Orencia.    Patient seen by provider today: No   present during visit today: Not Applicable.    Note: N/A.      Intravenous Access:  Peripheral IV placed to right hand.    Treatment Conditions:  Biological Infusion Checklist:  ~~~ NOTE: If the patient answers yes to any of the questions below, hold the infusion and contact ordering provider or on-call provider.    Have you recently had an elevated temperature, fever, chills, productive cough, coughing for 3 weeks or longer or hemoptysis,  abnormal vital signs, night sweats,  chest pain or have you noticed a decrease in your appetite, unexplained weight loss or fatigue? No  Do you have any open wounds or new incisions? No  Do you have any upcoming hospitalizations or surgeries? Does not include esophagogastroduodenoscopy, colonoscopy, endoscopic retrograde cholangiopancreatography (ERCP), endoscopic ultrasound (EUS), dental procedures or joint aspiration/steroid injections No  Do you currently have any signs of illness or infection or are you on any antibiotics? No  Have you had any new, sudden or worsening abdominal pain? No  Have you or anyone in your household received a live vaccination in the past 4 weeks? Please note: No live vaccines while on biologic/chemotherapy until 6 months after the last treatment. Patient can receive the flu vaccine (shot only), pneumovax and the Covid vaccine. It is optimal for the patient to get these vaccines mid cycle, but they can be given at any time as long as it is not on the day of the infusion. No  Have you recently been diagnosed with any new nervous system diseases (ie. Multiple sclerosis, Guillain [place], seizures, neurological changes) or cancer diagnosis? Are you on any form of radiation or chemotherapy? No  Are you pregnant or breast feeding or do you have plans of pregnancy in the future? No  Have you been having any signs of worsening depression or  suicidal ideations?  (benlysta only) NA  Have there been any other new onset medical symptoms? No  Have you had any new blood clots? (IVIG only) NA      Post Infusion Assessment:  Patient tolerated infusion without incident.  Blood return noted pre and post infusion.  Site patent and intact, free from redness, edema or discomfort.  No evidence of extravasations.  Access discontinued per protocol.  Biologic Infusion Post Education: Call the triage nurse at your clinic or seek medical attention if you have chills and/or temperature greater than or equal to 100.5, uncontrolled nausea/vomiting, diarrhea, constipation, dizziness, shortness of breath, chest pain, heart palpitations, weakness or any other new or concerning symptoms, questions or concerns.  You cannot have any live virus vaccines prior to or during treatment or up to 6 months post infusion.  If you have an upcoming surgery, medical procedure or dental procedure during treatment, this should be discussed with your ordering physician and your surgeon/dentist.  If you are having any concerning symptom, if you are unsure if you should get your next infusion or wish to speak to a provider before your next infusion, please call your care coordinator or triage nurse at your clinic to notify them so we can adequately serve you.       Discharge Plan:   Discharge instructions reviewed with: Patient.  Patient and/or family verbalized understanding of discharge instructions and all questions answered.  Patient discharged in stable condition accompanied by: self and spouse  Departure Mode: Wheelchair with spouse assist.      Reema Bender RN

## 2024-08-06 RX ORDER — BLOOD SUGAR DIAGNOSTIC
STRIP MISCELLANEOUS
Qty: 200 STRIP | Refills: 0 | Status: SHIPPED | OUTPATIENT
Start: 2024-08-06

## 2024-08-06 NOTE — TELEPHONE ENCOUNTER
[facility] sent Rx request for the following:      Requested Prescriptions   Pending Prescriptions Disp Refills    blood glucose (ONETOUCH VERIO IQ) test strip 200 strip 3     Sig: USE TO TEST BLOOD SUGAR TWICE DAILY OR AS DIRECTED       Diabetic Supplies Protocol Passed - 8/6/2024  9:33 AM   Last Prescription Date:   1/31/23  Last Fill Qty/Refills:         200, R-3    Last Office Visit:              6/7/24   Future Office visit:            none     Clari Burrell RN on 8/6/2024 at 9:33 AM

## 2024-08-09 DIAGNOSIS — E11.9 DIABETES MELLITUS TYPE 2, INSULIN DEPENDENT (H): ICD-10-CM

## 2024-08-09 DIAGNOSIS — Z79.4 DIABETES MELLITUS TYPE 2, INSULIN DEPENDENT (H): ICD-10-CM

## 2024-08-13 RX ORDER — BLOOD SUGAR DIAGNOSTIC
STRIP MISCELLANEOUS
Qty: 200 STRIP | Refills: 0 | OUTPATIENT
Start: 2024-08-13

## 2024-08-13 NOTE — TELEPHONE ENCOUNTER
[facility] requesting:  blood glucose (ONETOUCH VERIO IQ) test strip 200 strip 0 8/6/2024 -- No   Sig: USE TO TEST BLOOD SUGAR TWICE DAILY OR AS DIRECTED   Sent to pharmacy as: OneTouch Verio In Vitro Strip (blood glucose)   Class: E-Prescribe   Order: [id number]   E-Prescribing Status: Receipt confirmed by pharmacy (8/6/2024  9:34 AM CDT)       Duplicate

## 2024-09-03 ENCOUNTER — HOSPITAL ENCOUNTER (OUTPATIENT)
Dept: INFUSION THERAPY | Facility: OTHER | Age: 74
Discharge: HOME | End: 2024-09-03
Attending: INTERNAL MEDICINE | Admitting: INTERNAL MEDICINE
Payer: MEDICARE

## 2024-09-03 VITALS
DIASTOLIC BLOOD PRESSURE: 54 MMHG | HEART RATE: 89 BPM | RESPIRATION RATE: 14 BRPM | BODY MASS INDEX: 26.46 KG/M2 | SYSTOLIC BLOOD PRESSURE: 136 MMHG | WEIGHT: 147 LBS | TEMPERATURE: 97.1 F

## 2024-09-03 DIAGNOSIS — M05.9 SEROPOSITIVE RHEUMATOID ARTHRITIS (H): ICD-10-CM

## 2024-09-03 DIAGNOSIS — Z79.52 ON PREDNISONE THERAPY: Primary | ICD-10-CM

## 2024-09-03 DIAGNOSIS — E85.89 OTHER AMYLOIDOSIS (H): ICD-10-CM

## 2024-09-03 PROCEDURE — 96365 THER/PROPH/DIAG IV INF INIT: CPT

## 2024-09-03 PROCEDURE — 258N000003 HC RX IP 258 OP 636: Performed by: INTERNAL MEDICINE

## 2024-09-03 PROCEDURE — 250N000028 HC RX JA MOD (INTRAVENOUS), IP 250 OP 636: Mod: JA | Performed by: INTERNAL MEDICINE

## 2024-09-03 RX ORDER — ALBUTEROL SULFATE 0.83 MG/ML
2.5 SOLUTION RESPIRATORY (INHALATION)
OUTPATIENT
Start: 2024-09-06

## 2024-09-03 RX ORDER — ALBUTEROL SULFATE 90 UG/1
1-2 AEROSOL, METERED RESPIRATORY (INHALATION)
Start: 2024-09-06

## 2024-09-03 RX ORDER — EPINEPHRINE 1 MG/ML
0.3 INJECTION, SOLUTION, CONCENTRATE INTRAVENOUS EVERY 5 MIN PRN
OUTPATIENT
Start: 2024-09-06

## 2024-09-03 RX ORDER — DIPHENHYDRAMINE HYDROCHLORIDE 50 MG/ML
50 INJECTION INTRAMUSCULAR; INTRAVENOUS
Start: 2024-09-06

## 2024-09-03 RX ORDER — METHYLPREDNISOLONE SODIUM SUCCINATE 125 MG/2ML
125 INJECTION, POWDER, LYOPHILIZED, FOR SOLUTION INTRAMUSCULAR; INTRAVENOUS
Start: 2024-09-06

## 2024-09-03 RX ADMIN — SODIUM CHLORIDE 750 MG: 9 INJECTION, SOLUTION INTRAVENOUS at 13:55

## 2024-09-03 RX ADMIN — SODIUM CHLORIDE 250 ML: 9 INJECTION, SOLUTION INTRAVENOUS at 13:21

## 2024-09-03 NOTE — NURSING NOTE
Infusion Nursing Note:  Laura Perez presents today for orencia.    Patient seen by provider today: No   present during visit today: Not Applicable.    Note: N/A.      Intravenous Access:  Peripheral IV placed.    Treatment Conditions:  Biological Infusion Checklist:  ~~~ NOTE: If the patient answers yes to any of the questions below, hold the infusion and contact ordering provider or on-call provider.    Have you recently had an elevated temperature, fever, chills, productive cough, coughing for 3 weeks or longer or hemoptysis,  abnormal vital signs, night sweats,  chest pain or have you noticed a decrease in your appetite, unexplained weight loss or fatigue? No  Do you have any open wounds or new incisions? No  Do you have any upcoming hospitalizations or surgeries? Does not include esophagogastroduodenoscopy, colonoscopy, endoscopic retrograde cholangiopancreatography (ERCP), endoscopic ultrasound (EUS), dental procedures or joint aspiration/steroid injections No  Do you currently have any signs of illness or infection or are you on any antibiotics? No  Have you had any new, sudden or worsening abdominal pain? No  Have you or anyone in your household received a live vaccination in the past 4 weeks? Please note: No live vaccines while on biologic/chemotherapy until 6 months after the last treatment. Patient can receive the flu vaccine (shot only), pneumovax and the Covid vaccine. It is optimal for the patient to get these vaccines mid cycle, but they can be given at any time as long as it is not on the day of the infusion. No  Have you recently been diagnosed with any new nervous system diseases (ie. Multiple sclerosis, Guillain [place], seizures, neurological changes) or cancer diagnosis? Are you on any form of radiation or chemotherapy? No  Are you pregnant or breast feeding or do you have plans of pregnancy in the future? No  Have you been having any signs of worsening depression or suicidal  ideations?  (benlysta only) No  Have there been any other new onset medical symptoms? No  Have you had any new blood clots? (IVIG only) No      Post Infusion Assessment:  Patient tolerated infusion without incident.  Blood return noted pre and post infusion.  Site patent and intact, free from redness, edema or discomfort.  No evidence of extravasations.  Access discontinued per protocol.  Biologic Infusion Post Education: Call the triage nurse at your clinic or seek medical attention if you have chills and/or temperature greater than or equal to 100.5, uncontrolled nausea/vomiting, diarrhea, constipation, dizziness, shortness of breath, chest pain, heart palpitations, weakness or any other new or concerning symptoms, questions or concerns.  You cannot have any live virus vaccines prior to or during treatment or up to 6 months post infusion.  If you have an upcoming surgery, medical procedure or dental procedure during treatment, this should be discussed with your ordering physician and your surgeon/dentist.  If you are having any concerning symptom, if you are unsure if you should get your next infusion or wish to speak to a provider before your next infusion, please call your care coordinator or triage nurse at your clinic to notify them so we can adequately serve you.       Discharge Plan:   Discharge instructions reviewed with: Patient and Family.  Patient and/or family verbalized understanding of discharge instructions and all questions answered.  AVS to patient via [employer].  Patient will return 9/30/24 for next appointment.   Patient discharged in stable condition accompanied by: self and .  Departure Mode: Wheelchair.      Jayna Harrison RN

## 2024-09-30 ENCOUNTER — HOSPITAL ENCOUNTER (OUTPATIENT)
Dept: INFUSION THERAPY | Facility: OTHER | Age: 74
Discharge: HOME | End: 2024-09-30
Attending: INTERNAL MEDICINE | Admitting: INTERNAL MEDICINE
Payer: MEDICARE

## 2024-09-30 VITALS
HEART RATE: 67 BPM | TEMPERATURE: 97.4 F | BODY MASS INDEX: 26.39 KG/M2 | SYSTOLIC BLOOD PRESSURE: 147 MMHG | WEIGHT: 146.6 LBS | RESPIRATION RATE: 16 BRPM | DIASTOLIC BLOOD PRESSURE: 61 MMHG

## 2024-09-30 DIAGNOSIS — E85.89 OTHER AMYLOIDOSIS (H): ICD-10-CM

## 2024-09-30 DIAGNOSIS — M05.9 SEROPOSITIVE RHEUMATOID ARTHRITIS (H): ICD-10-CM

## 2024-09-30 DIAGNOSIS — Z79.52 ON PREDNISONE THERAPY: Primary | ICD-10-CM

## 2024-09-30 PROCEDURE — 250N000028 HC RX JA MOD (INTRAVENOUS), IP 250 OP 636: Mod: JA | Performed by: INTERNAL MEDICINE

## 2024-09-30 PROCEDURE — 258N000003 HC RX IP 258 OP 636: Performed by: INTERNAL MEDICINE

## 2024-09-30 PROCEDURE — 96365 THER/PROPH/DIAG IV INF INIT: CPT

## 2024-09-30 RX ORDER — DIPHENHYDRAMINE HYDROCHLORIDE 50 MG/ML
50 INJECTION INTRAMUSCULAR; INTRAVENOUS
Start: 2024-10-04

## 2024-09-30 RX ORDER — ALBUTEROL SULFATE 0.83 MG/ML
2.5 SOLUTION RESPIRATORY (INHALATION)
OUTPATIENT
Start: 2024-10-04

## 2024-09-30 RX ORDER — METHYLPREDNISOLONE SODIUM SUCCINATE 125 MG/2ML
125 INJECTION, POWDER, LYOPHILIZED, FOR SOLUTION INTRAMUSCULAR; INTRAVENOUS
Start: 2024-10-04

## 2024-09-30 RX ORDER — ALBUTEROL SULFATE 90 UG/1
1-2 AEROSOL, METERED RESPIRATORY (INHALATION)
Start: 2024-10-04

## 2024-09-30 RX ORDER — EPINEPHRINE 1 MG/ML
0.3 INJECTION, SOLUTION, CONCENTRATE INTRAVENOUS EVERY 5 MIN PRN
OUTPATIENT
Start: 2024-10-04

## 2024-09-30 RX ADMIN — SODIUM CHLORIDE 750 MG: 9 INJECTION, SOLUTION INTRAVENOUS at 13:42

## 2024-09-30 RX ADMIN — SODIUM CHLORIDE 250 ML: 9 INJECTION, SOLUTION INTRAVENOUS at 13:31

## 2024-09-30 NOTE — NURSING NOTE
~~~ NOTE: If the patient answers yes to any of the questions below, hold the infusion and contact ordering provider or on-call provider.    Do you currently have any signs of illness or infection or are you on any antibiotics? No  Have you recently had an elevated temperature, fever, chills, productive cough, coughing for 3 weeks or longer or hemoptysis, abnormal vital signs, night sweats, chest pain or have you noticed a decrease in your appetite, unexplained weight loss or fatigue? No  Have you had any new, sudden, or worsening abdominal pain? No  Do you have any open wounds or new incisions? (exclude for patients with hidradenitis suppurativa) No  Have you recently been diagnosed with any new nervous system diseases (ie. Multiple sclerosis, Guillain [place], seizures, neurological changes) or cancer diagnosis? Are you on any form of radiation or chemotherapy? No  Have there been any other new onset medical symptoms? No  Are you pregnant or breast feeding or do you have plans of pregnancy in the future? No; N/A  Do you have any upcoming hospitalizations or surgeries? Does not include esophagogastroduodenoscopy, colonoscopy, endoscopic retrograde cholangiopancreatography (ERCP), endoscopic ultrasound (EUS), dental procedures (including cleanings, fillings, implants, extractions)  or joint aspiration/steroid injections No  Have you or anyone in your household received a live vaccination in the past 4 weeks? Please note: No live vaccines while on biologic/chemotherapy until 6 months after the last treatment. Patient can receive the flu vaccine (shot only).  It is optimal for the patient to get it mid cycle, but it can be given at any time as long as it is not on the day of the infusion. No  If applicable to prescribed medication, confirm negative PPD or quantiferon gold MTB. If positive, verify has negative chest x-ray or the patient is at least 4 weeks post initiation of INH/B6 therapy and have clearance from provider  before infusion. Negative 7/13/2023  If applicable to prescribed medication, confirm negative hepatitis B surface antigen or hepatitis C. If positive, clearance from provider before infusion. Negative hepatitis B 8/10/2022  Rheumatology patients receiving tocilizumab (Actemra): If labs were drawn within the past week, hold dosing until cleared to infuse If AST/ALT > 2 X upper limit normal; ANC < 1.0. N/A  Patients receiving belimumab (Benlysta): Have you been having any signs of worsening depression or suicidal ideations? No; N/A

## 2024-09-30 NOTE — NURSING NOTE
Infusion Nursing Note:  Laura Perez presents today for Orencia.    Patient seen by provider today: No   present during visit today: Not Applicable.    Note: N/A.      Intravenous Access:  Peripheral IV placed.    Treatment Conditions:  Biological infusion checklist completed prior to infusion.      Post Infusion Assessment:  Patient tolerated infusion without incident.  Blood return noted pre and post infusion.  Site patent and intact, free from redness, edema or discomfort.  No evidence of extravasations.  Access discontinued per protocol.  Biologic Infusion Post Education: Call the triage nurse at your clinic or seek medical attention if you have chills and/or temperature greater than or equal to 100.5, uncontrolled nausea/vomiting, diarrhea, constipation, dizziness, shortness of breath, chest pain, heart palpitations, weakness or any other new or concerning symptoms, questions or concerns.  You cannot have any live virus vaccines prior to or during treatment or up to 6 months post infusion.  If you have an upcoming surgery, medical procedure or dental procedure during treatment, this should be discussed with your ordering physician and your surgeon/dentist.  If you are having any concerning symptom, if you are unsure if you should get your next infusion or wish to speak to a provider before your next infusion, please call your care coordinator or triage nurse at your clinic to notify them so we can adequately serve you.       Discharge Plan:   Discharge instructions reviewed with: Patient and Family.  Patient and/or family verbalized understanding of discharge instructions and all questions answered.  Copy of AVS reviewed with patient and/or family.  Patient will return 10/28/2024 for next appointment.  Patient discharged in stable condition accompanied by: .  Departure Mode: Wheelchair.      Lesa William RN

## 2024-10-15 ENCOUNTER — LAB (OUTPATIENT)
Dept: LAB | Facility: OTHER | Age: 74
End: 2024-10-15
Payer: MEDICARE

## 2024-10-15 DIAGNOSIS — Z01.89 EXAMINATION FOR, LABORATORY: Primary | ICD-10-CM

## 2024-10-15 PROCEDURE — 99000 SPECIMEN HANDLING OFFICE-LAB: CPT

## 2024-10-15 PROCEDURE — 36415 COLL VENOUS BLD VENIPUNCTURE: CPT | Mod: ZL

## 2024-10-23 ENCOUNTER — TELEPHONE (OUTPATIENT)
Dept: PEDIATRICS | Facility: OTHER | Age: 74
End: 2024-10-23
Payer: MEDICARE

## 2024-10-23 DIAGNOSIS — N18.32 STAGE 3B CHRONIC KIDNEY DISEASE (H): ICD-10-CM

## 2024-10-23 DIAGNOSIS — M81.0 OSTEOPOROSIS, UNSPECIFIED OSTEOPOROSIS TYPE, UNSPECIFIED PATHOLOGICAL FRACTURE PRESENCE: Primary | ICD-10-CM

## 2024-10-24 DIAGNOSIS — N18.32 STAGE 3B CHRONIC KIDNEY DISEASE (H): ICD-10-CM

## 2024-10-24 DIAGNOSIS — M81.0 OSTEOPOROSIS, UNSPECIFIED OSTEOPOROSIS TYPE, UNSPECIFIED PATHOLOGICAL FRACTURE PRESENCE: Primary | ICD-10-CM

## 2024-10-24 RX ORDER — MEPERIDINE HYDROCHLORIDE 50 MG/ML
25 INJECTION INTRAMUSCULAR; INTRAVENOUS; SUBCUTANEOUS EVERY 30 MIN PRN
OUTPATIENT
Start: 2024-10-24

## 2024-10-24 RX ORDER — EPINEPHRINE 1 MG/ML
0.3 INJECTION, SOLUTION, CONCENTRATE INTRAVENOUS EVERY 5 MIN PRN
OUTPATIENT
Start: 2024-10-24

## 2024-10-24 RX ORDER — METHYLPREDNISOLONE SODIUM SUCCINATE 125 MG/2ML
125 INJECTION INTRAMUSCULAR; INTRAVENOUS
Start: 2024-10-24

## 2024-10-24 RX ORDER — ALBUTEROL SULFATE 90 UG/1
1-2 INHALANT RESPIRATORY (INHALATION)
Start: 2024-10-24

## 2024-10-24 RX ORDER — DIPHENHYDRAMINE HYDROCHLORIDE 50 MG/ML
50 INJECTION INTRAMUSCULAR; INTRAVENOUS
Start: 2024-10-24

## 2024-10-24 RX ORDER — ALBUTEROL SULFATE 0.83 MG/ML
2.5 SOLUTION RESPIRATORY (INHALATION)
OUTPATIENT
Start: 2024-10-24

## 2024-10-25 ENCOUNTER — ALLIED HEALTH/NURSE VISIT (OUTPATIENT)
Dept: FAMILY MEDICINE | Facility: OTHER | Age: 74
End: 2024-10-25
Attending: INTERNAL MEDICINE
Payer: MEDICARE

## 2024-10-25 DIAGNOSIS — N18.32 STAGE 3B CHRONIC KIDNEY DISEASE (H): ICD-10-CM

## 2024-10-25 DIAGNOSIS — M81.0 OSTEOPOROSIS, UNSPECIFIED OSTEOPOROSIS TYPE, UNSPECIFIED PATHOLOGICAL FRACTURE PRESENCE: Primary | ICD-10-CM

## 2024-10-25 DIAGNOSIS — Z23 ENCOUNTER FOR IMMUNIZATION: ICD-10-CM

## 2024-10-25 LAB
CALCIUM SERPL-MCNC: 9.6 MG/DL (ref 8.8–10.4)
CREAT SERPL-MCNC: 1.78 MG/DL (ref 0.51–0.95)
EGFRCR SERPLBLD CKD-EPI 2021: 29 ML/MIN/1.73M2

## 2024-10-25 PROCEDURE — G0008 ADMIN INFLUENZA VIRUS VAC: HCPCS

## 2024-10-25 PROCEDURE — 82310 ASSAY OF CALCIUM: CPT | Mod: ZL | Performed by: INTERNAL MEDICINE

## 2024-10-25 PROCEDURE — 96372 THER/PROPH/DIAG INJ SC/IM: CPT | Performed by: INTERNAL MEDICINE

## 2024-10-25 PROCEDURE — 36415 COLL VENOUS BLD VENIPUNCTURE: CPT | Mod: ZL | Performed by: INTERNAL MEDICINE

## 2024-10-25 PROCEDURE — 250N000011 HC RX IP 250 OP 636: Performed by: INTERNAL MEDICINE

## 2024-10-25 PROCEDURE — 82565 ASSAY OF CREATININE: CPT | Mod: ZL | Performed by: INTERNAL MEDICINE

## 2024-10-25 RX ORDER — ALBUTEROL SULFATE 90 UG/1
1-2 INHALANT RESPIRATORY (INHALATION)
Start: 2024-10-28

## 2024-10-25 RX ORDER — MEPERIDINE HYDROCHLORIDE 50 MG/ML
25 INJECTION INTRAMUSCULAR; INTRAVENOUS; SUBCUTANEOUS EVERY 30 MIN PRN
OUTPATIENT
Start: 2025-03-27

## 2024-10-25 RX ORDER — METHYLPREDNISOLONE SODIUM SUCCINATE 125 MG/2ML
125 INJECTION INTRAMUSCULAR; INTRAVENOUS
Start: 2025-03-27

## 2024-10-25 RX ORDER — ALBUTEROL SULFATE 90 UG/1
1-2 INHALANT RESPIRATORY (INHALATION)
Start: 2025-03-27

## 2024-10-25 RX ORDER — METHYLPREDNISOLONE SODIUM SUCCINATE 125 MG/2ML
125 INJECTION INTRAMUSCULAR; INTRAVENOUS
Start: 2024-10-28

## 2024-10-25 RX ORDER — ALBUTEROL SULFATE 0.83 MG/ML
2.5 SOLUTION RESPIRATORY (INHALATION)
OUTPATIENT
Start: 2025-03-27

## 2024-10-25 RX ORDER — EPINEPHRINE 1 MG/ML
0.3 INJECTION, SOLUTION, CONCENTRATE INTRAVENOUS EVERY 5 MIN PRN
OUTPATIENT
Start: 2024-10-28

## 2024-10-25 RX ORDER — EPINEPHRINE 1 MG/ML
0.3 INJECTION, SOLUTION, CONCENTRATE INTRAVENOUS EVERY 5 MIN PRN
OUTPATIENT
Start: 2025-03-27

## 2024-10-25 RX ORDER — DIPHENHYDRAMINE HYDROCHLORIDE 50 MG/ML
50 INJECTION INTRAMUSCULAR; INTRAVENOUS
Start: 2025-03-27

## 2024-10-25 RX ORDER — ALBUTEROL SULFATE 0.83 MG/ML
2.5 SOLUTION RESPIRATORY (INHALATION)
OUTPATIENT
Start: 2024-10-28

## 2024-10-25 RX ORDER — DIPHENHYDRAMINE HYDROCHLORIDE 50 MG/ML
50 INJECTION INTRAMUSCULAR; INTRAVENOUS
Start: 2024-10-28

## 2024-10-25 RX ADMIN — DENOSUMAB 60 MG: 60 INJECTION SUBCUTANEOUS at 11:12

## 2024-10-25 NOTE — PROGRESS NOTES
Clinic Administered Medication Documentation      Prolia Documentation    Indication: Prolia  (denosumab) is a prescription medicine used to treat osteoporosis in patients who:   Are at high risk for fracture, meaning patients who have had a fracture related to osteoporosis, or who have multiple risk factors for fracture.  Cannot use another osteoporosis medicine or other osteoporosis medicines did not work well.  The timeline for early/late injections would be 4 weeks early and any time after the 6 month anjali. If a patient receives their injection late, then the subsequent injection would be 6 months from the date that they actually received the injection.    When was the last injection?  2024  Was the last injection at least 6 months ago? Yes  Has the prior authorization been completed?  Yes  Is there an active order (written within the past 365 days, with administrations remaining, not ) in the chart?  Yes   GFR Estimate   Date Value Ref Range Status   10/25/2024 29 (L) >60 mL/min/1.73m2 Final     Comment:     eGFR calculated using  CKD-EPI equation.   2021 39 (L) >60 mL/min/[1.73_m2] Final     Has patient had a GFR within the last 12 months? Yes   Is GFR under 30, or patient has a diagnosis of CKD4 or CKD5? Yes   Calcium   Date Value Ref Range Status   10/25/2024 9.6 8.8 - 10.4 mg/dL Final     Comment:     Reference intervals for this test were updated on 2024 to reflect our healthy population more accurately. There may be differences in the flagging of prior results with similar values performed with this method. Those prior results can be interpreted in the context of the updated reference intervals.   2020 9.3 8.6 - 10.3 mg/dL Final     Is calcium result 8.5 or above? Yes   Was the calcium done after last Prolia injection? Yes   Is there a calcium order for 1 month post Prolia injection? Yes. Schedule patient for Calcium lab in next month.   Patient denies gastric bypass or  parathyroid surgery in past 6 months? Yes - patient denies.   Patient denies dental work in the past two months involving drilling into the bone, such as implants/extractions, oral surgery or a tooth extraction that has not healed yet?  Yes  Patient denies plans for an emergency tooth extraction within the next week? Yes    The following steps were completed to comply with the REMS program for Prolia:  Reviewed information in the Medication Guide, including the serious risks of Prolia  and the symptoms of each risk.  Advised patient to seek prompt medical attention if they have signs or symptoms of any of the serious risks.  Provided each patient a copy of the Medication Guide and Patient Guide.    Prior to injection, verified patient identity using patient's name and date of birth. Medication was administered. Please see MAR and medication order for additional information. Patient instructed to remain in clinic for 15 minutes and report any adverse reaction to staff immediately.    Vial/Syringe: Syringe  Was this medication supplied by the patient? No  Verified that the patient has refills remaining in their prescription.  Prior to immunization administration, verified patients identity using patient s name and date of birth. Please see Immunization Activity for additional information.     Is the patient's temperature normal (100.5 or less)? Yes     Patient MEETS CRITERIA. PROCEED with vaccine administration.      Patient instructed to remain in clinic for 15 minutes afterwards, and to report any adverse reactions.      Link to Ancillary Visit Immunization Standing Orders SmartSet     Screening performed by Joe Dolan RN on 10/25/2024 at 11:41 AM.

## 2024-10-28 ENCOUNTER — HOSPITAL ENCOUNTER (OUTPATIENT)
Dept: INFUSION THERAPY | Facility: OTHER | Age: 74
Discharge: HOME | End: 2024-10-28
Attending: INTERNAL MEDICINE | Admitting: INTERNAL MEDICINE
Payer: MEDICARE

## 2024-10-28 VITALS
BODY MASS INDEX: 26.28 KG/M2 | RESPIRATION RATE: 16 BRPM | HEART RATE: 84 BPM | WEIGHT: 146 LBS | TEMPERATURE: 97.4 F | DIASTOLIC BLOOD PRESSURE: 67 MMHG | SYSTOLIC BLOOD PRESSURE: 145 MMHG

## 2024-10-28 DIAGNOSIS — M05.9 SEROPOSITIVE RHEUMATOID ARTHRITIS (H): Primary | ICD-10-CM

## 2024-10-28 DIAGNOSIS — E85.89 OTHER AMYLOIDOSIS (H): ICD-10-CM

## 2024-10-28 DIAGNOSIS — Z79.52 ON PREDNISONE THERAPY: ICD-10-CM

## 2024-10-28 PROCEDURE — 96365 THER/PROPH/DIAG IV INF INIT: CPT

## 2024-10-28 PROCEDURE — 250N000028 HC RX JA MOD (INTRAVENOUS), IP 250 OP 636: Mod: JZ,JA | Performed by: INTERNAL MEDICINE

## 2024-10-28 PROCEDURE — 258N000003 HC RX IP 258 OP 636: Performed by: INTERNAL MEDICINE

## 2024-10-28 RX ORDER — METHYLPREDNISOLONE SODIUM SUCCINATE 125 MG/2ML
125 INJECTION INTRAMUSCULAR; INTRAVENOUS
Start: 2024-11-01

## 2024-10-28 RX ORDER — ALBUTEROL SULFATE 90 UG/1
1-2 INHALANT RESPIRATORY (INHALATION)
Start: 2024-11-01

## 2024-10-28 RX ORDER — DIPHENHYDRAMINE HYDROCHLORIDE 50 MG/ML
50 INJECTION INTRAMUSCULAR; INTRAVENOUS
Start: 2024-11-01

## 2024-10-28 RX ORDER — ALBUTEROL SULFATE 0.83 MG/ML
2.5 SOLUTION RESPIRATORY (INHALATION)
OUTPATIENT
Start: 2024-11-01

## 2024-10-28 RX ORDER — EPINEPHRINE 1 MG/ML
0.3 INJECTION, SOLUTION, CONCENTRATE INTRAVENOUS EVERY 5 MIN PRN
OUTPATIENT
Start: 2024-11-01

## 2024-10-28 RX ADMIN — SODIUM CHLORIDE 750 MG: 900 INJECTION, SOLUTION INTRAVENOUS at 13:39

## 2024-10-28 RX ADMIN — SODIUM CHLORIDE 250 ML: 900 INJECTION, SOLUTION INTRAVENOUS at 13:32

## 2024-10-28 NOTE — NURSING NOTE
Infusion Nursing Note:  Laura Perez presents today for Orencia.    Patient seen by provider today: No   present during visit today: Not Applicable.    Note: N/A.      Intravenous Access:  Peripheral IV placed.    Treatment Conditions:  Biological Infusion Checklist:  ~~~ NOTE: If the patient answers yes to any of the questions below, hold the infusion and contact ordering provider or on-call provider.    Have you recently had an elevated temperature, fever, chills, productive cough, coughing for 3 weeks or longer or hemoptysis,  abnormal vital signs, night sweats,  chest pain or have you noticed a decrease in your appetite, unexplained weight loss or fatigue? No  Do you have any open wounds or new incisions? No  Do you have any upcoming hospitalizations or surgeries? Does not include esophagogastroduodenoscopy, colonoscopy, endoscopic retrograde cholangiopancreatography (ERCP), endoscopic ultrasound (EUS), dental procedures or joint aspiration/steroid injections No  Do you currently have any signs of illness or infection or are you on any antibiotics? No  Have you had any new, sudden or worsening abdominal pain? No  Have you or anyone in your household received a live vaccination in the past 4 weeks? Please note: No live vaccines while on biologic/chemotherapy until 6 months after the last treatment. Patient can receive the flu vaccine (shot only), pneumovax and the Covid vaccine. It is optimal for the patient to get these vaccines mid cycle, but they can be given at any time as long as it is not on the day of the infusion. No  Have you recently been diagnosed with any new nervous system diseases (ie. Multiple sclerosis, Guillain [place], seizures, neurological changes) or cancer diagnosis? Are you on any form of radiation or chemotherapy? No  Are you pregnant or breast feeding or do you have plans of pregnancy in the future? No  Have you been having any signs of worsening depression or suicidal  ideations?  (benlysta only) No  Have there been any other new onset medical symptoms? No  Have you had any new blood clots? (IVIG only) No      Post Infusion Assessment:  Patient tolerated infusion without incident.  Blood return noted pre and post infusion.  Site patent and intact, free from redness, edema or discomfort.  No evidence of extravasations.  Access discontinued per protocol.  Biologic Infusion Post Education: Call the triage nurse at your clinic or seek medical attention if you have chills and/or temperature greater than or equal to 100.5, uncontrolled nausea/vomiting, diarrhea, constipation, dizziness, shortness of breath, chest pain, heart palpitations, weakness or any other new or concerning symptoms, questions or concerns.  You cannot have any live virus vaccines prior to or during treatment or up to 6 months post infusion.  If you have an upcoming surgery, medical procedure or dental procedure during treatment, this should be discussed with your ordering physician and your surgeon/dentist.  If you are having any concerning symptom, if you are unsure if you should get your next infusion or wish to speak to a provider before your next infusion, please call your care coordinator or triage nurse at your clinic to notify them so we can adequately serve you.       Discharge Plan:   Discharge instructions reviewed with: Patient and Family.  Patient and/or family verbalized understanding of discharge instructions and all questions answered.  AVS to patient via [employer].  Patient will return 11/25/2024 for next appointment.   Patient discharged in stable condition accompanied by: .  Departure Mode: Wheelchair.      Lesa William RN

## 2024-11-20 ASSESSMENT — PATIENT HEALTH QUESTIONNAIRE - PHQ9
SUM OF ALL RESPONSES TO PHQ QUESTIONS 1-9: 11
10. IF YOU CHECKED OFF ANY PROBLEMS, HOW DIFFICULT HAVE THESE PROBLEMS MADE IT FOR YOU TO DO YOUR WORK, TAKE CARE OF THINGS AT HOME, OR GET ALONG WITH OTHER PEOPLE: SOMEWHAT DIFFICULT
SUM OF ALL RESPONSES TO PHQ QUESTIONS 1-9: 11

## 2024-11-21 ENCOUNTER — OFFICE VISIT (OUTPATIENT)
Dept: PEDIATRICS | Facility: OTHER | Age: 74
End: 2024-11-21
Attending: INTERNAL MEDICINE
Payer: MEDICARE

## 2024-11-21 VITALS
WEIGHT: 148.7 LBS | DIASTOLIC BLOOD PRESSURE: 60 MMHG | RESPIRATION RATE: 20 BRPM | BODY MASS INDEX: 26.35 KG/M2 | HEIGHT: 63 IN | OXYGEN SATURATION: 97 % | TEMPERATURE: 99.1 F | SYSTOLIC BLOOD PRESSURE: 138 MMHG | HEART RATE: 104 BPM

## 2024-11-21 DIAGNOSIS — E53.8 VITAMIN B12 DEFICIENCY (NON ANEMIC): ICD-10-CM

## 2024-11-21 DIAGNOSIS — D47.2 MGUS (MONOCLONAL GAMMOPATHY OF UNKNOWN SIGNIFICANCE): ICD-10-CM

## 2024-11-21 DIAGNOSIS — M05.9 SEROPOSITIVE RHEUMATOID ARTHRITIS (H): Primary | ICD-10-CM

## 2024-11-21 DIAGNOSIS — D75.89 MACROCYTOSIS: ICD-10-CM

## 2024-11-21 PROCEDURE — G0463 HOSPITAL OUTPT CLINIC VISIT: HCPCS

## 2024-11-21 RX ORDER — TRAMADOL HYDROCHLORIDE 50 MG/1
50 TABLET ORAL
Qty: 30 TABLET | Refills: 5 | Status: SHIPPED | OUTPATIENT
Start: 2024-11-21

## 2024-11-21 ASSESSMENT — ANXIETY QUESTIONNAIRES
1. FEELING NERVOUS, ANXIOUS, OR ON EDGE: SEVERAL DAYS
3. WORRYING TOO MUCH ABOUT DIFFERENT THINGS: SEVERAL DAYS
6. BECOMING EASILY ANNOYED OR IRRITABLE: SEVERAL DAYS
GAD7 TOTAL SCORE: 6
GAD7 TOTAL SCORE: 6
4. TROUBLE RELAXING: SEVERAL DAYS
7. FEELING AFRAID AS IF SOMETHING AWFUL MIGHT HAPPEN: SEVERAL DAYS
2. NOT BEING ABLE TO STOP OR CONTROL WORRYING: SEVERAL DAYS
GAD7 TOTAL SCORE: 6
8. IF YOU CHECKED OFF ANY PROBLEMS, HOW DIFFICULT HAVE THESE MADE IT FOR YOU TO DO YOUR WORK, TAKE CARE OF THINGS AT HOME, OR GET ALONG WITH OTHER PEOPLE?: NOT DIFFICULT AT ALL
5. BEING SO RESTLESS THAT IT IS HARD TO SIT STILL: NOT AT ALL
IF YOU CHECKED OFF ANY PROBLEMS ON THIS QUESTIONNAIRE, HOW DIFFICULT HAVE THESE PROBLEMS MADE IT FOR YOU TO DO YOUR WORK, TAKE CARE OF THINGS AT HOME, OR GET ALONG WITH OTHER PEOPLE: NOT DIFFICULT AT ALL
7. FEELING AFRAID AS IF SOMETHING AWFUL MIGHT HAPPEN: SEVERAL DAYS

## 2024-11-21 ASSESSMENT — PAIN SCALES - GENERAL: PAINLEVEL_OUTOF10: SEVERE PAIN (6)

## 2024-11-21 NOTE — NURSING NOTE
"Chief Complaint   Patient presents with    Recheck Medication       Initial /60   Pulse 104   Temp 99.1  F (37.3  C) (Tympanic)   Resp 20   Ht 1.588 m (5' 2.5\")   Wt 67.4 kg (148 lb 11.2 oz)   LMP 06/07/2004 (Approximate)   SpO2 97%   Breastfeeding No   BMI 26.76 kg/m   Estimated body mass index is 26.76 kg/m  as calculated from the following:    Height as of this encounter: 1.588 m (5' 2.5\").    Weight as of this encounter: 67.4 kg (148 lb 11.2 oz).  Medication Review: complete    The next two questions are to help us understand your food security.  If you are feeling you need any assistance in this area, we have resources available to support you today.          6/7/2024   SDOH- Food Insecurity   Within the past 12 months, did you worry that your food would run out before you got money to buy more? N    Within the past 12 months, did the food you bought just not last and you didn t have money to get more? N        Patient-reported         Health Care Directive:  Patient does not have a Health Care Directive: Discussed advance care planning with patient; however, patient declined at this time.    Norma J. Gosselin, LPN      "

## 2024-11-21 NOTE — PROGRESS NOTES
"Assessment & Plan   1. Seropositive rheumatoid arthritis (H) (Primary)  He has been able to sleep well if she takes her tramadol before bed.  She expresses a desire to avoid other opioids.  We discussed the possibility of using buprenorphine in the future but decided against at this time.  [facility] database reviewed.  Drug screen up-to-date and reviewed.  Contract up-to-date.  She expresses a desire for COVID vaccination before [place].  Given her immunosuppressed status I think this is fine.  It would be a little earlier than the 4-month window but she is a high risk for severe COVID disease.  - traMADol (ULTRAM) 50 MG tablet; Take 1 tablet (50 mg) by mouth nightly as needed for severe pain.  Dispense: 30 tablet; Refill: 5    2. MGUS (monoclonal gammopathy of unknown significance)  3. Macrocytosis  4. Vitamin B12 deficiency (non anemic)  Follows with hematology at [place].  I do not see a B12 level in a while.  I think her macrocytosis is more likely related to MGUS.  We will draw a B12 level with her next lab draw whenever that happens.  - Vitamin B12; Future      Patient Instructions    -- I agree and recommend a COVID booster 1-2 weeks before [place] gathering        Return in about 6 months (around 5/21/2025), or if symptoms worsen or fail to improve, for controlled substance medication management.    Signed, Gio Dooley MD, FAAP, FACP  Internal Medicine & Pediatrics    Subjective   Laura Perez is a 74 year old female who presents for Recheck Medication.    Objective   Vitals: /60   Pulse 104   Temp 99.1  F (37.3  C) (Tympanic)   Resp 20   Ht 1.588 m (5' 2.5\")   Wt 67.4 kg (148 lb 11.2 oz)   LMP 06/07/2004 (Approximate)   SpO2 97%   Breastfeeding No   BMI 26.76 kg/m        Review and Analysis of Data   I personally reviewed the following:  External notes: Yes [facility]  Results: Yes local and [facility] lab  Use of an independent historian: Yes   Independent review of a test performed by " another physician: No  Discussion of management with another physician: No  Moderate risk of morbidity from additional diagnostic testing and/or treatment.

## 2024-11-25 ENCOUNTER — HOSPITAL ENCOUNTER (OUTPATIENT)
Dept: INFUSION THERAPY | Facility: OTHER | Age: 74
Discharge: HOME | End: 2024-11-25
Admitting: FAMILY MEDICINE
Payer: MEDICARE

## 2024-11-25 VITALS
WEIGHT: 148.59 LBS | RESPIRATION RATE: 20 BRPM | HEART RATE: 86 BPM | TEMPERATURE: 97.9 F | SYSTOLIC BLOOD PRESSURE: 148 MMHG | BODY MASS INDEX: 26.74 KG/M2 | DIASTOLIC BLOOD PRESSURE: 63 MMHG

## 2024-11-25 DIAGNOSIS — D75.89 MACROCYTOSIS: ICD-10-CM

## 2024-11-25 DIAGNOSIS — E85.89 OTHER AMYLOIDOSIS (H): ICD-10-CM

## 2024-11-25 DIAGNOSIS — E53.8 VITAMIN B12 DEFICIENCY (NON ANEMIC): ICD-10-CM

## 2024-11-25 DIAGNOSIS — Z79.52 ON PREDNISONE THERAPY: ICD-10-CM

## 2024-11-25 DIAGNOSIS — M05.9 SEROPOSITIVE RHEUMATOID ARTHRITIS (H): Primary | ICD-10-CM

## 2024-11-25 LAB — VIT B12 SERPL-MCNC: 1961 PG/ML (ref 232–1245)

## 2024-11-25 PROCEDURE — 82607 VITAMIN B-12: CPT

## 2024-11-25 PROCEDURE — 36415 COLL VENOUS BLD VENIPUNCTURE: CPT

## 2024-11-25 PROCEDURE — 96365 THER/PROPH/DIAG IV INF INIT: CPT

## 2024-11-25 PROCEDURE — 250N000028 HC RX JA MOD (INTRAVENOUS), IP 250 OP 636: Mod: JZ,JA | Performed by: INTERNAL MEDICINE

## 2024-11-25 PROCEDURE — 258N000003 HC RX IP 258 OP 636: Performed by: INTERNAL MEDICINE

## 2024-11-25 RX ORDER — EPINEPHRINE 1 MG/ML
0.3 INJECTION, SOLUTION, CONCENTRATE INTRAVENOUS EVERY 5 MIN PRN
OUTPATIENT
Start: 2024-11-29

## 2024-11-25 RX ORDER — ALBUTEROL SULFATE 90 UG/1
1-2 INHALANT RESPIRATORY (INHALATION)
Start: 2024-11-29

## 2024-11-25 RX ORDER — METHYLPREDNISOLONE SODIUM SUCCINATE 125 MG/2ML
125 INJECTION INTRAMUSCULAR; INTRAVENOUS
Start: 2024-11-29

## 2024-11-25 RX ORDER — DIPHENHYDRAMINE HYDROCHLORIDE 50 MG/ML
50 INJECTION INTRAMUSCULAR; INTRAVENOUS
Start: 2024-11-29

## 2024-11-25 RX ORDER — ALBUTEROL SULFATE 0.83 MG/ML
2.5 SOLUTION RESPIRATORY (INHALATION)
OUTPATIENT
Start: 2024-11-29

## 2024-11-25 RX ADMIN — SODIUM CHLORIDE 750 MG: 9 INJECTION, SOLUTION INTRAVENOUS at 13:38

## 2024-11-25 RX ADMIN — SODIUM CHLORIDE 250 ML: 9 INJECTION, SOLUTION INTRAVENOUS at 13:34

## 2024-11-25 NOTE — NURSING NOTE
Infusion Nursing Note:Doreen Perez presents today for [place].    Patient seen by provider today: No   present during visit today: Not Applicable.    Note: N/A.      Intravenous Access:  Labs drawn without difficulty.  Peripheral IV placed to right arm.    Treatment Conditions:  Biological Infusion Checklist:  ~~~ NOTE: If the patient answers yes to any of the questions below, hold the infusion and contact ordering provider or on-call provider.    Have you recently had an elevated temperature, fever, chills, productive cough, coughing for 3 weeks or longer or hemoptysis,  abnormal vital signs, night sweats,  chest pain or have you noticed a decrease in your appetite, unexplained weight loss or fatigue? No  Do you have any open wounds or new incisions? No  Do you have any upcoming hospitalizations or surgeries? Does not include esophagogastroduodenoscopy, colonoscopy, endoscopic retrograde cholangiopancreatography (ERCP), endoscopic ultrasound (EUS), dental procedures or joint aspiration/steroid injections No  Do you currently have any signs of illness or infection or are you on any antibiotics? No  Have you had any new, sudden or worsening abdominal pain? No  Have you or anyone in your household received a live vaccination in the past 4 weeks? Please note: No live vaccines while on biologic/chemotherapy until 6 months after the last treatment. Patient can receive the flu vaccine (shot only), pneumovax and the Covid vaccine. It is optimal for the patient to get these vaccines mid cycle, but they can be given at any time as long as it is not on the day of the infusion. No  Have you recently been diagnosed with any new nervous system diseases (ie. Multiple sclerosis, Guillain Roscoe, seizures, neurological changes) or cancer diagnosis? Are you on any form of radiation or chemotherapy? No  Are you pregnant or breast feeding or do you have plans of pregnancy in the future? No  Have you been having any  signs of worsening depression or suicidal ideations?  (benlysta only) NA  Have there been any other new onset medical symptoms? No  Have you had any new blood clots? (IVIG only) NA      Post Infusion Assessment:  Patient tolerated infusion without incident.  Blood return noted pre and post infusion.  Site patent and intact, free from redness, edema or discomfort.  No evidence of extravasations.  Access discontinued per protocol.  Biologic Infusion Post Education: Call the triage nurse at your clinic or seek medical attention if you have chills and/or temperature greater than or equal to 100.5, uncontrolled nausea/vomiting, diarrhea, constipation, dizziness, shortness of breath, chest pain, heart palpitations, weakness or any other new or concerning symptoms, questions or concerns.  You cannot have any live virus vaccines prior to or during treatment or up to 6 months post infusion.  If you have an upcoming surgery, medical procedure or dental procedure during treatment, this should be discussed with your ordering physician and your surgeon/dentist.  If you are having any concerning symptom, if you are unsure if you should get your next infusion or wish to speak to a provider before your next infusion, please call your care coordinator or triage nurse at your clinic to notify them so we can adequately serve you.       Discharge Plan:   Discharge instructions reviewed with: Patient.  Patient and/or family verbalized understanding of discharge instructions and all questions answered.  Copy of AVS reviewed with patient and/or family.  Patient will return 12/26 for next appointment.  Patient discharged in stable condition accompanied by: self and .  Departure Mode: Wheelchair with spouse assist with mobility.      Reema Bender RN

## 2024-12-15 ENCOUNTER — APPOINTMENT (OUTPATIENT)
Dept: GENERAL RADIOLOGY | Facility: OTHER | Age: 74
End: 2024-12-15
Attending: EMERGENCY MEDICINE
Payer: MEDICARE

## 2024-12-15 ENCOUNTER — HOSPITAL ENCOUNTER (INPATIENT)
Facility: OTHER | Age: 74
LOS: 2 days | Discharge: HOME | End: 2024-12-17
Attending: EMERGENCY MEDICINE | Admitting: STUDENT IN AN ORGANIZED HEALTH CARE EDUCATION/TRAINING PROGRAM
Payer: MEDICARE

## 2024-12-15 DIAGNOSIS — J44.1 COPD EXACERBATION (H): ICD-10-CM

## 2024-12-15 DIAGNOSIS — B00.1 HERPESVIRAL VESICULAR DERMATITIS: Primary | ICD-10-CM

## 2024-12-15 DIAGNOSIS — N39.0 ACUTE UTI: ICD-10-CM

## 2024-12-15 LAB
ALBUMIN SERPL BCG-MCNC: 3.6 G/DL (ref 3.5–5.2)
ALBUMIN UR-MCNC: 300 MG/DL
ALBUMIN UR-MCNC: 300 MG/DL
ALP SERPL-CCNC: 146 U/L (ref 40–150)
ALT SERPL W P-5'-P-CCNC: 44 U/L (ref 0–50)
ANION GAP SERPL CALCULATED.3IONS-SCNC: 8 MMOL/L (ref 7–15)
APPEARANCE UR: ABNORMAL
APPEARANCE UR: ABNORMAL
AST SERPL W P-5'-P-CCNC: 56 U/L (ref 0–45)
BACTERIA #/AREA URNS HPF: ABNORMAL /HPF
BACTERIA #/AREA URNS HPF: ABNORMAL /HPF
BASOPHILS # BLD AUTO: 0.1 10E3/UL (ref 0–0.2)
BASOPHILS NFR BLD AUTO: 1 %
BILIRUB SERPL-MCNC: 1.6 MG/DL
BILIRUB UR QL STRIP: ABNORMAL
BILIRUB UR QL STRIP: ABNORMAL
BUN SERPL-MCNC: 28.9 MG/DL (ref 8–23)
CALCIUM SERPL-MCNC: 10 MG/DL (ref 8.8–10.4)
CHLORIDE SERPL-SCNC: 97 MMOL/L (ref 98–107)
COLOR UR AUTO: ABNORMAL
COLOR UR AUTO: YELLOW
CREAT SERPL-MCNC: 1.75 MG/DL (ref 0.51–0.95)
EGFRCR SERPLBLD CKD-EPI 2021: 30 ML/MIN/1.73M2
EOSINOPHIL # BLD AUTO: 0.1 10E3/UL (ref 0–0.7)
EOSINOPHIL NFR BLD AUTO: 0 %
ERYTHROCYTE [DISTWIDTH] IN BLOOD BY AUTOMATED COUNT: 16.1 % (ref 10–15)
EST. AVERAGE GLUCOSE BLD GHB EST-MCNC: 160 MG/DL
FLUAV RNA SPEC QL NAA+PROBE: NEGATIVE
FLUBV RNA RESP QL NAA+PROBE: NEGATIVE
GLUCOSE BLDC GLUCOMTR-MCNC: 344 MG/DL (ref 70–99)
GLUCOSE BLDC GLUCOMTR-MCNC: 372 MG/DL (ref 70–99)
GLUCOSE SERPL-MCNC: 196 MG/DL (ref 70–99)
GLUCOSE UR STRIP-MCNC: 30 MG/DL
GLUCOSE UR STRIP-MCNC: NEGATIVE MG/DL
HBA1C MFR BLD: 7.2 %
HCO3 SERPL-SCNC: 27 MMOL/L (ref 22–29)
HCT VFR BLD AUTO: 38.3 % (ref 35–47)
HGB BLD-MCNC: 12.1 G/DL (ref 11.7–15.7)
HGB UR QL STRIP: ABNORMAL
HGB UR QL STRIP: ABNORMAL
HOLD SPECIMEN: NORMAL
HOLD SPECIMEN: NORMAL
HYALINE CASTS: 28 /LPF
HYALINE CASTS: 55 /LPF
IMM GRANULOCYTES # BLD: 0.1 10E3/UL
IMM GRANULOCYTES NFR BLD: 1 %
KETONES UR STRIP-MCNC: 10 MG/DL
KETONES UR STRIP-MCNC: ABNORMAL MG/DL
LEUKOCYTE ESTERASE UR QL STRIP: ABNORMAL
LEUKOCYTE ESTERASE UR QL STRIP: ABNORMAL
LYMPHOCYTES # BLD AUTO: 1.9 10E3/UL (ref 0.8–5.3)
LYMPHOCYTES NFR BLD AUTO: 16 %
MCH RBC QN AUTO: 31.8 PG (ref 26.5–33)
MCHC RBC AUTO-ENTMCNC: 31.6 G/DL (ref 31.5–36.5)
MCV RBC AUTO: 101 FL (ref 78–100)
MONOCYTES # BLD AUTO: 1.3 10E3/UL (ref 0–1.3)
MONOCYTES NFR BLD AUTO: 11 %
MUCOUS THREADS #/AREA URNS LPF: PRESENT /LPF
MUCOUS THREADS #/AREA URNS LPF: PRESENT /LPF
NEUTROPHILS # BLD AUTO: 8.2 10E3/UL (ref 1.6–8.3)
NEUTROPHILS NFR BLD AUTO: 71 %
NITRATE UR QL: NEGATIVE
NITRATE UR QL: NEGATIVE
NRBC # BLD AUTO: 0.1 10E3/UL
NRBC BLD AUTO-RTO: 1 /100
PH UR STRIP: 5.5 [PH] (ref 5–9)
PH UR STRIP: 5.5 [PH] (ref 5–9)
PLATELET # BLD AUTO: 151 10E3/UL (ref 150–450)
POTASSIUM SERPL-SCNC: 4.9 MMOL/L (ref 3.4–5.3)
PROT SERPL-MCNC: 6.5 G/DL (ref 6.4–8.3)
RBC # BLD AUTO: 3.81 10E6/UL (ref 3.8–5.2)
RBC URINE: 2 /HPF
RBC URINE: 5 /HPF
RSV RNA SPEC NAA+PROBE: NEGATIVE
SARS-COV-2 RNA RESP QL NAA+PROBE: NEGATIVE
SODIUM SERPL-SCNC: 132 MMOL/L (ref 135–145)
SP GR UR STRIP: 1.02 (ref 1–1.03)
SP GR UR STRIP: 1.03 (ref 1–1.03)
SQUAMOUS EPITHELIAL: 39 /HPF
T4 FREE SERPL-MCNC: 0.87 NG/DL (ref 0.9–1.7)
TROPONIN T SERPL HS-MCNC: 102 NG/L
TROPONIN T SERPL HS-MCNC: 95 NG/L
TSH SERPL DL<=0.005 MIU/L-ACNC: 13.8 UIU/ML (ref 0.3–4.2)
UROBILINOGEN UR STRIP-MCNC: 3 MG/DL
UROBILINOGEN UR STRIP-MCNC: 4 MG/DL
WBC # BLD AUTO: 11.6 10E3/UL (ref 4–11)
WBC CLUMPS #/AREA URNS HPF: PRESENT /HPF
WBC URINE: 101 /HPF
WBC URINE: 24 /HPF

## 2024-12-15 PROCEDURE — 84439 ASSAY OF FREE THYROXINE: CPT | Performed by: STUDENT IN AN ORGANIZED HEALTH CARE EDUCATION/TRAINING PROGRAM

## 2024-12-15 PROCEDURE — 85025 COMPLETE CBC W/AUTO DIFF WBC: CPT | Performed by: EMERGENCY MEDICINE

## 2024-12-15 PROCEDURE — 82040 ASSAY OF SERUM ALBUMIN: CPT | Performed by: EMERGENCY MEDICINE

## 2024-12-15 PROCEDURE — 71045 X-RAY EXAM CHEST 1 VIEW: CPT

## 2024-12-15 PROCEDURE — 87186 SC STD MICRODIL/AGAR DIL: CPT | Performed by: EMERGENCY MEDICINE

## 2024-12-15 PROCEDURE — 250N000011 HC RX IP 250 OP 636: Performed by: STUDENT IN AN ORGANIZED HEALTH CARE EDUCATION/TRAINING PROGRAM

## 2024-12-15 PROCEDURE — 999N000157 HC STATISTIC RCP TIME EA 10 MIN

## 2024-12-15 PROCEDURE — 94640 AIRWAY INHALATION TREATMENT: CPT

## 2024-12-15 PROCEDURE — 250N000013 HC RX MED GY IP 250 OP 250 PS 637: Performed by: EMERGENCY MEDICINE

## 2024-12-15 PROCEDURE — 96375 TX/PRO/DX INJ NEW DRUG ADDON: CPT | Performed by: EMERGENCY MEDICINE

## 2024-12-15 PROCEDURE — 120N000001 HC R&B MED SURG/OB

## 2024-12-15 PROCEDURE — 250N000011 HC RX IP 250 OP 636: Performed by: INTERNAL MEDICINE

## 2024-12-15 PROCEDURE — 83036 HEMOGLOBIN GLYCOSYLATED A1C: CPT | Performed by: STUDENT IN AN ORGANIZED HEALTH CARE EDUCATION/TRAINING PROGRAM

## 2024-12-15 PROCEDURE — 81001 URINALYSIS AUTO W/SCOPE: CPT | Performed by: EMERGENCY MEDICINE

## 2024-12-15 PROCEDURE — 99223 1ST HOSP IP/OBS HIGH 75: CPT | Mod: AI | Performed by: STUDENT IN AN ORGANIZED HEALTH CARE EDUCATION/TRAINING PROGRAM

## 2024-12-15 PROCEDURE — 93010 ELECTROCARDIOGRAM REPORT: CPT | Performed by: INTERNAL MEDICINE

## 2024-12-15 PROCEDURE — 96374 THER/PROPH/DIAG INJ IV PUSH: CPT | Performed by: EMERGENCY MEDICINE

## 2024-12-15 PROCEDURE — 250N000013 HC RX MED GY IP 250 OP 250 PS 637: Performed by: STUDENT IN AN ORGANIZED HEALTH CARE EDUCATION/TRAINING PROGRAM

## 2024-12-15 PROCEDURE — 85014 HEMATOCRIT: CPT | Performed by: EMERGENCY MEDICINE

## 2024-12-15 PROCEDURE — 84484 ASSAY OF TROPONIN QUANT: CPT | Performed by: EMERGENCY MEDICINE

## 2024-12-15 PROCEDURE — 84443 ASSAY THYROID STIM HORMONE: CPT | Performed by: STUDENT IN AN ORGANIZED HEALTH CARE EDUCATION/TRAINING PROGRAM

## 2024-12-15 PROCEDURE — 258N000003 HC RX IP 258 OP 636: Performed by: STUDENT IN AN ORGANIZED HEALTH CARE EDUCATION/TRAINING PROGRAM

## 2024-12-15 PROCEDURE — 250N000009 HC RX 250: Performed by: STUDENT IN AN ORGANIZED HEALTH CARE EDUCATION/TRAINING PROGRAM

## 2024-12-15 PROCEDURE — 250N000011 HC RX IP 250 OP 636: Performed by: EMERGENCY MEDICINE

## 2024-12-15 PROCEDURE — 99285 EMERGENCY DEPT VISIT HI MDM: CPT | Mod: 25 | Performed by: EMERGENCY MEDICINE

## 2024-12-15 PROCEDURE — 250N000009 HC RX 250: Performed by: EMERGENCY MEDICINE

## 2024-12-15 PROCEDURE — 82310 ASSAY OF CALCIUM: CPT | Performed by: EMERGENCY MEDICINE

## 2024-12-15 PROCEDURE — 87637 SARSCOV2&INF A&B&RSV AMP PRB: CPT | Performed by: STUDENT IN AN ORGANIZED HEALTH CARE EDUCATION/TRAINING PROGRAM

## 2024-12-15 PROCEDURE — 36415 COLL VENOUS BLD VENIPUNCTURE: CPT | Performed by: EMERGENCY MEDICINE

## 2024-12-15 PROCEDURE — 99285 EMERGENCY DEPT VISIT HI MDM: CPT | Performed by: EMERGENCY MEDICINE

## 2024-12-15 RX ORDER — AMOXICILLIN 250 MG
2 CAPSULE ORAL 2 TIMES DAILY PRN
Status: DISCONTINUED | OUTPATIENT
Start: 2024-12-15 | End: 2024-12-17 | Stop reason: HOSPADM

## 2024-12-15 RX ORDER — LEVOTHYROXINE SODIUM 112 UG/1
112 TABLET ORAL DAILY
Status: DISCONTINUED | OUTPATIENT
Start: 2024-12-16 | End: 2024-12-17 | Stop reason: HOSPADM

## 2024-12-15 RX ORDER — NALOXONE HYDROCHLORIDE 0.4 MG/ML
0.2 INJECTION, SOLUTION INTRAMUSCULAR; INTRAVENOUS; SUBCUTANEOUS
Status: DISCONTINUED | OUTPATIENT
Start: 2024-12-15 | End: 2024-12-17 | Stop reason: HOSPADM

## 2024-12-15 RX ORDER — METHYLPREDNISOLONE SODIUM SUCCINATE 125 MG/2ML
125 INJECTION INTRAMUSCULAR; INTRAVENOUS ONCE
Status: COMPLETED | OUTPATIENT
Start: 2024-12-15 | End: 2024-12-15

## 2024-12-15 RX ORDER — VALACYCLOVIR HYDROCHLORIDE 500 MG/1
1000 TABLET, FILM COATED ORAL DAILY
Status: DISCONTINUED | OUTPATIENT
Start: 2024-12-15 | End: 2024-12-15

## 2024-12-15 RX ORDER — DOXYCYCLINE 100 MG/1
100 CAPSULE ORAL EVERY 12 HOURS SCHEDULED
Status: DISCONTINUED | OUTPATIENT
Start: 2024-12-15 | End: 2024-12-17 | Stop reason: HOSPADM

## 2024-12-15 RX ORDER — ONDANSETRON 2 MG/ML
4 INJECTION INTRAMUSCULAR; INTRAVENOUS EVERY 6 HOURS PRN
Status: DISCONTINUED | OUTPATIENT
Start: 2024-12-15 | End: 2024-12-17 | Stop reason: HOSPADM

## 2024-12-15 RX ORDER — LIDOCAINE 40 MG/G
CREAM TOPICAL
Status: DISCONTINUED | OUTPATIENT
Start: 2024-12-15 | End: 2024-12-17 | Stop reason: HOSPADM

## 2024-12-15 RX ORDER — PREDNISONE 20 MG/1
40 TABLET ORAL DAILY
Status: DISCONTINUED | OUTPATIENT
Start: 2024-12-16 | End: 2024-12-17 | Stop reason: HOSPADM

## 2024-12-15 RX ORDER — ONDANSETRON 4 MG/1
4 TABLET, ORALLY DISINTEGRATING ORAL EVERY 6 HOURS PRN
Status: DISCONTINUED | OUTPATIENT
Start: 2024-12-15 | End: 2024-12-17 | Stop reason: HOSPADM

## 2024-12-15 RX ORDER — CALCIUM CARBONATE 500 MG/1
1000 TABLET, CHEWABLE ORAL 4 TIMES DAILY PRN
Status: DISCONTINUED | OUTPATIENT
Start: 2024-12-15 | End: 2024-12-17 | Stop reason: HOSPADM

## 2024-12-15 RX ORDER — PANTOPRAZOLE SODIUM 40 MG/1
40 TABLET, DELAYED RELEASE ORAL
Status: DISCONTINUED | OUTPATIENT
Start: 2024-12-16 | End: 2024-12-17 | Stop reason: HOSPADM

## 2024-12-15 RX ORDER — HYDROCODONE BITARTRATE AND ACETAMINOPHEN 5; 325 MG/1; MG/1
1 TABLET ORAL ONCE
Status: COMPLETED | OUTPATIENT
Start: 2024-12-15 | End: 2024-12-15

## 2024-12-15 RX ORDER — FORMOTEROL FUMARATE DIHYDRATE 20 UG/2ML
2 SOLUTION RESPIRATORY (INHALATION) 2 TIMES DAILY
Status: DISCONTINUED | OUTPATIENT
Start: 2024-12-15 | End: 2024-12-16 | Stop reason: ALTCHOICE

## 2024-12-15 RX ORDER — CEFTRIAXONE 1 G/1
1 INJECTION, POWDER, FOR SOLUTION INTRAMUSCULAR; INTRAVENOUS ONCE
Status: COMPLETED | OUTPATIENT
Start: 2024-12-15 | End: 2024-12-15

## 2024-12-15 RX ORDER — ASPIRIN 81 MG/1
81 TABLET, CHEWABLE ORAL DAILY
Status: DISCONTINUED | OUTPATIENT
Start: 2024-12-16 | End: 2024-12-17 | Stop reason: HOSPADM

## 2024-12-15 RX ORDER — MONTELUKAST SODIUM 5 MG/1
10 TABLET, CHEWABLE ORAL AT BEDTIME
Status: DISCONTINUED | OUTPATIENT
Start: 2024-12-15 | End: 2024-12-17 | Stop reason: HOSPADM

## 2024-12-15 RX ORDER — AMOXICILLIN 250 MG
1 CAPSULE ORAL 2 TIMES DAILY PRN
Status: DISCONTINUED | OUTPATIENT
Start: 2024-12-15 | End: 2024-12-17 | Stop reason: HOSPADM

## 2024-12-15 RX ORDER — ACETAMINOPHEN 650 MG/1
650 SUPPOSITORY RECTAL EVERY 4 HOURS PRN
Status: DISCONTINUED | OUTPATIENT
Start: 2024-12-15 | End: 2024-12-17 | Stop reason: HOSPADM

## 2024-12-15 RX ORDER — NICOTINE POLACRILEX 4 MG
15-30 LOZENGE BUCCAL
Status: DISCONTINUED | OUTPATIENT
Start: 2024-12-15 | End: 2024-12-17 | Stop reason: HOSPADM

## 2024-12-15 RX ORDER — IPRATROPIUM BROMIDE AND ALBUTEROL SULFATE 2.5; .5 MG/3ML; MG/3ML
3 SOLUTION RESPIRATORY (INHALATION)
Status: DISCONTINUED | OUTPATIENT
Start: 2024-12-15 | End: 2024-12-16

## 2024-12-15 RX ORDER — TRAMADOL HYDROCHLORIDE 50 MG/1
50 TABLET ORAL ONCE
Status: COMPLETED | OUTPATIENT
Start: 2024-12-15 | End: 2024-12-15

## 2024-12-15 RX ORDER — PROCHLORPERAZINE MALEATE 5 MG/1
5 TABLET ORAL EVERY 6 HOURS PRN
Status: DISCONTINUED | OUTPATIENT
Start: 2024-12-15 | End: 2024-12-17 | Stop reason: HOSPADM

## 2024-12-15 RX ORDER — VALACYCLOVIR HYDROCHLORIDE 500 MG/1
1000 TABLET, FILM COATED ORAL DAILY
Status: DISCONTINUED | OUTPATIENT
Start: 2024-12-15 | End: 2024-12-17 | Stop reason: HOSPADM

## 2024-12-15 RX ORDER — HYDROMORPHONE HCL IN WATER/PF 6 MG/30 ML
0.2 PATIENT CONTROLLED ANALGESIA SYRINGE INTRAVENOUS
Status: DISCONTINUED | OUTPATIENT
Start: 2024-12-15 | End: 2024-12-17 | Stop reason: HOSPADM

## 2024-12-15 RX ORDER — NALOXONE HYDROCHLORIDE 0.4 MG/ML
0.4 INJECTION, SOLUTION INTRAMUSCULAR; INTRAVENOUS; SUBCUTANEOUS
Status: DISCONTINUED | OUTPATIENT
Start: 2024-12-15 | End: 2024-12-17 | Stop reason: HOSPADM

## 2024-12-15 RX ORDER — TRAMADOL HYDROCHLORIDE 50 MG/1
50 TABLET ORAL
Status: DISCONTINUED | OUTPATIENT
Start: 2024-12-15 | End: 2024-12-17 | Stop reason: HOSPADM

## 2024-12-15 RX ORDER — DEXTROSE MONOHYDRATE 25 G/50ML
25-50 INJECTION, SOLUTION INTRAVENOUS
Status: DISCONTINUED | OUTPATIENT
Start: 2024-12-15 | End: 2024-12-17 | Stop reason: HOSPADM

## 2024-12-15 RX ORDER — BUDESONIDE 0.5 MG/2ML
0.5 INHALANT ORAL 2 TIMES DAILY
Status: DISCONTINUED | OUTPATIENT
Start: 2024-12-15 | End: 2024-12-17 | Stop reason: HOSPADM

## 2024-12-15 RX ORDER — TRAMADOL HYDROCHLORIDE 50 MG/1
50 TABLET ORAL EVERY 6 HOURS PRN
Status: DISCONTINUED | OUTPATIENT
Start: 2024-12-15 | End: 2024-12-17 | Stop reason: HOSPADM

## 2024-12-15 RX ORDER — POLYETHYLENE GLYCOL 3350 17 G/17G
17 POWDER, FOR SOLUTION ORAL 2 TIMES DAILY PRN
Status: DISCONTINUED | OUTPATIENT
Start: 2024-12-15 | End: 2024-12-17 | Stop reason: HOSPADM

## 2024-12-15 RX ORDER — ENOXAPARIN SODIUM 100 MG/ML
30 INJECTION SUBCUTANEOUS EVERY 24 HOURS
Status: DISCONTINUED | OUTPATIENT
Start: 2024-12-15 | End: 2024-12-17 | Stop reason: HOSPADM

## 2024-12-15 RX ORDER — SIMVASTATIN 40 MG
40 TABLET ORAL AT BEDTIME
Status: DISCONTINUED | OUTPATIENT
Start: 2024-12-15 | End: 2024-12-17 | Stop reason: HOSPADM

## 2024-12-15 RX ORDER — ALBUTEROL SULFATE 0.83 MG/ML
2.5 SOLUTION RESPIRATORY (INHALATION)
Status: ACTIVE | OUTPATIENT
Start: 2024-12-15 | End: 2024-12-15

## 2024-12-15 RX ORDER — IPRATROPIUM BROMIDE AND ALBUTEROL SULFATE 2.5; .5 MG/3ML; MG/3ML
3 SOLUTION RESPIRATORY (INHALATION) ONCE
Status: COMPLETED | OUTPATIENT
Start: 2024-12-15 | End: 2024-12-15

## 2024-12-15 RX ORDER — ACETAMINOPHEN 325 MG/1
650 TABLET ORAL EVERY 4 HOURS PRN
Status: DISCONTINUED | OUTPATIENT
Start: 2024-12-15 | End: 2024-12-17 | Stop reason: HOSPADM

## 2024-12-15 RX ORDER — CEFTRIAXONE 1 G/1
1 INJECTION, POWDER, FOR SOLUTION INTRAMUSCULAR; INTRAVENOUS EVERY 24 HOURS
Status: DISCONTINUED | OUTPATIENT
Start: 2024-12-16 | End: 2024-12-17 | Stop reason: HOSPADM

## 2024-12-15 RX ORDER — LISINOPRIL 10 MG/1
10 TABLET ORAL DAILY
Status: DISCONTINUED | OUTPATIENT
Start: 2024-12-15 | End: 2024-12-16

## 2024-12-15 RX ORDER — ALLOPURINOL 100 MG/1
100 TABLET ORAL DAILY
Status: DISCONTINUED | OUTPATIENT
Start: 2024-12-16 | End: 2024-12-17 | Stop reason: HOSPADM

## 2024-12-15 RX ADMIN — SODIUM CHLORIDE, POTASSIUM CHLORIDE, SODIUM LACTATE AND CALCIUM CHLORIDE 1000 ML: 600; 310; 30; 20 INJECTION, SOLUTION INTRAVENOUS at 18:51

## 2024-12-15 RX ADMIN — ONDANSETRON 4 MG: 2 INJECTION INTRAMUSCULAR; INTRAVENOUS at 22:53

## 2024-12-15 RX ADMIN — VALACYCLOVIR 1000 MG: 500 TABLET, FILM COATED ORAL at 20:49

## 2024-12-15 RX ADMIN — ALBUTEROL SULFATE 2.5 MG: 2.5 SOLUTION RESPIRATORY (INHALATION) at 12:00

## 2024-12-15 RX ADMIN — ALBUTEROL SULFATE 2.5 MG: 2.5 SOLUTION RESPIRATORY (INHALATION) at 16:18

## 2024-12-15 RX ADMIN — CEFTRIAXONE SODIUM 1 G: 1 INJECTION, POWDER, FOR SOLUTION INTRAMUSCULAR; INTRAVENOUS at 17:04

## 2024-12-15 RX ADMIN — HYDROCODONE BITARTRATE AND ACETAMINOPHEN 1 TABLET: 5; 325 TABLET ORAL at 14:45

## 2024-12-15 RX ADMIN — BUDESONIDE INHALATION 0.5 MG: 0.5 SUSPENSION RESPIRATORY (INHALATION) at 19:42

## 2024-12-15 RX ADMIN — DOXYCYCLINE HYCLATE 100 MG: 100 CAPSULE ORAL at 22:10

## 2024-12-15 RX ADMIN — ENOXAPARIN SODIUM 30 MG: 30 INJECTION SUBCUTANEOUS at 22:14

## 2024-12-15 RX ADMIN — IPRATROPIUM BROMIDE AND ALBUTEROL SULFATE 3 ML: .5; 3 SOLUTION RESPIRATORY (INHALATION) at 12:00

## 2024-12-15 RX ADMIN — METHYLPREDNISOLONE SODIUM SUCCINATE 125 MG: 125 INJECTION, POWDER, FOR SOLUTION INTRAMUSCULAR; INTRAVENOUS at 12:46

## 2024-12-15 RX ADMIN — TRAMADOL HYDROCHLORIDE 50 MG: 50 TABLET, COATED ORAL at 17:33

## 2024-12-15 RX ADMIN — HYDROMORPHONE HYDROCHLORIDE 0.2 MG: 0.2 INJECTION, SOLUTION INTRAMUSCULAR; INTRAVENOUS; SUBCUTANEOUS at 22:07

## 2024-12-15 RX ADMIN — DOXYCYCLINE HYCLATE 100 MG: 100 CAPSULE ORAL at 17:25

## 2024-12-15 RX ADMIN — SIMVASTATIN 40 MG: 40 TABLET, FILM COATED ORAL at 22:10

## 2024-12-15 RX ADMIN — MONTELUKAST SODIUM 10 MG: 5 TABLET, CHEWABLE ORAL at 22:10

## 2024-12-15 RX ADMIN — LISINOPRIL 10 MG: 10 TABLET ORAL at 22:10

## 2024-12-15 ASSESSMENT — ACTIVITIES OF DAILY LIVING (ADL)
ADLS_ACUITY_SCORE: 64
ADLS_ACUITY_SCORE: 56
ADLS_ACUITY_SCORE: 56
ADLS_ACUITY_SCORE: 64
ADLS_ACUITY_SCORE: 64
ADLS_ACUITY_SCORE: 58
ADLS_ACUITY_SCORE: 64
ADLS_ACUITY_SCORE: 64
ADLS_ACUITY_SCORE: 58
ADLS_ACUITY_SCORE: 64
ADLS_ACUITY_SCORE: 64
ADLS_ACUITY_SCORE: 58

## 2024-12-15 ASSESSMENT — ENCOUNTER SYMPTOMS
NEUROLOGICAL NEGATIVE: 1
NECK STIFFNESS: 0
PSYCHIATRIC NEGATIVE: 1
SHORTNESS OF BREATH: 0
SLEEP DISTURBANCE: 0
CHILLS: 0
ALLERGIC/IMMUNOLOGIC NEGATIVE: 1
FEVER: 0
MUSCULOSKELETAL NEGATIVE: 1
HEMATOLOGIC/LYMPHATIC NEGATIVE: 1
EYES NEGATIVE: 1
ENDOCRINE NEGATIVE: 1
NECK PAIN: 0
CONSTITUTIONAL NEGATIVE: 1

## 2024-12-15 ASSESSMENT — COLUMBIA-SUICIDE SEVERITY RATING SCALE - C-SSRS
6. HAVE YOU EVER DONE ANYTHING, STARTED TO DO ANYTHING, OR PREPARED TO DO ANYTHING TO END YOUR LIFE?: NO
1. IN THE PAST MONTH, HAVE YOU WISHED YOU WERE DEAD OR WISHED YOU COULD GO TO SLEEP AND NOT WAKE UP?: NO

## 2024-12-15 NOTE — H&P
[facility]    History and Physical - Hospitalist Service       Date of Admission:  12/15/2024    Assessment & Plan      Laura Perez is a 74 year old woman with past medical history of severe COPD, bronchiectasis, chronic hypoxic respiratory failure, CKD stage III, anemia, diastolic heart failure, type 2 diabetes on insulin, hypertension, hypothyroidism, rheumatoid arthritis and anxiety who presents to the emergency department with worsening shortness of breath.  She is admitted to the hospital due to COPD exacerbation, UTI and shingles.    Principal Problem:    COPD exacerbation (H)    COPD, very severe (H)  Acute on hypoxic respiratory failure    Bronchiectasis (H)    Assessment: Was notably wheezy for the emergency department physician.  Requiring 1 L supplemental oxygen to maintain saturations greater than 90%.  Chest x-ray negative for infiltrate    Plan:   -Doxycycline 100 mg twice daily   -prednisone 40 mg daily  -DuoNebs as needed  -Goal oxygen saturations greater than 90%, wean O2  -Continue home Pulmicort nebs, Spiriva, montelukast and formoterol  -Hold home atovaquone  -Viral multiplex testing          Acute cystitis    Assessment: UA with WBCs, moderate leuk esterase and bacteria, temp on arrival 99.9.  Empirically treated with ceftriaxone.  ED physician ordered a second UA/culture due to high epithelial cells on initial sample.    Plan:   -Ceftriaxone 1 g daily  -follow urine culture      Shingles   Assessment: Shingles over the right eye brow and scalp for approximately 1 week.  No visual deficits.  I does not appear to be infected at this time.  GFR is 30 so we will reduce Valtrex dosing to 1000 mg twice daily.  GFR less than 29 dosing is 500 mg d twice daily.  Symptoms have been present for nearly a week so likely low efficacy of Valtrex.  Plan:   -Valtrex 1000 mg twice daily  -Continue home tramadol 50 mg increased to every 6 hours as needed  -adjust Veltrix dosing based on  GFR      Anemia of chronic kidney failure, stage 3 (moderate) (H)  Amyloidosis    Assessment: GFR 30 on admission, baseline GFR between 30 and 45.        Chronic diastolic CHF (congestive heart failure), NYHA class 2 (H)    Assessment: Chronic diastolic heart failure, does not appear fluid overloaded and urine is fairly concentrated on UA.  Poor oral intake recently.  Most recent echocardiogram performed in 2022 with a normal ejection fraction    Plan:   -Monitor fluid status   -1 L IVF      Diabetes mellitus type 2, insulin dependent (H)    Assessment: Prior to admission on insulin 70/30 40 units in a.m. and 20 units p.m. most recent hemoglobin A1c 6.9% in June 2024.  Has had a poor oral intake recently but also initiated on steroids    Plan:   -4 times daily glucose checks  - NPH 35 units a.m., 15 units p.m.  - Insulin aspart 7 units with meals, medium sliding scale        HTN (hypertension)    Assessment: Prior to admission on lisinopril 10 mg daily, continue      Acquired hypothyroidism    Assessment: last TSH in 2022. PTA on levothyroxine 112 mcg daily    Plan:   -Levothyroxine 112 mcg daily  -Add on TSH      Rheumatoid arthritis, seropositive    Assessment: Prior to admission on abatacept, methotrexate and atovaquone, hold both while inpatient     Gout  Assessment: Not in current exacerbation, continue home allopurinol 100 mg daily          Dyslipidemia    Assessment: Continue home simvastatin 40 mg daily        Diet:  Regular diet  DVT Prophylaxis: Enoxaparin (Lovenox) SQ  Farr Catheter: Not present  Lines: None     Cardiac Monitoring: None  Code Status:  Full code    Clinically Significant Risk Factors Present on Admission         # Hyponatremia: Lowest Na = 132 mmol/L in last 2 days, will monitor as appropriate  # Hypochloremia: Lowest Cl = 97 mmol/L in last 2 days, will monitor as appropriate        # Drug Induced Platelet Defect: home medication list includes an antiplatelet medication   # Hypertension:  "Noted on problem list           # Overweight: Estimated body mass index is 27.07 kg/m  as calculated from the following:    Height as of this encounter: 1.575 m (5' 2\").    Weight as of this encounter: 67.1 kg (148 lb).       # COPD: noted on problem list        Disposition Plan     Medically Ready for Discharge: Anticipated in 2-4 Days           Neil Chinchilla MD  Hospitalist Service  [facility]  Securely message with Vers[employer] (more info)  Text page via [facility] Paging/Directory     ______________________________________________________________________    Chief Complaint   Shingles, shortness of breath, dysuria    History is obtained from the patient and ED provider    History of Present Illness   Laura Perez is a 74 year old woman with past medical history of amyloidosis, CKD stage III, COPD, hypertension, bronchiectasis, osteoporosis, rheumatoid arthritis, hyperlipidemia and type 2 diabetes on insulin who presented the emergency department with facial rash, shortness of breath and dysuria.    The patient states that she has had a facial rash over her right eye for approximately 1 week now and it has become increasingly more painful.  She did get her shingles vaccine.    She also states she has been progressively more short of breath over the last few months.  She has known severe COPD and her chart mentions bronchiectasis but she has not been told this diagnosis in the past.  She has been fairly wheezy.  She has been compliant with her nebulizers and inhalers at home.  She is not on chronic oxygen prior to admission.    She also has a history of urinary tract infections that are recurrent.  She has been having burning with her urination and dark urine for the past few days.  Also has a low-grade temperature  on arrival.    No chest pain.  No peripheral edema.  No nausea or vomiting.  No known sick contacts.    She has been on chronic prednisone for greater than 50 years for her rheumatoid " "arthritis.    She confirms her insulin dosing.    Prolonged discussion about CODE STATUS and she elects to be full code.      Past Medical History    Past Medical History:   Diagnosis Date    Amyloidosis (H)     Chronic kidney disease (CKD), stage III (moderate) (H)     Chronic obstructive pulmonary disease (H)     No Comments Provided    Diverticulosis of intestine without perforation or abscess without bleeding     Diffuse diverticulosis and history of diminutive hyperplastic colon    Essential (primary) hypertension     No Comments Provided    Hyperlipidemia     No Comments Provided    Hypothyroidism     No Comments Provided    Malignant neoplasm of colon (H)     9/2017    Osteoporosis     On Prolia    Rheumatoid arthritis (H)     Rheumatologist Dr. Gonzalez, [phone], fax [phone]    Rosacea     No Comments Provided    Type 2 diabetes mellitus without complications (H)     Diabetes Mellitus, prednisone induced       Past Surgical History   Past Surgical History:   Procedure Laterality Date    ARTHROPLASTY,Right MTP 1 and 5 and left MTP 5  01/2001    CATARACT IOL, RT/LT Bilateral     COLON SURGERY Right 09/2017    For colon cancer    COLONOSCOPY  09/29/2008    COLONOSCOPY  10/11/2018    F/U in 5 years recommended secondary to polyps    LAPAROSCOPIC TUBAL LIGATION      No Comments Provided    Left MCP joint arthroplasty  02/05/1998    MTP 2, 3, 4 right foot  05/2000    RELEASE CARPAL TUNNEL Left 1991    Right long finger MCP repair  1996    [place]    Right MCP 4 and 5 arthroplasty  03/01/2004    Right wrist arthroplasty  1990       Prior to Admission Medications   Prior to Admission Medications   Prescriptions Last Dose Informant Patient Reported? Taking?   ABATACEPT IV  Self Yes No   Sig: Inject 750 mg into the vein every 28 days   B-D TB SYRINGE 27G X 1/2\" 1 ML MISC  Self Yes No   Sig: USE AS DIRECTED FOR METHOTREXATE INJECTIONS   EPINEPHrine (ANY BX GENERIC EQUIV) 0.3 MG/0.3ML injection 2-pack  " "Self No No   Sig: Inject 0.3 mLs (0.3 mg) into the muscle once as needed for anaphylaxis   Lancets (ONETOUCH DELICA PLUS [id number]) MISC   No No   Si each by In Vitro route 2 times daily   Methotrexate 25 MG/ML SOSY  Self Yes No   Sig: Inject 12.5 mg Subcutaneous once a week (On )   Nebulizers (VIOS AEROSOL DELIVERY SYSTEM) [facility]  Self No No   Sig: USE AS DIRECTED   Needle, Disp, (HYPODERMIC NEEDLE) 27G X 1/2\" MISC  Self Yes No   Sig: For methotrexate injections.   Vitamin D, Cholecalciferol, 25 MCG (1000 UT) TABS  Self Yes No   Sig: Take 1 tablet by mouth daily   albuterol (PROAIR HFA/PROVENTIL HFA/VENTOLIN HFA) 108 (90 Base) MCG/ACT inhaler   No No   Sig: INHALE 1-2 PUFFS BY MOUTH EVERY 4-6 HOURS AS NEEDED FOR SHORTNESS OF BREATH OR WHEEZING   allopurinol (ZYLOPRIM) 100 MG tablet  Self Yes No   Sig: Take 100 mg by mouth daily   aspirin 81 MG chewable tablet  Self Yes No   Sig: Take 81 mg by mouth daily with food   atovaquone (MEPRON) 750 MG/5ML suspension  Self Yes No   Sig: Take 1,500 mg by mouth daily   blood glucose (ONETOUCH VERIO IQ) test strip   No No   Sig: USE TO TEST BLOOD SUGAR TWICE DAILY OR AS DIRECTED   budesonide (PULMICORT) 0.5 MG/2ML neb solution   No No   Sig: Take 2 mLs (0.5 mg) by nebulization 2 times daily   cyanocobalamin (VITAMIN B-12) 1000 MCG tablet   No No   Sig: Take 1 tablet (1,000 mcg) by mouth daily   denosumab (PROLIA) 60 MG/ML SOSY injection   No No   Sig: Inject 1 mL (60 mg) Subcutaneous every 6 months   estradiol (VAGIFEM) 10 MCG TABS vaginal tablet   No No   Sig: INSERT 1 TABLET(10 MCG) VAGINALLY 2 TIMES A WEEK   folic acid (FOLVITE) 1 MG tablet   No No   Sig: Take 1 tablet (1 mg) by mouth daily   formoterol (PERFOROMIST) 20 MCG/2ML neb solution   No No   Sig: Take 2 mLs (20 mcg) by nebulization 2 times daily   glucagon 1 MG kit  Self Yes No   Sig: Inject 1 mg into the muscle as needed for low blood sugar   glucose 4 g CHEW chewable tablet  Self Yes No   Sig: Take 1 " tablet by mouth as needed for low blood sugar   insulin lispro protamine-insulin lispro (HUMALOG MIX 75/25 KWIKPEN) (75-25) 100 UNIT/ML pen   No No   Si units in the morning, 20 units in the evening   insulin pen needle (BD PEN NEEDLE LUCILLE 2ND GEN) 32G X 4 MM miscellaneous   No No   Sig: Use to administer insulin twice daily. Dispense as covered by insurance. Dx. Code: E11.9.   ipratropium - albuterol 0.5 mg/2.5 mg/3 mL (DUONEB) 0.5-2.5 (3) MG/3ML neb solution   No No   Sig: Take 1 vial (3 mLs) by nebulization every 6 hours as needed for shortness of breath, wheezing or cough   levothyroxine (SYNTHROID/LEVOTHROID) 112 MCG tablet   No No   Sig: Take 1 tablet (112 mcg) by mouth daily   lisinopril (ZESTRIL) 10 MG tablet   No No   Sig: Take 1 tablet (10 mg) by mouth daily   methotrexate sodium, PF, 50 MG/2ML SOLN injection   Yes No   Sig: Inject 12.5 mg Subcutaneous every 7 days   montelukast (SINGULAIR) 10 MG tablet   No No   Sig: Take 1 tablet (10 mg) by mouth at bedtime   omeprazole (PRILOSEC) 20 MG DR capsule   No No   Sig: Take 1 capsule (20 mg) by mouth daily   predniSONE (DELTASONE) 5 MG tablet  Self Yes No   Sig: Take 10 mg by mouth daily with food   simvastatin (ZOCOR) 40 MG tablet   No No   Sig: Take 1 tablet (40 mg) by mouth at bedtime   tiotropium (SPIRIVA) 18 MCG inhaled capsule   No No   Sig: Inhale 1 capsule (18 mcg) into the lungs daily   traMADol (ULTRAM) 50 MG tablet   No No   Sig: Take 1 tablet (50 mg) by mouth nightly as needed for severe pain.      Facility-Administered Medications: None        Family History   I have reviewed this patient's family history and updated it with pertinent information if needed.  Family History   Problem Relation Age of Onset    Cancer Mother 50        uterus    Heart Disease Mother         MI    Colon Cancer Mother     Heart Disease Father         MI    Chronic Obstructive Pulmonary Disease Sister     Other - See Comments Sister         rubina dow    Colon  Cancer Brother     Breast Cancer No family hx of         Cancer-breast        Physical Exam   Vital Signs: Temp: 99.9  F (37.7  C) Temp src: Tympanic BP: (!) 147/68 Pulse: 84   Resp: 25 SpO2: 96 % O2 Device: Nasal cannula Oxygen Delivery: 1 LPM  Weight: 148 lbs 0 oz  General: Pleasant 74-year-old woman appears chronically ill lying in bed no acute distress  HEENT: Shingles rash on the right eyebrow extending into the scalp, no eye lesions  CV: Regular rate and rhythm no peripheral edema appreciated  Pulmonary: Scattered end expiratory wheezes, no rhonchi or rales appreciated.  Unlabored breathing  GI: Soft nontender abdomen  Skin: See HEENT, otherwise no skin lesions appreciated    Medical Decision Making       77 MINUTES SPENT BY ME on the date of service doing chart review, history, exam, documentation & further activities per the note.      Data     I have personally reviewed the following data over the past 24 hrs:    11.6 (H)  \   12.1   / 151     132 (L) 97 (L) 28.9 (H) /  196 (H)   4.9 27 1.75 (H) \     ALT: 44 AST: 56 (H) AP: 146 TBILI: 1.6 (H)   ALB: 3.6 TOT PROTEIN: 6.5 LIPASE: N/A     Trop: 95 (H) BNP: N/A       Imaging results reviewed over the past 24 hrs:   Recent Results (from the past 24 hours)   XR Chest Port 1 View    Narrative    EXAM: XR CHEST PORT 1 VIEW  LOCATION: [facility]  DATE: 12/15/2024    INDICATION: Patient with complaints of shortness of breath and weakness.  Rule out pneumonia  COMPARISON: 11/10/2023.      Impression    IMPRESSION: Negative chest with no significant change.

## 2024-12-15 NOTE — PHARMACY
Pharmacy- Renal Dose Adjustment    Patient Active Problem List   Diagnosis    Acne rosacea    Anemia of chronic kidney failure, stage 3 (moderate) (H)    Chronic diastolic CHF (congestive heart failure), NYHA class 2 (H)    Stage 3b chronic kidney disease (H)    Malignant tumor of colon (H)    History of colonic polyps    COPD, very severe (H)    Diabetes mellitus type 2, insulin dependent (H)    HTN (hypertension)    Hyperlipidemia    Acquired hypothyroidism    Lichen sclerosus et atrophicus    Migraine headache    Non-rheumatic mitral valve stenosis    On prednisone therapy    Orthostatic hypotension    Osteoporosis    Popliteal cyst, left    Secondary adrenal insufficiency (H)    Rheumatoid arthritis, seropositive    Recurrent pneumonia    Avascular necrosis of left talus (H)    Other amyloidosis (H)    MGUS (monoclonal gammopathy of unknown significance)    COVID-19    Dependence on supplemental oxygen    Bronchiectasis (H)    Osteochondropathy, unspecified other    Bone metabolism disorder    Anxiety disorder, unspecified    Angiomyolipoma of kidney    Anemia in chronic kidney disease    Diabetic nephropathy associated with type 2 diabetes mellitus (H)    Dyslipidemia    Stress incontinence    Sinusitis, maxillary, chronic    Allergic reaction, sequela    Sacroiliac pain    Renal cyst, left    Primary insomnia    Postnasal drip    Postmenopausal atrophic vaginitis    Persistent proteinuria    Perimenopausal    Other chronic pain    Need for vaccination    Microalbuminuria    Medication side effect, initial encounter    Low back pain    Long term (current) use of oral hypoglycemic drugs    Long term (current) use of inhaled steroids    Leukocytosis, unspecified type    Iron deficiency anemia    Immunosuppressed status (H)    History of malignant neoplasm of colon    High risk medication use    Herpesviral vesicular dermatitis    Epigastric pain    Early satiety    Dyspepsia    COPD exacerbation (H)    Acute UTI         Relevant Labs:  Recent Labs   Lab Test 12/15/24  1241 11/12/23  0538   WBC 11.6* 11.5*   HGB 12.1 11.2*    159        CrCl: 25    No intake or output data in the 24 hours ending 12/15/24 1759       Per Renal Dose Adjustment Protocol, will adjust:  Valacyclovir 1000 mg BID to Valacyclovir 1000 mg QD       Will continue to follow and make adjustments accordingly. Thank You.    Rodri Ac [facility] ....................  12/15/2024   5:59 PM

## 2024-12-15 NOTE — ED PROVIDER NOTES
History     Chief Complaint   Patient presents with    Shortness of Breath     HPI  Laura Perez is a 74 year old female who presents today with complaints of shortness of breath.  Symptoms began approximately 8 days ago.  Symptoms gradually worsening prompting ER visit.  Patient's  actually called the police to do a well check on patient.  At house patient was profoundly weak and so 911 was called.  Patient appeared to be dyspneic on when seen.  Was given half a DuoNeb treatment prior to arrival.    Allergies:  Allergies   Allergen Reactions    Glyburide Anaphylaxis     Other reaction(s): Dizziness    Mosquitoes (Informational Only) Hives    Other [Seasonal Allergies] Hives     Deer Flies    Sulfa Antibiotics Anaphylaxis and Hives    Sulfamethoxazole-Trimethoprim Anaphylaxis     Other reaction(s): Other - Describe In Comment Field  Rash, nausea, dizziness    Sulfamethoxazole-Trimethoprim Hives and Itching    Duloxetine      Other reaction(s): Other (see comments)  Falls and dizziness    Codeine Nausea and Nausea and Vomiting       Problem List:    Patient Active Problem List    Diagnosis Date Noted    COVID-19 11/10/2023     Priority: Medium    MGUS (monoclonal gammopathy of unknown significance) 05/25/2023     Priority: Medium    History of malignant neoplasm of colon 09/03/2021     Priority: Medium    Epigastric pain 09/03/2021     Priority: Medium    Early satiety 09/03/2021     Priority: Medium    Dyspepsia 09/03/2021     Priority: Medium    Bronchiectasis (H) 07/16/2019     Priority: Medium    Iron deficiency anemia 11/16/2018     Priority: Medium    Other amyloidosis (H) 11/07/2018     Priority: Medium    Persistent proteinuria 10/16/2018     Priority: Medium     Formatting of this note might be different from the original.   1022 mg/g of creatinine      Dependence on supplemental oxygen 06/28/2018     Priority: Medium    Osteochondropathy, unspecified other 06/28/2018     Priority: Medium     Anxiety disorder, unspecified 06/28/2018     Priority: Medium    Anemia in chronic kidney disease 06/28/2018     Priority: Medium    Other chronic pain 06/28/2018     Priority: Medium    Low back pain 06/28/2018     Priority: Medium    Long term (current) use of oral hypoglycemic drugs 06/28/2018     Priority: Medium    Long term (current) use of inhaled steroids 06/28/2018     Priority: Medium    Herpesviral vesicular dermatitis 06/28/2018     Priority: Medium    Avascular necrosis of left talus (H) 06/16/2018     Priority: Medium    Recurrent pneumonia 06/15/2018     Priority: Medium    Sacroiliac pain 04/12/2018     Priority: Medium    Acne rosacea 01/26/2018     Priority: Medium    History of colonic polyps 01/26/2018     Priority: Medium     Overview:   diminutive at 25 cm      Hyperlipidemia 01/26/2018     Priority: Medium    Acquired hypothyroidism 01/26/2018     Priority: Medium    Migraine headache 01/26/2018     Priority: Medium    Popliteal cyst, left 01/26/2018     Priority: Medium    Stage 3b chronic kidney disease (H) 12/11/2017     Priority: Medium    HTN (hypertension) 11/29/2017     Priority: Medium    Osteoporosis 11/27/2017     Priority: Medium    Malignant tumor of colon (H) 11/26/2017     Priority: Medium    Anemia of chronic kidney failure, stage 3 (moderate) (H) 07/28/2017     Priority: Medium    Diabetes mellitus type 2, insulin dependent (H) 07/28/2017     Priority: Medium    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     Priority: Medium    Stress incontinence 02/09/2017     Priority: Medium    Renal cyst, left 08/24/2016     Priority: Medium    Bone metabolism disorder 07/14/2016     Priority: Medium    Dyslipidemia 07/14/2016     Priority: Medium    Microalbuminuria 07/14/2016     Priority: Medium    Sinusitis, maxillary, chronic 05/06/2016     Priority: Medium    Postnasal drip 05/06/2016     Priority: Medium    Need for vaccination 05/06/2016     Priority: Medium    Allergic reaction, sequela  04/08/2016     Priority: Medium    Postmenopausal atrophic vaginitis 04/08/2016     Priority: Medium    Leukocytosis, unspecified type 04/08/2016     Priority: Medium    Immunosuppressed status (H) 04/08/2016     Priority: Medium    Chronic diastolic CHF (congestive heart failure), NYHA class 2 (H) 02/13/2016     Priority: Medium    Non-rheumatic mitral valve stenosis 02/13/2016     Priority: Medium    On prednisone therapy 01/15/2016     Priority: Medium    Orthostatic hypotension 01/15/2016     Priority: Medium    Secondary adrenal insufficiency (H) 01/15/2016     Priority: Medium    Rheumatoid arthritis, seropositive 01/15/2016     Priority: Medium    Diabetic nephropathy associated with type 2 diabetes mellitus (H) 01/15/2016     Priority: Medium    Primary insomnia 01/15/2016     Priority: Medium    Medication side effect, initial encounter 01/15/2016     Priority: Medium    High risk medication use 01/15/2016     Priority: Medium    Lichen sclerosus et atrophicus 03/05/2015     Priority: Medium    COPD, very severe (H) 09/12/2014     Priority: Medium    Perimenopausal 01/21/2008     Priority: Medium     Formatting of this note might be different from the original.   IMO Update 10/11          Past Medical History:    Past Medical History:   Diagnosis Date    Amyloidosis (H)     Chronic kidney disease (CKD), stage III (moderate) (H)     Chronic obstructive pulmonary disease (H)     Diverticulosis of intestine without perforation or abscess without bleeding     Essential (primary) hypertension     Hyperlipidemia     Hypothyroidism     Malignant neoplasm of colon (H)     Osteoporosis     Rheumatoid arthritis (H)     Rosacea     Type 2 diabetes mellitus without complications (H)        Past Surgical History:    Past Surgical History:   Procedure Laterality Date    ARTHROPLASTY,Right MTP 1 and 5 and left MTP 5  01/2001    CATARACT IOL, RT/LT Bilateral     COLON SURGERY Right 09/2017    For colon cancer     "COLONOSCOPY  2008    COLONOSCOPY  10/11/2018    F/U in 5 years recommended secondary to polyps    LAPAROSCOPIC TUBAL LIGATION      No Comments Provided    Left MCP joint arthroplasty  1998    MTP 2, 3, 4 right foot  2000    RELEASE CARPAL TUNNEL Left     Right long finger MCP repair  1996    [place]    Right MCP 4 and 5 arthroplasty  2004    Right wrist arthroplasty         Family History:    Family History   Problem Relation Age of Onset    Cancer Mother 50        uterus    Heart Disease Mother         MI    Colon Cancer Mother     Heart Disease Father         MI    Chronic Obstructive Pulmonary Disease Sister     Other - See Comments Sister         rubina dow    Colon Cancer Brother     Breast Cancer No family hx of         Cancer-breast       Social History:  Marital Status:   [2]  Social History     Tobacco Use    Smoking status: Former     Current packs/day: 0.00     Average packs/day: 1 pack/day for 30.0 years (30.0 ttl pk-yrs)     Types: Cigarettes     Start date: 1975     Quit date: 2005     Years since quittin.3    Smokeless tobacco: Never   Vaping Use    Vaping status: Never Used   Substance Use Topics    Alcohol use: Not Currently     Alcohol/week: 2.5 standard drinks of alcohol     Types: 3 Standard drinks or equivalent per week     Comment: Rare    Drug use: Yes     Comment: Medical marijuana        Medications:    ABATACEPT IV  albuterol (PROAIR HFA/PROVENTIL HFA/VENTOLIN HFA) 108 (90 Base) MCG/ACT inhaler  allopurinol (ZYLOPRIM) 100 MG tablet  aspirin 81 MG chewable tablet  atovaquone (MEPRON) 750 MG/5ML suspension  B-D TB SYRINGE 27G X 1/2\" 1 ML MISC  blood glucose (ONETOUCH VERIO IQ) test strip  budesonide (PULMICORT) 0.5 MG/2ML neb solution  cyanocobalamin (VITAMIN B-12) 1000 MCG tablet  denosumab (PROLIA) 60 MG/ML SOSY injection  EPINEPHrine (ANY BX GENERIC EQUIV) 0.3 MG/0.3ML injection 2-pack  estradiol (VAGIFEM) 10 MCG TABS vaginal " "tablet  folic acid (FOLVITE) 1 MG tablet  formoterol (PERFOROMIST) 20 MCG/2ML neb solution  glucagon 1 MG kit  glucose 4 g CHEW chewable tablet  insulin lispro protamine-insulin lispro (HUMALOG MIX 75/25 KWIKPEN) (75-25) 100 UNIT/ML pen  insulin pen needle (BD PEN NEEDLE LUCILLE 2ND GEN) 32G X 4 MM miscellaneous  ipratropium - albuterol 0.5 mg/2.5 mg/3 mL (DUONEB) 0.5-2.5 (3) MG/3ML neb solution  Lancets (ONETOUCH DELICA PLUS [id number]) MISC  levothyroxine (SYNTHROID/LEVOTHROID) 112 MCG tablet  lisinopril (ZESTRIL) 10 MG tablet  Methotrexate 25 MG/ML SOSY  methotrexate sodium, PF, 50 MG/2ML SOLN injection  montelukast (SINGULAIR) 10 MG tablet  Nebulizers (VIOS AEROSOL DELIVERY SYSTEM) MISC  Needle, Disp, (HYPODERMIC NEEDLE) 27G X 1/2\" MISC  omeprazole (PRILOSEC) 20 MG DR capsule  predniSONE (DELTASONE) 5 MG tablet  simvastatin (ZOCOR) 40 MG tablet  tiotropium (SPIRIVA) 18 MCG inhaled capsule  traMADol (ULTRAM) 50 MG tablet  Vitamin D, Cholecalciferol, 25 MCG (1000 UT) TABS          Review of Systems   Constitutional: Negative.  Negative for chills and fever.   HENT: Negative.     Eyes: Negative.    Respiratory:  Negative for shortness of breath.    Cardiovascular:  Negative for chest pain.   Endocrine: Negative.    Genitourinary: Negative.    Musculoskeletal: Negative.  Negative for neck pain and neck stiffness.   Skin: Negative.    Allergic/Immunologic: Negative.    Neurological: Negative.    Hematological: Negative.    Psychiatric/Behavioral: Negative.  Negative for self-injury, sleep disturbance and suicidal ideas.        Physical Exam   Pulse: 111  Temp: 99.9  F (37.7  C)  Resp: 20  Height: 157.5 cm (5' 2\")  Weight: 67.1 kg (148 lb)  SpO2: 94 %      Physical Exam  Constitutional:       General: She is not in acute distress.     Appearance: She is normal weight. She is not toxic-appearing.   HENT:      Head:      Comments: Several crusted lesions noted over right upper eyelid as well as scattered lesions over right " side of forehead.  Erythema noted of right upper and right lower eyelids  Cardiovascular:      Rate and Rhythm: Tachycardia present.   Pulmonary:      Effort: Pulmonary effort is normal.      Breath sounds: Examination of the right-upper field reveals wheezing. Examination of the left-upper field reveals wheezing. Examination of the right-middle field reveals wheezing. Examination of the left-middle field reveals wheezing. Examination of the right-lower field reveals wheezing. Examination of the left-lower field reveals wheezing. Wheezing present.   Abdominal:      Palpations: Abdomen is soft.   Skin:     Capillary Refill: Capillary refill takes less than 2 seconds.   Neurological:      General: No focal deficit present.      Mental Status: She is alert.   Psychiatric:         Mood and Affect: Mood normal.         ED Course     ED Course as of 12/15/24 1646   Sun Dec 15, 2024   1350 Patient less tachypneic after albuterol treatments.  Chest x-ray pending   1635 Results noted.  Patient with UTI.  Patient less tachypneic.  Plan admit   1642 Case discussed with hospitalist, Dr. Chinchilla, plan admit     Procedures         EKG - Sinus tachy with t wave inv V2 - V5         Results for orders placed or performed during the hospital encounter of 12/15/24 (from the past 24 hours)   CBC with platelets differential    Narrative    The following orders were created for panel order CBC with platelets differential.  Procedure                               Abnormality         Status                     ---------                               -----------         ------                     CBC with platelets and d...[[id number]]  Abnormal            Final result                 Please view results for these tests on the individual orders.   Comprehensive metabolic panel   Result Value Ref Range    Sodium 132 (L) 135 - 145 mmol/L    Potassium 4.9 3.4 - 5.3 mmol/L    Carbon Dioxide (CO2) 27 22 - 29 mmol/L    Anion Gap 8 7 - 15 mmol/L     Urea Nitrogen 28.9 (H) 8.0 - 23.0 mg/dL    Creatinine 1.75 (H) 0.51 - 0.95 mg/dL    GFR Estimate 30 (L) >60 mL/min/1.73m2    Calcium 10.0 8.8 - 10.4 mg/dL    Chloride 97 (L) 98 - 107 mmol/L    Glucose 196 (H) 70 - 99 mg/dL    Alkaline Phosphatase 146 40 - 150 U/L    AST 56 (H) 0 - 45 U/L    ALT 44 0 - 50 U/L    Protein Total 6.5 6.4 - 8.3 g/dL    Albumin 3.6 3.5 - 5.2 g/dL    Bilirubin Total 1.6 (H) <=1.2 mg/dL   Troponin T, High Sensitivity   Result Value Ref Range    Troponin T, High Sensitivity 102 (HH) <=14 ng/L   [place] Draw    Narrative    The following orders were created for panel order [place] Draw.  Procedure                               Abnormality         Status                     ---------                               -----------         ------                     Extra Blue Top Tube[[id number]]                              Final result               Extra Red Top Tube[[id number]]                               Final result                 Please view results for these tests on the individual orders.   CBC with platelets and differential   Result Value Ref Range    WBC Count 11.6 (H) 4.0 - 11.0 10e3/uL    RBC Count 3.81 3.80 - 5.20 10e6/uL    Hemoglobin 12.1 11.7 - 15.7 g/dL    Hematocrit 38.3 35.0 - 47.0 %     (H) 78 - 100 fL    MCH 31.8 26.5 - 33.0 pg    MCHC 31.6 31.5 - 36.5 g/dL    RDW 16.1 (H) 10.0 - 15.0 %    Platelet Count 151 150 - 450 10e3/uL    % Neutrophils 71 %    % Lymphocytes 16 %    % Monocytes 11 %    % Eosinophils 0 %    % Basophils 1 %    % Immature Granulocytes 1 %    NRBCs per 100 WBC 1 (H) <1 /100    Absolute Neutrophils 8.2 1.6 - 8.3 10e3/uL    Absolute Lymphocytes 1.9 0.8 - 5.3 10e3/uL    Absolute Monocytes 1.3 0.0 - 1.3 10e3/uL    Absolute Eosinophils 0.1 0.0 - 0.7 10e3/uL    Absolute Basophils 0.1 0.0 - 0.2 10e3/uL    Absolute Immature Granulocytes 0.1 <=0.4 10e3/uL    Absolute NRBCs 0.1 10e3/uL   Extra Blue Top Tube   Result Value Ref Range    Hold Specimen J[facility]    Extra  Red Top Tube   Result Value Ref Range    Hold Specimen [facility]    XR Chest Port 1 View    Narrative    EXAM: XR CHEST PORT 1 VIEW  LOCATION: [facility]  DATE: 12/15/2024    INDICATION: Patient with complaints of shortness of breath and weakness.  Rule out pneumonia  COMPARISON: 11/10/2023.      Impression    IMPRESSION: Negative chest with no significant change.   UA with Microscopic reflex to Culture    Specimen: Urine, Clean Catch   Result Value Ref Range    Color Urine Dark Yellow (A) Colorless, Straw, Light Yellow, Yellow    Appearance Urine Cloudy (A) Clear    Glucose Urine Negative Negative mg/dL    Bilirubin Urine Small (A) Negative    Ketones Urine Trace (A) Negative mg/dL    Specific Gravity Urine 1.026 1.000 - 1.030    Blood Urine Trace (A) Negative    pH Urine 5.5 5.0 - 9.0    Protein Albumin Urine 300 (A) Negative mg/dL    Urobilinogen Urine 4.0 (A) Normal, 2.0 mg/dL    Nitrite Urine Negative Negative    Leukocyte Esterase Urine Moderate (A) Negative    Bacteria Urine Many (A) None Seen /HPF    WBC Clumps Urine Present (A) None Seen /HPF    Mucus Urine Present (A) None Seen /LPF    RBC Urine 5 (H) <=2 /HPF    WBC Urine 101 (H) <=5 /HPF    Squamous Epithelials Urine 39 (H) <=1 /HPF    Hyaline Casts Urine 55 (H) <=2 /LPF    Narrative    Urine Culture ordered based on laboratory criteria   Troponin T, High Sensitivity   Result Value Ref Range    Troponin T, High Sensitivity 95 (H) <=14 ng/L   UA with Microscopic reflex to Culture    Specimen: Urine, Catheter   Result Value Ref Range    Color Urine Yellow Colorless, Straw, Light Yellow, Yellow    Appearance Urine Slightly Cloudy (A) Clear    Glucose Urine 30 (A) Negative mg/dL    Bilirubin Urine Small (A) Negative    Ketones Urine 10 (A) Negative mg/dL    Specific Gravity Urine 1.025 1.000 - 1.030    Blood Urine Trace (A) Negative    pH Urine 5.5 5.0 - 9.0    Protein Albumin Urine 300 (A) Negative mg/dL    Urobilinogen Urine 3.0 (A) Normal,  2.0 mg/dL    Nitrite Urine Negative Negative    Leukocyte Esterase Urine Moderate (A) Negative    Bacteria Urine Many (A) None Seen /HPF    Mucus Urine Present (A) None Seen /LPF    RBC Urine 2 <=2 /HPF    WBC Urine 24 (H) <=5 /HPF    Hyaline Casts Urine 28 (H) <=2 /LPF    Narrative    Urine Culture ordered based on laboratory criteria       Medications   ipratropium - albuterol 0.5 mg/2.5 mg/3 mL (DUONEB) neb solution 3 mL (has no administration in time range)   albuterol (PROVENTIL) neb solution 2.5 mg (has no administration in time range)   methylPREDNISolone Na Suc (solu-MEDROL) injection 125 mg (has no administration in time range)       Assessments & Plan (with Medical Decision Making)     74 year old female who presents today with complaints of shortness of breath and weakness.  Arrived by EMS who had given her a neb treatment prior to arrival.  Patient visibly tachypneic on arrival and on oxygen nasal cannula supplementation.      Workup here included labs, x-ray, EKG as above.  Significant findings included elevated BUN and creatinine, elevated troponin which decreased on repeat. UA findings consistent with a urine tract infection.      Treated with albuterol and DuoNebs with improvement of respiratory symptoms.  Suspect elevated troponin may be demand ischemia. Urine tract infection treated with antibiotics.  Case discussed with admitting hospitalist Dr. Chinchilla who agrees to admit.        New Prescriptions    No medications on file       Final diagnoses:   COPD exacerbation (H)   Acute UTI       12/15/2024   [facility]       Olu Livingston MD  12/15/24 2021

## 2024-12-15 NOTE — ED TRIAGE NOTES
"ED Nursing Triage Note (General)   ________________________________    Laura Perez is a 74 year old Female that presents to triage via [facility] with complaints of shortness of breath and generalized weakness.  Patient states hx of COPD, 90% on EMS arrival.  EMS applied 2L NC and gave an albuterol neb.   in route, patient is diabetic.  Patient denies fevers at home.  Patient has noted redness/swelling to the R) eye.  Patient reports hx of a stye and states they have been having trouble treating it.   Significant symptoms had onset 3 day(s) ago.  Pulse 111   Temp 99.9  F (37.7  C) (Tympanic)   Resp 20   Ht 1.575 m (5' 2\")   Wt 67.1 kg (148 lb)   LMP 06/07/2004 (Approximate)   SpO2 94%   BMI 27.07 kg/m    Vital signs:  Temp: 99.9  F (37.7  C) Temp src: Tympanic   Pulse: 111   Resp: 20 SpO2: 94 %     Height: 157.5 cm (5' 2\") Weight: 67.1 kg (148 lb)  Estimated body mass index is 27.07 kg/m  as calculated from the following:    Height as of this encounter: 1.575 m (5' 2\").    Weight as of this encounter: 67.1 kg (148 lb).  PRE HOSPITAL PRIOR LIVING SITUATION Spouse      Triage Assessment (Adult)       Row Name 12/15/24 1149          Triage Assessment    Airway WDL WDL        Respiratory WDL    Respiratory WDL X        Skin Circulation/Temperature WDL    Skin Circulation/Temperature WDL X        Cardiac WDL    Cardiac WDL X     Cardiac Rhythm ST        Peripheral/Neurovascular WDL    Peripheral Neurovascular WDL WDL     Capillary Refill, General less than/equal to 3 secs        Cognitive/Neuro/Behavioral WDL    Cognitive/Neuro/Behavioral WDL WDL        [place] Coma Scale    Best Eye Response 4-->(E4) spontaneous     Best Motor Response 6-->(M6) obeys commands     Best Verbal Response 5-->(V5) oriented     [place] Coma Scale Score 15                     "

## 2024-12-16 ENCOUNTER — APPOINTMENT (OUTPATIENT)
Dept: OCCUPATIONAL THERAPY | Facility: OTHER | Age: 74
End: 2024-12-16
Attending: STUDENT IN AN ORGANIZED HEALTH CARE EDUCATION/TRAINING PROGRAM
Payer: MEDICARE

## 2024-12-16 ENCOUNTER — APPOINTMENT (OUTPATIENT)
Dept: PHYSICAL THERAPY | Facility: OTHER | Age: 74
End: 2024-12-16
Attending: STUDENT IN AN ORGANIZED HEALTH CARE EDUCATION/TRAINING PROGRAM
Payer: MEDICARE

## 2024-12-16 LAB
ALBUMIN SERPL BCG-MCNC: 3.4 G/DL (ref 3.5–5.2)
ALP SERPL-CCNC: 119 U/L (ref 40–150)
ALT SERPL W P-5'-P-CCNC: 37 U/L (ref 0–50)
ANION GAP SERPL CALCULATED.3IONS-SCNC: 8 MMOL/L (ref 7–15)
AST SERPL W P-5'-P-CCNC: 33 U/L (ref 0–45)
BILIRUB SERPL-MCNC: 0.8 MG/DL
BUN SERPL-MCNC: 42 MG/DL (ref 8–23)
CALCIUM SERPL-MCNC: 8.9 MG/DL (ref 8.8–10.4)
CHLORIDE SERPL-SCNC: 96 MMOL/L (ref 98–107)
CREAT SERPL-MCNC: 1.93 MG/DL (ref 0.51–0.95)
EGFRCR SERPLBLD CKD-EPI 2021: 27 ML/MIN/1.73M2
ERYTHROCYTE [DISTWIDTH] IN BLOOD BY AUTOMATED COUNT: 15.8 % (ref 10–15)
GLUCOSE BLDC GLUCOMTR-MCNC: 138 MG/DL (ref 70–99)
GLUCOSE BLDC GLUCOMTR-MCNC: 188 MG/DL (ref 70–99)
GLUCOSE BLDC GLUCOMTR-MCNC: 191 MG/DL (ref 70–99)
GLUCOSE BLDC GLUCOMTR-MCNC: 341 MG/DL (ref 70–99)
GLUCOSE SERPL-MCNC: 390 MG/DL (ref 70–99)
HCO3 SERPL-SCNC: 24 MMOL/L (ref 22–29)
HCT VFR BLD AUTO: 32.5 % (ref 35–47)
HGB BLD-MCNC: 10.8 G/DL (ref 11.7–15.7)
HOLD SPECIMEN: NORMAL
HOLD SPECIMEN: NORMAL
MAGNESIUM SERPL-MCNC: 1.5 MG/DL (ref 1.7–2.3)
MCH RBC QN AUTO: 32.1 PG (ref 26.5–33)
MCHC RBC AUTO-ENTMCNC: 33.2 G/DL (ref 31.5–36.5)
MCV RBC AUTO: 97 FL (ref 78–100)
PLATELET # BLD AUTO: 139 10E3/UL (ref 150–450)
POTASSIUM SERPL-SCNC: 6 MMOL/L (ref 3.4–5.3)
PROT SERPL-MCNC: 6.3 G/DL (ref 6.4–8.3)
RBC # BLD AUTO: 3.36 10E6/UL (ref 3.8–5.2)
SODIUM SERPL-SCNC: 128 MMOL/L (ref 135–145)
WBC # BLD AUTO: 10.5 10E3/UL (ref 4–11)

## 2024-12-16 PROCEDURE — 250N000009 HC RX 250: Performed by: FAMILY MEDICINE

## 2024-12-16 PROCEDURE — 97165 OT EVAL LOW COMPLEX 30 MIN: CPT | Mod: GO | Performed by: OCCUPATIONAL THERAPIST

## 2024-12-16 PROCEDURE — 97116 GAIT TRAINING THERAPY: CPT | Mod: GP

## 2024-12-16 PROCEDURE — 258N000003 HC RX IP 258 OP 636: Performed by: FAMILY MEDICINE

## 2024-12-16 PROCEDURE — 97530 THERAPEUTIC ACTIVITIES: CPT | Mod: GP

## 2024-12-16 PROCEDURE — 36415 COLL VENOUS BLD VENIPUNCTURE: CPT | Performed by: STUDENT IN AN ORGANIZED HEALTH CARE EDUCATION/TRAINING PROGRAM

## 2024-12-16 PROCEDURE — 82040 ASSAY OF SERUM ALBUMIN: CPT | Performed by: STUDENT IN AN ORGANIZED HEALTH CARE EDUCATION/TRAINING PROGRAM

## 2024-12-16 PROCEDURE — 94640 AIRWAY INHALATION TREATMENT: CPT | Mod: 76

## 2024-12-16 PROCEDURE — 97161 PT EVAL LOW COMPLEX 20 MIN: CPT | Mod: GP

## 2024-12-16 PROCEDURE — 85018 HEMOGLOBIN: CPT | Performed by: STUDENT IN AN ORGANIZED HEALTH CARE EDUCATION/TRAINING PROGRAM

## 2024-12-16 PROCEDURE — 250N000009 HC RX 250: Performed by: STUDENT IN AN ORGANIZED HEALTH CARE EDUCATION/TRAINING PROGRAM

## 2024-12-16 PROCEDURE — 250N000011 HC RX IP 250 OP 636: Performed by: STUDENT IN AN ORGANIZED HEALTH CARE EDUCATION/TRAINING PROGRAM

## 2024-12-16 PROCEDURE — 97535 SELF CARE MNGMENT TRAINING: CPT | Mod: GO | Performed by: OCCUPATIONAL THERAPIST

## 2024-12-16 PROCEDURE — 250N000013 HC RX MED GY IP 250 OP 250 PS 637: Performed by: STUDENT IN AN ORGANIZED HEALTH CARE EDUCATION/TRAINING PROGRAM

## 2024-12-16 PROCEDURE — 999N000157 HC STATISTIC RCP TIME EA 10 MIN

## 2024-12-16 PROCEDURE — 120N000001 HC R&B MED SURG/OB

## 2024-12-16 PROCEDURE — 250N000013 HC RX MED GY IP 250 OP 250 PS 637: Performed by: FAMILY MEDICINE

## 2024-12-16 PROCEDURE — 94640 AIRWAY INHALATION TREATMENT: CPT

## 2024-12-16 PROCEDURE — 83735 ASSAY OF MAGNESIUM: CPT | Performed by: STUDENT IN AN ORGANIZED HEALTH CARE EDUCATION/TRAINING PROGRAM

## 2024-12-16 PROCEDURE — 250N000011 HC RX IP 250 OP 636: Performed by: INTERNAL MEDICINE

## 2024-12-16 PROCEDURE — 250N000012 HC RX MED GY IP 250 OP 636 PS 637: Performed by: STUDENT IN AN ORGANIZED HEALTH CARE EDUCATION/TRAINING PROGRAM

## 2024-12-16 PROCEDURE — 99233 SBSQ HOSP IP/OBS HIGH 50: CPT | Performed by: FAMILY MEDICINE

## 2024-12-16 RX ORDER — IPRATROPIUM BROMIDE AND ALBUTEROL SULFATE 2.5; .5 MG/3ML; MG/3ML
3 SOLUTION RESPIRATORY (INHALATION) EVERY 4 HOURS PRN
Status: DISCONTINUED | OUTPATIENT
Start: 2024-12-16 | End: 2024-12-17 | Stop reason: HOSPADM

## 2024-12-16 RX ORDER — GLIPIZIDE 10 MG/1
1 TABLET ORAL
Status: DISCONTINUED | OUTPATIENT
Start: 2024-12-16 | End: 2024-12-16

## 2024-12-16 RX ORDER — GLIPIZIDE 10 MG/1
1 TABLET ORAL 4 TIMES DAILY
Status: DISCONTINUED | OUTPATIENT
Start: 2024-12-16 | End: 2024-12-17 | Stop reason: HOSPADM

## 2024-12-16 RX ORDER — SODIUM CHLORIDE 9 MG/ML
INJECTION, SOLUTION INTRAVENOUS CONTINUOUS
Status: DISCONTINUED | OUTPATIENT
Start: 2024-12-16 | End: 2024-12-17 | Stop reason: HOSPADM

## 2024-12-16 RX ORDER — ALBUTEROL SULFATE 0.83 MG/ML
2.5 SOLUTION RESPIRATORY (INHALATION)
Status: DISCONTINUED | OUTPATIENT
Start: 2024-12-16 | End: 2024-12-17 | Stop reason: HOSPADM

## 2024-12-16 RX ADMIN — HYDROMORPHONE HYDROCHLORIDE 0.2 MG: 0.2 INJECTION, SOLUTION INTRAMUSCULAR; INTRAVENOUS; SUBCUTANEOUS at 14:24

## 2024-12-16 RX ADMIN — UMECLIDINIUM 1 PUFF: 62.5 AEROSOL, POWDER ORAL at 06:35

## 2024-12-16 RX ADMIN — DEXTRAN 70, GLYCERIN, HYPROMELLOSE 1 DROP: 1; 2; 3 SOLUTION/ DROPS OPHTHALMIC at 17:32

## 2024-12-16 RX ADMIN — PANTOPRAZOLE SODIUM 40 MG: 40 TABLET, DELAYED RELEASE ORAL at 10:09

## 2024-12-16 RX ADMIN — HYDROMORPHONE HYDROCHLORIDE 0.2 MG: 0.2 INJECTION, SOLUTION INTRAMUSCULAR; INTRAVENOUS; SUBCUTANEOUS at 20:14

## 2024-12-16 RX ADMIN — INSULIN HUMAN 35 UNITS: 100 INJECTION, SUSPENSION SUBCUTANEOUS at 08:06

## 2024-12-16 RX ADMIN — ALBUTEROL SULFATE 2.5 MG: 2.5 SOLUTION RESPIRATORY (INHALATION) at 16:24

## 2024-12-16 RX ADMIN — ENOXAPARIN SODIUM 30 MG: 30 INJECTION SUBCUTANEOUS at 22:37

## 2024-12-16 RX ADMIN — MONTELUKAST SODIUM 10 MG: 5 TABLET, CHEWABLE ORAL at 22:36

## 2024-12-16 RX ADMIN — SIMVASTATIN 40 MG: 40 TABLET, FILM COATED ORAL at 22:36

## 2024-12-16 RX ADMIN — INSULIN ASPART 2 UNITS: 100 INJECTION, SOLUTION INTRAVENOUS; SUBCUTANEOUS at 13:40

## 2024-12-16 RX ADMIN — BUDESONIDE INHALATION 0.5 MG: 0.5 SUSPENSION RESPIRATORY (INHALATION) at 06:33

## 2024-12-16 RX ADMIN — SODIUM CHLORIDE: 9 INJECTION, SOLUTION INTRAVENOUS at 22:44

## 2024-12-16 RX ADMIN — DOXYCYCLINE HYCLATE 100 MG: 100 CAPSULE ORAL at 10:08

## 2024-12-16 RX ADMIN — HYDROMORPHONE HYDROCHLORIDE 0.2 MG: 0.2 INJECTION, SOLUTION INTRAMUSCULAR; INTRAVENOUS; SUBCUTANEOUS at 08:12

## 2024-12-16 RX ADMIN — TRAMADOL HYDROCHLORIDE 50 MG: 50 TABLET, COATED ORAL at 22:36

## 2024-12-16 RX ADMIN — VALACYCLOVIR 1000 MG: 500 TABLET, FILM COATED ORAL at 10:59

## 2024-12-16 RX ADMIN — ASPIRIN 81 MG CHEWABLE TABLET 81 MG: 81 TABLET CHEWABLE at 10:09

## 2024-12-16 RX ADMIN — CEFTRIAXONE SODIUM 1 G: 1 INJECTION, POWDER, FOR SOLUTION INTRAMUSCULAR; INTRAVENOUS at 15:07

## 2024-12-16 RX ADMIN — BUDESONIDE INHALATION 0.5 MG: 0.5 SUSPENSION RESPIRATORY (INHALATION) at 19:35

## 2024-12-16 RX ADMIN — DOXYCYCLINE HYCLATE 100 MG: 100 CAPSULE ORAL at 22:36

## 2024-12-16 RX ADMIN — INSULIN ASPART 5 UNITS: 100 INJECTION, SOLUTION INTRAVENOUS; SUBCUTANEOUS at 08:08

## 2024-12-16 RX ADMIN — SODIUM CHLORIDE: 9 INJECTION, SOLUTION INTRAVENOUS at 12:04

## 2024-12-16 RX ADMIN — LEVOTHYROXINE SODIUM 112 MCG: 0.11 TABLET ORAL at 10:09

## 2024-12-16 RX ADMIN — ALLOPURINOL 100 MG: 100 TABLET ORAL at 10:09

## 2024-12-16 RX ADMIN — DEXTRAN 70, GLYCERIN, HYPROMELLOSE 1 DROP: 1; 2; 3 SOLUTION/ DROPS OPHTHALMIC at 22:37

## 2024-12-16 RX ADMIN — PREDNISONE 40 MG: 20 TABLET ORAL at 10:09

## 2024-12-16 ASSESSMENT — ACTIVITIES OF DAILY LIVING (ADL)
ADLS_ACUITY_SCORE: 62
ADLS_ACUITY_SCORE: 59
ADLS_ACUITY_SCORE: 62
ADLS_ACUITY_SCORE: 59
ADLS_ACUITY_SCORE: 58
ADLS_ACUITY_SCORE: 59
ADLS_ACUITY_SCORE: 58
ADLS_ACUITY_SCORE: 59
ADLS_ACUITY_SCORE: 58
ADLS_ACUITY_SCORE: 58
ADLS_ACUITY_SCORE: 62
ADLS_ACUITY_SCORE: 59

## 2024-12-16 NOTE — CARE PLAN
" NSG ADMISSION NOTE    Patient admitted to room 311 at approximately 1808 via bed from emergency room. Patient was accompanied by transport tech.     Verbal SBAR report received from ML Martin  prior to patient arrival.     Patient trasferred to bed via self. Patient alert and oriented X 4. Pain is controlled with current analgesics.  Medication(s) being used: see MAR.  . Admission vital signs: Blood pressure (!) 164/73, pulse 90, temperature 98.6  F (37  C), temperature source Tympanic, resp. rate 13, height 1.575 m (5' 2\"), weight 67.1 kg (148 lb), last menstrual period 06/07/2004, SpO2 96%, not currently breastfeeding. Patient was oriented to plan of care, call light, bed controls, tv, telephone, bathroom, and visiting hours.     Risk Assessment    The following safety risks were identified during admission: fall. Yellow risk band applied: YES.     Skin Initial Assessment    This writer admitted this patient and completed a full skin assessment and Oh score in the Adult PCS flowsheet.   Photo documentation of skin problem and/or wound competed via [employer] application (located under Media):  N/A    Appropriate interventions initiated as needed.            Education    Patient has a [place] to Observation order: No  Observation education completed and documented: N/A      Rebeca Rivera RN      "

## 2024-12-16 NOTE — PROGRESS NOTES
Interdisciplinary Discharge Planning Note    Anticipated Discharge Date:12/17-12/18    Anticipated Discharge Location: Home    Clinical Needs Before Discharge:   abx, fluids, PT/OT Eval    Treatment Needs After Discharge:  None identified    Potential Barriers to Discharge: None Identified     CARMEN Pham  12/16/2024,  12:44 PM

## 2024-12-16 NOTE — PROVIDER NOTIFICATION
12/15/24 1810   Initial Information   Patient Belongings none   Patient Belongings Remaining with Patient other (see comments)  (pt states that she does not have anything with her right now her  brought it all home)   Did you bring any home meds/supplements to the hospital?  No         [facility] will make every effort per our policy to help keep your items safe while in the hospital.  If you choose to keep any items at the bedside, we cannot be held responsible for any items that are lost or broken.      List items sent to safe: none    I have reviewed my belongings list on admission and verify that it is correct.     Patient signature_______________________________  Date/Time_____________________    2nd Staff person if patient unable to sign __________________________  Date/Time ______________________      I have received all my belongings noted above at discharge.    Patient signature________________________________  Date/Time  __________________________

## 2024-12-16 NOTE — PHARMACY-ADMISSION MEDICATION HISTORY
Pharmacist Admission Medication History    Admission medication history is complete. The information provided in this note is only as accurate as the sources available at the time of the update.    Information Source(s): Patient, Hospital records, and CareEverywhere/SureScripts via in-person    Pertinent Information: No Pertinent information     Changes made to PTA medication list:  Added: None  Deleted:   Methotrexate  Duplicate  Changed: None    Allergies reviewed with patient and updates made in EHR: yes    Medication History Completed By: Rodri Ac [facility] 12/15/2024 7:25 PM    PTA Med List   Medication Sig Last Dose/Taking    albuterol (PROAIR HFA/PROVENTIL HFA/VENTOLIN HFA) 108 (90 Base) MCG/ACT inhaler INHALE 1-2 PUFFS BY MOUTH EVERY 4-6 HOURS AS NEEDED FOR SHORTNESS OF BREATH OR WHEEZING Past Week    allopurinol (ZYLOPRIM) 100 MG tablet Take 100 mg by mouth daily 12/15/2024    aspirin 81 MG chewable tablet Take 81 mg by mouth daily with food 12/15/2024    atovaquone (MEPRON) 750 MG/5ML suspension Take 1,500 mg by mouth daily 12/15/2024    budesonide (PULMICORT) 0.5 MG/2ML neb solution Take 2 mLs (0.5 mg) by nebulization 2 times daily 12/15/2024 Morning    cyanocobalamin (VITAMIN B-12) 1000 MCG tablet Take 1 tablet (1,000 mcg) by mouth daily 12/15/2024 Morning    estradiol (VAGIFEM) 10 MCG TABS vaginal tablet INSERT 1 TABLET(10 MCG) VAGINALLY 2 TIMES A WEEK 12/11/2024 Morning    folic acid (FOLVITE) 1 MG tablet Take 1 tablet (1 mg) by mouth daily 12/15/2024 Morning    formoterol (PERFOROMIST) 20 MCG/2ML neb solution Take 2 mLs (20 mcg) by nebulization 2 times daily 12/15/2024 Morning    glucagon 1 MG kit Inject 1 mg into the muscle as needed for low blood sugar Unknown    insulin lispro protamine-insulin lispro (HUMALOG MIX 75/25 KWIKPEN) (75-25) 100 UNIT/ML pen 40 units in the morning, 20 units in the evening 12/15/2024 Morning    ipratropium - albuterol 0.5 mg/2.5 mg/3 mL (DUONEB) 0.5-2.5 (3)  MG/3ML neb solution Take 1 vial (3 mLs) by nebulization every 6 hours as needed for shortness of breath, wheezing or cough Past Week    levothyroxine (SYNTHROID/LEVOTHROID) 112 MCG tablet Take 1 tablet (112 mcg) by mouth daily 12/14/2024 Evening    lisinopril (ZESTRIL) 10 MG tablet Take 1 tablet (10 mg) by mouth daily 12/15/2024 Morning    montelukast (SINGULAIR) 10 MG tablet Take 1 tablet (10 mg) by mouth at bedtime 12/14/2024 Evening    omeprazole (PRILOSEC) 20 MG DR capsule Take 1 capsule (20 mg) by mouth daily 12/15/2024 Morning    predniSONE (DELTASONE) 5 MG tablet Take 10 mg by mouth daily with food 12/15/2024 Morning    simvastatin (ZOCOR) 40 MG tablet Take 1 tablet (40 mg) by mouth at bedtime 12/14/2024 Evening    tiotropium (SPIRIVA) 18 MCG inhaled capsule Inhale 1 capsule (18 mcg) into the lungs daily 12/15/2024 Morning    traMADol (ULTRAM) 50 MG tablet Take 1 tablet (50 mg) by mouth nightly as needed for severe pain. 12/14/2024 Evening    Vitamin D, Cholecalciferol, 25 MCG (1000 UT) TABS Take 1 tablet by mouth daily 12/15/2024 Morning

## 2024-12-16 NOTE — PROGRESS NOTES
12/16/24 1230   Appointment Info   Signing Clinician's Name / Credentials (OT) Kelsey Alfred, OTR/L   Living Environment   People in Home spouse   Current Living Arrangements house   Home Accessibility no concerns   Transportation Anticipated family or friend will provide   Living Environment Comments pt uses wheelchair outside of home, is cared for by her    Self-Care   Usual Activity Tolerance moderate   Current Activity Tolerance fair   Equipment Currently Used at Home   (pt has all necessary equipment)   Cognitive Status Examination   Orientation Status orientation to person, place and time   Affect/Mental Status (Cognitive) WFL   Follows Commands WFL   Visual Perception   Visual Impairment/Limitations WFL   Pain Assessment   Patient Currently in Pain   (pt reports feeling pain chronically and from her new shingles at times)   Range of Motion Comprehensive   General Range of Motion   (pt has bilateral U/E AROM limitations chronically)   Coordination   Upper Extremity Coordination Left UE impaired;Right UE impaired  (chronically, needs assistance to open packages, etc)   Bed Mobility   Bed Mobility sit-supine   Sit-Supine [place] (Bed Mobility) minimum assist (75% patient effort);1 person to manage equipment   Transfers   Transfers sit-stand transfer;toilet transfer   Sit-Stand Transfer   Sit-Stand [place] (Transfers) contact guard;1 person to manage equipment   Toilet Transfer   [place] Level (Toilet Transfer) minimum assist (75% patient effort);1 person to manage equipment   Assistive Device (Toilet Transfer) walker, front-wheeled   Clinical Impression   Criteria for Skilled Therapeutic Interventions Met (OT) Yes, treatment indicated   OT Diagnosis COPD Exacerbation and Shingles   Influenced by the following impairments weakness, SOB with activity   OT Problem List-Impairments impacting ADL activity tolerance impaired   Assessment of Occupational Performance 1-3 Performance Deficits    Identified Performance Deficits mobility and self care   Planned Therapy Interventions (OT) ADL retraining;progressive activity/exercise   Clinical Decision Making Complexity (OT) problem focused assessment/low complexity   Risk & Benefits of therapy have been explained risks/benefits reviewed   OT Total Evaluation Time   OT Eval Low Complexity Minutes (43020) 15   OT Goals   Therapy Frequency (OT) Daily   OT Predicted Duration/Target Date for Goal Attainment 12/18/24   OT Goals Hygiene/Grooming;Upper Body Dressing;Transfers;Toilet Transfer/Toileting   OT: Hygiene/Grooming supervision/stand-by assist   OT: Upper Body Dressing Minimal assist   OT: Transfer Supervision/stand-by assist   OT: Toilet Transfer/Toileting Supervision/stand-by assist   Interventions   Interventions Quick Adds Self-Care/Home Management   Self-Care/Home Management   Self-Care/Home Mgmt/ADL, Compensatory, Meal Prep Minutes (45987) 45   Symptoms Noted During/After Treatment (Meal Preparation/Planning Training) shortness of breath;fatigue   Treatment Detail/Skilled Intervention Pt completed self cares seated in recliner, and ambulated to  with FWW and CGA, needed assist for gladys care   [place] Level (Grooming Training) maximum assist (25% patient effort)   Assistance (Grooming Training) 1 person assist  (for hair due to shingles sores)   [place] Level (Bathing Training) moderate assist (50% patient effort)   Assistance (Bathing Training) 1 person assist   [place] Level (Upper Body Dressing Training) moderate assist (50% patient effort)   Assistance (Upper Body Dressing Training) 1 person assist   Lower Body Dressing Training Assistance maximum assist (25% patient effort)   Lower Body Dressing Training Assistance 1 person assist   [place] Level (Toilet Training) moderate assist (50% patient effort)   Assistance (Toilet Training) 1 person assist   OT Discharge Planning   OT Plan progress functional activity and ADL's as pt  able   OT Discharge Recommendation (DC Rec) home with assist   OT Rationale for DC Rec Pt will likely have no futher OT needs upon D/C but will continue to assess   OT Brief overview of current status see above for details   OT Equipment Needed at Discharge   (pt has all necessary equipment)   Total Session Time   Timed Code Treatment Minutes 45   Total Session Time (sum of timed and untimed services) 60

## 2024-12-16 NOTE — PLAN OF CARE
"Goal Outcome Evaluation:   Patients vss. BP (!) 148/64 (BP Location: Left arm, Patient Position: Semi-Ortega's, Cuff Size: Adult Regular)   Pulse 69   Temp 98.3  F (36.8  C) (Tympanic)   Resp 16   Ht 1.575 m (5' 2\")   Wt 67.5 kg (148 lb 14.4 oz)   LMP 06/07/2004 (Approximate)   SpO2 94%   BMI 27.23 kg/m   Complaints of burning/aching to right eye and scalp from shingles rated 4/10. PRN dilaudid given for moderate pain rating per order. No nausea reported this morning. Patient has some shortness of breath at rest and with moving around in bed. Patient states this is not any worse than normal for her. Patient is on room air.                         "

## 2024-12-16 NOTE — PROGRESS NOTES
[facility]    Medicine Progress Note - Hospitalist Service    Date of Admission:  12/15/2024    Assessment & Plan      Laura Perez is a 74 year old woman with past medical history of severe COPD, bronchiectasis, chronic hypoxic respiratory failure, CKD stage III, anemia, diastolic heart failure, type 2 diabetes on insulin, hypertension, hypothyroidism, rheumatoid arthritis and anxiety who presents to the emergency department with worsening shortness of breath.  She is admitted to the hospital due to COPD exacerbation, UTI and shingles.    Principal Problem:    COPD exacerbation (H)    COPD, very severe (H)    Acute on hypoxic respiratory failure    Bronchiectasis (H)    Assessment: Was notably wheezy for the emergency department physician.  Requiring 1 L supplemental oxygen to maintain saturations greater than 90%.  Chest x-ray negative for infiltrate  Viral multiplex testing negative for flu/COVID/RSV    Plan:   -Doxycycline 100 mg twice daily   -prednisone 40 mg daily  -schedule albuterol TID to replace home formoterol  -DuoNebs as needed  -Goal oxygen saturations greater than 90%, wean O2  -Continue home Pulmicort nebs, Incruse subbed for Spiriva, montelukast  -Hold home atovaquone      Acute cystitis    Assessment: UA with WBCs, moderate leuk esterase and bacteria, temp on arrival 99.9.  Empirically treated with ceftriaxone.  ED physician ordered a second UA/culture due to high epithelial cells on initial sample.    Plan:   -Ceftriaxone 1 g daily  -follow urine culture    Shingles   Assessment: Shingles over the right eye brow and scalp for approximately 1 week.  No visual deficits. No clear secondary infection.  GFR under 30 so reduced Valtrex dosing to 1000 mg twice daily.  GFR less than 29 dosing is 500 mg d twice daily.  Symptoms have been present for nearly a week so likely low efficacy of Valtrex.  Plan:   -Valtrex 1000 mg daily, renally dosed  -Continue home tramadol 50 mg  increased to every 6 hours as needed  -Contacted [facility] to see if she needs ophthalmic drops due to location of lesions      CKD3    Anemia of chronic kidney failure, stage 3 (moderate) (H)    Amyloidosis    Assessment: GFR 30 on admission, baseline GFR between 30 and 45.      Chronic diastolic CHF (congestive heart failure), NYHA class 2 (H)    Assessment: Chronic diastolic heart failure, not fluid overloaded.  Poor oral intake recently.  Most recent echocardiogram performed in 2022 with a normal ejection fraction    Plan:   -Received IVF and Cr increased, continue IVF      Diabetes mellitus type 2, insulin dependent (H)    Assessment: Prior to admission on insulin 70/30 40 units in a.m. and 20 units p.m. most recent hemoglobin A1c 6.9% in June 2024.  Has had a poor oral intake recently but also initiated on steroids    Plan:   -4 times daily glucose checks  - NPH 35 units a.m., 15 units p.m.  - Insulin aspart 7 units with meals, medium sliding scale      HTN (hypertension)    Assessment: Prior to admission on lisinopril 10 mg daily. Held due to SEAN      Acquired hypothyroidism    Assessment: last TSH in 2022. PTA on levothyroxine 112 mcg daily    Plan:   -Levothyroxine 112 mcg daily      Rheumatoid arthritis, seropositive    Assessment: Prior to admission on abatacept, methotrexate and atovaquone, hold while inpatient       Gout  Assessment: Not in current exacerbation, continue home allopurinol 100 mg daily      Dyslipidemia    Assessment: Continue home simvastatin 40 mg daily          Diet: Regular Diet Adult    DVT Prophylaxis: Enoxaparin (Lovenox) SQ  Farr Catheter: Not present  Lines: None     Cardiac Monitoring: None  Code Status: Full Code      Clinically Significant Risk Factors Present on Admission        # Hyperkalemia: Highest K = 6 mmol/L in last 2 days, will monitor as appropriate  # Hyponatremia: Lowest Na = 128 mmol/L in last 2 days, will monitor as appropriate  # Hypochloremia: Lowest  "Cl = 96 mmol/L in last 2 days, will monitor as appropriate    # Hypomagnesemia: Lowest Mg = 1.5 mg/dL in last 2 days, will replace as needed   # Hypoalbuminemia: Lowest albumin = 3.4 g/dL at 12/16/2024  7:10 AM, will monitor as appropriate   # Drug Induced Platelet Defect: home medication list includes an antiplatelet medication   # Hypertension: Noted on problem list      # Anemia: based on hgb <11      # DMII: A1C = 7.2 % (Ref range: <5.7 %) within past 6 months   # Overweight: Estimated body mass index is 27.23 kg/m  as calculated from the following:    Height as of this encounter: 1.575 m (5' 2\").    Weight as of this encounter: 67.5 kg (148 lb 14.4 oz).       # COPD: noted on problem list        Social Drivers of Health    Tobacco Use: Medium Risk (11/21/2024)    Patient History     Smoking Tobacco Use: Former     Smokeless Tobacco Use: Never   Physical Activity: Unknown (6/7/2024)    Exercise Vital Sign     Days of Exercise per Week: 0 days   Recent Concern: Physical Activity - Insufficiently Active (6/6/2024)    Received from [facility]    Exercise Vital Sign     Days of Exercise per Week: 2 days     Minutes of Exercise per Session: 40 min   Stress: Stress Concern Present (6/7/2024)    [place] [employer] - Occupational Stress Questionnaire     Feeling of Stress : To some extent   Social Connections: Unknown (6/7/2024)    Social Connection and Isolation Panel [NHANES]     Frequency of Social Gatherings with Friends and Family: Once a week          Disposition Plan     Medically Ready for Discharge: Anticipated in 2-4 Days             John Sharma MD  Hospitalist Service  [facility]  Securely message with [employer] (more info)  Text page via [facility] Paging/Directory   ______________________________________________________________________    Interval History   Breathing improved. A little confused making statements about eye when she means ear, but just took on " opioid for pain.  Has zoster lesions in upper eyelid. Pain in eye    Physical Exam   Vital Signs: Temp: 98.3  F (36.8  C) Temp src: Tympanic BP: (!) 148/64 Pulse: 69   Resp: 16 SpO2: 94 % O2 Device: None (Room air) Oxygen Delivery: 1 LPM  Weight: 148 lbs 14.4 oz    General Appearance: Alert. No acute distress  Head: Right forehead lesions.   Eye: Right upper eyelid lesions. No conjunctival injection  Chest/Respiratory Exam: Clear to auscultation bilaterally  Cardiovascular Exam: Regular rate and rhythm. S1, S2, no murmur, gallop, or rubs.  Gastrointestinal Exam: Mild distension, nontender  Extremities: No lower extremity edema.  Musculoskeletal: RA deformities of both hands  Psychiatric: Normal affect and mentation      Medical Decision Making             Data     I have personally reviewed the following data over the past 24 hrs:    10.5  \   10.8 (L)   / 139 (L)     128 (L) 96 (L) 42.0 (H) /  341 (H)   6.0 (H) 24 1.93 (H) \     ALT: 37 AST: 33 AP: 119 TBILI: 0.8   ALB: 3.4 (L) TOT PROTEIN: 6.3 (L) LIPASE: N/A     Trop: 95 (H) BNP: N/A     TSH: 13.80 (H) T4: 0.87 (L) A1C: 7.2 (H)       Imaging results reviewed over the past 24 hrs:   Recent Results (from the past 24 hours)   XR Chest Port 1 View    Narrative    EXAM: XR CHEST PORT 1 VIEW  LOC[facility]""  DATE: 12/15/2024    INDICATION: Patient with complaints of shortness of breath and weakness.  Rule out pneumonia  COMPARISON: 11/10/2023.      Impression    IMPRESSION: Negative chest with no significant change.

## 2024-12-16 NOTE — PLAN OF CARE
"/71 (BP Location: Left arm, Patient Position: Semi-Ortega's, Cuff Size: Adult Regular)   Pulse 81   Temp 97.1  F (36.2  C) (Tympanic)   Resp 15   Ht 1.575 m (5' 2\")   Wt 67.1 kg (148 lb)   LMP 06/07/2004 (Approximate)   SpO2 93%   BMI 27.07 kg/m        Pt A&O, VSS, afebrile, rates right eye pain 4/10 from shingles. Pt declined tylenol. PRN dilaudid given with relief. On room air, LS clear. PRN zofran given once for nausea. Bowel and bladder continent. Ax1 to commode. Tolerating clear liquids. BG remain elevated. Saline locked.   "

## 2024-12-16 NOTE — PROGRESS NOTES
Patient remains on room air with Sp02 94%.  Budesonide and Incruse given as ordered.  Mouth rinsed post treatment.  Will continue current care plan, Respiratory Therapy following.    Kelsey Zamora, RT

## 2024-12-16 NOTE — PROGRESS NOTES
12/16/24 1247   Appointment Info   Signing Clinician's Name / Credentials (PT) Wojciech Borjasimasrinivas MPT   Living Environment   People in Home spouse   Current Living Arrangements house   Home Accessibility no concerns   Transportation Anticipated family or friend will provide   Living Environment Comments Spouse is primary care giver   Self-Care   Usual Activity Tolerance moderate   Current Activity Tolerance fair   Equipment Currently Used at Home cane, straight;walker, rolling;wheelchair, manual;wheelchair, power   Fall history within last six months no   General Information   Referring Physician Zachary   Patient/Family Therapy Goals Statement (PT) return home when able   Existing Precautions/Restrictions fall  (SOB with activity)   Weight-Bearing Status - LLE full weight-bearing   Weight-Bearing Status - RLE full weight-bearing   Cognition   Affect/Mental Status (Cognition) WFL   Orientation Status (Cognition) oriented x 4   Follows Commands (Cognition) WFL   Pain Assessment   Patient Currently in Pain No   Integumentary/Edema   Integumentary/Edema Comments dx of shingles right eye and on her scalp   Posture    Posture Not impaired   Range of Motion (ROM)   Range of Motion ROM is WFL   Strength (Manual Muscle Testing)   Strength (Manual Muscle Testing) strength is WFL   Strength Comments however, patient fatigue with activity   Bed Mobility   Impairments Contributing to Impaired Bed Mobility decreased strength   Comment, (Bed Mobility) minimal assist for supine<>sit   Transfers   Impairments Contributing to Impaired Transfers decreased strength   Comment, (Transfers) sit to stand and stand pivot with minimal assist to CGA of 1 using a Fww   Gait/Stairs (Locomotion)   [place] Level (Gait) contact guard   Assistive Device (Gait) walker, front-wheeled   Distance in Feet (Gait) 20   Pattern (Gait) step-through   Balance   Balance Comments good with Fww   Sensory Examination   Sensory Perception WFL   Coordination    Coordination no deficits were identified   Muscle Tone   Muscle Tone no deficits were identified   Clinical Impression   Criteria for Skilled Therapeutic Intervention Yes, treatment indicated   PT Diagnosis (PT) impaired mobility   Influenced by the following impairments weakness, fatigue and SOB   Functional limitations due to impairments activity/gait tolerance   Clinical Presentation (PT Evaluation Complexity) evolving   Clinical Decision Making (Complexity) moderate complexity   Planned Therapy Interventions (PT) bed mobility training;gait training;transfer training   Risk & Benefits of therapy have been explained evaluation/treatment results reviewed;risks/benefits reviewed;patient   PT Total Evaluation Time   PT [place], Low Complexity Minutes (59485) 15   Physical Therapy Goals   PT Frequency Daily   PT Predicted Duration/Target Date for Goal Attainment 12/20/24   PT Goals Bed Mobility;Transfers;Gait   PT: Bed Mobility Supervision/stand-by assist   PT: Transfers Supervision/stand-by assist;Assistive device   PT: Gait Supervision/stand-by assist;Rolling walker;50 feet   PT Discharge Planning   PT Plan Continue PT   PT Discharge Recommendation (DC Rec) home with assist;home with outpatient physical therapy   PT Rationale for DC Rec to promote strength, stability and safe mobility   PT Brief overview of current status Pleasant patient requiring assistance with mobilities due to weakness and SOB with activity; she may benefit from continued PT through outpatient services upon discharge should she feel that she is not back to her prior level of activity tolerance   PT Equipment Needed at Discharge walker, rolling;wheelchair

## 2024-12-17 VITALS
HEART RATE: 72 BPM | WEIGHT: 148.9 LBS | RESPIRATION RATE: 18 BRPM | HEIGHT: 62 IN | SYSTOLIC BLOOD PRESSURE: 148 MMHG | DIASTOLIC BLOOD PRESSURE: 71 MMHG | TEMPERATURE: 98.2 F | BODY MASS INDEX: 27.4 KG/M2 | OXYGEN SATURATION: 96 %

## 2024-12-17 LAB
ALBUMIN SERPL BCG-MCNC: 3.3 G/DL (ref 3.5–5.2)
ALP SERPL-CCNC: 118 U/L (ref 40–150)
ALT SERPL W P-5'-P-CCNC: 36 U/L (ref 0–50)
ANION GAP SERPL CALCULATED.3IONS-SCNC: 7 MMOL/L (ref 7–15)
AST SERPL W P-5'-P-CCNC: 40 U/L (ref 0–45)
ATRIAL RATE - MUSE: 109 BPM
BACTERIA UR CULT: ABNORMAL
BACTERIA UR CULT: ABNORMAL
BILIRUB SERPL-MCNC: 0.5 MG/DL
BUN SERPL-MCNC: 49.4 MG/DL (ref 8–23)
CALCIUM SERPL-MCNC: 8.3 MG/DL (ref 8.8–10.4)
CHLORIDE SERPL-SCNC: 103 MMOL/L (ref 98–107)
CREAT SERPL-MCNC: 1.7 MG/DL (ref 0.51–0.95)
DIASTOLIC BLOOD PRESSURE - MUSE: NORMAL MMHG
EGFRCR SERPLBLD CKD-EPI 2021: 31 ML/MIN/1.73M2
ERYTHROCYTE [DISTWIDTH] IN BLOOD BY AUTOMATED COUNT: 16 % (ref 10–15)
GLUCOSE BLDC GLUCOMTR-MCNC: 127 MG/DL (ref 70–99)
GLUCOSE BLDC GLUCOMTR-MCNC: 132 MG/DL (ref 70–99)
GLUCOSE BLDC GLUCOMTR-MCNC: 171 MG/DL (ref 70–99)
GLUCOSE SERPL-MCNC: 157 MG/DL (ref 70–99)
HCO3 SERPL-SCNC: 20 MMOL/L (ref 22–29)
HCT VFR BLD AUTO: 32.3 % (ref 35–47)
HGB BLD-MCNC: 10.5 G/DL (ref 11.7–15.7)
INTERPRETATION ECG - MUSE: NORMAL
MCH RBC QN AUTO: 31.8 PG (ref 26.5–33)
MCHC RBC AUTO-ENTMCNC: 32.5 G/DL (ref 31.5–36.5)
MCV RBC AUTO: 98 FL (ref 78–100)
P AXIS - MUSE: -16 DEGREES
PLATELET # BLD AUTO: 176 10E3/UL (ref 150–450)
POTASSIUM SERPL-SCNC: 5 MMOL/L (ref 3.4–5.3)
PR INTERVAL - MUSE: 136 MS
PROT SERPL-MCNC: 5.8 G/DL (ref 6.4–8.3)
QRS DURATION - MUSE: 74 MS
QT - MUSE: 310 MS
QTC - MUSE: 417 MS
R AXIS - MUSE: 0 DEGREES
RBC # BLD AUTO: 3.3 10E6/UL (ref 3.8–5.2)
SODIUM SERPL-SCNC: 130 MMOL/L (ref 135–145)
SYSTOLIC BLOOD PRESSURE - MUSE: NORMAL MMHG
T AXIS - MUSE: 107 DEGREES
VENTRICULAR RATE- MUSE: 109 BPM
WBC # BLD AUTO: 20.8 10E3/UL (ref 4–11)

## 2024-12-17 PROCEDURE — 250N000011 HC RX IP 250 OP 636: Performed by: STUDENT IN AN ORGANIZED HEALTH CARE EDUCATION/TRAINING PROGRAM

## 2024-12-17 PROCEDURE — 94640 AIRWAY INHALATION TREATMENT: CPT

## 2024-12-17 PROCEDURE — 258N000003 HC RX IP 258 OP 636: Performed by: FAMILY MEDICINE

## 2024-12-17 PROCEDURE — 250N000011 HC RX IP 250 OP 636: Performed by: INTERNAL MEDICINE

## 2024-12-17 PROCEDURE — 85027 COMPLETE CBC AUTOMATED: CPT | Performed by: FAMILY MEDICINE

## 2024-12-17 PROCEDURE — 250N000012 HC RX MED GY IP 250 OP 636 PS 637: Performed by: STUDENT IN AN ORGANIZED HEALTH CARE EDUCATION/TRAINING PROGRAM

## 2024-12-17 PROCEDURE — 250N000009 HC RX 250: Performed by: STUDENT IN AN ORGANIZED HEALTH CARE EDUCATION/TRAINING PROGRAM

## 2024-12-17 PROCEDURE — 99239 HOSP IP/OBS DSCHRG MGMT >30: CPT | Performed by: FAMILY MEDICINE

## 2024-12-17 PROCEDURE — 999N000157 HC STATISTIC RCP TIME EA 10 MIN

## 2024-12-17 PROCEDURE — 94640 AIRWAY INHALATION TREATMENT: CPT | Mod: 76

## 2024-12-17 PROCEDURE — 36415 COLL VENOUS BLD VENIPUNCTURE: CPT | Performed by: FAMILY MEDICINE

## 2024-12-17 PROCEDURE — 250N000009 HC RX 250: Performed by: FAMILY MEDICINE

## 2024-12-17 PROCEDURE — 82947 ASSAY GLUCOSE BLOOD QUANT: CPT | Performed by: FAMILY MEDICINE

## 2024-12-17 PROCEDURE — 250N000013 HC RX MED GY IP 250 OP 250 PS 637: Performed by: STUDENT IN AN ORGANIZED HEALTH CARE EDUCATION/TRAINING PROGRAM

## 2024-12-17 PROCEDURE — 82310 ASSAY OF CALCIUM: CPT | Performed by: FAMILY MEDICINE

## 2024-12-17 RX ORDER — CEFUROXIME AXETIL 500 MG/1
500 TABLET ORAL 2 TIMES DAILY
Qty: 4 TABLET | Refills: 0 | Status: SHIPPED | OUTPATIENT
Start: 2024-12-18 | End: 2024-12-20

## 2024-12-17 RX ORDER — DOXYCYCLINE 100 MG/1
100 CAPSULE ORAL EVERY 12 HOURS
Qty: 8 CAPSULE | Refills: 0 | Status: SHIPPED | OUTPATIENT
Start: 2024-12-17 | End: 2024-12-21

## 2024-12-17 RX ORDER — GLIPIZIDE 10 MG/1
1 TABLET ORAL 4 TIMES DAILY
COMMUNITY
Start: 2024-12-17

## 2024-12-17 RX ORDER — VALACYCLOVIR HYDROCHLORIDE 1 G/1
1000 TABLET, FILM COATED ORAL DAILY
Qty: 3 TABLET | Refills: 0 | Status: SHIPPED | OUTPATIENT
Start: 2024-12-18 | End: 2024-12-21

## 2024-12-17 RX ORDER — PREDNISONE 5 MG/1
10 TABLET ORAL
COMMUNITY
Start: 2024-12-29

## 2024-12-17 RX ORDER — PREDNISONE 10 MG/1
TABLET ORAL
Qty: 29 TABLET | Refills: 0 | Status: SHIPPED | OUTPATIENT
Start: 2024-12-18 | End: 2024-12-29

## 2024-12-17 RX ADMIN — DOXYCYCLINE HYCLATE 100 MG: 100 CAPSULE ORAL at 09:28

## 2024-12-17 RX ADMIN — PANTOPRAZOLE SODIUM 40 MG: 40 TABLET, DELAYED RELEASE ORAL at 09:28

## 2024-12-17 RX ADMIN — DEXTRAN 70, GLYCERIN, HYPROMELLOSE 1 DROP: 1; 2; 3 SOLUTION/ DROPS OPHTHALMIC at 08:21

## 2024-12-17 RX ADMIN — VALACYCLOVIR 1000 MG: 500 TABLET, FILM COATED ORAL at 09:29

## 2024-12-17 RX ADMIN — ALBUTEROL SULFATE 2.5 MG: 2.5 SOLUTION RESPIRATORY (INHALATION) at 06:20

## 2024-12-17 RX ADMIN — CEFTRIAXONE SODIUM 1 G: 1 INJECTION, POWDER, FOR SOLUTION INTRAMUSCULAR; INTRAVENOUS at 14:18

## 2024-12-17 RX ADMIN — POLYETHYLENE GLYCOL 3350 17 G: 17 POWDER, FOR SOLUTION ORAL at 08:33

## 2024-12-17 RX ADMIN — SODIUM CHLORIDE: 9 INJECTION, SOLUTION INTRAVENOUS at 08:33

## 2024-12-17 RX ADMIN — PREDNISONE 40 MG: 20 TABLET ORAL at 09:28

## 2024-12-17 RX ADMIN — DEXTRAN 70, GLYCERIN, HYPROMELLOSE 1 DROP: 1; 2; 3 SOLUTION/ DROPS OPHTHALMIC at 14:18

## 2024-12-17 RX ADMIN — BUDESONIDE INHALATION 0.5 MG: 0.5 SUSPENSION RESPIRATORY (INHALATION) at 06:20

## 2024-12-17 RX ADMIN — ALBUTEROL SULFATE 2.5 MG: 2.5 SOLUTION RESPIRATORY (INHALATION) at 14:33

## 2024-12-17 RX ADMIN — DEXTRAN 70, GLYCERIN, HYPROMELLOSE 1 DROP: 1; 2; 3 SOLUTION/ DROPS OPHTHALMIC at 12:01

## 2024-12-17 RX ADMIN — INSULIN HUMAN 35 UNITS: 100 INJECTION, SUSPENSION SUBCUTANEOUS at 08:22

## 2024-12-17 RX ADMIN — ALLOPURINOL 100 MG: 100 TABLET ORAL at 09:29

## 2024-12-17 RX ADMIN — UMECLIDINIUM 1 PUFF: 62.5 AEROSOL, POWDER ORAL at 06:19

## 2024-12-17 RX ADMIN — HYDROMORPHONE HYDROCHLORIDE 0.2 MG: 0.2 INJECTION, SOLUTION INTRAMUSCULAR; INTRAVENOUS; SUBCUTANEOUS at 02:55

## 2024-12-17 RX ADMIN — LEVOTHYROXINE SODIUM 112 MCG: 0.11 TABLET ORAL at 09:29

## 2024-12-17 RX ADMIN — ASPIRIN 81 MG CHEWABLE TABLET 81 MG: 81 TABLET CHEWABLE at 09:29

## 2024-12-17 ASSESSMENT — ACTIVITIES OF DAILY LIVING (ADL)
ADLS_ACUITY_SCORE: 59
ADLS_ACUITY_SCORE: 62
ADLS_ACUITY_SCORE: 59
ADLS_ACUITY_SCORE: 62
ADLS_ACUITY_SCORE: 59
ADLS_ACUITY_SCORE: 62
ADLS_ACUITY_SCORE: 59
ADLS_ACUITY_SCORE: 62
ADLS_ACUITY_SCORE: 59
ADLS_ACUITY_SCORE: 62

## 2024-12-17 NOTE — PLAN OF CARE
Goal Outcome Evaluation:    Overall Patient Progress: improving    Patient has remained alert and oriented.  Patient having pain to right eye, area of scabbed over shingles.  Patient requested to have dilaudid vs oral pain medication as she said this has worked better for pain for her since she has been hospitalized.  Dilaudid has been effective for relief from pain and patient has been able to sleep off and on.  Patient got up to bathroom and was noticed to have small skin tear next to IV site.  New IV placed and skin tear covered with nonadherent and rolled gauze.  Patient has denied feeling short of breath and continues on room air with Sp02 >90%.  Lungs are clear and diminished.  VSS and afebrile.

## 2024-12-17 NOTE — DISCHARGE SUMMARY
"[facility]  Hospitalist Discharge Summary      Date of Admission:  12/15/2024  Date of Discharge:  12/17/2024  Discharging Provider: John Sharma MD  Discharge Service: Hospitalist Service    Discharge Diagnoses   Principal Problem:    COPD exacerbation (H)  Active Problems:    Anemia of chronic kidney failure, stage 3 (moderate) (H)    Chronic diastolic CHF (congestive heart failure), NYHA class 2 (H)    COPD, very severe (H)    Diabetes mellitus type 2, insulin dependent (H)    HTN (hypertension)    Acquired hypothyroidism    Rheumatoid arthritis, seropositive    Dependence on supplemental oxygen    Bronchiectasis (H)    Anxiety disorder, unspecified    Dyslipidemia    Acute UTI      Clinically Significant Risk Factors     # DMII: A1C = 7.2 % (Ref range: <5.7 %) within past 6 months  # Overweight: Estimated body mass index is 27.23 kg/m  as calculated from the following:    Height as of this encounter: 1.575 m (5' 2\").    Weight as of this encounter: 67.5 kg (148 lb 14.4 oz).       Follow-ups Needed After Discharge   Follow-up Appointments       Follow-up and recommended labs and tests       Hospital follow up with Dr Jason Andrade 12/26                Unresulted Labs Ordered in the Past 30 Days of this Admission       No orders found from 11/15/2024 to 12/16/2024.        These results will be followed up by NA    Discharge Disposition   Discharged to home  Condition at discharge: Stable    Hospital Course   Laura Perez is a 74 year old woman with past medical history of severe COPD, bronchiectasis, chronic hypoxic respiratory failure, CKD stage III, anemia, diastolic heart failure, type 2 diabetes on insulin, hypertension, hypothyroidism, rheumatoid arthritis and anxiety who presents to the emergency department with worsening shortness of breath.  She is admitted to the hospital due to COPD exacerbation, UTI and shingles.    Principal Problem:    COPD exacerbation (H)    COPD, very " severe (H)    Acute on hypoxic respiratory failure    Bronchiectasis (H)  Was notably wheezy for the emergency department physician.  Requiring 1 L supplemental oxygen to maintain saturations greater than 90%.  Chest x-ray negative for infiltrate  Viral multiplex testing negative for flu/COVID/RSV  Doxycycline 100 mg twice daily for total of 7 days  Prednisone 40 mg daily, complete taper with 40 mg tomorrow, 30 mg daily x 5 days, 20 mg daily x 5 days, then back to baseline 10 mg daily for RA  Resume home nebs  Weaned off O2 in a day      Acute cystitis due to E coli  UA with WBCs, moderate leuk esterase and bacteria, temp on arrival 99.9.  Empirically treated with ceftriaxone.  ED physician ordered a second UA/culture due to high epithelial cells on initial sample.  Urine culture with E coli  Ceftriaxone 1 g daily x 3 doses, complete cefuroxime on discharge    Shingles   Shingles over the right eye brow and scalp for approximately 1 week.  No visual deficits. No clear secondary infection.    Given location started Valtrex renally dosed 1000 mg daily  Contacted [facility] to see if she needs ophthalmic drops due to location of lesions. No medication prescribed, but has appointment in 2 days for evaluation      CKD3    Anemia of chronic kidney failure, stage 3 (moderate) (H)    Amyloidosis  GFR 30 on admission, Dropped to 27, back to 31 by discharge. Baseline GFR between 30 and 37.      Chronic diastolic CHF (congestive heart failure), NYHA class 2 (H)  Chronic diastolic heart failure, not fluid overloaded.  Poor oral intake recently.  Most recent echocardiogram performed in 2022 with a normal ejection fraction  Received IVF and Cr improved      Diabetes mellitus type 2, insulin dependent (H)  Prior to admission on insulin 70/30 40 units in a.m. and 20 units p.m. most recent hemoglobin A1c 6.9% in June 2024.  Has had a poor oral intake recently but also initiated on steroids. Treated with home NPH and sliding  scale, glucose in the 100-200 range for the past day before discharge      HTN (hypertension)  Prior to admission on lisinopril 10 mg daily. Held due to SEAN, resume at discharge      Rheumatoid arthritis, seropositive  Prior to admission on abatacept, methotrexate and atovaquone, hold while inpatient. Resume methotrexate next week, abatacept when due 12/26     Consultations This Hospital Stay   PHYSICAL THERAPY ADULT IP CONSULT  OCCUPATIONAL THERAPY ADULT IP CONSULT    Code Status   Full Code    Time Spent on this Encounter   I, John Sharma MD, personally saw the patient today and spent greater than 30 minutes discharging this patient.       John Sharma MD  [facility]  [address]  [place] [address]  Phone: [phone]  Fax: [phone]  ______________________________________________________________________    Physical Exam   Vital Signs: Temp: 98.2  F (36.8  C) Temp src: Tympanic BP: (!) 148/71 Pulse: 72   Resp: 18 SpO2: 96 % O2 Device: None (Room air)    Weight: 148 lbs 14.4 oz  General Appearance: Alert. No acute distress  Head: Shingles vesicles on right eyelid improving.  Less edema around right eye.  Chest/Respiratory Exam: Decreased breath sounds bilaterally, no wheezing or rales  Cardiovascular Exam: Regular rate and rhythm. S1, S2, no murmur, gallop, or rubs.  Extremities: No lower extremity edema.  Psychiatric: Normal affect and mentation         Primary Care Physician   Gio Dooley    Discharge Orders      Reason for your hospital stay    COPD exacerbation     Follow-up and recommended labs and tests     Hospital follow up with Dr Jason Andrade 12/26     Activity    Your activity upon discharge: activity as tolerated     Diet    Follow this diet upon discharge: regular diet       Significant Results and Procedures   Most Recent 3 CBC's:  Recent Labs   Lab Test 12/17/24  0536 12/16/24  0710 12/15/24  1241   WBC 20.8* 10.5 11.6*   HGB 10.5* 10.8*  12.1   MCV 98 97 101*    139* 151     Most Recent 3 BMP's:  Recent Labs   Lab Test 12/17/24  1114 12/17/24  0804 12/17/24  0536 12/16/24  0719 12/16/24  0710 12/15/24  1837 12/15/24  1241   NA  --   --  130*  --  128*  --  132*   POTASSIUM  --   --  5.0  --  6.0*  --  4.9   CHLORIDE  --   --  103  --  96*  --  97*   CO2  --   --  20*  --  24  --  27   BUN  --   --  49.4*  --  42.0*  --  28.9*   CR  --   --  1.70*  --  1.93*  --  1.75*   ANIONGAP  --   --  7  --  8  --  8   GIANNI  --   --  8.3*  --  8.9  --  10.0   * 127* 157*   < > 390*   < > 196*    < > = values in this interval not displayed.     Most Recent 2 LFT's:  Recent Labs   Lab Test 12/17/24  0536 12/16/24  0710   AST 40 33   ALT 36 37   ALKPHOS 118 119   BILITOTAL 0.5 0.8   ,   Results for orders placed or performed during the hospital encounter of 12/15/24   XR Chest Port 1 View    Narrative    EXAM: XR CHEST PORT 1 VIEW  LOCATION: [facility]  DATE: 12/15/2024    INDICATION: Patient with complaints of shortness of breath and weakness.  Rule out pneumonia  COMPARISON: 11/10/2023.      Impression    IMPRESSION: Negative chest with no significant change.       Discharge Medications   Current Discharge Medication List        START taking these medications    Details   artificial tears (GENTEAL) 0.1-0.2-0.3 % ophthalmic solution Place 1 drop into the right eye 4 times daily.    Associated Diagnoses: Herpesviral vesicular dermatitis      cefuroxime (CEFTIN) 500 MG tablet Take 1 tablet (500 mg) by mouth 2 times daily for 2 days.  Qty: 4 tablet, Refills: 0    Associated Diagnoses: Acute UTI      doxycycline hyclate (VIBRAMYCIN) 100 MG capsule Take 1 capsule (100 mg) by mouth every 12 hours for 4 days.  Qty: 8 capsule, Refills: 0    Associated Diagnoses: COPD exacerbation (H)      valACYclovir (VALTREX) 1000 mg tablet Take 1 tablet (1,000 mg) by mouth daily for 3 days.  Qty: 3 tablet, Refills: 0    Associated Diagnoses:  Herpesviral vesicular dermatitis           CONTINUE these medications which have CHANGED    Details   !! predniSONE (DELTASONE) 10 MG tablet Take 4 tablets (40 mg) by mouth daily for 1 day, THEN 3 tablets (30 mg) daily for 5 days, THEN 2 tablets (20 mg) daily for 5 days. Then resume usual 10 mg dose.  Qty: 29 tablet, Refills: 0    Associated Diagnoses: COPD exacerbation (H)      !! predniSONE (DELTASONE) 5 MG tablet Take 2 tablets (10 mg) by mouth daily with food.       !! - Potential duplicate medications found. Please discuss with provider.        CONTINUE these medications which have NOT CHANGED    Details   albuterol (PROAIR HFA/PROVENTIL HFA/VENTOLIN HFA) 108 (90 Base) MCG/ACT inhaler INHALE 1-2 PUFFS BY MOUTH EVERY 4-6 HOURS AS NEEDED FOR SHORTNESS OF BREATH OR WHEEZING  Qty: 18 g, Refills: 11    Comments: Pharmacy may dispense brand covered by insurance (Proair, or proventil or ventolin or generic albuterol inhaler)  Associated Diagnoses: Chronic obstructive pulmonary disease, unspecified COPD type (H)      allopurinol (ZYLOPRIM) 100 MG tablet Take 100 mg by mouth daily      aspirin 81 MG chewable tablet Take 81 mg by mouth daily with food      atovaquone (MEPRON) 750 MG/5ML suspension Take 1,500 mg by mouth daily      budesonide (PULMICORT) 0.5 MG/2ML neb solution Take 2 mLs (0.5 mg) by nebulization 2 times daily  Qty: 360 mL, Refills: 4    Associated Diagnoses: Chronic obstructive pulmonary disease, unspecified COPD type (H)      cyanocobalamin (VITAMIN B-12) 1000 MCG tablet Take 1 tablet (1,000 mcg) by mouth daily  Qty: 90 tablet, Refills: 4    Associated Diagnoses: Vitamin B12 deficiency (non anemic)      estradiol (VAGIFEM) 10 MCG TABS vaginal tablet INSERT 1 TABLET(10 MCG) VAGINALLY 2 TIMES A WEEK  Qty: 24 tablet, Refills: 1    Associated Diagnoses: Atrophic vaginitis      folic acid (FOLVITE) 1 MG tablet Take 1 tablet (1 mg) by mouth daily  Qty: 90 tablet, Refills: 4    Associated Diagnoses:  Seropositive rheumatoid arthritis (H)      formoterol (PERFOROMIST) 20 MCG/2ML neb solution Take 2 mLs (20 mcg) by nebulization 2 times daily  Qty: 180 mL, Refills: 4    Associated Diagnoses: Chronic obstructive pulmonary disease, unspecified COPD type (H)      glucagon 1 MG kit Inject 1 mg into the muscle as needed for low blood sugar      insulin lispro protamine-insulin lispro (HUMALOG MIX 75/25 KWIKPEN) (75-25) 100 UNIT/ML pen 40 units in the morning, 20 units in the evening  Qty: 60 mL, Refills: 4    Associated Diagnoses: Diabetes mellitus type 2, insulin dependent (H)      ipratropium - albuterol 0.5 mg/2.5 mg/3 mL (DUONEB) 0.5-2.5 (3) MG/3ML neb solution Take 1 vial (3 mLs) by nebulization every 6 hours as needed for shortness of breath, wheezing or cough  Qty: 90 mL, Refills: 3    Associated Diagnoses: 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); COPD, very severe (H)      levothyroxine (SYNTHROID/LEVOTHROID) 112 MCG tablet Take 1 tablet (112 mcg) by mouth daily  Qty: 90 tablet, Refills: 4    Associated Diagnoses: Acquired hypothyroidism      lisinopril (ZESTRIL) 10 MG tablet Take 1 tablet (10 mg) by mouth daily  Qty: 90 tablet, Refills: 4    Associated Diagnoses: Stage 3b chronic kidney disease (H); Primary hypertension      montelukast (SINGULAIR) 10 MG tablet Take 1 tablet (10 mg) by mouth at bedtime  Qty: 90 tablet, Refills: 4    Associated Diagnoses: Chronic obstructive pulmonary disease, unspecified COPD type (H)      omeprazole (PRILOSEC) 20 MG DR capsule Take 1 capsule (20 mg) by mouth daily  Qty: 90 capsule, Refills: 4    Associated Diagnoses: Gastroesophageal reflux disease with esophagitis without hemorrhage      simvastatin (ZOCOR) 40 MG tablet Take 1 tablet (40 mg) by mouth at bedtime  Qty: 90 tablet, Refills: 4    Associated Diagnoses: Hyperlipidemia, unspecified hyperlipidemia type      tiotropium (SPIRIVA) 18 MCG inhaled capsule Inhale 1 capsule (18 mcg) into the lungs daily  Qty: 90  "capsule, Refills: 4    Associated Diagnoses: 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  Qty: 30 tablet, Refills: 5    Associated Diagnoses: Seropositive rheumatoid arthritis (H)      Vitamin D, Cholecalciferol, 25 MCG (1000 UT) TABS Take 1 tablet by mouth daily      ABATACEPT IV Inject 750 mg into the vein every 28 days      B-D TB SYRINGE 27G X 1/2\" 1 ML MISC USE AS DIRECTED FOR METHOTREXATE INJECTIONS      blood glucose (ONETOUCH VERIO IQ) test strip USE TO TEST BLOOD SUGAR TWICE DAILY OR AS DIRECTED  Qty: 200 strip, Refills: 0    Associated Diagnoses: Diabetes mellitus type 2, insulin dependent (H)      denosumab (PROLIA) 60 MG/ML SOSY injection Inject 1 mL (60 mg) Subcutaneous every 6 months  Qty: 1 mL, Refills: 1    Associated Diagnoses: Osteoporosis, unspecified osteoporosis type, unspecified pathological fracture presence      EPINEPHrine (ANY BX GENERIC EQUIV) 0.3 MG/0.3ML injection 2-pack Inject 0.3 mLs (0.3 mg) into the muscle once as needed for anaphylaxis  Qty: 0.9 mL, Refills: 1    Associated Diagnoses: Other emphysema (H)      glucose 4 g CHEW chewable tablet Take 1 tablet by mouth as needed for low blood sugar      insulin pen needle (BD PEN NEEDLE LUCILLE 2ND GEN) 32G X 4 MM miscellaneous Use to administer insulin twice daily. Dispense as covered by insurance. Dx. Code: E11.9.  Qty: 200 each, Refills: 11    Associated Diagnoses: Diabetes mellitus type 2, insulin dependent (H)      Lancets (ONETOUCH DELICA PLUS EVMMRH10T) MISC 1 each by In Vitro route 2 times daily  Qty: 200 each, Refills: 11    Associated Diagnoses: Diabetes mellitus type 2, insulin dependent (H)      Methotrexate 25 MG/ML SOSY Inject 12.5 mg Subcutaneous once a week (On Tuesdays)      Nebulizers (VIOS AEROSOL DELIVERY SYSTEM) MISC USE AS DIRECTED  Qty: 1 each, Refills: 0    Associated Diagnoses: Chronic " "obstructive pulmonary disease, unspecified COPD type (H)      Needle, Disp, (HYPODERMIC NEEDLE) 27G X 1/2\" MISC For methotrexate injections.           Allergies   Allergies   Allergen Reactions    Glyburide Anaphylaxis     Other reaction(s): Dizziness    Mosquitoes (Informational Only) Hives    Other [Seasonal Allergies] Hives     Deer Flies    Sulfa Antibiotics Anaphylaxis and Hives    Sulfamethoxazole-Trimethoprim Anaphylaxis     Other reaction(s): Other - Describe In Comment Field  Rash, nausea, dizziness    Sulfamethoxazole-Trimethoprim Hives and Itching    Duloxetine      Other reaction(s): Other (see comments)  Falls and dizziness    Codeine Nausea and Nausea and Vomiting     "

## 2024-12-17 NOTE — PROGRESS NOTES
NSG DISCHARGE NOTE    Patient discharged to home at 3:10 PM via wheel chair. Accompanied by spouse and staff. Discharge instructions reviewed with patient and spouse, opportunity offered to ask questions. Prescriptions sent to patients preferred pharmacy. All belongings sent with patient.    Lexi Hernandez RN

## 2024-12-17 NOTE — PROGRESS NOTES
Pt remains on Room Air.  Pt received and tolerated aerosol meds as ordered.Respiratory will continue to provide support as needed.    Cathy Crockett, RT

## 2024-12-17 NOTE — PLAN OF CARE
"Goal Outcome Evaluation:  Patients vss. BP (!) 148/71 (BP Location: Left arm, Patient Position: Semi-Ortega's, Cuff Size: Adult Regular)   Pulse 72   Temp 98.2  F (36.8  C) (Tympanic)   Resp 18   Ht 1.575 m (5' 2\")   Wt 67.5 kg (148 lb 14.4 oz)   LMP 06/07/2004 (Approximate)   SpO2 96%   BMI 27.23 kg/m   Patient remains on room air. Shortness of breath with exertion, this is patients baseline per patient. Complaints of pain to right eye where shingles are, but patient states it is feeling better than yesterday. Appears less red. Patient will be discharging today.                         "

## 2024-12-17 NOTE — PHARMACY - DISCHARGE MEDICATION RECONCILIATION AND EDUCATION
Pharmacy:  Discharge Counseling and Medication Reconciliation    Laura Perez  [address] [facility]  [place] [address]  [phone] (home)   74 year old female  PCP: Gio Dooley    Allergies: Glyburide, Mosquitoes (informational only), Other [seasonal allergies], Sulfa antibiotics, Sulfamethoxazole-trimethoprim, Sulfamethoxazole-trimethoprim, Duloxetine, and Codeine    Discharge Counseling:    Pharmacist met with patient (and/or family) today to review the medication portion of the After Visit Summary (with an emphasis on NEW medications) and to address patient's questions/concerns.    Summary of Education:   Cefuroxime: Patient is starting Cefuroxime outpatient. Patient was consulted to start tomorrow morning and to take twice daily with food until gone. Patient was made aware that this medication may cause GI upset and to take with food to reduce this risk.  Doxycycline: Patient is continue doxycycline outpatient. Patient was consulted to start tonight and to take with food until gone. Patient was made aware to avoid taking at the same time as any vitamins.  Valacyclovir: Patient is continuing valacyclovir outpatient. Patient was consulted to start tomorrow and drink plenty of water with it. Patient was made aware to complete the full three days.   General: Patient has used prednisone before. Patient was made aware to finish taper before continuing chronic medication.      Materials Provided:  MedCounselor sheets printed from Clinical Pharmacology on: Cefuroxime, Doxycycline, and Valacyclovir    Discharge Medication Reconciliation:    It has been determined that the patient has an adequate supply of medications available or which can be obtained from the patient's preferred pharmacy, which HE/SHE has confirmed as: [facility] [An updated medication list will be faxed to the patient's pharmacy.]    Thank you for the consult.    Rodri Ac [facility]........December 17, 2024 1:15 PM

## 2024-12-17 NOTE — PROGRESS NOTES
Patient remains on RA sating in the mid 90s. Nebs given as ordered. No other respiratory interventions this shift.    Susana Hubbard, RT

## 2024-12-18 ENCOUNTER — PATIENT OUTREACH (OUTPATIENT)
Dept: FAMILY MEDICINE | Facility: OTHER | Age: 74
End: 2024-12-18
Payer: MEDICARE

## 2024-12-18 NOTE — TELEPHONE ENCOUNTER
Transitions of Care Outreach    COPD exacerbation       Most Recent Admission Date: 12/15/2024   Most Recent Admission Diagnosis: COPD exacerbation (H) - J44.1  Acute UTI - N39.0     Most Recent Discharge Date: 12/17/2024   Most Recent Discharge Diagnosis: COPD exacerbation (H) - J44.1  Acute UTI - N39.0  Herpesviral vesicular dermatitis - B00.1     Transitions of Care Assessment         Follow up Plan          Future Appointments   Date Time Provider Department Center   12/26/2024  9:30 AM [facility] INFUSION CHAIR 3 [facility] [place]   12/26/2024  1:00 PM Jonny Andrade DO [facility] [place]   1/27/2025  1:00 PM GH INFUSION M410 (PROCEDURE) [facility] [place]   2/24/2025  1:00 PM GH INFUSION CHAIR 5 [facility] [place]   3/24/2025  1:00 PM GH INFUSION CHAIR 4 [facility] [place]   4/21/2025  1:00 PM GH INFUSION CHAIR 5 [facility] [place]   5/19/2025  1:00 PM GH INFUSION CHAIR 4 [facility] [place]       Outpatient Plan as outlined on AVS reviewed with patient.    For any urgent concerns, please contact our 24 hour nurse triage line: [phone] ([phone])       Rhonda Gill RN

## 2025-01-06 ENCOUNTER — HOSPITAL ENCOUNTER (OUTPATIENT)
Dept: INFUSION THERAPY | Facility: OTHER | Age: 75
Discharge: HOME | End: 2025-01-06
Attending: INTERNAL MEDICINE | Admitting: INTERNAL MEDICINE
Payer: MEDICARE

## 2025-01-06 VITALS
HEART RATE: 77 BPM | DIASTOLIC BLOOD PRESSURE: 65 MMHG | WEIGHT: 147 LBS | SYSTOLIC BLOOD PRESSURE: 161 MMHG | TEMPERATURE: 97.1 F | BODY MASS INDEX: 26.89 KG/M2 | RESPIRATION RATE: 18 BRPM

## 2025-01-06 DIAGNOSIS — M05.9 SEROPOSITIVE RHEUMATOID ARTHRITIS (H): Primary | ICD-10-CM

## 2025-01-06 DIAGNOSIS — E85.89 OTHER AMYLOIDOSIS (H): ICD-10-CM

## 2025-01-06 DIAGNOSIS — Z79.52 ON PREDNISONE THERAPY: ICD-10-CM

## 2025-01-06 PROCEDURE — 258N000003 HC RX IP 258 OP 636: Performed by: INTERNAL MEDICINE

## 2025-01-06 PROCEDURE — 96365 THER/PROPH/DIAG IV INF INIT: CPT

## 2025-01-06 PROCEDURE — 250N000028 HC RX JA MOD (INTRAVENOUS), IP 250 OP 636: Mod: JA | Performed by: INTERNAL MEDICINE

## 2025-01-06 RX ORDER — ALBUTEROL SULFATE 0.83 MG/ML
2.5 SOLUTION RESPIRATORY (INHALATION)
OUTPATIENT
Start: 2025-01-24

## 2025-01-06 RX ORDER — METHYLPREDNISOLONE SODIUM SUCCINATE 125 MG/2ML
125 INJECTION INTRAMUSCULAR; INTRAVENOUS
Start: 2025-01-24

## 2025-01-06 RX ORDER — DIPHENHYDRAMINE HYDROCHLORIDE 50 MG/ML
50 INJECTION INTRAMUSCULAR; INTRAVENOUS
Start: 2025-01-24

## 2025-01-06 RX ORDER — ALBUTEROL SULFATE 90 UG/1
1-2 INHALANT RESPIRATORY (INHALATION)
Start: 2025-01-24

## 2025-01-06 RX ORDER — EPINEPHRINE 1 MG/ML
0.3 INJECTION, SOLUTION, CONCENTRATE INTRAVENOUS EVERY 5 MIN PRN
OUTPATIENT
Start: 2025-01-24

## 2025-01-06 RX ADMIN — SODIUM CHLORIDE 750 MG: 9 INJECTION, SOLUTION INTRAVENOUS at 14:59

## 2025-01-06 RX ADMIN — SODIUM CHLORIDE 250 ML: 0.9 INJECTION, SOLUTION INTRAVENOUS at 14:47

## 2025-01-06 NOTE — NURSING NOTE
Infusion Nursing Note:  Laura Perez presents today for Orencia.    Patient seen by provider today: No   present during visit today: Not Applicable.    Note: N/A.      Intravenous Access:  Peripheral IV placed to left arm.    Treatment Conditions:  Biological Infusion Checklist:  ~~~ NOTE: If the patient answers yes to any of the questions below, hold the infusion and contact ordering provider or on-call provider.    Have you recently had an elevated temperature, fever, chills, productive cough, coughing for 3 weeks or longer or hemoptysis,  abnormal vital signs, night sweats,  chest pain or have you noticed a decrease in your appetite, unexplained weight loss or fatigue? No  Do you have any open wounds or new incisions? No  Do you have any upcoming hospitalizations or surgeries? Does not include esophagogastroduodenoscopy, colonoscopy, endoscopic retrograde cholangiopancreatography (ERCP), endoscopic ultrasound (EUS), dental procedures or joint aspiration/steroid injections  12/15/24  Do you currently have any signs of illness or infection or are you on any antibiotics? No  Have you had any new, sudden or worsening abdominal pain? No  Have you or anyone in your household received a live vaccination in the past 4 weeks? Please note: No live vaccines while on biologic/chemotherapy until 6 months after the last treatment. Patient can receive the flu vaccine (shot only), pneumovax and the Covid vaccine. It is optimal for the patient to get these vaccines mid cycle, but they can be given at any time as long as it is not on the day of the infusion. No  Have you recently been diagnosed with any new nervous system diseases (ie. Multiple sclerosis, Guillain [place], seizures, neurological changes) or cancer diagnosis? Are you on any form of radiation or chemotherapy? No  Are you pregnant or breast feeding or do you have plans of pregnancy in the future? No  Have you been having any signs of worsening depression or  suicidal ideations?  (benlysta only) No  Have there been any other new onset medical symptoms? No  Have you had any new blood clots? (IVIG only) No      Post Infusion Assessment:  Patient tolerated infusion without incident.  Blood return noted pre and post infusion.  Site patent and intact, free from redness, edema or discomfort.  No evidence of extravasations.  Access discontinued per protocol.  Biologic Infusion Post Education: Call the triage nurse at your clinic or seek medical attention if you have chills and/or temperature greater than or equal to 100.5, uncontrolled nausea/vomiting, diarrhea, constipation, dizziness, shortness of breath, chest pain, heart palpitations, weakness or any other new or concerning symptoms, questions or concerns.  You cannot have any live virus vaccines prior to or during treatment or up to 6 months post infusion.  If you have an upcoming surgery, medical procedure or dental procedure during treatment, this should be discussed with your ordering physician and your surgeon/dentist.  If you are having any concerning symptom, if you are unsure if you should get your next infusion or wish to speak to a provider before your next infusion, please call your care coordinator or triage nurse at your clinic to notify them so we can adequately serve you.       Discharge Plan:   Discharge instructions reviewed with: Patient.  Patient and/or family verbalized understanding of discharge instructions and all questions answered.  Declined Copy of AVS.  Patient will return 1 month for next appointment.  Patient discharged in stable condition accompanied by: self and .  Departure Mode: Wheelchair with spouse assist for mobility.      Reema Bender RN

## 2025-01-16 NOTE — PROGRESS NOTES
Call from CRISTEL Acosta, alerting patient spouse on phone wanting to set up infusion. She spoke with pharmacy and drug is on hand. Plan is in and signed by [facility] provider. Able to accomodate on schedule today, given times this would work this date and she will return call to patient/spouse.    musculoskeletal

## 2025-02-03 ENCOUNTER — HOSPITAL ENCOUNTER (OUTPATIENT)
Dept: INFUSION THERAPY | Facility: OTHER | Age: 75
Discharge: HOME | End: 2025-02-03
Attending: INTERNAL MEDICINE | Admitting: INTERNAL MEDICINE
Payer: MEDICARE

## 2025-02-03 VITALS
HEART RATE: 89 BPM | RESPIRATION RATE: 18 BRPM | TEMPERATURE: 97.2 F | SYSTOLIC BLOOD PRESSURE: 143 MMHG | DIASTOLIC BLOOD PRESSURE: 65 MMHG

## 2025-02-03 DIAGNOSIS — M05.9 SEROPOSITIVE RHEUMATOID ARTHRITIS (H): Primary | ICD-10-CM

## 2025-02-03 DIAGNOSIS — Z79.52 ON PREDNISONE THERAPY: ICD-10-CM

## 2025-02-03 DIAGNOSIS — E85.89 OTHER AMYLOIDOSIS (H): ICD-10-CM

## 2025-02-03 PROCEDURE — 250N000028 HC RX JA MOD (INTRAVENOUS), IP 250 OP 636: Mod: JA | Performed by: INTERNAL MEDICINE

## 2025-02-03 PROCEDURE — 96365 THER/PROPH/DIAG IV INF INIT: CPT

## 2025-02-03 PROCEDURE — 258N000003 HC RX IP 258 OP 636: Performed by: INTERNAL MEDICINE

## 2025-02-03 RX ORDER — EPINEPHRINE 1 MG/ML
0.3 INJECTION, SOLUTION, CONCENTRATE INTRAVENOUS EVERY 5 MIN PRN
OUTPATIENT
Start: 2025-03-03

## 2025-02-03 RX ORDER — ALBUTEROL SULFATE 90 UG/1
1-2 INHALANT RESPIRATORY (INHALATION)
Start: 2025-03-03

## 2025-02-03 RX ORDER — DIPHENHYDRAMINE HYDROCHLORIDE 50 MG/ML
50 INJECTION INTRAMUSCULAR; INTRAVENOUS
Start: 2025-03-03

## 2025-02-03 RX ORDER — ALBUTEROL SULFATE 0.83 MG/ML
2.5 SOLUTION RESPIRATORY (INHALATION)
OUTPATIENT
Start: 2025-03-03

## 2025-02-03 RX ORDER — METHYLPREDNISOLONE SODIUM SUCCINATE 125 MG/2ML
125 INJECTION INTRAMUSCULAR; INTRAVENOUS
Start: 2025-03-03

## 2025-02-03 RX ADMIN — SODIUM CHLORIDE 250 ML: 0.9 INJECTION, SOLUTION INTRAVENOUS at 13:11

## 2025-02-03 RX ADMIN — SODIUM CHLORIDE 750 MG: 9 INJECTION, SOLUTION INTRAVENOUS at 13:56

## 2025-02-03 NOTE — NURSING NOTE
Infusion Nursing Note:  Laura NIEVES Perez presents today for Orencia.    Patient seen by provider today: No   present during visit today: Not Applicable.    Note: N/A.      Intravenous Access:  Peripheral IV placed.    Treatment Conditions:  Biological Infusion Checklist:  ~~~ NOTE: If the patient answers yes to any of the questions below, hold the infusion and contact ordering provider or on-call provider.    Have you recently had an elevated temperature, fever, chills, productive cough, coughing for 3 weeks or longer or hemoptysis,  abnormal vital signs, night sweats,  chest pain or have you noticed a decrease in your appetite, unexplained weight loss or fatigue? No  Do you have any open wounds or new incisions? No, small scattered scabs noted to arms from previous skin tears, no areas are open and no signs of infection are noted.   Do you have any upcoming hospitalizations or surgeries? Does not include esophagogastroduodenoscopy, colonoscopy, endoscopic retrograde cholangiopancreatography (ERCP), endoscopic ultrasound (EUS), dental procedures or joint aspiration/steroid injections No  Do you currently have any signs of illness or infection or are you on any antibiotics? No  Have you had any new, sudden or worsening abdominal pain? No  Have you or anyone in your household received a live vaccination in the past 4 weeks? Please note: No live vaccines while on biologic/chemotherapy until 6 months after the last treatment. Patient can receive the flu vaccine (shot only), pneumovax and the Covid vaccine. It is optimal for the patient to get these vaccines mid cycle, but they can be given at any time as long as it is not on the day of the infusion. No  Have you recently been diagnosed with any new nervous system diseases (ie. Multiple sclerosis, Guillain [place], seizures, neurological changes) or cancer diagnosis? Are you on any form of radiation or chemotherapy? No  Are you pregnant or breast feeding or do you  have plans of pregnancy in the future? No  Have you been having any signs of worsening depression or suicidal ideations?  (benlysta only) No  Have there been any other new onset medical symptoms? No  Have you had any new blood clots? (IVIG only) No      Post Infusion Assessment:  Patient tolerated infusion without incident.  Blood return noted pre and post infusion.  Site patent and intact, free from redness, edema or discomfort.  No evidence of extravasations.  Access discontinued per protocol.    Biologic Infusion Post Education: Call the triage nurse at your clinic or seek medical attention if you have chills and/or temperature greater than or equal to 100.5, uncontrolled nausea/vomiting, diarrhea, constipation, dizziness, shortness of breath, chest pain, heart palpitations, weakness or any other new or concerning symptoms, questions or concerns.  You cannot have any live virus vaccines prior to or during treatment or up to 6 months post infusion.  If you have an upcoming surgery, medical procedure or dental procedure during treatment, this should be discussed with your ordering physician and your surgeon/dentist.  If you are having any concerning symptom, if you are unsure if you should get your next infusion or wish to speak to a provider before your next infusion, please call your care coordinator or triage nurse at your clinic to notify them so we can adequately serve you.       Discharge Plan:   Discharge instructions reviewed with: Patient and .  Patient and/or family verbalized understanding of discharge instructions and all questions answered.  Copy of AVS declined. Patient will return 3-3-25 for next appointment.  AVS to patient via [employer].   Patient discharged in stable condition accompanied by: .  Departure Mode: Wheelchair.      Emerald Kerns RN

## 2025-02-05 DIAGNOSIS — N95.2 ATROPHIC VAGINITIS: ICD-10-CM

## 2025-02-07 DIAGNOSIS — N95.2 ATROPHIC VAGINITIS: ICD-10-CM

## 2025-02-07 NOTE — TELEPHONE ENCOUNTER
Requested Prescriptions   Pending Prescriptions Disp Refills    estradiol (VAGIFEM) 10 MCG TABS vaginal tablet 24 tablet 1     Sig: INSERT 1 TABLET(10 MCG) VAGINALLY 2 TIMES A WEEK       Hormone Replacement Therapy (vaginal) Failed - 2/7/2025  9:33 AM        Failed - Recent (12 mo) or future (90 days) visit within the authorizing provider's specialty             Passed - Medication is active on med list        Passed - Medication indicated for associated diagnosis     Medication is associated with one or more of the following diagnoses:     Menopause   Vulva/Vaginal atrophy   UTI prophylaxis          Passed - Patient is 18 years of age or older        Passed - No active pregnancy on record        Passed - No positive pregnancy test on record in past 12 months         Patient failed protocol, LOV with Gio Dooley was 11/21/24  , to provider for consideration Jayna Harrison RN on 2/7/2025 at 9:34 AM

## 2025-02-10 RX ORDER — ESTRADIOL 10 UG/1
TABLET, FILM COATED VAGINAL
Qty: 24 TABLET | Refills: 2 | Status: SHIPPED | OUTPATIENT
Start: 2025-02-10

## 2025-02-10 RX ORDER — ESTRADIOL 10 UG/1
TABLET, FILM COATED VAGINAL
Qty: 24 TABLET | Refills: 1 | OUTPATIENT
Start: 2025-02-10

## 2025-02-10 NOTE — TELEPHONE ENCOUNTER
This is a duplicate Order. See other order from 2/10/2025.  Rhonda Gill RN on 2/10/2025 at 4:20 PM

## 2025-03-03 ENCOUNTER — HOSPITAL ENCOUNTER (OUTPATIENT)
Dept: INFUSION THERAPY | Facility: OTHER | Age: 75
Discharge: HOME | End: 2025-03-03
Attending: INTERNAL MEDICINE | Admitting: INTERNAL MEDICINE
Payer: MEDICARE

## 2025-03-03 VITALS
DIASTOLIC BLOOD PRESSURE: 50 MMHG | RESPIRATION RATE: 16 BRPM | BODY MASS INDEX: 27.25 KG/M2 | TEMPERATURE: 98 F | HEART RATE: 91 BPM | WEIGHT: 149 LBS | SYSTOLIC BLOOD PRESSURE: 135 MMHG

## 2025-03-03 DIAGNOSIS — E85.89 OTHER AMYLOIDOSIS (H): ICD-10-CM

## 2025-03-03 DIAGNOSIS — Z79.52 ON PREDNISONE THERAPY: ICD-10-CM

## 2025-03-03 DIAGNOSIS — M05.9 SEROPOSITIVE RHEUMATOID ARTHRITIS (H): Primary | ICD-10-CM

## 2025-03-03 PROCEDURE — 96365 THER/PROPH/DIAG IV INF INIT: CPT

## 2025-03-03 PROCEDURE — 250N000028 HC RX JA MOD (INTRAVENOUS), IP 250 OP 636: Mod: JZ,JA | Performed by: INTERNAL MEDICINE

## 2025-03-03 PROCEDURE — 258N000003 HC RX IP 258 OP 636: Performed by: INTERNAL MEDICINE

## 2025-03-03 RX ORDER — METHYLPREDNISOLONE SODIUM SUCCINATE 125 MG/2ML
125 INJECTION INTRAMUSCULAR; INTRAVENOUS
Start: 2025-03-31

## 2025-03-03 RX ORDER — ALBUTEROL SULFATE 90 UG/1
1-2 INHALANT RESPIRATORY (INHALATION)
Start: 2025-03-31

## 2025-03-03 RX ORDER — DIPHENHYDRAMINE HYDROCHLORIDE 50 MG/ML
50 INJECTION, SOLUTION INTRAMUSCULAR; INTRAVENOUS
Start: 2025-03-31

## 2025-03-03 RX ORDER — EPINEPHRINE 1 MG/ML
0.3 INJECTION, SOLUTION, CONCENTRATE INTRAVENOUS EVERY 5 MIN PRN
OUTPATIENT
Start: 2025-03-31

## 2025-03-03 RX ORDER — ALBUTEROL SULFATE 0.83 MG/ML
2.5 SOLUTION RESPIRATORY (INHALATION)
OUTPATIENT
Start: 2025-03-31

## 2025-03-03 RX ADMIN — SODIUM CHLORIDE 250 ML: 0.9 INJECTION, SOLUTION INTRAVENOUS at 13:40

## 2025-03-03 RX ADMIN — SODIUM CHLORIDE 750 MG: 9 INJECTION, SOLUTION INTRAVENOUS at 13:51

## 2025-03-03 NOTE — NURSING NOTE
Infusion Nursing Note:  Laura Perez presents today for Orencia.    Patient seen by provider today: No   present during visit today: Not Applicable.    Note: N/A.      Intravenous Access:  Peripheral IV placed.    Treatment Conditions:  Biological Infusion Checklist:  ~~~ NOTE: If the patient answers yes to any of the questions below, hold the infusion and contact ordering provider or on-call provider.    Have you recently had an elevated temperature, fever, chills, productive cough, coughing for 3 weeks or longer or hemoptysis,  abnormal vital signs, night sweats,  chest pain or have you noticed a decrease in your appetite, unexplained weight loss or fatigue? No  Do you have any open wounds or new incisions? No  Do you have any upcoming hospitalizations or surgeries? Does not include esophagogastroduodenoscopy, colonoscopy, endoscopic retrograde cholangiopancreatography (ERCP), endoscopic ultrasound (EUS), dental procedures or joint aspiration/steroid injections No  Do you currently have any signs of illness or infection or are you on any antibiotics? No  Have you had any new, sudden or worsening abdominal pain? No  Have you or anyone in your household received a live vaccination in the past 4 weeks? Please note: No live vaccines while on biologic/chemotherapy until 6 months after the last treatment. Patient can receive the flu vaccine (shot only), pneumovax and the Covid vaccine. It is optimal for the patient to get these vaccines mid cycle, but they can be given at any time as long as it is not on the day of the infusion. No  Have you recently been diagnosed with any new nervous system diseases (ie. Multiple sclerosis, Guillain [place], seizures, neurological changes) or cancer diagnosis? Are you on any form of radiation or chemotherapy? No  Are you pregnant or breast feeding or do you have plans of pregnancy in the future? No  Have you been having any signs of worsening depression or suicidal  ideations?  (benlysta only) No  Have there been any other new onset medical symptoms? No  Have you had any new blood clots? (IVIG only) No      Post Infusion Assessment:  Patient tolerated infusion without incident.  Blood return noted pre and post infusion.  Site patent and intact, free from redness, edema or discomfort.  No evidence of extravasations.  Access discontinued per protocol.  Biologic Infusion Post Education: Call the triage nurse at your clinic or seek medical attention if you have chills and/or temperature greater than or equal to 100.5, uncontrolled nausea/vomiting, diarrhea, constipation, dizziness, shortness of breath, chest pain, heart palpitations, weakness or any other new or concerning symptoms, questions or concerns.  You cannot have any live virus vaccines prior to or during treatment or up to 6 months post infusion.  If you have an upcoming surgery, medical procedure or dental procedure during treatment, this should be discussed with your ordering physician and your surgeon/dentist.  If you are having any concerning symptom, if you are unsure if you should get your next infusion or wish to speak to a provider before your next infusion, please call your care coordinator or triage nurse at your clinic to notify them so we can adequately serve you.       Discharge Plan:   Discharge instructions reviewed with: Patient and Family.  Patient and/or family verbalized understanding of discharge instructions and all questions answered.  AVS to patient via [employer].  Patient will return 3/31/2025 for next appointment.   Patient discharged in stable condition accompanied by: .  Departure Mode: Wheelchair.      Lesa William RN

## 2025-03-31 ENCOUNTER — HOSPITAL ENCOUNTER (OUTPATIENT)
Dept: INFUSION THERAPY | Facility: OTHER | Age: 75
Discharge: HOME | End: 2025-03-31
Attending: INTERNAL MEDICINE | Admitting: INTERNAL MEDICINE
Payer: MEDICARE

## 2025-03-31 VITALS
BODY MASS INDEX: 26.7 KG/M2 | DIASTOLIC BLOOD PRESSURE: 69 MMHG | RESPIRATION RATE: 16 BRPM | TEMPERATURE: 97.9 F | HEART RATE: 74 BPM | WEIGHT: 146 LBS | SYSTOLIC BLOOD PRESSURE: 149 MMHG

## 2025-03-31 DIAGNOSIS — E85.89 OTHER AMYLOIDOSIS (H): ICD-10-CM

## 2025-03-31 DIAGNOSIS — Z79.52 ON PREDNISONE THERAPY: ICD-10-CM

## 2025-03-31 DIAGNOSIS — M05.9 SEROPOSITIVE RHEUMATOID ARTHRITIS (H): Primary | ICD-10-CM

## 2025-03-31 PROCEDURE — 258N000003 HC RX IP 258 OP 636: Performed by: INTERNAL MEDICINE

## 2025-03-31 PROCEDURE — 96365 THER/PROPH/DIAG IV INF INIT: CPT

## 2025-03-31 PROCEDURE — 250N000028 HC RX JA MOD (INTRAVENOUS), IP 250 OP 636: Mod: JZ,JA | Performed by: INTERNAL MEDICINE

## 2025-03-31 RX ORDER — DIPHENHYDRAMINE HYDROCHLORIDE 50 MG/ML
50 INJECTION, SOLUTION INTRAMUSCULAR; INTRAVENOUS
Start: 2025-04-28

## 2025-03-31 RX ORDER — ALBUTEROL SULFATE 0.83 MG/ML
2.5 SOLUTION RESPIRATORY (INHALATION)
OUTPATIENT
Start: 2025-04-28

## 2025-03-31 RX ORDER — EPINEPHRINE 1 MG/ML
0.3 INJECTION, SOLUTION, CONCENTRATE INTRAVENOUS EVERY 5 MIN PRN
OUTPATIENT
Start: 2025-04-28

## 2025-03-31 RX ORDER — METHYLPREDNISOLONE SODIUM SUCCINATE 125 MG/2ML
125 INJECTION INTRAMUSCULAR; INTRAVENOUS
Start: 2025-04-28

## 2025-03-31 RX ORDER — ALBUTEROL SULFATE 90 UG/1
1-2 INHALANT RESPIRATORY (INHALATION)
Start: 2025-04-28

## 2025-03-31 RX ADMIN — SODIUM CHLORIDE 750 MG: 9 INJECTION, SOLUTION INTRAVENOUS at 13:35

## 2025-03-31 RX ADMIN — SODIUM CHLORIDE 250 ML: 0.9 INJECTION, SOLUTION INTRAVENOUS at 13:17

## 2025-03-31 NOTE — NURSING NOTE
Infusion Nursing Note:  Laura PICKETT Destinybeverley presents today for Orencia.    Patient seen by provider today: No   present during visit today: Not Applicable.    Note: Patient's blood pressure noted to be 140/56 - 151/59 throughout infusion visit.  Patient states that she is asymptomatic and declines further evaluation at this time. Patient and spouse educated to continue to monitor blood pressure at home and to follow-up with PCP or present to ED if blood pressure does not improve or should signs and symptoms of hypertension arise. Patient and spouse verbalize understanding and are in agreement with the plan.      Intravenous Access:  Peripheral IV placed.    Treatment Conditions:  Biological Infusion Checklist:  ~~~ NOTE: If the patient answers yes to any of the questions below, hold the infusion and contact ordering provider or on-call provider.    Have you recently had an elevated temperature, fever, chills, productive cough, coughing for 3 weeks or longer or hemoptysis,  abnormal vital signs, night sweats,  chest pain or have you noticed a decrease in your appetite, unexplained weight loss or fatigue? No  Do you have any open wounds or new incisions? No  Do you have any upcoming hospitalizations or surgeries? Does not include esophagogastroduodenoscopy, colonoscopy, endoscopic retrograde cholangiopancreatography (ERCP), endoscopic ultrasound (EUS), dental procedures or joint aspiration/steroid injections No  Do you currently have any signs of illness or infection or are you on any antibiotics? No  Have you had any new, sudden or worsening abdominal pain? No  Have you or anyone in your household received a live vaccination in the past 4 weeks? Please note: No live vaccines while on biologic/chemotherapy until 6 months after the last treatment. Patient can receive the flu vaccine (shot only), pneumovax and the Covid vaccine. It is optimal for the patient to get these vaccines mid cycle, but they can be given at  any time as long as it is not on the day of the infusion. No  Have you recently been diagnosed with any new nervous system diseases (ie. Multiple sclerosis, Guillain [place], seizures, neurological changes) or cancer diagnosis? Are you on any form of radiation or chemotherapy? No  Are you pregnant or breast feeding or do you have plans of pregnancy in the future? No  Have you been having any signs of worsening depression or suicidal ideations?  (benlysta only) No  Have there been any other new onset medical symptoms? No  Have you had any new blood clots? (IVIG only) No      Post Infusion Assessment:  Patient tolerated infusion without incident.  Blood return noted pre and post infusion.  Site patent and intact, free from redness, edema or discomfort.  No evidence of extravasations.  Access discontinued per protocol.  Biologic Infusion Post Education: Call the triage nurse at your clinic or seek medical attention if you have chills and/or temperature greater than or equal to 100.5, uncontrolled nausea/vomiting, diarrhea, constipation, dizziness, shortness of breath, chest pain, heart palpitations, weakness or any other new or concerning symptoms, questions or concerns.  You cannot have any live virus vaccines prior to or during treatment or up to 6 months post infusion.  If you have an upcoming surgery, medical procedure or dental procedure during treatment, this should be discussed with your ordering physician and your surgeon/dentist.  If you are having any concerning symptom, if you are unsure if you should get your next infusion or wish to speak to a provider before your next infusion, please call your care coordinator or triage nurse at your clinic to notify them so we can adequately serve you.       Discharge Plan:   Discharge instructions reviewed with: Patient and Family.  Patient and/or family verbalized understanding of discharge instructions and all questions answered.  AVS to patient via [employer].  Patient  will return 4/28/2025 for next appointment.   Patient discharged in stable condition accompanied by: .  Departure Mode: Wheelchair.      Lesa William RN

## 2025-04-28 ENCOUNTER — HOSPITAL ENCOUNTER (OUTPATIENT)
Dept: INFUSION THERAPY | Facility: OTHER | Age: 75
Discharge: HOME | End: 2025-04-28
Attending: INTERNAL MEDICINE
Payer: MEDICARE

## 2025-04-28 VITALS
RESPIRATION RATE: 16 BRPM | HEART RATE: 82 BPM | TEMPERATURE: 97.2 F | SYSTOLIC BLOOD PRESSURE: 148 MMHG | DIASTOLIC BLOOD PRESSURE: 62 MMHG

## 2025-04-28 DIAGNOSIS — Z79.52 ON PREDNISONE THERAPY: ICD-10-CM

## 2025-04-28 DIAGNOSIS — E85.89 OTHER AMYLOIDOSIS (H): ICD-10-CM

## 2025-04-28 DIAGNOSIS — N18.32 STAGE 3B CHRONIC KIDNEY DISEASE (H): ICD-10-CM

## 2025-04-28 DIAGNOSIS — M81.0 OSTEOPOROSIS, UNSPECIFIED OSTEOPOROSIS TYPE, UNSPECIFIED PATHOLOGICAL FRACTURE PRESENCE: ICD-10-CM

## 2025-04-28 DIAGNOSIS — M05.9 SEROPOSITIVE RHEUMATOID ARTHRITIS (H): Primary | ICD-10-CM

## 2025-04-28 PROCEDURE — 258N000003 HC RX IP 258 OP 636: Performed by: INTERNAL MEDICINE

## 2025-04-28 PROCEDURE — 250N000028 HC RX JA MOD (INTRAVENOUS), IP 250 OP 636: Mod: JZ,JA | Performed by: INTERNAL MEDICINE

## 2025-04-28 RX ORDER — DIPHENHYDRAMINE HYDROCHLORIDE 50 MG/ML
50 INJECTION, SOLUTION INTRAMUSCULAR; INTRAVENOUS
Start: 2025-05-26

## 2025-04-28 RX ORDER — EPINEPHRINE 1 MG/ML
0.3 INJECTION, SOLUTION, CONCENTRATE INTRAVENOUS EVERY 5 MIN PRN
OUTPATIENT
Start: 2025-05-26

## 2025-04-28 RX ORDER — ALBUTEROL SULFATE 0.83 MG/ML
2.5 SOLUTION RESPIRATORY (INHALATION)
OUTPATIENT
Start: 2025-05-26

## 2025-04-28 RX ORDER — ALBUTEROL SULFATE 90 UG/1
1-2 INHALANT RESPIRATORY (INHALATION)
Start: 2025-05-26

## 2025-04-28 RX ORDER — METHYLPREDNISOLONE SODIUM SUCCINATE 125 MG/2ML
125 INJECTION INTRAMUSCULAR; INTRAVENOUS
Start: 2025-05-26

## 2025-04-28 RX ADMIN — SODIUM CHLORIDE 750 MG: 9 INJECTION, SOLUTION INTRAVENOUS at 13:30

## 2025-04-28 RX ADMIN — SODIUM CHLORIDE 250 ML: 9 INJECTION, SOLUTION INTRAVENOUS at 13:20

## 2025-04-28 NOTE — NURSING NOTE
Infusion Nursing Note:  Laura Perez presents today for Orencia.    Patient seen by provider today: No   present during visit today: Not Applicable.    Note: N/A.      Intravenous Access:  Peripheral IV placed.    Treatment Conditions:  Biological Infusion Checklist:  ~~~ NOTE: If the patient answers yes to any of the questions below, hold the infusion and contact ordering provider or on-call provider.    Have you recently had an elevated temperature, fever, chills, productive cough, coughing for 3 weeks or longer or hemoptysis,  abnormal vital signs, night sweats,  chest pain or have you noticed a decrease in your appetite, unexplained weight loss or fatigue? No  Do you have any open wounds or new incisions? No  Do you have any upcoming hospitalizations or surgeries? Does not include esophagogastroduodenoscopy, colonoscopy, endoscopic retrograde cholangiopancreatography (ERCP), endoscopic ultrasound (EUS), dental procedures or joint aspiration/steroid injections No  Do you currently have any signs of illness or infection or are you on any antibiotics? No  Have you had any new, sudden or worsening abdominal pain? No  Have you or anyone in your household received a live vaccination in the past 4 weeks? Please note: No live vaccines while on biologic/chemotherapy until 6 months after the last treatment. Patient can receive the flu vaccine (shot only), pneumovax and the Covid vaccine. It is optimal for the patient to get these vaccines mid cycle, but they can be given at any time as long as it is not on the day of the infusion. No  Have you recently been diagnosed with any new nervous system diseases (ie. Multiple sclerosis, Guillain [place], seizures, neurological changes) or cancer diagnosis? Are you on any form of radiation or chemotherapy? No  Are you pregnant or breast feeding or do you have plans of pregnancy in the future? No  Have you been having any signs of worsening depression or suicidal  ideations?  (benlysta only) No NA  Have there been any other new onset medical symptoms? No  Have you had any new blood clots? (IVIG only) No NA      Post Infusion Assessment:  Patient tolerated infusion without incident.  Blood return noted pre and post infusion.  Site patent and intact, free from redness, edema or discomfort.  No evidence of extravasations.  Access discontinued per protocol.  Biologic Infusion Post Education: Call the triage nurse at your clinic or seek medical attention if you have chills and/or temperature greater than or equal to 100.5, uncontrolled nausea/vomiting, diarrhea, constipation, dizziness, shortness of breath, chest pain, heart palpitations, weakness or any other new or concerning symptoms, questions or concerns.  You cannot have any live virus vaccines prior to or during treatment or up to 6 months post infusion.  If you have an upcoming surgery, medical procedure or dental procedure during treatment, this should be discussed with your ordering physician and your surgeon/dentist.  If you are having any concerning symptom, if you are unsure if you should get your next infusion or wish to speak to a provider before your next infusion, please call your care coordinator or triage nurse at your clinic to notify them so we can adequately serve you.       Discharge Plan:   Discharge instructions reviewed with: Patient.  Copy of AVS reviewed with patient and/or family.  Patient will return 5/27/25 for next appointment.  Patient discharged in stable condition accompanied by: self and .  Departure Mode: Wheelchair.      Jayna Harrison RN

## 2025-05-06 ENCOUNTER — OFFICE VISIT (OUTPATIENT)
Dept: PEDIATRICS | Facility: OTHER | Age: 75
End: 2025-05-06
Attending: INTERNAL MEDICINE
Payer: MEDICARE

## 2025-05-06 VITALS
BODY MASS INDEX: 27.75 KG/M2 | SYSTOLIC BLOOD PRESSURE: 122 MMHG | WEIGHT: 151.7 LBS | OXYGEN SATURATION: 96 % | DIASTOLIC BLOOD PRESSURE: 60 MMHG | HEART RATE: 96 BPM | RESPIRATION RATE: 16 BRPM | TEMPERATURE: 99 F

## 2025-05-06 DIAGNOSIS — E11.69 TYPE 2 DIABETES MELLITUS WITH OTHER SPECIFIED COMPLICATION, UNSPECIFIED WHETHER LONG TERM INSULIN USE (H): ICD-10-CM

## 2025-05-06 DIAGNOSIS — Z51.81 MEDICATION MONITORING ENCOUNTER: ICD-10-CM

## 2025-05-06 DIAGNOSIS — I10 ESSENTIAL HYPERTENSION, BENIGN: Primary | ICD-10-CM

## 2025-05-06 DIAGNOSIS — F11.90 CHRONIC, CONTINUOUS USE OF OPIOIDS: ICD-10-CM

## 2025-05-06 DIAGNOSIS — M05.9 SEROPOSITIVE RHEUMATOID ARTHRITIS (H): Chronic | ICD-10-CM

## 2025-05-06 DIAGNOSIS — N18.32 STAGE 3B CHRONIC KIDNEY DISEASE (H): Chronic | ICD-10-CM

## 2025-05-06 DIAGNOSIS — D63.1 ANEMIA OF CHRONIC KIDNEY FAILURE, STAGE 3 (MODERATE) (H): Chronic | ICD-10-CM

## 2025-05-06 DIAGNOSIS — N18.30 ANEMIA OF CHRONIC KIDNEY FAILURE, STAGE 3 (MODERATE) (H): Chronic | ICD-10-CM

## 2025-05-06 DIAGNOSIS — C18.9 MALIGNANT NEOPLASM OF COLON, UNSPECIFIED PART OF COLON (H): ICD-10-CM

## 2025-05-06 DIAGNOSIS — M1A.9XX0 CHRONIC GOUT WITHOUT TOPHUS, UNSPECIFIED CAUSE, UNSPECIFIED SITE: ICD-10-CM

## 2025-05-06 DIAGNOSIS — J44.9 COPD, VERY SEVERE (H): ICD-10-CM

## 2025-05-06 DIAGNOSIS — E85.89 OTHER AMYLOIDOSIS (H): Chronic | ICD-10-CM

## 2025-05-06 LAB
ANION GAP SERPL CALCULATED.3IONS-SCNC: 13 MMOL/L (ref 7–15)
BUN SERPL-MCNC: 33 MG/DL (ref 8–23)
CALCIUM SERPL-MCNC: 9.5 MG/DL (ref 8.8–10.4)
CHLORIDE SERPL-SCNC: 102 MMOL/L (ref 98–107)
CREAT SERPL-MCNC: 1.33 MG/DL (ref 0.51–0.95)
CREAT UR-MCNC: 71.5 MG/DL
CREAT UR-MCNC: 72 MG/DL
EGFRCR SERPLBLD CKD-EPI 2021: 41 ML/MIN/1.73M2
ERYTHROCYTE [DISTWIDTH] IN BLOOD BY AUTOMATED COUNT: 17.2 % (ref 10–15)
GLUCOSE SERPL-MCNC: 288 MG/DL (ref 70–99)
HCO3 SERPL-SCNC: 22 MMOL/L (ref 22–29)
HCT VFR BLD AUTO: 34.3 % (ref 35–47)
HGB BLD-MCNC: 11 G/DL (ref 11.7–15.7)
MCH RBC QN AUTO: 30.8 PG (ref 26.5–33)
MCHC RBC AUTO-ENTMCNC: 32.1 G/DL (ref 31.5–36.5)
MCV RBC AUTO: 96 FL (ref 78–100)
MICROALBUMIN UR-MCNC: 135.1 MG/L
MICROALBUMIN/CREAT UR: 188.95 MG/G CR (ref 0–25)
PLATELET # BLD AUTO: 231 10E3/UL (ref 150–450)
POTASSIUM SERPL-SCNC: 5.5 MMOL/L (ref 3.4–5.3)
RBC # BLD AUTO: 3.57 10E6/UL (ref 3.8–5.2)
SODIUM SERPL-SCNC: 137 MMOL/L (ref 135–145)
URATE SERPL-MCNC: 5 MG/DL (ref 2.4–5.7)
WBC # BLD AUTO: 15.3 10E3/UL (ref 4–11)

## 2025-05-06 PROCEDURE — 85018 HEMOGLOBIN: CPT | Mod: ZL | Performed by: INTERNAL MEDICINE

## 2025-05-06 PROCEDURE — 82043 UR ALBUMIN QUANTITATIVE: CPT | Mod: ZL | Performed by: INTERNAL MEDICINE

## 2025-05-06 PROCEDURE — 36415 COLL VENOUS BLD VENIPUNCTURE: CPT | Mod: ZL | Performed by: INTERNAL MEDICINE

## 2025-05-06 PROCEDURE — 84550 ASSAY OF BLOOD/URIC ACID: CPT | Mod: ZL | Performed by: INTERNAL MEDICINE

## 2025-05-06 PROCEDURE — 80048 BASIC METABOLIC PNL TOTAL CA: CPT | Mod: ZL | Performed by: INTERNAL MEDICINE

## 2025-05-06 RX ORDER — AMLODIPINE BESYLATE 2.5 MG/1
2.5 TABLET ORAL DAILY
COMMUNITY
Start: 2025-04-25

## 2025-05-06 RX ORDER — TRAMADOL HYDROCHLORIDE 50 MG/1
50 TABLET ORAL
Qty: 30 TABLET | Refills: 5 | Status: SHIPPED | OUTPATIENT
Start: 2025-05-06

## 2025-05-06 ASSESSMENT — ANXIETY QUESTIONNAIRES
4. TROUBLE RELAXING: NEARLY EVERY DAY
3. WORRYING TOO MUCH ABOUT DIFFERENT THINGS: MORE THAN HALF THE DAYS
7. FEELING AFRAID AS IF SOMETHING AWFUL MIGHT HAPPEN: SEVERAL DAYS
2. NOT BEING ABLE TO STOP OR CONTROL WORRYING: MORE THAN HALF THE DAYS
5. BEING SO RESTLESS THAT IT IS HARD TO SIT STILL: MORE THAN HALF THE DAYS
GAD7 TOTAL SCORE: 13
IF YOU CHECKED OFF ANY PROBLEMS ON THIS QUESTIONNAIRE, HOW DIFFICULT HAVE THESE PROBLEMS MADE IT FOR YOU TO DO YOUR WORK, TAKE CARE OF THINGS AT HOME, OR GET ALONG WITH OTHER PEOPLE: EXTREMELY DIFFICULT
GAD7 TOTAL SCORE: 13
7. FEELING AFRAID AS IF SOMETHING AWFUL MIGHT HAPPEN: SEVERAL DAYS
1. FEELING NERVOUS, ANXIOUS, OR ON EDGE: MORE THAN HALF THE DAYS
GAD7 TOTAL SCORE: 13
8. IF YOU CHECKED OFF ANY PROBLEMS, HOW DIFFICULT HAVE THESE MADE IT FOR YOU TO DO YOUR WORK, TAKE CARE OF THINGS AT HOME, OR GET ALONG WITH OTHER PEOPLE?: EXTREMELY DIFFICULT
6. BECOMING EASILY ANNOYED OR IRRITABLE: SEVERAL DAYS

## 2025-05-06 ASSESSMENT — PAIN SCALES - GENERAL: PAINLEVEL_OUTOF10: NO PAIN (0)

## 2025-05-06 ASSESSMENT — PATIENT HEALTH QUESTIONNAIRE - PHQ9
SUM OF ALL RESPONSES TO PHQ QUESTIONS 1-9: 13
10. IF YOU CHECKED OFF ANY PROBLEMS, HOW DIFFICULT HAVE THESE PROBLEMS MADE IT FOR YOU TO DO YOUR WORK, TAKE CARE OF THINGS AT HOME, OR GET ALONG WITH OTHER PEOPLE: EXTREMELY DIFFICULT
SUM OF ALL RESPONSES TO PHQ QUESTIONS 1-9: 13

## 2025-05-06 NOTE — PROGRESS NOTES
Assessment & Plan   1. Essential hypertension, benign (Primary)  Blood pressure seems to be at goal today.  Anxiety may have been contributing.  Considering the possibility of falsely high readings with home blood pressure cuff.  Recommend scheduling a nurse only blood pressure check so both can be examined.  If home blood pressure check within 10 points of clinic would consider that accurate.  Recommend kidney panel to ensure no alteration of chronic kidney disease which could lead to hypertension.  - Basic metabolic panel; Future  - Basic metabolic panel    2. Other amyloidosis (H)  She follows with the [facility]    3. Rheumatoid arthritis, seropositive  She has chronic pain due to many years of rheumatoid arthritis which has been well-controlled with the use of tramadol.  I reviewed the [place]  database.  Urine drug screens were reviewed.  Opioid contract resigned today.  - traMADol (ULTRAM) 50 MG tablet; Take 1 tablet (50 mg) by mouth nightly as needed for severe pain.  Dispense: 30 tablet; Refill: 5    4. COPD, very severe (H)  She is having some ongoing shortness of breath.  Her specialist think that it is more than what would be explained by her COPD.  Which is why they are proceeding with stress testing.    5. Malignant neoplasm of colon, unspecified part of colon (H)  She follows with oncology    6. Type 2 diabetes mellitus with other specified complication, unspecified whether long term insulin use (H)  At goal, no change.    7. Stage 3b chronic kidney disease (H)  Due for lab work  - Albumin Random Urine Quantitative with Creat Ratio; Future  - Basic metabolic panel; Future  - Albumin Random Urine Quantitative with Creat Ratio  - Basic metabolic panel    8. Anemia of chronic kidney failure, stage 3 (moderate) (H)  For lab work  - CBC with platelets; Future  - CBC with platelets    9. Chronic, continuous use of opioids  See above  - Drug Confirmation Panel Urine with Creatinine; Future  - Drug  Confirmation Panel Urine with Creatinine    10. Chronic gout without tophus, unspecified cause, unspecified site  - Uric acid; Future  - Uric acid    11. Medication monitoring encounter  - Drug Confirmation Panel Urine with Creatinine; Future  - Drug Confirmation Panel Urine with Creatinine    Return in about 6 weeks (around 6/17/2025) for medicare wellness visit.    Signed, Gio Dooley MD, FAAP, FACP  Internal Medicine & Pediatrics    Subjective   Laura Perez is a 75 year old female who presents for Hypertension.  Had been having significant elevated blood pressures.  Last week she went to [facility] and her stress test was canceled due to high blood pressure.  Her cardiologist added amlodipine 2.5 mg.  Since then her home blood pressures have been 170//80.  She is surprised it is so much better today.  She has experienced some stress.  She was worried about the stress test.  She is wondering if I can refill her tramadol.    Objective   Vitals: /60   Pulse 96   Temp 99  F (37.2  C) (Tympanic)   Resp 16   Wt 68.8 kg (151 lb 11.2 oz)   LMP 06/07/2004 (Approximate)   SpO2 96%   BMI 27.75 kg/m        Review and Analysis of Data   I personally reviewed the following:  External notes: Yes [facility] cardiology  Results: Yes local and remote lab work  Use of an independent historian: Yes I spoke with her  Benigno  Independent review of a test performed by another physician: Yes reviewed [facility] pulmonary function test  Discussion of management with another physician: No  Moderate risk of morbidity from additional diagnostic testing and/or treatment.

## 2025-05-06 NOTE — LETTER

## 2025-05-06 NOTE — NURSING NOTE
"Chief Complaint   Patient presents with    Hypertension       Initial /60   Pulse 96   Temp 99  F (37.2  C) (Tympanic)   Resp 16   Wt 68.8 kg (151 lb 11.2 oz)   LMP 06/07/2004 (Approximate)   SpO2 96%   BMI 27.75 kg/m   Estimated body mass index is 27.75 kg/m  as calculated from the following:    Height as of 12/15/24: 1.575 m (5' 2\").    Weight as of this encounter: 68.8 kg (151 lb 11.2 oz).  Medication Review: complete    The next two questions are to help us understand your food security.  If you are feeling you need any assistance in this area, we have resources available to support you today.          12/15/2024   SDOH- Food Insecurity   Within the past 12 months, did you worry that your food would run out before you got money to buy more? N   Within the past 12 months, did the food you bought just not last and you didn t have money to get more? N         Health Care Directive:  Patient does not have a Health Care Directive: Discussed advance care planning with patient; however, patient declined at this time.    Norma J. Gosselin, LPN      "

## 2025-05-30 PROBLEM — E11.9 TYPE 2 DIABETES MELLITUS WITHOUT COMPLICATION (H): Status: ACTIVE | Noted: 2018-06-28

## 2025-05-30 PROBLEM — I21.4 NSTEMI (NON-ST ELEVATED MYOCARDIAL INFARCTION) (H): Status: ACTIVE | Noted: 2025-05-21

## 2025-05-30 PROBLEM — I12.9 CHRONIC KIDNEY DISEASE DUE TO HYPERTENSION: Status: ACTIVE | Noted: 2018-06-28

## 2025-06-11 ENCOUNTER — TELEPHONE (OUTPATIENT)
Dept: PEDIATRICS | Facility: OTHER | Age: 75
End: 2025-06-11
Payer: MEDICARE

## 2025-06-11 PROCEDURE — G0180 MD CERTIFICATION HHA PATIENT: HCPCS | Performed by: INTERNAL MEDICINE

## 2025-06-11 NOTE — TELEPHONE ENCOUNTER
"URGENT: per CMS best practice, response is requested within 24 hours.    Home Care regulation mandates that you are notified about drug discrepancies, interactions & contraindications. Response within a 24 hour timeframe is established by CMS as \"best practice\" for the delivery of home health care. Home Care is required to report if the 24 hour timeframe was met. The home health clinician will contact you again if this timeframe is not met or if the response does not address all concerns.       Situation:        The patient was admitted to [facility], after being discharged from [facility] on 06/09/2025. Upon admission, a med reconciliation was completed to identify any drug discrepancies, interactions or contraindications. Home Care's drug regime review has revealed significant medication issues.     You are being contacted for clarifying orders related to the medication issues.     Background:    Patient was recently hospitalized at [facility] for CHF exacerbation.  We admitted her to homecare for skilled nursing and therapy.    Assessment:   There are severe drug interactions between the following meds:  Albuterol/carvedilol  Epinephrine/carvedilol  Formotorol/carvedilol  Methotrexate/aspirin/omeprazole  Also patient does not have fenofibrate in the home, if she is supposed to take they need a script.  And patient does not have Albuterol for her neb, she has Duoneb.    Recommendations:    Please evaluate this information and indicate below whether or not changes are required. A copy of the patient's drug interaction/contraindications report is available upon request.       CLINIC NURSE - If changes are made, please update the Epic medication profile. Also, patient needs to be contacted for post hospital follow up please schedule.    Please sign this encounter with your electronic signature and route to the  Home Care pool.    Ruth Porter RN on 6/11/2025 at 4:27 PM          "

## 2025-06-12 ENCOUNTER — TELEPHONE (OUTPATIENT)
Dept: PEDIATRICS | Facility: OTHER | Age: 75
End: 2025-06-12
Payer: MEDICARE

## 2025-06-12 NOTE — TELEPHONE ENCOUNTER
Request for Home Care Skilled Nursing orders as follows:    SN eval and treat date:     Continuation frequency =  ____1___ X week X ____3___ weeks             Effective date= 6/12/25    Code status = full    Interventions include:    Assessment  Weight changes  O2 sat monitoring (all disciplines as needed)  Patient/caregiver education      Fall risk: instruct on fall prevention strategies, home safety, assess environment Observe for signs and symptoms of depression, assess effectiveness of medication, if at risk  Instruct on pain relief measures and assess pain with each visit, if has pain. Assess effectiveness of medications.      Vital signs as follows:       check oxygen saturation - goal > 90%       check blood pressure - abnormal ranges = < 95/60 or > 140/90       check pulse - abnormal range < 60 or > 100       check respirations - abnormal range < 12 or > 20       check vital signs: temperature abnormal range > 100.4      Please respond with .[email or web address], if you are in agreement. Please sign with your comments and signature, if you are not in agreement. Route to the  Home Care pool.  Ruth Porter RN on 6/12/2025 at 10:09 AM

## 2025-06-12 NOTE — TELEPHONE ENCOUNTER
I agree with the Home Care order requests below.  Gio Dooley MD on 6/12/2025 at 4:58 PM    Signed, Gio Dooley MD, FAAP, FACP  Internal Medicine & Pediatrics

## 2025-06-13 ENCOUNTER — RESULTS FOLLOW-UP (OUTPATIENT)
Dept: PEDIATRICS | Facility: OTHER | Age: 75
End: 2025-06-13

## 2025-06-13 PROBLEM — N39.0 ACUTE UTI: Status: RESOLVED | Noted: 2024-12-15 | Resolved: 2025-06-13

## 2025-06-16 ENCOUNTER — HOSPITAL ENCOUNTER (OUTPATIENT)
Dept: INFUSION THERAPY | Facility: OTHER | Age: 75
Discharge: HOME | End: 2025-06-16
Admitting: FAMILY MEDICINE
Payer: MEDICARE

## 2025-06-16 VITALS — DIASTOLIC BLOOD PRESSURE: 47 MMHG | TEMPERATURE: 97 F | HEART RATE: 69 BPM | SYSTOLIC BLOOD PRESSURE: 130 MMHG

## 2025-06-16 DIAGNOSIS — M05.9 SEROPOSITIVE RHEUMATOID ARTHRITIS (H): Primary | ICD-10-CM

## 2025-06-16 DIAGNOSIS — Z79.52 ON PREDNISONE THERAPY: ICD-10-CM

## 2025-06-16 DIAGNOSIS — E85.89 OTHER AMYLOIDOSIS (H): ICD-10-CM

## 2025-06-16 PROCEDURE — 258N000003 HC RX IP 258 OP 636: Performed by: INTERNAL MEDICINE

## 2025-06-16 PROCEDURE — 250N000028 HC RX JA MOD (INTRAVENOUS), IP 250 OP 636: Mod: JZ,JA | Performed by: INTERNAL MEDICINE

## 2025-06-16 PROCEDURE — 96365 THER/PROPH/DIAG IV INF INIT: CPT

## 2025-06-16 RX ORDER — ALBUTEROL SULFATE 0.83 MG/ML
2.5 SOLUTION RESPIRATORY (INHALATION)
OUTPATIENT
Start: 2025-06-23

## 2025-06-16 RX ORDER — DIPHENHYDRAMINE HYDROCHLORIDE 50 MG/ML
50 INJECTION, SOLUTION INTRAMUSCULAR; INTRAVENOUS
Start: 2025-06-23

## 2025-06-16 RX ORDER — EPINEPHRINE 1 MG/ML
0.3 INJECTION, SOLUTION, CONCENTRATE INTRAVENOUS EVERY 5 MIN PRN
OUTPATIENT
Start: 2025-06-23

## 2025-06-16 RX ORDER — ALBUTEROL SULFATE 90 UG/1
1-2 INHALANT RESPIRATORY (INHALATION)
Start: 2025-06-23

## 2025-06-16 RX ORDER — METHYLPREDNISOLONE SODIUM SUCCINATE 125 MG/2ML
125 INJECTION INTRAMUSCULAR; INTRAVENOUS
Start: 2025-06-23

## 2025-06-16 RX ADMIN — SODIUM CHLORIDE 750 MG: 9 INJECTION, SOLUTION INTRAVENOUS at 13:43

## 2025-06-16 RX ADMIN — SODIUM CHLORIDE 250 ML: 0.9 INJECTION, SOLUTION INTRAVENOUS at 13:17

## 2025-06-16 NOTE — NURSING NOTE
Infusion Nursing Note:  Laura Perez presents today for [place].    Patient seen by provider today: No   present during visit today: Not Applicable.    Note: N/A.      Intravenous Access:  Peripheral IV placed.    Treatment Conditions:  Biological Infusion Checklist:  ~~~ NOTE: If the patient answers yes to any of the questions below, hold the infusion and contact ordering provider or on-call provider.    Have you recently had an elevated temperature, fever, chills, productive cough, coughing for 3 weeks or longer or hemoptysis,  abnormal vital signs, night sweats,  chest pain or have you noticed a decrease in your appetite, unexplained weight loss or fatigue? No  Do you have any open wounds or new incisions? No  Do you have any upcoming hospitalizations or surgeries? Does not include esophagogastroduodenoscopy, colonoscopy, endoscopic retrograde cholangiopancreatography (ERCP), endoscopic ultrasound (EUS), dental procedures or joint aspiration/steroid injections No  Do you currently have any signs of illness or infection or are you on any antibiotics? No  Have you had any new, sudden or worsening abdominal pain? No  Have you or anyone in your household received a live vaccination in the past 4 weeks? Please note: No live vaccines while on biologic/chemotherapy until 6 months after the last treatment. Patient can receive the flu vaccine (shot only), pneumovax and the Covid vaccine. It is optimal for the patient to get these vaccines mid cycle, but they can be given at any time as long as it is not on the day of the infusion. No  Have you recently been diagnosed with any new nervous system diseases (ie. Multiple sclerosis, Guillain Amelia, seizures, neurological changes) or cancer diagnosis? Are you on any form of radiation or chemotherapy? No  Are you pregnant or breast feeding or do you have plans of pregnancy in the future? No  Have you been having any signs of worsening depression or suicidal  ideations?  (benlysta only) No  Have there been any other new onset medical symptoms? No  Have you had any new blood clots? (IVIG only) No      Post Infusion Assessment:  Patient tolerated infusion without incident.  Blood return noted pre and post infusion.  Site patent and intact, free from redness, edema or discomfort.  No evidence of extravasations.  Access discontinued per protocol.    Biologic Infusion Post Education: Call the triage nurse at your clinic or seek medical attention if you have chills and/or temperature greater than or equal to 100.5, uncontrolled nausea/vomiting, diarrhea, constipation, dizziness, shortness of breath, chest pain, heart palpitations, weakness or any other new or concerning symptoms, questions or concerns.  You cannot have any live virus vaccines prior to or during treatment or up to 6 months post infusion.  If you have an upcoming surgery, medical procedure or dental procedure during treatment, this should be discussed with your ordering physician and your surgeon/dentist.  If you are having any concerning symptom, if you are unsure if you should get your next infusion or wish to speak to a provider before your next infusion, please call your care coordinator or triage nurse at your clinic to notify them so we can adequately serve you.       Discharge Plan:   Discharge instructions reviewed with: Patient and .  Patient and/or family verbalized understanding of discharge instructions and all questions answered.  Copy of AVS reviewed with patient and/or family.  Patient will return 7/411/25 for next appointment.  Patient discharged in stable condition accompanied by: .  Departure Mode: Wheelchair.      Emerald Kerns RN

## 2025-06-23 ENCOUNTER — TELEPHONE (OUTPATIENT)
Dept: PEDIATRICS | Facility: OTHER | Age: 75
End: 2025-06-23
Payer: MEDICARE

## 2025-06-23 ENCOUNTER — TELEPHONE (OUTPATIENT)
Dept: PEDIATRICS | Facility: OTHER | Age: 75
End: 2025-06-23

## 2025-06-23 ENCOUNTER — DOCUMENTATION ONLY (OUTPATIENT)
Dept: PEDIATRICS | Facility: OTHER | Age: 75
End: 2025-06-23

## 2025-06-23 DIAGNOSIS — N18.32 STAGE 3B CHRONIC KIDNEY DISEASE (H): Primary | Chronic | ICD-10-CM

## 2025-06-23 DIAGNOSIS — M81.0 OSTEOPOROSIS, UNSPECIFIED OSTEOPOROSIS TYPE, UNSPECIFIED PATHOLOGICAL FRACTURE PRESENCE: ICD-10-CM

## 2025-06-23 DIAGNOSIS — I50.31 ACUTE DIASTOLIC CONGESTIVE HEART FAILURE (H): Primary | ICD-10-CM

## 2025-06-23 DIAGNOSIS — Z79.4 DIABETES MELLITUS TYPE 2, INSULIN DEPENDENT (H): Chronic | ICD-10-CM

## 2025-06-23 DIAGNOSIS — D47.2 MGUS (MONOCLONAL GAMMOPATHY OF UNKNOWN SIGNIFICANCE): ICD-10-CM

## 2025-06-23 DIAGNOSIS — E11.9 DIABETES MELLITUS TYPE 2, INSULIN DEPENDENT (H): Chronic | ICD-10-CM

## 2025-06-23 DIAGNOSIS — E03.9 ACQUIRED HYPOTHYROIDISM: Chronic | ICD-10-CM

## 2025-06-23 DIAGNOSIS — M05.9 SEROPOSITIVE RHEUMATOID ARTHRITIS (H): Chronic | ICD-10-CM

## 2025-06-23 DIAGNOSIS — N18.32 STAGE 3B CHRONIC KIDNEY DISEASE (H): ICD-10-CM

## 2025-06-23 NOTE — PROGRESS NOTES
Laura Perez has an upcoming lab appointment and there are no orders available. Please review and place future orders, as appropriate.    Mita Mosley

## 2025-06-23 NOTE — TELEPHONE ENCOUNTER
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 Certification period effective 6/11/2025 to 8/9/2025.  Sindy Rashid RN on 6/23/2025 at 9:05 AM

## 2025-06-23 NOTE — TELEPHONE ENCOUNTER
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  Also during a follow up with Nico, Omeprezole was added and has a severe drug interaction with her methotrexate.  I just wanted to inform you, please advise if you would like changes made.  Thanks!  Ruth Porter RN on 6/23/2025 at 2:46 PM

## 2025-06-27 ENCOUNTER — LAB (OUTPATIENT)
Dept: LAB | Facility: OTHER | Age: 75
End: 2025-06-27
Payer: MEDICARE

## 2025-06-27 ENCOUNTER — RESULTS FOLLOW-UP (OUTPATIENT)
Dept: PEDIATRICS | Facility: OTHER | Age: 75
End: 2025-06-27

## 2025-06-27 DIAGNOSIS — E11.9 DIABETES MELLITUS TYPE 2, INSULIN DEPENDENT (H): Chronic | ICD-10-CM

## 2025-06-27 DIAGNOSIS — D64.9 ANEMIA, UNSPECIFIED: Primary | ICD-10-CM

## 2025-06-27 DIAGNOSIS — M81.0 OSTEOPOROSIS, UNSPECIFIED OSTEOPOROSIS TYPE, UNSPECIFIED PATHOLOGICAL FRACTURE PRESENCE: ICD-10-CM

## 2025-06-27 DIAGNOSIS — N18.32 STAGE 3B CHRONIC KIDNEY DISEASE (H): Chronic | ICD-10-CM

## 2025-06-27 DIAGNOSIS — Z79.4 DIABETES MELLITUS TYPE 2, INSULIN DEPENDENT (H): Chronic | ICD-10-CM

## 2025-06-27 DIAGNOSIS — M05.9 SEROPOSITIVE RHEUMATOID ARTHRITIS (H): Chronic | ICD-10-CM

## 2025-06-27 DIAGNOSIS — E03.9 ACQUIRED HYPOTHYROIDISM: ICD-10-CM

## 2025-06-27 LAB
ALBUMIN SERPL BCG-MCNC: 3.5 G/DL (ref 3.5–5.2)
ALP SERPL-CCNC: 226 U/L (ref 40–150)
ALT SERPL W P-5'-P-CCNC: 34 U/L (ref 0–50)
ANION GAP SERPL CALCULATED.3IONS-SCNC: 11 MMOL/L (ref 7–15)
AST SERPL W P-5'-P-CCNC: 41 U/L (ref 0–45)
BILIRUB SERPL-MCNC: 0.8 MG/DL
BUN SERPL-MCNC: 51.5 MG/DL (ref 8–23)
CALCIUM SERPL-MCNC: 9.2 MG/DL (ref 8.8–10.4)
CHLORIDE SERPL-SCNC: 99 MMOL/L (ref 98–107)
CHOLEST SERPL-MCNC: 132 MG/DL
CREAT SERPL-MCNC: 1.89 MG/DL (ref 0.51–0.95)
EGFRCR SERPLBLD CKD-EPI 2021: 27 ML/MIN/1.73M2
EST. AVERAGE GLUCOSE BLD GHB EST-MCNC: 163 MG/DL
FASTING STATUS PATIENT QL REPORTED: YES
FASTING STATUS PATIENT QL REPORTED: YES
GLUCOSE SERPL-MCNC: 144 MG/DL (ref 70–99)
HBA1C MFR BLD: 7.3 %
HCO3 SERPL-SCNC: 23 MMOL/L (ref 22–29)
HDLC SERPL-MCNC: 54 MG/DL
HGB BLD-MCNC: 9.8 G/DL (ref 11.7–15.7)
LDLC SERPL CALC-MCNC: 47 MG/DL
MCV RBC AUTO: 94 FL (ref 78–100)
NONHDLC SERPL-MCNC: 78 MG/DL
POTASSIUM SERPL-SCNC: 4.5 MMOL/L (ref 3.4–5.3)
PROT SERPL-MCNC: 6.7 G/DL (ref 6.4–8.3)
PTH-INTACT SERPL-MCNC: 56 PG/ML (ref 15–65)
SODIUM SERPL-SCNC: 133 MMOL/L (ref 135–145)
T4 FREE SERPL-MCNC: 1.37 NG/DL (ref 0.9–1.7)
TRIGL SERPL-MCNC: 155 MG/DL
TSH SERPL DL<=0.005 MIU/L-ACNC: 6.69 UIU/ML (ref 0.3–4.2)
VIT D+METAB SERPL-MCNC: 20 NG/ML (ref 20–50)

## 2025-06-27 PROCEDURE — 82465 ASSAY BLD/SERUM CHOLESTEROL: CPT | Mod: ZL

## 2025-06-27 PROCEDURE — 85018 HEMOGLOBIN: CPT | Mod: ZL

## 2025-06-27 PROCEDURE — 84439 ASSAY OF FREE THYROXINE: CPT | Mod: ZL

## 2025-06-27 PROCEDURE — 83970 ASSAY OF PARATHORMONE: CPT | Mod: ZL

## 2025-06-27 PROCEDURE — 84443 ASSAY THYROID STIM HORMONE: CPT | Mod: ZL

## 2025-06-27 PROCEDURE — 36415 COLL VENOUS BLD VENIPUNCTURE: CPT | Mod: ZL

## 2025-06-27 PROCEDURE — 83036 HEMOGLOBIN GLYCOSYLATED A1C: CPT | Mod: ZL

## 2025-06-27 PROCEDURE — 82306 VITAMIN D 25 HYDROXY: CPT | Mod: ZL

## 2025-06-27 PROCEDURE — 82947 ASSAY GLUCOSE BLOOD QUANT: CPT | Mod: ZL

## 2025-07-01 ENCOUNTER — OFFICE VISIT (OUTPATIENT)
Dept: CARDIOLOGY | Facility: OTHER | Age: 75
End: 2025-07-01
Attending: NURSE PRACTITIONER
Payer: MEDICARE

## 2025-07-01 VITALS
WEIGHT: 136.8 LBS | SYSTOLIC BLOOD PRESSURE: 128 MMHG | BODY MASS INDEX: 25.17 KG/M2 | RESPIRATION RATE: 18 BRPM | OXYGEN SATURATION: 98 % | HEART RATE: 80 BPM | HEIGHT: 62 IN | TEMPERATURE: 97.1 F | DIASTOLIC BLOOD PRESSURE: 54 MMHG

## 2025-07-01 DIAGNOSIS — J44.9 COPD, VERY SEVERE (H): ICD-10-CM

## 2025-07-01 DIAGNOSIS — E85.3 AA AMYLOIDOSIS (H): ICD-10-CM

## 2025-07-01 DIAGNOSIS — E11.9 DIABETES MELLITUS TYPE 2, INSULIN DEPENDENT (H): ICD-10-CM

## 2025-07-01 DIAGNOSIS — I50.32 CHRONIC DIASTOLIC CHF (CONGESTIVE HEART FAILURE), NYHA CLASS 2 (H): Primary | Chronic | ICD-10-CM

## 2025-07-01 DIAGNOSIS — D63.1 ANEMIA IN STAGE 3A CHRONIC KIDNEY DISEASE (H): ICD-10-CM

## 2025-07-01 DIAGNOSIS — R06.02 SOB (SHORTNESS OF BREATH): ICD-10-CM

## 2025-07-01 DIAGNOSIS — I11.9 HYPERTENSIVE CARDIOMEGALY: ICD-10-CM

## 2025-07-01 DIAGNOSIS — N18.31 ANEMIA IN STAGE 3A CHRONIC KIDNEY DISEASE (H): ICD-10-CM

## 2025-07-01 DIAGNOSIS — Z79.4 DIABETES MELLITUS TYPE 2, INSULIN DEPENDENT (H): ICD-10-CM

## 2025-07-01 DIAGNOSIS — R60.0 BILATERAL LOWER EXTREMITY EDEMA: ICD-10-CM

## 2025-07-01 LAB
ATRIAL RATE - MUSE: 74 BPM
DIASTOLIC BLOOD PRESSURE - MUSE: NORMAL MMHG
INTERPRETATION ECG - MUSE: NORMAL
P AXIS - MUSE: NORMAL DEGREES
PR INTERVAL - MUSE: 166 MS
QRS DURATION - MUSE: 88 MS
QT - MUSE: 398 MS
QTC - MUSE: 441 MS
R AXIS - MUSE: 14 DEGREES
SYSTOLIC BLOOD PRESSURE - MUSE: NORMAL MMHG
T AXIS - MUSE: 163 DEGREES
VENTRICULAR RATE- MUSE: 74 BPM

## 2025-07-01 PROCEDURE — G0463 HOSPITAL OUTPT CLINIC VISIT: HCPCS

## 2025-07-01 PROCEDURE — 93005 ELECTROCARDIOGRAM TRACING: CPT | Performed by: NURSE PRACTITIONER

## 2025-07-01 RX ORDER — BUMETANIDE 0.5 MG/1
TABLET ORAL
Qty: 90 TABLET | Refills: 4 | Status: SHIPPED | OUTPATIENT
Start: 2025-07-01

## 2025-07-01 ASSESSMENT — PAIN SCALES - GENERAL: PAINLEVEL_OUTOF10: MODERATE PAIN (5)

## 2025-07-01 NOTE — NURSING NOTE
"Chief Complaint   Patient presents with    Establish Care     Cardiac Care from the [facility]        Initial /54 (BP Location: Right arm, Patient Position: Sitting, Cuff Size: Adult Regular)   Pulse 80   Temp 97.1  F (36.2  C) (Temporal)   Resp 18   Ht 1.575 m (5' 2\")   Wt 62.1 kg (136 lb 12.8 oz)   LMP 06/07/2004 (Approximate)   SpO2 98%   BMI 25.02 kg/m   Estimated body mass index is 25.02 kg/m  as calculated from the following:    Height as of this encounter: 1.575 m (5' 2\").    Weight as of this encounter: 62.1 kg (136 lb 12.8 oz).  Meds Reconciled: complete  Pt is on Aspirin  Pt is on a Statin  Pt is not on Xarelto or Eliquis  Pt is not on a Warfarin   PHQ and/or SHIVAM reviewed. Pt referred to PCP/MH Provider as appropriate.    Tori San LPN         "

## 2025-07-01 NOTE — PATIENT INSTRUCTIONS
Review with intervention cardiologist if continued indication for Asprin and Plavix 75 mg daily with your chronic anemia.   Continue on Bumex 0.5 mg daily in the morning. You may take an additional 0.5 mg up to one time daily if weight gain as reviewed or for increased edema.  Referral to heart failure care coordination, you will receive a phone call from [facility].   Baseline weight 135-140 lb.  Sodium restriction, no more than 2,000 mg sodium per day.   Compression stockings ordered.   Follow-up with cardiology in 4 weeks, sooner if needed.

## 2025-07-01 NOTE — PROGRESS NOTES
[facility] HEART CARE   CARDIOLOGY CONSULT     Laura Perez   [address]  [facility] [id number]    Gio Dooley     Chief Complaint   Patient presents with    [facility]     Cardiac Care from the [facility]         HPI:   Mrs. Perez is a 75 year old female who presents to establish local cardiology care, management of congestive heart failure. Patient with WEST, severe obstructive lung disease and pericardial calcification secondary to RA. Additionally, increased LV wall thickness secondary to hypertensive heart disease.     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     Patient had [facility] evaluated [facility] due to exertional dyspnea within the past year and absence of a significant change in her pulmonary function tests, previously revealing severe obstructive lung disease, a cardiac workup was initiated at [facility]. 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 She denied orthopnea or PND. She denied palpitations, presyncope or syncope. No chest pain endorsed.     At visit with [facility] on 2/20/2025 reviewed evaluating cardiac contribution to her dyspnea on exertion by performing right and left heart catheterization at rest and with exercise.  Possible endomyocardial biopsy was also discussed. After consideration, the decision was made not to proceed with any invasive diagnostic testing primarily due to the lack of therapeutic implications. Recommended monitoring  for volume overload symptoms, initiation of a loop diuretic and start SGLT2 inhibitor, Jardiance 10 mg once daily. She is aware of the association of urinary tract infections with this therapy. Recommended outpatient NM Lexiscan stress test.     She was admitted to [facility] 5/21-6/9/2025. Admitted for concerns of worsening shortness of breath. She had an appointment to undergo repeat stress testing 5/22 when she developed sudden onset of shortness of breath leading her to present to [facility] emergency department. On arrival EKG revealed sinus tachycardia without acute ischemic concerns. proBNP noted to be elevated at 1799, troponin noted to be 73>78, and D-dimer was noted to be elevated. CT chest PE study showed pleural effusions without pulmonary embolism. She was admitted for further evaluation and IV diuresis. PET stress recommended which was completed 5/23/25 and returned abnormal. She was thus recommended to proceed with invasive coronary angiogram, which was delayed a few days due to patient being on Jardiance. This was ultimately completed 5/27 revealing multivessel disease with recommendations for surgical revascularization. LVEDP noted to be elevated on cath and additional IV diuretic was started. CVS consulted and but patient declined surgery. Patient wanting to move forward with high risk PCI and staged interventions. Patient developed subsequent orthostatic hypotension with addition of medication such as Imdur, carvedilol, and amlodipine. All of which were ultimately discontinued. Unfortunately renal function continued to worsen in light of dye load on admission as well as dye load during angiogram and intermittent hypotension related to trying to uptitrate medications. Nephrology was involved and ended up guiding diuresis in the setting of acute kidney injury. Patient received 1 unit packed red blood cells for both volume and anemia noted on BVA. Despite this intervention patient's renal function  continued to decline. She was ultimately discharged 6/9 with plans for outpatient nephrology follow-up in early July. Estimated dry weight likely to be between 140-145 lb. patient was discharged on 80 mg atorvastatin, 1 mg Bumex, 3.125 mg twice daily carvedilol, 75 mg Plavix, 81 mg aspirin, and 48 mg fenofibrate.    Patient had cardiac hospital follow-up on 6/20/2025. Level of dyspnea reported as improved. No anginal symptoms or chest pain. It was recommended decreased dose of Bumex to 0.5 mg daily. Started on Protonix for gastric protection.     Additionally, she is scheduled with nephrology at the end of July.    Today, patient reports continues to feel well and level of dyspnea improved from her baseline. No chest pain or pressure. She is accompanied by her spouse. She wishes for no invasive procedures. Not interested in coronary intervention at this time due to risk with her multiple comorbidities and with lack of anginal symptoms, preserved LV systolic function. Weight has been overall stable, LE edema gradually increasing after dose decrease of Bumex. No palpitations, lightheadedness or syncope. Chronic abdominal distention noted.       IMAGING RESULTS:   Coronary angiogram 5/27/2025:  Summary/Conclusions  PRESENTATION / INDICATIONS  * Urgent  * [place]  * Stress Test High risk/extent of ischemia  VASCULAR ACCESS  * Using ultrasound guidance and a percutaneous technique, the right common femoral artery was accessed. Ultrasound was used to confirm vessel patency, localizing needle into the lumen of the vessel. An image was saved for the medical record.  DIAGNOSTIC - CORONARY  * Right dominant coronary artery system  * 50% stenosis in the ostial Left Main  * 60% stenosis in the mid LAD  * 50% stenosis in the proximal 1st Diagonal  * 70% stenosis in the proximal Circumflex  * Total occlusion (with bridging collaterals) of the ostial 2nd Obtuse Marginal  * 70% stenosis in the mid RCA  * 50% stenosis in the distal  RCA  * Baseline rFR measured in left main was 0.65? strongly positive for ischemia.  DIAGNOSTIC - VALVES  * No evidence of aortic valve stenosis.  HEMODYNAMICS  * The LVEDP is mildly elevated, 21 mmHg.  LEFT VENTRICULAR FUNCTION  * No LV angiogram performed due to renal insufficiency  DIAGNOSTIC - VASCULAR  * Severe stenosis bilateral common iliac arteries.  RECOMMENDATIONS & PLAN  * Optimize risk factors and medications: HDL > 50; LDL <55; Triglycerides < 100.  * Recommend surgical revascularization  * Consider common iliac stenting in the future if symptomatic.   Echocardiogram, 5/22/2025:  Final Conclusion  Visually Estimated EF: 60-65%  Technically difficult study - contrast was used to enhance endocardial definition due to suboptimal image quality.  Normal left ventricular size and ejection fraction.  Right ventricle not well visualized.  No obvious septal shift noted.  No significant valvular heart disease noted.  Echo free space anterior to the right ventricle likely represents a fat pad. Pericardium is not well-visualized on the study  When compared to the echocardiographic report at outside facility the pericardium is not well-visualized on the current study however  no other signs of constriction were noted.     MR Cardiac without and with IV Contrast ([place])  Result Date: 2/19/2025  Impression: 1. Pericardial adhesion with mild focal thickening and enhancement of the anterior pericardium adjacent to the right ventricular free wall, just inferior to prominent pericardial calcifications. No evidence of constrictive morphology. 2. Remarkable asymmetric/non-concentric LVH with severe thickening of the basal and mid anterior wall and septum. The wall thickening can be compensatory but can also be compatible with severe and long-standing hypertension, aortic stenosis, hypertrophic cardiomyopathy or infiltrative disease in the proper clinical context. Cardiac amyloid is less likely given the high signal intensity  of the blood pool and uniformly low signal intensity of the myocardium at late gadolinium enhancement imaging. There is evidence of impaired opening of the aortic valve on series 15. 3. Thinning of LV wall thickness of the basal and mid ventricular inferolateral lateral segments with associated hypokinesis and possible subendocardial delayed gadolinium enhancement could be compatible with of prior infarction. 4. Mildly decreased left ventricular systolic function with LVEF = 47%. Normal systolic RV function. RVEF = 64%.     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 Limited imaging restricts assessment of constrictive physiology. Recommend Cardiac MRI for further evaluation. 2. Normal left ventricular chamber size with hyperdynamic systolic function. Calculated ejection fraction 70%. 3. No regional wall motion abnormalities. Elevated left ventricular filling pressure. 4. No left ventricular outflow tract obstruction at rest or with Valsalva. 5. Left ventricular strain assessment not performed because of image quality. 6. Normal right ventricular chamber size and systolic function. Unable to assess RV systolic pressure. 7. No hemodynamically significant valvular heart disease. 8. Normal inferior vena cava size with normal inspiratory collapse (>50%) , consistent with normal central venous pressure. 9. No pericardial effusion. 10. 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 11. Additional images performed were reviewed. Despite the presence of myocardial tethering, there is no evidence of constrictive physiology or hemodynamic abnormalities. These findings were discussed with Dr. Rancho Flores, and the plan is to proceed with a cardiac MRI. Depending on the results, a consultation for  pericardial disease may be considered. Findings LEFT VENTRICLE:Normal left ventricular chamber size. Abnormal left ventricular geometry with concentric remodeling (increased wall thickness to cavity ratio). No left ventricular outflow tract obstruction at rest or with Valsalva. Calculated 2-D linear left ventricular ejection fraction 70%. No regional wall motion abnormalities. Left ventricular strain assessment not performed because of image quality. Elevated left ventricular filling pressure. RIGHT VENTRICLE:Normal right ventricular chamber size. Normal right ventricular systolic function. Unable to detect peak tricuspid regurgitation velocity for pulmonary artery systolic pressure calculation. ATRIA:Normal left atrial size. Left atrial volume index 16 ml/m2. Normal right atrial size. CARDIAC VALVES:Trileaflet aortic valve. Sclerotic aortic valve. Trivial aortic valve regurgitation. Thickened mitral valve. Mild mitral valve stenosis. Mitral valve diastolic mean Doppler gradient 3 mmHg (heart rate 79 BPM). Mild mitral valve regurgitation. Mitral regurgitation ERO (PISA) 0.11 cm2. Mitral regurgitant volume (PISA) 24 ml. Pulmonary valve not well visualized. Normal pulmonary valve systolic velocities. Trivial pulmonary valve regurgitation. Normal tricuspid valve. Trivial tricuspid valve regurgitation. OTHER ECHO FINDINGS:Normal inferior vena cava size with normal inspiratory collapse (>50%). Normal sinus of Valsalva diameter of 31 mm. Upper limit of normal of the sinus of Valsalva for age, sex and BSA is 39 mm. Normal mid ascending aorta diameter of 33 mm. Upper limit of normal of the mid ascending aorta, for age, sex and BSA is 38 mm. Abdominal aorta incompletely visualized. No atrial level shunt by color flow imaging. No intracardiac mass or thrombus, but the left atrial appendage cannot be visualized adequately with transthoracic echo to exclude thrombus in this location. No pericardial effusion. For the complete  report, see the Order-Level Documents.     PAST MEDICAL HISTORY:   Past Medical History:   Diagnosis Date    Amyloidosis (H)     Chronic kidney disease (CKD), stage III (moderate) (H)     Chronic obstructive pulmonary disease (H)     No Comments Provided    Diverticulosis of intestine without perforation or abscess without bleeding     Diffuse diverticulosis and history of diminutive hyperplastic colon    Essential (primary) hypertension     No Comments Provided    Hyperlipidemia     No Comments Provided    Hypothyroidism     No Comments Provided    Malignant neoplasm of colon (H)     9/2017    Osteoporosis     On Prolia    Rheumatoid arthritis (H)     Rheumatologist Dr. Gonzalez, [phone], fax [phone]    Rosacea     No Comments Provided    Type 2 diabetes mellitus without complications (H)     Diabetes Mellitus, prednisone induced          FAMILY HISTORY:   Family History   Problem Relation Age of Onset    Cancer Mother 50        uterus    Heart Disease Mother         MI    Colon Cancer Mother     Heart Disease Father         MI    Chronic Obstructive Pulmonary Disease Sister     Other - See Comments Sister         rubina dow    Colon Cancer Brother     Breast Cancer No family hx of         Cancer-breast          PAST SURGICAL HISTORY:   Past Surgical History:   Procedure Laterality Date    ARTHROPLASTY,Right MTP 1 and 5 and left MTP 5  01/2001    CATARACT IOL, RT/LT Bilateral     COLON SURGERY Right 09/2017    For colon cancer    COLONOSCOPY  09/29/2008    COLONOSCOPY  10/11/2018    F/U in 5 years recommended secondary to polyps    LAPAROSCOPIC TUBAL LIGATION      No Comments Provided    Left MCP joint arthroplasty  02/05/1998    MTP 2, 3, 4 right foot  05/2000    RELEASE CARPAL TUNNEL Left 1991    Right long finger MCP repair  1996    [place]    Right MCP 4 and 5 arthroplasty  03/01/2004    Right wrist arthroplasty  1990          SOCIAL HISTORY:   Social History     Socioeconomic History     Marital status:      Spouse name: None    Number of children: None    Years of education: None    Highest education level: None   Tobacco Use    Smoking status: Former     Current packs/day: 0.00     Average packs/day: 1 pack/day for 30.0 years (30.0 ttl pk-yrs)     Types: Cigarettes     Start date: 1975     Quit date: 2005     Years since quittin.8    Smokeless tobacco: Never   Vaping Use    Vaping status: Never Used   Substance and Sexual Activity    Alcohol use: Not Currently     Alcohol/week: 2.5 standard drinks of alcohol     Types: 3 Standard drinks or equivalent per week     Comment: Rare    Drug use: Yes     Comment: Medical marijuana    Sexual activity: Not Currently   Social History Narrative    , [place].  Does not work outside the home.  Four children are grown.  Lives in town.     Social Drivers of Health     Financial Resource Strain: Low Risk  (2025)    Received from [employer]    Financial Resource Strain     Difficulty of Paying Living Expenses: 3   Food Insecurity: No Food Insecurity (2025)    Received from [employer]    Food Insecurity     Do you worry your food will run out before you are able to buy more?: 1   Transportation Needs: No Transportation Needs (2025)    Received from [employer]    Transportation Needs     Does lack of transportation keep you from medical appointments?: 1     Does lack of transportation keep you from work, meetings or getting things that you need?: 1   Physical Activity: Unknown (2024)    Exercise Vital Sign     Days of Exercise per Week: 0 days   Recent Concern: Physical Activity - Insufficiently Active (2024)    Received from [facility]    Exercise Vital Sign     Days of Exercise per Week: 2 days     Minutes of Exercise per Session: 40 min   Stress: Stress Concern Present (2024)    Citizen of the [place]  "Occupational Health - Occupational Stress Questionnaire     Feeling of Stress : To some extent   Social Connections: Socially Integrated (5/22/2025)    Received from [facility]    Social Connections     Do you often feel lonely or isolated from those around you?: 0   Interpersonal Safety: Low Risk  (7/1/2025)    Interpersonal Safety     Do you feel physically and emotionally safe where you currently live?: Yes     Within the past 12 months, have you been hit, slapped, kicked or otherwise physically hurt by someone?: No     Within the past 12 months, have you been humiliated or emotionally abused in other ways by your partner or ex-partner?: No   Housing Stability: Low Risk  (5/22/2025)    Received from [facility]    Housing Stability     What is your housing situation today?: 1          CURRENT MEDICATIONS:   Prior to Admission medications    Medication Sig Start Date End Date Taking? Authorizing Provider   ABATACEPT IV Inject 750 mg into the vein every 28 days   Yes Unknown, Entered By History   albuterol (PROAIR HFA/PROVENTIL HFA/VENTOLIN HFA) 108 (90 Base) MCG/ACT inhaler INHALE 1-2 PUFFS BY MOUTH EVERY 4-6 HOURS AS NEEDED FOR SHORTNESS OF BREATH OR WHEEZING 6/7/24  Yes Gio Dooley MD   allopurinol (ZYLOPRIM) 100 MG tablet Take 100 mg by mouth daily   Yes Reported, Patient   aspirin 81 MG chewable tablet Take 81 mg by mouth daily with food   Yes Reported, Patient   atorvastatin (LIPITOR) 80 MG tablet Take 1 tablet (80 mg) by mouth daily. 6/13/25  Yes Gio Dooley MD   atovaquone (MEPRON) 750 MG/5ML suspension Take 1,500 mg by mouth daily 7/8/19  Yes Raul Ortega MD   B-D TB SYRINGE 27G X 1/2\" 1 ML MISC USE AS DIRECTED FOR METHOTREXATE INJECTIONS 6/19/23  Yes Reported, Patient   blood glucose (ONETOUCH VERIO IQ) test strip USE TO TEST BLOOD SUGAR TWICE DAILY OR AS DIRECTED 8/6/24  Yes Gio Dooley MD   budesonide " (PULMICORT) 0.5 MG/2ML neb solution Take 2 mLs (0.5 mg) by nebulization 2 times daily 5/6/24  Yes Gio Dooley MD   bumetanide (BUMEX) 1 MG tablet Take 1 tablet (1 mg) by mouth daily. 6/13/25  Yes Gio Dooley MD   carvedilol (COREG) 3.125 MG tablet Take 1 tablet (3.125 mg) by mouth 2 times daily (with meals). 6/13/25  Yes Gio Dooley MD   clopidogrel (PLAVIX) 75 MG tablet Take 1 tablet (75 mg) by mouth daily. 6/13/25  Yes Gio Dooley MD   Continuous Glucose Sensor (DEXCOM G7 SENSOR) MISC 1 each every 10 days. Change sensor every 10 days 6/13/25  Yes Gio Dooley MD   cyanocobalamin (VITAMIN B-12) 1000 MCG tablet Take 1 tablet (1,000 mcg) by mouth daily 6/7/24  Yes Gio Dooley MD   denosumab (PROLIA) 60 MG/ML SOSY injection Inject 1 mL (60 mg) Subcutaneous every 6 months 6/7/24  Yes Gio Dooley MD   EPINEPHrine (ANY BX GENERIC EQUIV) 0.3 MG/0.3ML injection 2-pack Inject 0.3 mLs (0.3 mg) into the muscle once as needed for anaphylaxis 5/25/23  Yes Gio Dooley MD   estradiol (VAGIFEM) 10 MCG TABS vaginal tablet INSERT 1 TABLET(10 MCG) VAGINALLY 2 TIMES A WEEK 2/10/25  Yes Gio Dooley MD   folic acid (FOLVITE) 1 MG tablet Take 1 tablet (1 mg) by mouth daily 6/7/24  Yes Gio Dooley MD   formoterol (PERFOROMIST) 20 MCG/2ML neb solution Take 2 mLs (20 mcg) by nebulization 2 times daily 6/7/24  Yes Gio Dooley MD   glucagon 1 MG kit Inject 1 mg into the muscle as needed for low blood sugar   Yes Unknown, Entered By History   glucose 4 g CHEW chewable tablet Take 1 tablet by mouth as needed for low blood sugar   Yes Unknown, Entered By History   insulin lispro protamine-insulin lispro (HUMALOG MIX 75/25 KWIKPEN) (75-25) 100 UNIT/ML pen 30 units in the morning, 15 units in the evening 6/13/25  Yes Gio Dooley MD   insulin pen needle (BD PEN NEEDLE LUCILLE 2ND GEN) 32G X 4 MM miscellaneous Use to  "administer insulin twice daily. Dispense as covered by insurance. Dx. Code: E11.9. 6/7/24  Yes Gio Dooley MD   ipratropium - albuterol 0.5 mg/2.5 mg/3 mL (DUONEB) 0.5-2.5 (3) MG/3ML neb solution Take 1 vial (3 mLs) by nebulization every 6 hours as needed for shortness of breath, wheezing or cough 6/7/24  Yes Gio Dooley MD   Lancets (ONETOUCH DELICA PLUS [id number]) MISC 1 each by In Vitro route 2 times daily 6/7/24  Yes Gio Dooley MD   levothyroxine (SYNTHROID/LEVOTHROID) 112 MCG tablet Take 1 tablet (112 mcg) by mouth daily 6/7/24  Yes Gio Dooley MD   lidocaine, viscous, (XYLOCAINE) 2 % solution Swish and spit 15 mLs in mouth every 4 hours as needed. 6/9/25  Yes Reported, Patient   Methotrexate 25 MG/ML SOSY Inject 12.5 mg Subcutaneous once a week (On Tuesdays)   Yes Unknown, Entered By History   montelukast (SINGULAIR) 10 MG tablet Take 1 tablet (10 mg) by mouth at bedtime 6/7/24  Yes Gio Dooley MD   Nebulizers (VIOS AEROSOL DELIVERY SYSTEM) MISC USE AS DIRECTED 11/7/23  Yes Gio Dooley MD   Needle, Disp, (HYPODERMIC NEEDLE) 27G X 1/2\" MISC For methotrexate injections. 6/15/21  Yes Reported, Patient   omeprazole (PRILOSEC) 20 MG DR capsule Take 1 capsule (20 mg) by mouth daily 6/7/24  Yes Gio Dooley MD   ondansetron (ZOFRAN) 4 MG tablet Take 1 tablet (4 mg) by mouth every 8 hours as needed for nausea or vomiting. 6/13/25  Yes Gio Dooley MD   predniSONE (DELTASONE) 5 MG tablet Take 2 tablets (10 mg) by mouth daily with food. 12/29/24  Yes John Sharma MD   tiotropium (SPIRIVA) 18 MCG inhaled capsule Inhale 1 capsule (18 mcg) into the lungs daily 6/7/24  Yes Gio Dooley MD   traMADol (ULTRAM) 50 MG tablet Take 1 tablet (50 mg) by mouth nightly as needed for severe pain. 5/6/25  Yes Gio Dooley MD          ALLERGIES:   Allergies   Allergen Reactions    Glyburide Anaphylaxis     Other reaction(s): " "Dizziness    Mosquitoes (Informational Only) Hives    Other [Seasonal Allergies] Hives     Deer Flies    Sulfa Antibiotics Anaphylaxis and Hives    Sulfamethoxazole-Trimethoprim Anaphylaxis     Other reaction(s): Other - Describe In Comment Field  Rash, nausea, dizziness    Sulfamethoxazole-Trimethoprim Hives and Itching    Duloxetine      Other reaction(s): Other (see comments)  Falls and dizziness    Codeine Nausea and Nausea and Vomiting          ROS:   CONSTITUTIONAL: No reported fever or chills. Weight stable.  ENT: No visual disturbance, ear ache, epistaxis or sore throat.   CARDIOVASCULAR: No chest pain, chest pressure or chest discomfort. No palpitations, positive for increased pedal edema.   RESPIRATORY: Level of dyspnea improved. No cough, wheezing or hemoptysis. No orthopnea or PND.   GI: No reported abdominal pain, melena or hematochezia.   : No reported hematuria or dysuria.   NEUROLOGICAL: No lightheadedness, dizziness or syncope. Positive for generalized weakness.   HEMATOLOGIC: Positive for history of anemia.   MUSCULOSKELETAL: Chronic joint pain.  SKIN: No abnormal rashes or sores, no unusual itching. Bruising present.       PHYSICAL EXAM:   /54 (BP Location: Right arm, Patient Position: Sitting, Cuff Size: Adult Regular)   Pulse 80   Temp 97.1  F (36.2  C) (Temporal)   Resp 18   Ht 1.575 m (5' 2\")   Wt 62.1 kg (136 lb 12.8 oz)   LMP 06/07/2004 (Approximate)   SpO2 98%   BMI 25.02 kg/m    GENERAL: The patient is a well-developed, well-nourished, in no apparent distress.  HEENT: Head is normocephalic and atraumatic. Eyes are symmetrical with normal visual tracking. No icterus, no xanthelasmas. Nares appeared normal without nasal drainage. Mucous membranes are moist, no cyanosis.  NECK: Supple, no cervical bruits, JVP not visible.   CHEST/ LUNGS: Lungs clear to auscultation, no rales, rhonchi or wheezes, no use of accessory muscles, no retractions, respirations unlabored and normal " respiratory rate.   CARDIO: Regular rate and rhythm normal with S1 and S2, no S3 or S4 and no murmur, click or rub.   ABD: Abdomen is distended without tenderness.   EXTREMITIES: No clubbing or cyanosis, 1+ pedal edema present.   MUSCULOSKELETAL: No visible joint swelling.   NEUROLOGIC: Alert and oriented X3. Normal speech, gait and affect. No focal neurologic deficits.   SKIN: No jaundice. No rashes or visible skin lesions present.     EKG:    NSR, rate 74 bpm, nonspecific ST and T wave abnormality.    LAB RESULTS:   Lab on 06/27/2025   Component Date Value Ref Range Status    Estimated Average Glucose 06/27/2025 163 (H)  <117 mg/dL Final    Hemoglobin A1C 06/27/2025 7.3 (H)  <5.7 % Final    Cholesterol 06/27/2025 132  <200 mg/dL Final    Triglycerides 06/27/2025 155 (H)  <150 mg/dL Final    Direct Measure HDL 06/27/2025 54  >=50 mg/dL Final    LDL Cholesterol Calculated 06/27/2025 47  <100 mg/dL Final    Non HDL Cholesterol 06/27/2025 78  <130 mg/dL Final    Patient Fasting > 8hrs? 06/27/2025 Yes   Final    Sodium 06/27/2025 133 (L)  135 - 145 mmol/L Final    Potassium 06/27/2025 4.5  3.4 - 5.3 mmol/L Final    Carbon Dioxide (CO2) 06/27/2025 23  22 - 29 mmol/L Final    Anion Gap 06/27/2025 11  7 - 15 mmol/L Final    Urea Nitrogen 06/27/2025 51.5 (H)  8.0 - 23.0 mg/dL Final    Creatinine 06/27/2025 1.89 (H)  0.51 - 0.95 mg/dL Final    GFR Estimate 06/27/2025 27 (L)  >60 mL/min/1.73m2 Final    Calcium 06/27/2025 9.2  8.8 - 10.4 mg/dL Final    Chloride 06/27/2025 99  98 - 107 mmol/L Final    Glucose 06/27/2025 144 (H)  70 - 99 mg/dL Final    Alkaline Phosphatase 06/27/2025 226 (H)  40 - 150 U/L Final    AST 06/27/2025 41  0 - 45 U/L Final    ALT 06/27/2025 34  0 - 50 U/L Final    Protein Total 06/27/2025 6.7  6.4 - 8.3 g/dL Final    Albumin 06/27/2025 3.5  3.5 - 5.2 g/dL Final    Bilirubin Total 06/27/2025 0.8  <=1.2 mg/dL Final    Patient Fasting > 8hrs? 06/27/2025 Yes   Final    Vitamin D, Total (25-Hydroxy)  06/27/2025 20  20 - 50 ng/mL Final    Parathyroid Hormone Intact 06/27/2025 56  15 - 65 pg/mL Final    TSH 06/27/2025 6.69 (H)  0.30 - 4.20 uIU/mL Final    Hemoglobin 06/27/2025 9.8 (L)  11.7 - 15.7 g/dL Final    MCV 06/27/2025 94  78 - 100 fL Final    Free T4 06/27/2025 1.37  0.90 - 1.70 ng/dL Final   Office Visit on 06/13/2025   Component Date Value Ref Range Status    Sodium 06/13/2025 134 (L)  135 - 145 mmol/L Final    Potassium 06/13/2025 5.2  3.4 - 5.3 mmol/L Final    Chloride 06/13/2025 97 (L)  98 - 107 mmol/L Final    Carbon Dioxide (CO2) 06/13/2025 21 (L)  22 - 29 mmol/L Final    Anion Gap 06/13/2025 16 (H)  7 - 15 mmol/L Final    Urea Nitrogen 06/13/2025 74.6 (H)  8.0 - 23.0 mg/dL Final    Creatinine 06/13/2025 1.87 (H)  0.51 - 0.95 mg/dL Final    GFR Estimate 06/13/2025 27 (L)  >60 mL/min/1.73m2 Final    Calcium 06/13/2025 9.2  8.8 - 10.4 mg/dL Final    Glucose 06/13/2025 285 (H)  70 - 99 mg/dL Final   Office Visit on 05/06/2025   Component Date Value Ref Range Status    Creatinine Urine mg/dL 05/06/2025 71.5  mg/dL Final    Albumin Urine mg/L 05/06/2025 135.1  mg/L Final    Albumin Urine mg/g Cr 05/06/2025 188.95 (H)  0.00 - 25.00 mg/g Cr Final    Uric Acid 05/06/2025 5.0  2.4 - 5.7 mg/dL Final    Sodium 05/06/2025 137  135 - 145 mmol/L Final    Potassium 05/06/2025 5.5 (H)  3.4 - 5.3 mmol/L Final    Chloride 05/06/2025 102  98 - 107 mmol/L Final    Carbon Dioxide (CO2) 05/06/2025 22  22 - 29 mmol/L Final    Anion Gap 05/06/2025 13  7 - 15 mmol/L Final    Urea Nitrogen 05/06/2025 33.0 (H)  8.0 - 23.0 mg/dL Final    Creatinine 05/06/2025 1.33 (H)  0.51 - 0.95 mg/dL Final    GFR Estimate 05/06/2025 41 (L)  >60 mL/min/1.73m2 Final    Calcium 05/06/2025 9.5  8.8 - 10.4 mg/dL Final    Glucose 05/06/2025 288 (H)  70 - 99 mg/dL Final    WBC Count 05/06/2025 15.3 (H)  4.0 - 11.0 10e3/uL Final    RBC Count 05/06/2025 3.57 (L)  3.80 - 5.20 10e6/uL Final    Hemoglobin 05/06/2025 11.0 (L)  11.7 - 15.7 g/dL Final     Hematocrit 05/06/2025 34.3 (L)  35.0 - 47.0 % Final    MCV 05/06/2025 96  78 - 100 fL Final    MCH 05/06/2025 30.8  26.5 - 33.0 pg Final    MCHC 05/06/2025 32.1  31.5 - 36.5 g/dL Final    RDW 05/06/2025 17.2 (H)  10.0 - 15.0 % Final    Platelet Count 05/06/2025 231  150 - 450 10e3/uL Final    O-Sanjeev-Tramadol ng/mL 05/06/2025 >13,000 (H)  <50 ng/mL Final    O-Sanjeev-Tramadol 05/06/2025    Final    Tramadol ng/mL 05/06/2025 9,240 (H)  <50 ng/mL Final    Tramadol 05/06/2025 12,833  Absent ng/mg [creat] Final    Creatinine Urine for Drug Screen 05/06/2025 72  mg/dL Final        ASSESSMENT:   Laura PICKETT Destinybeverley presents to establish local cardiology care, management of congestive heart failure. Patient with WEST, severe obstructive lung disease and pericardial calcification secondary to RA. Additionally, increased LV wall thickness secondary to hypertensive heart disease.   Today, patient reports continues to feel well and level of dyspnea improved from her baseline. No chest pain or pressure. She is accompanied by her spouse. She wishes for no invasive procedures. Not interested in coronary intervention at this time due to risk with her multiple comorbidities and with lack of anginal symptoms, preserved LV systolic function. Weight has been overall stable, LE edema gradually increasing after dose decrease of Bumex. No palpitations, lightheadedness or syncope. Chronic abdominal distention noted.       PLAN:   1. Chronic diastolic CHF (congestive heart failure), NYHA class 2 (H) (Primary)  Chronic HFrEF,hypertensive cardiomyopathy and pericardial calcification secondary to RA.  NYHA class CLASS II:  Symptoms with ordinary activity, Stage C. Last CHF hospitalization 6/2025.  GDMT:  ACEi/ARB/ARNI: Contraindicated d/t renal dysfunction or hyperkalemia  BB: Carvedilol 3.125 mg BID.   Aldosterone antagonist: contraindicated due to renal dysfunction  SGLT2i: Holding Jardiance currently. Review with nephrology restarting this  medication.  Fluid status: Continue Bumex 0.5 mg daily, may take additional bumex 0.5 mg up to one time daily as needed for weight gain/ increased edema.   Referral to CHF care coordination.   Patient not interested in cardioMEMs or invasive procedures at this time.   Order placed for compression stockings, reviewed sodium restricted diet.     - Compression Sleeve/Stocking Order for DME - ONLY FOR DME  - Primary Care - Care Coordination Referral; Future  - bumetanide (BUMEX) 0.5 MG tablet; Take 0.5 mg once daily in the morning, you may take additional 0.5 mg up to one time daily if needed for weight gain or increased edema.  Dispense: 90 tablet; Refill: 4    2. Hypertensive cardiomegaly  -See above #1    3. SOB (shortness of breath)  -Chronic and multifactorial    4. COPD, very severe (H)    5. Anemia in stage 3a chronic kidney disease (H)  -Consult with nephrology this week.    6. AA amyloidosis (H)  -Managed at [facility].  -No indication for cardiac testing at this time, review recent MRI results.     7. Bilateral lower extremity edema  -See above #1  - Compression Sleeve/Stocking Order for DME - ONLY FOR DME    8. MV CAD  -Patient without anginal symptoms and preserved LV systolic function. She is not interested in coronary intervention or any invasive procedures at this time.   -Continue on medical therapy.     Orders Placed This Encounter   Procedures    Primary Care - Care Coordination Referral    EKG 12-lead, tracing only (Same Day)    Compression Sleeve/Stocking Order for DME - ONLY FOR DME     Follow-up with cardiology in 4 weeks, repeat lab at next visit.     Thank you for allowing me to participate in the care of your patient. Please do not hesitate to contact me if you have any questions.     Today's clinic visit entailed:  100 minutes spent on the date of the encounter doing chart review, history and exam, documentation and further activities per the note. Extended period of time spent counseling patient and  spouse on new CHF diagnosis.  The longitudinal plan of care for the diagnosis(es)/condition(s) as documented were addressed during this visit. Due to the added complexity in care, I will continue to support Laura Perez in the subsequent management and with ongoing continuity of care.    VIANEY Romero CNP [facility]

## 2025-07-02 ENCOUNTER — PATIENT OUTREACH (OUTPATIENT)
Dept: CARE COORDINATION | Facility: CLINIC | Age: 75
End: 2025-07-02
Payer: MEDICARE

## 2025-07-02 NOTE — PROGRESS NOTES
Clinic Care Coordination Contact  Clinic Care Coordination Contact  OUTREACH    Referral Information: [place]       Chief Complaint   Patient presents with    Heart Failure    Clinic Care Coordination - Initial        Universal Utilization: see below     Utilization      No Show Count (past year)  1             ED Visits  1             Hospital Admissions  1                    Current as of: 7/2/2025  9:40 AM                Clinical Concerns:  Current Medical Concerns: CHF    Current Behavioral Concerns: N/A  Education Provided to patient: Services offered, education reinforcement of disease process.        Health Maintenance Reviewed:    Clinical Pathway: Clinic Care Coordination - Heart Failure Clinical Pathway    Heart Failure Symptoms:  Shortness of breath:: Yes  Shortness of breath symptom course:: Stable (Unchanged since discharge from [facility])  Waking up at night short of breath?: No  Shortness of breath with:: Activities of daily living  Able to lie flat?: No  Prop up on pillows to breathe easier? : Yes  Elevating head of bed: Yes (has wedge)  Sleep in chair to breathe easier?: No  Wheezing or noisy breathing?: No  Cough: No  Increased sputum: No  Fever: No  New, different, or worsening chest pain? : No  Heartbeat: Fast (Heart will race after a bad dream)  Dizzy or Lightheaded: No  Swelling:: Yes  Swelling location:: Feet  Swelling severity:: Worsened (Patient states it could be due to her gout. Saw [facility] in clinic yesterday. Patient was encouraged to monitor and contact clinic with concerns.)  Swelling management:: Diuretics, Compression stockings, Elevation (compression stockings ordered yesterday.)  Bloating:: None  Fatigue:: Yes, worsening  Weakness (Heaviness in limbs):: No  Appetite:: Normal  Urination:: Decreased (Bumex dose was decreased recently. Patient states she is still urinating normaly. Just isnt getting up as much at night.)  Cognitive:: None noted  Emotional:: None noted    Heart  Failure Management:  Does patient have access to a digital scale?: Yes  Checking weight daily? : Yes  Weight?: Unchanged  Today's Weight?: 62.1 kg (137 lb)  Weight increase more than 2 lbs in 24 hours?: Yes  Weight Increase more than 5 lbs in 1 week? : Yes  Does the patient have understanding of Diuretic self-management?: Yes  Diet:: No added salt  Resources reviewed and provided:: Nurse triage line, Clinical references education    Medication Management:  Medication review status: Medications reviewed and no changes reported per patient.             Functional Status: Independent, alert, oriented.        Living Situation: Lives with  in home.        Lifestyle & Psychosocial Needs: Chronic disease management.     Social Drivers of Health     Food Insecurity: No Food Insecurity (5/22/2025)    Received from [employer]    Food Insecurity     Do you worry your food will run out before you are able to buy more?: 1   Depression: Not at risk (7/1/2025)    PHQ-2     PHQ-2 Score: 0   Recent Concern: Depression - At risk (6/13/2025)    PHQ-2     PHQ-2 Score: 6   Housing Stability: Low Risk  (5/22/2025)    Received from [employer]    Housing Stability     What is your housing situation today?: 1   Tobacco Use: Medium Risk (7/1/2025)    Patient History     Smoking Tobacco Use: Former     Smokeless Tobacco Use: Never     Passive Exposure: Not on file   Financial Resource Strain: Low Risk  (5/22/2025)    Received from [employer]    Financial Resource Strain     Difficulty of Paying Living Expenses: 3     Difficulty of Paying Living Expenses: Not on file   Alcohol Use: Not At Risk (6/30/2024)    Received from [employer]    Patient History     : Not on file   Transportation Needs: No Transportation Needs (5/22/2025)    Received from [employer]    Transportation Needs     Does lack  of transportation keep you from medical appointments?: 1     Does lack of transportation keep you from work, meetings or getting things that you need?: 1   Physical Activity: Unknown (6/7/2024)    Exercise Vital Sign     Days of Exercise per Week: 0 days     Minutes of Exercise per Session: Not on file   Recent Concern: Physical Activity - Insufficiently Active (6/6/2024)    Received from [facility]    Exercise Vital Sign     Days of Exercise per Week: 2 days     Minutes of Exercise per Session: 40 min   Interpersonal Safety: Low Risk  (7/1/2025)    Interpersonal Safety     Do you feel physically and emotionally safe where you currently live?: Yes     Within the past 12 months, have you been hit, slapped, kicked or otherwise physically hurt by someone?: No     Within the past 12 months, have you been humiliated or emotionally abused in other ways by your partner or ex-partner?: No   Stress: Stress Concern Present (6/7/2024)    [employer] - Occupational Stress Questionnaire     Feeling of Stress : To some extent   Social Connections: Socially Integrated (5/22/2025)    Received from [employer]    Social Connections     Do you often feel lonely or isolated from those around you?: 0   Health Literacy: Not on file         Resources and Interventions: Patient was mailed the patient heart failure self help guide with stop light tool along with writers contact information.        Future Appointments                In 1 week GH INFUSION NURSE; GH INFUSION CHAIR 3 [facility] and \[facility]\""    In 4 weeks Maura Allen APRN CNP [facility] and \[facility]\""    In 1 month GH INFUSION NURSE; GH INFUSION CHAIR 5 [facility] \[facility]\""    In 2 months GH INFUSION NURSE; GH INFUSION CHAIR 1 [facility] \[facility]\""    In 3 months GH INFUSION NURSE; GH INFUSION CHAIR 2 [facility] and [facility]            Plan: Patient agreeable to check in calls 1x monthly.   Yana Moore RN on 7/2/2025 at 1:19 PM

## 2025-07-07 RX ORDER — LANCETS 30 GAUGE
1 EACH MISCELLANEOUS 2 TIMES DAILY
Qty: 200 EACH | Refills: 0 | Status: SHIPPED | OUTPATIENT
Start: 2025-07-07

## 2025-07-07 RX ORDER — BLOOD SUGAR DIAGNOSTIC
STRIP MISCELLANEOUS
Qty: 200 STRIP | Refills: 0 | Status: SHIPPED | OUTPATIENT
Start: 2025-07-07

## 2025-07-07 NOTE — TELEPHONE ENCOUNTER
[facility] sent Rx request for the following:      Requested Prescriptions   Pending Prescriptions Disp Refills    blood glucose (ONETOUCH VERIO IQ) test strip [Pharmacy Med Name: ONE TOUCH VERIO TEST ST(NEW)100S] 200 strip 0     Sig: USE TO CHECK BLOOD SUGAR TWICE DAILY OR AS DIRECTED.   Last Prescription Date:   8/6/24  Last Fill Qty/Refills:         200, R-0      Diabetic Supplies Protocol Failed - 7/7/2025  4:13 PM        Failed - Medication is active on med list and the sig matches. RN to manually verify dose and sig if red X/fail.     If the protocol passes (green check), you do not need to verify med dose and sig.    A prescription matches if they are the same clinical intention.    For Example: once daily and every morning are the same.    The protocol can not identify upper and lower case letters as matching and will fail.     For Example: Take 1 tablet (50 mg) by mouth daily     TAKE 1 TABLET (50 MG) BY MOUTH DAILY    For all fails (red x), verify dose and sig.    If the refill does match what is on file, the RN can still proceed to approve the refill request.       If they do not match, route to the appropriate provider.       Lancets (ONETOUCH DELICA PLUS [id number]) MISC [Pharmacy Med Name: ONE TOUCH DELICA PLUS 30G LANCETS]       Sig: USE TO CHECK BLOOD SUGAR TWO TIMES DAILY`   Last Prescription Date:   6/7/24  Last Fill Qty/Refills:         200, R-11      Diabetic Supplies Protocol Failed - 7/7/2025  4:13 PM        Failed - Medication is active on med list and the sig matches. RN to manually verify dose and sig if red X/fail.     If the protocol passes (green check), you do not need to verify med dose and sig.    A prescription matches if they are the same clinical intention.    For Example: once daily and every morning are the same.    The protocol can not identify upper and lower case letters as matching and will fail.     For Example: Take 1 tablet (50 mg) by mouth daily      TAKE 1 TABLET (50 MG) BY MOUTH DAILY    For all fails (red x), verify dose and sig.    If the refill does match what is on file, the RN can still proceed to approve the refill request.       If they do not match, route to the appropriate provider.     Last Office Visit:              6/13/25 (DM discussed)   Future Office visit:           None    Unable to complete prescription refill per RN Medication Refill Policy. Routing to covering provider for refill consideration, as PCP/provider is out of clinic >48 hours or Pt is completely out of medication and provider is out of the clinic today. Kelsey Brody, Refill RN .............. 7/7/2025  4:19 PM

## 2025-07-11 ENCOUNTER — TELEPHONE (OUTPATIENT)
Dept: CARDIOLOGY | Facility: OTHER | Age: 75
End: 2025-07-11
Payer: MEDICARE

## 2025-07-13 DIAGNOSIS — J44.9 MIXED RESTRICTIVE AND OBSTRUCTIVE LUNG DISEASE (H): ICD-10-CM

## 2025-07-13 DIAGNOSIS — J44.9 COPD, VERY SEVERE (H): ICD-10-CM

## 2025-07-13 DIAGNOSIS — J98.4 MIXED RESTRICTIVE AND OBSTRUCTIVE LUNG DISEASE (H): ICD-10-CM

## 2025-07-14 ENCOUNTER — HOSPITAL ENCOUNTER (OUTPATIENT)
Dept: INFUSION THERAPY | Facility: OTHER | Age: 75
Discharge: HOME | End: 2025-07-14
Admitting: FAMILY MEDICINE
Payer: MEDICARE

## 2025-07-14 ENCOUNTER — TELEPHONE (OUTPATIENT)
Dept: PEDIATRICS | Facility: OTHER | Age: 75
End: 2025-07-14
Payer: MEDICARE

## 2025-07-14 VITALS
DIASTOLIC BLOOD PRESSURE: 57 MMHG | SYSTOLIC BLOOD PRESSURE: 161 MMHG | OXYGEN SATURATION: 95 % | RESPIRATION RATE: 18 BRPM | HEART RATE: 72 BPM | TEMPERATURE: 97.5 F

## 2025-07-14 DIAGNOSIS — E85.89 OTHER AMYLOIDOSIS (H): ICD-10-CM

## 2025-07-14 DIAGNOSIS — Z79.52 ON PREDNISONE THERAPY: ICD-10-CM

## 2025-07-14 DIAGNOSIS — Z79.4 DIABETES MELLITUS TYPE 2, INSULIN DEPENDENT (H): ICD-10-CM

## 2025-07-14 DIAGNOSIS — J44.9 CHRONIC OBSTRUCTIVE PULMONARY DISEASE, UNSPECIFIED COPD TYPE (H): ICD-10-CM

## 2025-07-14 DIAGNOSIS — M05.9 SEROPOSITIVE RHEUMATOID ARTHRITIS (H): Primary | ICD-10-CM

## 2025-07-14 DIAGNOSIS — E11.9 DIABETES MELLITUS TYPE 2, INSULIN DEPENDENT (H): ICD-10-CM

## 2025-07-14 PROCEDURE — 96365 THER/PROPH/DIAG IV INF INIT: CPT

## 2025-07-14 PROCEDURE — 258N000003 HC RX IP 258 OP 636: Performed by: INTERNAL MEDICINE

## 2025-07-14 PROCEDURE — 250N000028 HC RX JA MOD (INTRAVENOUS), IP 250 OP 636: Mod: JZ,JA | Performed by: INTERNAL MEDICINE

## 2025-07-14 RX ORDER — ALBUTEROL SULFATE 90 UG/1
1-2 INHALANT RESPIRATORY (INHALATION)
Start: 2025-08-11

## 2025-07-14 RX ORDER — EPINEPHRINE 1 MG/ML
0.3 INJECTION, SOLUTION, CONCENTRATE INTRAVENOUS EVERY 5 MIN PRN
OUTPATIENT
Start: 2025-08-11

## 2025-07-14 RX ORDER — METHYLPREDNISOLONE SODIUM SUCCINATE 125 MG/2ML
125 INJECTION INTRAMUSCULAR; INTRAVENOUS
Start: 2025-08-11

## 2025-07-14 RX ORDER — ALBUTEROL SULFATE 0.83 MG/ML
2.5 SOLUTION RESPIRATORY (INHALATION)
OUTPATIENT
Start: 2025-08-11

## 2025-07-14 RX ORDER — DIPHENHYDRAMINE HYDROCHLORIDE 50 MG/ML
50 INJECTION, SOLUTION INTRAMUSCULAR; INTRAVENOUS
Start: 2025-08-11

## 2025-07-14 RX ADMIN — SODIUM CHLORIDE 250 ML: 0.9 INJECTION, SOLUTION INTRAVENOUS at 13:19

## 2025-07-14 RX ADMIN — SODIUM CHLORIDE 750 MG: 9 INJECTION, SOLUTION INTRAVENOUS at 13:42

## 2025-07-14 NOTE — TELEPHONE ENCOUNTER
I agree with the Home Care order requests below.  Gio Dooley MD on 7/14/2025 at 10:14 AM    Signed, Gio Dooley MD, FAAP, FACP  Internal Medicine & Pediatrics

## 2025-07-14 NOTE — TELEPHONE ENCOUNTER
Request for additional Home Care Occupational Therapy orders as follows:        Continuation frequency =   ___2___  x week x  ___1___ week(s)    Effective date = 7/14/25      Interventions include:    ADL skills training  Education - home safety  Instruction - home exercise program  Education - energy conservation  Therapeutic exercise  Instruction - caregiver training  Adaptive equipment           For therapist: Tracy Palacios OTR/L    Please respond with .HOMECAREAGREE, if you are in agreement. Please sign with your comments and signature, if you are not in agreement. Route to the  Home Care pool.

## 2025-07-14 NOTE — NURSING NOTE
Infusion Nursing Note:  Laura Perez presents today for O[place].    Patient seen by provider today: No   present during visit today: Not Applicable.    Note: N/A.      Intravenous Access:  Peripheral IV placed.    Treatment Conditions:  Biological Infusion Checklist:  ~~~ NOTE: If the patient answers yes to any of the questions below, hold the infusion and contact ordering provider or on-call provider.    Have you recently had an elevated temperature, fever, chills, productive cough, coughing for 3 weeks or longer or hemoptysis,  abnormal vital signs, night sweats,  chest pain or have you noticed a decrease in your appetite, unexplained weight loss or fatigue? No  Do you have any open wounds or new incisions? No  Do you have any upcoming hospitalizations or surgeries? Does not include esophagogastroduodenoscopy, colonoscopy, endoscopic retrograde cholangiopancreatography (ERCP), endoscopic ultrasound (EUS), dental procedures or joint aspiration/steroid injections No  Do you currently have any signs of illness or infection or are you on any antibiotics? No  Have you had any new, sudden or worsening abdominal pain? No  Have you or anyone in your household received a live vaccination in the past 4 weeks? Please note: No live vaccines while on biologic/chemotherapy until 6 months after the last treatment. Patient can receive the flu vaccine (shot only), pneumovax and the Covid vaccine. It is optimal for the patient to get these vaccines mid cycle, but they can be given at any time as long as it is not on the day of the infusion. No  Have you recently been diagnosed with any new nervous system diseases (ie. Multiple sclerosis, Guillain [place], seizures, neurological changes) or cancer diagnosis? Are you on any form of radiation or chemotherapy? No  Are you pregnant or breast feeding or do you have plans of pregnancy in the future? No  Have you been having any signs of worsening depression or suicidal  ideations?  (benlysta only) No NA  Have there been any other new onset medical symptoms? No  Have you had any new blood clots? (IVIG only) No NA      Post Infusion Assessment:  Patient tolerated infusion without incident.  Blood return noted pre and post infusion.  Site patent and intact, free from redness, edema or discomfort.  No evidence of extravasations.  Access discontinued per protocol.  Biologic Infusion Post Education: Call the triage nurse at your clinic or seek medical attention if you have chills and/or temperature greater than or equal to 100.5, uncontrolled nausea/vomiting, diarrhea, constipation, dizziness, shortness of breath, chest pain, heart palpitations, weakness or any other new or concerning symptoms, questions or concerns.  You cannot have any live virus vaccines prior to or during treatment or up to 6 months post infusion.  If you have an upcoming surgery, medical procedure or dental procedure during treatment, this should be discussed with your ordering physician and your surgeon/dentist.  If you are having any concerning symptom, if you are unsure if you should get your next infusion or wish to speak to a provider before your next infusion, please call your care coordinator or triage nurse at your clinic to notify them so we can adequately serve you.       Discharge Plan:   Discharge instructions reviewed with: Patient.  Patient and/or family verbalized understanding of discharge instructions and all questions answered.  AVS to patient via [employer].  Patient will return 7/30/25 for next appointment.   Patient discharged in stable condition accompanied by: self and .  Departure Mode: Wheelchair.      Jayna Harrison RN

## 2025-07-15 DIAGNOSIS — J44.9 CHRONIC OBSTRUCTIVE PULMONARY DISEASE, UNSPECIFIED COPD TYPE (H): ICD-10-CM

## 2025-07-15 RX ORDER — TIOTROPIUM BROMIDE 18 UG/1
CAPSULE ORAL; RESPIRATORY (INHALATION)
Qty: 90 CAPSULE | Refills: 0 | Status: SHIPPED | OUTPATIENT
Start: 2025-07-15

## 2025-07-15 RX ORDER — ALBUTEROL SULFATE 90 UG/1
INHALANT RESPIRATORY (INHALATION)
Qty: 54 G | Refills: 2 | Status: SHIPPED | OUTPATIENT
Start: 2025-07-15

## 2025-07-15 RX ORDER — BUDESONIDE 0.5 MG/2ML
INHALANT ORAL
Qty: 360 ML | Refills: 4 | Status: SHIPPED | OUTPATIENT
Start: 2025-07-15

## 2025-07-15 RX ORDER — PEN NEEDLE, DIABETIC 32GX 5/32"
NEEDLE, DISPOSABLE MISCELLANEOUS
Qty: 200 EACH | Refills: 2 | Status: SHIPPED | OUTPATIENT
Start: 2025-07-15

## 2025-07-15 NOTE — TELEPHONE ENCOUNTER
[facility] sent Rx request for the following:      Requested Prescriptions   Pending Prescriptions Disp Refills    tiotropium (SPIRIVA) 18 MCG inhaled capsule [Pharmacy Med Name: TIOTROPIUM BR 18MCG CAPS&HANDIHALER] 90 capsule 4     Sig: INHALE THE CONTENTS OF 1 CAPSULE(18 MCG) INTO THE LUNGS DAILY       Long-Acting Muscarinic Agonists (LAMA) (including combination products) Failed - 7/15/2025  9:23 AM        Failed - Medication is active on med list and the sig matches. RN to manually verify dose and sig if red X/fail.     If the protocol passes (green check), you do not need to verify med dose and sig.    A prescription matches if they are the same clinical intention.    For Example: once daily and every morning are the same.    The protocol can not identify upper and lower case letters as matching and will fail.     For Example: Take 1 tablet (50 mg) by mouth daily     TAKE 1 TABLET (50 MG) BY MOUTH DAILY    For all fails (red x), verify dose and sig.    If the refill does match what is on file, the RN can still proceed to approve the refill request.       If they do not match, route to the appropriate provider.          Last Prescription Date:   06/07/24  Last Fill Qty/Refills:         90, R-4    Last Office Visit:              06/07/24 (Miah)   Future Office visit:             Next 5 appointments (look out 90 days)      Jul 30, 2025 9:30 AM  (Arrive by 9:15 AM)  Return Visit with VIANEY [facility] [address]  [place] [id number] [phone]           Prescription refilled per RN Medication Refill Policy.................... Mini Balderas RN ....................  7/15/2025   9:35 AM

## 2025-07-15 NOTE — TELEPHONE ENCOUNTER
[facility] sent Rx request for the following:      Requested Prescriptions   Pending Prescriptions Disp Refills    budesonide (PULMICORT) 0.5 MG/2ML neb solution [Pharmacy Med Name: BUDESONIDE 0.5MG/2ML VIALS 2ML] 360 mL 4     Sig: USE 2 ML(0.5 MG) VIA NEBULIZER TWICE DAILY       Inhaled Steroids Protocol Failed - 7/15/2025  2:54 PM        Failed - Medication is active on med list and the sig matches. RN to manually verify dose and sig if red X/fail.     If the protocol passes (green check), you do not need to verify med dose and sig.    A prescription matches if they are the same clinical intention.    For Example: once daily and every morning are the same.    The protocol can not identify upper and lower case letters as matching and will fail.     For Example: Take 1 tablet (50 mg) by mouth daily     TAKE 1 TABLET (50 MG) BY MOUTH DAILY    For all fails (red x), verify dose and sig.    If the refill does match what is on file, the RN can still proceed to approve the refill request.       If they do not match, route to the appropriate provider.     Last Prescription Date:   5/6/24  Last Fill Qty/Refills:         360 ml, R-4    Last Office Visit:                6/13/25 5/6/25 (COPD discussed)    Future Office visit:           None    Unable to complete prescription refill per RN Medication Refill Policy. Kelsey Brody, Refill RN .............. 7/15/2025  3:05 PM

## 2025-07-15 NOTE — TELEPHONE ENCOUNTER
[facility] sent Rx request for the following:      Requested Prescriptions   Pending Prescriptions Disp Refills    albuterol (VENTOLIN HFA) 108 (90 Base) MCG/ACT inhaler [Pharmacy Med Name: VENTOLIN HFA INH W/DOS CTR 200PUFFS] 18 g 11     Sig: INHALE 1 TO 2 PUFFS BY MOUTH EVERY 4 TO 6 HOURS AS NEEDED FOR SHORTNESS OF BREATH OR WHEEZING   Last Prescription Date:   6/7/24  Last Fill Qty/Refills:         18 g, R-11      Short-Acting Beta Agonist Inhalers Protocol  Failed - 7/15/2025  1:46 PM        Failed - Medication is active on med list and the sig matches. RN to manually verify dose and sig if red X/fail.     If the protocol passes (green check), you do not need to verify med dose and sig.    A prescription matches if they are the same clinical intention.    For Example: once daily and every morning are the same.    The protocol can not identify upper and lower case letters as matching and will fail.     For Example: Take 1 tablet (50 mg) by mouth daily     TAKE 1 TABLET (50 MG) BY MOUTH DAILY    For all fails (red x), verify dose and sig.    If the refill does match what is on file, the RN can still proceed to approve the refill request.       If they do not match, route to the appropriate provider.       insulin pen needle (BD PEN NEEDLE LUCILLE 2ND GEN) 32G X 4 MM miscellaneous [Pharmacy Med Name: B-D LUCILLE 2ND GEN PEN NDL [id number]]       Sig: USE TO ADMINISTER INSULIN TWICE DAILY   Last Prescription Date:   6/7/24  Last Fill Qty/Refills:         200, R-11      Diabetic Supplies Protocol Failed - 7/15/2025  1:46 PM        Failed - Medication is active on med list and the sig matches. RN to manually verify dose and sig if red X/fail.     If the protocol passes (green check), you do not need to verify med dose and sig.    A prescription matches if they are the same clinical intention.    For Example: once daily and every morning are the same.    The protocol can not identify upper and lower  case letters as matching and will fail.     For Example: Take 1 tablet (50 mg) by mouth daily     TAKE 1 TABLET (50 MG) BY MOUTH DAILY    For all fails (red x), verify dose and sig.    If the refill does match what is on file, the RN can still proceed to approve the refill request.       If they do not match, route to the appropriate provider.     Last Office Visit:              6/13/25  Future Office visit:           None    Unable to complete prescription refill per RN Medication Refill Policy. Kelsey Brody, Refill RN .............. 7/15/2025  2:09 PM

## 2025-07-19 ENCOUNTER — HEALTH MAINTENANCE LETTER (OUTPATIENT)
Age: 75
End: 2025-07-19

## 2025-07-23 ENCOUNTER — MYC MEDICAL ADVICE (OUTPATIENT)
Dept: PEDIATRICS | Facility: OTHER | Age: 75
End: 2025-07-23
Payer: MEDICARE

## 2025-07-23 NOTE — TELEPHONE ENCOUNTER
Called [facility].     Needs form filled out by provider for Medicare then faxed back.     Was initially faxed to [phone]. Having him route directly to [facility] fax.     Shaq Johnson RN on 7/23/2025 at 2:11 PM

## 2025-07-27 DIAGNOSIS — J44.9 CHRONIC OBSTRUCTIVE PULMONARY DISEASE, UNSPECIFIED COPD TYPE (H): ICD-10-CM

## 2025-07-27 DIAGNOSIS — K21.00 GASTROESOPHAGEAL REFLUX DISEASE WITH ESOPHAGITIS WITHOUT HEMORRHAGE: ICD-10-CM

## 2025-07-30 ENCOUNTER — OFFICE VISIT (OUTPATIENT)
Dept: CARDIOLOGY | Facility: OTHER | Age: 75
End: 2025-07-30
Attending: NURSE PRACTITIONER
Payer: MEDICARE

## 2025-07-30 VITALS
HEIGHT: 62 IN | SYSTOLIC BLOOD PRESSURE: 160 MMHG | WEIGHT: 136.2 LBS | RESPIRATION RATE: 16 BRPM | HEART RATE: 80 BPM | TEMPERATURE: 97.8 F | BODY MASS INDEX: 25.06 KG/M2 | DIASTOLIC BLOOD PRESSURE: 67 MMHG | OXYGEN SATURATION: 96 %

## 2025-07-30 DIAGNOSIS — I11.9 HYPERTENSIVE CARDIOMEGALY: ICD-10-CM

## 2025-07-30 DIAGNOSIS — I25.10 MULTIPLE VESSEL CORONARY ARTERY DISEASE: ICD-10-CM

## 2025-07-30 DIAGNOSIS — I50.32 CHRONIC DIASTOLIC CHF (CONGESTIVE HEART FAILURE), NYHA CLASS 2 (H): Primary | ICD-10-CM

## 2025-07-30 DIAGNOSIS — N18.32 STAGE 3B CHRONIC KIDNEY DISEASE (H): ICD-10-CM

## 2025-07-30 DIAGNOSIS — E85.3 AA AMYLOIDOSIS (H): ICD-10-CM

## 2025-07-30 DIAGNOSIS — J44.9 COPD, VERY SEVERE (H): ICD-10-CM

## 2025-07-30 DIAGNOSIS — I10 ESSENTIAL HYPERTENSION: ICD-10-CM

## 2025-07-30 DIAGNOSIS — R60.0 BILATERAL LOWER EXTREMITY EDEMA: ICD-10-CM

## 2025-07-30 PROCEDURE — G0463 HOSPITAL OUTPT CLINIC VISIT: HCPCS

## 2025-07-30 RX ORDER — BUMETANIDE 0.5 MG/1
TABLET ORAL
Qty: 180 TABLET | Refills: 3 | Status: SHIPPED | OUTPATIENT
Start: 2025-07-30

## 2025-07-30 RX ORDER — ISOSORBIDE MONONITRATE 30 MG/1
30 TABLET, EXTENDED RELEASE ORAL DAILY
Qty: 90 TABLET | Refills: 1 | Status: SHIPPED | OUTPATIENT
Start: 2025-07-30

## 2025-07-30 ASSESSMENT — PAIN SCALES - GENERAL: PAINLEVEL_OUTOF10: MODERATE PAIN (6)

## 2025-07-30 NOTE — PROGRESS NOTES
[facility] HEART CARE   CARDIOLOGY PROGRESS NOTE    Laura Perez   [address]  [facility] [id number]    Gio Dooley     Chief Complaint   Patient presents with    Heart Failure     CHF 4 week follow up. Weight Check         HPI:   Mrs. Perez is a 75 year old female who presents for cardiology follow-up to visit on 7/1/2025, management of congestive heart failure. Patient with WEST, severe obstructive lung disease and pericardial calcification secondary to RA. Additionally, increased LV wall thickness secondary to hypertensive heart disease.     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     Patient had been evaluated by cardiology at [facility] due to exertional dyspnea within the past year and absence of a significant change in her pulmonary function tests, previously revealing severe obstructive lung disease, a cardiac workup was initiated at [facility]. 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 She denied orthopnea or PND. She denied palpitations, presyncope or syncope. No chest pain endorsed.     At visit with [facility] on 2/20/2025 reviewed evaluating cardiac contribution to her dyspnea on exertion by performing right and left heart catheterization at rest and with exercise.  Possible endomyocardial biopsy was also discussed. After consideration, the decision was made not to proceed with any invasive diagnostic testing primarily due to the lack of therapeutic implications. Recommended  monitoring for volume overload symptoms, initiation of a loop diuretic and start SGLT2 inhibitor, Jardiance 10 mg once daily. She is aware of the association of urinary tract infections with this therapy. Recommended outpatient NM Lexiscan stress test.     She was admitted to [facility] 5/21-6/9/2025. Admitted for concerns of worsening shortness of breath. She had an appointment to undergo repeat stress testing 5/22 when she developed sudden onset of shortness of breath leading her to present to [facility] emergency department. On arrival EKG revealed sinus tachycardia without acute ischemic concerns. proBNP noted to be elevated at 1799, troponin noted to be 73>78, and D-dimer was noted to be elevated. CT chest PE study showed pleural effusions without pulmonary embolism. She was admitted for further evaluation and IV diuresis. PET stress recommended which was completed 5/23/25 and returned abnormal. She was thus recommended to proceed with invasive coronary angiogram, which was delayed a few days due to patient being on Jardiance. This was ultimately completed 5/27 revealing multivessel disease with recommendations for surgical revascularization. LVEDP noted to be elevated on cath and additional IV diuretic was started. CVS consulted and but patient declined surgery. Patient wanting to move forward with high risk PCI and staged interventions. Patient developed subsequent orthostatic hypotension with addition of medication such as Imdur, carvedilol, and amlodipine. All of which were ultimately discontinued. Unfortunately renal function continued to worsen in light of dye load on admission as well as dye load during angiogram and intermittent hypotension related to trying to uptitrate medications. Nephrology was involved and ended up guiding diuresis in the setting of acute kidney injury. Patient received 1 unit packed red blood cells for both volume and anemia noted on BVA. Despite this intervention patient's renal  function continued to decline. She was ultimately discharged 6/9 with plans for outpatient nephrology follow-up in early July. Estimated dry weight likely to be between 140-145 lb. patient was discharged on 80 mg atorvastatin, 1 mg Bumex, 3.125 mg twice daily carvedilol, 75 mg Plavix, 81 mg aspirin, and 48 mg fenofibrate.    Patient had cardiac hospital follow-up on 6/20/2025. Level of dyspnea reported as improved. No anginal symptoms or chest pain. It was recommended decreased dose of Bumex to 0.5 mg daily. Started on Protonix for gastric protection.     Additionally, she is scheduled with nephrology at the end of July.    At her last visit on 7/1/2025, patient reported continued dyspnea although improved some. No chest pain or pressure. She wishes for no invasive procedures. Not interested in coronary intervention at this time due to risk with her multiple comorbidities and with lack of anginal symptoms, preserved LV systolic function. Weight has been overall stable, LE edema gradually increasing after dose decrease of Bumex. No palpitations, lightheadedness or syncope. Chronic abdominal distention noted.     INTERVAL HISTORY:  Today, patient reports feeling the best she has in some time. Positive for continued pedal edema. Weight unchanged from last visit at 136 lb. No chest pain or pressure, no increased dyspnea. No palpitations, lightheadedness or syncope. Positive for continued elevated BP. Nephrology had recommended goal 130/80 mmHg or less.     IMAGING RESULTS:   Coronary angiogram 5/27/2025:  Summary/Conclusions  PRESENTATION / INDICATIONS  * Urgent  * [place]  * Stress Test High risk/extent of ischemia  VASCULAR ACCESS  * Using ultrasound guidance and a percutaneous technique, the right common femoral artery was accessed. Ultrasound was used to confirm vessel patency, localizing needle into the lumen of the vessel. An image was saved for the medical record.  DIAGNOSTIC - CORONARY  * Right dominant coronary  artery system  * 50% stenosis in the ostial Left Main  * 60% stenosis in the mid LAD  * 50% stenosis in the proximal 1st Diagonal  * 70% stenosis in the proximal Circumflex  * Total occlusion (with bridging collaterals) of the ostial 2nd Obtuse Marginal  * 70% stenosis in the mid RCA  * 50% stenosis in the distal RCA  * Baseline rFR measured in left main was 0.65? strongly positive for ischemia.  DIAGNOSTIC - VALVES  * No evidence of aortic valve stenosis.  HEMODYNAMICS  * The LVEDP is mildly elevated, 21 mmHg.  LEFT VENTRICULAR FUNCTION  * No LV angiogram performed due to renal insufficiency  DIAGNOSTIC - VASCULAR  * Severe stenosis bilateral common iliac arteries.  RECOMMENDATIONS & PLAN  * Optimize risk factors and medications: HDL > 50; LDL <55; Triglycerides < 100.  * Recommend surgical revascularization  * Consider common iliac stenting in the future if symptomatic.   Echocardiogram, 5/22/2025:  Final Conclusion  Visually Estimated EF: 60-65%  Technically difficult study - contrast was used to enhance endocardial definition due to suboptimal image quality.  Normal left ventricular size and ejection fraction.  Right ventricle not well visualized.  No obvious septal shift noted.  No significant valvular heart disease noted.  Echo free space anterior to the right ventricle likely represents a fat pad. Pericardium is not well-visualized on the study  When compared to the echocardiographic report at outside facility the pericardium is not well-visualized on the current study however  no other signs of constriction were noted.     MR Cardiac without and with IV Contrast ([place])  Result Date: 2/19/2025  Impression: 1. Pericardial adhesion with mild focal thickening and enhancement of the anterior pericardium adjacent to the right ventricular free wall, just inferior to prominent pericardial calcifications. No evidence of constrictive morphology. 2. Remarkable asymmetric/non-concentric LVH with severe thickening of the  basal and mid anterior wall and septum. The wall thickening can be compensatory but can also be compatible with severe and long-standing hypertension, aortic stenosis, hypertrophic cardiomyopathy or infiltrative disease in the proper clinical context. Cardiac amyloid is less likely given the high signal intensity of the blood pool and uniformly low signal intensity of the myocardium at late gadolinium enhancement imaging. There is evidence of impaired opening of the aortic valve on series 15. 3. Thinning of LV wall thickness of the basal and mid ventricular inferolateral lateral segments with associated hypokinesis and possible subendocardial delayed gadolinium enhancement could be compatible with of prior infarction. 4. Mildly decreased left ventricular systolic function with LVEF = 47%. Normal systolic RV function. RVEF = 64%.     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 Limited imaging restricts assessment of constrictive physiology. Recommend Cardiac MRI for further evaluation. 2. Normal left ventricular chamber size with hyperdynamic systolic function. Calculated ejection fraction 70%. 3. No regional wall motion abnormalities. Elevated left ventricular filling pressure. 4. No left ventricular outflow tract obstruction at rest or with Valsalva. 5. Left ventricular strain assessment not performed because of image quality. 6. Normal right ventricular chamber size and systolic function. Unable to assess RV systolic pressure. 7. No hemodynamically significant valvular heart disease. 8. Normal inferior vena cava size with normal inspiratory collapse (>50%) , consistent with normal central venous pressure. 9. No pericardial effusion. 10. 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 11.  Additional images performed were reviewed. Despite the presence of myocardial tethering, there is no evidence of constrictive physiology or hemodynamic abnormalities. These findings were discussed with Dr. Rancho Flores, and the plan is to proceed with a cardiac MRI. Depending on the results, a consultation for pericardial disease may be considered. Findings LEFT VENTRICLE:Normal left ventricular chamber size. Abnormal left ventricular geometry with concentric remodeling (increased wall thickness to cavity ratio). No left ventricular outflow tract obstruction at rest or with Valsalva. Calculated 2-D linear left ventricular ejection fraction 70%. No regional wall motion abnormalities. Left ventricular strain assessment not performed because of image quality. Elevated left ventricular filling pressure. RIGHT VENTRICLE:Normal right ventricular chamber size. Normal right ventricular systolic function. Unable to detect peak tricuspid regurgitation velocity for pulmonary artery systolic pressure calculation. ATRIA:Normal left atrial size. Left atrial volume index 16 ml/m2. Normal right atrial size. CARDIAC VALVES:Trileaflet aortic valve. Sclerotic aortic valve. Trivial aortic valve regurgitation. Thickened mitral valve. Mild mitral valve stenosis. Mitral valve diastolic mean Doppler gradient 3 mmHg (heart rate 79 BPM). Mild mitral valve regurgitation. Mitral regurgitation ERO (PISA) 0.11 cm2. Mitral regurgitant volume (PISA) 24 ml. Pulmonary valve not well visualized. Normal pulmonary valve systolic velocities. Trivial pulmonary valve regurgitation. Normal tricuspid valve. Trivial tricuspid valve regurgitation. OTHER ECHO FINDINGS:Normal inferior vena cava size with normal inspiratory collapse (>50%). Normal sinus of Valsalva diameter of 31 mm. Upper limit of normal of the sinus of Valsalva for age, sex and BSA is 39 mm. Normal mid ascending aorta diameter of 33 mm. Upper limit of normal of the mid ascending aorta, for  age, sex and BSA is 38 mm. Abdominal aorta incompletely visualized. No atrial level shunt by color flow imaging. No intracardiac mass or thrombus, but the left atrial appendage cannot be visualized adequately with transthoracic echo to exclude thrombus in this location. No pericardial effusion. For the complete report, see the Order-Level Documents.     PAST MEDICAL HISTORY:   Past Medical History:   Diagnosis Date    Amyloidosis (H)     Chronic kidney disease (CKD), stage III (moderate) (H)     Chronic obstructive pulmonary disease (H)     No Comments Provided    Diverticulosis of intestine without perforation or abscess without bleeding     Diffuse diverticulosis and history of diminutive hyperplastic colon    Essential (primary) hypertension     No Comments Provided    Hyperlipidemia     No Comments Provided    Hypothyroidism     No Comments Provided    Malignant neoplasm of colon (H)     9/2017    Osteoporosis     On Prolia    Rheumatoid arthritis (H)     Rheumatologist Dr. Gonzalez, [phone], fax [phone]    Rosacea     No Comments Provided    Type 2 diabetes mellitus without complications (H)     Diabetes Mellitus, prednisone induced          FAMILY HISTORY:   Family History   Problem Relation Age of Onset    Cancer Mother 50        uterus    Heart Disease Mother         MI    Colon Cancer Mother     Heart Disease Father         MI    Chronic Obstructive Pulmonary Disease Sister     Other - See Comments Sister         rubina dow    Colon Cancer Brother     Breast Cancer No family hx of         Cancer-breast          PAST SURGICAL HISTORY:   Past Surgical History:   Procedure Laterality Date    ARTHROPLASTY,Right MTP 1 and 5 and left MTP 5  01/2001    CATARACT IOL, RT/LT Bilateral     COLON SURGERY Right 09/2017    For colon cancer    COLONOSCOPY  09/29/2008    COLONOSCOPY  10/11/2018    F/U in 5 years recommended secondary to polyps    LAPAROSCOPIC TUBAL LIGATION      No Comments Provided    Left  MCP joint arthroplasty  1998    MTP 2, 3, 4 right foot  2000    RELEASE CARPAL TUNNEL Left     Right long finger MCP repair      [place]    Right MCP 4 and 5 arthroplasty  2004    Right wrist arthroplasty            SOCIAL HISTORY:   Social History     Socioeconomic History    Marital status:      Spouse name: None    Number of children: None    Years of education: None    Highest education level: None   Tobacco Use    Smoking status: Former     Current packs/day: 0.00     Average packs/day: 1 pack/day for 30.0 years (30.0 ttl pk-yrs)     Types: Cigarettes     Start date: 1975     Quit date: 2005     Years since quittin.8    Smokeless tobacco: Never   Vaping Use    Vaping status: Never Used   Substance and Sexual Activity    Alcohol use: Not Currently     Alcohol/week: 2.5 standard drinks of alcohol     Types: 3 Standard drinks or equivalent per week     Comment: Rare    Drug use: Yes     Comment: Medical marijuana    Sexual activity: Not Currently   Social History Narrative    , [place].  Does not work outside the home.  Four children are grown.  Lives in town.     Social Drivers of Health     Financial Resource Strain: Low Risk  (2025)    Received from [facility]    Financial Resource Strain     Difficulty of Paying Living Expenses: 3   Food Insecurity: No Food Insecurity (2025)    Received from [facility]    Food Insecurity     Do you worry your food will run out before you are able to buy more?: 1   Transportation Needs: No Transportation Needs (2025)    Received from [facility]    Transportation Needs     Does lack of transportation keep you from medical appointments?: 1     Does lack of transportation keep you from work, meetings or getting things that you need?: 1   Physical Activity: Unknown (2024)    Exercise Vital Sign     Days of  "Exercise per Week: 0 days   Recent Concern: Physical Activity - Insufficiently Active (6/6/2024)    Received from [facility]    Exercise Vital Sign     Days of Exercise per Week: 2 days     Minutes of Exercise per Session: 40 min   Stress: Stress Concern Present (6/7/2024)    [employer] - Occupational Stress Questionnaire     Feeling of Stress : To some extent   Social Connections: Socially Integrated (5/22/2025)    Received from [facility]    Social Connections     Do you often feel lonely or isolated from those around you?: 0   Interpersonal Safety: Low Risk  (7/1/2025)    Interpersonal Safety     Do you feel physically and emotionally safe where you currently live?: Yes     Within the past 12 months, have you been hit, slapped, kicked or otherwise physically hurt by someone?: No     Within the past 12 months, have you been humiliated or emotionally abused in other ways by your partner or ex-partner?: No   Housing Stability: Low Risk  (5/22/2025)    Received from [facility]    Housing Stability     What is your housing situation today?: 1          CURRENT MEDICATIONS:   Current Outpatient Medications   Medication Sig Dispense Refill    ABATACEPT IV Inject 750 mg into the vein every 28 days      albuterol (VENTOLIN HFA) 108 (90 Base) MCG/ACT inhaler INHALE 1 TO 2 PUFFS BY MOUTH EVERY 4 TO 6 HOURS AS NEEDED FOR SHORTNESS OF BREATH OR WHEEZING 54 g 2    allopurinol (ZYLOPRIM) 100 MG tablet Take 100 mg by mouth daily      aspirin 81 MG chewable tablet Take 81 mg by mouth daily with food      atorvastatin (LIPITOR) 80 MG tablet Take 1 tablet (80 mg) by mouth daily. 90 tablet 4    atovaquone (MEPRON) 750 MG/5ML suspension Take 1,500 mg by mouth daily      B-D TB SYRINGE 27G X 1/2\" 1 ML MISC USE AS DIRECTED FOR METHOTREXATE INJECTIONS      blood glucose (ONETOUCH VERIO IQ) test strip USE TO CHECK BLOOD SUGAR TWICE DAILY 200 " strip 0    budesonide (PULMICORT) 0.5 MG/2ML neb solution USE 2 ML(0.5 MG) VIA NEBULIZER TWICE DAILY 360 mL 4    bumetanide (BUMEX) 0.5 MG tablet Take 0.5 mg once daily in the morning, you may take additional 0.5 mg up to one time daily if needed for weight gain or increased edema. 90 tablet 4    carvedilol (COREG) 3.125 MG tablet Take 1 tablet (3.125 mg) by mouth 2 times daily (with meals). 180 tablet 4    clopidogrel (PLAVIX) 75 MG tablet Take 1 tablet (75 mg) by mouth daily. 90 tablet 4    cyanocobalamin (VITAMIN B-12) 1000 MCG tablet Take 1 tablet (1,000 mcg) by mouth daily 90 tablet 4    denosumab (PROLIA) 60 MG/ML SOSY injection Inject 1 mL (60 mg) Subcutaneous every 6 months 1 mL 1    EPINEPHrine (ANY BX GENERIC EQUIV) 0.3 MG/0.3ML injection 2-pack Inject 0.3 mLs (0.3 mg) into the muscle once as needed for anaphylaxis 0.9 mL 1    estradiol (VAGIFEM) 10 MCG TABS vaginal tablet INSERT 1 TABLET(10 MCG) VAGINALLY 2 TIMES A WEEK 24 tablet 2    folic acid (FOLVITE) 1 MG tablet Take 1 tablet (1 mg) by mouth daily 90 tablet 4    formoterol (PERFOROMIST) 20 MCG/2ML neb solution Take 2 mLs (20 mcg) by nebulization 2 times daily 180 mL 4    glucagon 1 MG kit Inject 1 mg into the muscle as needed for low blood sugar      glucose 4 g CHEW chewable tablet Take 1 tablet by mouth as needed for low blood sugar      insulin lispro protamine-insulin lispro (HUMALOG MIX 75/25 KWIKPEN) (75-25) 100 UNIT/ML pen 30 units in the morning, 15 units in the evening 60 mL 4    insulin pen needle (BD PEN NEEDLE LUCILLE 2ND GEN) 32G X 4 MM miscellaneous USE TO ADMINISTER INSULIN TWICE DAILY 200 each 2    ipratropium - albuterol 0.5 mg/2.5 mg/3 mL (DUONEB) 0.5-2.5 (3) MG/3ML neb solution Take 1 vial (3 mLs) by nebulization every 6 hours as needed for shortness of breath, wheezing or cough 90 mL 3    Lancets (ONETOUCH DELICA PLUS [id number]) MISC 1 each by In Vitro route 2 times daily. 200 each 0    levothyroxine (SYNTHROID/LEVOTHROID) 112 MCG  "tablet Take 1 tablet (112 mcg) by mouth daily 90 tablet 4    lidocaine, viscous, (XYLOCAINE) 2 % solution Swish and spit 15 mLs in mouth every 4 hours as needed.      Methotrexate 25 MG/ML SOSY Inject 12.5 mg Subcutaneous once a week (On Tuesdays)      montelukast (SINGULAIR) 10 MG tablet Take 1 tablet (10 mg) by mouth at bedtime 90 tablet 4    Nebulizers (VIOS AEROSOL DELIVERY SYSTEM) MISC USE AS DIRECTED 1 each 0    Needle, Disp, (HYPODERMIC NEEDLE) 27G X 1/2\" MISC For methotrexate injections.      ondansetron (ZOFRAN) 4 MG tablet Take 1 tablet (4 mg) by mouth every 8 hours as needed for nausea or vomiting. 90 tablet 11    predniSONE (DELTASONE) 5 MG tablet Take 2 tablets (10 mg) by mouth daily with food.      tiotropium (SPIRIVA) 18 MCG inhaled capsule INHALE THE CONTENTS OF 1 CAPSULE(18 MCG) INTO THE LUNGS DAILY 90 capsule 0    traMADol (ULTRAM) 50 MG tablet Take 1 tablet (50 mg) by mouth nightly as needed for severe pain. 30 tablet 5    Continuous Glucose Sensor (DEXCOM G7 SENSOR) MISC 1 each every 10 days. Change sensor every 10 days (Patient not taking: Reported on 7/30/2025) 9 each 5    omeprazole (PRILOSEC) 20 MG DR capsule Take 1 capsule (20 mg) by mouth daily 90 capsule 4     No current facility-administered medications for this visit.         ALLERGIES:   Allergies   Allergen Reactions    Glyburide Anaphylaxis     Other reaction(s): Dizziness    Mosquitoes (Informational Only) Hives    Other [Seasonal Allergies] Hives     Deer Flies    Sulfa Antibiotics Anaphylaxis and Hives    Sulfamethoxazole-Trimethoprim Anaphylaxis     Other reaction(s): Other - Describe In Comment Field  Rash, nausea, dizziness    Sulfamethoxazole-Trimethoprim Hives and Itching    Duloxetine      Other reaction(s): Other (see comments)  Falls and dizziness    Codeine Nausea and Nausea and Vomiting        ROS:   CONSTITUTIONAL: No reported fever or chills. Weight unchanged.  ENT: No visual disturbance, ear ache, epistaxis or sore " "throat.   CARDIOVASCULAR: No chest pain, chest pressure or chest discomfort. No palpitations, positive for continued pedal edema.   RESPIRATORY: Level of dyspnea improved. No cough, wheezing or hemoptysis. No orthopnea or PND.   GI: No reported abdominal pain, melena or hematochezia.   : No reported hematuria or dysuria.   NEUROLOGICAL: No lightheadedness, dizziness or syncope. Positive for generalized weakness.   HEMATOLOGIC: Positive for history of anemia.   MUSCULOSKELETAL: Chronic joint pain.  SKIN: No abnormal rashes or sores, no unusual itching. Chronic bruising present.       PHYSICAL EXAM:   BP (!) 160/67 (BP Location: Right arm, Patient Position: Sitting, Cuff Size: Child)   Pulse 80   Temp 97.8  F (36.6  C) (Temporal)   Resp 16   Ht 1.575 m (5' 2\")   Wt 61.8 kg (136 lb 3.2 oz)   LMP 06/07/2004 (Approximate)   SpO2 96%   BMI 24.91 kg/m    GENERAL: The patient is a well-developed, well-nourished, in no apparent distress.  HEENT: Head is normocephalic and atraumatic. Eyes are symmetrical with normal visual tracking. No icterus, no xanthelasmas. Nares appeared normal without nasal drainage. Mucous membranes are moist, no cyanosis.  NECK: Supple, no cervical bruits, JVP not visible.   CHEST/ LUNGS: Lungs clear to auscultation, no rales, rhonchi or wheezes, no use of accessory muscles, no retractions, respirations unlabored and normal respiratory rate.   CARDIO: Regular rate and rhythm normal with S1 and S2, no S3 or S4 and no murmur, click or rub.   ABD: Abdomen is distended without tenderness.   EXTREMITIES: 1+ pedal edema present.   MUSCULOSKELETAL: No visible joint swelling.   NEUROLOGIC: Alert and oriented X3. Normal speech, gait and affect. No focal neurologic deficits.   SKIN: No jaundice. No rashes or visible skin lesions present.     LAB RESULTS:   Office Visit on 07/01/2025   Component Date Value Ref Range Status    Ventricular Rate 07/01/2025 74  BPM Final    Atrial Rate 07/01/2025 74  BPM " Final    SC Interval 07/01/2025 166  ms Final    QRS Duration 07/01/2025 88  ms Final    QT 07/01/2025 398  ms Final    QTc 07/01/2025 441  ms Final    R AXIS 07/01/2025 14  degrees Final    T Axis 07/01/2025 163  degrees Final    Interpretation ECG 07/01/2025    Final                    Value:Sinus rhythm  ST & T wave abnormality, consider inferior ischemia  ST & T wave abnormality, consider anterolateral ischemia  When compared with ECG of 15-Dec-2024 11:55,  Premature atrial complexes are no longer Present  T wave inversion now evident in Inferior leads  Confirmed by MD LAKHANI DANIEL ([id number]) on 7/1/2025 5:07:07 PM         ASSESSMENT:   Laura Perez presents for cardiology follow-up to visit on 7/1/2025, management of congestive heart failure. Patient with WEST, severe obstructive lung disease and pericardial calcification secondary to RA. Additionally, increased LV wall thickness secondary to hypertensive heart disease.     Today, patient reports feeling the best she has in some time. Positive for continued pedal edema. Weight unchanged from last visit at 136 lb. No chest pain or pressure, no increased dyspnea. No palpitations, lightheadedness or syncope. Positive for continued elevated BP. Nephrology had recommended goal 130/80 mmHg or less.     PLAN:   1. Chronic diastolic CHF (congestive heart failure), NYHA class 2 (H) (Primary)  -Chronic HFrEF, hypertensive cardiomyopathy and pericardial calcification secondary to RA.  NYHA class CLASS II:  Symptoms with ordinary activity, Stage C. Last CHF hospitalization 6/2025.  -GDMT:  ACEi/ARB/ARNI: Contraindicated d/t renal dysfunction or hyperkalemia  Aldosterone antagonist: contraindicated due to renal dysfunction  SGLT2i: Holding Jardiance currently. Review with nephrology restarting this medication.  Fluid status: Continued pedal edema and weight unchanged from last visit. Recommended alternating every other day dose Bumex between 0.5 to 1 mg every other day.    -Patient not interested in cardioMEMs or invasive procedures at this time.   -Order recently placed for compression stockings, reviewed sodium restricted diet.   -Return for repeat lab in 2 weeks.    - bumetanide (BUMEX) 0.5 MG tablet; Take 0.5 mg rotating with 1 mg next day for rotating dose every other day.  Dispense: 180 tablet; Refill: 3  - Basic metabolic panel; Future  - NT-proBNP; Future    2. Hypertensive cardiomegaly  -See above #1.    3. COPD, very severe (H)    4. AA amyloidosis (H)  -Managed at [facility].  -No indication for cardiac testing at this time, review recent MRI results.     5. Bilateral lower extremity edema  -See above #1.    6. Stage 3b chronic kidney disease (H)  -Following nephrology, renal function currently stable.   - Basic metabolic panel; Future    7. Essential hypertension  -BP remains elevated, pressure 160/100 mmHg which is similar to current home readings.   -Increase Bumex as recommended above, if pressure remains elevated >140/90 mmHg after oe week, then start Imdur 30 mg daily for hypertension and benefit for vasodilation in setting of MV CAD.     - isosorbide mononitrate (IMDUR) 30 MG 24 hr tablet; Take 1 tablet (30 mg) by mouth daily.  Dispense: 90 tablet; Refill: 1    8. Multiple vessel coronary artery disease  -Patient without anginal symptoms and preserved LV systolic function. She is not interested in coronary intervention or any invasive procedures at this time.   -No longer on ASA, interventional cardiology recommended Plavix 75 mg daily.  -Continue on high intensity statin.     - isosorbide mononitrate (IMDUR) 30 MG 24 hr tablet; Take 1 tablet (30 mg) by mouth daily.  Dispense: 90 tablet; Refill: 1    Orders Placed This Encounter   Procedures    Basic metabolic panel    NT-proBNP     Return for lab visit in 2 weeks, cardiology clinic follow-up in 4 weeks, sooner if needed.   Thank you for allowing me to participate in the care of your patient. Please do not hesitate to  contact me if you have any questions.     Today's clinic visit entailed:  55 minutes spent on the date of the encounter doing chart review, history and exam, documentation and further activities per the note. Extended period of time spent counseling patient and spouse on new CHF diagnosis.    VIANEY Romero CNP [facility]

## 2025-07-30 NOTE — NURSING NOTE
"Chief Complaint   Patient presents with    Heart Failure     CHF 4 week follow up. Weight Check        Initial BP (!) 160/100 (BP Location: Right arm, Patient Position: Sitting, Cuff Size: Child)   Pulse 80   Temp 97.8  F (36.6  C) (Temporal)   Resp 16   Ht 1.575 m (5' 2\")   Wt 61.8 kg (136 lb 3.2 oz)   LMP 06/07/2004 (Approximate)   SpO2 96%   BMI 24.91 kg/m   Estimated body mass index is 24.91 kg/m  as calculated from the following:    Height as of this encounter: 1.575 m (5' 2\").    Weight as of this encounter: 61.8 kg (136 lb 3.2 oz).  Medication Review: complete    The next two questions are to help us understand your food security.  If you are feeling you need any assistance in this area, we have resources available to support you today.          12/15/2024   SDOH- Food Insecurity   Within the past 12 months, did you worry that your food would run out before you got money to buy more? N   Within the past 12 months, did the food you bought just not last and you didn t have money to get more? N        Data saved with a previous flowsheet row definition             Tori San LPN      "

## 2025-07-30 NOTE — PATIENT INSTRUCTIONS
Adjust Bumex to rotating every other day dosing between 0.5 mg followed by 1 mg.   Start eating one banana per day- equivalent to 10 meq potassium per day.   Start Imdur 30 mg daily in one week if BP remains elevated. Watch blood pressure 30-60 min after taking Imdur in order to ensure this is not driving your blood pressure too low.   Goal BP less than 140/90 mmHg and above 110/70 mmHg.  Lab only visit in 2 weeks.   Follow-up with cardiology in 4 weeks.

## 2025-08-04 RX ORDER — OMEPRAZOLE 20 MG/1
20 CAPSULE, DELAYED RELEASE ORAL DAILY
Qty: 90 CAPSULE | Refills: 4 | OUTPATIENT
Start: 2025-08-04

## 2025-08-04 RX ORDER — MONTELUKAST SODIUM 10 MG/1
1 TABLET ORAL AT BEDTIME
Qty: 90 TABLET | Refills: 2 | Status: SHIPPED | OUTPATIENT
Start: 2025-08-04

## 2025-08-11 ENCOUNTER — HOSPITAL ENCOUNTER (OUTPATIENT)
Dept: INFUSION THERAPY | Facility: OTHER | Age: 75
Discharge: HOME | End: 2025-08-11
Admitting: FAMILY MEDICINE
Payer: MEDICARE

## 2025-08-11 VITALS
RESPIRATION RATE: 18 BRPM | HEART RATE: 65 BPM | DIASTOLIC BLOOD PRESSURE: 71 MMHG | SYSTOLIC BLOOD PRESSURE: 161 MMHG | WEIGHT: 136.4 LBS | BODY MASS INDEX: 24.95 KG/M2 | TEMPERATURE: 98.4 F

## 2025-08-11 DIAGNOSIS — Z79.52 ON PREDNISONE THERAPY: ICD-10-CM

## 2025-08-11 DIAGNOSIS — E85.89 OTHER AMYLOIDOSIS (H): ICD-10-CM

## 2025-08-11 DIAGNOSIS — M05.9 SEROPOSITIVE RHEUMATOID ARTHRITIS (H): Primary | ICD-10-CM

## 2025-08-11 PROCEDURE — 96365 THER/PROPH/DIAG IV INF INIT: CPT

## 2025-08-11 PROCEDURE — 250N000028 HC RX JA MOD (INTRAVENOUS), IP 250 OP 636: Mod: JZ,JA | Performed by: INTERNAL MEDICINE

## 2025-08-11 PROCEDURE — 258N000003 HC RX IP 258 OP 636: Performed by: INTERNAL MEDICINE

## 2025-08-11 RX ORDER — ALBUTEROL SULFATE 0.83 MG/ML
2.5 SOLUTION RESPIRATORY (INHALATION)
OUTPATIENT
Start: 2025-09-08

## 2025-08-11 RX ORDER — METHYLPREDNISOLONE SODIUM SUCCINATE 125 MG/2ML
125 INJECTION INTRAMUSCULAR; INTRAVENOUS
Start: 2025-09-08

## 2025-08-11 RX ORDER — DIPHENHYDRAMINE HYDROCHLORIDE 50 MG/ML
50 INJECTION, SOLUTION INTRAMUSCULAR; INTRAVENOUS
Start: 2025-09-08

## 2025-08-11 RX ORDER — EPINEPHRINE 1 MG/ML
0.3 INJECTION, SOLUTION, CONCENTRATE INTRAVENOUS EVERY 5 MIN PRN
OUTPATIENT
Start: 2025-09-08

## 2025-08-11 RX ORDER — ALBUTEROL SULFATE 90 UG/1
1-2 INHALANT RESPIRATORY (INHALATION)
Start: 2025-09-08

## 2025-08-11 RX ADMIN — SODIUM CHLORIDE 750 MG: 9 INJECTION, SOLUTION INTRAVENOUS at 14:24

## 2025-08-11 RX ADMIN — SODIUM CHLORIDE 250 ML: 0.9 INJECTION, SOLUTION INTRAVENOUS at 13:36

## 2025-08-12 ENCOUNTER — MYC MEDICAL ADVICE (OUTPATIENT)
Dept: PEDIATRICS | Facility: OTHER | Age: 75
End: 2025-08-12
Payer: MEDICARE

## 2025-08-13 DIAGNOSIS — E11.9 DIABETES MELLITUS TYPE 2, INSULIN DEPENDENT (H): ICD-10-CM

## 2025-08-13 DIAGNOSIS — Z79.4 DIABETES MELLITUS TYPE 2, INSULIN DEPENDENT (H): ICD-10-CM

## 2025-08-13 RX ORDER — BLOOD SUGAR DIAGNOSTIC
STRIP MISCELLANEOUS
Qty: 200 STRIP | Refills: 0 | Status: SHIPPED | OUTPATIENT
Start: 2025-08-13

## 2025-08-14 ENCOUNTER — LAB (OUTPATIENT)
Dept: LAB | Facility: OTHER | Age: 75
End: 2025-08-14
Attending: NURSE PRACTITIONER
Payer: MEDICARE

## 2025-08-14 DIAGNOSIS — I50.32 CHRONIC DIASTOLIC CHF (CONGESTIVE HEART FAILURE), NYHA CLASS 2 (H): ICD-10-CM

## 2025-08-14 DIAGNOSIS — N18.32 STAGE 3B CHRONIC KIDNEY DISEASE (H): ICD-10-CM

## 2025-08-14 LAB
ANION GAP SERPL CALCULATED.3IONS-SCNC: 13 MMOL/L (ref 7–15)
BUN SERPL-MCNC: 42.4 MG/DL (ref 8–23)
CALCIUM SERPL-MCNC: 8.4 MG/DL (ref 8.8–10.4)
CHLORIDE SERPL-SCNC: 100 MMOL/L (ref 98–107)
CREAT SERPL-MCNC: 1.41 MG/DL (ref 0.51–0.95)
EGFRCR SERPLBLD CKD-EPI 2021: 38 ML/MIN/1.73M2
GLUCOSE SERPL-MCNC: 233 MG/DL (ref 70–99)
HCO3 SERPL-SCNC: 24 MMOL/L (ref 22–29)
NT-PROBNP SERPL-MCNC: 3010 PG/ML (ref 0–624)
POTASSIUM SERPL-SCNC: 4.2 MMOL/L (ref 3.4–5.3)
SODIUM SERPL-SCNC: 137 MMOL/L (ref 135–145)

## 2025-08-14 PROCEDURE — 36415 COLL VENOUS BLD VENIPUNCTURE: CPT | Mod: ZL

## 2025-08-14 PROCEDURE — 80048 BASIC METABOLIC PNL TOTAL CA: CPT | Mod: ZL

## 2025-08-14 PROCEDURE — 83880 ASSAY OF NATRIURETIC PEPTIDE: CPT | Mod: ZL

## 2025-08-18 ENCOUNTER — PATIENT OUTREACH (OUTPATIENT)
Dept: CARE COORDINATION | Facility: CLINIC | Age: 75
End: 2025-08-18
Payer: MEDICARE

## 2025-08-19 ENCOUNTER — OFFICE VISIT (OUTPATIENT)
Dept: PEDIATRICS | Facility: OTHER | Age: 75
End: 2025-08-19
Attending: INTERNAL MEDICINE
Payer: MEDICARE

## 2025-08-19 VITALS
DIASTOLIC BLOOD PRESSURE: 70 MMHG | RESPIRATION RATE: 16 BRPM | SYSTOLIC BLOOD PRESSURE: 160 MMHG | OXYGEN SATURATION: 94 % | HEART RATE: 80 BPM | BODY MASS INDEX: 25.5 KG/M2 | WEIGHT: 139.4 LBS | TEMPERATURE: 98.1 F

## 2025-08-19 DIAGNOSIS — Z79.4 DIABETES MELLITUS TYPE 2, INSULIN DEPENDENT (H): ICD-10-CM

## 2025-08-19 DIAGNOSIS — I12.9 CHRONIC KIDNEY DISEASE DUE TO HYPERTENSION: ICD-10-CM

## 2025-08-19 DIAGNOSIS — I50.32 CHRONIC DIASTOLIC CHF (CONGESTIVE HEART FAILURE), NYHA CLASS 2 (H): ICD-10-CM

## 2025-08-19 DIAGNOSIS — E03.9 ACQUIRED HYPOTHYROIDISM: Chronic | ICD-10-CM

## 2025-08-19 DIAGNOSIS — M51.362 DEGENERATION OF INTERVERTEBRAL DISC OF LUMBAR REGION WITH DISCOGENIC BACK PAIN AND LOWER EXTREMITY PAIN: Primary | ICD-10-CM

## 2025-08-19 DIAGNOSIS — E11.9 DIABETES MELLITUS TYPE 2, INSULIN DEPENDENT (H): ICD-10-CM

## 2025-08-19 PROCEDURE — G0463 HOSPITAL OUTPT CLINIC VISIT: HCPCS | Performed by: INTERNAL MEDICINE

## 2025-08-19 RX ORDER — OMEPRAZOLE 40 MG/1
40 CAPSULE, DELAYED RELEASE ORAL DAILY
COMMUNITY
Start: 2025-06-23

## 2025-08-19 RX ORDER — BLOOD SUGAR DIAGNOSTIC
6 STRIP MISCELLANEOUS
Qty: 600 STRIP | Refills: 4 | Status: SHIPPED | OUTPATIENT
Start: 2025-08-19

## 2025-08-19 RX ORDER — LANCETS 30 GAUGE
1 EACH MISCELLANEOUS 2 TIMES DAILY
Qty: 200 EACH | Refills: 12 | Status: SHIPPED | OUTPATIENT
Start: 2025-08-19

## 2025-08-19 RX ORDER — ACETAMINOPHEN 500 MG
TABLET ORAL EVERY 6 HOURS PRN
COMMUNITY

## 2025-08-19 RX ORDER — LEVOTHYROXINE SODIUM 112 UG/1
112 TABLET ORAL DAILY
Qty: 90 TABLET | Refills: 4 | Status: SHIPPED | OUTPATIENT
Start: 2025-08-19

## 2025-08-19 RX ORDER — BUMETANIDE 0.5 MG/1
1 TABLET ORAL DAILY
Qty: 180 TABLET | Refills: 3 | Status: SHIPPED | OUTPATIENT
Start: 2025-08-19

## 2025-08-19 RX ORDER — INSULIN LISPRO 100 [IU]/ML
INJECTION, SUSPENSION SUBCUTANEOUS
Qty: 60 ML | Refills: 4 | Status: SHIPPED | OUTPATIENT
Start: 2025-08-19

## 2025-08-19 ASSESSMENT — PAIN SCALES - GENERAL: PAINLEVEL_OUTOF10: NO PAIN (0)

## 2025-08-19 ASSESSMENT — PATIENT HEALTH QUESTIONNAIRE - PHQ9
SUM OF ALL RESPONSES TO PHQ QUESTIONS 1-9: 10
SUM OF ALL RESPONSES TO PHQ QUESTIONS 1-9: 10
10. IF YOU CHECKED OFF ANY PROBLEMS, HOW DIFFICULT HAVE THESE PROBLEMS MADE IT FOR YOU TO DO YOUR WORK, TAKE CARE OF THINGS AT HOME, OR GET ALONG WITH OTHER PEOPLE: VERY DIFFICULT

## 2025-08-20 ENCOUNTER — HOSPITAL ENCOUNTER (OUTPATIENT)
Dept: MRI IMAGING | Facility: OTHER | Age: 75
Discharge: HOME | End: 2025-08-20
Attending: INTERNAL MEDICINE
Payer: MEDICARE

## 2025-08-20 DIAGNOSIS — M51.362 DEGENERATION OF INTERVERTEBRAL DISC OF LUMBAR REGION WITH DISCOGENIC BACK PAIN AND LOWER EXTREMITY PAIN: ICD-10-CM

## 2025-08-20 PROCEDURE — 72148 MRI LUMBAR SPINE W/O DYE: CPT | Mod: 26 | Performed by: RADIOLOGY

## 2025-08-20 PROCEDURE — 72148 MRI LUMBAR SPINE W/O DYE: CPT

## 2025-08-26 ENCOUNTER — TELEPHONE (OUTPATIENT)
Dept: PEDIATRICS | Facility: OTHER | Age: 75
End: 2025-08-26
Payer: MEDICARE

## 2025-08-26 DIAGNOSIS — E11.9 DIABETES MELLITUS TYPE 2, INSULIN DEPENDENT (H): Primary | Chronic | ICD-10-CM

## 2025-08-26 DIAGNOSIS — Z79.4 DIABETES MELLITUS TYPE 2, INSULIN DEPENDENT (H): Primary | Chronic | ICD-10-CM

## 2025-08-29 ENCOUNTER — HOSPITAL ENCOUNTER (OUTPATIENT)
Dept: GENERAL RADIOLOGY | Facility: OTHER | Age: 75
Discharge: HOME | End: 2025-08-29
Attending: NURSE PRACTITIONER
Payer: MEDICARE

## 2025-08-29 ENCOUNTER — HOSPITAL ENCOUNTER (OUTPATIENT)
Dept: CT IMAGING | Facility: OTHER | Age: 75
Discharge: HOME | End: 2025-08-29
Attending: NURSE PRACTITIONER
Payer: MEDICARE

## 2025-08-29 DIAGNOSIS — J44.9 COPD, VERY SEVERE (H): ICD-10-CM

## 2025-08-29 DIAGNOSIS — R06.09 DOE (DYSPNEA ON EXERTION): ICD-10-CM

## 2025-08-29 DIAGNOSIS — I50.32 CHRONIC DIASTOLIC CHF (CONGESTIVE HEART FAILURE), NYHA CLASS 2 (H): ICD-10-CM

## 2025-08-29 DIAGNOSIS — J81.1 CHRONIC PULMONARY EDEMA: ICD-10-CM

## 2025-08-29 DIAGNOSIS — R79.89 ELEVATED D-DIMER: ICD-10-CM

## 2025-08-29 PROCEDURE — 250N000011 HC RX IP 250 OP 636: Performed by: NURSE PRACTITIONER

## 2025-08-29 PROCEDURE — 71046 X-RAY EXAM CHEST 2 VIEWS: CPT | Mod: 26 | Performed by: RADIOLOGY

## 2025-08-29 PROCEDURE — 71046 X-RAY EXAM CHEST 2 VIEWS: CPT

## 2025-08-29 PROCEDURE — 71275 CT ANGIOGRAPHY CHEST: CPT

## 2025-08-29 PROCEDURE — 71275 CT ANGIOGRAPHY CHEST: CPT | Mod: 26 | Performed by: RADIOLOGY

## 2025-08-29 PROCEDURE — 250N000009 HC RX 250: Performed by: NURSE PRACTITIONER

## 2025-08-29 RX ORDER — IOPAMIDOL 755 MG/ML
66 INJECTION, SOLUTION INTRAVASCULAR ONCE
Status: COMPLETED | OUTPATIENT
Start: 2025-08-29 | End: 2025-08-29

## 2025-08-29 RX ADMIN — IOPAMIDOL 66 ML: 755 INJECTION, SOLUTION INTRAVENOUS at 12:30

## 2025-08-29 RX ADMIN — SODIUM CHLORIDE 80 ML: 9 INJECTION, SOLUTION INTRAVENOUS at 12:31

## 2025-08-30 ENCOUNTER — APPOINTMENT (OUTPATIENT)
Dept: GENERAL RADIOLOGY | Facility: OTHER | Age: 75
End: 2025-08-30
Attending: FAMILY MEDICINE
Payer: MEDICARE

## 2025-08-30 ENCOUNTER — HOSPITAL ENCOUNTER (INPATIENT)
Facility: OTHER | Age: 75
LOS: 4 days | Discharge: HOME | End: 2025-09-03
Attending: FAMILY MEDICINE | Admitting: FAMILY MEDICINE
Payer: MEDICARE

## 2025-08-30 DIAGNOSIS — D63.1 ANEMIA OF CHRONIC KIDNEY FAILURE, STAGE 3 (MODERATE) (H): Chronic | ICD-10-CM

## 2025-08-30 DIAGNOSIS — I50.32 CHRONIC DIASTOLIC CHF (CONGESTIVE HEART FAILURE), NYHA CLASS 2 (H): ICD-10-CM

## 2025-08-30 DIAGNOSIS — Z87.891 PERSONAL HISTORY OF TOBACCO USE, PRESENTING HAZARDS TO HEALTH: ICD-10-CM

## 2025-08-30 DIAGNOSIS — I50.43 CHF (CONGESTIVE HEART FAILURE), NYHA CLASS I, ACUTE ON CHRONIC, COMBINED (H): Primary | ICD-10-CM

## 2025-08-30 DIAGNOSIS — N18.30 ANEMIA OF CHRONIC KIDNEY FAILURE, STAGE 3 (MODERATE) (H): Chronic | ICD-10-CM

## 2025-08-30 DIAGNOSIS — Z20.822 LAB TEST NEGATIVE FOR COVID-19 VIRUS: ICD-10-CM

## 2025-08-30 DIAGNOSIS — J18.9 ATYPICAL PNEUMONIA: ICD-10-CM

## 2025-08-30 PROBLEM — J44.1 COPD EXACERBATION (H): Status: RESOLVED | Noted: 2024-12-15 | Resolved: 2025-08-30

## 2025-08-30 PROBLEM — I50.33 ACUTE ON CHRONIC DIASTOLIC CONGESTIVE HEART FAILURE (H): Status: ACTIVE | Noted: 2025-08-30

## 2025-08-30 PROBLEM — J96.01 ACUTE RESPIRATORY FAILURE WITH HYPOXIA (H): Status: ACTIVE | Noted: 2025-08-30

## 2025-08-30 LAB
ALBUMIN SERPL BCG-MCNC: 3.4 G/DL (ref 3.5–5.2)
ALBUMIN UR-MCNC: NEGATIVE MG/DL
ALLEN'S TEST: NO
ALP SERPL-CCNC: 257 U/L (ref 40–150)
ALT SERPL W P-5'-P-CCNC: 29 U/L (ref 0–50)
ANION GAP SERPL CALCULATED.3IONS-SCNC: 10 MMOL/L (ref 7–15)
APPEARANCE UR: CLEAR
AST SERPL W P-5'-P-CCNC: 39 U/L (ref 0–45)
BASE EXCESS BLDA CALC-SCNC: 2.1 MMOL/L (ref -3–3)
BASE EXCESS BLDV CALC-SCNC: 2.9 MMOL/L (ref -3–3)
BASOPHILS # BLD AUTO: 0.06 10E3/UL (ref 0–0.2)
BASOPHILS NFR BLD AUTO: 0.3 %
BILIRUB SERPL-MCNC: 0.8 MG/DL
BILIRUB UR QL STRIP: NEGATIVE
BUN SERPL-MCNC: 34.2 MG/DL (ref 8–23)
CALCIUM SERPL-MCNC: 8.3 MG/DL (ref 8.8–10.4)
CHLORIDE SERPL-SCNC: 98 MMOL/L (ref 98–107)
COLOR UR AUTO: ABNORMAL
CREAT SERPL-MCNC: 1.44 MG/DL (ref 0.51–0.95)
EGFRCR SERPLBLD CKD-EPI 2021: 38 ML/MIN/1.73M2
EOSINOPHIL # BLD AUTO: 0.1 10E3/UL (ref 0–0.7)
EOSINOPHIL NFR BLD AUTO: 0.4 %
ERYTHROCYTE [DISTWIDTH] IN BLOOD BY AUTOMATED COUNT: 18.3 % (ref 10–15)
FLUAV RNA SPEC QL NAA+PROBE: NEGATIVE
FLUBV RNA RESP QL NAA+PROBE: NEGATIVE
GLUCOSE BLDC GLUCOMTR-MCNC: 234 MG/DL (ref 70–99)
GLUCOSE BLDC GLUCOMTR-MCNC: 241 MG/DL (ref 70–99)
GLUCOSE BLDC GLUCOMTR-MCNC: 244 MG/DL (ref 70–99)
GLUCOSE SERPL-MCNC: 221 MG/DL (ref 70–99)
GLUCOSE UR STRIP-MCNC: NEGATIVE MG/DL
HCO3 BLD-SCNC: 27 MMOL/L (ref 21–28)
HCO3 BLDV-SCNC: 29 MMOL/L (ref 21–28)
HCO3 SERPL-SCNC: 26 MMOL/L (ref 22–29)
HCT VFR BLD AUTO: 27.5 % (ref 35–47)
HGB BLD-MCNC: 8.6 G/DL (ref 11.7–15.7)
HGB UR QL STRIP: ABNORMAL
HOLD SPECIMEN: NORMAL
HOLD SPECIMEN: NORMAL
HYALINE CASTS: 4 /LPF
IMM GRANULOCYTES # BLD: 0.14 10E3/UL
IMM GRANULOCYTES NFR BLD: 0.6 %
INR PPP: 1.14 (ref 0.85–1.15)
KETONES UR STRIP-MCNC: NEGATIVE MG/DL
LACTATE SERPL-SCNC: 1.1 MMOL/L (ref 0.7–2)
LEUKOCYTE ESTERASE UR QL STRIP: NEGATIVE
LYMPHOCYTES # BLD AUTO: 0.79 10E3/UL (ref 0.8–5.3)
LYMPHOCYTES NFR BLD AUTO: 3.5 %
MAGNESIUM SERPL-MCNC: 1.6 MG/DL (ref 1.7–2.3)
MCH RBC QN AUTO: 28.8 PG (ref 26.5–33)
MCHC RBC AUTO-ENTMCNC: 31.3 G/DL (ref 31.5–36.5)
MCV RBC AUTO: 92 FL (ref 78–100)
MONOCYTES # BLD AUTO: 1.45 10E3/UL (ref 0–1.3)
MONOCYTES NFR BLD AUTO: 6.5 %
MUCOUS THREADS #/AREA URNS LPF: PRESENT /LPF
NEUTROPHILS # BLD AUTO: 19.78 10E3/UL (ref 1.6–8.3)
NEUTROPHILS NFR BLD AUTO: 88.7 %
NITRATE UR QL: NEGATIVE
NRBC # BLD AUTO: <0.03 10E3/UL
NRBC BLD AUTO-RTO: 0 /100
NT-PROBNP SERPL-MCNC: 3472 PG/ML (ref 0–624)
O2/TOTAL GAS SETTING VFR VENT: 25 %
O2/TOTAL GAS SETTING VFR VENT: 25 %
OXYHGB MFR BLDA: 95 % (ref 92–100)
OXYHGB MFR BLDV: 70 % (ref 70–75)
PCO2 BLD: 42 MM HG (ref 35–45)
PCO2 BLDV: 48 MM HG (ref 40–50)
PEEP: 5 CM H2O
PH BLD: 7.42 [PH] (ref 7.35–7.45)
PH BLDV: 7.38 [PH] (ref 7.32–7.43)
PH UR STRIP: 5.5 [PH] (ref 5–9)
PLATELET # BLD AUTO: 265 10E3/UL (ref 150–450)
PO2 BLD: 81 MM HG (ref 80–105)
PO2 BLDV: 40 MM HG (ref 25–47)
POTASSIUM SERPL-SCNC: 3.9 MMOL/L (ref 3.4–5.3)
PROT SERPL-MCNC: 6.4 G/DL (ref 6.4–8.3)
PROTHROMBIN TIME: 14.9 SECONDS (ref 11.8–14.8)
RBC # BLD AUTO: 2.99 10E6/UL (ref 3.8–5.2)
RBC URINE: 1 /HPF
RSV RNA SPEC NAA+PROBE: NEGATIVE
SAO2 % BLDA: 97.4 % (ref 95–96)
SAO2 % BLDV: 71.2 % (ref 70–75)
SARS-COV-2 RNA RESP QL NAA+PROBE: NEGATIVE
SODIUM SERPL-SCNC: 134 MMOL/L (ref 135–145)
SP GR UR STRIP: 1.02 (ref 1–1.03)
SQUAMOUS EPITHELIAL: 1 /HPF
TRANSITIONAL EPI: <1 /HPF
TROPONIN T SERPL HS-MCNC: 118 NG/L
TROPONIN T SERPL HS-MCNC: 119 NG/L
TROPONIN T SERPL HS-MCNC: 125 NG/L
UROBILINOGEN UR STRIP-MCNC: NORMAL MG/DL
WBC # BLD AUTO: 22.32 10E3/UL (ref 4–11)
WBC URINE: <1 /HPF

## 2025-08-30 PROCEDURE — 94660 CPAP INITIATION&MGMT: CPT

## 2025-08-30 PROCEDURE — 71045 X-RAY EXAM CHEST 1 VIEW: CPT

## 2025-08-30 PROCEDURE — 99291 CRITICAL CARE FIRST HOUR: CPT | Performed by: FAMILY MEDICINE

## 2025-08-30 PROCEDURE — 82805 BLOOD GASES W/O2 SATURATION: CPT | Performed by: FAMILY MEDICINE

## 2025-08-30 PROCEDURE — 250N000011 HC RX IP 250 OP 636: Performed by: FAMILY MEDICINE

## 2025-08-30 PROCEDURE — 36416 COLLJ CAPILLARY BLOOD SPEC: CPT | Performed by: INTERNAL MEDICINE

## 2025-08-30 PROCEDURE — 250N000009 HC RX 250: Performed by: FAMILY MEDICINE

## 2025-08-30 PROCEDURE — 82962 GLUCOSE BLOOD TEST: CPT

## 2025-08-30 PROCEDURE — 96366 THER/PROPH/DIAG IV INF ADDON: CPT | Performed by: FAMILY MEDICINE

## 2025-08-30 PROCEDURE — 87637 SARSCOV2&INF A&B&RSV AMP PRB: CPT | Performed by: FAMILY MEDICINE

## 2025-08-30 PROCEDURE — 250N000013 HC RX MED GY IP 250 OP 250 PS 637: Performed by: FAMILY MEDICINE

## 2025-08-30 PROCEDURE — 99292 CRITICAL CARE ADDL 30 MIN: CPT | Performed by: FAMILY MEDICINE

## 2025-08-30 PROCEDURE — 36600 WITHDRAWAL OF ARTERIAL BLOOD: CPT | Performed by: FAMILY MEDICINE

## 2025-08-30 PROCEDURE — 200N000001 HC R&B ICU

## 2025-08-30 PROCEDURE — 80053 COMPREHEN METABOLIC PANEL: CPT | Performed by: FAMILY MEDICINE

## 2025-08-30 PROCEDURE — 36415 COLL VENOUS BLD VENIPUNCTURE: CPT | Performed by: FAMILY MEDICINE

## 2025-08-30 PROCEDURE — 96365 THER/PROPH/DIAG IV INF INIT: CPT | Performed by: FAMILY MEDICINE

## 2025-08-30 PROCEDURE — 999N000157 HC STATISTIC RCP TIME EA 10 MIN

## 2025-08-30 PROCEDURE — 94640 AIRWAY INHALATION TREATMENT: CPT | Mod: 76

## 2025-08-30 PROCEDURE — 83735 ASSAY OF MAGNESIUM: CPT | Performed by: FAMILY MEDICINE

## 2025-08-30 PROCEDURE — 85041 AUTOMATED RBC COUNT: CPT | Performed by: FAMILY MEDICINE

## 2025-08-30 PROCEDURE — 84484 ASSAY OF TROPONIN QUANT: CPT | Performed by: FAMILY MEDICINE

## 2025-08-30 PROCEDURE — 84484 ASSAY OF TROPONIN QUANT: CPT | Performed by: INTERNAL MEDICINE

## 2025-08-30 PROCEDURE — 250N000012 HC RX MED GY IP 250 OP 636 PS 637: Performed by: FAMILY MEDICINE

## 2025-08-30 PROCEDURE — 83605 ASSAY OF LACTIC ACID: CPT | Performed by: FAMILY MEDICINE

## 2025-08-30 PROCEDURE — 83880 ASSAY OF NATRIURETIC PEPTIDE: CPT | Performed by: FAMILY MEDICINE

## 2025-08-30 PROCEDURE — 99291 CRITICAL CARE FIRST HOUR: CPT | Mod: 25 | Performed by: FAMILY MEDICINE

## 2025-08-30 PROCEDURE — 81003 URINALYSIS AUTO W/O SCOPE: CPT | Performed by: FAMILY MEDICINE

## 2025-08-30 PROCEDURE — 87040 BLOOD CULTURE FOR BACTERIA: CPT | Performed by: FAMILY MEDICINE

## 2025-08-30 PROCEDURE — 85610 PROTHROMBIN TIME: CPT | Performed by: FAMILY MEDICINE

## 2025-08-30 PROCEDURE — 96375 TX/PRO/DX INJ NEW DRUG ADDON: CPT | Performed by: FAMILY MEDICINE

## 2025-08-30 PROCEDURE — 94640 AIRWAY INHALATION TREATMENT: CPT

## 2025-08-30 PROCEDURE — 93005 ELECTROCARDIOGRAM TRACING: CPT | Performed by: FAMILY MEDICINE

## 2025-08-30 RX ORDER — NALOXONE HYDROCHLORIDE 0.4 MG/ML
0.2 INJECTION, SOLUTION INTRAMUSCULAR; INTRAVENOUS; SUBCUTANEOUS
Status: DISCONTINUED | OUTPATIENT
Start: 2025-08-30 | End: 2025-09-03 | Stop reason: HOSPADM

## 2025-08-30 RX ORDER — ENOXAPARIN SODIUM 100 MG/ML
30 INJECTION SUBCUTANEOUS EVERY 24 HOURS
Status: DISCONTINUED | OUTPATIENT
Start: 2025-08-30 | End: 2025-09-01

## 2025-08-30 RX ORDER — ALBUTEROL SULFATE 0.83 MG/ML
2.5 SOLUTION RESPIRATORY (INHALATION)
Status: COMPLETED | OUTPATIENT
Start: 2025-08-30 | End: 2025-08-30

## 2025-08-30 RX ORDER — LIDOCAINE 40 MG/G
CREAM TOPICAL
Status: DISCONTINUED | OUTPATIENT
Start: 2025-08-30 | End: 2025-09-03 | Stop reason: HOSPADM

## 2025-08-30 RX ORDER — HYDROMORPHONE HYDROCHLORIDE 1 MG/ML
0.5 INJECTION, SOLUTION INTRAMUSCULAR; INTRAVENOUS; SUBCUTANEOUS ONCE
Status: COMPLETED | OUTPATIENT
Start: 2025-08-30 | End: 2025-08-30

## 2025-08-30 RX ORDER — CLOPIDOGREL BISULFATE 75 MG/1
75 TABLET ORAL DAILY
Status: DISCONTINUED | OUTPATIENT
Start: 2025-08-31 | End: 2025-09-03 | Stop reason: HOSPADM

## 2025-08-30 RX ORDER — HYDROMORPHONE HCL IN WATER/PF 6 MG/30 ML
0.4 PATIENT CONTROLLED ANALGESIA SYRINGE INTRAVENOUS
Status: DISCONTINUED | OUTPATIENT
Start: 2025-08-30 | End: 2025-09-03 | Stop reason: HOSPADM

## 2025-08-30 RX ORDER — PANTOPRAZOLE SODIUM 40 MG/1
40 TABLET, DELAYED RELEASE ORAL
Status: DISCONTINUED | OUTPATIENT
Start: 2025-08-31 | End: 2025-09-01

## 2025-08-30 RX ORDER — LEVOFLOXACIN 5 MG/ML
500 INJECTION, SOLUTION INTRAVENOUS ONCE
Status: COMPLETED | OUTPATIENT
Start: 2025-08-30 | End: 2025-08-30

## 2025-08-30 RX ORDER — ACETAMINOPHEN 500 MG
1000 TABLET ORAL 3 TIMES DAILY
Status: DISCONTINUED | OUTPATIENT
Start: 2025-08-30 | End: 2025-09-03 | Stop reason: HOSPADM

## 2025-08-30 RX ORDER — AMOXICILLIN 250 MG
1 CAPSULE ORAL 2 TIMES DAILY PRN
Status: DISCONTINUED | OUTPATIENT
Start: 2025-08-30 | End: 2025-09-03 | Stop reason: HOSPADM

## 2025-08-30 RX ORDER — DEXTROSE MONOHYDRATE 25 G/50ML
25-50 INJECTION, SOLUTION INTRAVENOUS
Status: DISCONTINUED | OUTPATIENT
Start: 2025-08-30 | End: 2025-09-03 | Stop reason: HOSPADM

## 2025-08-30 RX ORDER — ALLOPURINOL 100 MG/1
100 TABLET ORAL DAILY
Status: DISCONTINUED | OUTPATIENT
Start: 2025-08-30 | End: 2025-09-03 | Stop reason: HOSPADM

## 2025-08-30 RX ORDER — HYDROMORPHONE HCL IN WATER/PF 6 MG/30 ML
0.2 PATIENT CONTROLLED ANALGESIA SYRINGE INTRAVENOUS
Status: DISCONTINUED | OUTPATIENT
Start: 2025-08-30 | End: 2025-09-03 | Stop reason: HOSPADM

## 2025-08-30 RX ORDER — ONDANSETRON 2 MG/ML
4 INJECTION INTRAMUSCULAR; INTRAVENOUS EVERY 6 HOURS PRN
Status: DISCONTINUED | OUTPATIENT
Start: 2025-08-30 | End: 2025-09-03 | Stop reason: HOSPADM

## 2025-08-30 RX ORDER — CALCIUM CARBONATE 500 MG/1
1000 TABLET, CHEWABLE ORAL 4 TIMES DAILY PRN
Status: DISCONTINUED | OUTPATIENT
Start: 2025-08-30 | End: 2025-09-03 | Stop reason: HOSPADM

## 2025-08-30 RX ORDER — MONTELUKAST SODIUM 5 MG/1
10 TABLET, CHEWABLE ORAL AT BEDTIME
Status: DISCONTINUED | OUTPATIENT
Start: 2025-08-30 | End: 2025-09-03 | Stop reason: HOSPADM

## 2025-08-30 RX ORDER — IPRATROPIUM BROMIDE AND ALBUTEROL SULFATE 2.5; .5 MG/3ML; MG/3ML
1 SOLUTION RESPIRATORY (INHALATION) EVERY 6 HOURS PRN
Status: DISCONTINUED | OUTPATIENT
Start: 2025-08-30 | End: 2025-08-31

## 2025-08-30 RX ORDER — BUMETANIDE 0.25 MG/ML
2 INJECTION, SOLUTION INTRAMUSCULAR; INTRAVENOUS ONCE
Status: COMPLETED | OUTPATIENT
Start: 2025-08-30 | End: 2025-08-30

## 2025-08-30 RX ORDER — NALOXONE HYDROCHLORIDE 0.4 MG/ML
0.4 INJECTION, SOLUTION INTRAMUSCULAR; INTRAVENOUS; SUBCUTANEOUS
Status: DISCONTINUED | OUTPATIENT
Start: 2025-08-30 | End: 2025-09-03 | Stop reason: HOSPADM

## 2025-08-30 RX ORDER — LISINOPRIL 10 MG/1
10 TABLET ORAL DAILY
Status: ON HOLD | COMMUNITY
End: 2025-09-03

## 2025-08-30 RX ORDER — NICOTINE POLACRILEX 4 MG
15-30 LOZENGE BUCCAL
Status: DISCONTINUED | OUTPATIENT
Start: 2025-08-30 | End: 2025-09-03 | Stop reason: HOSPADM

## 2025-08-30 RX ORDER — PREDNISONE 10 MG/1
10 TABLET ORAL DAILY
Status: DISCONTINUED | OUTPATIENT
Start: 2025-08-31 | End: 2025-09-03 | Stop reason: HOSPADM

## 2025-08-30 RX ORDER — LEVOFLOXACIN 750 MG/1
750 TABLET, FILM COATED ORAL
Status: DISCONTINUED | OUTPATIENT
Start: 2025-09-01 | End: 2025-09-01

## 2025-08-30 RX ORDER — CARVEDILOL 3.12 MG/1
3.12 TABLET ORAL 2 TIMES DAILY WITH MEALS
Status: DISCONTINUED | OUTPATIENT
Start: 2025-08-30 | End: 2025-09-03 | Stop reason: HOSPADM

## 2025-08-30 RX ORDER — TRAMADOL HYDROCHLORIDE 50 MG/1
50 TABLET ORAL
Status: DISCONTINUED | OUTPATIENT
Start: 2025-08-30 | End: 2025-09-03 | Stop reason: HOSPADM

## 2025-08-30 RX ORDER — AMOXICILLIN 250 MG
2 CAPSULE ORAL 2 TIMES DAILY PRN
Status: DISCONTINUED | OUTPATIENT
Start: 2025-08-30 | End: 2025-09-03 | Stop reason: HOSPADM

## 2025-08-30 RX ORDER — BUDESONIDE 0.5 MG/2ML
0.5 INHALANT ORAL 2 TIMES DAILY
Status: DISCONTINUED | OUTPATIENT
Start: 2025-08-30 | End: 2025-09-03 | Stop reason: HOSPADM

## 2025-08-30 RX ORDER — LEVOTHYROXINE SODIUM 112 UG/1
112 TABLET ORAL DAILY
Status: DISCONTINUED | OUTPATIENT
Start: 2025-08-31 | End: 2025-09-03 | Stop reason: HOSPADM

## 2025-08-30 RX ORDER — LEVOFLOXACIN 5 MG/ML
250 INJECTION, SOLUTION INTRAVENOUS ONCE
Status: COMPLETED | OUTPATIENT
Start: 2025-08-30 | End: 2025-08-30

## 2025-08-30 RX ORDER — ONDANSETRON 4 MG/1
4 TABLET, ORALLY DISINTEGRATING ORAL EVERY 6 HOURS PRN
Status: DISCONTINUED | OUTPATIENT
Start: 2025-08-30 | End: 2025-09-03 | Stop reason: HOSPADM

## 2025-08-30 RX ORDER — NITROGLYCERIN 20 MG/100ML
10-200 INJECTION INTRAVENOUS CONTINUOUS
Status: DISCONTINUED | OUTPATIENT
Start: 2025-08-30 | End: 2025-08-30

## 2025-08-30 RX ADMIN — ALLOPURINOL 100 MG: 100 TABLET ORAL at 21:01

## 2025-08-30 RX ADMIN — LEVOFLOXACIN 500 MG: 5 INJECTION, SOLUTION INTRAVENOUS at 13:20

## 2025-08-30 RX ADMIN — INSULIN ASPART 2 UNITS: 100 INJECTION, SOLUTION INTRAVENOUS; SUBCUTANEOUS at 18:49

## 2025-08-30 RX ADMIN — MIDAZOLAM 0.25 MG: 1 INJECTION INTRAMUSCULAR; INTRAVENOUS at 13:20

## 2025-08-30 RX ADMIN — LEVOFLOXACIN 250 MG: 5 INJECTION, SOLUTION INTRAVENOUS at 17:18

## 2025-08-30 RX ADMIN — ACETAMINOPHEN 1000 MG: 500 TABLET, FILM COATED ORAL at 21:01

## 2025-08-30 RX ADMIN — BUDESONIDE 0.5 MG: 0.5 INHALANT RESPIRATORY (INHALATION) at 19:55

## 2025-08-30 RX ADMIN — ENOXAPARIN SODIUM 30 MG: 30 INJECTION SUBCUTANEOUS at 21:01

## 2025-08-30 RX ADMIN — CARVEDILOL 3.12 MG: 3.12 TABLET, FILM COATED ORAL at 19:41

## 2025-08-30 RX ADMIN — IPRATROPIUM BROMIDE AND ALBUTEROL SULFATE 3 ML: .5; 3 SOLUTION RESPIRATORY (INHALATION) at 19:55

## 2025-08-30 RX ADMIN — MONTELUKAST SODIUM 10 MG: 5 TABLET, CHEWABLE ORAL at 21:01

## 2025-08-30 RX ADMIN — BUMETANIDE 2 MG: 0.25 INJECTION INTRAMUSCULAR; INTRAVENOUS at 19:43

## 2025-08-30 RX ADMIN — TRAMADOL HYDROCHLORIDE 50 MG: 50 TABLET, COATED ORAL at 19:42

## 2025-08-30 RX ADMIN — ONDANSETRON 4 MG: 4 TABLET, ORALLY DISINTEGRATING ORAL at 23:59

## 2025-08-30 RX ADMIN — HYDROMORPHONE HYDROCHLORIDE 0.4 MG: 0.2 INJECTION, SOLUTION INTRAMUSCULAR; INTRAVENOUS; SUBCUTANEOUS at 23:59

## 2025-08-30 RX ADMIN — NITROGLYCERIN 10 MCG/MIN: 20 INJECTION INTRAVENOUS at 12:34

## 2025-08-30 RX ADMIN — ALBUTEROL SULFATE 2.5 MG: 2.5 SOLUTION RESPIRATORY (INHALATION) at 12:31

## 2025-08-30 RX ADMIN — HYDROMORPHONE HYDROCHLORIDE 0.5 MG: 1 INJECTION, SOLUTION INTRAMUSCULAR; INTRAVENOUS; SUBCUTANEOUS at 16:00

## 2025-08-30 ASSESSMENT — ACTIVITIES OF DAILY LIVING (ADL)
ADLS_ACUITY_SCORE: 63
FALL_HISTORY_WITHIN_LAST_SIX_MONTHS: NO
CONCENTRATING,_REMEMBERING_OR_MAKING_DECISIONS_DIFFICULTY: NO
WEAR_GLASSES_OR_BLIND: YES
ADLS_ACUITY_SCORE: 65
WALKING_OR_CLIMBING_STAIRS: AMBULATION DIFFICULTY, REQUIRES EQUIPMENT
TOILETING_ISSUES: NO
DIFFICULTY_COMMUNICATING: NO
DOING_ERRANDS_INDEPENDENTLY_DIFFICULTY: YES
VISION_MANAGEMENT: GLASSES
ADLS_ACUITY_SCORE: 63
ADLS_ACUITY_SCORE: 50
EQUIPMENT_CURRENTLY_USED_AT_HOME: CANE, STRAIGHT;WALKER, STANDARD
ADLS_ACUITY_SCORE: 66
ADLS_ACUITY_SCORE: 65
ADLS_ACUITY_SCORE: 63
ADLS_ACUITY_SCORE: 63
ADLS_ACUITY_SCORE: 65
ADLS_ACUITY_SCORE: 50
DIFFICULTY_EATING/SWALLOWING: NO
HEARING_DIFFICULTY_OR_DEAF: NO
ADLS_ACUITY_SCORE: 65
CHANGE_IN_FUNCTIONAL_STATUS_SINCE_ONSET_OF_CURRENT_ILLNESS/INJURY: YES
DRESSING/BATHING_DIFFICULTY: NO
WALKING_OR_CLIMBING_STAIRS_DIFFICULTY: YES

## 2025-08-30 ASSESSMENT — COLUMBIA-SUICIDE SEVERITY RATING SCALE - C-SSRS
1. IN THE PAST MONTH, HAVE YOU WISHED YOU WERE DEAD OR WISHED YOU COULD GO TO SLEEP AND NOT WAKE UP?: NO
6. HAVE YOU EVER DONE ANYTHING, STARTED TO DO ANYTHING, OR PREPARED TO DO ANYTHING TO END YOUR LIFE?: NO
2. HAVE YOU ACTUALLY HAD ANY THOUGHTS OF KILLING YOURSELF IN THE PAST MONTH?: NO

## 2025-08-31 LAB
ALBUMIN SERPL BCG-MCNC: 3.1 G/DL (ref 3.5–5.2)
ALP SERPL-CCNC: 202 U/L (ref 40–150)
ALT SERPL W P-5'-P-CCNC: 22 U/L (ref 0–50)
ANION GAP SERPL CALCULATED.3IONS-SCNC: 11 MMOL/L (ref 7–15)
AST SERPL W P-5'-P-CCNC: 23 U/L (ref 0–45)
ATRIAL RATE - MUSE: 74 BPM
ATRIAL RATE - MUSE: 92 BPM
BILIRUB SERPL-MCNC: 0.6 MG/DL
BUN SERPL-MCNC: 38.7 MG/DL (ref 8–23)
CALCIUM SERPL-MCNC: 8 MG/DL (ref 8.8–10.4)
CHLORIDE SERPL-SCNC: 98 MMOL/L (ref 98–107)
CREAT SERPL-MCNC: 1.89 MG/DL (ref 0.51–0.95)
DIASTOLIC BLOOD PRESSURE - MUSE: NORMAL MMHG
DIASTOLIC BLOOD PRESSURE - MUSE: NORMAL MMHG
EGFRCR SERPLBLD CKD-EPI 2021: 27 ML/MIN/1.73M2
ERYTHROCYTE [DISTWIDTH] IN BLOOD BY AUTOMATED COUNT: 18.2 % (ref 10–15)
GLUCOSE BLDC GLUCOMTR-MCNC: 143 MG/DL (ref 70–99)
GLUCOSE BLDC GLUCOMTR-MCNC: 173 MG/DL (ref 70–99)
GLUCOSE BLDC GLUCOMTR-MCNC: 215 MG/DL (ref 70–99)
GLUCOSE BLDC GLUCOMTR-MCNC: 218 MG/DL (ref 70–99)
GLUCOSE BLDC GLUCOMTR-MCNC: 255 MG/DL (ref 70–99)
GLUCOSE SERPL-MCNC: 160 MG/DL (ref 70–99)
HCO3 SERPL-SCNC: 25 MMOL/L (ref 22–29)
HCT VFR BLD AUTO: 25.7 % (ref 35–47)
HGB BLD-MCNC: 7.9 G/DL (ref 11.7–15.7)
INTERPRETATION ECG - MUSE: NORMAL
INTERPRETATION ECG - MUSE: NORMAL
IRON BINDING CAPACITY (ROCHE): 266 UG/DL (ref 240–430)
IRON SATN MFR SERPL: 7 % (ref 15–46)
IRON SERPL-MCNC: 19 UG/DL (ref 37–145)
MAGNESIUM SERPL-MCNC: 1.6 MG/DL (ref 1.7–2.3)
MCH RBC QN AUTO: 28.6 PG (ref 26.5–33)
MCHC RBC AUTO-ENTMCNC: 30.7 G/DL (ref 31.5–36.5)
MCV RBC AUTO: 93.1 FL (ref 78–100)
P AXIS - MUSE: -29 DEGREES
P AXIS - MUSE: -29 DEGREES
PLATELET # BLD AUTO: 214 10E3/UL (ref 150–450)
POTASSIUM SERPL-SCNC: 4.3 MMOL/L (ref 3.4–5.3)
PR INTERVAL - MUSE: 156 MS
PR INTERVAL - MUSE: 166 MS
PROT SERPL-MCNC: 6.2 G/DL (ref 6.4–8.3)
QRS DURATION - MUSE: 86 MS
QRS DURATION - MUSE: 86 MS
QT - MUSE: 356 MS
QT - MUSE: 390 MS
QTC - MUSE: 432 MS
QTC - MUSE: 440 MS
R AXIS - MUSE: 2 DEGREES
R AXIS - MUSE: 6 DEGREES
RBC # BLD AUTO: 2.76 10E6/UL (ref 3.8–5.2)
RETICS # AUTO: 0.05 10E6/UL (ref 0.03–0.1)
RETICS/RBC NFR AUTO: 1.89 % (ref 0.5–2)
SODIUM SERPL-SCNC: 134 MMOL/L (ref 135–145)
SYSTOLIC BLOOD PRESSURE - MUSE: NORMAL MMHG
SYSTOLIC BLOOD PRESSURE - MUSE: NORMAL MMHG
T AXIS - MUSE: 133 DEGREES
T AXIS - MUSE: 196 DEGREES
VENTRICULAR RATE- MUSE: 74 BPM
VENTRICULAR RATE- MUSE: 92 BPM
WBC # BLD AUTO: 18.59 10E3/UL (ref 4–11)

## 2025-08-31 PROCEDURE — 85045 AUTOMATED RETICULOCYTE COUNT: CPT | Performed by: FAMILY MEDICINE

## 2025-08-31 PROCEDURE — 83735 ASSAY OF MAGNESIUM: CPT | Performed by: FAMILY MEDICINE

## 2025-08-31 PROCEDURE — 93005 ELECTROCARDIOGRAM TRACING: CPT

## 2025-08-31 PROCEDURE — 250N000011 HC RX IP 250 OP 636: Performed by: INTERNAL MEDICINE

## 2025-08-31 PROCEDURE — 999N000157 HC STATISTIC RCP TIME EA 10 MIN

## 2025-08-31 PROCEDURE — 83540 ASSAY OF IRON: CPT | Performed by: FAMILY MEDICINE

## 2025-08-31 PROCEDURE — 82565 ASSAY OF CREATININE: CPT | Performed by: FAMILY MEDICINE

## 2025-08-31 PROCEDURE — 250N000013 HC RX MED GY IP 250 OP 250 PS 637: Performed by: FAMILY MEDICINE

## 2025-08-31 PROCEDURE — 250N000009 HC RX 250: Performed by: FAMILY MEDICINE

## 2025-08-31 PROCEDURE — 36415 COLL VENOUS BLD VENIPUNCTURE: CPT | Performed by: FAMILY MEDICINE

## 2025-08-31 PROCEDURE — 250N000011 HC RX IP 250 OP 636: Performed by: FAMILY MEDICINE

## 2025-08-31 PROCEDURE — 85027 COMPLETE CBC AUTOMATED: CPT | Performed by: FAMILY MEDICINE

## 2025-08-31 PROCEDURE — 94640 AIRWAY INHALATION TREATMENT: CPT | Mod: 76

## 2025-08-31 PROCEDURE — 94640 AIRWAY INHALATION TREATMENT: CPT

## 2025-08-31 PROCEDURE — 120N000001 HC R&B MED SURG/OB

## 2025-08-31 PROCEDURE — 250N000012 HC RX MED GY IP 250 OP 636 PS 637: Performed by: FAMILY MEDICINE

## 2025-08-31 RX ORDER — LISINOPRIL 10 MG/1
10 TABLET ORAL DAILY
Status: DISCONTINUED | OUTPATIENT
Start: 2025-08-31 | End: 2025-09-01

## 2025-08-31 RX ORDER — LISINOPRIL 10 MG/1
10 TABLET ORAL DAILY
Status: DISCONTINUED | OUTPATIENT
Start: 2025-08-31 | End: 2025-08-31

## 2025-08-31 RX ORDER — IPRATROPIUM BROMIDE AND ALBUTEROL SULFATE 2.5; .5 MG/3ML; MG/3ML
1 SOLUTION RESPIRATORY (INHALATION)
Status: DISCONTINUED | OUTPATIENT
Start: 2025-08-31 | End: 2025-09-03 | Stop reason: HOSPADM

## 2025-08-31 RX ORDER — BUMETANIDE 1 MG/1
1 TABLET ORAL DAILY
Status: DISCONTINUED | OUTPATIENT
Start: 2025-08-31 | End: 2025-09-01

## 2025-08-31 RX ADMIN — CARVEDILOL 3.12 MG: 3.12 TABLET, FILM COATED ORAL at 18:38

## 2025-08-31 RX ADMIN — INSULIN HUMAN 25 UNITS: 100 INJECTION, SUSPENSION SUBCUTANEOUS at 08:48

## 2025-08-31 RX ADMIN — LEVOTHYROXINE SODIUM 112 MCG: 0.11 TABLET ORAL at 09:13

## 2025-08-31 RX ADMIN — ACETAMINOPHEN 1000 MG: 500 TABLET, FILM COATED ORAL at 22:51

## 2025-08-31 RX ADMIN — ENOXAPARIN SODIUM 30 MG: 30 INJECTION SUBCUTANEOUS at 22:52

## 2025-08-31 RX ADMIN — BUDESONIDE 0.5 MG: 0.5 INHALANT RESPIRATORY (INHALATION) at 06:28

## 2025-08-31 RX ADMIN — IPRATROPIUM BROMIDE AND ALBUTEROL SULFATE 3 ML: .5; 3 SOLUTION RESPIRATORY (INHALATION) at 11:00

## 2025-08-31 RX ADMIN — ACETAMINOPHEN 1000 MG: 500 TABLET, FILM COATED ORAL at 05:29

## 2025-08-31 RX ADMIN — PANTOPRAZOLE SODIUM 40 MG: 40 TABLET, DELAYED RELEASE ORAL at 08:07

## 2025-08-31 RX ADMIN — INSULIN ASPART 3 UNITS: 100 INJECTION, SOLUTION INTRAVENOUS; SUBCUTANEOUS at 12:08

## 2025-08-31 RX ADMIN — INSULIN ASPART 1 UNITS: 100 INJECTION, SOLUTION INTRAVENOUS; SUBCUTANEOUS at 08:49

## 2025-08-31 RX ADMIN — IPRATROPIUM BROMIDE AND ALBUTEROL SULFATE 3 ML: .5; 3 SOLUTION RESPIRATORY (INHALATION) at 06:28

## 2025-08-31 RX ADMIN — PREDNISONE 10 MG: 10 TABLET ORAL at 09:13

## 2025-08-31 RX ADMIN — LISINOPRIL 10 MG: 10 TABLET ORAL at 14:44

## 2025-08-31 RX ADMIN — INSULIN ASPART 2 UNITS: 100 INJECTION, SOLUTION INTRAVENOUS; SUBCUTANEOUS at 16:44

## 2025-08-31 RX ADMIN — ALLOPURINOL 100 MG: 100 TABLET ORAL at 22:52

## 2025-08-31 RX ADMIN — CARVEDILOL 3.12 MG: 3.12 TABLET, FILM COATED ORAL at 08:07

## 2025-08-31 RX ADMIN — PROCHLORPERAZINE EDISYLATE 10 MG: 5 INJECTION INTRAMUSCULAR; INTRAVENOUS at 00:32

## 2025-08-31 RX ADMIN — MONTELUKAST SODIUM 10 MG: 5 TABLET, CHEWABLE ORAL at 22:51

## 2025-08-31 RX ADMIN — BUMETANIDE 1 MG: 1 TABLET ORAL at 09:13

## 2025-08-31 RX ADMIN — IPRATROPIUM BROMIDE AND ALBUTEROL SULFATE 3 ML: .5; 3 SOLUTION RESPIRATORY (INHALATION) at 19:31

## 2025-08-31 RX ADMIN — CLOPIDOGREL 75 MG: 75 TABLET ORAL at 09:13

## 2025-08-31 RX ADMIN — ACETAMINOPHEN 1000 MG: 500 TABLET, FILM COATED ORAL at 14:44

## 2025-08-31 RX ADMIN — IPRATROPIUM BROMIDE AND ALBUTEROL SULFATE 3 ML: .5; 3 SOLUTION RESPIRATORY (INHALATION) at 14:07

## 2025-08-31 RX ADMIN — BUDESONIDE 0.5 MG: 0.5 INHALANT RESPIRATORY (INHALATION) at 19:31

## 2025-08-31 ASSESSMENT — ACTIVITIES OF DAILY LIVING (ADL)
ADLS_ACUITY_SCORE: 50
ADLS_ACUITY_SCORE: 50
ADLS_ACUITY_SCORE: 47
ADLS_ACUITY_SCORE: 50
ADLS_ACUITY_SCORE: 45
ADLS_ACUITY_SCORE: 50
ADLS_ACUITY_SCORE: 47
ADLS_ACUITY_SCORE: 45
ADLS_ACUITY_SCORE: 50
ADLS_ACUITY_SCORE: 47
ADLS_ACUITY_SCORE: 47
ADLS_ACUITY_SCORE: 50
ADLS_ACUITY_SCORE: 47
ADLS_ACUITY_SCORE: 47
ADLS_ACUITY_SCORE: 50

## 2025-09-01 LAB
ABO + RH BLD: NORMAL
ALBUMIN SERPL BCG-MCNC: 2.8 G/DL (ref 3.5–5.2)
ALP SERPL-CCNC: 172 U/L (ref 40–150)
ALT SERPL W P-5'-P-CCNC: 21 U/L (ref 0–50)
ANION GAP SERPL CALCULATED.3IONS-SCNC: 12 MMOL/L (ref 7–15)
AST SERPL W P-5'-P-CCNC: 25 U/L (ref 0–45)
BILIRUB SERPL-MCNC: 0.4 MG/DL
BLD GP AB SCN SERPL QL: NEGATIVE
BLD PROD TYP BPU: NORMAL
BLOOD COMPONENT TYPE: NORMAL
BUN SERPL-MCNC: 57 MG/DL (ref 8–23)
CALCIUM SERPL-MCNC: 8.1 MG/DL (ref 8.8–10.4)
CHLORIDE SERPL-SCNC: 98 MMOL/L (ref 98–107)
CODING SYSTEM: NORMAL
CREAT SERPL-MCNC: 3.36 MG/DL (ref 0.51–0.95)
CROSSMATCH: NORMAL
EGFRCR SERPLBLD CKD-EPI 2021: 14 ML/MIN/1.73M2
ERYTHROCYTE [DISTWIDTH] IN BLOOD BY AUTOMATED COUNT: 18.2 % (ref 10–15)
GLUCOSE BLDC GLUCOMTR-MCNC: 122 MG/DL (ref 70–99)
GLUCOSE BLDC GLUCOMTR-MCNC: 150 MG/DL (ref 70–99)
GLUCOSE BLDC GLUCOMTR-MCNC: 155 MG/DL (ref 70–99)
GLUCOSE BLDC GLUCOMTR-MCNC: 201 MG/DL (ref 70–99)
GLUCOSE BLDC GLUCOMTR-MCNC: 236 MG/DL (ref 70–99)
GLUCOSE SERPL-MCNC: 126 MG/DL (ref 70–99)
HCO3 SERPL-SCNC: 26 MMOL/L (ref 22–29)
HCT VFR BLD AUTO: 23.5 % (ref 35–47)
HGB BLD-MCNC: 7.2 G/DL (ref 11.7–15.7)
ISSUE DATE AND TIME: NORMAL
MAGNESIUM SERPL-MCNC: 1.7 MG/DL (ref 1.7–2.3)
MCH RBC QN AUTO: 28.3 PG (ref 26.5–33)
MCHC RBC AUTO-ENTMCNC: 30.6 G/DL (ref 31.5–36.5)
MCV RBC AUTO: 92.5 FL (ref 78–100)
PLATELET # BLD AUTO: 198 10E3/UL (ref 150–450)
POTASSIUM SERPL-SCNC: 4.1 MMOL/L (ref 3.4–5.3)
PROT SERPL-MCNC: 5.8 G/DL (ref 6.4–8.3)
RBC # BLD AUTO: 2.54 10E6/UL (ref 3.8–5.2)
SODIUM SERPL-SCNC: 136 MMOL/L (ref 135–145)
SPECIMEN EXP DATE BLD: NORMAL
UNIT ABO/RH: NORMAL
UNIT NUMBER: NORMAL
UNIT STATUS: NORMAL
UNIT TYPE ISBT: 5100
WBC # BLD AUTO: 13.91 10E3/UL (ref 4–11)

## 2025-09-01 PROCEDURE — 36415 COLL VENOUS BLD VENIPUNCTURE: CPT | Performed by: FAMILY MEDICINE

## 2025-09-01 PROCEDURE — 85027 COMPLETE CBC AUTOMATED: CPT | Performed by: FAMILY MEDICINE

## 2025-09-01 PROCEDURE — 250N000009 HC RX 250: Performed by: FAMILY MEDICINE

## 2025-09-01 PROCEDURE — 82310 ASSAY OF CALCIUM: CPT | Performed by: FAMILY MEDICINE

## 2025-09-01 PROCEDURE — 250N000013 HC RX MED GY IP 250 OP 250 PS 637: Performed by: FAMILY MEDICINE

## 2025-09-01 PROCEDURE — 250N000012 HC RX MED GY IP 250 OP 636 PS 637: Performed by: FAMILY MEDICINE

## 2025-09-01 PROCEDURE — 86923 COMPATIBILITY TEST ELECTRIC: CPT | Performed by: FAMILY MEDICINE

## 2025-09-01 PROCEDURE — 86850 RBC ANTIBODY SCREEN: CPT | Performed by: FAMILY MEDICINE

## 2025-09-01 PROCEDURE — 94640 AIRWAY INHALATION TREATMENT: CPT | Mod: 76

## 2025-09-01 PROCEDURE — 83735 ASSAY OF MAGNESIUM: CPT | Performed by: FAMILY MEDICINE

## 2025-09-01 PROCEDURE — 250N000011 HC RX IP 250 OP 636: Performed by: FAMILY MEDICINE

## 2025-09-01 PROCEDURE — 999N000157 HC STATISTIC RCP TIME EA 10 MIN

## 2025-09-01 PROCEDURE — 94640 AIRWAY INHALATION TREATMENT: CPT

## 2025-09-01 PROCEDURE — 120N000001 HC R&B MED SURG/OB

## 2025-09-01 PROCEDURE — P9016 RBC LEUKOCYTES REDUCED: HCPCS | Performed by: FAMILY MEDICINE

## 2025-09-01 RX ORDER — FERROUS SULFATE 325(65) MG
325 TABLET, DELAYED RELEASE (ENTERIC COATED) ORAL DAILY
Status: DISCONTINUED | OUTPATIENT
Start: 2025-09-01 | End: 2025-09-03 | Stop reason: HOSPADM

## 2025-09-01 RX ORDER — PANTOPRAZOLE SODIUM 40 MG/1
40 TABLET, DELAYED RELEASE ORAL
Status: DISCONTINUED | OUTPATIENT
Start: 2025-09-01 | End: 2025-09-03 | Stop reason: HOSPADM

## 2025-09-01 RX ORDER — FERROUS SULFATE 220 (44)/5
220 ELIXIR ORAL DAILY
Status: DISCONTINUED | OUTPATIENT
Start: 2025-09-01 | End: 2025-09-01

## 2025-09-01 RX ORDER — AZITHROMYCIN 250 MG/1
500 TABLET, FILM COATED ORAL DAILY
Status: COMPLETED | OUTPATIENT
Start: 2025-09-01 | End: 2025-09-03

## 2025-09-01 RX ORDER — CEFTRIAXONE 2 G/1
2 INJECTION, POWDER, FOR SOLUTION INTRAMUSCULAR; INTRAVENOUS ONCE
Status: COMPLETED | OUTPATIENT
Start: 2025-09-01 | End: 2025-09-01

## 2025-09-01 RX ADMIN — AZITHROMYCIN DIHYDRATE 500 MG: 250 TABLET, FILM COATED ORAL at 13:27

## 2025-09-01 RX ADMIN — LEVOTHYROXINE SODIUM 112 MCG: 0.11 TABLET ORAL at 09:18

## 2025-09-01 RX ADMIN — PANTOPRAZOLE SODIUM 40 MG: 40 TABLET, DELAYED RELEASE ORAL at 08:32

## 2025-09-01 RX ADMIN — PANTOPRAZOLE SODIUM 40 MG: 40 TABLET, DELAYED RELEASE ORAL at 17:22

## 2025-09-01 RX ADMIN — ACETAMINOPHEN 1000 MG: 500 TABLET, FILM COATED ORAL at 06:22

## 2025-09-01 RX ADMIN — IPRATROPIUM BROMIDE AND ALBUTEROL SULFATE 3 ML: .5; 3 SOLUTION RESPIRATORY (INHALATION) at 19:30

## 2025-09-01 RX ADMIN — CARVEDILOL 3.12 MG: 3.12 TABLET, FILM COATED ORAL at 17:24

## 2025-09-01 RX ADMIN — ACETAMINOPHEN 1000 MG: 500 TABLET, FILM COATED ORAL at 13:27

## 2025-09-01 RX ADMIN — TRAMADOL HYDROCHLORIDE 50 MG: 50 TABLET, COATED ORAL at 21:47

## 2025-09-01 RX ADMIN — IPRATROPIUM BROMIDE AND ALBUTEROL SULFATE 3 ML: .5; 3 SOLUTION RESPIRATORY (INHALATION) at 15:22

## 2025-09-01 RX ADMIN — INSULIN ASPART 2 UNITS: 100 INJECTION, SOLUTION INTRAVENOUS; SUBCUTANEOUS at 16:46

## 2025-09-01 RX ADMIN — ALLOPURINOL 100 MG: 100 TABLET ORAL at 21:41

## 2025-09-01 RX ADMIN — CEFTRIAXONE 2 G: 2 INJECTION, POWDER, FOR SOLUTION INTRAMUSCULAR; INTRAVENOUS at 13:24

## 2025-09-01 RX ADMIN — BUDESONIDE 0.5 MG: 0.5 INHALANT RESPIRATORY (INHALATION) at 19:30

## 2025-09-01 RX ADMIN — PREDNISONE 10 MG: 10 TABLET ORAL at 09:18

## 2025-09-01 RX ADMIN — MONTELUKAST SODIUM 10 MG: 5 TABLET, CHEWABLE ORAL at 21:41

## 2025-09-01 RX ADMIN — IPRATROPIUM BROMIDE AND ALBUTEROL SULFATE 3 ML: .5; 3 SOLUTION RESPIRATORY (INHALATION) at 11:09

## 2025-09-01 RX ADMIN — FERROUS SULFATE TAB EC 325 MG (65 MG FE EQUIVALENT) 325 MG: 325 (65 FE) TABLET DELAYED RESPONSE at 16:17

## 2025-09-01 RX ADMIN — BUDESONIDE 0.5 MG: 0.5 INHALANT RESPIRATORY (INHALATION) at 07:24

## 2025-09-01 RX ADMIN — INSULIN ASPART 1 UNITS: 100 INJECTION, SOLUTION INTRAVENOUS; SUBCUTANEOUS at 11:40

## 2025-09-01 RX ADMIN — IPRATROPIUM BROMIDE AND ALBUTEROL SULFATE 3 ML: .5; 3 SOLUTION RESPIRATORY (INHALATION) at 07:24

## 2025-09-01 ASSESSMENT — ACTIVITIES OF DAILY LIVING (ADL)
ADLS_ACUITY_SCORE: 47
ADLS_ACUITY_SCORE: 45
ADLS_ACUITY_SCORE: 47
ADLS_ACUITY_SCORE: 45
ADLS_ACUITY_SCORE: 47
ADLS_ACUITY_SCORE: 45
ADLS_ACUITY_SCORE: 45

## 2025-09-02 ENCOUNTER — MYC MEDICAL ADVICE (OUTPATIENT)
Dept: CARDIOLOGY | Facility: OTHER | Age: 75
End: 2025-09-02

## 2025-09-02 ENCOUNTER — TELEPHONE (OUTPATIENT)
Dept: CARDIOLOGY | Facility: OTHER | Age: 75
End: 2025-09-02
Payer: MEDICARE

## 2025-09-02 ENCOUNTER — APPOINTMENT (OUTPATIENT)
Dept: ULTRASOUND IMAGING | Facility: OTHER | Age: 75
End: 2025-09-02
Attending: FAMILY MEDICINE
Payer: MEDICARE

## 2025-09-02 LAB
ALBUMIN MFR UR ELPH: 36.9 MG/DL
ALBUMIN UR-MCNC: NEGATIVE MG/DL
ANION GAP SERPL CALCULATED.3IONS-SCNC: 14 MMOL/L (ref 7–15)
APPEARANCE UR: CLEAR
BILIRUB UR QL STRIP: NEGATIVE
BUN SERPL-MCNC: 63.7 MG/DL (ref 8–23)
CALCIUM SERPL-MCNC: 8 MG/DL (ref 8.8–10.4)
CHLORIDE SERPL-SCNC: 98 MMOL/L (ref 98–107)
COLOR UR AUTO: YELLOW
CREAT SERPL-MCNC: 3.63 MG/DL (ref 0.51–0.95)
CREAT UR-MCNC: 199.5 MG/DL
EGFRCR SERPLBLD CKD-EPI 2021: 12 ML/MIN/1.73M2
EOSINOPHIL SMEAR SPECIMEN TYPE: NORMAL
EOSINOPHIL SPEC QL WRIGHT STN: NORMAL
ERYTHROCYTE [DISTWIDTH] IN BLOOD BY AUTOMATED COUNT: 17.2 % (ref 10–15)
GLUCOSE BLDC GLUCOMTR-MCNC: 117 MG/DL (ref 70–99)
GLUCOSE BLDC GLUCOMTR-MCNC: 159 MG/DL (ref 70–99)
GLUCOSE BLDC GLUCOMTR-MCNC: 259 MG/DL (ref 70–99)
GLUCOSE BLDC GLUCOMTR-MCNC: 269 MG/DL (ref 70–99)
GLUCOSE BLDC GLUCOMTR-MCNC: 70 MG/DL (ref 70–99)
GLUCOSE SERPL-MCNC: 87 MG/DL (ref 70–99)
GLUCOSE UR STRIP-MCNC: NEGATIVE MG/DL
HCO3 SERPL-SCNC: 23 MMOL/L (ref 22–29)
HCT VFR BLD AUTO: 28.8 % (ref 35–47)
HGB BLD-MCNC: 9.3 G/DL (ref 11.7–15.7)
HGB UR QL STRIP: NEGATIVE
HYALINE CASTS: 6 /LPF
KETONES UR STRIP-MCNC: NEGATIVE MG/DL
LEUKOCYTE ESTERASE UR QL STRIP: NEGATIVE
MAGNESIUM SERPL-MCNC: 1.7 MG/DL (ref 1.7–2.3)
MCH RBC QN AUTO: 29.6 PG (ref 26.5–33)
MCHC RBC AUTO-ENTMCNC: 32.3 G/DL (ref 31.5–36.5)
MCV RBC AUTO: 91.7 FL (ref 78–100)
MUCOUS THREADS #/AREA URNS LPF: PRESENT /LPF
NITRATE UR QL: NEGATIVE
NT-PROBNP SERPL-MCNC: 4374 PG/ML (ref 0–624)
PH UR STRIP: 5 [PH] (ref 5–9)
PLATELET # BLD AUTO: 214 10E3/UL (ref 150–450)
POTASSIUM SERPL-SCNC: 4.4 MMOL/L (ref 3.4–5.3)
PROT/CREAT 24H UR: 0.18 MG/MG CR (ref 0–0.2)
RBC # BLD AUTO: 3.14 10E6/UL (ref 3.8–5.2)
RBC URINE: 1 /HPF
SODIUM SERPL-SCNC: 135 MMOL/L (ref 135–145)
SP GR UR STRIP: 1.02 (ref 1–1.03)
SQUAMOUS EPITHELIAL: 16 /HPF
UROBILINOGEN UR STRIP-MCNC: NORMAL MG/DL
WBC # BLD AUTO: 16.55 10E3/UL (ref 4–11)
WBC URINE: 2 /HPF

## 2025-09-02 PROCEDURE — 120N000001 HC R&B MED SURG/OB

## 2025-09-02 PROCEDURE — 81003 URINALYSIS AUTO W/O SCOPE: CPT | Performed by: FAMILY MEDICINE

## 2025-09-02 PROCEDURE — 250N000009 HC RX 250: Performed by: FAMILY MEDICINE

## 2025-09-02 PROCEDURE — 76770 US EXAM ABDO BACK WALL COMP: CPT

## 2025-09-02 PROCEDURE — 84156 ASSAY OF PROTEIN URINE: CPT | Performed by: FAMILY MEDICINE

## 2025-09-02 PROCEDURE — 85014 HEMATOCRIT: CPT | Performed by: FAMILY MEDICINE

## 2025-09-02 PROCEDURE — 250N000012 HC RX MED GY IP 250 OP 636 PS 637: Performed by: FAMILY MEDICINE

## 2025-09-02 PROCEDURE — 83880 ASSAY OF NATRIURETIC PEPTIDE: CPT | Performed by: FAMILY MEDICINE

## 2025-09-02 PROCEDURE — 94640 AIRWAY INHALATION TREATMENT: CPT

## 2025-09-02 PROCEDURE — 83735 ASSAY OF MAGNESIUM: CPT | Performed by: FAMILY MEDICINE

## 2025-09-02 PROCEDURE — 89190 NASAL SMEAR FOR EOSINOPHILS: CPT | Performed by: FAMILY MEDICINE

## 2025-09-02 PROCEDURE — 250N000011 HC RX IP 250 OP 636: Performed by: FAMILY MEDICINE

## 2025-09-02 PROCEDURE — 36415 COLL VENOUS BLD VENIPUNCTURE: CPT | Performed by: FAMILY MEDICINE

## 2025-09-02 PROCEDURE — 80048 BASIC METABOLIC PNL TOTAL CA: CPT | Performed by: FAMILY MEDICINE

## 2025-09-02 PROCEDURE — 94640 AIRWAY INHALATION TREATMENT: CPT | Mod: 76

## 2025-09-02 PROCEDURE — 999N000157 HC STATISTIC RCP TIME EA 10 MIN

## 2025-09-02 PROCEDURE — 250N000013 HC RX MED GY IP 250 OP 250 PS 637: Performed by: FAMILY MEDICINE

## 2025-09-02 RX ORDER — BUMETANIDE 0.25 MG/ML
0.5 INJECTION, SOLUTION INTRAMUSCULAR; INTRAVENOUS ONCE
Status: COMPLETED | OUTPATIENT
Start: 2025-09-02 | End: 2025-09-02

## 2025-09-02 RX ADMIN — CARVEDILOL 3.12 MG: 3.12 TABLET, FILM COATED ORAL at 07:45

## 2025-09-02 RX ADMIN — IPRATROPIUM BROMIDE AND ALBUTEROL SULFATE 3 ML: .5; 3 SOLUTION RESPIRATORY (INHALATION) at 15:48

## 2025-09-02 RX ADMIN — ALLOPURINOL 100 MG: 100 TABLET ORAL at 21:12

## 2025-09-02 RX ADMIN — INSULIN ASPART 3 UNITS: 100 INJECTION, SOLUTION INTRAVENOUS; SUBCUTANEOUS at 16:59

## 2025-09-02 RX ADMIN — BUDESONIDE 0.5 MG: 0.5 INHALANT RESPIRATORY (INHALATION) at 18:49

## 2025-09-02 RX ADMIN — LEVOTHYROXINE SODIUM 112 MCG: 0.11 TABLET ORAL at 09:45

## 2025-09-02 RX ADMIN — PANTOPRAZOLE SODIUM 40 MG: 40 TABLET, DELAYED RELEASE ORAL at 07:35

## 2025-09-02 RX ADMIN — CARVEDILOL 3.12 MG: 3.12 TABLET, FILM COATED ORAL at 18:37

## 2025-09-02 RX ADMIN — BUMETANIDE 0.5 MG: 0.25 INJECTION INTRAMUSCULAR; INTRAVENOUS at 15:13

## 2025-09-02 RX ADMIN — PANTOPRAZOLE SODIUM 40 MG: 40 TABLET, DELAYED RELEASE ORAL at 16:59

## 2025-09-02 RX ADMIN — TRAMADOL HYDROCHLORIDE 50 MG: 50 TABLET, COATED ORAL at 21:18

## 2025-09-02 RX ADMIN — MONTELUKAST SODIUM 10 MG: 5 TABLET, CHEWABLE ORAL at 21:12

## 2025-09-02 RX ADMIN — IPRATROPIUM BROMIDE AND ALBUTEROL SULFATE 3 ML: .5; 3 SOLUTION RESPIRATORY (INHALATION) at 11:22

## 2025-09-02 RX ADMIN — ACETAMINOPHEN 1000 MG: 500 TABLET, FILM COATED ORAL at 13:38

## 2025-09-02 RX ADMIN — AZITHROMYCIN DIHYDRATE 500 MG: 250 TABLET, FILM COATED ORAL at 09:44

## 2025-09-02 RX ADMIN — ACETAMINOPHEN 1000 MG: 500 TABLET, FILM COATED ORAL at 21:12

## 2025-09-02 RX ADMIN — FERROUS SULFATE TAB EC 325 MG (65 MG FE EQUIVALENT) 325 MG: 325 (65 FE) TABLET DELAYED RESPONSE at 09:45

## 2025-09-02 RX ADMIN — PREDNISONE 10 MG: 10 TABLET ORAL at 09:45

## 2025-09-02 RX ADMIN — ACETAMINOPHEN 1000 MG: 500 TABLET, FILM COATED ORAL at 06:04

## 2025-09-02 RX ADMIN — INSULIN ASPART 1 UNITS: 100 INJECTION, SOLUTION INTRAVENOUS; SUBCUTANEOUS at 12:30

## 2025-09-02 RX ADMIN — BUDESONIDE 0.5 MG: 0.5 INHALANT RESPIRATORY (INHALATION) at 07:55

## 2025-09-02 RX ADMIN — IPRATROPIUM BROMIDE AND ALBUTEROL SULFATE 3 ML: .5; 3 SOLUTION RESPIRATORY (INHALATION) at 07:55

## 2025-09-02 RX ADMIN — IPRATROPIUM BROMIDE AND ALBUTEROL SULFATE 3 ML: .5; 3 SOLUTION RESPIRATORY (INHALATION) at 18:49

## 2025-09-02 ASSESSMENT — ACTIVITIES OF DAILY LIVING (ADL)
ADLS_ACUITY_SCORE: 51
ADLS_ACUITY_SCORE: 51
ADLS_ACUITY_SCORE: 47
ADLS_ACUITY_SCORE: 47
ADLS_ACUITY_SCORE: 51
ADLS_ACUITY_SCORE: 47
ADLS_ACUITY_SCORE: 51
ADLS_ACUITY_SCORE: 53
ADLS_ACUITY_SCORE: 51
ADLS_ACUITY_SCORE: 51
ADLS_ACUITY_SCORE: 47
ADLS_ACUITY_SCORE: 51
ADLS_ACUITY_SCORE: 49
ADLS_ACUITY_SCORE: 53
ADLS_ACUITY_SCORE: 51
ADLS_ACUITY_SCORE: 53
ADLS_ACUITY_SCORE: 51
ADLS_ACUITY_SCORE: 47
ADLS_ACUITY_SCORE: 47

## 2025-09-03 VITALS
DIASTOLIC BLOOD PRESSURE: 59 MMHG | RESPIRATION RATE: 15 BRPM | OXYGEN SATURATION: 95 % | WEIGHT: 142.6 LBS | SYSTOLIC BLOOD PRESSURE: 167 MMHG | TEMPERATURE: 96.9 F | HEART RATE: 68 BPM | BODY MASS INDEX: 26.24 KG/M2 | HEIGHT: 62 IN

## 2025-09-03 LAB
ANION GAP SERPL CALCULATED.3IONS-SCNC: 13 MMOL/L (ref 7–15)
BUN SERPL-MCNC: 64.7 MG/DL (ref 8–23)
CALCIUM SERPL-MCNC: 8.1 MG/DL (ref 8.8–10.4)
CHLORIDE SERPL-SCNC: 96 MMOL/L (ref 98–107)
CREAT SERPL-MCNC: 2.96 MG/DL (ref 0.51–0.95)
EGFRCR SERPLBLD CKD-EPI 2021: 16 ML/MIN/1.73M2
ERYTHROCYTE [DISTWIDTH] IN BLOOD BY AUTOMATED COUNT: 17.3 % (ref 10–15)
GLUCOSE BLDC GLUCOMTR-MCNC: 115 MG/DL (ref 70–99)
GLUCOSE BLDC GLUCOMTR-MCNC: 169 MG/DL (ref 70–99)
GLUCOSE SERPL-MCNC: 129 MG/DL (ref 70–99)
HCO3 SERPL-SCNC: 23 MMOL/L (ref 22–29)
HCT VFR BLD AUTO: 30.2 % (ref 35–47)
HGB BLD-MCNC: 9.6 G/DL (ref 11.7–15.7)
HOLD SPECIMEN: NORMAL
HOLD SPECIMEN: NORMAL
MAGNESIUM SERPL-MCNC: 1.7 MG/DL (ref 1.7–2.3)
MCH RBC QN AUTO: 28.9 PG (ref 26.5–33)
MCHC RBC AUTO-ENTMCNC: 31.8 G/DL (ref 31.5–36.5)
MCV RBC AUTO: 91 FL (ref 78–100)
PLATELET # BLD AUTO: 226 10E3/UL (ref 150–450)
POTASSIUM SERPL-SCNC: 4.7 MMOL/L (ref 3.4–5.3)
RBC # BLD AUTO: 3.32 10E6/UL (ref 3.8–5.2)
SODIUM SERPL-SCNC: 132 MMOL/L (ref 135–145)
WBC # BLD AUTO: 16.29 10E3/UL (ref 4–11)

## 2025-09-03 PROCEDURE — 250N000011 HC RX IP 250 OP 636: Mod: JZ | Performed by: FAMILY MEDICINE

## 2025-09-03 PROCEDURE — 999N000157 HC STATISTIC RCP TIME EA 10 MIN

## 2025-09-03 PROCEDURE — 94640 AIRWAY INHALATION TREATMENT: CPT

## 2025-09-03 PROCEDURE — 83735 ASSAY OF MAGNESIUM: CPT | Performed by: FAMILY MEDICINE

## 2025-09-03 PROCEDURE — 36415 COLL VENOUS BLD VENIPUNCTURE: CPT | Performed by: FAMILY MEDICINE

## 2025-09-03 PROCEDURE — 250N000009 HC RX 250: Performed by: FAMILY MEDICINE

## 2025-09-03 PROCEDURE — 250N000013 HC RX MED GY IP 250 OP 250 PS 637: Performed by: FAMILY MEDICINE

## 2025-09-03 PROCEDURE — 85014 HEMATOCRIT: CPT | Performed by: FAMILY MEDICINE

## 2025-09-03 PROCEDURE — 82310 ASSAY OF CALCIUM: CPT | Performed by: FAMILY MEDICINE

## 2025-09-03 PROCEDURE — 250N000012 HC RX MED GY IP 250 OP 636 PS 637: Performed by: FAMILY MEDICINE

## 2025-09-03 RX ORDER — BUMETANIDE 0.25 MG/ML
1 INJECTION, SOLUTION INTRAMUSCULAR; INTRAVENOUS ONCE
Status: COMPLETED | OUTPATIENT
Start: 2025-09-03 | End: 2025-09-03

## 2025-09-03 RX ORDER — FERROUS SULFATE 325(65) MG
325 TABLET, DELAYED RELEASE (ENTERIC COATED) ORAL DAILY
Qty: 30 TABLET | Refills: 0 | Status: SHIPPED | OUTPATIENT
Start: 2025-09-03

## 2025-09-03 RX ORDER — BUMETANIDE 1 MG/1
1 TABLET ORAL DAILY
Qty: 90 TABLET | Refills: 1 | Status: SHIPPED | OUTPATIENT
Start: 2025-09-03

## 2025-09-03 RX ADMIN — FERROUS SULFATE TAB EC 325 MG (65 MG FE EQUIVALENT) 325 MG: 325 (65 FE) TABLET DELAYED RESPONSE at 09:54

## 2025-09-03 RX ADMIN — IPRATROPIUM BROMIDE AND ALBUTEROL SULFATE 3 ML: .5; 3 SOLUTION RESPIRATORY (INHALATION) at 06:42

## 2025-09-03 RX ADMIN — BUDESONIDE 0.5 MG: 0.5 INHALANT RESPIRATORY (INHALATION) at 06:42

## 2025-09-03 RX ADMIN — ACETAMINOPHEN 1000 MG: 500 TABLET, FILM COATED ORAL at 05:33

## 2025-09-03 RX ADMIN — AZITHROMYCIN DIHYDRATE 500 MG: 250 TABLET, FILM COATED ORAL at 09:53

## 2025-09-03 RX ADMIN — PANTOPRAZOLE SODIUM 40 MG: 40 TABLET, DELAYED RELEASE ORAL at 08:06

## 2025-09-03 RX ADMIN — BUMETANIDE 1 MG: 0.25 INJECTION INTRAMUSCULAR; INTRAVENOUS at 10:37

## 2025-09-03 RX ADMIN — CARVEDILOL 3.12 MG: 3.12 TABLET, FILM COATED ORAL at 08:06

## 2025-09-03 RX ADMIN — CLOPIDOGREL 75 MG: 75 TABLET ORAL at 09:53

## 2025-09-03 RX ADMIN — LEVOTHYROXINE SODIUM 112 MCG: 0.11 TABLET ORAL at 09:53

## 2025-09-03 RX ADMIN — PREDNISONE 10 MG: 10 TABLET ORAL at 09:54

## 2025-09-03 ASSESSMENT — ACTIVITIES OF DAILY LIVING (ADL)
ADLS_ACUITY_SCORE: 51
ADLS_ACUITY_SCORE: 53
ADLS_ACUITY_SCORE: 51
ADLS_ACUITY_SCORE: 51
ADLS_ACUITY_SCORE: 53
ADLS_ACUITY_SCORE: 51
ADLS_ACUITY_SCORE: 53

## 2025-09-04 ENCOUNTER — PATIENT OUTREACH (OUTPATIENT)
Dept: CARE COORDINATION | Facility: CLINIC | Age: 75
End: 2025-09-04
Payer: MEDICARE

## 2025-09-04 ENCOUNTER — PATIENT OUTREACH (OUTPATIENT)
Dept: PEDIATRICS | Facility: OTHER | Age: 75
End: 2025-09-04
Payer: MEDICARE

## 2025-09-04 DIAGNOSIS — Z09 HOSPITAL DISCHARGE FOLLOW-UP: ICD-10-CM

## 2025-09-04 LAB
BACTERIA SPEC CULT: NO GROWTH
BACTERIA SPEC CULT: NO GROWTH

## (undated) RX ORDER — ACETAMINOPHEN 325 MG/1
TABLET ORAL
Status: DISPENSED
Start: 2023-11-10

## (undated) RX ORDER — HYDROMORPHONE HYDROCHLORIDE 1 MG/ML
INJECTION, SOLUTION INTRAMUSCULAR; INTRAVENOUS; SUBCUTANEOUS
Status: DISPENSED
Start: 2018-06-27

## (undated) RX ORDER — ACETAMINOPHEN 325 MG/1
TABLET ORAL
Status: DISPENSED
Start: 2022-10-13

## (undated) RX ORDER — IPRATROPIUM BROMIDE AND ALBUTEROL SULFATE 2.5; .5 MG/3ML; MG/3ML
SOLUTION RESPIRATORY (INHALATION)
Status: DISPENSED
Start: 2023-11-10

## (undated) RX ORDER — CEFTRIAXONE SODIUM 2 G
VIAL (EA) INJECTION
Status: DISPENSED
Start: 2020-01-25

## (undated) RX ORDER — ALBUTEROL SULFATE 0.83 MG/ML
SOLUTION RESPIRATORY (INHALATION)
Status: DISPENSED
Start: 2023-11-10

## (undated) RX ORDER — SODIUM CHLORIDE 9 MG/ML
INJECTION, SOLUTION INTRAVENOUS
Status: DISPENSED
Start: 2020-01-25

## (undated) RX ORDER — AZITHROMYCIN 500 MG/5ML
INJECTION, POWDER, LYOPHILIZED, FOR SOLUTION INTRAVENOUS
Status: DISPENSED
Start: 2020-01-25

## (undated) RX ORDER — CEFTRIAXONE SODIUM 1 G/50ML
INJECTION, SOLUTION INTRAVENOUS
Status: DISPENSED
Start: 2022-10-13

## (undated) RX ORDER — ALBUTEROL SULFATE 0.83 MG/ML
SOLUTION RESPIRATORY (INHALATION)
Status: DISPENSED
Start: 2024-12-15

## (undated) RX ORDER — LORAZEPAM 2 MG/ML
INJECTION INTRAMUSCULAR
Status: DISPENSED
Start: 2018-06-27

## (undated) RX ORDER — HYDROMORPHONE HYDROCHLORIDE 1 MG/ML
INJECTION, SOLUTION INTRAMUSCULAR; INTRAVENOUS; SUBCUTANEOUS
Status: DISPENSED
Start: 2018-06-15

## (undated) RX ORDER — IPRATROPIUM BROMIDE AND ALBUTEROL SULFATE 2.5; .5 MG/3ML; MG/3ML
SOLUTION RESPIRATORY (INHALATION)
Status: DISPENSED
Start: 2024-12-15

## (undated) RX ORDER — CEFTRIAXONE SODIUM 1 G
VIAL (EA) INJECTION
Status: DISPENSED
Start: 2024-12-15

## (undated) RX ORDER — ALBUTEROL SULFATE 0.83 MG/ML
SOLUTION RESPIRATORY (INHALATION)
Status: DISPENSED
Start: 2025-08-30

## (undated) RX ORDER — ONDANSETRON 2 MG/ML
INJECTION INTRAMUSCULAR; INTRAVENOUS
Status: DISPENSED
Start: 2018-06-15

## (undated) RX ORDER — PREDNISONE 20 MG/1
TABLET ORAL
Status: DISPENSED
Start: 2022-10-13

## (undated) RX ORDER — NITROGLYCERIN 20 MG/100ML
INJECTION INTRAVENOUS
Status: DISPENSED
Start: 2025-08-30

## (undated) RX ORDER — SODIUM POLYSTYRENE SULFONATE 15 G/60ML
SUSPENSION ORAL; RECTAL
Status: DISPENSED
Start: 2023-11-11

## (undated) RX ORDER — METHYLPREDNISOLONE SODIUM SUCCINATE 125 MG/2ML
INJECTION INTRAMUSCULAR; INTRAVENOUS
Status: DISPENSED
Start: 2024-12-15

## (undated) RX ORDER — LEVOFLOXACIN 5 MG/ML
INJECTION, SOLUTION INTRAVENOUS
Status: DISPENSED
Start: 2025-08-30

## (undated) RX ORDER — LEVOFLOXACIN 5 MG/ML
INJECTION, SOLUTION INTRAVENOUS
Status: DISPENSED
Start: 2018-06-15

## (undated) RX ORDER — AZITHROMYCIN 500 MG/5ML
INJECTION, POWDER, LYOPHILIZED, FOR SOLUTION INTRAVENOUS
Status: DISPENSED
Start: 2022-10-13

## (undated) RX ORDER — DOXYCYCLINE 100 MG/1
CAPSULE ORAL
Status: DISPENSED
Start: 2024-12-15

## (undated) RX ORDER — IPRATROPIUM BROMIDE AND ALBUTEROL SULFATE 2.5; .5 MG/3ML; MG/3ML
SOLUTION RESPIRATORY (INHALATION)
Status: DISPENSED
Start: 2022-10-13

## (undated) RX ORDER — DEXAMETHASONE SODIUM PHOSPHATE 10 MG/ML
INJECTION, SOLUTION INTRAMUSCULAR; INTRAVENOUS
Status: DISPENSED
Start: 2023-11-10

## (undated) RX ORDER — SODIUM CHLORIDE, SODIUM LACTATE, POTASSIUM CHLORIDE, CALCIUM CHLORIDE 600; 310; 30; 20 MG/100ML; MG/100ML; MG/100ML; MG/100ML
INJECTION, SOLUTION INTRAVENOUS
Status: DISPENSED
Start: 2020-01-25

## (undated) RX ORDER — SODIUM CHLORIDE 9 MG/ML
INJECTION, SOLUTION INTRAVENOUS
Status: DISPENSED
Start: 2018-06-27

## (undated) RX ORDER — ACETAMINOPHEN 325 MG/1
TABLET ORAL
Status: DISPENSED
Start: 2020-01-25

## (undated) RX ORDER — NITROGLYCERIN 0.4 MG/1
TABLET SUBLINGUAL
Status: DISPENSED
Start: 2018-06-17

## (undated) RX ORDER — SODIUM CHLORIDE, SODIUM LACTATE, POTASSIUM CHLORIDE, CALCIUM CHLORIDE 600; 310; 30; 20 MG/100ML; MG/100ML; MG/100ML; MG/100ML
INJECTION, SOLUTION INTRAVENOUS
Status: DISPENSED
Start: 2018-06-15

## (undated) RX ORDER — MORPHINE SULFATE 2 MG/ML
INJECTION, SOLUTION INTRAMUSCULAR; INTRAVENOUS
Status: DISPENSED
Start: 2018-06-27

## (undated) RX ORDER — TRAMADOL HYDROCHLORIDE 50 MG/1
TABLET ORAL
Status: DISPENSED
Start: 2024-12-15

## (undated) RX ORDER — HYDROMORPHONE HYDROCHLORIDE 1 MG/ML
INJECTION, SOLUTION INTRAMUSCULAR; INTRAVENOUS; SUBCUTANEOUS
Status: DISPENSED
Start: 2025-08-30

## (undated) RX ORDER — HYDROCODONE BITARTRATE AND ACETAMINOPHEN 5; 325 MG/1; MG/1
TABLET ORAL
Status: DISPENSED
Start: 2024-12-15

## (undated) RX ORDER — SODIUM CHLORIDE 9 MG/ML
INJECTION, SOLUTION INTRAVENOUS
Status: DISPENSED
Start: 2018-06-15